# Patient Record
Sex: FEMALE | Race: OTHER | Employment: PART TIME | URBAN - METROPOLITAN AREA
[De-identification: names, ages, dates, MRNs, and addresses within clinical notes are randomized per-mention and may not be internally consistent; named-entity substitution may affect disease eponyms.]

---

## 2017-01-11 PROBLEM — Z51.89 TREATMENT: Status: ACTIVE | Noted: 2017-01-11

## 2017-01-26 ENCOUNTER — OFFICE VISIT (OUTPATIENT)
Dept: INTERNAL MEDICINE | Age: 42
End: 2017-01-26
Attending: INTERNAL MEDICINE

## 2017-01-26 VITALS — DIASTOLIC BLOOD PRESSURE: 100 MMHG | SYSTOLIC BLOOD PRESSURE: 150 MMHG | OXYGEN SATURATION: 99 %

## 2017-01-26 DIAGNOSIS — L72.9 CYST OF SUBCUTANEOUS TISSUE: Primary | ICD-10-CM

## 2017-01-26 RX ORDER — POLYETHYLENE GLYCOL 3350 17 G/17G
17 POWDER, FOR SOLUTION ORAL PRN
Qty: 119 G | Refills: 1 | Status: SHIPPED | OUTPATIENT
Start: 2017-01-26 | End: 2018-07-16 | Stop reason: SDUPTHER

## 2017-01-26 RX ORDER — MOMETASONE FUROATE 1 MG/G
CREAM TOPICAL
Qty: 1 TUBE | Refills: 2 | Status: SHIPPED | OUTPATIENT
Start: 2017-01-26 | End: 2017-11-06 | Stop reason: SDUPTHER

## 2017-01-26 ASSESSMENT — ENCOUNTER SYMPTOMS
VOMITING: 0
BLOOD IN STOOL: 1
NAUSEA: 0
COUGH: 0
ABDOMINAL PAIN: 0
DIARRHEA: 0
SHORTNESS OF BREATH: 0

## 2017-02-03 ENCOUNTER — TELEPHONE (OUTPATIENT)
Dept: INTERNAL MEDICINE | Age: 42
End: 2017-02-03

## 2017-02-06 PROBLEM — L03.311 ABDOMINAL WALL CELLULITIS: Status: ACTIVE | Noted: 2017-02-06

## 2017-02-08 PROBLEM — A49.02 MRSA INFECTION: Status: ACTIVE | Noted: 2017-02-08

## 2017-02-08 PROBLEM — L02.211 ABDOMINAL WALL ABSCESS: Status: ACTIVE | Noted: 2017-02-08

## 2017-02-13 ENCOUNTER — TELEPHONE (OUTPATIENT)
Dept: INFECTIOUS DISEASES | Age: 42
End: 2017-02-13

## 2017-02-20 ENCOUNTER — OFFICE VISIT (OUTPATIENT)
Dept: INTERNAL MEDICINE | Age: 42
End: 2017-02-20
Attending: STUDENT IN AN ORGANIZED HEALTH CARE EDUCATION/TRAINING PROGRAM

## 2017-02-20 VITALS
HEART RATE: 89 BPM | DIASTOLIC BLOOD PRESSURE: 94 MMHG | RESPIRATION RATE: 20 BRPM | TEMPERATURE: 99.3 F | BODY MASS INDEX: 39.48 KG/M2 | SYSTOLIC BLOOD PRESSURE: 142 MMHG | WEIGHT: 222.8 LBS | OXYGEN SATURATION: 99 %

## 2017-02-20 RX ORDER — LINEZOLID 600 MG/1
600 TABLET, FILM COATED ORAL 2 TIMES DAILY
Qty: 10 TABLET | Refills: 0 | Status: SHIPPED | OUTPATIENT
Start: 2017-02-20 | End: 2017-02-25

## 2017-02-20 RX ORDER — CETIRIZINE HYDROCHLORIDE, PSEUDOEPHEDRINE HYDROCHLORIDE 5; 120 MG/1; MG/1
1 TABLET, FILM COATED, EXTENDED RELEASE ORAL 2 TIMES DAILY
Qty: 60 TABLET | Refills: 0 | Status: SHIPPED | OUTPATIENT
Start: 2017-02-20 | End: 2017-03-22

## 2017-02-20 ASSESSMENT — ENCOUNTER SYMPTOMS
SHORTNESS OF BREATH: 0
SPUTUM PRODUCTION: 0
NAUSEA: 0
WHEEZING: 0
DOUBLE VISION: 0
BLOOD IN STOOL: 0
COUGH: 0
DIARRHEA: 0
BLURRED VISION: 0
VOMITING: 0
CONSTIPATION: 0

## 2017-03-01 RX ORDER — LEVOTHYROXINE SODIUM 0.12 MG/1
125 TABLET ORAL DAILY
Qty: 30 TABLET | Refills: 2
Start: 2017-03-01 | End: 2017-11-06 | Stop reason: SDUPTHER

## 2017-03-06 ENCOUNTER — TELEPHONE (OUTPATIENT)
Dept: INTERNAL MEDICINE | Age: 42
End: 2017-03-06

## 2017-04-04 DIAGNOSIS — J45.909 UNCOMPLICATED ASTHMA, UNSPECIFIED ASTHMA SEVERITY: ICD-10-CM

## 2017-04-04 RX ORDER — MONTELUKAST SODIUM 10 MG/1
10 TABLET ORAL DAILY
Qty: 30 TABLET | Refills: 1 | OUTPATIENT
Start: 2017-04-04 | End: 2017-10-16 | Stop reason: SDUPTHER

## 2017-05-10 PROBLEM — D69.3 CHRONIC ITP (IDIOPATHIC THROMBOCYTOPENIA) (HCC): Status: ACTIVE | Noted: 2017-05-10

## 2017-06-12 ENCOUNTER — OFFICE VISIT (OUTPATIENT)
Dept: INTERNAL MEDICINE | Age: 42
End: 2017-06-12
Attending: STUDENT IN AN ORGANIZED HEALTH CARE EDUCATION/TRAINING PROGRAM

## 2017-06-12 VITALS
WEIGHT: 224 LBS | SYSTOLIC BLOOD PRESSURE: 122 MMHG | RESPIRATION RATE: 18 BRPM | BODY MASS INDEX: 39.69 KG/M2 | TEMPERATURE: 99.2 F | OXYGEN SATURATION: 97 % | HEART RATE: 85 BPM | DIASTOLIC BLOOD PRESSURE: 79 MMHG

## 2017-06-12 DIAGNOSIS — E03.9 HYPOTHYROIDISM, UNSPECIFIED TYPE: Primary | ICD-10-CM

## 2017-06-12 DIAGNOSIS — E66.9 OBESITY, UNSPECIFIED OBESITY SEVERITY, UNSPECIFIED OBESITY TYPE: ICD-10-CM

## 2017-06-12 DIAGNOSIS — R20.0 NUMBNESS AND TINGLING: ICD-10-CM

## 2017-06-12 DIAGNOSIS — R20.2 NUMBNESS AND TINGLING: ICD-10-CM

## 2017-06-12 LAB
GLUCOSE BLD-MCNC: 111 MG/DL (ref 70–99)
PERFORMED ON: ABNORMAL

## 2017-06-12 RX ORDER — PREDNISONE 20 MG/1
TABLET ORAL
Qty: 10 TABLET | Refills: 0 | Status: SHIPPED | OUTPATIENT
Start: 2017-06-12 | End: 2017-06-22

## 2017-06-12 RX ORDER — ALBUTEROL SULFATE 90 UG/1
2 AEROSOL, METERED RESPIRATORY (INHALATION) EVERY 6 HOURS PRN
Qty: 1 INHALER | Refills: 5 | Status: SHIPPED | OUTPATIENT
Start: 2017-06-12 | End: 2018-03-19 | Stop reason: SDUPTHER

## 2017-06-12 RX ORDER — AMOXICILLIN AND CLAVULANATE POTASSIUM 875; 125 MG/1; MG/1
1 TABLET, FILM COATED ORAL 2 TIMES DAILY
Qty: 14 TABLET | Refills: 0 | Status: SHIPPED | OUTPATIENT
Start: 2017-06-12 | End: 2017-06-19

## 2017-06-12 RX ORDER — PANTOPRAZOLE SODIUM 40 MG/1
40 TABLET, DELAYED RELEASE ORAL DAILY
Qty: 30 TABLET | Refills: 2 | Status: SHIPPED | OUTPATIENT
Start: 2017-06-12 | End: 2018-07-16 | Stop reason: SDUPTHER

## 2017-06-12 ASSESSMENT — ENCOUNTER SYMPTOMS
SHORTNESS OF BREATH: 0
CONSTIPATION: 0
BLURRED VISION: 0
BLOOD IN STOOL: 0
VOMITING: 0
WHEEZING: 0
SORE THROAT: 1
PHOTOPHOBIA: 0
NAUSEA: 0
DOUBLE VISION: 0
SPUTUM PRODUCTION: 1
DIARRHEA: 0
COUGH: 1

## 2017-10-16 ENCOUNTER — OFFICE VISIT (OUTPATIENT)
Dept: INTERNAL MEDICINE | Age: 42
End: 2017-10-16
Attending: STUDENT IN AN ORGANIZED HEALTH CARE EDUCATION/TRAINING PROGRAM

## 2017-10-16 VITALS
HEIGHT: 63 IN | TEMPERATURE: 98.2 F | WEIGHT: 220 LBS | RESPIRATION RATE: 16 BRPM | HEART RATE: 83 BPM | OXYGEN SATURATION: 96 % | BODY MASS INDEX: 38.98 KG/M2 | SYSTOLIC BLOOD PRESSURE: 134 MMHG | DIASTOLIC BLOOD PRESSURE: 90 MMHG

## 2017-10-16 DIAGNOSIS — K75.4 AUTOIMMUNE HEPATITIS (HCC): Primary | ICD-10-CM

## 2017-10-16 DIAGNOSIS — Z12.39 BREAST SCREENING: ICD-10-CM

## 2017-10-16 DIAGNOSIS — Z76.0 MEDICATION REFILL: ICD-10-CM

## 2017-10-16 RX ORDER — MAGNESIUM 200 MG
200 TABLET ORAL DAILY
Qty: 30 TABLET | Refills: 0 | Status: SHIPPED | OUTPATIENT
Start: 2017-10-16 | End: 2018-02-15

## 2017-10-16 RX ORDER — RIZATRIPTAN BENZOATE 5 MG/1
5 TABLET ORAL
Qty: 6 TABLET | Refills: 0 | Status: SHIPPED | OUTPATIENT
Start: 2017-10-16 | End: 2018-07-16 | Stop reason: SDUPTHER

## 2017-10-16 RX ORDER — MONTELUKAST SODIUM 10 MG/1
10 TABLET ORAL NIGHTLY
Qty: 30 TABLET | Refills: 0 | Status: SHIPPED | OUTPATIENT
Start: 2017-10-16 | End: 2018-03-05 | Stop reason: SDUPTHER

## 2017-10-16 RX ORDER — SUMATRIPTAN 50 MG/1
50 TABLET, FILM COATED ORAL
Qty: 6 TABLET | Refills: 0 | Status: CANCELLED | OUTPATIENT
Start: 2017-10-16 | End: 2017-10-16

## 2017-10-16 RX ORDER — RIBOFLAVIN (VITAMIN B2) 400 MG
400 TABLET ORAL DAILY
Qty: 30 TABLET | Refills: 0 | Status: SHIPPED | OUTPATIENT
Start: 2017-10-16 | End: 2018-02-15

## 2017-10-16 ASSESSMENT — ENCOUNTER SYMPTOMS
NAUSEA: 1
SPUTUM PRODUCTION: 0
DOUBLE VISION: 0
EYE REDNESS: 1
VOMITING: 0
PHOTOPHOBIA: 1
DIARRHEA: 0
CONSTIPATION: 0
BLURRED VISION: 1
COUGH: 0
BLOOD IN STOOL: 0
SORE THROAT: 0
WHEEZING: 0
SINUS PAIN: 1
SHORTNESS OF BREATH: 0

## 2017-10-16 NOTE — PROGRESS NOTES
[facility] OUTPATIENT CLINIC       NURSING PROGRESS NOTE      2017  Riley Arora    Chief Complaint:   Chief Complaint   Patient presents with    Migraine       Constitutional: {Findings; ROS constitutional:13509::\"negative\"}  Eyes: {Findings; ROS eyes:11292::\"negative\"}  Ears, nose, mouth, throat, and face: {Findings; ROS HEENT:97991::\"negative\"}  Respiratory: {Findings; ROS respiratory:95106::\"negative\"}  Cardiovascular: {Findings; ROS cardiac:90425::\"negative\"}  Gastrointestinal: {Findings; ROS gastrointestinal:62421::\"negative\"}  Genitourinary: {Findings; ROS genitourinary:95341::\"negative\"}  Integument/breast: {Findings; ROS skin/breast:97904::\"negative\"}  Hematologic/lymphatic: {Findings; ROS hematologic/lymphatic:34946::\"negative\"}  Musculoskeletal: {Findings; ROS musculoskeletal:35650::\"negative\"}  Neurological: {Findings; ROS neuro:48842::\"negative\"}  Diabetes: {YES/NO/NA:238897285}  Yes:  Medication compliance:  {compliance:398943}  Diabetic diet compliance:  {compliance:923188}  Home glucose monitoring:  {home testin}  Last eye exam:  {NUMBER 1-10:4264015726} {days/wks/mos/yrs:947820}  Acute symptoms hyperglycemia:  {symptoms; hyperglycemia:45558}  Acute symptoms hypoglycemia:  {symptoms; hypoglycemia:50533}  POC glucose today: *** mg/dL    Pain Assessment:  Pain Present: {YES/NO:35061}  Pain Score: {NUMBERS; 0-10:5044}  Pain Quality/Description: {[facility] PAIN DESC:}    Mobility/Safety/Self-Care:  Steady Gait: {YES / NO:}  History of Falls: {EXAM; YES/NO:::\"No\"}   History of a Fall within the last 30 days {YES OR NO:}  Assistive Device: {Mobility/Ambulatory Device:1744633231}  Poor Hygiene: {YES / KS:93848}    Psychosocial:   Depression: {YES / DM:12067}  If YES,    Does Patient express current thoughts of harming self or others?: {YES / IN:32799}  Anxious: {YES / RACHAEL:23223}  Insomnia: {YES / ZF:36403}  Inappropriate Behavior: {YES / GV:36986}  Alcohol Abuse:

## 2017-10-16 NOTE — PROGRESS NOTES
cycle     Morbid obesity with BMI of 40.0-44.9, adult ([address] 5/26/2015    MRSA (methicillin resistant staph aureus) culture positive 02/03/2017    abdominal abscess    MRSA infection 08/20/2012    rt thigh abscess    Pericarditis, acute     Sinusitis        Past Surgical History:        Procedure Laterality Date    OTHER SURGICAL HISTORY  8/22/2012    INCISION AND DRAINAGE POSTERIOR THIGH ABSCESS    RECTAL SURGERY  Aug 2011    repair of rectal fissures and anal skin tag removal    SINUS SURGERY      X 3    TONSILLECTOMY  2004    TONSILLECTOMY AND ADENOIDECTOMY  2005    (partial adenoidectomy)       Family History:       Problem Relation Age of Onset    High Blood Pressure Mother     Heart Disease Father     Diabetes Maternal Aunt        Social History:   TOBACCO:   reports that she has never smoked. She has never used smokeless tobacco.  ETOH:   reports that she drinks alcohol. OCCUPATION:      Allergies:  Latex; Clindamycin/lincomycin; Sulfa antibiotics; and Diflucan [fluconazole]    Current Medications:    Prior to Admission medications    Medication Sig Start Date End Date Taking? Authorizing Provider   pantoprazole (PROTONIX) 40 MG tablet Take 1 tablet by mouth daily 6/12/17  Yes Vic Vaca MD   albuterol sulfate HFA (PROVENTIL HFA) 108 (90 BASE) MCG/ACT inhaler Inhale 2 puffs into the lungs every 6 hours as needed for Wheezing 6/12/17  Yes Vic Vaca MD   levothyroxine (SYNTHROID) 125 MCG tablet Take 1 tablet by mouth daily 3/1/17  Yes Raul Watkins MD   lactobacillus (CULTURELLE) CAPS capsule Take 4 capsules by mouth daily 2/9/17  Yes Suzie Paniagua MD   mometasone (ELOCON) 0.1 % cream Apply topically daily. 1/26/17  Yes Kerri Kwon MD   polyethylene glycol [facility]) powder Take 17 g by mouth as needed (constipation) Take as directed. 1/26/17  Yes Kerri Kwon MD   urea (CARMOL) 10 % cream Apply topically as needed.  10/24/16  Yes Miles Waller DPM   folic acid (FOLVITE) 1 MG tablet Take 1 tablet by mouth daily 7/18/16  Yes Ekaterina Santizo MD   montelukast (SINGULAIR) 10 MG tablet Take 1 tablet by mouth daily 4/4/17   Ekaterina Santizo MD   cyanocobalamin 1000 MCG/ML injection Inject 1 ml subcutaneously every 30 days 3/8/17   Nadia Bahena MD   Syringe, Disposable, 2.5 ML MISC 1 Syringe by Does not apply route every 30 days 3/8/17   Nadia Bahena MD   NEEDLE, DISP, 25 G (BD DISP NEEDLES) 25G X 5/8\" MISC 1 packet by Does not apply route every 30 days 3/8/17   Nadia Bahena MD   budesonide-formoterol [facility]) 160-4.5 MCG/ACT AERO Inhale 2 puffs into the lungs 2 times daily 7/18/16   Ekaterina Santizo MD   Biotin 5000 MCG TABS Take 5,000 mcg by mouth daily    Historical Provider, MD   Spacer/Aero-Holding Chambers (AEROCHAMBER PLUS-FLOW SIGNAL) MISC Use with inhalers as directed 8/17/15   Zak Galvez MD   budesonide (PULMICORT) 0.5 MG/2ML nebulizer suspension Use 1 raspule (0.5mg) to mix with 1 teaspoon of saline. Then instill into sinuses as instructed. 2/2/15   Nil Roger Arana MD       Review of Systems   Constitutional: Negative for chills, fever and malaise/fatigue. HENT: Positive for congestion (improving) and sinus pain (improving). Negative for ear discharge, ear pain, sore throat and tinnitus. Headache     Eyes: Positive for blurred vision (with migraine), photophobia and redness. Negative for double vision. Respiratory: Negative for cough, sputum production, shortness of breath and wheezing. Cardiovascular: Negative for chest pain, palpitations and leg swelling. Gastrointestinal: Positive for nausea (with migraine). Negative for blood in stool, constipation, diarrhea, melena and vomiting. Genitourinary: Negative for dysuria, flank pain, hematuria and urgency. Musculoskeletal: Negative for falls. Skin: Negative for rash. Neurological: Negative for dizziness, focal weakness, loss of consciousness and headaches.    Endo/Heme/Allergies: Negative hours. [unfilled]    No results for input(s): Ulyess Course, LABMICR, MICROALBUR, Michelle Zhou in the last 72 hours. Lab Results   Component Value Date    TSH 0.67 11/26/2016       Hematology:  No results for input(s): WBC, HGB, HCT, PLT, MCV, MCH, MCHC, RDW, EOSABS in the last 72 hours. Invalid input(s): NEUTP, LYMPHP, MONOSP, EOSP, BASOP, NEUTABS, LYMPHABS, MONOABS, BASOABS    Lab Results   Component Value Date    IRON 106 02/21/2017    TIBC 259 02/21/2017    FERRITIN 812.7 (H) 02/21/2017    FOLATE 7.11 11/26/2016    MZXDDODR58 278 11/26/2016       Lipid:  Lab Results   Component Value Date    CHOL 174 10/24/2013    HDL 56 10/24/2013    LDLCALC 99 10/24/2013    TRIG 93 10/24/2013       U/A:  Lab Results   Component Value Date    LABMICR SEE BELOW 09/20/2016         Assessment/Plan:     Headache. Reports history of migraines. Headache can also be due to her sinus issues and/or stress. Patient stated new lights installed in her work has resulted in an exacerbation and making it difficult to work. Requested letter to wear sunglasses while working.  - Will prescribe rizatriptan 5mg, if improves her headaches will know with more certainty it is a migraine  - Prescribed Riboflavin/magnesium   - Letter provided to wear sunglasses while working  - F/u in 2 weeks     Sinusitis. Patient with previous history of sinusitis. Reports another episodes and was seen by her ENT. Reports currently taking Augmentin and prednisone.   - Cont Per ENT    Left Eye Conjunctivitis. Some conjunctival erythema evident. No drainage currently. Likely viral. May also be due to allergies as patient has not been taking her Singulair. No evidence of bacterial infection  - Reorder Singulair  - Patient already on PO antibiotics per ENT for sinusitis   - Recommended patient obtain eye lid scrub/rince   - Monitor    Autoimmune Hepatitis: Patient was diagnosed on 11/26/2016. Smooth muscle antibody <1.2. F actin IgG greater than 57. Tissue transglutaminase 1. [place] positive. Enzymes elevated during last hospital admission.     - Patient reported she was seen by GI at [facility] and was followed for this problem. Per GI note from January (Dr. Marcos Scherer) it appears he was awaiting to complete his own evaluation prior to referring her to a hepatologist. No notes founds thereafter.   - Patient informed she needs to follow up with GI about her autoimmune hepatitis as she has not seen them since her colonoscopy. - Had a colonoscopy was in February which was normal. F/u scope in 2027. - Will defer to GI   - Will get CMP  - Monitor    Autoimmune thrombocytopenia  - Patient follows with Dr. Ventura Vines, informed she needs an appointment  - Platelet count appeared stable   - Will get CBC  - Will defer management to oncology/hematology  - Monitor    Hypothyroidism. TSH 0.67 on 11/26/16  - Cont home synthroid  - Check TSH    Cellulitis- Resolved. Patient had presented to the ED on Friday 2/3 with left lower quadrant cellulitis and I&D was performed on an abscess that was present. Reduced sensation- Resolved. Patient previously complained of somewhat reduced sensation on the 2nd digit of both feet. Today patient issue has resolved, thinks it may have been \"dead skin\". Yeast Infection - Resolved    Health Maintenance   - Patient does not wish to have any vaccinations currently  - Does not want cervical screening during current visit  - Ordered mammogram   - Will check lipids, last checked in 2013    Case will be discussed with preceptor.      River Moran MD  Internal Medicine Resident  Pager: [phone]  10/16/2017, 10:58 AM

## 2017-10-16 NOTE — PATIENT INSTRUCTIONS
Before any of you medication is completely gone, call your pharmacy AT LEAST 1 WEEK ahead for refills. Review all information regarding your medication before starting. If you become ill when the clinic is closed, please call the [facility] [employer]  at   #[phone] and ask the  to page the AOD between 6:00 AM and 6:00 PM or page the AON between 6:00 PM and 6:00 am    Labs are done Tuesday thru Friday from 8:00 to 9:00 AM. For fasting labs do not eat anything for 12 hours prior. You may drink water. The clinic is not able to process MY CHART requests for appointments or messages including requests. Please call the [address] [place] [address] at [phone]  For appointments and messages. Call your pharmacy for medication refills. Return to clinic in 2 weeks    Please go to [employer] and get an eyelid scrub for your eye matter    Continue medication as listed on discharge sheet  We are starting you on Magnesium 200 mgs daily  Riboflavin 400 mgs daily   riboflavin (vitamin B2)  Pronunciation:  RYE bow flay toma  Brand:  [id number], Vitamin B2  What is the most important information I should know about riboflavin? Follow all directions on your medicine label and package. Tell each of your healthcare providers about all your medical conditions, allergies, and all medicines you use. What is riboflavin? Riboflavin is vitamin B2. Vitamins are naturally occurring substances necessary for many processes in the body. Riboflavin is important in the maintenance of many tissues of the body. Riboflavin is used to treat or prevent deficiencies of riboflavin. Riboflavin may also be used for purposes not listed in this medication guide. What should I discuss with my healthcare provider before taking riboflavin? Ask a doctor or pharmacist if it is safe for you to use this medicine if you have other medical conditions, especially:  · gallbladder disease; or  · cirrhosis or other liver disease.   Riboflavin for medical advice about side effects. You may report side effects to FDA at 1-759-ITE-2197. What other drugs will affect riboflavin? Other drugs may interact with riboflavin, including prescription and over-the-counter medicines, vitamins, and herbal products. Tell each of your health care providers about all medicines you use now and any medicine you start or stop using. Where can I get more information? Your pharmacist can provide more information about riboflavin. Remember, keep this and all other medicines out of the reach of children, never share your medicines with others, and use this medication only for the indication prescribed. Every effort has been made to ensure that the information provided by [facility]  is accurate, up-to-date, and complete, but no guarantee is made to that effect. Drug information contained herein may be time sensitive. [employer] information has been compiled for use by healthcare practitioners and consumers in the [place] and therefore [employer] does not warrant that uses outside of the [place] are appropriate, unless specifically indicated otherwise. [facility] drug information does not endorse drugs, diagnose patients or recommend therapy. [employer] drug information is an informational resource designed to assist licensed healthcare practitioners in caring for their patients and/or to serve consumers viewing this service as a supplement to, and not a substitute for, the expertise, skill, knowledge and judgment of healthcare practitioners. The absence of a warning for a given drug or drug combination in no way should be construed to indicate that the drug or drug combination is safe, effective or appropriate for any given patient. [employer] does not assume any responsibility for any aspect of healthcare administered with the aid of information [employer] provides.  The information contained herein is not intended to cover all possible uses, directions, may also be used for purposes not listed in this medication guide. What should I discuss with my healthcare provider before using rizatriptan? You should not use rizatriptan if you are allergic to it, or if you have:  · severe or uncontrolled high blood pressure;  · past or present heart problems;  · a history of coronary artery disease, angina (chest pain), heart attack, or stroke, including \"mini-stroke\";  · a blood vessel disorder or circulation problems that cause a lack of blood supply within the body; or  · a headache that seems different from your usual migraine headaches. Do not use rizatriptan if you have used an MAO inhibitor in the past 14 days. A dangerous drug interaction could occur. MAO inhibitors include isocarboxazid, linezolid, methylene blue injection, phenelzine, rasagiline, selegiline, tranylcypromine, and others. To make sure rizatriptan is safe for you, tell your doctor if you have:  · liver or kidney disease;  · high blood pressure, a heart rhythm disorder;  · a condition for which you take propranolol (Hemangeol, Inderal, InnoPran); or  · coronary heart disease (or risk factors such as diabetes, menopause, smoking, being overweight, having high cholesterol, having a family history of coronary artery disease, being older than 36 and a man, or being a woman who has had a hysterectomy). Rizatriptan disintegrating tablets may contain phenylalanine. Talk to your doctor before using this form of rizatriptan if you have phenylketonuria (PKU). It is not known whether this medicine will harm an unborn baby. Tell your doctor if you are pregnant or plan to become pregnant. It is not known whether rizatriptan passes into breast milk or if it could harm a nursing baby. Tell your doctor if you are breast-feeding a baby. Rizatriptan is not approved for use by anyone younger than 10years old. How should I use rizatriptan?   Your doctor may want to give your first dose of this medicine in a hospital be checked using an electrocardiograph or ECG (sometimes called an EKG). Store at room temperature away from moisture and heat. What happens if I miss a dose? Since rizatriptan is used as needed, it does not have a daily dosing schedule. Call your doctor promptly if your symptoms do not improve after using rizatriptan. What happens if I overdose? Seek emergency medical attention or call the Poison Help line at 1-787.719.4238. What should I avoid while using rizatriptan? Do not take rizatriptan within 24 hours before or after using another migraine headache medicine,  including:  · medicines like rizatriptan--almotriptan, eletriptan, frovatriptan, naratriptan, sumatriptan, zolmitriptan, and others; or  · ergot medicine--dihydroergotamine, ergotamine, ergonovine, methylergonovine. Rizatriptan may impair your thinking or reactions. Be careful if you drive or do anything that requires you to be alert. What are the possible side effects of rizatriptan? Get emergency medical help if you have signs of an allergic reaction: hives; difficult breathing; swelling of your face, lips, tongue, or throat. Stop using rizatriptan and call your doctor at once if you have:  · sudden and severe stomach pain and bloody diarrhea;  · cold feeling or numbness in your feet and hands;  · heart attack symptoms --chest pain or pressure, pain spreading to your jaw or shoulder, nausea, sweating;  · high levels of serotonin in the body --agitation, hallucinations, fever, fast heart rate, overactive reflexes, nausea, vomiting, diarrhea, loss of coordination, fainting;  · signs of a stroke --sudden numbness or weakness (especially on one side of the body), sudden severe headache, slurred speech, problems with vision or balance; or  · dangerously high blood pressure --severe headache, blurred vision, buzzing in your ears, anxiety, confusion, chest pain, shortness of breath, uneven heartbeats, seizure.   Common side effects may viewing this service as a supplement to, and not a substitute for, the expertise, skill, knowledge and judgment of healthcare practitioners. The absence of a warning for a given drug or drug combination in no way should be construed to indicate that the drug or drug combination is safe, effective or appropriate for any given patient. [facility] does not assume any responsibility for any aspect of healthcare administered with the aid of information [facility] provides. The information contained herein is not intended to cover all possible uses, directions, precautions, warnings, drug interactions, allergic reactions, or adverse effects. If you have questions about the drugs you are taking, check with your doctor, nurse or pharmacist.  Copyright 1264-4651 [address]: 11.01. Revision date: 9/29/2016. Care instructions adapted under license by [facility] ([facility]). If you have questions about a medical condition or this instruction, always ask your healthcare professional. [place] [address] any warranty or liability for your use of this information. 5 mgs for migraines. Take as directed    Please schedule a mammogram    Go to the outpatient lab after fasting for 8 hours. Eat no food or beverages except water. Please have labs drawn this week. Instructions reviewed before discharge and copy given to patient by ANTOINE Parks RN, [address] [employer] [phone]

## 2017-10-16 NOTE — PROGRESS NOTES
[facility]       NURSING PROGRESS NOTE      October 16, 2017  Letty Washburn    Chief Complaint:   Chief Complaint   Patient presents with    Migraine       Constitutional: negative  Eyes: positive for redness in left eye with drainage. Photosensitivity  Ears, nose, mouth, throat, and face: positive for clearing sinus infection  Respiratory: negative  Cardiovascular: negative  Gastrointestinal: negative  Genitourinary: negative  Integument/breast: negative  Hematologic/lymphatic: negative  Musculoskeletal: negative  Neurological: positive for headaches  Diabetes: No  Pain Assessment:  Pain Present: yes  Pain Score: 9  Pain Quality/Description: Aching    Mobility/Safety/Self-Care:  Steady Gait: Yes  History of Falls: No   History of a Fall within the last 30 days No  Assistive Device: None  Poor Hygiene: No    Psychosocial:   Depression: No  If YES,    Does Patient express current thoughts of harming self or others?: No  Anxious: Yes, at work  Insomnia: Yes  Inappropriate Behavior: No  Alcohol Abuse: no  Substance Abuse: no  Signs of Physical Abuse: No  Signs of Emotional Abuse: No    Educational:  Identify the learner who is being assessed for education:  patient                    Ability to Learn:  Exhibits ability to grasp concepts and respond to questions: High  Ready to Learn: Yes  calm   Preferred Method of Learning:  verbal  Barriers to Learning: none  Special Considerations due to cultural, Buddhist, spiritual beliefs:  No  Language:  English  :  No    Note:   Here for follow up. Having migraines and needs a work note allowing her to wear sunglasses at work. Declined flu shot.     11:44 AM 10/16/2017

## 2017-10-16 NOTE — LETTER
To Whom This May Concern,     I've been asked by Ms. Marcel iMshra to write this letter. She is under this clinic's care for treatment of chronic migraine headaches. Over the last several months, she had several office visits that have focused around her self report inability to work because of ongoing headaches triggered by LED lights. Ms. Marcel Mishra was referred to a neurologist for additional evaluation and as of this date, she had declined to have further testing. This office cannot document that she can work without restrictions.        Sincerely          MD Gwen Wharton MD

## 2017-10-28 LAB — T4 FREE: 0.7 NG/DL (ref 0.9–1.8)

## 2017-10-30 ENCOUNTER — HOSPITAL ENCOUNTER (OUTPATIENT)
Dept: CT IMAGING | Age: 42
Discharge: HOME | End: 2017-10-30
Attending: STUDENT IN AN ORGANIZED HEALTH CARE EDUCATION/TRAINING PROGRAM | Admitting: STUDENT IN AN ORGANIZED HEALTH CARE EDUCATION/TRAINING PROGRAM

## 2017-10-30 ENCOUNTER — OFFICE VISIT (OUTPATIENT)
Dept: INTERNAL MEDICINE | Age: 42
End: 2017-10-30
Attending: STUDENT IN AN ORGANIZED HEALTH CARE EDUCATION/TRAINING PROGRAM

## 2017-10-30 ENCOUNTER — HOSPITAL ENCOUNTER (OUTPATIENT)
Dept: MAMMOGRAPHY | Age: 42
Discharge: HOME | End: 2017-10-30
Attending: STUDENT IN AN ORGANIZED HEALTH CARE EDUCATION/TRAINING PROGRAM | Admitting: STUDENT IN AN ORGANIZED HEALTH CARE EDUCATION/TRAINING PROGRAM

## 2017-10-30 VITALS
BODY MASS INDEX: 38.62 KG/M2 | SYSTOLIC BLOOD PRESSURE: 131 MMHG | RESPIRATION RATE: 18 BRPM | HEART RATE: 84 BPM | TEMPERATURE: 97.9 F | HEIGHT: 63 IN | WEIGHT: 218 LBS | OXYGEN SATURATION: 99 % | DIASTOLIC BLOOD PRESSURE: 86 MMHG

## 2017-10-30 DIAGNOSIS — R51.9 HEADACHE: ICD-10-CM

## 2017-10-30 DIAGNOSIS — R51.9 INTRACTABLE EPISODIC HEADACHE, UNSPECIFIED HEADACHE TYPE: Primary | ICD-10-CM

## 2017-10-30 DIAGNOSIS — E03.9 HYPOTHYROIDISM, UNSPECIFIED TYPE: ICD-10-CM

## 2017-10-30 DIAGNOSIS — Z12.39 BREAST SCREENING: ICD-10-CM

## 2017-10-30 DIAGNOSIS — K90.9 INTESTINAL MALABSORPTION, UNSPECIFIED TYPE: Chronic | ICD-10-CM

## 2017-10-30 DIAGNOSIS — R51.9 INTRACTABLE EPISODIC HEADACHE, UNSPECIFIED HEADACHE TYPE: ICD-10-CM

## 2017-10-30 DIAGNOSIS — D50.0 IRON DEFICIENCY ANEMIA DUE TO CHRONIC BLOOD LOSS: ICD-10-CM

## 2017-10-30 DIAGNOSIS — Z00.00 PREVENTATIVE HEALTH CARE: ICD-10-CM

## 2017-10-30 RX ORDER — LACTOBACILLUS RHAMNOSUS GG 10B CELL
4 CAPSULE ORAL DAILY
Qty: 120 CAPSULE | Refills: 0 | Status: SHIPPED | OUTPATIENT
Start: 2017-10-30 | End: 2017-12-04 | Stop reason: SDUPTHER

## 2017-10-30 RX ORDER — CYANOCOBALAMIN 1000 UG/ML
INJECTION INTRAMUSCULAR; SUBCUTANEOUS
Qty: 1 ML | Refills: 11 | Status: SHIPPED | OUTPATIENT
Start: 2017-10-30 | End: 2018-07-16 | Stop reason: SDUPTHER

## 2017-10-30 RX ORDER — CYANOCOBALAMIN 1000 UG/ML
1000 INJECTION INTRAMUSCULAR; SUBCUTANEOUS ONCE
Qty: 1 ML | Refills: 0 | Status: SHIPPED | OUTPATIENT
Start: 2017-10-30 | End: 2017-11-06 | Stop reason: ALTCHOICE

## 2017-10-30 RX ORDER — RIZATRIPTAN BENZOATE 5 MG/1
5 TABLET ORAL
Qty: 6 TABLET | Refills: 0 | Status: SHIPPED | OUTPATIENT
Start: 2017-10-30 | End: 2017-11-06 | Stop reason: SDUPTHER

## 2017-10-30 RX ORDER — BUSPIRONE HYDROCHLORIDE 5 MG/1
5 TABLET ORAL 2 TIMES DAILY
Qty: 60 TABLET | Refills: 0 | Status: SHIPPED | OUTPATIENT
Start: 2017-10-30 | End: 2017-11-06

## 2017-10-30 ASSESSMENT — ENCOUNTER SYMPTOMS
CONSTIPATION: 0
SORE THROAT: 0
DOUBLE VISION: 0
VOMITING: 0
NAUSEA: 1
BLURRED VISION: 1
COUGH: 0
WHEEZING: 0
SPUTUM PRODUCTION: 0
SINUS PAIN: 0
SHORTNESS OF BREATH: 0
BLOOD IN STOOL: 0
DIARRHEA: 0
PHOTOPHOBIA: 1
EYE REDNESS: 0

## 2017-10-30 NOTE — PROGRESS NOTES
Department Of Internal Medicine  General Medicine/Primary Care  Established Patient Visit    Patient:  Aditi Dempsey                                               : 1975  Age: 38 y.o. MRN: [id number]  Date : 10/30/2017    History Obtained From:  Patient, EMR    REASON FOR VISIT:  Follow-up visit    HISTORY OF PRESENT ILLNESS:     Aditi Dempsey is a 37 yo Female PMH of sinusitis w/ multiple surgeries, pericarditis, asthma, autoimmune hepatitis, menorrhagia w/ iron deficiency anemia, GERD, hypothyroidism who presents for a follow-up visit. Patient was seen 2 weeks ago for headaches, suspected to be migraine type (reported previous history, had visual aura), which were exacerbated by her work conditions (stress, new lights). She was prescribed Maxalt as well as magnesium/riboflavin. Today the patient reports that her headaches persist. The rizatriptan is helping \"taking the edge off\" and removing some of her symptoms but does not fully alleviate her headache. Her headaches usually are worse at work and significantly improve several hours after she gets home. She endorses feeling very stressed at work and feels this is also contributing. She has not taken the magnesium and riboflavin. Had some conjunctivitis which has resolved and she has no eye pain. She finished her treatment course for sinusitis (per ENT) but is concerned she may have a reoccurrence  (has noticed some nasal discharge which is more yellow, has sinus pressure which is very common for her). No fever, chills, shore throat or other symptoms (although sinus issue can also contribute to headaches) . Has had multiple procedures on her sinuses and is seen by ENT. She is not regularly taking her folate/B12. She has also missed several doses of her synthroid. She missed work on Thursday, Friday and would like a note.      Still has not followed with Dr. Fawn Beaver at [facility] for her autoimmune hepatitis and Dr. Petar Lr for ITP/thrombocytopenia . Patient denies neck stiffness, shortness of breath, chest pain, abdominal pain, dysuria. Past Medical History:        Diagnosis Date    Anemia, iron deficiency     Asthma     Autoimmune hepatitis ([address]     History of blood transfusion     Hypothyroidism     Menorrhagia with regular cycle     Morbid obesity with BMI of 40.0-44.9, [facility] ([facility]) 5/26/2015    MRSA (methicillin resistant staph aureus) culture positive 02/03/2017    abdominal abscess    MRSA infection 08/20/2012    rt thigh abscess    Pericarditis, acute     Sinusitis        Past Surgical History:        Procedure Laterality Date    OTHER SURGICAL HISTORY  8/22/2012    INCISION AND DRAINAGE POSTERIOR THIGH ABSCESS    RECTAL SURGERY  Aug 2011    repair of rectal fissures and anal skin tag removal    SINUS SURGERY      X 3    TONSILLECTOMY  2004    TONSILLECTOMY AND ADENOIDECTOMY  2005    (partial adenoidectomy)       Family History:       Problem Relation Age of Onset    High Blood Pressure Mother     Heart Disease Father     Diabetes Maternal Aunt        Social History:   TOBACCO:   reports that she has never smoked. She has never used smokeless tobacco.  ETOH:   reports that she drinks alcohol. OCCUPATION:      Allergies:  Latex; Clindamycin/lincomycin; Sulfa antibiotics; and Diflucan [fluconazole]    Current Medications:    Prior to Admission medications    Medication Sig Start Date End Date Taking?  Authorizing Provider   rizatriptan (MAXALT) 5 MG tablet Take 1 tablet by mouth once as needed for Migraine May repeat in 2 hours if needed 10/16/17 10/16/17  Argelia Mercedes MD   montelukast (SINGULAIR) 10 MG tablet Take 1 tablet by mouth nightly 10/16/17   Argelia Mercedes MD   Riboflavin 400 MG TABS Take 400 mg by mouth daily 10/16/17   Argelia Mercedes MD   magnesium 200 MG TABS tablet Take 1 tablet by mouth daily 10/16/17   Argelia Mercedes MD   pantoprazole (PROTONIX) 40 MG tablet Take 1 tablet by mouth daily 6/12/17   Deepak Martinez MD   albuterol sulfate HFA (PROVENTIL HFA) 108 (90 BASE) MCG/ACT inhaler Inhale 2 puffs into the lungs every 6 hours as needed for Wheezing 6/12/17   Deepak Martinez MD   cyanocobalamin 1000 MCG/ML injection Inject 1 ml subcutaneously every 30 days 3/8/17   Anahi Gonzalez MD   Syringe, Disposable, 2.5 ML MISC 1 Syringe by Does not apply route every 30 days 3/8/17   Anahi Gonzalez MD   NEEDLE, DISP, 25 G (BD DISP NEEDLES) 25G X 5/8\" MISC 1 packet by Does not apply route every 30 days 3/8/17   Anahi Gonzalez MD   levothyroxine (SYNTHROID) 125 MCG tablet Take 1 tablet by mouth daily 3/1/17   Cole Webster MD   lactobacillus (CULTURELLE) CAPS capsule Take 4 capsules by mouth daily 2/9/17   Kim Lo MD   mometasone (ELOCON) 0.1 % cream Apply topically daily. 1/26/17   David Javed MD   polyethylene glycol [facility]) powder Take 17 g by mouth as needed (constipation) Take as directed. 1/26/17   David Javed MD   urea (CARMOL) 10 % cream Apply topically as needed. 10/24/16   Amos Hashimoto, DPM   folic acid (FOLVITE) 1 MG tablet Take 1 tablet by mouth daily 7/18/16   Deepak Martinez MD   budesonide-formoterol [facility]) 160-4.5 MCG/ACT AERO Inhale 2 puffs into the lungs 2 times daily 7/18/16   Deepak Martinez MD   Biotin 5000 MCG TABS Take 5,000 mcg by mouth daily    Historical Provider, MD   Spacer/Aero-Holding Chambers (AEROCHAMBER PLUS-FLOW SIGNAL) MISC Use with inhalers as directed 8/17/15   Sharron Askew MD   budesonide (PULMICORT) 0.5 MG/2ML nebulizer suspension Use 1 raspule (0.5mg) to mix with 1 teaspoon of saline. Then instill into sinuses as instructed. 2/2/15   Ryan Marino MD       Review of Systems   Constitutional: Negative for chills, fever and malaise/fatigue. HENT: Positive for congestion (slight). Negative for ear discharge, ear pain, sinus pain (Has some pressure, but no pain), sore throat and tinnitus.          Headache     Eyes: Positive for blurred vision (with migraine, relieved by triptan) and photophobia (at work). Negative for double vision and redness. Respiratory: Negative for cough, sputum production, shortness of breath and wheezing. Cardiovascular: Negative for chest pain, palpitations and leg swelling. Gastrointestinal: Positive for nausea (with migraine). Negative for blood in stool, constipation, diarrhea, melena and vomiting. Genitourinary: Negative for dysuria, flank pain, hematuria and urgency. Musculoskeletal: Negative for falls. Skin: Negative for rash. Neurological: Negative for dizziness, focal weakness, loss of consciousness and headaches. Endo/Heme/Allergies: Negative for polydipsia. Psychiatric/Behavioral: Negative for substance abuse. Physical Exam:      Vitals:   /86 (Site: Left Arm, Position: Sitting, Cuff Size: Large Adult)   Pulse 84   Temp 97.9 °F (36.6 °C) (Oral)   Resp 18   Ht 5' 3\" (1.6 m)   Wt 218 lb (98.9 kg)   SpO2 99%   BMI 38.62 kg/m²     Body mass index is 38.62 kg/m². Wt Readings from Last 3 Encounters:   10/30/17 218 lb (98.9 kg)   10/16/17 220 lb (99.8 kg)   07/01/17 224 lb (101.6 kg)       Physical Exam   Constitutional: She is oriented to person, place, and time. No distress. Wearing sunglasses   HENT:   Head: Normocephalic and atraumatic. Right Ear: External ear normal.   Left Ear: External ear normal.   Mouth/Throat: Oropharynx is clear and moist. No trismus in the jaw. No oropharyngeal exudate or tonsillar abscesses. Eyes: EOM are normal. Pupils are equal, round, and reactive to light. Right eye exhibits no discharge. Left eye exhibits no discharge. No significant erythema, discharge   Neck: Normal range of motion. No JVD present. No thyromegaly present. Cardiovascular: Normal rate, regular rhythm and normal heart sounds. No murmur heard. Pulmonary/Chest: No accessory muscle usage or stridor. No respiratory distress.  She has decreased breath sounds but no full resolution. Has not taken magnesium/riboflavin prescribed. - CT Head W WO contrast. Will evaluate for bleed/ mass. May need MRI if no improvement noted in symptoms. Patient does have chronically low platelets. - Asked to f/u with ENT for sinuses   - Will again prescribe rizatriptan 5mg (6 pills)  - Asked to take Riboflavin/magnesium   - F/u in 1 weeks     Chronic Sinusitis. Patient with previous history of sinusitis with multiple procedures. Reports another recent episode and was seen by her ENT. Recently completed course of Augmentin and prednisone. Worries about potential recurrence but currently only has some nasal discharge which is \"more yellow\". - Asked to follow up with ENT, this seems like a chronic issue and more antibiotics/steroids is not seeming to help. Anxiety. Patient has had increasing stress at work and reports significant anxiety.   - Start with low dose Buspar 5mg BID. No contraindications. Will monitor blood work as has a rare side-effect of thrombocytopenia. Autoimmune Hepatitis: Patient was diagnosed on 11/26/2016. Smooth muscle antibody <1.2. F actin IgG greater than 57. Tissue transglutaminase 1. [place] positive. Has elevated enzymes ( alk phose- 316, ALT - 110, AST-83 on 10/28)    - Patient was seen by GI at [facility] and was followed for this issue. Per GI note from January (Dr. Iman Rodriguez) it appears he was awaiting to complete his own evaluation prior to referring her to a hepatologist. Patient has not followed up since her colonoscopy. - Patient informed she needs to follow up with GI about her autoimmune hepatitis   - Had a colonoscopy in February which was normal. F/u scope in 2027. - Will defer to GI, but may need steroids  - Monitor    Hypothyroidism. TSH of 8.31 and T4 of 0.7 on 10/28/17. Previously had TSH of 0.67 and t4 of 1.1 on 11/26/16 . Patient does report missing some doses. No overt symptoms. Reports history of graves/hashimoto. No palpable nodules.    - Asked to take current dose (125mcg) regularly for 1 month, will repeat TSH thereafter before going up on dose  - Referred to endocrinology   - Check TSH in 1 month    HLD  - LDL of 139, but does have high HDL of 65. - Will avoid statin with her current liver issues. Low folate. 3.66 on 10/28/17. Patient does not regularly take her folate supplement. Her B12 was 266 (low - normal). - Asked to take her folate supplement.  - B12 injection given. Will do 1000 mcg IM once weekly for 4 weeks, subsequently will transition to once monthly dosing. Autoimmune thrombocytopenia. Platelets of 87 04/91, were 98 on 5/10  - Patient follows with Dr. Jeannine Hanley, informed she needs an appointment  - Platelet count appeared relatively stable (slight decline)  - Will defer management to oncology/hematology  - Monitor    Cellulitis- Resolved. Reduced sensation- Resolved. Patient previously complained of somewhat reduced sensation on the 2nd digit of both feet. Today patient issue has resolved, thinks it may have been \"dead skin\". Yeast Infection - Resolved    Left Eye Conjunctivitis. - resolved    Health Maintenance   - Patient does not wish to have any vaccinations currently (flu, pneumonia)  - Does not want cervical screening during current visit, says she has gynecologist.     Case will be discussed with preceptor.      Codi Mcfarland MD  Internal Medicine Resident  Pager: [phone]  10/30/2017, 9:39 AM

## 2017-10-30 NOTE — PATIENT INSTRUCTIONS
Before any of you medication is completely gone, call your pharmacy AT LEAST 1 WEEK ahead for refills. Review all information regarding your medication before starting. If you become ill when the clinic is closed, please call the [facility]  at   #[phone] and ask the  to page the AOD between 6:00 AM and 6:00 PM or page the AON between 6:00 PM and 6:00 am    Labs are done Tuesday thru Friday from 8:00 to 9:00 AM. For fasting labs do not eat anything for 12 hours prior. You may drink water. The clinic is not able to process MY CHART requests for appointments or messages including requests. Please call the [address] [place] [address] at [phone]  For appointments and messages. Call your pharmacy for medication refills. Return to clinic in one week    Please schedule an appointment with Brandon Garza for your diabetes    Please schedule a CT scan with and without contrast    Continue medication as listed on discharge sheet    We are prescribing 6 more Maxalt for your headaches    We are adding Buspar 5 mgs twice a day  buspirone  Pronunciation:  linwood abrams  Brand: BuSpar  What is the most important information I should know about buspirone? Do not use buspirone if you have taken an MAO inhibitor in the past 14 days. A dangerous drug interaction could occur. MAO inhibitors include isocarboxazid, linezolid, methylene blue injection, phenelzine, rasagiline, selegiline, and tranylcypromine. What is buspirone? Buspirone is an anti-anxiety medicine that affects chemicals in the brain that may be unbalanced in people with anxiety. Buspirone is used to treat symptoms of anxiety, such as fear, tension, irritability, dizziness, pounding heartbeat, and other physical symptoms. Buspirone is not an anti-psychotic medication and should not be used in place of medication prescribed by your doctor for mental illness.   Buspirone may also be used for purposes not listed in this medication guide. What should I discuss with my healthcare provider before taking buspirone? You should not use buspirone if you are allergic to it. Do not use buspirone if you have taken an MAO inhibitor in the past 14 days. A dangerous drug interaction could occur. MAO inhibitors include isocarboxazid, linezolid, methylene blue injection, phenelzine, rasagiline, selegiline, and tranylcypromine. To make sure buspirone is safe for you, tell your doctor if you have any of these conditions:  · kidney disease; or  · liver disease. Buspirone is not expected to harm an unborn baby. Tell your doctor if you are pregnant or plan to become pregnant during treatment. It is not known whether buspirone passes into breast milk or if it could harm a nursing baby. Tell your doctor if you are breast-feeding a baby. Buspirone is not approved for use by anyone younger than 25years old. How should I take buspirone? Follow all directions on your prescription label. Your doctor may occasionally change your dose to make sure you get the best results. Do not take this medicine in larger or smaller amounts or for longer than recommended. You may take buspirone with or without food but take it the same way each time. Some buspirone tablets are scored so you can break the tablet into 2 or 3 pieces in order to take a smaller amount of the medicine at each dose. Do not use a buspirone tablet if it has not been broken correctly and the piece is too big or too small. Follow your doctor's instructions about how much of the tablet to take. If you have switched to buspirone from another anxiety medication, you may need to slowly decrease your dose of the other medication rather than stopping suddenly. Some anxiety medications can cause withdrawal symptoms when you stop taking them suddenly after long-term use. Buspirone can cause false positive results with certain medical tests.  You may need to stop using the medicine for at least 48 products. Tell each of your health care providers about all medicines you use now and any medicine you start or stop using. Where can I get more information? Your pharmacist can provide more information about buspirone. Remember, keep this and all other medicines out of the reach of children, never share your medicines with others, and use this medication only for the indication prescribed. Every effort has been made to ensure that the information provided by [facility] is accurate, up-to-date, and complete, but no guarantee is made to that effect. Drug information contained herein may be time sensitive. [facility] information has been compiled for use by healthcare practitioners and consumers in the [place] and therefore [facility] does not warrant that uses outside of the [place] are appropriate, unless specifically indicated otherwise. [facility]'s drug information does not endorse drugs, diagnose patients or recommend therapy. [facility]'s drug information is an informational resource designed to assist licensed healthcare practitioners in caring for their patients and/or to serve consumers viewing this service as a supplement to, and not a substitute for, the expertise, skill, knowledge and judgment of healthcare practitioners. The absence of a warning for a given drug or drug combination in no way should be construed to indicate that the drug or drug combination is safe, effective or appropriate for any given patient. [facility] does not assume any responsibility for any aspect of healthcare administered with the aid of information [facility] provides. The information contained herein is not intended to cover all possible uses, directions, precautions, warnings, drug interactions, allergic reactions, or adverse effects. If you have questions about the drugs you are taking, check with your doctor, nurse or pharmacist.  Copyright 9841-1500 [address] Version: 5.01.  Revision date: 12/14/2015. Care instructions adapted under license by [facility] [address] If you have questions about a medical condition or this instruction, always ask your healthcare professional. [place] [address] any warranty or liability for your use of this information.     Instructions reviewed before discharge and copy given to patient by Renita Turpin RN, [address] [facility] [phone]

## 2017-11-06 ENCOUNTER — OFFICE VISIT (OUTPATIENT)
Dept: INTERNAL MEDICINE | Age: 42
End: 2017-11-06
Attending: INTERNAL MEDICINE

## 2017-11-06 VITALS
OXYGEN SATURATION: 97 % | DIASTOLIC BLOOD PRESSURE: 86 MMHG | WEIGHT: 221 LBS | SYSTOLIC BLOOD PRESSURE: 127 MMHG | HEIGHT: 63 IN | HEART RATE: 86 BPM | TEMPERATURE: 98.5 F | RESPIRATION RATE: 16 BRPM | BODY MASS INDEX: 39.16 KG/M2

## 2017-11-06 DIAGNOSIS — E66.9 OBESITY (BMI 30-39.9): ICD-10-CM

## 2017-11-06 DIAGNOSIS — G89.29 CHRONIC BILATERAL LOW BACK PAIN, WITH SCIATICA PRESENCE UNSPECIFIED: ICD-10-CM

## 2017-11-06 DIAGNOSIS — F41.9 ANXIETY: ICD-10-CM

## 2017-11-06 DIAGNOSIS — E78.5 HYPERLIPIDEMIA, UNSPECIFIED HYPERLIPIDEMIA TYPE: ICD-10-CM

## 2017-11-06 DIAGNOSIS — G44.89 OTHER HEADACHE SYNDROME: ICD-10-CM

## 2017-11-06 DIAGNOSIS — M54.5 CHRONIC BILATERAL LOW BACK PAIN, WITH SCIATICA PRESENCE UNSPECIFIED: ICD-10-CM

## 2017-11-06 DIAGNOSIS — E03.9 HYPOTHYROIDISM, UNSPECIFIED TYPE: ICD-10-CM

## 2017-11-06 DIAGNOSIS — G43.519 MIGRAINE AURA, PERSISTENT, INTRACTABLE: Primary | ICD-10-CM

## 2017-11-06 DIAGNOSIS — K75.4 AUTOIMMUNE HEPATITIS (HCC): ICD-10-CM

## 2017-11-06 DIAGNOSIS — E53.8 LOW FOLATE: ICD-10-CM

## 2017-11-06 LAB
ALBUMIN SERPL-MCNC: 3.9 G/DL (ref 3.4–5)
ALP BLD-CCNC: 291 U/L (ref 40–129)
ALT SERPL-CCNC: 64 U/L (ref 10–40)
AST SERPL-CCNC: 59 U/L (ref 15–37)
BILIRUB SERPL-MCNC: 0.4 MG/DL (ref 0–1)
BILIRUBIN DIRECT: <0.2 MG/DL (ref 0–0.3)
BILIRUBIN, INDIRECT: ABNORMAL MG/DL (ref 0–1)
TOTAL PROTEIN: 8.6 G/DL (ref 6.4–8.2)

## 2017-11-06 RX ORDER — RIZATRIPTAN BENZOATE 5 MG/1
5 TABLET ORAL
Qty: 6 TABLET | Refills: 0 | Status: SHIPPED | OUTPATIENT
Start: 2017-11-06 | End: 2017-12-04 | Stop reason: SDUPTHER

## 2017-11-06 RX ORDER — MOMETASONE FUROATE 1 MG/G
CREAM TOPICAL
Qty: 1 TUBE | Refills: 2 | Status: SHIPPED | OUTPATIENT
Start: 2017-11-06 | End: 2018-07-16 | Stop reason: SDUPTHER

## 2017-11-06 RX ORDER — CYANOCOBALAMIN 1000 UG/ML
1000 INJECTION INTRAMUSCULAR; SUBCUTANEOUS ONCE
Qty: 1 ML | Refills: 0 | Status: SHIPPED | OUTPATIENT
Start: 2017-11-06 | End: 2017-11-06 | Stop reason: ALTCHOICE

## 2017-11-06 RX ORDER — LEVOTHYROXINE SODIUM 0.12 MG/1
125 TABLET ORAL DAILY
Qty: 30 TABLET | Refills: 2 | Status: SHIPPED | OUTPATIENT
Start: 2017-11-06 | End: 2018-07-16 | Stop reason: SDUPTHER

## 2017-11-06 ASSESSMENT — ENCOUNTER SYMPTOMS
EYE REDNESS: 0
SINUS PAIN: 0
DOUBLE VISION: 0
VOMITING: 0
NAUSEA: 1
BLOOD IN STOOL: 0
WHEEZING: 0
BLURRED VISION: 1
SHORTNESS OF BREATH: 0
SPUTUM PRODUCTION: 0
DIARRHEA: 0
PHOTOPHOBIA: 1
CONSTIPATION: 0
SORE THROAT: 0
COUGH: 0

## 2017-11-06 NOTE — PATIENT INSTRUCTIONS
Vitamin B12 deficiency and will not prevent possible damage to the spinal cord. Use all of your medications as directed. To treat pernicious anemia, you may have to use cyanocobalamin for the rest of your life. Do not stop using the medicine unless your doctor tells you to. Untreated vitamin B12 deficiency can lead to a recurrence of anemia and irreversible nerve damage. Store this medication at room temperature away from moisture, heat, and light. What happens if I miss a dose? Call your doctor for instructions if you miss a dose. What happens if I overdose? Seek emergency medical attention or call the Poison Help line at 1-121.206.1874. What should I avoid while using cyanocobalamin injection? Avoid drinking large amounts of alcohol while you are being treated with cyanocobalamin. What are the possible side effects of cyanocobalamin injection? Get emergency medical help if you have any of these signs of an allergic reaction:  hives; difficulty breathing; swelling of your face, lips, tongue, or throat. Call your doctor at once if you have:  · numbness or tingling in your hands or feet;  · signs of fluid build-up around your lungs --anxiety, sweating, pale skin, severe shortness of breath, wheezing, gasping for breath, cough with foamy mucus, chest pain; or  · signs of low potassium --confusion, uneven heart rate, extreme thirst, increased urination, leg discomfort, muscle weakness or limp feeling. Common side effects may include:  · swelling, rapid weight gain;  · diarrhea; or  · itching or mild rash. This is not a complete list of side effects and others may occur. Call your doctor for medical advice about side effects. You may report side effects to FDA at 2-821-OZU-6923. What other drugs will affect cyanocobalamin injection? Other drugs may interact with cyanocobalamin, including prescription and over-the-counter medicines, vitamins, and herbal products.  Tell each of your health care providers about all medicines you use now and any medicine you start or stop using. Where can I get more information? Your pharmacist can provide more information about cyanocobalamin injection. Remember, keep this and all other medicines out of the reach of children, never share your medicines with others, and use this medication only for the indication prescribed. Every effort has been made to ensure that the information provided by [address] [place], [place] [address]can Dr is accurate, up-to-date, and complete, but no guarantee is made to that effect. Drug information contained herein may be time sensitive. [facility] information has been compiled for use by healthcare practitioners and consumers in the [place] and therefore [facility] does not warrant that uses outside of the [place] are appropriate, unless specifically indicated otherwise. [facility]'s drug information does not endorse drugs, diagnose patients or recommend therapy. [facility] drug information is an informational resource designed to assist licensed healthcare practitioners in caring for their patients and/or to serve consumers viewing this service as a supplement to, and not a substitute for, the expertise, skill, knowledge and judgment of healthcare practitioners. The absence of a warning for a given drug or drug combination in no way should be construed to indicate that the drug or drug combination is safe, effective or appropriate for any given patient. [facility] does not assume any responsibility for any aspect of healthcare administered with the aid of information [facility] provides. The information contained herein is not intended to cover all possible uses, directions, precautions, warnings, drug interactions, allergic reactions, or adverse effects. If you have questions about the drugs you are taking, check with your doctor, nurse or pharmacist.  Copyright 0886-7033 [address] [place] [address] Avenue: 2.01. Revision date: 2/27/2014.   Care instructions adapted under

## 2017-11-06 NOTE — PROGRESS NOTES
Department Of Internal Medicine  General Medicine/Primary Care  Established Patient Visit    Patient:  Juan F Lee                                               : 1975  Age: 38 y.o. MRN: [id number]  Date : 2017    History Obtained From:  Patient, EMR    REASON FOR VISIT:  Follow-up visit    HISTORY OF PRESENT ILLNESS:     Juan F Lee is a 37 yo Female PMH of sinusitis w/ multiple surgeries, pericarditis, asthma, autoimmune hepatitis, menorrhagia w/ iron deficiency anemia, GERD, hypothyroidism who presents for a follow-up visit. She continues to c/o headaches, same presentation as last week. They are not completely improved with her prescribed triptan. She continues to have photophobia and nausea. Her CT Head results were reviewed with her. She denies fever, chills chest pain, shortness of breath, abdominal pain, constipation, diarrhea, and dysuria. Past Medical History:        Diagnosis Date    Anemia, iron deficiency     Asthma     Autoimmune hepatitis ([address]     History of blood transfusion     Hypothyroidism     Menorrhagia with regular cycle     Morbid obesity with BMI of 40.0-44.9, adult ([facility]) 2015    MRSA (methicillin resistant staph aureus) culture positive 2017    abdominal abscess    MRSA infection 2012    rt thigh abscess    Pericarditis, acute     Sinusitis        Past Surgical History:        Procedure Laterality Date    OTHER SURGICAL HISTORY  2012    INCISION AND DRAINAGE POSTERIOR THIGH ABSCESS    RECTAL SURGERY  Aug 2011    repair of rectal fissures and anal skin tag removal    SINUS SURGERY      X 3    TONSILLECTOMY  2004    TONSILLECTOMY AND ADENOIDECTOMY      (partial adenoidectomy)       Family History:       Problem Relation Age of Onset    High Blood Pressure Mother     Heart Disease Father     Diabetes Maternal Aunt        Social History:   TOBACCO:   reports that she has never smoked.  She has never used smokeless tobacco.  ETOH:   reports that she drinks alcohol. OCCUPATION:      Allergies:  Latex; Clindamycin/lincomycin; Sulfa antibiotics; and Diflucan [fluconazole]    Current Medications:    Prior to Admission medications    Medication Sig Start Date End Date Taking? Authorizing Provider   cyanocobalamin 1000 MCG/ML injection Inject 1 ml (1000mcg) weekly for 4 weeks. Followed by once a month (indefinitely). 10/30/17  Yes Norberto Fulton MD   lactobacillus (CULTURELLE) CAPS capsule Take 4 capsules by mouth daily 10/30/17  Yes Norberto Fulton MD   montelukast (SINGULAIR) 10 MG tablet Take 1 tablet by mouth nightly 10/16/17  Yes Norberto Fulton MD   Riboflavin 400 MG TABS Take 400 mg by mouth daily 10/16/17  Yes Norberto Fulton MD   magnesium 200 MG TABS tablet Take 1 tablet by mouth daily 10/16/17  Yes Norberto Fulton MD   pantoprazole (PROTONIX) 40 MG tablet Take 1 tablet by mouth daily 6/12/17  Yes Norberto Fulton MD   albuterol sulfate HFA (PROVENTIL HFA) 108 (90 BASE) MCG/ACT inhaler Inhale 2 puffs into the lungs every 6 hours as needed for Wheezing 6/12/17  Yes Norberto Fulton MD   Syringe, Disposable, 2.5 ML MISC 1 Syringe by Does not apply route every 30 days 3/8/17  Yes Gutierrez Castro MD   NEEDLE, DISP, 25 G (BD DISP NEEDLES) 25G X 5/8\" MISC 1 packet by Does not apply route every 30 days 3/8/17  Yes Gutierrez Castro MD   levothyroxine (SYNTHROID) 125 MCG tablet Take 1 tablet by mouth daily 3/1/17  Yes Cole Ricardo MD   mometasone (ELOCON) 0.1 % cream Apply topically daily. 1/26/17  Yes Dolly Brown MD   polyethylene glycol [facility]) powder Take 17 g by mouth as needed (constipation) Take as directed. 1/26/17  Yes Dolly Brown MD   urea (CARMOL) 10 % cream Apply topically as needed.  10/24/16  Yes Julito Whatley DPM   busPIRone (BUSPAR) 5 MG tablet Take 1 tablet by mouth 2 times daily 10/30/17 11/29/17  Norberto Fulton MD   rizatriptan (MAXALT) 5 MG tablet Take 1 tablet by mouth once as needed for Migraine Psychiatric/Behavioral: Negative for substance abuse. Physical Exam:      Vitals:   /86 (Site: Right Arm, Position: Sitting, Cuff Size: Large Adult)   Pulse 86   Temp 98.5 °F (36.9 °C) (Oral)   Resp 16   Ht 5' 3\" (1.6 m)   Wt 221 lb (100.2 kg)   SpO2 97%   BMI 39.15 kg/m²     Body mass index is 39.15 kg/m². Wt Readings from Last 3 Encounters:   11/06/17 221 lb (100.2 kg)   10/30/17 218 lb (98.9 kg)   10/16/17 220 lb (99.8 kg)       Physical Exam   Constitutional: She is oriented to person, place, and time. No distress. Wearing sunglasses   HENT:   Head: Normocephalic and atraumatic. Right Ear: External ear normal.   Left Ear: External ear normal.   Mouth/Throat: Oropharynx is clear and moist. No trismus in the jaw. No oropharyngeal exudate or tonsillar abscesses. Eyes: EOM are normal. Pupils are equal, round, and reactive to light. Right eye exhibits no discharge. Left eye exhibits no discharge. No significant erythema, discharge   Neck: Normal range of motion. No JVD present. No thyromegaly present. Cardiovascular: Normal rate, regular rhythm and normal heart sounds. No murmur heard. Pulmonary/Chest: No accessory muscle usage or stridor. No respiratory distress. She has no decreased breath sounds (mildly, due to body habitus). She has no wheezes. She has no rales. She exhibits no tenderness. Abdominal: Soft. There is no tenderness. There is no rebound and no guarding. Musculoskeletal: She exhibits no edema. Neurological: She is alert and oriented to person, place, and time. She has intact cranial nerves. She displays no weakness and facial symmetry. No cranial nerve deficit. Skin: Skin is warm and dry. No rash noted. She is not diaphoretic. No erythema. Psychiatric: Memory and judgment normal.       LABS:    Chemistry:  No results for input(s): BUN, CREATININE, NA, K, CO2, CL, MG, PHOS, AST, ALT, ALB, PROT in the last 72 hours.     Invalid input(s): GLU, CA, TBILI, DBILI, ALP, GLUFASTING    No results for input(s): ALKPHOS, ALT, AST, PROT, BILITOT, BILIDIR, LABALBU in the last 72 hours. [unfilled]    No results for input(s): Kristi Fresh, LABMICR, MICROALBUR, [place] in the last 72 hours. Lab Results   Component Value Date    TSH 0.67 11/26/2016       Hematology:  No results for input(s): WBC, HGB, HCT, PLT, MCV, MCH, MCHC, RDW, EOSABS in the last 72 hours. Invalid input(s): NEUTP, LYMPHP, MONOSP, EOSP, BASOP, NEUTABS, LYMPHABS, MONOABS, BASOABS    Lab Results   Component Value Date    IRON 106 02/21/2017    TIBC 259 02/21/2017    FERRITIN 812.7 (H) 02/21/2017    FOLATE 3.66 (L) 10/28/2017    XEXTKHUI41 266 10/28/2017       Lipid:  Lab Results   Component Value Date    CHOL 174 10/24/2013    HDL 65 (H) 10/28/2017    LDLCALC 139 (H) 10/28/2017    TRIG 93 10/24/2013       U/A:  Lab Results   Component Value Date    LABMICR SEE BELOW 09/20/2016         Assessment/Plan:     Headache. Not significantly improving. Reports history of migraines. Headache can also be due to her sinus issues and/or stress. Patient reported new lights installed in her work has resulted in an exacerbation of her headaches and making it difficult to work. Also reports increased stress at work. Rizatriptan was tried with some improvement over her symptoms but no full resolution. Has not taken magnesium/riboflavin prescribed. - CT Head W WO contrast reviewed with patient. We discussed the need of MRI and an LP as her symptoms have not improved. At this time, she wants to see if removing herself from her current job (and the possible trigger of her migraines) will improve her symptoms. If not, then she is open to trying the additional diagnostic testing   - Asked to f/u with ENT for sinuses   - Will again prescribe rizatriptan 5mg (6 pills)  - Asked to take Riboflavin/magnesium   - given script for PO metoprolol 25 mg BID for prophylaxis     Chronic Sinusitis.  Patient with previous history of sinusitis with multiple procedures. Reports another recent episode and was seen by her ENT. Recently completed course of Augmentin and prednisone. Worries about potential recurrence but currently only has some nasal discharge which is \"more yellow\". - Asked to follow up with ENT, this seems like a chronic issue and more antibiotics/steroids is not seeming to help. Anxiety. Patient has had increasing stress at work and reports significant anxiety. - D/C Buspar, pt not interested in taking medication    Autoimmune Hepatitis: Patient was diagnosed on 11/26/2016. Smooth muscle antibody <1.2. F actin IgG greater than 57. Tissue transglutaminase 1. [place] positive. Has elevated enzymes ( alk phose- 316, ALT - 110, AST-83 on 10/28)  -repeat LFTs today    - Patient was seen by GI at [address] [place] [address] and was followed for this issue. Per GI note from January (Dr. Daniel Palafox) it appears he was awaiting to complete his own evaluation prior to referring her to a hepatologist. Patient has not followed up since her colonoscopy. - Patient informed she needs to follow up with GI about her autoimmune hepatitis   - Had a colonoscopy in February which was normal. F/u scope in 2027. - Will defer to GI, but may need steroids  - Monitor    Hypothyroidism. TSH of 8.31 and T4 of 0.7 on 10/28/17. Previously had TSH of 0.67 and t4 of 1.1 on 11/26/16 . Patient does report missing some doses. No overt symptoms. Reports history of graves/hashimoto. No palpable nodules. - Asked to take current dose (125mcg) regularly for 1 month, will repeat TSH thereafter before going up on dose  - Referred to endocrinology   - Check TSH in 1 month    HLD  - LDL of 139, but does have high HDL of 65. - Will avoid statin with her current liver issues. Low folate. 3.66 on 10/28/17. Patient does not regularly take her folate supplement. Her B12 was 266 (low - normal). - Asked to take her folate supplement.  - B12 injection given.  Will do

## 2017-11-06 NOTE — PROGRESS NOTES
[facility]       NURSING PROGRESS NOTE      November 6, 2017  Alyssa Price    Chief Complaint:   Chief Complaint   Patient presents with    Follow-up     hypothyroid; iron deficiency anemia       Constitutional: alert and oriented; calm;denies fever,chills,illness; weight stable over 9 months  Eyes: negative  Ears, nose, mouth, throat, and face: negative  Respiratory: states she uses Albuterol inhaler when the weather has drastic changes. Has not been taking Symbicort due to insurance problem but states she doesn't need it.   Cardiovascular: negative  Gastrointestinal: states her GERD is well controlled on Protonix  Genitourinary: negative  Integument/breast: negative  Hematologic/lymphatic: negative  Musculoskeletal: negative  Neurological: states bright lights at work are cause for headaches at work  Diabetes: No    Pain Assessment:  Pain Present: no  Pain Score: 0  Pain Quality/Description: patient had no pain when admitted to exam room but during visit developed headache 8/10    Mobility/Safety/Self-Care:  Steady Gait: Yes  History of Falls: No   History of a Fall within the last 30 days No  Assistive Device: None  Poor Hygiene: No    Psychosocial:   Depression: No  does not want to take Buspar; taking Melatonin instead but does not realize it is indicated for insomnia  If YES,    Does Patient express current thoughts of harming self or others?: No  Anxious: yes  Insomnia: Yes  Inappropriate Behavior: No  Alcohol Abuse: no  Substance Abuse: no  Signs of Physical Abuse: No  Signs of Emotional Abuse: No    Educational:  Identify the learner who is being assessed for education:  patient                    Ability to Learn:  Exhibits ability to grasp concepts and respond to questions: High  Ready to Learn: Yes  calm   Preferred Method of Learning:  written  Barriers to Learning: Verbalizes interest  Special Considerations due to cultural, Adventist, spiritual beliefs:  No  Language: English  :  No    Note:   refuses flu vaccine    Vitamin B12 injection 1ml given IM in right upper arm Lot#[id number]  Exp: July 2018      9:33 AM 11/6/2017

## 2017-12-04 ENCOUNTER — OFFICE VISIT (OUTPATIENT)
Dept: INTERNAL MEDICINE | Age: 42
End: 2017-12-04
Attending: STUDENT IN AN ORGANIZED HEALTH CARE EDUCATION/TRAINING PROGRAM

## 2017-12-04 DIAGNOSIS — Z00.00 PREVENTATIVE HEALTH CARE: ICD-10-CM

## 2017-12-04 DIAGNOSIS — E03.9 HYPOTHYROIDISM, UNSPECIFIED TYPE: Primary | ICD-10-CM

## 2017-12-04 RX ORDER — FLUTICASONE PROPIONATE 50 MCG
1 SPRAY, SUSPENSION (ML) NASAL DAILY
Qty: 1 BOTTLE | Refills: 0 | Status: SHIPPED | OUTPATIENT
Start: 2017-12-04 | End: 2018-03-05 | Stop reason: SDUPTHER

## 2017-12-04 RX ORDER — RIZATRIPTAN BENZOATE 5 MG/1
5 TABLET ORAL
Qty: 6 TABLET | Refills: 0 | Status: SHIPPED | OUTPATIENT
Start: 2017-12-04 | End: 2018-03-05 | Stop reason: SDUPTHER

## 2017-12-04 RX ORDER — LACTOBACILLUS RHAMNOSUS GG 10B CELL
4 CAPSULE ORAL DAILY
Qty: 120 CAPSULE | Refills: 0
Start: 2017-12-04 | End: 2018-03-05 | Stop reason: SDUPTHER

## 2017-12-04 ASSESSMENT — ENCOUNTER SYMPTOMS
BLOOD IN STOOL: 0
SPUTUM PRODUCTION: 0
NAUSEA: 1
SHORTNESS OF BREATH: 0
EYE REDNESS: 0
WHEEZING: 0
COUGH: 0
PHOTOPHOBIA: 1
DOUBLE VISION: 0
BLURRED VISION: 1
SINUS PAIN: 0
VOMITING: 0
CONSTIPATION: 0
SORE THROAT: 0
DIARRHEA: 0

## 2017-12-08 VITALS
SYSTOLIC BLOOD PRESSURE: 129 MMHG | TEMPERATURE: 97.5 F | HEIGHT: 63 IN | OXYGEN SATURATION: 98 % | HEART RATE: 77 BPM | DIASTOLIC BLOOD PRESSURE: 81 MMHG

## 2017-12-08 RX ORDER — NAPROXEN 500 MG/1
TABLET ORAL
Refills: 0 | COMMUNITY
Start: 2017-09-02 | End: 2017-12-21 | Stop reason: ALTCHOICE

## 2017-12-26 ENCOUNTER — OFFICE VISIT (OUTPATIENT)
Dept: INTERNAL MEDICINE | Age: 42
End: 2017-12-26
Attending: INTERNAL MEDICINE

## 2017-12-26 VITALS
BODY MASS INDEX: 39.15 KG/M2 | OXYGEN SATURATION: 98 % | TEMPERATURE: 98 F | DIASTOLIC BLOOD PRESSURE: 80 MMHG | WEIGHT: 221 LBS | HEART RATE: 83 BPM | SYSTOLIC BLOOD PRESSURE: 122 MMHG | RESPIRATION RATE: 16 BRPM

## 2017-12-26 DIAGNOSIS — J01.10 ACUTE FRONTAL SINUSITIS, RECURRENCE NOT SPECIFIED: Primary | ICD-10-CM

## 2017-12-26 RX ORDER — AMOXICILLIN AND CLAVULANATE POTASSIUM 875; 125 MG/1; MG/1
1 TABLET, FILM COATED ORAL 2 TIMES DAILY
Qty: 14 TABLET | Refills: 0 | Status: SHIPPED | OUTPATIENT
Start: 2017-12-26 | End: 2018-01-02

## 2017-12-26 ASSESSMENT — ENCOUNTER SYMPTOMS
SINUS PAIN: 1
HEMOPTYSIS: 0
COUGH: 1
HEARTBURN: 0
SORE THROAT: 1
SHORTNESS OF BREATH: 0
VOMITING: 0
DOUBLE VISION: 0
BLURRED VISION: 0
ABDOMINAL PAIN: 0

## 2017-12-26 NOTE — PROGRESS NOTES
[facility] PROGRESS NOTE      December 26, 2017  Sea Joshi    Chief Complaint:   Chief Complaint   Patient presents with    Follow-up     sinus issues no better. Constitutional: positive for chills and fatigue  Eyes: negative  Ears, nose, mouth, throat, and face: negative, pressure over sinus. Sore throat. Respiratory: positive for cough and cough productive of yellow sputum. Cardiovascular: negative  Gastrointestinal: negative  Genitourinary: negative  Integument/breast: negative  Hematologic/lymphatic: negative  Musculoskeletal: negative  Neurological: negative  Diabetes: No  Yes:  Medication compliance:    Diabetic diet compliance:    Home glucose monitoring:    Last eye exam:     Acute symptoms hyperglycemia:    Acute symptoms hypoglycemia:    POC glucose today:  mg/dL    Pain Assessment:  Pain Present: no  Pain Score: 0  Pain Quality/Description:     Mobility/Safety/Self-Care:  Steady Gait: Yes  History of Falls: No   History of a Fall within the last 30 days No  Assistive Device: None  Poor Hygiene: No    Psychosocial:   Depression: No  If YES,    Does Patient express current thoughts of harming self or others?: No  Anxious: No  Insomnia: No  Inappropriate Behavior: No  Alcohol Abuse: no  Substance Abuse: no  Signs of Physical Abuse: No  Signs of Emotional Abuse: No    Educational:  Identify the learner who is being assessed for education:  patient                    Ability to Learn:  Exhibits ability to grasp concepts and respond to questions: Medium  Ready to Learn: Yes  calm   Preferred Method of Learning:  written  Barriers to Learning: Verbalizes interest  Special Considerations due to cultural, Synagogue, spiritual beliefs:  No  Language:  English  :  No    Note:   Medrol dose pack did not help at all. Feels worse today.       10:11 AM 12/26/2017

## 2017-12-26 NOTE — PROGRESS NOTES
Department Of Internal Medicine  General Medicine/Primary Care  Established Patient Visit    Patient:  Lars Leo                                               : 1975  Age: 38 y.o. MRN: [id number]  Date : 2017    History Obtained From:  patient    REASON FOR VISIT:  Fevers chills nasal drainage    HISTORY OF PRESENT ILLNESS:   The patient is a 38 y.o. female who presents with chronic sinusitis presenting to the clinic one week ago for lymphadenopathy and ear popping now has gotten worse with increased nasal drainage and sore throat, fevers, chills, muscle aches. Patient has been having yellowish greenish sputum production and nasal drainage. Fevers at home measured to the 100s. Patient denies any sick contacts, SOB, and NVD. Past Medical History:        Diagnosis Date    Anemia, iron deficiency     Asthma     Autoimmune hepatitis ([address]     History of blood transfusion     Hypothyroidism     Menorrhagia with regular cycle     Morbid obesity with BMI of 40.0-44.9, [facility] ([facility]) 2015    MRSA (methicillin resistant staph aureus) culture positive 2017    abdominal abscess    MRSA infection 2012    rt thigh abscess    Pericarditis, acute     Sinusitis        Past Surgical History:        Procedure Laterality Date    OTHER SURGICAL HISTORY  2012    INCISION AND DRAINAGE POSTERIOR THIGH ABSCESS    RECTAL SURGERY  Aug 2011    repair of rectal fissures and anal skin tag removal    SINUS SURGERY      X 3    TONSILLECTOMY  2004    TONSILLECTOMY AND ADENOIDECTOMY      (partial adenoidectomy)       Family History:       Problem Relation Age of Onset    High Blood Pressure Mother     Heart Disease Father     Diabetes Maternal Aunt        Social History:   TOBACCO:   reports that she has never smoked. She has never used smokeless tobacco.  ETOH:   reports that she drinks alcohol.   OCCUPATION:      Allergies:  Latex; Clindamycin/lincomycin; Sulfa antibiotics; and tablet by mouth once as needed for Migraine May repeat in 2 hours if needed 12/4/17 12/4/17  Ekaterina Santizo MD   rizatriptan (MAXALT) 5 MG tablet Take 1 tablet by mouth once as needed for Migraine May repeat in 2 hours if needed 10/16/17 10/16/17  Ekaterina Santizo MD   Syringe, Disposable, 2.5 ML MISC 1 Syringe by Does not apply route every 30 days 3/8/17   Nadia Bahena MD   NEEDLE, DISP, 25 G (BD DISP NEEDLES) 25G X 5/8\" MISC 1 packet by Does not apply route every 30 days 3/8/17   Nadia Bahena MD   polyethylene glycol (MIRALAX) powder Take 17 g by mouth as needed (constipation) Take as directed. 1/26/17   Umang Shafer MD       Review of Systems   Constitutional: Negative for chills and fever. HENT: Positive for congestion, sinus pain and sore throat. Negative for ear discharge, ear pain and hearing loss. Eyes: Negative for blurred vision and double vision. Respiratory: Positive for cough. Negative for hemoptysis and shortness of breath. Cardiovascular: Negative for chest pain and palpitations. Gastrointestinal: Negative for abdominal pain, heartburn and vomiting. Musculoskeletal: Positive for myalgias. Skin: Negative for rash. Neurological: Negative for dizziness, tingling and headaches. Psychiatric/Behavioral: Negative for depression. Physical Exam:      Vitals: /80 (Site: Right Arm, Position: Sitting, Cuff Size: Medium Adult)   Pulse 83   Temp 98 °F (36.7 °C) (Oral)   Resp 16   Wt 221 lb (100.2 kg)   SpO2 98%   BMI 39.15 kg/m²     Body mass index is 39.15 kg/m². Wt Readings from Last 3 Encounters:   12/26/17 221 lb (100.2 kg)   12/21/17 220 lb (99.8 kg)   11/06/17 221 lb (100.2 kg)     Physical Exam   Constitutional: She is oriented to person, place, and time. She appears well-developed and well-nourished. No distress. HENT:   Head: Normocephalic and atraumatic.    Right Ear: External ear normal.   Left Ear: External ear normal.   Nose: Nose normal. 1. Acute Sinusitis     Assessment/Plan:   1. Sinusitis  - Patient with fevers and chills with worsening symptoms since last visit  - Some improvement with Medrol Dose pack but worse generalized sypmtoms  - will give Augmentin 875/125 for 7 days  - NSAIDs for patient's sore throat  - follow up with Dr. Miranda Chris in one week if symptoms do not improve. Case will be discussed with preceptor.      Vandana Horner MD  Internal Medicine Resident  Pager: [phone]  12/26/2017, 10:25 AM

## 2017-12-26 NOTE — PATIENT INSTRUCTIONS
Augmentin one capsule twice a day. Call if no better after taking antibiotics. Sooner if you get worse. Patient Education          amoxicillin and clavulanate potassium  Pronunciation:  am OKS i eleazar in [place] velez [facility] ee um  Brand:  Augmentin, Augmentin ES-600, Augmentin XR  What is the most important information I should know about amoxicillin and clavulanate potassium? Do not use this medication if you are allergic to amoxicillin or clavulanate potassium, or if you have ever had liver problems caused by this medication. Do not use if you are allergic to any other penicillin antibiotic, such as amoxicillin (Amoxil, Augmentin, Dispermox, Moxatag), ampicillin (Principen, Unasyn), dicloxacillin (Dycill, Dynapen), oxacillin (Bactocill), or penicillin (Bicillin L-A, PC Pen VK, Pfizerpen), and others. Before taking amoxicillin and clavulanate potassium, tell your doctor if you have liver disease (or a history of hepatitis or jaundice), kidney disease, or mononucleosis, or if you are allergic to a cephalosporin antibiotic, such as cefdinir (Omnicef), cefprozil (Cefzil), cefuroxime (Ceftin), cephalexin (Keflex), and others. If you switch from one tablet form to another (regular, chewable, or extended-release tablet), take only the new tablet form and strength prescribed for you. This medicine may not be as effective or could be harmful if you do not use the exact tablet form your doctor has prescribed. Amoxicillin and clavulanate potassium can pass into breast milk and may harm a nursing baby. Do not use this medication without telling your doctor if you are breast-feeding a baby. Amoxicillin and clavulanate potassium can make birth control pills less effective. Ask your doctor about using a non-hormone method of birth control (such as a condom, diaphragm, spermicide) to prevent pregnancy while taking amoxicillin and clavulanate potassium. What is amoxicillin and clavulanate potassium?   Amoxicillin is an potassium. Amoxicillin and clavulanate potassium can pass into breast milk and may harm a nursing baby. Do not use this medication without telling your doctor if you are breast-feeding a baby. The liquid and chewable tablet forms of this medication may contain phenylalanine. Talk to your doctor before using these forms of amoxicillin and clavulanate potassium if you have phenylketonuria (PKU). How should I take amoxicillin and clavulanate potassium? Take exactly as prescribed by your doctor. Do not take in larger or smaller amounts or for longer than recommended. Follow the directions on your prescription label. If you switch from one tablet form to another (regular, chewable, or extended-release tablet), take only the new tablet form and strength prescribed for you. The strength of clavulanate potassium is not the same among the different tablet forms, even though the amount of amoxicillin may be the same as in the tablet you were using before. This medicine may not be as effective or could be harmful if you do not use the exact tablet form your doctor has prescribed. Take this medicine with a full glass of water. Take the medicine at the start of a meal to reduce stomach upset. Take the medicine at the same time each day. The Augmentin tablet should be swallowed whole. The Augmentin Chewable tablet must be chewed before swallowing. Do not swallow a chewable tablet whole. Do not crush or chew the Augmentin XR (extended-release) tablet. Swallow the pill whole, or break the pill in half and take both halves one at a time. If you have trouble swallowing a whole or half pill, talk with your doctor about using another form of amoxicillin and clavulanate potassium. Shake the liquid form of this medicine well just before you measure a dose. To be sure you get the correct dose, measure the liquid with a marked measuring spoon or medicine cup, not with a regular table spoon.  If you do not have a dose-measuring device, ask your pharmacist for one. Take this medication for the full prescribed length of time. Your symptoms may improve before the infection is completely cleared. Skipping doses may also increase your risk of further infection that is resistant to antibiotics. Amoxicillin and clavulanate potassium will not treat a viral infection such as the common cold or flu. This medication can cause false results with certain lab tests for glucose (sugar) in the urine. Tell any doctor who treats you that you are using amoxicillin and clavulanate potassium. Store the tablets at room temperature away from moisture and heat. Store the liquid  in the refrigerator. Throw away any unused liquid after 10 days. What happens if I miss a dose? Take the missed dose as soon as you remember. Skip the missed dose if it is almost time for your next scheduled dose. Do not take extra medicine to make up the missed dose. What happens if I overdose? Seek emergency medical attention or call the Poison Help line at 1-668.511.5290. Overdose can cause nausea, vomiting, stomach pain, diarrhea, skin rash, drowsiness, and hyperactivity. What should I avoid while taking amoxicillin and clavulanate potassium? Antibiotic medicines can cause diarrhea, which may be a sign of a new infection. If you have diarrhea that is watery or has blood in it, stop taking this medication and call your doctor. Do not use any medicine to stop the diarrhea unless your doctor has told you to. What are the possible side effects of amoxicillin and clavulanate potassium? Get emergency medical help if you have any of these signs of an allergic reaction: hives; difficulty breathing; swelling of your face, lips, tongue, or throat.   Stop using this medicine and call your doctor at once if you have a serious side effect such as:  · diarrhea that is watery or has blood in it;  · pale or yellowed skin, dark colored urine, fever, confusion or weakness;  · easy bruising or bleeding;  · skin rash, bruising, severe tingling, numbness, pain, muscle weakness;  · agitation, confusion, unusual thoughts or behavior, seizure (convulsions);  · nausea, upper stomach pain, itching, loss of appetite, dark urine, gee-colored stools, jaundice (yellowing of the skin or eyes); or  · severe skin reaction -- fever, sore throat, swelling in your face or tongue, burning in your eyes, skin pain, followed by a red or purple skin rash that spreads (especially in the face or upper body) and causes blistering and peeling. Less serious side effects may include:  · mild diarrhea, gas, stomach pain;  · nausea or vomiting;  · headache;  · skin rash or itching;  · white patches in your mouth or throat; or  · vaginal yeast infection (itching or discharge). This is not a complete list of side effects and others may occur. Call your doctor for medical advice about side effects. You may report side effects to FDA at 4-966-EIE-0955. What other drugs will affect amoxicillin and clavulanate potassium? Tell your doctor about all other medications you use, especially:  · allopurinol (Zyloprim);  · probenecid (Benemid);  · a blood thinner such as warfarin (Coumadin, Jantoven); or  · another antibiotic (for the same or for a different infection). This list is not complete and other drugs may interact with amoxicillin and clavulanate potassium. Tell your doctor about all medications you use. This includes prescription, over-the-counter, vitamin, and herbal products. Do not start a new medication without telling your doctor. Where can I get more information? Your pharmacist can provide more information about amoxicillin and clavulanate potassium. Remember, keep this and all other medicines out of the reach of children, never share your medicines with others, and use this medication only for the indication prescribed.   Every effort has been made to ensure that the information provided by [address] [place] [address] Dr DAMON ('[employer]') is accurate, up-to-date, and complete, but no guarantee is made to that effect. Drug information contained herein may be time sensitive. [employer] information has been compiled for use by healthcare practitioners and consumers in the [place] and therefore [employer] does not warrant that uses outside of the [place] are appropriate, unless specifically indicated otherwise. [employer]'s drug information does not endorse drugs, diagnose patients or recommend therapy. [employer]'s drug information is an informational resource designed to assist licensed healthcare practitioners in caring for their patients and/or to serve consumers viewing this service as a supplement to, and not a substitute for, the expertise, skill, knowledge and judgment of healthcare practitioners. The absence of a warning for a given drug or drug combination in no way should be construed to indicate that the drug or drug combination is safe, effective or appropriate for any given patient. [facility] does not assume any responsibility for any aspect of healthcare administered with the aid of information [employer] provides. The information contained herein is not intended to cover all possible uses, directions, precautions, warnings, drug interactions, allergic reactions, or adverse effects. If you have questions about the drugs you are taking, check with your doctor, nurse or pharmacist.  Copyright 3683-8886 [address]. Version: 9.01. Revision date: 2/1/2011. Care instructions adapted under license by [facility] ([facility]). If you have questions about a medical condition or this instruction, always ask your healthcare professional. [place] [address] any warranty or liability for your use of this information.

## 2018-01-03 ENCOUNTER — OFFICE VISIT (OUTPATIENT)
Dept: INTERNAL MEDICINE | Age: 43
End: 2018-01-03
Attending: STUDENT IN AN ORGANIZED HEALTH CARE EDUCATION/TRAINING PROGRAM

## 2018-01-03 VITALS
HEART RATE: 90 BPM | RESPIRATION RATE: 18 BRPM | SYSTOLIC BLOOD PRESSURE: 123 MMHG | TEMPERATURE: 97.3 F | DIASTOLIC BLOOD PRESSURE: 81 MMHG | OXYGEN SATURATION: 99 %

## 2018-01-03 DIAGNOSIS — L02.91 ABSCESS: Primary | ICD-10-CM

## 2018-01-03 DIAGNOSIS — Z22.322 MRSA (METHICILLIN RESISTANT STAPHYLOCOCCUS AUREUS) CARRIER: ICD-10-CM

## 2018-01-03 DIAGNOSIS — Z76.89 ENCOUNTER FOR INCISION AND DRAINAGE PROCEDURE: ICD-10-CM

## 2018-01-03 RX ORDER — DOXYCYCLINE 100 MG/1
100 TABLET ORAL 2 TIMES DAILY
Qty: 14 TABLET | Refills: 0 | Status: SHIPPED | OUTPATIENT
Start: 2018-01-03 | End: 2018-01-03 | Stop reason: CLARIF

## 2018-01-03 RX ORDER — CHLORHEXIDINE GLUCONATE 4 G/100ML
SOLUTION TOPICAL
Qty: 1 BOTTLE | Refills: 1 | Status: SHIPPED | OUTPATIENT
Start: 2018-01-03 | End: 2018-01-17

## 2018-01-03 RX ORDER — HYDROCODONE BITARTRATE AND ACETAMINOPHEN 5; 325 MG/1; MG/1
1 TABLET ORAL EVERY 6 HOURS PRN
Qty: 12 TABLET | Refills: 0 | Status: SHIPPED | OUTPATIENT
Start: 2018-01-03 | End: 2018-01-06

## 2018-01-03 RX ORDER — LINEZOLID 600 MG/1
600 TABLET, FILM COATED ORAL 2 TIMES DAILY
Qty: 14 TABLET | Refills: 0 | Status: SHIPPED | OUTPATIENT
Start: 2018-01-03 | End: 2018-01-03 | Stop reason: SDUPTHER

## 2018-01-03 RX ORDER — LINEZOLID 600 MG/1
600 TABLET, FILM COATED ORAL 2 TIMES DAILY
Qty: 14 TABLET | Refills: 0 | Status: SHIPPED | OUTPATIENT
Start: 2018-01-03 | End: 2018-01-10

## 2018-01-09 LAB
ANAEROBIC CULTURE: ABNORMAL
GRAM STAIN RESULT: ABNORMAL
ORGANISM: ABNORMAL
WOUND/ABSCESS: ABNORMAL

## 2018-01-10 ENCOUNTER — OFFICE VISIT (OUTPATIENT)
Dept: INTERNAL MEDICINE | Age: 43
End: 2018-01-10
Attending: STUDENT IN AN ORGANIZED HEALTH CARE EDUCATION/TRAINING PROGRAM

## 2018-01-10 DIAGNOSIS — Z98.890 STATUS POST INCISION AND DRAINAGE: ICD-10-CM

## 2018-01-10 NOTE — PATIENT INSTRUCTIONS
Clean wound daily with hibiclense and apply the bactoban ointment three times a day. Return regular appointment with Dr. Sowmya Lopez. All other medication the same.

## 2018-01-16 ENCOUNTER — TELEPHONE (OUTPATIENT)
Dept: INTERNAL MEDICINE | Age: 43
End: 2018-01-16

## 2018-01-18 PROBLEM — Z98.890 STATUS POST INCISION AND DRAINAGE: Status: ACTIVE | Noted: 2018-01-18

## 2018-02-19 ENCOUNTER — OFFICE VISIT (OUTPATIENT)
Dept: ORTHOPEDIC SURGERY | Age: 43
End: 2018-02-19

## 2018-02-19 ENCOUNTER — TELEPHONE (OUTPATIENT)
Dept: INTERNAL MEDICINE | Age: 43
End: 2018-02-19

## 2018-02-19 VITALS
SYSTOLIC BLOOD PRESSURE: 134 MMHG | HEART RATE: 92 BPM | HEIGHT: 63 IN | DIASTOLIC BLOOD PRESSURE: 89 MMHG | WEIGHT: 219 LBS | BODY MASS INDEX: 38.8 KG/M2 | RESPIRATION RATE: 17 BRPM

## 2018-02-19 DIAGNOSIS — M25.561 RIGHT KNEE PAIN, UNSPECIFIED CHRONICITY: Primary | ICD-10-CM

## 2018-02-19 DIAGNOSIS — S80.01XA CONTUSION OF RIGHT KNEE, INITIAL ENCOUNTER: ICD-10-CM

## 2018-02-19 PROCEDURE — G8417 CALC BMI ABV UP PARAM F/U: HCPCS | Performed by: ORTHOPAEDIC SURGERY

## 2018-02-19 PROCEDURE — 1036F TOBACCO NON-USER: CPT | Performed by: ORTHOPAEDIC SURGERY

## 2018-02-19 PROCEDURE — G8427 DOCREV CUR MEDS BY ELIG CLIN: HCPCS | Performed by: ORTHOPAEDIC SURGERY

## 2018-02-19 PROCEDURE — G8484 FLU IMMUNIZE NO ADMIN: HCPCS | Performed by: ORTHOPAEDIC SURGERY

## 2018-02-19 PROCEDURE — 99203 OFFICE O/P NEW LOW 30 MIN: CPT | Performed by: ORTHOPAEDIC SURGERY

## 2018-02-19 NOTE — TELEPHONE ENCOUNTER
Patient called stating she was in ED for knee injury and wants handicap placard. Patient referred from ED to [address]. Patient has appointment today. Will fax to 's office.

## 2018-02-19 NOTE — PROGRESS NOTES
Aunt        Social History     Social History    Marital status: Single     Spouse name: N/A    Number of children: N/A    Years of education: N/A     Occupational History    NA      Social History Main Topics    Smoking status: Never Smoker    Smokeless tobacco: Never Used    Alcohol use 0.0 oz/week      Comment: about 5 drinks a week    Drug use: No    Sexual activity: Not Asked     Other Topics Concern    None     Social History Narrative    None       Current Outpatient Prescriptions   Medication Sig Dispense Refill    Multiple Vitamins-Minerals (THERAPEUTIC MULTIVITAMIN-MINERALS) tablet Take 1 tablet by mouth daily      cetirizine (ZYRTEC) 10 MG tablet Take 10 mg by mouth daily      amoxicillin-clavulanate (AUGMENTIN) 875-125 MG per tablet Take 1 tablet by mouth 2 times daily      naproxen (NAPROSYN) 500 MG tablet Take 1 tablet by mouth 2 times daily as needed for Pain 30 tablet 0    Misc. Devices (HIBICLENS HAND PUMP 32OZ) MISC Use as needed for skin irritation or bumps. 1 each 0    fluticasone (FLONASE) 50 MCG/ACT nasal spray 1 spray by Nasal route daily 1 Bottle 0    rizatriptan (MAXALT) 5 MG tablet Take 1 tablet by mouth once as needed for Migraine May repeat in 2 hours if needed 6 tablet 0    lactobacillus (CULTURELLE) CAPS capsule Take 4 capsules by mouth daily 120 capsule 0    mometasone (ELOCON) 0.1 % cream Apply topically daily. 1 Tube 2    levothyroxine (SYNTHROID) 125 MCG tablet Take 1 tablet by mouth daily 30 tablet 2    cyanocobalamin 1000 MCG/ML injection Inject 1 ml (1000mcg) weekly for 4 weeks. Followed by once a month (indefinitely).  1 mL 11    rizatriptan (MAXALT) 5 MG tablet Take 1 tablet by mouth once as needed for Migraine May repeat in 2 hours if needed 6 tablet 0    montelukast (SINGULAIR) 10 MG tablet Take 1 tablet by mouth nightly 30 tablet 0    pantoprazole (PROTONIX) 40 MG tablet Take 1 tablet by mouth daily 30 tablet 2    albuterol sulfate HFA (PROVENTIL

## 2018-02-26 ENCOUNTER — TELEPHONE (OUTPATIENT)
Dept: ORTHOPEDIC SURGERY | Age: 43
End: 2018-02-26

## 2018-03-05 ENCOUNTER — OFFICE VISIT (OUTPATIENT)
Dept: INTERNAL MEDICINE | Age: 43
End: 2018-03-05
Attending: INTERNAL MEDICINE

## 2018-03-05 ENCOUNTER — OFFICE VISIT (OUTPATIENT)
Dept: ORTHOPEDIC SURGERY | Age: 43
End: 2018-03-05

## 2018-03-05 VITALS
TEMPERATURE: 98.1 F | HEART RATE: 88 BPM | DIASTOLIC BLOOD PRESSURE: 86 MMHG | WEIGHT: 220 LBS | SYSTOLIC BLOOD PRESSURE: 130 MMHG | RESPIRATION RATE: 20 BRPM | OXYGEN SATURATION: 98 % | BODY MASS INDEX: 38.97 KG/M2

## 2018-03-05 VITALS
BODY MASS INDEX: 38.8 KG/M2 | HEIGHT: 63 IN | WEIGHT: 219 LBS | SYSTOLIC BLOOD PRESSURE: 141 MMHG | DIASTOLIC BLOOD PRESSURE: 94 MMHG | RESPIRATION RATE: 16 BRPM

## 2018-03-05 DIAGNOSIS — S80.211D ABRASION OF RIGHT KNEE, SUBSEQUENT ENCOUNTER: ICD-10-CM

## 2018-03-05 DIAGNOSIS — M25.561 ACUTE PAIN OF RIGHT KNEE: Primary | ICD-10-CM

## 2018-03-05 DIAGNOSIS — S80.01XD CONTUSION OF RIGHT KNEE, SUBSEQUENT ENCOUNTER: Primary | ICD-10-CM

## 2018-03-05 DIAGNOSIS — M25.461 EFFUSION OF RIGHT KNEE: ICD-10-CM

## 2018-03-05 DIAGNOSIS — Z00.00 PREVENTATIVE HEALTH CARE: ICD-10-CM

## 2018-03-05 PROCEDURE — 1036F TOBACCO NON-USER: CPT | Performed by: ORTHOPAEDIC SURGERY

## 2018-03-05 PROCEDURE — G8427 DOCREV CUR MEDS BY ELIG CLIN: HCPCS | Performed by: ORTHOPAEDIC SURGERY

## 2018-03-05 PROCEDURE — G8484 FLU IMMUNIZE NO ADMIN: HCPCS | Performed by: ORTHOPAEDIC SURGERY

## 2018-03-05 PROCEDURE — 99213 OFFICE O/P EST LOW 20 MIN: CPT | Performed by: ORTHOPAEDIC SURGERY

## 2018-03-05 PROCEDURE — G8417 CALC BMI ABV UP PARAM F/U: HCPCS | Performed by: ORTHOPAEDIC SURGERY

## 2018-03-05 RX ORDER — FLUTICASONE PROPIONATE 50 MCG
1 SPRAY, SUSPENSION (ML) NASAL DAILY
Qty: 1 BOTTLE | Refills: 3 | Status: SHIPPED | OUTPATIENT
Start: 2018-03-05 | End: 2018-07-16 | Stop reason: SDUPTHER

## 2018-03-05 RX ORDER — MONTELUKAST SODIUM 10 MG/1
10 TABLET ORAL NIGHTLY
Qty: 30 TABLET | Refills: 3 | Status: SHIPPED | OUTPATIENT
Start: 2018-03-05 | End: 2018-07-16 | Stop reason: SDUPTHER

## 2018-03-05 RX ORDER — CAPSAICIN 0.025 %
CREAM (GRAM) TOPICAL
Qty: 2 TUBE | Refills: 0 | Status: SHIPPED | OUTPATIENT
Start: 2018-03-05 | End: 2018-04-04

## 2018-03-05 RX ORDER — LACTOBACILLUS RHAMNOSUS GG 10B CELL
4 CAPSULE ORAL DAILY
Qty: 120 CAPSULE | Refills: 0 | Status: SHIPPED | OUTPATIENT
Start: 2018-03-05 | End: 2018-07-16 | Stop reason: SDUPTHER

## 2018-03-05 RX ORDER — RIZATRIPTAN BENZOATE 5 MG/1
5 TABLET ORAL
Qty: 12 TABLET | Refills: 0 | Status: SHIPPED | OUTPATIENT
Start: 2018-03-05 | End: 2018-03-19 | Stop reason: SDUPTHER

## 2018-03-05 ASSESSMENT — ENCOUNTER SYMPTOMS
NAUSEA: 0
DIARRHEA: 0
SHORTNESS OF BREATH: 0
HEARTBURN: 1
COUGH: 0
CONSTIPATION: 0
VOMITING: 0
SPUTUM PRODUCTION: 0

## 2018-03-05 NOTE — PROGRESS NOTES
Family History:       Problem Relation Age of Onset    High Blood Pressure Mother     Heart Disease Father     Diabetes Maternal Aunt        Social History:   TOBACCO:   reports that she has never smoked. She has never used smokeless tobacco.  ETOH:   reports that she drinks alcohol. OCCUPATION:      Allergies:  Latex; Clindamycin/lincomycin; Sulfa antibiotics; and Diflucan [fluconazole]    Current Medications:    Prior to Admission medications    Medication Sig Start Date End Date Taking? Authorizing Provider   Handicap Placard MISC by Does not apply route Duration 2 weeks 2/19/18   Vernadine Care, MD   Multiple Vitamins-Minerals (THERAPEUTIC MULTIVITAMIN-MINERALS) tablet Take 1 tablet by mouth daily    Historical Provider, MD   cetirizine (ZYRTEC) 10 MG tablet Take 10 mg by mouth daily    Historical Provider, MD   amoxicillin-clavulanate (AUGMENTIN) 875-125 MG per tablet Take 1 tablet by mouth 2 times daily    Historical Provider, MD   naproxen (NAPROSYN) 500 MG tablet Take 1 tablet by mouth 2 times daily as needed for Pain 2/15/18   LEILANI Irizarry   Misc. Devices (HIBICLENS HAND PUMP 32OZ) MISC Use as needed for skin irritation or bumps. 1/10/18   Cezar Johns MD   fluticasone [facility]) 50 MCG/ACT nasal spray 1 spray by Nasal route daily 12/4/17   Hiro Engle MD   rizatriptan (MAXALT) 5 MG tablet Take 1 tablet by mouth once as needed for Migraine May repeat in 2 hours if needed 12/4/17 2/15/18  Hiro Engle MD   lactobacillus (CULTURELLE) CAPS capsule Take 4 capsules by mouth daily 12/4/17   Lita Nagel MD   mometasone (ELOCON) 0.1 % cream Apply topically daily. 11/6/17   Lita Nagel MD   levothyroxine (SYNTHROID) 125 MCG tablet Take 1 tablet by mouth daily 11/6/17   Lita Nagel MD   cyanocobalamin 1000 MCG/ML injection Inject 1 ml (1000mcg) weekly for 4 weeks. Followed by once a month (indefinitely).  10/30/17   Hiro Engle MD   rizatriptan (MAXALT) 5 MG tablet Take 1 tablet by mouth once as needed for Migraine May repeat in 2 hours if needed 10/16/17 10/16/17  Ratna Galvan MD   montelukast (SINGULAIR) 10 MG tablet Take 1 tablet by mouth nightly 10/16/17   Ratna Galvan MD   pantoprazole (PROTONIX) 40 MG tablet Take 1 tablet by mouth daily 6/12/17   Ratna Galvan MD   albuterol sulfate HFA (PROVENTIL HFA) 108 (90 BASE) MCG/ACT inhaler Inhale 2 puffs into the lungs every 6 hours as needed for Wheezing 6/12/17   Ratna Galvan MD   Syringe, Disposable, 2.5 ML MISC 1 Syringe by Does not apply route every 30 days 3/8/17   Silvio Jaimes MD   NEEDLE, DISP, 25 G (BD DISP NEEDLES) 25G X 5/8\" MISC 1 packet by Does not apply route every 30 days 3/8/17   Silvio Jaimes MD   polyethylene glycol (MIRALAX) powder Take 17 g by mouth as needed (constipation) Take as directed. 1/26/17   Tash Enrique MD   urea (CARMOL) 10 % cream Apply topically as needed. 10/24/16   Luisa Duggan DPM   folic acid (FOLVITE) 1 MG tablet Take 1 tablet by mouth daily 7/18/16   Ratna Galvan MD       Review of Systems   Constitutional: Negative for chills and fever. Respiratory: Negative for cough, sputum production and shortness of breath. Cardiovascular: Negative for chest pain and palpitations. Gastrointestinal: Positive for heartburn. Negative for constipation, diarrhea, nausea and vomiting. Musculoskeletal: Positive for falls, joint pain and myalgias. Physical Exam:      Vitals: LMP 02/04/2018     There is no height or weight on file to calculate BMI. Wt Readings from Last 3 Encounters:   03/05/18 219 lb (99.3 kg)   02/19/18 219 lb (99.3 kg)   02/15/18 219 lb 9.3 oz (99.6 kg)       Physical Exam  Constitutional: She is oriented to person, place, and time. She appears well-developed and well-nourished. No distress. Cardiovascular: RRR, No murmur, S1S2  Pulmonary/Chest: Effort normal and breath sounds normal. No respiratory distress. She has no wheezes.  She has no rales. She exhibits no tenderness. Abdominal: Soft. Bowel sounds are normal. She exhibits no distension. There is no tenderness. There is no guarding. Musculoskeletal: Normal range of motion. She exhibits no edema or deformity. Negative anterior or posterior drawer test. She has healing abrasion w/ eschar at the anterior tibia area  Neurological: She is alert and oriented to person, place, and time. Skin: Skin is warm and dry. LABS:    Chemistry:  No results for input(s): BUN, CREATININE, NA, K, CO2, CL, MG, PHOS, AST, ALT, ALB, PROT in the last 72 hours. Invalid input(s): GLU, CA, TBILI, DBILI, ALP, GLUFASTING    No results for input(s): ALKPHOS, ALT, AST, PROT, BILITOT, BILIDIR, LABALBU in the last 72 hours. [unfilled]    No results for input(s): Rory Wilson, LABMICR, MICROALBUR, Krishna Raymundoo in the last 72 hours. Lab Results   Component Value Date    TSH 0.67 11/26/2016       Hematology:  No results for input(s): WBC, HGB, HCT, PLT, MCV, MCH, MCHC, RDW, EOSABS in the last 72 hours. Invalid input(s): NEUTP, LYMPHP, MONOSP, EOSP, BASOP, NEUTABS, LYMPHABS, MONOABS, BASOABS    Lab Results   Component Value Date    IRON 106 02/21/2017    TIBC 259 02/21/2017    FERRITIN 812.7 (H) 02/21/2017    FOLATE 3.66 (L) 10/28/2017    AQAYQJSW09 266 10/28/2017       Lipid:  Lab Results   Component Value Date    CHOL 174 10/24/2013    HDL 65 (H) 10/28/2017    LDLCALC 139 (H) 10/28/2017    TRIG 93 10/24/2013       U/A:  Lab Results   Component Value Date    LABMICR SEE BELOW 09/20/2016       Imaging:   Xr Knee Right (1-2 Views)    Result Date: 2/19/2018  Radiology exam is complete. No Radiologist dictation. Please follow up with ordering provider.      Xr Knee Right (1-2 Views)    Result Date: 2/15/2018  EXAMINATION: 2 VIEWS OF THE RIGHT KNEE 2/15/2018 11:50 am COMPARISON: 07/01/2017 HISTORY: ORDERING PHYSICIAN PROVIDED HISTORY: injury from fall TECHNOLOGIST PROVIDED HISTORY: Technologist Provided

## 2018-03-05 NOTE — LETTER
[facility]  [address]  [address] [facility] [place] [address]  Phone: [phone]  Fax: [phone]    Adrian Willard MD        March 5, 2018     Patient: Casa Matta   YOB: 1975   Date of Visit: 3/5/2018       To Whom It May Concern: It is my medical opinion that Casa Matta may return to work on 3/6/18. No kneeling or squatting for 2 months. If you have any questions or concerns, please don't hesitate to call.     Sincerely,          Adrian Willard MD

## 2018-03-05 NOTE — PROGRESS NOTES
CHIEF COMPLAINT: Right knee pain. DATE OF INJURY: 2/13/18    History:   Nighat Kramer is a 38 y.o. Black female here for right knee follow up. Initial history:   referred by [facility] ER for evaluation and treatment of right knee pain / injury. The patient complains of right knee pain. This is evaluated as a personal injury. There was a history of injury. She tripped and fell going up steps, landing on her right knee. The pain began 6 days ago. The pain is located anterior. She describes the symptoms as aching. She rates pain at 8/10. Symptoms improve with ice, elevation, compression. The symptoms are worse with activity. The knee has not given out or felt unstable. The patient cannot bend and straighten the knee fully. There is no swelling in the knee. There was not catching / locking of the knee. The patient has not had PT. The patient has not had an injection. The patient has not taken NSAIDs because she has history of GI ulcer with naproxen. The patient's occupation is .    Interval History:  She is here because of issues related to her work. She did not go back to work since after last visit. She did not obtain the handicap placard. She states she did not go to work because they have uneven floors inside the building, and have signs warning about it and people have tripped in the past.  She also lives on the 4th floor of a building and did not wish to risk falling. She rates pain 7/10. She states the knee does fell better than before. She has swelling. She feels snapping anteriorly. Physical Examination:     Vital signs:   BP (!) 141/94   Resp 16   Ht 5' 3\" (1.6 m)   Wt 219 lb (99.3 kg)   LMP 02/04/2018   BMI 38.79 kg/m²     General:  alert, appears stated age, cooperative and no distress   Gait:  Antalgic. The patient can bear weight on the injured extremity.      Right Knee  Alignment:  neutral   ROM:  0 degrees extension to 120 degrees flexion, but stiff   Left knee: 0 degrees extension, 120 flexion   Crepitus:  no   Joint Tenderness:  anterior tibia around abrasion   Effusion:   0 cc   Patellar excursion:  2 of 4 quadrants    Patellar tilt test:  negative   Patellar facet tenderness:  negative medial   negative lateral   Trochlear tenderness:  negative   Lachman test:  negative   Left knee: negative   Anterior drawer test:  negative   Left knee: positive   Posterior drawer:   negative    Left knee: negative   Varus laxity at 30 degrees:  negative   Left knee: negative   Valgus laxity at 30 degrees:   negative   Left knee: negative   Saray's test:  not tested   Left knee: negative   Dora's test:  negative   Left knee: negative     She has a healing abrasion with eschar at the anterior tibia near the level of the tibial tubercle. No signs of infection. Motor exam of the lower extremities show quadriceps, hamstrings, foot dorsiflexion and plantarflexion grossly intact. Sensation to both feet is grossly intact to light touch. The bilateral lower extremities are warm and well-perfused with brisk capillary refill. Imaging:  Right Knee X-Ray (previous): No fracture. Moderate lateral compartment narrowing       Assessment:     Right knee contusion with abrasion  Right knee osteoarthritis  Obesity      Plan:     Natural history and expected course discussed. Questions answered. I had an extensive discussion with the patient about her injury. Her exam is still consistent with contusion and abrasion. 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 Continue rest, ice, compression, and elevation (RICE) therapy. Letter for return to work and restrictions provided. Follow up prn.

## 2018-03-05 NOTE — PATIENT INSTRUCTIONS
Before any of you medication is completely gone, call your pharmacy AT LEAST 1 WEEK ahead for refills. Review all information regarding your medication before starting. If you become ill when the clinic is closed, please call the [facility] [employer] [employer]  at   #[phone] and ask the  to page the AOD between 6:00 AM and 6:00 PM or page the AON between 6:00 PM and 6:00 am    The clinic is not able to process MY CHART requests for appointments or messages including requests. Please call the [facility] at [phone]  For appointments and messages. Call your pharmacy for medication refills. Return to clinic 1 month    Apply capsaicin to knee twice huynh    Referral to physical therapy.  Call for appointment     Continue medication as listed on discharge sheet    Instructions reviewed before discharge and copy given to patient    [address] [phone]

## 2018-03-06 ENCOUNTER — HOSPITAL ENCOUNTER (OUTPATIENT)
Dept: PHYSICAL THERAPY | Age: 43
Discharge: HOME | End: 2018-03-31
Attending: INTERNAL MEDICINE | Admitting: INTERNAL MEDICINE

## 2018-03-06 ASSESSMENT — PAIN DESCRIPTION - DESCRIPTORS: DESCRIPTORS: ACHING;DULL;CONSTANT

## 2018-03-06 ASSESSMENT — PAIN DESCRIPTION - ORIENTATION: ORIENTATION: RIGHT

## 2018-03-06 ASSESSMENT — PAIN DESCRIPTION - LOCATION: LOCATION: KNEE

## 2018-03-06 ASSESSMENT — PAIN SCALES - GENERAL: PAINLEVEL_OUTOF10: 7

## 2018-03-06 ASSESSMENT — PAIN DESCRIPTION - PAIN TYPE: TYPE: ACUTE PAIN

## 2018-03-07 ENCOUNTER — HOSPITAL ENCOUNTER (OUTPATIENT)
Dept: OTHER | Age: 43
Discharge: HOME | End: 2018-03-31
Attending: INTERNAL MEDICINE | Admitting: INTERNAL MEDICINE

## 2018-03-19 ENCOUNTER — OFFICE VISIT (OUTPATIENT)
Dept: INTERNAL MEDICINE | Age: 43
End: 2018-03-19
Attending: STUDENT IN AN ORGANIZED HEALTH CARE EDUCATION/TRAINING PROGRAM

## 2018-03-19 VITALS
TEMPERATURE: 98.3 F | DIASTOLIC BLOOD PRESSURE: 91 MMHG | RESPIRATION RATE: 20 BRPM | HEART RATE: 79 BPM | SYSTOLIC BLOOD PRESSURE: 137 MMHG | WEIGHT: 218 LBS | HEIGHT: 62 IN | OXYGEN SATURATION: 99 % | BODY MASS INDEX: 40.12 KG/M2

## 2018-03-19 DIAGNOSIS — R51.9 GENERALIZED HEADACHES: Primary | ICD-10-CM

## 2018-03-19 DIAGNOSIS — D69.3 CHRONIC ITP (IDIOPATHIC THROMBOCYTOPENIA) (HCC): ICD-10-CM

## 2018-03-19 DIAGNOSIS — E03.9 HYPOTHYROIDISM, UNSPECIFIED TYPE: ICD-10-CM

## 2018-03-19 DIAGNOSIS — D50.0 IRON DEFICIENCY ANEMIA DUE TO CHRONIC BLOOD LOSS: ICD-10-CM

## 2018-03-19 LAB — TSH REFLEX: 1.76 UIU/ML (ref 0.27–4.2)

## 2018-03-19 RX ORDER — ALBUTEROL SULFATE 90 UG/1
2 AEROSOL, METERED RESPIRATORY (INHALATION) EVERY 6 HOURS PRN
Qty: 1 INHALER | Refills: 2 | Status: SHIPPED | OUTPATIENT
Start: 2018-03-19 | End: 2018-07-16 | Stop reason: SDUPTHER

## 2018-03-19 RX ORDER — METOPROLOL SUCCINATE 25 MG/1
25 TABLET, EXTENDED RELEASE ORAL DAILY
Qty: 30 TABLET | Refills: 1 | Status: SHIPPED | OUTPATIENT
Start: 2018-03-19 | End: 2018-07-16

## 2018-03-19 RX ORDER — RIZATRIPTAN BENZOATE 5 MG/1
5 TABLET ORAL
Qty: 12 TABLET | Refills: 0 | Status: SHIPPED | OUTPATIENT
Start: 2018-03-19 | End: 2018-07-16 | Stop reason: SDUPTHER

## 2018-03-19 ASSESSMENT — ENCOUNTER SYMPTOMS
SPUTUM PRODUCTION: 0
SORE THROAT: 0
PHOTOPHOBIA: 1
VOMITING: 0
SHORTNESS OF BREATH: 0
EYE REDNESS: 0
SINUS PAIN: 0
BLURRED VISION: 1
DOUBLE VISION: 0
NAUSEA: 1
WHEEZING: 0
BLOOD IN STOOL: 0
CONSTIPATION: 0
COUGH: 0
DIARRHEA: 0

## 2018-03-19 NOTE — PROGRESS NOTES
Mauricio Plascencia MD   polyethylene glycol (MIRALAX) powder Take 17 g by mouth as needed (constipation) Take as directed. 1/26/17  Yes Abimael Brock MD   urea (CARMOL) 10 % cream Apply topically as needed. 10/24/16  Yes Robert Page DPM   folic acid (FOLVITE) 1 MG tablet Take 1 tablet by mouth daily 7/18/16  Yes Hernandez Jc MD   rizatriptan (MAXALT) 5 MG tablet Take 1 tablet by mouth once as needed for Migraine May repeat in 2 hours if needed 3/5/18 3/5/18  Tena Cintron   diclofenac sodium (VOLTAREN) 1 % GEL Apply 4 g topically 4 times daily 3/5/18 4/4/18  Tena Cintron   naproxen (NAPROSYN) 500 MG tablet Take 1 tablet by mouth 2 times daily as needed for Pain 2/15/18   LEILANI Gay   rizatriptan (MAXALT) 5 MG tablet Take 1 tablet by mouth once as needed for Migraine May repeat in 2 hours if needed 10/16/17 10/16/17  Hernandez Jc MD   Syringe, Disposable, 2.5 ML MISC 1 Syringe by Does not apply route every 30 days 3/8/17   Serge Ortiz MD   NEEDLE, DISP, 25 G (BD DISP NEEDLES) 25G X 5/8\" MISC 1 packet by Does not apply route every 30 days 3/8/17   Serge Ortiz MD       Review of Systems   Constitutional: Negative for chills, fever and malaise/fatigue. HENT: Negative for congestion, ear discharge, ear pain, sinus pain, sore throat and tinnitus. Headache     Eyes: Positive for blurred vision (with migraine) and photophobia. Negative for double vision and redness. Respiratory: Negative for cough, sputum production, shortness of breath and wheezing. Cardiovascular: Negative for chest pain, palpitations and leg swelling. Gastrointestinal: Positive for nausea (with migraine). Negative for blood in stool, constipation, diarrhea, melena and vomiting. Genitourinary: Negative for dysuria, flank pain, hematuria and urgency. Musculoskeletal: Positive for falls (Previously tripped, knee contusion). Negative for neck pain. Skin: Negative for rash.    Neurological: Negative for

## 2018-03-19 NOTE — PATIENT INSTRUCTIONS
Call your pharmacy for medication refills at least 48 hours ahead at [phone] (Monday -Friday, 08:00 AM to 4:00 PM .If you become ill when the clinic is closed, please call [facility]  at [phone] and ask the  to page the AOD between 6:00 AM and 6:00 PM or page the AON between 6:00 PM & 6:00 AM.    The clinic is not able to process [facility] requests for appointments or messages including refill requests. Please call the [facility] at [phone] for appointments and messages. The [facility]  Telephone:[phone]    NEW PRESCRIPTION:    METOPROLOL 25MG   TAKE ONE TABLET BY MOUTH DAILY    Refill on Maxalt  Refill  Albuterol Inhaler    Referral to [address] [place] [address] Cristine Garza Neurologist    REturn in 3 months. Instructions reviewed with patient and copy given to patient upon discharge. 11:44 AM3/19/2018     Patient Education          metoprolol  Pronunciation:  me TOE pro lol  Brand:  Lopressor, Metoprolol Succinate ER, Metoprolol Tartrate, Toprol-XL  What is the most important information I should know about metoprolol? You should not use this medication if you have a serious heart problem (heart block, sick sinus syndrome, slow heart rate), severe circulation problems, severe heart failure, or a history of slow heart beats that caused fainting. What is metoprolol? Metoprolol is a beta-blocker that affects the heart and circulation (blood flow through arteries and veins). Metoprolol is used to treat angina (chest pain) and hypertension (high blood pressure). It is also used to treat or prevent heart attack. Metoprolol may also be used for other purposes not listed in this medication guide. What should I discuss with my healthcare provider before taking metoprolol?   You should not use this medication if you are allergic to metoprolol, or other beta-blockers (atenolol, carvedilol, labetalol, metoprolol, nadolol, nebivolol, propranolol, ergotamine, methylergonovine;  · heart or blood pressure medications --amlodipine, clonidine, digoxin, diltiazem, dipyridamole, hydralazine, methyldopa, nifedipine, quinidine, reserpine, verapamil, and others;  · an MAO inhibitor --isocarboxazid, linezolid, phenelzine, rasagiline, selegiline, tranylcypromine; or  · medicine to treat mental illness --chlorpromazine, fluphenazine haloperidol, thioridazine. This list is not complete. Other drugs may interact with metoprolol, including prescription and over-the-counter medicines, vitamins, and herbal products. Not all possible interactions are listed in this medication guide. Where can I get more information? Your pharmacist can provide more information about metoprolol. Remember, keep this and all other medicines out of the reach of children, never share your medicines with others, and use this medication only for the indication prescribed. Every effort has been made to ensure that the information provided by [address] is accurate, up-to-date, and complete, but no guarantee is made to that effect. Drug information contained herein may be time sensitive. [facility] information has been compiled for use by healthcare practitioners and consumers in the [place] and therefore [facility] does not warrant that uses outside of the [place] are appropriate, unless specifically indicated otherwise. [facility]'s drug information does not endorse drugs, diagnose patients or recommend therapy. [facility] drug information is an informational resource designed to assist licensed healthcare practitioners in caring for their patients and/or to serve consumers viewing this service as a supplement to, and not a substitute for, the expertise, skill, knowledge and judgment of healthcare practitioners.  The absence of a warning for a given drug or drug combination in no way should be construed to indicate that the drug or drug combination is safe, effective or appropriate for any given patient. [facility] does not assume any responsibility for any aspect of healthcare administered with the aid of information [facility] provides. The information contained herein is not intended to cover all possible uses, directions, precautions, warnings, drug interactions, allergic reactions, or adverse effects. If you have questions about the drugs you are taking, check with your doctor, nurse or pharmacist.  Copyright 7519-0986 [address] [place] [address] Dr BROWN. Version: 16.04. Revision date: 3/25/2016. Care instructions adapted under license by [facility] ([facility]). If you have questions about a medical condition or this instruction, always ask your healthcare professional. [address] any warranty or liability for your use of this information.

## 2018-03-27 ENCOUNTER — TELEPHONE (OUTPATIENT)
Dept: INTERNAL MEDICINE | Age: 43
End: 2018-03-27

## 2018-04-01 ENCOUNTER — HOSPITAL ENCOUNTER (OUTPATIENT)
Dept: OTHER | Age: 43
Discharge: HOME | End: 2018-04-30
Attending: INTERNAL MEDICINE | Admitting: INTERNAL MEDICINE

## 2018-04-16 ENCOUNTER — TELEPHONE (OUTPATIENT)
Dept: INTERNAL MEDICINE | Age: 43
End: 2018-04-16

## 2018-04-25 ENCOUNTER — OFFICE VISIT (OUTPATIENT)
Dept: INTERNAL MEDICINE | Age: 43
End: 2018-04-25
Attending: STUDENT IN AN ORGANIZED HEALTH CARE EDUCATION/TRAINING PROGRAM

## 2018-04-25 VITALS
HEIGHT: 63 IN | OXYGEN SATURATION: 100 % | TEMPERATURE: 97.7 F | RESPIRATION RATE: 20 BRPM | HEART RATE: 78 BPM | BODY MASS INDEX: 38.45 KG/M2 | SYSTOLIC BLOOD PRESSURE: 125 MMHG | WEIGHT: 217 LBS | DIASTOLIC BLOOD PRESSURE: 87 MMHG

## 2018-04-25 DIAGNOSIS — D50.0 IRON DEFICIENCY ANEMIA DUE TO CHRONIC BLOOD LOSS: ICD-10-CM

## 2018-04-25 DIAGNOSIS — Z00.00 PREVENTATIVE HEALTH CARE: ICD-10-CM

## 2018-04-25 DIAGNOSIS — K90.9 INTESTINAL MALABSORPTION, UNSPECIFIED TYPE: Chronic | ICD-10-CM

## 2018-04-25 DIAGNOSIS — Z76.0 ENCOUNTER FOR MEDICATION REFILL: Primary | ICD-10-CM

## 2018-04-25 RX ORDER — GREEN TEA/HOODIA GORDONII 315-12.5MG
4 CAPSULE ORAL DAILY
Qty: 120 TABLET | Refills: 1 | Status: CANCELLED | OUTPATIENT
Start: 2018-04-25 | End: 2018-05-25

## 2018-04-25 RX ORDER — LEVOTHYROXINE SODIUM 0.12 MG/1
125 TABLET ORAL DAILY
Qty: 30 TABLET | Refills: 1 | Status: CANCELLED | OUTPATIENT
Start: 2018-04-25 | End: 2018-05-25

## 2018-04-25 RX ORDER — CETIRIZINE HYDROCHLORIDE, PSEUDOEPHEDRINE HYDROCHLORIDE 5; 120 MG/1; MG/1
1 TABLET, FILM COATED, EXTENDED RELEASE ORAL 2 TIMES DAILY
Qty: 60 TABLET | Refills: 1 | Status: SHIPPED | OUTPATIENT
Start: 2018-04-25 | End: 2018-05-25

## 2018-04-25 RX ORDER — CYANOCOBALAMIN 1000 UG/ML
1000 INJECTION INTRAMUSCULAR; SUBCUTANEOUS
Qty: 1 ML | Refills: 1 | Status: CANCELLED | OUTPATIENT
Start: 2018-04-25

## 2018-04-25 RX ORDER — CETIRIZINE HYDROCHLORIDE, PSEUDOEPHEDRINE HYDROCHLORIDE 5; 120 MG/1; MG/1
1 TABLET, FILM COATED, EXTENDED RELEASE ORAL 2 TIMES DAILY
Qty: 60 TABLET | Refills: 1 | Status: CANCELLED | OUTPATIENT
Start: 2018-04-25 | End: 2018-05-25

## 2018-07-16 ENCOUNTER — OFFICE VISIT (OUTPATIENT)
Dept: INTERNAL MEDICINE CLINIC | Age: 43
End: 2018-07-16
Payer: COMMERCIAL

## 2018-07-16 VITALS
DIASTOLIC BLOOD PRESSURE: 90 MMHG | HEART RATE: 88 BPM | SYSTOLIC BLOOD PRESSURE: 131 MMHG | TEMPERATURE: 97.9 F | HEIGHT: 63 IN | RESPIRATION RATE: 20 BRPM | BODY MASS INDEX: 40.04 KG/M2 | OXYGEN SATURATION: 97 % | WEIGHT: 226 LBS

## 2018-07-16 DIAGNOSIS — K90.9 INTESTINAL MALABSORPTION, UNSPECIFIED TYPE: Chronic | ICD-10-CM

## 2018-07-16 DIAGNOSIS — D64.9 ANEMIA, UNSPECIFIED TYPE: Primary | ICD-10-CM

## 2018-07-16 DIAGNOSIS — D50.0 IRON DEFICIENCY ANEMIA DUE TO CHRONIC BLOOD LOSS: ICD-10-CM

## 2018-07-16 DIAGNOSIS — Z00.00 PREVENTATIVE HEALTH CARE: ICD-10-CM

## 2018-07-16 PROCEDURE — 99213 OFFICE O/P EST LOW 20 MIN: CPT | Performed by: STUDENT IN AN ORGANIZED HEALTH CARE EDUCATION/TRAINING PROGRAM

## 2018-07-16 RX ORDER — PANTOPRAZOLE SODIUM 40 MG/1
40 TABLET, DELAYED RELEASE ORAL DAILY
Qty: 30 TABLET | Refills: 2 | Status: SHIPPED | OUTPATIENT
Start: 2018-07-16 | End: 2018-07-16 | Stop reason: SDUPTHER

## 2018-07-16 RX ORDER — POLYETHYLENE GLYCOL 3350 17 G/17G
17 POWDER, FOR SOLUTION ORAL PRN
Qty: 119 G | Refills: 1 | Status: SHIPPED | OUTPATIENT
Start: 2018-07-16 | End: 2018-07-16 | Stop reason: SDUPTHER

## 2018-07-16 RX ORDER — CYANOCOBALAMIN 1000 UG/ML
INJECTION INTRAMUSCULAR; SUBCUTANEOUS
Qty: 1 ML | Refills: 5 | Status: SHIPPED | OUTPATIENT
Start: 2018-07-16 | End: 2019-07-05 | Stop reason: SDUPTHER

## 2018-07-16 RX ORDER — MOMETASONE FUROATE 1 MG/G
CREAM TOPICAL
Qty: 1 TUBE | Refills: 2 | Status: SHIPPED | OUTPATIENT
Start: 2018-07-16 | End: 2019-01-07 | Stop reason: SDUPTHER

## 2018-07-16 RX ORDER — RIZATRIPTAN BENZOATE 5 MG/1
5 TABLET ORAL
Qty: 8 TABLET | Refills: 0 | Status: SHIPPED | OUTPATIENT
Start: 2018-07-16 | End: 2018-07-16 | Stop reason: SDUPTHER

## 2018-07-16 RX ORDER — POLYETHYLENE GLYCOL 3350 17 G/17G
17 POWDER, FOR SOLUTION ORAL PRN
Qty: 119 G | Refills: 1 | Status: SHIPPED | OUTPATIENT
Start: 2018-07-16 | End: 2020-06-25 | Stop reason: SDUPTHER

## 2018-07-16 RX ORDER — M-VIT,TX,IRON,MINS/CALC/FOLIC 27MG-0.4MG
1 TABLET ORAL DAILY
Qty: 30 TABLET | Refills: 1 | Status: SHIPPED | OUTPATIENT
Start: 2018-07-16 | End: 2018-07-16 | Stop reason: SDUPTHER

## 2018-07-16 RX ORDER — MONTELUKAST SODIUM 10 MG/1
10 TABLET ORAL NIGHTLY
Qty: 30 TABLET | Refills: 2 | Status: SHIPPED | OUTPATIENT
Start: 2018-07-16 | End: 2019-01-07 | Stop reason: ALTCHOICE

## 2018-07-16 RX ORDER — PANTOPRAZOLE SODIUM 40 MG/1
40 TABLET, DELAYED RELEASE ORAL DAILY
Qty: 30 TABLET | Refills: 2 | Status: SHIPPED | OUTPATIENT
Start: 2018-07-16 | End: 2018-11-26 | Stop reason: SDUPTHER

## 2018-07-16 RX ORDER — LEVOTHYROXINE SODIUM 0.12 MG/1
125 TABLET ORAL DAILY
Qty: 30 TABLET | Refills: 2 | Status: SHIPPED | OUTPATIENT
Start: 2018-07-16 | End: 2018-10-25

## 2018-07-16 RX ORDER — LACTOBACILLUS RHAMNOSUS GG 10B CELL
1 CAPSULE ORAL DAILY
Qty: 30 CAPSULE | Refills: 1 | Status: SHIPPED | OUTPATIENT
Start: 2018-07-16 | End: 2018-11-26 | Stop reason: SDUPTHER

## 2018-07-16 RX ORDER — FOLIC ACID 1 MG/1
1 TABLET ORAL DAILY
Qty: 30 TABLET | Refills: 0 | Status: SHIPPED | OUTPATIENT
Start: 2018-07-16 | End: 2018-07-16 | Stop reason: SDUPTHER

## 2018-07-16 RX ORDER — MOMETASONE FUROATE 1 MG/G
CREAM TOPICAL
Qty: 1 TUBE | Refills: 2 | Status: SHIPPED | OUTPATIENT
Start: 2018-07-16 | End: 2018-07-16 | Stop reason: SDUPTHER

## 2018-07-16 RX ORDER — LEVOTHYROXINE SODIUM 0.12 MG/1
125 TABLET ORAL DAILY
Qty: 30 TABLET | Refills: 2 | Status: SHIPPED | OUTPATIENT
Start: 2018-07-16 | End: 2018-07-16 | Stop reason: SDUPTHER

## 2018-07-16 RX ORDER — M-VIT,TX,IRON,MINS/CALC/FOLIC 27MG-0.4MG
1 TABLET ORAL DAILY
Qty: 30 TABLET | Refills: 1 | Status: SHIPPED | OUTPATIENT
Start: 2018-07-16 | End: 2020-04-03 | Stop reason: SDUPTHER

## 2018-07-16 RX ORDER — FOLIC ACID 1 MG/1
1 TABLET ORAL DAILY
Qty: 30 TABLET | Refills: 0 | Status: SHIPPED | OUTPATIENT
Start: 2018-07-16 | End: 2018-08-29 | Stop reason: SDUPTHER

## 2018-07-16 RX ORDER — FLUTICASONE PROPIONATE 50 MCG
1 SPRAY, SUSPENSION (ML) NASAL DAILY
Qty: 1 BOTTLE | Refills: 3 | Status: SHIPPED | OUTPATIENT
Start: 2018-07-16 | End: 2018-07-16 | Stop reason: SDUPTHER

## 2018-07-16 RX ORDER — CHLORHEXIDINE GLUCONATE 4 G/100ML
SOLUTION TOPICAL
Qty: 1 BOTTLE | Refills: 1 | Status: SHIPPED | OUTPATIENT
Start: 2018-07-16 | End: 2018-07-30

## 2018-07-16 RX ORDER — ALBUTEROL SULFATE 90 UG/1
2 AEROSOL, METERED RESPIRATORY (INHALATION) EVERY 6 HOURS PRN
Qty: 1 INHALER | Refills: 2 | Status: SHIPPED | OUTPATIENT
Start: 2018-07-16 | End: 2018-07-16 | Stop reason: SDUPTHER

## 2018-07-16 RX ORDER — LACTOBACILLUS RHAMNOSUS GG 10B CELL
4 CAPSULE ORAL DAILY
Qty: 120 CAPSULE | Refills: 0 | Status: SHIPPED | OUTPATIENT
Start: 2018-07-16 | End: 2018-07-16 | Stop reason: SDUPTHER

## 2018-07-16 RX ORDER — FLUTICASONE PROPIONATE 50 MCG
1 SPRAY, SUSPENSION (ML) NASAL DAILY
Qty: 1 BOTTLE | Refills: 3 | Status: SHIPPED | OUTPATIENT
Start: 2018-07-16 | End: 2019-07-02 | Stop reason: SDUPTHER

## 2018-07-16 RX ORDER — ALBUTEROL SULFATE 90 UG/1
2 AEROSOL, METERED RESPIRATORY (INHALATION) EVERY 6 HOURS PRN
Qty: 1 INHALER | Refills: 2 | Status: SHIPPED | OUTPATIENT
Start: 2018-07-16 | End: 2019-10-23 | Stop reason: SDUPTHER

## 2018-07-16 RX ORDER — LACTOBACILLUS RHAMNOSUS GG 10B CELL
4 CAPSULE ORAL DAILY
Qty: 120 CAPSULE | Refills: 1 | Status: SHIPPED | OUTPATIENT
Start: 2018-07-16 | End: 2018-07-16 | Stop reason: DRUGHIGH

## 2018-07-16 RX ORDER — MONTELUKAST SODIUM 10 MG/1
10 TABLET ORAL NIGHTLY
Qty: 30 TABLET | Refills: 3 | Status: SHIPPED | OUTPATIENT
Start: 2018-07-16 | End: 2018-07-16 | Stop reason: SDUPTHER

## 2018-07-16 RX ORDER — RIZATRIPTAN BENZOATE 5 MG/1
5 TABLET ORAL
Qty: 8 TABLET | Refills: 0 | Status: SHIPPED | OUTPATIENT
Start: 2018-07-16 | End: 2019-01-07 | Stop reason: ALTCHOICE

## 2018-07-16 RX ORDER — MUPIROCIN CALCIUM 20 MG/G
CREAM TOPICAL
Qty: 1 TUBE | Refills: 0 | Status: SHIPPED | OUTPATIENT
Start: 2018-07-16 | End: 2018-08-15

## 2018-07-16 ASSESSMENT — ENCOUNTER SYMPTOMS
ABDOMINAL PAIN: 0
SINUS PAIN: 0
COUGH: 0
SPUTUM PRODUCTION: 0
BLOOD IN STOOL: 0
PHOTOPHOBIA: 0
NAUSEA: 0
SHORTNESS OF BREATH: 0
EYE REDNESS: 0
WHEEZING: 0
SORE THROAT: 0
DIARRHEA: 0
BLURRED VISION: 0
ORTHOPNEA: 0
DOUBLE VISION: 0
CONSTIPATION: 0

## 2018-07-16 NOTE — PROGRESS NOTES
[facility]       NURSING PROGRESS NOTE      July 16, 2018  Viri Stringer    Chief Complaint:   Chief Complaint   Patient presents with    Other     new job; needs medical visit for work       Constitutional: alert and oriented; calm; denies feeling ill; weight gain 9lbs in 3 months  Eyes: negative  Ears, nose, mouth, throat, and face: negative  Respiratory: negative  Cardiovascular: negative  Gastrointestinal: negative  Genitourinary: negative  Integument/breast: negative  Hematologic/lymphatic: negative  Musculoskeletal: negative  Neurological: negative  Diabetes: No    Pain Assessment:  Pain Present: no  Pain Score: 0  Pain Quality/Description: no c/o pain    Mobility/Safety/Self-Care:  Steady Gait: Yes  History of Falls: No   History of a Fall within the last 30 days No  Assistive Device: None  Poor Hygiene: No    Psychosocial:   Depression: No  If YES,    Does Patient express current thoughts of harming self or others?: No  Anxious: No  Insomnia: No  Inappropriate Behavior: No  Alcohol Abuse: no  Substance Abuse: no  Signs of Physical Abuse: No  Signs of Emotional Abuse: No    Educational:  Identify the learner who is being assessed for education:  patient                    Ability to Learn:  Exhibits ability to grasp concepts and respond to questions: High  Ready to Learn: Yes  calm   Preferred Method of Learning:  written  Barriers to Learning: Verbalizes interest  Special Considerations due to cultural, Mu-ism, spiritual beliefs:  No  Language:  English  :  No    Note:   has paperwork for doctor to sign letter for new job that she is cleared to work based on [employer] that was abnormal      10:03 AM 7/16/2018

## 2018-07-16 NOTE — PATIENT INSTRUCTIONS
Call your pharmacy for medication refills at least 48 hours ahead at [phone] (Monday -Friday, 08:00 AM to 4:00 PM .If you become ill when the clinic is closed, please call [facility]  at [phone] and ask the  to page the AOD between 6:00 AM and 6:00 PM or page the AON between 6:00 PM & 6:00 AM.    The clinic is not able to process [facility] requests for appointments or messages including refill requests. Please call the [facility] at [phone] for appointments and messages. The [facility]  Telephone:[phone]    Referral to [address] [place] [address] for Gyn appointment  Followup with  for labs (platelets history)  Followup with [facility]  letter for new employer faxed as requested    Work excuse for today given. Bloodwork ordered  Refills given. Return in 3 months. Call [phone] for appointment    Instructions reviewed with patient and copy given to patient upon discharge.

## 2018-07-31 ENCOUNTER — TELEPHONE (OUTPATIENT)
Dept: ENT CLINIC | Age: 43
End: 2018-07-31

## 2018-07-31 DIAGNOSIS — J32.9 CHRONIC SINUSITIS, UNSPECIFIED LOCATION: Primary | Chronic | ICD-10-CM

## 2018-07-31 RX ORDER — CLINDAMYCIN HYDROCHLORIDE 300 MG/1
300 CAPSULE ORAL 3 TIMES DAILY
Qty: 30 CAPSULE | Refills: 0 | Status: CANCELLED | OUTPATIENT
Start: 2018-07-31 | End: 2018-08-10

## 2018-07-31 RX ORDER — AMOXICILLIN AND CLAVULANATE POTASSIUM 875; 125 MG/1; MG/1
1 TABLET, FILM COATED ORAL 2 TIMES DAILY
Qty: 20 TABLET | Refills: 0 | Status: SHIPPED | OUTPATIENT
Start: 2018-07-31 | End: 2018-08-10

## 2018-07-31 RX ORDER — PREDNISONE 10 MG/1
TABLET ORAL
Qty: 38 TABLET | Refills: 0 | Status: SHIPPED | OUTPATIENT
Start: 2018-07-31 | End: 2018-08-30 | Stop reason: ALTCHOICE

## 2018-08-08 ENCOUNTER — OFFICE VISIT (OUTPATIENT)
Dept: ENT CLINIC | Age: 43
End: 2018-08-08

## 2018-08-08 VITALS
DIASTOLIC BLOOD PRESSURE: 97 MMHG | HEART RATE: 98 BPM | SYSTOLIC BLOOD PRESSURE: 141 MMHG | WEIGHT: 222 LBS | BODY MASS INDEX: 37.9 KG/M2 | HEIGHT: 64 IN

## 2018-08-08 DIAGNOSIS — K90.9 INTESTINAL MALABSORPTION, UNSPECIFIED TYPE: Chronic | ICD-10-CM

## 2018-08-08 DIAGNOSIS — J32.4 CHRONIC PANSINUSITIS: Primary | ICD-10-CM

## 2018-08-08 DIAGNOSIS — D64.9 ANEMIA, UNSPECIFIED TYPE: ICD-10-CM

## 2018-08-08 LAB
FOLATE: 6.83 NG/ML (ref 4.78–24.2)
VITAMIN B-12: 325 PG/ML (ref 211–911)

## 2018-08-08 PROCEDURE — 99212 OFFICE O/P EST SF 10 MIN: CPT | Performed by: OTOLARYNGOLOGY

## 2018-08-08 ASSESSMENT — ENCOUNTER SYMPTOMS
VOMITING: 0
SINUS PAIN: 0
CONSTIPATION: 0
WHEEZING: 0
CHEST TIGHTNESS: 0
EYE DISCHARGE: 0
VOICE CHANGE: 0
APNEA: 0
BACK PAIN: 0
FACIAL SWELLING: 0
CHOKING: 0
EYE PAIN: 0
SORE THROAT: 0
RHINORRHEA: 0
DIARRHEA: 0
TROUBLE SWALLOWING: 0
SINUS PRESSURE: 0
NAUSEA: 0
STRIDOR: 0
COUGH: 0
SHORTNESS OF BREATH: 0
BLOOD IN STOOL: 0
COLOR CHANGE: 0

## 2018-08-15 NOTE — LETTER
Routing request to provider for review/approval because:  Medication is reported/historical  Called and spoke with pt.  Reports he is on (rivaroxaban) Xarelto 20mg one tablet daily with evening meal.  Would like sent to [facility] in [place].    Cass MORTON RN       [facility]  [address] [facility]      October 16, 2017    To Whom It May Concern:    Please allow Radha Myles to wear sunglasses at work. She suffers from migraine headaches which can be brought on by bright lights and can be aggravated by bright lights. Due to her photosynsitivity, she is unable to work in bright lights.     Thank you,      Dr. Adrian Luna MD

## 2018-08-29 RX ORDER — FOLIC ACID 1 MG/1
1 TABLET ORAL DAILY
Qty: 30 TABLET | Refills: 3
Start: 2018-08-29 | End: 2019-03-21 | Stop reason: SDUPTHER

## 2018-08-30 ENCOUNTER — OFFICE VISIT (OUTPATIENT)
Dept: GYNECOLOGY | Age: 43
End: 2018-08-30

## 2018-08-30 VITALS
TEMPERATURE: 97.2 F | SYSTOLIC BLOOD PRESSURE: 127 MMHG | HEART RATE: 92 BPM | RESPIRATION RATE: 17 BRPM | DIASTOLIC BLOOD PRESSURE: 85 MMHG | HEIGHT: 63 IN | WEIGHT: 217 LBS | BODY MASS INDEX: 38.45 KG/M2

## 2018-08-30 DIAGNOSIS — N92.0 MENORRHAGIA WITH REGULAR CYCLE: ICD-10-CM

## 2018-08-30 DIAGNOSIS — Z01.419 WELL WOMAN EXAM WITH ROUTINE GYNECOLOGICAL EXAM: Primary | ICD-10-CM

## 2018-08-30 DIAGNOSIS — Z11.3 ROUTINE SCREENING FOR STI (SEXUALLY TRANSMITTED INFECTION): ICD-10-CM

## 2018-08-30 PROCEDURE — 99386 PREV VISIT NEW AGE 40-64: CPT | Performed by: OBSTETRICS & GYNECOLOGY

## 2018-08-30 RX ORDER — LIDOCAINE HYDROCHLORIDE ANHYDROUS AND DEXTROSE MONOHYDRATE .4; 5 G/100ML; G/100ML
INJECTION, SOLUTION INTRAVENOUS CONTINUOUS
COMMUNITY
End: 2018-10-23 | Stop reason: ALTCHOICE

## 2018-08-31 LAB
C TRACH DNA GENITAL QL NAA+PROBE: NEGATIVE
N. GONORRHOEAE DNA: NEGATIVE

## 2018-09-03 ASSESSMENT — ENCOUNTER SYMPTOMS
GASTROINTESTINAL NEGATIVE: 1
RESPIRATORY NEGATIVE: 1
EYES NEGATIVE: 1

## 2018-09-03 NOTE — PROGRESS NOTES
file     Other Topics Concern    Not on file     Social History Narrative    No narrative on file     Allergies   Allergen Reactions    Latex Swelling and Dermatitis     Also \"burning of skin\"      Clindamycin/Lincomycin Hives and Other (See Comments)     \"Throat closing\"    Sulfa Antibiotics Anaphylaxis and Hives    Diflucan [Fluconazole] Hives and Itching    Other Hives     Outpatient Prescriptions Marked as Taking for the 8/30/18 encounter (Office Visit) with Quique Montes MD   Medication Sig Dispense Refill    lidocaine 4-5 MG/ML-% infusion Infuse intravenously continuous      folic acid (FOLVITE) 1 MG tablet Take 1 tablet by mouth daily 30 tablet 3    albuterol sulfate HFA (PROVENTIL HFA) 108 (90 Base) MCG/ACT inhaler Inhale 2 puffs into the lungs every 6 hours as needed for Wheezing 1 Inhaler 2    montelukast (SINGULAIR) 10 MG tablet Take 1 tablet by mouth nightly 30 tablet 2    fluticasone (FLONASE) 50 MCG/ACT nasal spray 1 spray by Nasal route daily 1 Bottle 3    Multiple Vitamins-Minerals (THERAPEUTIC MULTIVITAMIN-MINERALS) tablet Take 1 tablet by mouth daily 30 tablet 1    levothyroxine (SYNTHROID) 125 MCG tablet Take 1 tablet by mouth daily 30 tablet 2    mometasone (ELOCON) 0.1 % cream Apply topically daily. 1 Tube 2    pantoprazole (PROTONIX) 40 MG tablet Take 1 tablet by mouth daily 30 tablet 2    polyethylene glycol (MIRALAX) powder Take 17 g by mouth as needed (constipation) Take as directed. 119 g 1    urea (CARMOL) 10 % cream Apply topically as needed.  1 Tube 2    lactobacillus (CULTURELLE) capsule Take 1 capsule by mouth daily 30 capsule 1    cyanocobalamin 1000 MCG/ML injection Inject 1 ml (1000mcg) once monthly 1 mL 5    NEEDLE, DISP, 25 G (BD DISP NEEDLES) 25G X 5/8\" MISC 1 packet by Does not apply route every 30 days 12 each 1    Handicap Placard MISC by Does not apply route Duration 2 weeks 1 each 0    cetirizine (ZYRTEC) 10 MG tablet Take 10 mg by mouth daily      injury around the vagina. No vaginal discharge found. Genitourinary Comments: Normal urethral meatus, nl urethra, nl bladder. Musculoskeletal: Normal range of motion. Lymphadenopathy:        Right: No inguinal adenopathy present. Left: No inguinal adenopathy present. Neurological: She is alert and oriented to person, place, and time. She has normal reflexes. Skin: Skin is warm and dry. Psychiatric: She has a normal mood and affect. Her behavior is normal. Judgment and thought content normal.       Assessment:      1. Annual  2. Menorrhagia  3. STI testing      Plan:      1. Pap, calcium, exercise, mammogram  2. Pelvic US  3.  Check labs, dna        Leonor Del Castillo MD

## 2018-09-04 LAB
HPV COMMENT: NORMAL
HPV TYPE 16: NOT DETECTED
HPV TYPE 18: NOT DETECTED
HPVOH (OTHER TYPES): NOT DETECTED

## 2018-09-07 ENCOUNTER — TELEPHONE (OUTPATIENT)
Dept: INTERNAL MEDICINE CLINIC | Age: 43
End: 2018-09-07

## 2018-09-24 ENCOUNTER — TELEPHONE (OUTPATIENT)
Dept: ENT CLINIC | Age: 43
End: 2018-09-24

## 2018-09-24 DIAGNOSIS — J32.4 CHRONIC PANSINUSITIS: Primary | ICD-10-CM

## 2018-09-24 RX ORDER — PREDNISONE 10 MG/1
TABLET ORAL
Qty: 38 TABLET | Refills: 0 | Status: SHIPPED | OUTPATIENT
Start: 2018-09-24 | End: 2018-10-23 | Stop reason: ALTCHOICE

## 2018-09-24 RX ORDER — AMOXICILLIN AND CLAVULANATE POTASSIUM 875; 125 MG/1; MG/1
1 TABLET, FILM COATED ORAL 2 TIMES DAILY
Qty: 42 TABLET | Refills: 0 | Status: SHIPPED | OUTPATIENT
Start: 2018-09-24 | End: 2018-10-15

## 2018-09-24 NOTE — TELEPHONE ENCOUNTER
Pt states she has a sinus infection, green discharge, post nasal drainage and chest congestion. She would like augmentin and prednisone.   Would like called into [facility]     [phone]

## 2018-10-23 ENCOUNTER — TELEPHONE (OUTPATIENT)
Dept: ENDOCRINOLOGY | Age: 43
End: 2018-10-23

## 2018-10-23 ENCOUNTER — OFFICE VISIT (OUTPATIENT)
Dept: ENDOCRINOLOGY | Age: 43
End: 2018-10-23
Payer: COMMERCIAL

## 2018-10-23 VITALS
HEART RATE: 85 BPM | SYSTOLIC BLOOD PRESSURE: 123 MMHG | WEIGHT: 224.4 LBS | TEMPERATURE: 98.1 F | DIASTOLIC BLOOD PRESSURE: 82 MMHG | OXYGEN SATURATION: 98 % | HEIGHT: 62 IN | RESPIRATION RATE: 16 BRPM | BODY MASS INDEX: 41.3 KG/M2

## 2018-10-23 DIAGNOSIS — E89.0 POSTABLATIVE HYPOTHYROIDISM: ICD-10-CM

## 2018-10-23 DIAGNOSIS — E89.0 POSTABLATIVE HYPOTHYROIDISM: Primary | ICD-10-CM

## 2018-10-23 LAB
T4 FREE: 0.8 NG/DL (ref 0.9–1.8)
TSH SERPL DL<=0.05 MIU/L-ACNC: 3.5 UIU/ML (ref 0.27–4.2)

## 2018-10-23 PROCEDURE — 99203 OFFICE O/P NEW LOW 30 MIN: CPT | Performed by: INTERNAL MEDICINE

## 2018-10-23 PROCEDURE — G8417 CALC BMI ABV UP PARAM F/U: HCPCS | Performed by: INTERNAL MEDICINE

## 2018-10-23 PROCEDURE — G8484 FLU IMMUNIZE NO ADMIN: HCPCS | Performed by: INTERNAL MEDICINE

## 2018-10-23 PROCEDURE — 1036F TOBACCO NON-USER: CPT | Performed by: INTERNAL MEDICINE

## 2018-10-23 PROCEDURE — G8427 DOCREV CUR MEDS BY ELIG CLIN: HCPCS | Performed by: INTERNAL MEDICINE

## 2018-10-23 RX ORDER — CETIRIZINE HYDROCHLORIDE, PSEUDOEPHEDRINE HYDROCHLORIDE 5; 120 MG/1; MG/1
1 TABLET, FILM COATED, EXTENDED RELEASE ORAL 2 TIMES DAILY
COMMUNITY
End: 2019-02-12 | Stop reason: SDUPTHER

## 2018-10-23 NOTE — PROGRESS NOTES
Endocrinology       New Patient Consultation  Chata Parekh M.D. Phone: [phone]   FAX: [phone]       Nguyễn Bolivar   YOB: 1975    Date of Visit:  10/23/2018    Allergies   Allergen Reactions    Latex Swelling and Dermatitis     Also \"burning of skin\"      Clindamycin/Lincomycin Hives and Other (See Comments)     \"Throat closing\"    Sulfa Antibiotics Anaphylaxis and Hives    Diflucan [Fluconazole] Hives and Itching    Other Hives     Outpatient Prescriptions Marked as Taking for the 10/23/18 encounter (Office Visit) with Irene Ba MD   Medication Sig Dispense Refill    cetirizine-psuedoephedrine (ZYRTEC-D) 5-120 MG per extended release tablet Take 1 tablet by mouth 2 times daily      folic acid (FOLVITE) 1 MG tablet Take 1 tablet by mouth daily 30 tablet 3    albuterol sulfate HFA (PROVENTIL HFA) 108 (90 Base) MCG/ACT inhaler Inhale 2 puffs into the lungs every 6 hours as needed for Wheezing 1 Inhaler 2    fluticasone (FLONASE) 50 MCG/ACT nasal spray 1 spray by Nasal route daily 1 Bottle 3    Multiple Vitamins-Minerals (THERAPEUTIC MULTIVITAMIN-MINERALS) tablet Take 1 tablet by mouth daily 30 tablet 1    levothyroxine (SYNTHROID) 125 MCG tablet Take 1 tablet by mouth daily 30 tablet 2    rizatriptan (MAXALT) 5 MG tablet Take 1 tablet by mouth once as needed for Migraine May repeat in 2 hours if needed 8 tablet 0    lactobacillus (CULTURELLE) capsule Take 1 capsule by mouth daily 30 capsule 1    cyanocobalamin 1000 MCG/ML injection Inject 1 ml (1000mcg) once monthly 1 mL 5    NEEDLE, DISP, 25 G (BD DISP NEEDLES) 25G X 5/8\" MISC 1 packet by Does not apply route every 30 days 12 each 1    Handicap Placard MISC by Does not apply route Duration 2 weeks 1 each 0    Misc. Devices (HIBICLENS HAND PUMP 32OZ) MISC Use as needed for skin irritation or bumps.  1 each 0         Vitals:    10/23/18 1317 10/23/18 1322   BP: (!) 127/94 123/82   Site: Right Upper Arm Left

## 2018-10-25 DIAGNOSIS — E89.0 POSTABLATIVE HYPOTHYROIDISM: Primary | ICD-10-CM

## 2018-10-25 RX ORDER — LIOTHYRONINE SODIUM 5 UG/1
5 TABLET ORAL DAILY
Qty: 30 TABLET | Refills: 3 | Status: SHIPPED | OUTPATIENT
Start: 2018-10-25 | End: 2019-03-20 | Stop reason: SDUPTHER

## 2018-10-25 RX ORDER — LEVOTHYROXINE SODIUM 112 UG/1
112 TABLET ORAL DAILY
Qty: 30 TABLET | Refills: 3 | Status: SHIPPED | OUTPATIENT
Start: 2018-10-25 | End: 2019-01-23

## 2018-11-26 RX ORDER — PANTOPRAZOLE SODIUM 40 MG/1
40 TABLET, DELAYED RELEASE ORAL DAILY
Qty: 30 TABLET | Refills: 1
Start: 2018-11-26 | End: 2019-01-07

## 2018-11-26 RX ORDER — LACTOBACILLUS RHAMNOSUS GG 10B CELL
1 CAPSULE ORAL DAILY
Qty: 30 CAPSULE | Refills: 1
Start: 2018-11-26 | End: 2019-01-07 | Stop reason: SDUPTHER

## 2018-12-27 ENCOUNTER — OFFICE VISIT (OUTPATIENT)
Dept: ENT CLINIC | Age: 43
End: 2018-12-27
Payer: COMMERCIAL

## 2018-12-27 VITALS
DIASTOLIC BLOOD PRESSURE: 89 MMHG | BODY MASS INDEX: 38.21 KG/M2 | HEIGHT: 64 IN | HEART RATE: 80 BPM | SYSTOLIC BLOOD PRESSURE: 138 MMHG | WEIGHT: 223.8 LBS

## 2018-12-27 DIAGNOSIS — J06.9 VIRAL UPPER RESPIRATORY TRACT INFECTION: ICD-10-CM

## 2018-12-27 DIAGNOSIS — J32.4 CHRONIC PANSINUSITIS: Primary | Chronic | ICD-10-CM

## 2018-12-27 PROCEDURE — 99214 OFFICE O/P EST MOD 30 MIN: CPT | Performed by: OTOLARYNGOLOGY

## 2018-12-27 PROCEDURE — 1036F TOBACCO NON-USER: CPT | Performed by: OTOLARYNGOLOGY

## 2018-12-27 PROCEDURE — G8427 DOCREV CUR MEDS BY ELIG CLIN: HCPCS | Performed by: OTOLARYNGOLOGY

## 2018-12-27 PROCEDURE — 31231 NASAL ENDOSCOPY DX: CPT | Performed by: OTOLARYNGOLOGY

## 2018-12-27 PROCEDURE — G8484 FLU IMMUNIZE NO ADMIN: HCPCS | Performed by: OTOLARYNGOLOGY

## 2018-12-27 PROCEDURE — G8417 CALC BMI ABV UP PARAM F/U: HCPCS | Performed by: OTOLARYNGOLOGY

## 2018-12-27 RX ORDER — PREDNISONE 10 MG/1
40 TABLET ORAL DAILY
Qty: 20 TABLET | Refills: 0 | Status: SHIPPED | OUTPATIENT
Start: 2018-12-27 | End: 2019-01-01

## 2018-12-27 ASSESSMENT — ENCOUNTER SYMPTOMS
EYE ITCHING: 0
SINUS PAIN: 0
TROUBLE SWALLOWING: 0
DIARRHEA: 0
NAUSEA: 0
FACIAL SWELLING: 0
VOICE CHANGE: 1
CHOKING: 0
SHORTNESS OF BREATH: 0
SORE THROAT: 0
EYE REDNESS: 0
EYE PAIN: 0
COUGH: 0
RHINORRHEA: 0
SINUS PRESSURE: 1

## 2019-01-03 ENCOUNTER — TELEPHONE (OUTPATIENT)
Dept: PULMONOLOGY | Age: 44
End: 2019-01-03

## 2019-01-03 ENCOUNTER — OFFICE VISIT (OUTPATIENT)
Dept: INTERNAL MEDICINE CLINIC | Age: 44
End: 2019-01-03
Payer: COMMERCIAL

## 2019-01-03 VITALS
RESPIRATION RATE: 18 BRPM | BODY MASS INDEX: 39.3 KG/M2 | DIASTOLIC BLOOD PRESSURE: 94 MMHG | HEART RATE: 88 BPM | OXYGEN SATURATION: 98 % | SYSTOLIC BLOOD PRESSURE: 146 MMHG | TEMPERATURE: 98 F | WEIGHT: 226.1 LBS

## 2019-01-03 DIAGNOSIS — R51.9 GENERALIZED HEADACHES: ICD-10-CM

## 2019-01-03 DIAGNOSIS — Z00.00 PREVENTATIVE HEALTH CARE: Primary | ICD-10-CM

## 2019-01-03 DIAGNOSIS — J45.909 UNCOMPLICATED ASTHMA, UNSPECIFIED ASTHMA SEVERITY, UNSPECIFIED WHETHER PERSISTENT: ICD-10-CM

## 2019-01-03 DIAGNOSIS — E66.9 OBESITY, UNSPECIFIED OBESITY SEVERITY, UNSPECIFIED OBESITY TYPE: ICD-10-CM

## 2019-01-03 DIAGNOSIS — K75.4 AUTOIMMUNE HEPATITIS (HCC): ICD-10-CM

## 2019-01-03 PROCEDURE — 99213 OFFICE O/P EST LOW 20 MIN: CPT | Performed by: STUDENT IN AN ORGANIZED HEALTH CARE EDUCATION/TRAINING PROGRAM

## 2019-01-03 RX ORDER — PREDNISONE 10 MG/1
TABLET ORAL
Qty: 30 TABLET | Refills: 0 | Status: SHIPPED | OUTPATIENT
Start: 2019-01-03 | End: 2019-01-22 | Stop reason: ALTCHOICE

## 2019-01-03 RX ORDER — AMOXICILLIN AND CLAVULANATE POTASSIUM 875; 125 MG/1; MG/1
1 TABLET, FILM COATED ORAL 2 TIMES DAILY
Qty: 20 TABLET | Refills: 0 | Status: SHIPPED | OUTPATIENT
Start: 2019-01-03 | End: 2019-01-13

## 2019-01-03 ASSESSMENT — ENCOUNTER SYMPTOMS
SINUS PAIN: 0
DIARRHEA: 0
NAUSEA: 0
ABDOMINAL PAIN: 0
CONSTIPATION: 0
EYE REDNESS: 0
BLURRED VISION: 0
COUGH: 0
ORTHOPNEA: 0
PHOTOPHOBIA: 0
SPUTUM PRODUCTION: 0
WHEEZING: 1
BLOOD IN STOOL: 0
DOUBLE VISION: 0
SORE THROAT: 0
SHORTNESS OF BREATH: 1

## 2019-01-07 ENCOUNTER — OFFICE VISIT (OUTPATIENT)
Dept: INTERNAL MEDICINE CLINIC | Age: 44
End: 2019-01-07
Payer: COMMERCIAL

## 2019-01-07 VITALS
HEIGHT: 63 IN | WEIGHT: 229.4 LBS | TEMPERATURE: 98.4 F | DIASTOLIC BLOOD PRESSURE: 93 MMHG | RESPIRATION RATE: 20 BRPM | BODY MASS INDEX: 40.64 KG/M2 | OXYGEN SATURATION: 98 % | SYSTOLIC BLOOD PRESSURE: 148 MMHG | HEART RATE: 80 BPM

## 2019-01-07 DIAGNOSIS — K75.4 AUTOIMMUNE HEPATITIS (HCC): Primary | ICD-10-CM

## 2019-01-07 PROCEDURE — 99213 OFFICE O/P EST LOW 20 MIN: CPT | Performed by: STUDENT IN AN ORGANIZED HEALTH CARE EDUCATION/TRAINING PROGRAM

## 2019-01-07 RX ORDER — MOMETASONE FUROATE 1 MG/G
CREAM TOPICAL
Qty: 1 TUBE | Refills: 2 | Status: SHIPPED | OUTPATIENT
Start: 2019-01-07 | End: 2020-06-22 | Stop reason: SDUPTHER

## 2019-01-07 RX ORDER — LACTOBACILLUS RHAMNOSUS GG 10B CELL
1 CAPSULE ORAL 2 TIMES DAILY
Qty: 30 CAPSULE | Refills: 1 | Status: SHIPPED | OUTPATIENT
Start: 2019-01-07 | End: 2019-01-22 | Stop reason: CLARIF

## 2019-01-07 RX ORDER — RANITIDINE 150 MG/1
150 TABLET ORAL 2 TIMES DAILY
Qty: 180 TABLET | Refills: 1 | Status: SHIPPED | OUTPATIENT
Start: 2019-01-07 | End: 2020-03-05

## 2019-01-22 ENCOUNTER — OFFICE VISIT (OUTPATIENT)
Dept: ENDOCRINOLOGY | Age: 44
End: 2019-01-22
Payer: COMMERCIAL

## 2019-01-22 VITALS
HEIGHT: 62 IN | OXYGEN SATURATION: 93 % | SYSTOLIC BLOOD PRESSURE: 129 MMHG | WEIGHT: 229.6 LBS | RESPIRATION RATE: 16 BRPM | HEART RATE: 84 BPM | DIASTOLIC BLOOD PRESSURE: 82 MMHG | BODY MASS INDEX: 42.25 KG/M2

## 2019-01-22 DIAGNOSIS — E89.0 POSTABLATIVE HYPOTHYROIDISM: ICD-10-CM

## 2019-01-22 DIAGNOSIS — R73.03 PRE-DIABETES: ICD-10-CM

## 2019-01-22 DIAGNOSIS — E89.0 POSTABLATIVE HYPOTHYROIDISM: Primary | ICD-10-CM

## 2019-01-22 DIAGNOSIS — E66.01 MORBID OBESITY WITH BMI OF 40.0-44.9, ADULT (HCC): ICD-10-CM

## 2019-01-22 LAB
A/G RATIO: 1.1 (ref 1.1–2.2)
ALBUMIN SERPL-MCNC: 4.4 G/DL (ref 3.4–5)
ALP BLD-CCNC: 269 U/L (ref 40–129)
ALT SERPL-CCNC: 64 U/L (ref 10–40)
ANION GAP SERPL CALCULATED.3IONS-SCNC: 15 MMOL/L (ref 3–16)
AST SERPL-CCNC: 55 U/L (ref 15–37)
BILIRUB SERPL-MCNC: 0.4 MG/DL (ref 0–1)
BUN BLDV-MCNC: 13 MG/DL (ref 7–20)
CALCIUM SERPL-MCNC: 9.5 MG/DL (ref 8.3–10.6)
CHLORIDE BLD-SCNC: 103 MMOL/L (ref 99–110)
CO2: 21 MMOL/L (ref 21–32)
CREAT SERPL-MCNC: 0.9 MG/DL (ref 0.6–1.1)
GFR AFRICAN AMERICAN: >60
GFR NON-AFRICAN AMERICAN: >60
GLOBULIN: 4.1 G/DL
GLUCOSE BLD-MCNC: 81 MG/DL (ref 70–99)
POTASSIUM SERPL-SCNC: 4.3 MMOL/L (ref 3.5–5.1)
SODIUM BLD-SCNC: 139 MMOL/L (ref 136–145)
T3 FREE: 2.7 PG/ML (ref 2.3–4.2)
T4 FREE: 0.8 NG/DL (ref 0.9–1.8)
TOTAL PROTEIN: 8.5 G/DL (ref 6.4–8.2)
TSH SERPL DL<=0.05 MIU/L-ACNC: 4.04 UIU/ML (ref 0.27–4.2)

## 2019-01-22 PROCEDURE — G8417 CALC BMI ABV UP PARAM F/U: HCPCS | Performed by: INTERNAL MEDICINE

## 2019-01-22 PROCEDURE — 99214 OFFICE O/P EST MOD 30 MIN: CPT | Performed by: INTERNAL MEDICINE

## 2019-01-22 PROCEDURE — 1036F TOBACCO NON-USER: CPT | Performed by: INTERNAL MEDICINE

## 2019-01-22 PROCEDURE — G8427 DOCREV CUR MEDS BY ELIG CLIN: HCPCS | Performed by: INTERNAL MEDICINE

## 2019-01-22 PROCEDURE — G8484 FLU IMMUNIZE NO ADMIN: HCPCS | Performed by: INTERNAL MEDICINE

## 2019-01-22 RX ORDER — VITAMIN B COMPLEX
1 CAPSULE ORAL DAILY
Status: ON HOLD | COMMUNITY
End: 2019-02-06

## 2019-01-22 ASSESSMENT — ENCOUNTER SYMPTOMS
BACK PAIN: 0
COUGH: 0
PHOTOPHOBIA: 0

## 2019-01-23 ENCOUNTER — TELEPHONE (OUTPATIENT)
Dept: SURGERY | Age: 44
End: 2019-01-23

## 2019-01-23 LAB
ESTIMATED AVERAGE GLUCOSE: 116.9 MG/DL
HBA1C MFR BLD: 5.7 %

## 2019-01-23 RX ORDER — LEVOTHYROXINE SODIUM 0.12 MG/1
125 TABLET ORAL DAILY
Qty: 30 TABLET | Refills: 5 | Status: SHIPPED | OUTPATIENT
Start: 2019-01-23 | End: 2020-06-22 | Stop reason: SDUPTHER

## 2019-02-01 ENCOUNTER — APPOINTMENT (OUTPATIENT)
Dept: GENERAL RADIOLOGY | Age: 44
End: 2019-02-01
Payer: COMMERCIAL

## 2019-02-01 ENCOUNTER — HOSPITAL ENCOUNTER (EMERGENCY)
Age: 44
Discharge: HOME | End: 2019-02-01
Attending: EMERGENCY MEDICINE
Payer: COMMERCIAL

## 2019-02-01 ENCOUNTER — TELEPHONE (OUTPATIENT)
Dept: ENT CLINIC | Age: 44
End: 2019-02-01

## 2019-02-01 VITALS
TEMPERATURE: 97.4 F | HEIGHT: 63 IN | BODY MASS INDEX: 39.87 KG/M2 | DIASTOLIC BLOOD PRESSURE: 79 MMHG | OXYGEN SATURATION: 100 % | HEART RATE: 110 BPM | WEIGHT: 225 LBS | RESPIRATION RATE: 15 BRPM | SYSTOLIC BLOOD PRESSURE: 125 MMHG

## 2019-02-01 DIAGNOSIS — J45.901 MODERATE ASTHMA WITH EXACERBATION, UNSPECIFIED WHETHER PERSISTENT: Primary | ICD-10-CM

## 2019-02-01 LAB
ANION GAP SERPL CALCULATED.3IONS-SCNC: 14 MMOL/L (ref 3–16)
BASOPHILS ABSOLUTE: 0 K/UL (ref 0–0.2)
BASOPHILS RELATIVE PERCENT: 0.4 %
BUN BLDV-MCNC: 10 MG/DL (ref 7–20)
CALCIUM SERPL-MCNC: 9.3 MG/DL (ref 8.3–10.6)
CHLORIDE BLD-SCNC: 102 MMOL/L (ref 99–110)
CO2: 20 MMOL/L (ref 21–32)
CREAT SERPL-MCNC: 0.7 MG/DL (ref 0.6–1.1)
EKG ATRIAL RATE: 90 BPM
EKG DIAGNOSIS: NORMAL
EKG P AXIS: 29 DEGREES
EKG P-R INTERVAL: 162 MS
EKG Q-T INTERVAL: 376 MS
EKG QRS DURATION: 74 MS
EKG QTC CALCULATION (BAZETT): 459 MS
EKG R AXIS: -18 DEGREES
EKG T AXIS: 30 DEGREES
EKG VENTRICULAR RATE: 90 BPM
EOSINOPHILS ABSOLUTE: 0.1 K/UL (ref 0–0.6)
EOSINOPHILS RELATIVE PERCENT: 2.2 %
GFR AFRICAN AMERICAN: >60
GFR NON-AFRICAN AMERICAN: >60
GLUCOSE BLD-MCNC: 81 MG/DL (ref 70–99)
HCT VFR BLD CALC: 39.2 % (ref 36–48)
HEMOGLOBIN: 12.8 G/DL (ref 12–16)
LYMPHOCYTES ABSOLUTE: 1.3 K/UL (ref 1–5.1)
LYMPHOCYTES RELATIVE PERCENT: 19 %
MCH RBC QN AUTO: 28.8 PG (ref 26–34)
MCHC RBC AUTO-ENTMCNC: 32.6 G/DL (ref 31–36)
MCV RBC AUTO: 88.2 FL (ref 80–100)
MONOCYTES ABSOLUTE: 0.6 K/UL (ref 0–1.3)
MONOCYTES RELATIVE PERCENT: 8.6 %
NEUTROPHILS ABSOLUTE: 4.6 K/UL (ref 1.7–7.7)
NEUTROPHILS RELATIVE PERCENT: 69.8 %
PDW BLD-RTO: 13.2 % (ref 12.4–15.4)
PLATELET # BLD: 148 K/UL (ref 135–450)
PMV BLD AUTO: 10.5 FL (ref 5–10.5)
POTASSIUM REFLEX MAGNESIUM: 4 MMOL/L (ref 3.5–5.1)
RAPID INFLUENZA  B AGN: NEGATIVE
RAPID INFLUENZA A AGN: NEGATIVE
RBC # BLD: 4.44 M/UL (ref 4–5.2)
SODIUM BLD-SCNC: 136 MMOL/L (ref 136–145)
TROPONIN: <0.01 NG/ML
WBC # BLD: 6.6 K/UL (ref 4–11)

## 2019-02-01 PROCEDURE — 94761 N-INVAS EAR/PLS OXIMETRY MLT: CPT

## 2019-02-01 PROCEDURE — 85025 COMPLETE CBC W/AUTO DIFF WBC: CPT

## 2019-02-01 PROCEDURE — 71046 X-RAY EXAM CHEST 2 VIEWS: CPT

## 2019-02-01 PROCEDURE — 6360000002 HC RX W HCPCS: Performed by: STUDENT IN AN ORGANIZED HEALTH CARE EDUCATION/TRAINING PROGRAM

## 2019-02-01 PROCEDURE — 6360000002 HC RX W HCPCS: Performed by: EMERGENCY MEDICINE

## 2019-02-01 PROCEDURE — 96365 THER/PROPH/DIAG IV INF INIT: CPT

## 2019-02-01 PROCEDURE — 84484 ASSAY OF TROPONIN QUANT: CPT

## 2019-02-01 PROCEDURE — 94664 DEMO&/EVAL PT USE INHALER: CPT

## 2019-02-01 PROCEDURE — 87804 INFLUENZA ASSAY W/OPTIC: CPT

## 2019-02-01 PROCEDURE — 96375 TX/PRO/DX INJ NEW DRUG ADDON: CPT

## 2019-02-01 PROCEDURE — 80048 BASIC METABOLIC PNL TOTAL CA: CPT

## 2019-02-01 PROCEDURE — 6370000000 HC RX 637 (ALT 250 FOR IP): Performed by: EMERGENCY MEDICINE

## 2019-02-01 PROCEDURE — 99285 EMERGENCY DEPT VISIT HI MDM: CPT

## 2019-02-01 PROCEDURE — 93005 ELECTROCARDIOGRAM TRACING: CPT | Performed by: STUDENT IN AN ORGANIZED HEALTH CARE EDUCATION/TRAINING PROGRAM

## 2019-02-01 PROCEDURE — 94640 AIRWAY INHALATION TREATMENT: CPT

## 2019-02-01 RX ORDER — METHYLPREDNISOLONE SODIUM SUCCINATE 40 MG/ML
40 INJECTION, POWDER, LYOPHILIZED, FOR SOLUTION INTRAMUSCULAR; INTRAVENOUS ONCE
Status: COMPLETED | OUTPATIENT
Start: 2019-02-01 | End: 2019-02-01

## 2019-02-01 RX ORDER — ALBUTEROL SULFATE 2.5 MG/3ML
2.5 SOLUTION RESPIRATORY (INHALATION) ONCE
Status: COMPLETED | OUTPATIENT
Start: 2019-02-01 | End: 2019-02-01

## 2019-02-01 RX ORDER — IPRATROPIUM BROMIDE AND ALBUTEROL SULFATE 2.5; .5 MG/3ML; MG/3ML
1 SOLUTION RESPIRATORY (INHALATION) ONCE
Status: COMPLETED | OUTPATIENT
Start: 2019-02-01 | End: 2019-02-01

## 2019-02-01 RX ORDER — PREDNISONE 10 MG/1
10 TABLET ORAL 2 TIMES DAILY
Qty: 10 TABLET | Refills: 0 | Status: SHIPPED | OUTPATIENT
Start: 2019-02-01 | End: 2019-02-03

## 2019-02-01 RX ORDER — MAGNESIUM SULFATE IN WATER 40 MG/ML
2 INJECTION, SOLUTION INTRAVENOUS ONCE
Status: COMPLETED | OUTPATIENT
Start: 2019-02-01 | End: 2019-02-01

## 2019-02-01 RX ADMIN — ALBUTEROL SULFATE 2.5 MG: 2.5 SOLUTION RESPIRATORY (INHALATION) at 18:32

## 2019-02-01 RX ADMIN — IPRATROPIUM BROMIDE AND ALBUTEROL SULFATE 1 AMPULE: .5; 3 SOLUTION RESPIRATORY (INHALATION) at 18:23

## 2019-02-01 RX ADMIN — Medication 40 MG: at 18:14

## 2019-02-01 RX ADMIN — MAGNESIUM SULFATE HEPTAHYDRATE 2 G: 40 INJECTION, SOLUTION INTRAVENOUS at 18:22

## 2019-02-01 ASSESSMENT — ENCOUNTER SYMPTOMS
GASTROINTESTINAL NEGATIVE: 1
SHORTNESS OF BREATH: 1
COUGH: 1
EYES NEGATIVE: 1

## 2019-02-03 ENCOUNTER — HOSPITAL ENCOUNTER (EMERGENCY)
Age: 44
Discharge: HOME | End: 2019-02-03
Payer: COMMERCIAL

## 2019-02-03 VITALS
BODY MASS INDEX: 40.07 KG/M2 | SYSTOLIC BLOOD PRESSURE: 145 MMHG | TEMPERATURE: 96.9 F | DIASTOLIC BLOOD PRESSURE: 98 MMHG | OXYGEN SATURATION: 99 % | HEART RATE: 112 BPM | RESPIRATION RATE: 15 BRPM | WEIGHT: 226.19 LBS

## 2019-02-03 DIAGNOSIS — R05.9 COUGH: ICD-10-CM

## 2019-02-03 DIAGNOSIS — J32.4 CHRONIC PANSINUSITIS: Primary | ICD-10-CM

## 2019-02-03 PROCEDURE — 99283 EMERGENCY DEPT VISIT LOW MDM: CPT

## 2019-02-03 RX ORDER — GUAIFENESIN AND CODEINE PHOSPHATE 100; 10 MG/5ML; MG/5ML
5 SOLUTION ORAL 3 TIMES DAILY PRN
Qty: 120 ML | Refills: 0 | Status: ON HOLD | OUTPATIENT
Start: 2019-02-03 | End: 2019-02-07

## 2019-02-03 RX ORDER — CEFUROXIME AXETIL 500 MG/1
500 TABLET ORAL 2 TIMES DAILY
Qty: 20 TABLET | Refills: 0 | Status: ON HOLD | OUTPATIENT
Start: 2019-02-03 | End: 2019-02-07 | Stop reason: HOSPADM

## 2019-02-03 RX ORDER — PREDNISONE 20 MG/1
TABLET ORAL
Qty: 18 TABLET | Refills: 0 | Status: SHIPPED | OUTPATIENT
Start: 2019-02-03 | End: 2019-02-13

## 2019-02-03 ASSESSMENT — ENCOUNTER SYMPTOMS
RHINORRHEA: 1
GASTROINTESTINAL NEGATIVE: 1
SORE THROAT: 1
SINUS PRESSURE: 1
SINUS PAIN: 1
COUGH: 1

## 2019-02-03 ASSESSMENT — PAIN DESCRIPTION - PAIN TYPE: TYPE: ACUTE PAIN

## 2019-02-03 ASSESSMENT — PAIN DESCRIPTION - DESCRIPTORS: DESCRIPTORS: ACHING

## 2019-02-03 ASSESSMENT — PAIN SCALES - GENERAL
PAINLEVEL_OUTOF10: 3
PAINLEVEL_OUTOF10: 3

## 2019-02-03 ASSESSMENT — PAIN DESCRIPTION - ORIENTATION: ORIENTATION: RIGHT;LEFT

## 2019-02-03 ASSESSMENT — PAIN DESCRIPTION - FREQUENCY: FREQUENCY: CONTINUOUS

## 2019-02-03 ASSESSMENT — PAIN DESCRIPTION - LOCATION: LOCATION: RIB CAGE

## 2019-02-05 ENCOUNTER — OFFICE VISIT (OUTPATIENT)
Dept: ENT CLINIC | Age: 44
End: 2019-02-05
Payer: COMMERCIAL

## 2019-02-05 ENCOUNTER — HOSPITAL ENCOUNTER (EMERGENCY)
Age: 44
Discharge: HOME | End: 2019-02-05
Payer: COMMERCIAL

## 2019-02-05 VITALS
RESPIRATION RATE: 20 BRPM | HEART RATE: 94 BPM | TEMPERATURE: 98.4 F | SYSTOLIC BLOOD PRESSURE: 155 MMHG | DIASTOLIC BLOOD PRESSURE: 111 MMHG | OXYGEN SATURATION: 96 %

## 2019-02-05 DIAGNOSIS — J32.4 CHRONIC PANSINUSITIS: ICD-10-CM

## 2019-02-05 DIAGNOSIS — J32.2 CHRONIC ETHMOIDAL SINUSITIS: Primary | ICD-10-CM

## 2019-02-05 PROCEDURE — G8427 DOCREV CUR MEDS BY ELIG CLIN: HCPCS | Performed by: OTOLARYNGOLOGY

## 2019-02-05 PROCEDURE — G8417 CALC BMI ABV UP PARAM F/U: HCPCS | Performed by: OTOLARYNGOLOGY

## 2019-02-05 PROCEDURE — 1036F TOBACCO NON-USER: CPT | Performed by: OTOLARYNGOLOGY

## 2019-02-05 PROCEDURE — 4500000002 HC ER NO CHARGE

## 2019-02-05 PROCEDURE — 31231 NASAL ENDOSCOPY DX: CPT | Performed by: OTOLARYNGOLOGY

## 2019-02-05 PROCEDURE — 99214 OFFICE O/P EST MOD 30 MIN: CPT | Performed by: OTOLARYNGOLOGY

## 2019-02-05 PROCEDURE — G8484 FLU IMMUNIZE NO ADMIN: HCPCS | Performed by: OTOLARYNGOLOGY

## 2019-02-05 RX ORDER — EUCALYPTUS/PEPPERMINT OIL
1 SOLUTION, NON-ORAL NASAL EVERY 6 HOURS PRN
Qty: 1 BOTTLE | Refills: 5 | Status: SHIPPED | OUTPATIENT
Start: 2019-02-05 | End: 2020-06-25 | Stop reason: SDUPTHER

## 2019-02-05 ASSESSMENT — ENCOUNTER SYMPTOMS
SHORTNESS OF BREATH: 0
NAUSEA: 0
CHOKING: 0
SINUS PRESSURE: 0
COUGH: 1
EYE ITCHING: 0
TROUBLE SWALLOWING: 0
FACIAL SWELLING: 0
RHINORRHEA: 0
WHEEZING: 1
SORE THROAT: 0
SINUS PAIN: 0
EYE PAIN: 0
EYE REDNESS: 0
DIARRHEA: 0
VOICE CHANGE: 0

## 2019-02-06 ENCOUNTER — HOSPITAL ENCOUNTER (INPATIENT)
Age: 44
LOS: 1 days | Discharge: HOME | DRG: 141 | End: 2019-02-07
Attending: EMERGENCY MEDICINE | Admitting: FAMILY MEDICINE
Payer: COMMERCIAL

## 2019-02-06 ENCOUNTER — APPOINTMENT (OUTPATIENT)
Dept: GENERAL RADIOLOGY | Age: 44
DRG: 141 | End: 2019-02-06
Payer: COMMERCIAL

## 2019-02-06 DIAGNOSIS — J45.901 EXACERBATION OF PERSISTENT ASTHMA, UNSPECIFIED ASTHMA SEVERITY: Primary | ICD-10-CM

## 2019-02-06 DIAGNOSIS — R05.9 COUGH: ICD-10-CM

## 2019-02-06 DIAGNOSIS — J32.4 CHRONIC PANSINUSITIS: ICD-10-CM

## 2019-02-06 LAB
ANION GAP SERPL CALCULATED.3IONS-SCNC: 14 MMOL/L (ref 3–16)
BASOPHILS ABSOLUTE: 0 K/UL (ref 0–0.2)
BASOPHILS RELATIVE PERCENT: 0.2 %
BUN BLDV-MCNC: 18 MG/DL (ref 7–20)
CALCIUM SERPL-MCNC: 8.9 MG/DL (ref 8.3–10.6)
CHLORIDE BLD-SCNC: 104 MMOL/L (ref 99–110)
CO2: 19 MMOL/L (ref 21–32)
CREAT SERPL-MCNC: 0.9 MG/DL (ref 0.6–1.1)
EOSINOPHILS ABSOLUTE: 0.1 K/UL (ref 0–0.6)
EOSINOPHILS RELATIVE PERCENT: 0.6 %
GFR AFRICAN AMERICAN: >60
GFR NON-AFRICAN AMERICAN: >60
GLUCOSE BLD-MCNC: 108 MG/DL (ref 70–99)
GLUCOSE BLD-MCNC: 122 MG/DL (ref 70–99)
GLUCOSE BLD-MCNC: 162 MG/DL (ref 70–99)
GLUCOSE BLD-MCNC: 170 MG/DL (ref 70–99)
HCT VFR BLD CALC: 37.2 % (ref 36–48)
HEMOGLOBIN: 12.3 G/DL (ref 12–16)
LACTIC ACID: 0.8 MMOL/L (ref 0.4–2)
LYMPHOCYTES ABSOLUTE: 2.1 K/UL (ref 1–5.1)
LYMPHOCYTES RELATIVE PERCENT: 17.6 %
MAGNESIUM: 2 MG/DL (ref 1.8–2.4)
MCH RBC QN AUTO: 29.6 PG (ref 26–34)
MCHC RBC AUTO-ENTMCNC: 33.2 G/DL (ref 31–36)
MCV RBC AUTO: 89.2 FL (ref 80–100)
MONOCYTES ABSOLUTE: 1.2 K/UL (ref 0–1.3)
MONOCYTES RELATIVE PERCENT: 10.2 %
NEUTROPHILS ABSOLUTE: 8.4 K/UL (ref 1.7–7.7)
NEUTROPHILS RELATIVE PERCENT: 71.4 %
PDW BLD-RTO: 13.5 % (ref 12.4–15.4)
PERFORMED ON: ABNORMAL
PLATELET # BLD: 155 K/UL (ref 135–450)
PMV BLD AUTO: 9.8 FL (ref 5–10.5)
POTASSIUM REFLEX MAGNESIUM: 3.4 MMOL/L (ref 3.5–5.1)
RBC # BLD: 4.17 M/UL (ref 4–5.2)
SODIUM BLD-SCNC: 137 MMOL/L (ref 136–145)
WBC # BLD: 11.8 K/UL (ref 4–11)

## 2019-02-06 PROCEDURE — G0378 HOSPITAL OBSERVATION PER HR: HCPCS

## 2019-02-06 PROCEDURE — 96365 THER/PROPH/DIAG IV INF INIT: CPT

## 2019-02-06 PROCEDURE — 99285 EMERGENCY DEPT VISIT HI MDM: CPT

## 2019-02-06 PROCEDURE — 71046 X-RAY EXAM CHEST 2 VIEWS: CPT

## 2019-02-06 PROCEDURE — 6370000000 HC RX 637 (ALT 250 FOR IP): Performed by: FAMILY MEDICINE

## 2019-02-06 PROCEDURE — 94760 N-INVAS EAR/PLS OXIMETRY 1: CPT

## 2019-02-06 PROCEDURE — 83735 ASSAY OF MAGNESIUM: CPT

## 2019-02-06 PROCEDURE — 83605 ASSAY OF LACTIC ACID: CPT

## 2019-02-06 PROCEDURE — 6370000000 HC RX 637 (ALT 250 FOR IP): Performed by: INTERNAL MEDICINE

## 2019-02-06 PROCEDURE — 6360000002 HC RX W HCPCS: Performed by: FAMILY MEDICINE

## 2019-02-06 PROCEDURE — 80048 BASIC METABOLIC PNL TOTAL CA: CPT

## 2019-02-06 PROCEDURE — 6370000000 HC RX 637 (ALT 250 FOR IP): Performed by: EMERGENCY MEDICINE

## 2019-02-06 PROCEDURE — 94664 DEMO&/EVAL PT USE INHALER: CPT

## 2019-02-06 PROCEDURE — 94762 N-INVAS EAR/PLS OXIMTRY CONT: CPT

## 2019-02-06 PROCEDURE — 96372 THER/PROPH/DIAG INJ SC/IM: CPT

## 2019-02-06 PROCEDURE — 96375 TX/PRO/DX INJ NEW DRUG ADDON: CPT

## 2019-02-06 PROCEDURE — 2580000003 HC RX 258: Performed by: INTERNAL MEDICINE

## 2019-02-06 PROCEDURE — 85025 COMPLETE CBC W/AUTO DIFF WBC: CPT

## 2019-02-06 PROCEDURE — 96376 TX/PRO/DX INJ SAME DRUG ADON: CPT

## 2019-02-06 PROCEDURE — 94640 AIRWAY INHALATION TREATMENT: CPT

## 2019-02-06 PROCEDURE — 6360000002 HC RX W HCPCS: Performed by: EMERGENCY MEDICINE

## 2019-02-06 PROCEDURE — 6360000002 HC RX W HCPCS: Performed by: INTERNAL MEDICINE

## 2019-02-06 RX ORDER — NICOTINE POLACRILEX 4 MG
15 LOZENGE BUCCAL PRN
Status: DISCONTINUED | OUTPATIENT
Start: 2019-02-06 | End: 2019-02-07 | Stop reason: HOSPADM

## 2019-02-06 RX ORDER — ALBUTEROL SULFATE 2.5 MG/3ML
2.5 SOLUTION RESPIRATORY (INHALATION) ONCE
Status: COMPLETED | OUTPATIENT
Start: 2019-02-06 | End: 2019-02-06

## 2019-02-06 RX ORDER — DEXTROSE MONOHYDRATE 50 MG/ML
100 INJECTION, SOLUTION INTRAVENOUS PRN
Status: DISCONTINUED | OUTPATIENT
Start: 2019-02-06 | End: 2019-02-07 | Stop reason: HOSPADM

## 2019-02-06 RX ORDER — SODIUM CHLORIDE 0.9 % (FLUSH) 0.9 %
10 SYRINGE (ML) INJECTION EVERY 12 HOURS SCHEDULED
Status: DISCONTINUED | OUTPATIENT
Start: 2019-02-06 | End: 2019-02-07 | Stop reason: HOSPADM

## 2019-02-06 RX ORDER — IPRATROPIUM BROMIDE AND ALBUTEROL SULFATE 2.5; .5 MG/3ML; MG/3ML
1 SOLUTION RESPIRATORY (INHALATION)
Status: DISCONTINUED | OUTPATIENT
Start: 2019-02-06 | End: 2019-02-07 | Stop reason: HOSPADM

## 2019-02-06 RX ORDER — BENZONATATE 100 MG/1
100 CAPSULE ORAL ONCE
Status: COMPLETED | OUTPATIENT
Start: 2019-02-06 | End: 2019-02-06

## 2019-02-06 RX ORDER — PREDNISONE 20 MG/1
60 TABLET ORAL ONCE
Status: COMPLETED | OUTPATIENT
Start: 2019-02-06 | End: 2019-02-06

## 2019-02-06 RX ORDER — GUAIFENESIN AND CODEINE PHOSPHATE 100; 10 MG/5ML; MG/5ML
5 SOLUTION ORAL 3 TIMES DAILY PRN
Status: DISCONTINUED | OUTPATIENT
Start: 2019-02-06 | End: 2019-02-06 | Stop reason: CLARIF

## 2019-02-06 RX ORDER — CODEINE PHOSPHATE AND GUAIFENESIN 10; 100 MG/5ML; MG/5ML
5 SOLUTION ORAL EVERY 8 HOURS PRN
Status: DISCONTINUED | OUTPATIENT
Start: 2019-02-06 | End: 2019-02-07 | Stop reason: HOSPADM

## 2019-02-06 RX ORDER — LEVOFLOXACIN 5 MG/ML
500 INJECTION, SOLUTION INTRAVENOUS EVERY 24 HOURS
Status: DISCONTINUED | OUTPATIENT
Start: 2019-02-06 | End: 2019-02-07 | Stop reason: HOSPADM

## 2019-02-06 RX ORDER — LEVOTHYROXINE SODIUM 0.12 MG/1
125 TABLET ORAL DAILY
Status: DISCONTINUED | OUTPATIENT
Start: 2019-02-06 | End: 2019-02-07 | Stop reason: HOSPADM

## 2019-02-06 RX ORDER — ONDANSETRON 2 MG/ML
4 INJECTION INTRAMUSCULAR; INTRAVENOUS EVERY 6 HOURS PRN
Status: DISCONTINUED | OUTPATIENT
Start: 2019-02-06 | End: 2019-02-07 | Stop reason: HOSPADM

## 2019-02-06 RX ORDER — DEXTROSE MONOHYDRATE 25 G/50ML
12.5 INJECTION, SOLUTION INTRAVENOUS PRN
Status: DISCONTINUED | OUTPATIENT
Start: 2019-02-06 | End: 2019-02-07 | Stop reason: HOSPADM

## 2019-02-06 RX ORDER — LACTOBACILLUS RHAMNOSUS GG 10B CELL
1 CAPSULE ORAL
Status: DISCONTINUED | OUTPATIENT
Start: 2019-02-06 | End: 2019-02-07 | Stop reason: HOSPADM

## 2019-02-06 RX ORDER — METHYLPREDNISOLONE SODIUM SUCCINATE 40 MG/ML
40 INJECTION, POWDER, LYOPHILIZED, FOR SOLUTION INTRAMUSCULAR; INTRAVENOUS EVERY 8 HOURS
Status: DISCONTINUED | OUTPATIENT
Start: 2019-02-06 | End: 2019-02-07

## 2019-02-06 RX ORDER — SODIUM CHLORIDE 0.9 % (FLUSH) 0.9 %
10 SYRINGE (ML) INJECTION PRN
Status: DISCONTINUED | OUTPATIENT
Start: 2019-02-06 | End: 2019-02-07 | Stop reason: HOSPADM

## 2019-02-06 RX ORDER — ACETAMINOPHEN 500 MG
1000 TABLET ORAL EVERY 8 HOURS PRN
Status: DISCONTINUED | OUTPATIENT
Start: 2019-02-06 | End: 2019-02-07 | Stop reason: HOSPADM

## 2019-02-06 RX ORDER — FLUTICASONE PROPIONATE 50 MCG
2 SPRAY, SUSPENSION (ML) NASAL DAILY
Status: DISCONTINUED | OUTPATIENT
Start: 2019-02-06 | End: 2019-02-07 | Stop reason: HOSPADM

## 2019-02-06 RX ORDER — IPRATROPIUM BROMIDE AND ALBUTEROL SULFATE 2.5; .5 MG/3ML; MG/3ML
1 SOLUTION RESPIRATORY (INHALATION) ONCE
Status: COMPLETED | OUTPATIENT
Start: 2019-02-06 | End: 2019-02-06

## 2019-02-06 RX ADMIN — ALBUTEROL SULFATE 2.5 MG: 2.5 SOLUTION RESPIRATORY (INHALATION) at 05:24

## 2019-02-06 RX ADMIN — GUAIFENESIN AND CODEINE PHOSPHATE 5 ML: 10; 100 LIQUID ORAL at 20:10

## 2019-02-06 RX ADMIN — ACETAMINOPHEN 1000 MG: 500 TABLET ORAL at 22:20

## 2019-02-06 RX ADMIN — IPRATROPIUM BROMIDE AND ALBUTEROL SULFATE 1 AMPULE: .5; 3 SOLUTION RESPIRATORY (INHALATION) at 20:11

## 2019-02-06 RX ADMIN — Medication 1 CAPSULE: at 11:12

## 2019-02-06 RX ADMIN — LEVOFLOXACIN 500 MG: 5 INJECTION, SOLUTION INTRAVENOUS at 11:12

## 2019-02-06 RX ADMIN — Medication 10 ML: at 08:58

## 2019-02-06 RX ADMIN — GUAIFENESIN AND CODEINE PHOSPHATE 5 ML: 10; 100 LIQUID ORAL at 08:48

## 2019-02-06 RX ADMIN — PREDNISONE 60 MG: 20 TABLET ORAL at 07:13

## 2019-02-06 RX ADMIN — LEVOTHYROXINE SODIUM 125 MCG: 125 TABLET ORAL at 08:49

## 2019-02-06 RX ADMIN — FLUTICASONE PROPIONATE 2 SPRAY: 50 SPRAY, METERED NASAL at 12:57

## 2019-02-06 RX ADMIN — ACETAMINOPHEN 1000 MG: 500 TABLET ORAL at 14:17

## 2019-02-06 RX ADMIN — METHYLPREDNISOLONE SODIUM SUCCINATE 40 MG: 40 INJECTION, POWDER, FOR SOLUTION INTRAMUSCULAR; INTRAVENOUS at 23:08

## 2019-02-06 RX ADMIN — Medication 10 ML: at 16:39

## 2019-02-06 RX ADMIN — METHYLPREDNISOLONE SODIUM SUCCINATE 40 MG: 40 INJECTION, POWDER, FOR SOLUTION INTRAMUSCULAR; INTRAVENOUS at 16:39

## 2019-02-06 RX ADMIN — IPRATROPIUM BROMIDE AND ALBUTEROL SULFATE 1 AMPULE: .5; 3 SOLUTION RESPIRATORY (INHALATION) at 12:15

## 2019-02-06 RX ADMIN — ENOXAPARIN SODIUM 40 MG: 40 INJECTION SUBCUTANEOUS at 08:48

## 2019-02-06 RX ADMIN — IPRATROPIUM BROMIDE AND ALBUTEROL SULFATE 1 AMPULE: .5; 3 SOLUTION RESPIRATORY (INHALATION) at 05:24

## 2019-02-06 RX ADMIN — METFORMIN HYDROCHLORIDE 500 MG: 500 TABLET ORAL at 16:39

## 2019-02-06 RX ADMIN — Medication 10 ML: at 20:18

## 2019-02-06 RX ADMIN — METFORMIN HYDROCHLORIDE 500 MG: 500 TABLET ORAL at 12:56

## 2019-02-06 RX ADMIN — BENZONATATE 100 MG: 100 CAPSULE ORAL at 05:00

## 2019-02-06 RX ADMIN — IPRATROPIUM BROMIDE AND ALBUTEROL SULFATE 1 AMPULE: .5; 3 SOLUTION RESPIRATORY (INHALATION) at 16:14

## 2019-02-06 ASSESSMENT — ENCOUNTER SYMPTOMS
SHORTNESS OF BREATH: 1
NAUSEA: 0
COUGH: 1
DIARRHEA: 0
ABDOMINAL PAIN: 0
VOMITING: 0
WHEEZING: 1
CHEST TIGHTNESS: 1
SORE THROAT: 0
STRIDOR: 0

## 2019-02-06 ASSESSMENT — PAIN SCALES - GENERAL
PAINLEVEL_OUTOF10: 0
PAINLEVEL_OUTOF10: 6
PAINLEVEL_OUTOF10: 8
PAINLEVEL_OUTOF10: 5
PAINLEVEL_OUTOF10: 3
PAINLEVEL_OUTOF10: 0
PAINLEVEL_OUTOF10: 5
PAINLEVEL_OUTOF10: 3
PAINLEVEL_OUTOF10: 5

## 2019-02-06 ASSESSMENT — PAIN DESCRIPTION - PAIN TYPE: TYPE: ACUTE PAIN

## 2019-02-06 ASSESSMENT — PAIN DESCRIPTION - LOCATION: LOCATION: CHEST

## 2019-02-07 VITALS
TEMPERATURE: 98.3 F | SYSTOLIC BLOOD PRESSURE: 140 MMHG | HEART RATE: 74 BPM | HEIGHT: 63 IN | RESPIRATION RATE: 18 BRPM | BODY MASS INDEX: 39.41 KG/M2 | WEIGHT: 222.44 LBS | DIASTOLIC BLOOD PRESSURE: 77 MMHG | OXYGEN SATURATION: 95 %

## 2019-02-07 PROBLEM — J45.901 ACUTE SEVERE EXACERBATION OF ASTHMA: Status: ACTIVE | Noted: 2019-02-07

## 2019-02-07 LAB
GLUCOSE BLD-MCNC: 125 MG/DL (ref 70–99)
GLUCOSE BLD-MCNC: 127 MG/DL (ref 70–99)
HCT VFR BLD CALC: 37.1 % (ref 36–48)
HEMOGLOBIN: 12.3 G/DL (ref 12–16)
MCH RBC QN AUTO: 30.1 PG (ref 26–34)
MCHC RBC AUTO-ENTMCNC: 33.2 G/DL (ref 31–36)
MCV RBC AUTO: 90.7 FL (ref 80–100)
PDW BLD-RTO: 13.3 % (ref 12.4–15.4)
PERFORMED ON: ABNORMAL
PERFORMED ON: ABNORMAL
PLATELET # BLD: 163 K/UL (ref 135–450)
PMV BLD AUTO: 9.8 FL (ref 5–10.5)
PROCALCITONIN: 0.08 NG/ML (ref 0–0.15)
RBC # BLD: 4.09 M/UL (ref 4–5.2)
WBC # BLD: 10.9 K/UL (ref 4–11)

## 2019-02-07 PROCEDURE — 87205 SMEAR GRAM STAIN: CPT

## 2019-02-07 PROCEDURE — 84145 PROCALCITONIN (PCT): CPT

## 2019-02-07 PROCEDURE — 6360000002 HC RX W HCPCS: Performed by: FAMILY MEDICINE

## 2019-02-07 PROCEDURE — 36415 COLL VENOUS BLD VENIPUNCTURE: CPT

## 2019-02-07 PROCEDURE — 1200000000 HC SEMI PRIVATE

## 2019-02-07 PROCEDURE — 87070 CULTURE OTHR SPECIMN AEROBIC: CPT

## 2019-02-07 PROCEDURE — 6370000000 HC RX 637 (ALT 250 FOR IP): Performed by: INTERNAL MEDICINE

## 2019-02-07 PROCEDURE — 96372 THER/PROPH/DIAG INJ SC/IM: CPT

## 2019-02-07 PROCEDURE — G0378 HOSPITAL OBSERVATION PER HR: HCPCS

## 2019-02-07 PROCEDURE — 94640 AIRWAY INHALATION TREATMENT: CPT

## 2019-02-07 PROCEDURE — 2580000003 HC RX 258: Performed by: INTERNAL MEDICINE

## 2019-02-07 PROCEDURE — 6360000002 HC RX W HCPCS: Performed by: INTERNAL MEDICINE

## 2019-02-07 PROCEDURE — 85027 COMPLETE CBC AUTOMATED: CPT

## 2019-02-07 PROCEDURE — 6370000000 HC RX 637 (ALT 250 FOR IP): Performed by: FAMILY MEDICINE

## 2019-02-07 PROCEDURE — 96366 THER/PROPH/DIAG IV INF ADDON: CPT

## 2019-02-07 PROCEDURE — 94761 N-INVAS EAR/PLS OXIMETRY MLT: CPT

## 2019-02-07 PROCEDURE — 96376 TX/PRO/DX INJ SAME DRUG ADON: CPT

## 2019-02-07 RX ORDER — IPRATROPIUM BROMIDE AND ALBUTEROL SULFATE 2.5; .5 MG/3ML; MG/3ML
3 SOLUTION RESPIRATORY (INHALATION) EVERY 4 HOURS PRN
Qty: 360 ML | Refills: 3 | Status: SHIPPED | OUTPATIENT
Start: 2019-02-07 | End: 2019-02-07 | Stop reason: HOSPADM

## 2019-02-07 RX ORDER — PREDNISONE 10 MG/1
10 TABLET ORAL DAILY
Qty: 3 TABLET | Refills: 0 | Status: SHIPPED | OUTPATIENT
Start: 2019-02-13 | End: 2019-02-16

## 2019-02-07 RX ORDER — LACTOBACILLUS RHAMNOSUS GG 10B CELL
1 CAPSULE ORAL
Qty: 30 CAPSULE | Refills: 0 | Status: SHIPPED | OUTPATIENT
Start: 2019-02-08 | End: 2019-03-21 | Stop reason: SDUPTHER

## 2019-02-07 RX ORDER — GUAIFENESIN AND CODEINE PHOSPHATE 100; 10 MG/5ML; MG/5ML
5 SOLUTION ORAL 3 TIMES DAILY PRN
Qty: 120 ML | Refills: 0 | Status: SHIPPED | OUTPATIENT
Start: 2019-02-07 | End: 2019-02-10

## 2019-02-07 RX ORDER — LEVOFLOXACIN 750 MG/1
750 TABLET ORAL DAILY
Qty: 5 TABLET | Refills: 0 | Status: SHIPPED | OUTPATIENT
Start: 2019-02-07 | End: 2019-02-12

## 2019-02-07 RX ORDER — GUAIFENESIN 600 MG/1
600 TABLET, EXTENDED RELEASE ORAL 2 TIMES DAILY
Status: DISCONTINUED | OUTPATIENT
Start: 2019-02-07 | End: 2019-02-07 | Stop reason: HOSPADM

## 2019-02-07 RX ADMIN — METHYLPREDNISOLONE SODIUM SUCCINATE 40 MG: 40 INJECTION, POWDER, FOR SOLUTION INTRAMUSCULAR; INTRAVENOUS at 09:07

## 2019-02-07 RX ADMIN — GUAIFENESIN AND CODEINE PHOSPHATE 5 ML: 10; 100 LIQUID ORAL at 04:47

## 2019-02-07 RX ADMIN — FLUTICASONE PROPIONATE 2 SPRAY: 50 SPRAY, METERED NASAL at 09:18

## 2019-02-07 RX ADMIN — ENOXAPARIN SODIUM 40 MG: 40 INJECTION SUBCUTANEOUS at 09:07

## 2019-02-07 RX ADMIN — IPRATROPIUM BROMIDE AND ALBUTEROL SULFATE 1 AMPULE: .5; 3 SOLUTION RESPIRATORY (INHALATION) at 12:02

## 2019-02-07 RX ADMIN — LEVOTHYROXINE SODIUM 125 MCG: 125 TABLET ORAL at 05:25

## 2019-02-07 RX ADMIN — GUAIFENESIN 600 MG: 600 TABLET, EXTENDED RELEASE ORAL at 11:40

## 2019-02-07 RX ADMIN — Medication 1 CAPSULE: at 09:08

## 2019-02-07 RX ADMIN — METFORMIN HYDROCHLORIDE 500 MG: 500 TABLET ORAL at 09:07

## 2019-02-07 RX ADMIN — IPRATROPIUM BROMIDE AND ALBUTEROL SULFATE 1 AMPULE: .5; 3 SOLUTION RESPIRATORY (INHALATION) at 08:15

## 2019-02-07 RX ADMIN — LEVOFLOXACIN 500 MG: 5 INJECTION, SOLUTION INTRAVENOUS at 09:08

## 2019-02-07 RX ADMIN — Medication 10 ML: at 09:14

## 2019-02-07 RX ADMIN — ACETAMINOPHEN 1000 MG: 500 TABLET ORAL at 09:06

## 2019-02-07 ASSESSMENT — PAIN SCALES - GENERAL
PAINLEVEL_OUTOF10: 3
PAINLEVEL_OUTOF10: 0
PAINLEVEL_OUTOF10: 8
PAINLEVEL_OUTOF10: 0
PAINLEVEL_OUTOF10: 3
PAINLEVEL_OUTOF10: 0

## 2019-02-09 LAB
CULTURE, RESPIRATORY: NORMAL
GRAM STAIN RESULT: NORMAL
GRAM STAIN RESULT: NORMAL
WOUND/ABSCESS: NORMAL

## 2019-02-12 RX ORDER — CETIRIZINE HYDROCHLORIDE, PSEUDOEPHEDRINE HYDROCHLORIDE 5; 120 MG/1; MG/1
1 TABLET, FILM COATED, EXTENDED RELEASE ORAL 2 TIMES DAILY
Qty: 60 TABLET | Refills: 1
Start: 2019-02-12 | End: 2019-07-02 | Stop reason: SDUPTHER

## 2019-02-25 ENCOUNTER — OFFICE VISIT (OUTPATIENT)
Dept: ENT CLINIC | Age: 44
End: 2019-02-25
Payer: COMMERCIAL

## 2019-02-25 VITALS
BODY MASS INDEX: 39.27 KG/M2 | SYSTOLIC BLOOD PRESSURE: 152 MMHG | DIASTOLIC BLOOD PRESSURE: 96 MMHG | HEART RATE: 97 BPM | HEIGHT: 64 IN | WEIGHT: 230 LBS

## 2019-02-25 DIAGNOSIS — J32.9 CHRONIC SINUSITIS, UNSPECIFIED LOCATION: Primary | ICD-10-CM

## 2019-02-25 DIAGNOSIS — J45.41 MODERATE PERSISTENT ASTHMA WITH EXACERBATION: ICD-10-CM

## 2019-02-25 DIAGNOSIS — J31.0 CHRONIC RHINITIS: ICD-10-CM

## 2019-02-25 PROCEDURE — 31231 NASAL ENDOSCOPY DX: CPT | Performed by: OTOLARYNGOLOGY

## 2019-02-25 PROCEDURE — 1036F TOBACCO NON-USER: CPT | Performed by: OTOLARYNGOLOGY

## 2019-02-25 PROCEDURE — G8484 FLU IMMUNIZE NO ADMIN: HCPCS | Performed by: OTOLARYNGOLOGY

## 2019-02-25 PROCEDURE — 99214 OFFICE O/P EST MOD 30 MIN: CPT | Performed by: OTOLARYNGOLOGY

## 2019-02-25 PROCEDURE — G8427 DOCREV CUR MEDS BY ELIG CLIN: HCPCS | Performed by: OTOLARYNGOLOGY

## 2019-02-25 PROCEDURE — G8417 CALC BMI ABV UP PARAM F/U: HCPCS | Performed by: OTOLARYNGOLOGY

## 2019-02-25 ASSESSMENT — ENCOUNTER SYMPTOMS
RHINORRHEA: 0
VOICE CHANGE: 0
NAUSEA: 0
EYE PAIN: 0
SORE THROAT: 0
COUGH: 1
EYE ITCHING: 0
SINUS PAIN: 0
SINUS PRESSURE: 0
SHORTNESS OF BREATH: 0
EYE REDNESS: 0
TROUBLE SWALLOWING: 0
DIARRHEA: 0
FACIAL SWELLING: 0

## 2019-02-27 ENCOUNTER — OFFICE VISIT (OUTPATIENT)
Dept: PULMONOLOGY | Age: 44
End: 2019-02-27
Payer: COMMERCIAL

## 2019-02-27 VITALS
HEIGHT: 63 IN | SYSTOLIC BLOOD PRESSURE: 100 MMHG | BODY MASS INDEX: 40.4 KG/M2 | DIASTOLIC BLOOD PRESSURE: 80 MMHG | OXYGEN SATURATION: 98 % | WEIGHT: 228 LBS | HEART RATE: 92 BPM

## 2019-02-27 DIAGNOSIS — J45.21 MILD INTERMITTENT ACUTE ASTHMATIC BRONCHITIS WITH ACUTE EXACERBATION: Primary | ICD-10-CM

## 2019-02-27 PROCEDURE — G8427 DOCREV CUR MEDS BY ELIG CLIN: HCPCS | Performed by: INTERNAL MEDICINE

## 2019-02-27 PROCEDURE — G8484 FLU IMMUNIZE NO ADMIN: HCPCS | Performed by: INTERNAL MEDICINE

## 2019-02-27 PROCEDURE — G8417 CALC BMI ABV UP PARAM F/U: HCPCS | Performed by: INTERNAL MEDICINE

## 2019-02-27 PROCEDURE — 99214 OFFICE O/P EST MOD 30 MIN: CPT | Performed by: INTERNAL MEDICINE

## 2019-02-27 PROCEDURE — 1036F TOBACCO NON-USER: CPT | Performed by: INTERNAL MEDICINE

## 2019-02-27 RX ORDER — GUAIFENESIN/DEXTROMETHORPHAN 100-10MG/5
5 SYRUP ORAL 3 TIMES DAILY PRN
Qty: 120 ML | Refills: 0 | Status: SHIPPED | OUTPATIENT
Start: 2019-02-27 | End: 2019-03-09

## 2019-02-27 RX ORDER — BUDESONIDE AND FORMOTEROL FUMARATE DIHYDRATE 160; 4.5 UG/1; UG/1
2 AEROSOL RESPIRATORY (INHALATION) 2 TIMES DAILY
Qty: 1 INHALER | Refills: 3 | Status: SHIPPED | OUTPATIENT
Start: 2019-02-27 | End: 2019-03-01

## 2019-02-27 ASSESSMENT — ENCOUNTER SYMPTOMS
SHORTNESS OF BREATH: 1
CHEST TIGHTNESS: 0
WHEEZING: 1
COUGH: 1

## 2019-02-28 ENCOUNTER — OFFICE VISIT (OUTPATIENT)
Dept: ENT CLINIC | Age: 44
End: 2019-02-28
Payer: COMMERCIAL

## 2019-02-28 ENCOUNTER — TELEPHONE (OUTPATIENT)
Dept: PULMONOLOGY | Age: 44
End: 2019-02-28

## 2019-02-28 DIAGNOSIS — R04.0 EPISTAXIS: Primary | ICD-10-CM

## 2019-02-28 PROCEDURE — 99214 OFFICE O/P EST MOD 30 MIN: CPT | Performed by: OTOLARYNGOLOGY

## 2019-02-28 PROCEDURE — 30901 CONTROL OF NOSEBLEED: CPT | Performed by: OTOLARYNGOLOGY

## 2019-02-28 PROCEDURE — 1036F TOBACCO NON-USER: CPT | Performed by: OTOLARYNGOLOGY

## 2019-02-28 PROCEDURE — G8417 CALC BMI ABV UP PARAM F/U: HCPCS | Performed by: OTOLARYNGOLOGY

## 2019-02-28 PROCEDURE — G8484 FLU IMMUNIZE NO ADMIN: HCPCS | Performed by: OTOLARYNGOLOGY

## 2019-02-28 PROCEDURE — G8427 DOCREV CUR MEDS BY ELIG CLIN: HCPCS | Performed by: OTOLARYNGOLOGY

## 2019-02-28 ASSESSMENT — ENCOUNTER SYMPTOMS
EYE REDNESS: 0
SHORTNESS OF BREATH: 0
SORE THROAT: 0
TROUBLE SWALLOWING: 0
EYE PAIN: 0
RHINORRHEA: 0
DIARRHEA: 0
EYE ITCHING: 0
COUGH: 0
FACIAL SWELLING: 0
SINUS PAIN: 0
SINUS PRESSURE: 0
VOICE CHANGE: 0
NAUSEA: 0
CHOKING: 0

## 2019-03-01 RX ORDER — FLUTICASONE FUROATE AND VILANTEROL 200; 25 UG/1; UG/1
1 POWDER RESPIRATORY (INHALATION) DAILY
Qty: 1 EACH | Refills: 11 | Status: SHIPPED | OUTPATIENT
Start: 2019-03-01 | End: 2019-03-21 | Stop reason: SDUPTHER

## 2019-03-07 ENCOUNTER — TELEPHONE (OUTPATIENT)
Dept: ENT CLINIC | Age: 44
End: 2019-03-07

## 2019-03-10 ENCOUNTER — HOSPITAL ENCOUNTER (EMERGENCY)
Age: 44
Discharge: HOME | End: 2019-03-10
Payer: COMMERCIAL

## 2019-03-10 VITALS
TEMPERATURE: 97.9 F | RESPIRATION RATE: 14 BRPM | DIASTOLIC BLOOD PRESSURE: 93 MMHG | OXYGEN SATURATION: 99 % | WEIGHT: 227.51 LBS | SYSTOLIC BLOOD PRESSURE: 125 MMHG | BODY MASS INDEX: 40.31 KG/M2 | HEART RATE: 88 BPM | HEIGHT: 63 IN

## 2019-03-10 DIAGNOSIS — L03.012 PARONYCHIA OF LEFT MIDDLE FINGER: Primary | ICD-10-CM

## 2019-03-10 PROCEDURE — 2500000003 HC RX 250 WO HCPCS: Performed by: PHYSICIAN ASSISTANT

## 2019-03-10 PROCEDURE — 6370000000 HC RX 637 (ALT 250 FOR IP): Performed by: PHYSICIAN ASSISTANT

## 2019-03-10 PROCEDURE — 4500000023 HC ED LEVEL 3 PROCEDURE

## 2019-03-10 PROCEDURE — 99283 EMERGENCY DEPT VISIT LOW MDM: CPT

## 2019-03-10 RX ORDER — LIDOCAINE HYDROCHLORIDE 10 MG/ML
5 INJECTION, SOLUTION EPIDURAL; INFILTRATION; INTRACAUDAL; PERINEURAL ONCE
Status: DISCONTINUED | OUTPATIENT
Start: 2019-03-10 | End: 2019-03-10

## 2019-03-10 RX ORDER — AMOXICILLIN AND CLAVULANATE POTASSIUM 875; 125 MG/1; MG/1
1 TABLET, FILM COATED ORAL 2 TIMES DAILY
Qty: 14 TABLET | Refills: 0 | Status: SHIPPED | OUTPATIENT
Start: 2019-03-10 | End: 2019-03-17

## 2019-03-10 RX ORDER — IBUPROFEN 800 MG/1
800 TABLET ORAL EVERY 8 HOURS PRN
Qty: 30 TABLET | Refills: 0 | Status: SHIPPED | OUTPATIENT
Start: 2019-03-10 | End: 2020-04-03 | Stop reason: SDUPTHER

## 2019-03-10 RX ORDER — IBUPROFEN 400 MG/1
800 TABLET ORAL ONCE
Status: COMPLETED | OUTPATIENT
Start: 2019-03-10 | End: 2019-03-10

## 2019-03-10 RX ADMIN — IBUPROFEN 800 MG: 400 TABLET, FILM COATED ORAL at 07:00

## 2019-03-10 RX ADMIN — LIDOCAINE HYDROCHLORIDE 5 ML: 10 INJECTION, SOLUTION EPIDURAL; INFILTRATION; INTRACAUDAL; PERINEURAL at 06:30

## 2019-03-10 ASSESSMENT — ENCOUNTER SYMPTOMS
SHORTNESS OF BREATH: 0
NAUSEA: 0
COUGH: 0
BACK PAIN: 0
EYE PAIN: 0
DIARRHEA: 0
VOMITING: 0
ABDOMINAL PAIN: 0

## 2019-03-10 ASSESSMENT — PAIN DESCRIPTION - FREQUENCY: FREQUENCY: CONTINUOUS

## 2019-03-10 ASSESSMENT — PAIN DESCRIPTION - LOCATION: LOCATION: HAND

## 2019-03-10 ASSESSMENT — PAIN DESCRIPTION - DESCRIPTORS: DESCRIPTORS: THROBBING

## 2019-03-10 ASSESSMENT — PAIN DESCRIPTION - PROGRESSION: CLINICAL_PROGRESSION: GRADUALLY WORSENING

## 2019-03-10 ASSESSMENT — PAIN SCALES - GENERAL: PAINLEVEL_OUTOF10: 8

## 2019-03-10 ASSESSMENT — PAIN DESCRIPTION - ORIENTATION: ORIENTATION: LEFT

## 2019-03-10 ASSESSMENT — PAIN DESCRIPTION - ONSET: ONSET: PROGRESSIVE

## 2019-03-20 RX ORDER — LIOTHYRONINE SODIUM 5 UG/1
TABLET ORAL
Qty: 30 TABLET | Refills: 3 | Status: SHIPPED | OUTPATIENT
Start: 2019-03-20 | End: 2020-01-07

## 2019-03-20 RX ORDER — CA/D3/MAG OX/ZINC/COP/MANG/BOR 600 MG-800
TABLET,CHEWABLE ORAL
Qty: 30 CAPSULE | Refills: 1 | OUTPATIENT
Start: 2019-03-20

## 2019-03-21 ENCOUNTER — TELEPHONE (OUTPATIENT)
Dept: INTERNAL MEDICINE CLINIC | Age: 44
End: 2019-03-21

## 2019-03-21 RX ORDER — FLUTICASONE FUROATE AND VILANTEROL 200; 25 UG/1; UG/1
1 POWDER RESPIRATORY (INHALATION) DAILY
Qty: 1 EACH | Refills: 2
Start: 2019-03-21 | End: 2020-04-03 | Stop reason: SDUPTHER

## 2019-03-21 RX ORDER — MONTELUKAST SODIUM 10 MG/1
10 TABLET ORAL NIGHTLY
Qty: 30 TABLET | Refills: 2
Start: 2019-03-21 | End: 2020-01-07

## 2019-03-21 RX ORDER — LACTOBACILLUS RHAMNOSUS GG 10B CELL
1 CAPSULE ORAL
Qty: 30 CAPSULE | Refills: 0 | Status: SHIPPED | OUTPATIENT
Start: 2019-03-21 | End: 2019-07-02 | Stop reason: ALTCHOICE

## 2019-03-21 RX ORDER — FOLIC ACID 1 MG/1
1 TABLET ORAL DAILY
Qty: 30 TABLET | Refills: 0
Start: 2019-03-21 | End: 2020-04-03 | Stop reason: SDUPTHER

## 2019-03-25 ENCOUNTER — OFFICE VISIT (OUTPATIENT)
Dept: SURGERY | Age: 44
End: 2019-03-25
Payer: COMMERCIAL

## 2019-03-25 VITALS
HEIGHT: 63 IN | RESPIRATION RATE: 16 BRPM | SYSTOLIC BLOOD PRESSURE: 128 MMHG | BODY MASS INDEX: 40.22 KG/M2 | DIASTOLIC BLOOD PRESSURE: 84 MMHG | WEIGHT: 227 LBS

## 2019-03-25 DIAGNOSIS — K60.2 FISSURE IN ANO: Primary | ICD-10-CM

## 2019-03-25 PROCEDURE — G8417 CALC BMI ABV UP PARAM F/U: HCPCS | Performed by: SURGERY

## 2019-03-25 PROCEDURE — G8427 DOCREV CUR MEDS BY ELIG CLIN: HCPCS | Performed by: SURGERY

## 2019-03-25 PROCEDURE — G8484 FLU IMMUNIZE NO ADMIN: HCPCS | Performed by: SURGERY

## 2019-03-25 PROCEDURE — 99243 OFF/OP CNSLTJ NEW/EST LOW 30: CPT | Performed by: SURGERY

## 2019-03-28 ENCOUNTER — TELEPHONE (OUTPATIENT)
Dept: SURGERY | Age: 44
End: 2019-03-28

## 2019-03-28 NOTE — TELEPHONE ENCOUNTER
We have time held for surgery at [facility] on 4/2/19 and will need to know if patient intends to proceed with surgery this day. LMOM for patient to call office.

## 2019-05-15 ENCOUNTER — TELEPHONE (OUTPATIENT)
Dept: SURGERY | Age: 44
End: 2019-05-15

## 2019-05-29 RX ORDER — PANTOPRAZOLE SODIUM 40 MG/1
40 TABLET, DELAYED RELEASE ORAL DAILY
Qty: 30 TABLET | Refills: 0
Start: 2019-05-29 | End: 2019-07-02 | Stop reason: ALTCHOICE

## 2019-06-14 ENCOUNTER — TELEPHONE (OUTPATIENT)
Dept: INTERNAL MEDICINE CLINIC | Age: 44
End: 2019-06-14

## 2019-06-14 NOTE — TELEPHONE ENCOUNTER
Patient called. Missed last appointment and wants to reschedule appointment . Also states she is going to [place] and needs specific immunizations for that region. States she needs tetanus and hepatitis B as well . I informed her we can do the Tetanus  and Hepatitis immunizations We don't carry immunizations required for [place] to go over seas. I instructed her to call the health Department they may be able to tell her where to get them . States states she is sure she  Already had the Tetanus and hepatitis immunizations. I told  her I couldn't find humberto record here. She states she has seen [facility] , Dr. Diana Caraballo and Agustin Meléndez  And thinks they may have given them to her. Instructed her to call their office. I asked her if she still want's  appointment  To be seen . She does . Appointment scheduled.

## 2019-06-27 ENCOUNTER — TELEPHONE (OUTPATIENT)
Dept: INTERNAL MEDICINE CLINIC | Age: 44
End: 2019-06-27

## 2019-06-27 NOTE — TELEPHONE ENCOUNTER
Patient states that she will need. TDAP and Hepatitis A-B prior to a trip to [place]. Will discuss at next visit.

## 2019-07-02 ENCOUNTER — OFFICE VISIT (OUTPATIENT)
Dept: INTERNAL MEDICINE CLINIC | Age: 44
End: 2019-07-02
Payer: COMMERCIAL

## 2019-07-02 VITALS
HEART RATE: 71 BPM | DIASTOLIC BLOOD PRESSURE: 88 MMHG | OXYGEN SATURATION: 98 % | WEIGHT: 218.3 LBS | SYSTOLIC BLOOD PRESSURE: 122 MMHG | BODY MASS INDEX: 38.68 KG/M2 | TEMPERATURE: 98.4 F | RESPIRATION RATE: 20 BRPM | HEIGHT: 63 IN

## 2019-07-02 DIAGNOSIS — Z01.84 IMMUNITY TO HEPATITIS B VIRUS DEMONSTRATED BY SEROLOGIC TEST: ICD-10-CM

## 2019-07-02 DIAGNOSIS — K59.00 CONSTIPATION, UNSPECIFIED CONSTIPATION TYPE: ICD-10-CM

## 2019-07-02 DIAGNOSIS — Z23 NEED FOR PROPHYLACTIC VACCINATION AGAINST DIPHTHERIA-TETANUS-PERTUSSIS (DTP): ICD-10-CM

## 2019-07-02 DIAGNOSIS — Z23 NEED FOR PROPHYLACTIC VACCINATION AGAINST VIRAL HEPATITIS: ICD-10-CM

## 2019-07-02 DIAGNOSIS — E89.0 POSTABLATIVE HYPOTHYROIDISM: ICD-10-CM

## 2019-07-02 DIAGNOSIS — Z01.84 IMMUNITY TO HEPATITIS A VIRUS DETERMINED BY SEROLOGIC TEST: ICD-10-CM

## 2019-07-02 DIAGNOSIS — L30.9 ECZEMA, UNSPECIFIED TYPE: ICD-10-CM

## 2019-07-02 DIAGNOSIS — J32.4 CHRONIC PANSINUSITIS: Primary | ICD-10-CM

## 2019-07-02 LAB
HAV IGM SER IA-ACNC: NORMAL
HEPATITIS B SURFACE ANTIGEN INTERPRETATION: NORMAL
T4 FREE: 0.8 NG/DL (ref 0.9–1.8)
TSH REFLEX: 7.97 UIU/ML (ref 0.27–4.2)

## 2019-07-02 PROCEDURE — 6360000002 HC RX W HCPCS: Performed by: STUDENT IN AN ORGANIZED HEALTH CARE EDUCATION/TRAINING PROGRAM

## 2019-07-02 PROCEDURE — 99213 OFFICE O/P EST LOW 20 MIN: CPT | Performed by: STUDENT IN AN ORGANIZED HEALTH CARE EDUCATION/TRAINING PROGRAM

## 2019-07-02 PROCEDURE — 90715 TDAP VACCINE 7 YRS/> IM: CPT | Performed by: STUDENT IN AN ORGANIZED HEALTH CARE EDUCATION/TRAINING PROGRAM

## 2019-07-02 PROCEDURE — 90636 HEP A/HEP B VACC ADULT IM: CPT | Performed by: STUDENT IN AN ORGANIZED HEALTH CARE EDUCATION/TRAINING PROGRAM

## 2019-07-02 PROCEDURE — 90472 IMMUNIZATION ADMIN EACH ADD: CPT | Performed by: STUDENT IN AN ORGANIZED HEALTH CARE EDUCATION/TRAINING PROGRAM

## 2019-07-02 PROCEDURE — 90471 IMMUNIZATION ADMIN: CPT | Performed by: STUDENT IN AN ORGANIZED HEALTH CARE EDUCATION/TRAINING PROGRAM

## 2019-07-02 RX ORDER — CETIRIZINE HYDROCHLORIDE, PSEUDOEPHEDRINE HYDROCHLORIDE 5; 120 MG/1; MG/1
1 TABLET, FILM COATED, EXTENDED RELEASE ORAL 2 TIMES DAILY
Qty: 60 TABLET | Refills: 1 | Status: SHIPPED | OUTPATIENT
Start: 2019-07-02 | End: 2019-10-23 | Stop reason: SDUPTHER

## 2019-07-02 RX ORDER — FLUTICASONE PROPIONATE 50 MCG
1 SPRAY, SUSPENSION (ML) NASAL DAILY
Qty: 1 BOTTLE | Refills: 3 | Status: ON HOLD | OUTPATIENT
Start: 2019-07-02 | End: 2020-03-08 | Stop reason: ALTCHOICE

## 2019-07-02 RX ADMIN — TETANUS TOXOID, REDUCED DIPHTHERIA TOXOID AND ACELLULAR PERTUSSIS VACCINE, ADSORBED 0.5 ML: 5; 2.5; 8; 8; 2.5 SUSPENSION INTRAMUSCULAR at 15:27

## 2019-07-02 RX ADMIN — HEPATITIS A AND HEPATITIS B (RECOMBINANT) VACCINE 1 ML: 720; 20 INJECTION, SUSPENSION INTRAMUSCULAR at 15:24

## 2019-07-02 ASSESSMENT — ENCOUNTER SYMPTOMS
COUGH: 0
CHEST TIGHTNESS: 0
SINUS PRESSURE: 1
BACK PAIN: 1
SHORTNESS OF BREATH: 0

## 2019-07-02 NOTE — PROGRESS NOTES
Never Smoker    Smokeless tobacco: Never Used   Substance and Sexual Activity    Alcohol use: Yes     Alcohol/week: 0.0 oz     Comment: 2 drinks/week     Drug use: No    Sexual activity: Not on file   Lifestyle    Physical activity:     Days per week: Not on file     Minutes per session: Not on file    Stress: Not on file   Relationships    Social connections:     Talks on phone: Not on file     Gets together: Not on file     Attends [employer] service: Not on file     Active member of club or organization: Not on file     Attends meetings of clubs or organizations: Not on file     Relationship status: Not on file    Intimate partner violence:     Fear of current or ex partner: Not on file     Emotionally abused: Not on file     Physically abused: Not on file     Forced sexual activity: Not on file   Other Topics Concern    Not on file   Social History Narrative    Not on file        Family History   Problem Relation Age of Onset    High Blood Pressure Mother     Heart Disease Father     Diabetes Maternal Aunt        Vitals:    07/02/19 1435   BP: 122/88   Site: Left Upper Arm   Position: Sitting   Cuff Size: Large Adult   Pulse: 71   Resp: 20   Temp: 98.4 °F (36.9 °C)   TempSrc: Oral   SpO2: 98%   Weight: 218 lb 4.8 oz (99 kg)   Height: 5' 3\" (1.6 m)     Estimated body mass index is 38.67 kg/m² as calculated from the following:    Height as of this encounter: 5' 3\" (1.6 m). Weight as of this encounter: 218 lb 4.8 oz (99 kg). Physical Exam   Constitutional: She is oriented to person, place, and time. She appears well-developed and well-nourished. HENT:   Head: Normocephalic and atraumatic. Eyes: Right eye exhibits no discharge. Left eye exhibits no discharge. Neck: No JVD present. No thyromegaly present. Cardiovascular: Normal rate, regular rhythm, normal heart sounds and intact distal pulses. Exam reveals no gallop and no friction rub. No murmur heard.   Pulmonary/Chest: Effort normal and

## 2019-07-03 ENCOUNTER — TELEPHONE (OUTPATIENT)
Dept: INTERNAL MEDICINE CLINIC | Age: 44
End: 2019-07-03

## 2019-07-10 ENCOUNTER — OFFICE VISIT (OUTPATIENT)
Dept: INTERNAL MEDICINE CLINIC | Age: 44
End: 2019-07-10
Payer: COMMERCIAL

## 2019-07-10 VITALS
DIASTOLIC BLOOD PRESSURE: 74 MMHG | TEMPERATURE: 98.6 F | HEIGHT: 63 IN | RESPIRATION RATE: 20 BRPM | HEART RATE: 79 BPM | BODY MASS INDEX: 38.29 KG/M2 | SYSTOLIC BLOOD PRESSURE: 119 MMHG | WEIGHT: 216.1 LBS | OXYGEN SATURATION: 99 %

## 2019-07-10 DIAGNOSIS — H10.13 ALLERGIC CONJUNCTIVITIS OF BOTH EYES: Primary | ICD-10-CM

## 2019-07-10 PROCEDURE — 99213 OFFICE O/P EST LOW 20 MIN: CPT | Performed by: STUDENT IN AN ORGANIZED HEALTH CARE EDUCATION/TRAINING PROGRAM

## 2019-07-10 RX ORDER — OLOPATADINE HYDROCHLORIDE 1 MG/ML
1 SOLUTION/ DROPS OPHTHALMIC 2 TIMES DAILY
Qty: 1 BOTTLE | Refills: 1 | Status: SHIPPED | OUTPATIENT
Start: 2019-07-10 | End: 2019-08-09

## 2019-07-10 RX ORDER — AZELASTINE HYDROCHLORIDE 0.5 MG/ML
1 SOLUTION/ DROPS OPHTHALMIC 2 TIMES DAILY
Qty: 1 BOTTLE | Refills: 1 | Status: SHIPPED | OUTPATIENT
Start: 2019-07-10 | End: 2020-01-07

## 2019-07-26 DIAGNOSIS — L30.9 ECZEMA, UNSPECIFIED TYPE: ICD-10-CM

## 2019-07-26 DIAGNOSIS — K90.9 INTESTINAL MALABSORPTION, UNSPECIFIED TYPE: Chronic | ICD-10-CM

## 2019-07-26 DIAGNOSIS — D50.0 IRON DEFICIENCY ANEMIA DUE TO CHRONIC BLOOD LOSS: ICD-10-CM

## 2019-07-26 RX ORDER — CYANOCOBALAMIN 1000 UG/ML
INJECTION INTRAMUSCULAR; SUBCUTANEOUS
Qty: 1 ML | Refills: 0 | Status: SHIPPED | OUTPATIENT
Start: 2019-07-26 | End: 2020-01-07

## 2019-07-30 ENCOUNTER — OFFICE VISIT (OUTPATIENT)
Dept: INTERNAL MEDICINE CLINIC | Age: 44
End: 2019-07-30
Payer: COMMERCIAL

## 2019-07-30 DIAGNOSIS — Z23 NEED FOR PROPHYLACTIC VACCINATION AGAINST VIRAL HEPATITIS: Primary | ICD-10-CM

## 2019-07-30 PROCEDURE — 2500000003 HC RX 250 WO HCPCS: Performed by: STUDENT IN AN ORGANIZED HEALTH CARE EDUCATION/TRAINING PROGRAM

## 2019-07-30 PROCEDURE — 90636 HEP A/HEP B VACC ADULT IM: CPT | Performed by: STUDENT IN AN ORGANIZED HEALTH CARE EDUCATION/TRAINING PROGRAM

## 2019-07-30 PROCEDURE — 90471 IMMUNIZATION ADMIN: CPT | Performed by: STUDENT IN AN ORGANIZED HEALTH CARE EDUCATION/TRAINING PROGRAM

## 2019-07-30 RX ADMIN — HEPATITIS A AND HEPATITIS B (RECOMBINANT) VACCINE 1 ML: 720; 20 INJECTION, SUSPENSION INTRAMUSCULAR at 09:31

## 2019-08-08 PROBLEM — Z23: Status: ACTIVE | Noted: 2019-08-08

## 2019-08-13 ENCOUNTER — OFFICE VISIT (OUTPATIENT)
Dept: INTERNAL MEDICINE CLINIC | Age: 44
End: 2019-08-13
Payer: COMMERCIAL

## 2019-08-13 VITALS
SYSTOLIC BLOOD PRESSURE: 126 MMHG | RESPIRATION RATE: 16 BRPM | OXYGEN SATURATION: 99 % | HEART RATE: 80 BPM | DIASTOLIC BLOOD PRESSURE: 85 MMHG | TEMPERATURE: 98.3 F

## 2019-08-13 DIAGNOSIS — B30.9 VIRAL CONJUNCTIVITIS OF BOTH EYES: Primary | ICD-10-CM

## 2019-08-13 PROCEDURE — 99213 OFFICE O/P EST LOW 20 MIN: CPT | Performed by: STUDENT IN AN ORGANIZED HEALTH CARE EDUCATION/TRAINING PROGRAM

## 2019-08-13 NOTE — PROGRESS NOTES
Outpatient Clinic Visit Note    Patient: Geoffrey Richards  : 1975 (37 y.o.)  Date: 2019    CC: Pink eye    HPI:      Ms. YOUNG [facility] is a pleasant 37yo female with PMHx autoimmune hypothyroidism, presenting today for evaluation of eye discharge and discomfort for the last 4 days. She has returned from a mission trip to [place], and developed her initial eye discomfort and drainage in the Left eye whil she was there. It worsened at the airport and by the time she was flying back it had spread to the Right eye. She admits to awakening with 'eyes glued shut' for the past few nights, eye tenderness, and thick mucoid discharge bilaterally. Denies fevers, vision changes, cough, rhinorrhea, and congestion. Past Medical History:    Past Medical History:   Diagnosis Date    Anemia, iron deficiency     Asthma     Autoimmune hepatitis ([facility]     Fissure, anal     History of blood transfusion     Hypothyroidism     Menorrhagia with regular cycle     Morbid obesity with BMI of 40.0-44.9, adult ([facility] 2015    MRSA (methicillin resistant staph aureus) culture positive 2017    abdominal abscess    MRSA infection 2012    rt thigh abscess    Pericarditis, acute     Sinusitis        Past Surgical History:  Past Surgical History:   Procedure Laterality Date    OTHER SURGICAL HISTORY  2012    INCISION AND DRAINAGE POSTERIOR THIGH ABSCESS    RECTAL SURGERY  Aug 2011    repair of rectal fissures and anal skin tag removal    SINUS SURGERY      X 3    TONSILLECTOMY  2004    TONSILLECTOMY AND ADENOIDECTOMY      (partial adenoidectomy)       Home Medications:  Has been reviewed and as documented. Allergies:    Latex; Clindamycin/lincomycin; Sulfa antibiotics;  Diflucan [fluconazole]; and Other    Family History:       Problem Relation Age of Onset    High Blood Pressure Mother     Heart Disease Father     Diabetes Maternal Aunt        ROS: A 10-organ Review Of Systems was obtained recommended at this time  - Pt counseled on progression of viral vs bacterial conjunctivitis, and indications for antibiotic therapy.     Dispo: Pt has been staffed with Dr. Zenaida Alcala  _______________  Ronan Valdovinos DO, 8/13/2019 3:11 PM   PGY-1

## 2019-08-30 ENCOUNTER — OFFICE VISIT (OUTPATIENT)
Dept: INTERNAL MEDICINE CLINIC | Age: 44
End: 2019-08-30
Payer: COMMERCIAL

## 2019-08-30 VITALS
OXYGEN SATURATION: 98 % | WEIGHT: 216 LBS | HEIGHT: 63 IN | DIASTOLIC BLOOD PRESSURE: 80 MMHG | RESPIRATION RATE: 20 BRPM | BODY MASS INDEX: 38.27 KG/M2 | TEMPERATURE: 98.3 F | HEART RATE: 85 BPM | SYSTOLIC BLOOD PRESSURE: 118 MMHG

## 2019-08-30 DIAGNOSIS — H10.9 CONJUNCTIVITIS OF BOTH EYES, UNSPECIFIED CONJUNCTIVITIS TYPE: Primary | ICD-10-CM

## 2019-08-30 PROCEDURE — 99213 OFFICE O/P EST LOW 20 MIN: CPT | Performed by: STUDENT IN AN ORGANIZED HEALTH CARE EDUCATION/TRAINING PROGRAM

## 2019-08-30 RX ORDER — CIPROFLOXACIN HYDROCHLORIDE 3.5 MG/ML
1 SOLUTION/ DROPS TOPICAL 4 TIMES DAILY
Qty: 20 DROP | Refills: 0 | Status: SHIPPED | OUTPATIENT
Start: 2019-08-30 | End: 2019-09-04

## 2019-08-30 RX ORDER — AZELASTINE HYDROCHLORIDE 0.5 MG/ML
1 SOLUTION/ DROPS OPHTHALMIC 2 TIMES DAILY
Qty: 1 BOTTLE | Refills: 1 | Status: SHIPPED | OUTPATIENT
Start: 2019-08-30 | End: 2019-09-29

## 2019-08-30 ASSESSMENT — ENCOUNTER SYMPTOMS
SORE THROAT: 1
VOMITING: 0
WHEEZING: 0
PHOTOPHOBIA: 0
ABDOMINAL PAIN: 0
SHORTNESS OF BREATH: 0
NAUSEA: 0
BACK PAIN: 0
SINUS PAIN: 1

## 2019-08-30 NOTE — PROGRESS NOTES
2019    Chas Talbot (:  1975) is a 37 y.o. female, here for evaluation of the following medical concerns:    Since , Pt's eye symptoms have persisted. She wakes up every morning with \"thick, cloudy, yellow\" fluid from her eyes. She feels like her eyes are almost \"stuck\" every morning. Pinkness improves with visine A (4x a day). Uses saline washes every morning. Burning pain is more in the paranasal area. Eyes feel \"puffy\". Eyes \"burn\" throughout the day, and there is cloudy discharge collected in the paranasal area. Pt reports low grade fever (Highest 99.6F on ) in evenings since this episodes began, and sore throat for past 2 days. Pt has chronic sinusitis (on flonase) and so she gets nasal discharge, sore throat every now and then. Pt denies vision changes (hasnt had trouble driving). Pt denies chills night sweats, nasal discharge, cough, chest pain, SOB. Pt denies getting new pets, plants. Pt uses hypoallergenic products. Review of Systems   HENT: Positive for postnasal drip, sinus pain and sore throat. Negative for congestion and sneezing. Eyes: Negative for photophobia and visual disturbance. Respiratory: Negative for shortness of breath and wheezing. Cardiovascular: Negative for chest pain, palpitations and leg swelling. Gastrointestinal: Negative for abdominal pain, nausea and vomiting. Genitourinary: Negative for dysuria and frequency. Musculoskeletal: Negative for back pain and joint swelling. Neurological: Negative for dizziness, tremors, weakness and light-headedness. Prior to Visit Medications    Medication Sig Taking?  Authorizing Provider   cyanocobalamin 1000 MCG/ML injection INJECT 1ML INTO THE SKIN ONCE MONTHLY Yes Jim Levin DO   MONOJECT MAGELLAN SYRINGE 25G X \" 3 ML MISC USE AS DIRECTED FOR INJECTION Yes Jim Levin DO   urea (CARMOL) 10 % cream APPLY TO AFFECTED AREA(S) TOPICALLY AS NEEDED Yes Jim Levin, DO palpation in paranasal area bilaterally. Neck: Normal range of motion. Cardiovascular: Normal rate and regular rhythm. No murmur heard. Pulmonary/Chest: Effort normal and breath sounds normal. No respiratory distress. She has no rales. Abdominal: Soft. There is no tenderness. Musculoskeletal: She exhibits no edema or tenderness. Neurological: She is alert. ASSESSMENT/PLAN:  1. Conjunctivitis of both eyes, unspecified conjunctivitis type Allergic vs Bacterial  -persistent discharge every morning. Now associated with pain, subjective fevers.   -No relief with visine, no discharge on exam however Pt reports severe discharge every morning. .  -Start on Ciprofloxacin Drops QID x5 days. -If symptoms do not improve, Azelastine drops BID for 30 days. -FU in resident clinic as needed. An  electronic signature was used to authenticate this note.     --Lissette Caal MD on 8/30/2019 at 3:01 PM

## 2019-10-24 ENCOUNTER — TELEPHONE (OUTPATIENT)
Dept: ENT CLINIC | Age: 44
End: 2019-10-24

## 2019-10-24 ENCOUNTER — HOSPITAL ENCOUNTER (EMERGENCY)
Age: 44
Discharge: HOME | End: 2019-10-24
Payer: COMMERCIAL

## 2019-10-24 VITALS
OXYGEN SATURATION: 100 % | TEMPERATURE: 98.3 F | HEIGHT: 63 IN | RESPIRATION RATE: 16 BRPM | WEIGHT: 211.86 LBS | SYSTOLIC BLOOD PRESSURE: 136 MMHG | BODY MASS INDEX: 37.54 KG/M2 | HEART RATE: 74 BPM | DIASTOLIC BLOOD PRESSURE: 93 MMHG

## 2019-10-24 DIAGNOSIS — J45.901 MILD ASTHMA WITH EXACERBATION, UNSPECIFIED WHETHER PERSISTENT: ICD-10-CM

## 2019-10-24 DIAGNOSIS — J01.90 ACUTE SINUSITIS, RECURRENCE NOT SPECIFIED, UNSPECIFIED LOCATION: Primary | ICD-10-CM

## 2019-10-24 PROCEDURE — 94640 AIRWAY INHALATION TREATMENT: CPT

## 2019-10-24 PROCEDURE — 99284 EMERGENCY DEPT VISIT MOD MDM: CPT

## 2019-10-24 PROCEDURE — 6370000000 HC RX 637 (ALT 250 FOR IP): Performed by: PHYSICIAN ASSISTANT

## 2019-10-24 PROCEDURE — 6360000002 HC RX W HCPCS: Performed by: PHYSICIAN ASSISTANT

## 2019-10-24 RX ORDER — ALBUTEROL SULFATE 90 UG/1
AEROSOL, METERED RESPIRATORY (INHALATION)
Qty: 1 INHALER | Refills: 0 | Status: SHIPPED | OUTPATIENT
Start: 2019-10-24 | End: 2020-03-05

## 2019-10-24 RX ORDER — ALBUTEROL SULFATE 2.5 MG/3ML
5 SOLUTION RESPIRATORY (INHALATION) ONCE
Status: COMPLETED | OUTPATIENT
Start: 2019-10-24 | End: 2019-10-24

## 2019-10-24 RX ORDER — PREDNISONE 20 MG/1
40 TABLET ORAL DAILY
Qty: 10 TABLET | Refills: 0 | Status: SHIPPED | OUTPATIENT
Start: 2019-10-24 | End: 2019-10-29

## 2019-10-24 RX ORDER — PREDNISONE 20 MG/1
60 TABLET ORAL ONCE
Status: COMPLETED | OUTPATIENT
Start: 2019-10-24 | End: 2019-10-24

## 2019-10-24 RX ORDER — AMOXICILLIN AND CLAVULANATE POTASSIUM 875; 125 MG/1; MG/1
1 TABLET, FILM COATED ORAL 2 TIMES DAILY
Qty: 20 TABLET | Refills: 0 | Status: SHIPPED | OUTPATIENT
Start: 2019-10-24 | End: 2019-11-03

## 2019-10-24 RX ORDER — IPRATROPIUM BROMIDE AND ALBUTEROL SULFATE 2.5; .5 MG/3ML; MG/3ML
1 SOLUTION RESPIRATORY (INHALATION) ONCE
Status: COMPLETED | OUTPATIENT
Start: 2019-10-24 | End: 2019-10-24

## 2019-10-24 RX ORDER — AMOXICILLIN AND CLAVULANATE POTASSIUM 875; 125 MG/1; MG/1
1 TABLET, FILM COATED ORAL ONCE
Status: COMPLETED | OUTPATIENT
Start: 2019-10-24 | End: 2019-10-24

## 2019-10-24 RX ORDER — GUAIFENESIN AND CODEINE PHOSPHATE 100; 10 MG/5ML; MG/5ML
5 SOLUTION ORAL 3 TIMES DAILY PRN
Qty: 60 ML | Refills: 0 | Status: SHIPPED | OUTPATIENT
Start: 2019-10-24 | End: 2019-10-29

## 2019-10-24 RX ADMIN — ALBUTEROL SULFATE 5 MG: 2.5 SOLUTION RESPIRATORY (INHALATION) at 16:15

## 2019-10-24 RX ADMIN — AMOXICILLIN AND CLAVULANATE POTASSIUM 1 TABLET: 875; 125 TABLET, FILM COATED ORAL at 15:58

## 2019-10-24 RX ADMIN — IPRATROPIUM BROMIDE AND ALBUTEROL SULFATE 1 AMPULE: .5; 3 SOLUTION RESPIRATORY (INHALATION) at 16:15

## 2019-10-24 RX ADMIN — PREDNISONE 60 MG: 20 TABLET ORAL at 15:58

## 2019-10-24 ASSESSMENT — PAIN - FUNCTIONAL ASSESSMENT: PAIN_FUNCTIONAL_ASSESSMENT: 0-10

## 2019-10-24 ASSESSMENT — PAIN DESCRIPTION - DESCRIPTORS
DESCRIPTORS: ACHING
DESCRIPTORS: ACHING

## 2019-10-24 ASSESSMENT — PAIN DESCRIPTION - LOCATION
LOCATION: HEAD;THROAT
LOCATION: HEAD;THROAT

## 2019-10-24 ASSESSMENT — PAIN SCALES - GENERAL
PAINLEVEL_OUTOF10: 8
PAINLEVEL_OUTOF10: 8

## 2019-10-24 ASSESSMENT — PAIN DESCRIPTION - FREQUENCY
FREQUENCY: CONTINUOUS
FREQUENCY: CONTINUOUS

## 2019-10-24 ASSESSMENT — PAIN DESCRIPTION - PROGRESSION
CLINICAL_PROGRESSION: NOT CHANGED
CLINICAL_PROGRESSION: NOT CHANGED

## 2019-10-24 ASSESSMENT — PAIN DESCRIPTION - PAIN TYPE
TYPE: ACUTE PAIN
TYPE: ACUTE PAIN

## 2019-10-24 ASSESSMENT — PAIN DESCRIPTION - ONSET
ONSET: ON-GOING
ONSET: ON-GOING

## 2020-01-08 RX ORDER — LIOTHYRONINE SODIUM 5 UG/1
TABLET ORAL
Qty: 30 TABLET | Refills: 0 | Status: SHIPPED | OUTPATIENT
Start: 2020-01-08 | End: 2020-04-28

## 2020-01-30 ENCOUNTER — APPOINTMENT (OUTPATIENT)
Dept: GENERAL RADIOLOGY | Age: 45
End: 2020-01-30
Payer: COMMERCIAL

## 2020-01-30 ENCOUNTER — HOSPITAL ENCOUNTER (EMERGENCY)
Age: 45
Discharge: HOME | End: 2020-01-30
Attending: EMERGENCY MEDICINE
Payer: COMMERCIAL

## 2020-01-30 VITALS
HEART RATE: 93 BPM | WEIGHT: 215 LBS | HEIGHT: 63 IN | TEMPERATURE: 98.2 F | OXYGEN SATURATION: 100 % | SYSTOLIC BLOOD PRESSURE: 137 MMHG | DIASTOLIC BLOOD PRESSURE: 76 MMHG | BODY MASS INDEX: 38.09 KG/M2 | RESPIRATION RATE: 17 BRPM

## 2020-01-30 LAB
ANION GAP SERPL CALCULATED.3IONS-SCNC: 14 MMOL/L (ref 3–16)
BASE EXCESS VENOUS: -3.3 MMOL/L (ref -2–3)
BASOPHILS ABSOLUTE: 0 K/UL (ref 0–0.2)
BASOPHILS RELATIVE PERCENT: 0.4 %
BUN BLDV-MCNC: 17 MG/DL (ref 7–20)
CALCIUM SERPL-MCNC: 9.1 MG/DL (ref 8.3–10.6)
CARBOXYHEMOGLOBIN: 2 % (ref 0–1.5)
CHLORIDE BLD-SCNC: 102 MMOL/L (ref 99–110)
CO2: 18 MMOL/L (ref 21–32)
CREAT SERPL-MCNC: 0.7 MG/DL (ref 0.6–1.1)
D DIMER: <200 NG/ML DDU (ref 0–229)
EKG ATRIAL RATE: 94 BPM
EKG DIAGNOSIS: NORMAL
EKG P AXIS: 29 DEGREES
EKG P-R INTERVAL: 156 MS
EKG Q-T INTERVAL: 336 MS
EKG QRS DURATION: 76 MS
EKG QTC CALCULATION (BAZETT): 420 MS
EKG R AXIS: -18 DEGREES
EKG T AXIS: 106 DEGREES
EKG VENTRICULAR RATE: 94 BPM
EOSINOPHILS ABSOLUTE: 0.1 K/UL (ref 0–0.6)
EOSINOPHILS RELATIVE PERCENT: 1.5 %
GFR AFRICAN AMERICAN: >60
GFR NON-AFRICAN AMERICAN: >60
GLUCOSE BLD-MCNC: 102 MG/DL (ref 70–99)
HCO3 VENOUS: 20.3 MMOL/L (ref 24–28)
HCT VFR BLD CALC: 33.2 % (ref 36–48)
HEMOGLOBIN, VEN, REDUCED: 21.9 %
HEMOGLOBIN: 10.7 G/DL (ref 12–16)
LYMPHOCYTES ABSOLUTE: 1.1 K/UL (ref 1–5.1)
LYMPHOCYTES RELATIVE PERCENT: 14.1 %
MCH RBC QN AUTO: 26.9 PG (ref 26–34)
MCHC RBC AUTO-ENTMCNC: 32.2 G/DL (ref 31–36)
MCV RBC AUTO: 83.5 FL (ref 80–100)
METHEMOGLOBIN VENOUS: 0.6 % (ref 0–1.5)
MONOCYTES ABSOLUTE: 0.6 K/UL (ref 0–1.3)
MONOCYTES RELATIVE PERCENT: 7.6 %
NEUTROPHILS ABSOLUTE: 6.1 K/UL (ref 1.7–7.7)
NEUTROPHILS RELATIVE PERCENT: 76.4 %
O2 SAT, VEN: 78 %
PCO2, VEN: 33.5 MMHG (ref 41–51)
PDW BLD-RTO: 16.2 % (ref 12.4–15.4)
PH VENOUS: 7.4 (ref 7.35–7.45)
PLATELET # BLD: 116 K/UL (ref 135–450)
PMV BLD AUTO: 9.7 FL (ref 5–10.5)
PO2, VEN: 42.5 MMHG (ref 25–40)
POTASSIUM REFLEX MAGNESIUM: 4.1 MMOL/L (ref 3.5–5.1)
RAPID INFLUENZA  B AGN: NEGATIVE
RAPID INFLUENZA A AGN: NEGATIVE
RBC # BLD: 3.98 M/UL (ref 4–5.2)
SODIUM BLD-SCNC: 134 MMOL/L (ref 136–145)
TCO2 CALC VENOUS: 21 MMOL/L
TROPONIN: 0.02 NG/ML
TROPONIN: <0.01 NG/ML
WBC # BLD: 8 K/UL (ref 4–11)

## 2020-01-30 PROCEDURE — 96374 THER/PROPH/DIAG INJ IV PUSH: CPT

## 2020-01-30 PROCEDURE — 85379 FIBRIN DEGRADATION QUANT: CPT

## 2020-01-30 PROCEDURE — 87804 INFLUENZA ASSAY W/OPTIC: CPT

## 2020-01-30 PROCEDURE — 94640 AIRWAY INHALATION TREATMENT: CPT

## 2020-01-30 PROCEDURE — 84484 ASSAY OF TROPONIN QUANT: CPT

## 2020-01-30 PROCEDURE — 71046 X-RAY EXAM CHEST 2 VIEWS: CPT

## 2020-01-30 PROCEDURE — 93005 ELECTROCARDIOGRAM TRACING: CPT | Performed by: EMERGENCY MEDICINE

## 2020-01-30 PROCEDURE — 80048 BASIC METABOLIC PNL TOTAL CA: CPT

## 2020-01-30 PROCEDURE — 6370000000 HC RX 637 (ALT 250 FOR IP): Performed by: EMERGENCY MEDICINE

## 2020-01-30 PROCEDURE — 99285 EMERGENCY DEPT VISIT HI MDM: CPT

## 2020-01-30 PROCEDURE — 85025 COMPLETE CBC W/AUTO DIFF WBC: CPT

## 2020-01-30 PROCEDURE — 36415 COLL VENOUS BLD VENIPUNCTURE: CPT

## 2020-01-30 PROCEDURE — 82803 BLOOD GASES ANY COMBINATION: CPT

## 2020-01-30 PROCEDURE — 94761 N-INVAS EAR/PLS OXIMETRY MLT: CPT

## 2020-01-30 PROCEDURE — 6360000002 HC RX W HCPCS: Performed by: EMERGENCY MEDICINE

## 2020-01-30 RX ORDER — PREDNISONE 20 MG/1
40 TABLET ORAL DAILY
Qty: 10 TABLET | Refills: 0 | Status: SHIPPED | OUTPATIENT
Start: 2020-01-30 | End: 2020-02-04

## 2020-01-30 RX ORDER — IPRATROPIUM BROMIDE AND ALBUTEROL SULFATE 2.5; .5 MG/3ML; MG/3ML
1 SOLUTION RESPIRATORY (INHALATION)
Status: DISCONTINUED | OUTPATIENT
Start: 2020-01-30 | End: 2020-01-30 | Stop reason: HOSPADM

## 2020-01-30 RX ORDER — ASPIRIN 325 MG
325 TABLET ORAL ONCE
Status: COMPLETED | OUTPATIENT
Start: 2020-01-30 | End: 2020-01-30

## 2020-01-30 RX ORDER — CODEINE PHOSPHATE AND GUAIFENESIN 10; 100 MG/5ML; MG/5ML
5 SOLUTION ORAL 3 TIMES DAILY PRN
Qty: 45 ML | Refills: 0 | Status: SHIPPED | OUTPATIENT
Start: 2020-01-30 | End: 2020-02-05 | Stop reason: SDUPTHER

## 2020-01-30 RX ORDER — AMOXICILLIN AND CLAVULANATE POTASSIUM 875; 125 MG/1; MG/1
1 TABLET, FILM COATED ORAL 2 TIMES DAILY
Qty: 14 TABLET | Refills: 0 | Status: SHIPPED | OUTPATIENT
Start: 2020-01-30 | End: 2020-02-05 | Stop reason: SDUPTHER

## 2020-01-30 RX ORDER — METHYLPREDNISOLONE SODIUM SUCCINATE 125 MG/2ML
125 INJECTION, POWDER, LYOPHILIZED, FOR SOLUTION INTRAMUSCULAR; INTRAVENOUS ONCE
Status: COMPLETED | OUTPATIENT
Start: 2020-01-30 | End: 2020-01-30

## 2020-01-30 RX ADMIN — IPRATROPIUM BROMIDE AND ALBUTEROL SULFATE 1 AMPULE: .5; 3 SOLUTION RESPIRATORY (INHALATION) at 11:10

## 2020-01-30 RX ADMIN — IPRATROPIUM BROMIDE AND ALBUTEROL SULFATE 1 AMPULE: .5; 3 SOLUTION RESPIRATORY (INHALATION) at 08:44

## 2020-01-30 RX ADMIN — ASPIRIN 325 MG ORAL TABLET 325 MG: 325 PILL ORAL at 10:20

## 2020-01-30 RX ADMIN — METHYLPREDNISOLONE SODIUM SUCCINATE 125 MG: 125 INJECTION, POWDER, FOR SOLUTION INTRAMUSCULAR; INTRAVENOUS at 08:55

## 2020-01-30 ASSESSMENT — ENCOUNTER SYMPTOMS
WHEEZING: 1
COUGH: 1
SHORTNESS OF BREATH: 1

## 2020-01-30 ASSESSMENT — PAIN DESCRIPTION - LOCATION: LOCATION: THROAT

## 2020-01-30 ASSESSMENT — PAIN DESCRIPTION - PAIN TYPE: TYPE: ACUTE PAIN

## 2020-01-30 ASSESSMENT — PAIN SCALES - GENERAL: PAINLEVEL_OUTOF10: 8

## 2020-01-30 NOTE — FLOWSHEET NOTE
01/30/20 1111   Encounter Summary   Services provided to: Patient   Referral/Consult From: Álvaro   Continue Visiting   (Seen 1/30/19, NETTA. )   Complexity of Encounter Moderate   Length of Encounter 15 minutes   Routine   Type Initial   Assessment Approachable   Intervention Nurtured hope   Outcome Expressed gratitude        01/30/20 1111   Encounter Summary   Services provided to: Patient   Referral/Consult From: Álvaro   Continue Visiting   (Seen 1/30/19, NETTA. )   Complexity of Encounter Moderate   Length of Encounter 15 minutes   Routine   Type Initial   Assessment Approachable   Intervention Nurtured hope   Outcome Expressed gratitude

## 2020-01-30 NOTE — FLOWSHEET NOTE
01/30/20 1111   Encounter Summary   Services provided to: Patient   Referral/Consult From: Rounding   Continue Visiting   (Seen 1/30/19, NETTA. )   Complexity of Encounter Moderate   Length of Encounter 15 minutes   Routine   Type Initial   Assessment Approachable   Intervention Nurtured hope   Outcome Expressed gratitude   Spiritual/Scientologist   Type Spiritual support   Assessment Approachable   Intervention Active listening;Nurtured hope;Prayer   Outcome Expressed gratitude

## 2020-01-30 NOTE — ED PROVIDER NOTES
Date of evaluation: 1/30/2020    Chief Complaint   Shortness of Breath      Nursing Notes, Past Medical Hx, Past Surgical Hx, Social Hx, Allergies, and Family Hx were reviewed. History of Present Illness     Harvinder Portillo is a 53 y.o. female who presents coughing and wheezing. She states it started as a sinus infection last Friday. She was using nasal irrigation and then over the last day is when she started having wheezing and asthma attacks. She is never been intubated in the past for asthma. No history of PE DVT. She had low-grade fever as well yesterday. No myalgias no hemoptysis    Review of Systems     Review of Systems   Constitutional: Positive for fever. Negative for activity change and appetite change. Respiratory: Positive for cough, shortness of breath and wheezing. Cardiovascular: Negative for chest pain. Endocrine: Negative. Genitourinary: Negative. Neurological: Negative. Hematological: Negative. Psychiatric/Behavioral: Negative. All other systems reviewed and are negative. Past Medical, Surgical, Family, and Social History     She has a past medical history of Anemia, iron deficiency, Asthma, Autoimmune hepatitis ([address] Fissure, anal, History of blood transfusion, Hypothyroidism, Menorrhagia with regular cycle, Morbid obesity with BMI of 40.0-44.9, adult ([address] MRSA (methicillin resistant staph aureus) culture positive, MRSA infection, Pericarditis, acute, and Sinusitis. She has a past surgical history that includes Tonsillectomy and Adenoidectomy (2005); Rectal surgery (Aug 2011); Tonsillectomy (2004); other surgical history (8/22/2012); and sinus surgery. Her family history includes Diabetes in her maternal aunt; Heart Disease in her father; High Blood Pressure in her mother. She reports that she has never smoked. She has never used smokeless tobacco. She reports current alcohol use. She reports that she does not use drugs.     Medications     Previous Medications    ALBUTEROL (PROVENTIL) (5 MG/ML) 0.5% NEBULIZER SOLUTION    Take 1 mL by nebulization 4 times daily as needed for Wheezing    ALBUTEROL SULFATE HFA (PROVENTIL HFA) 108 (90 BASE) MCG/ACT INHALER    Inhale 2 puffs every 4 hours while awake for 7-10 days, then every 6 hours as needed for wheezing. Dispense with SPACER and instruct on use. May substitute Ventolin or Proventil as needed per insurance. AZELASTINE (OPTIVAR) 0.05 % OPHTHALMIC SOLUTION    INSTILL 1 DROP INTO EACH EYE TWO TIMES A DAY    BETASEPT SURGICAL SCRUB 4 % EXTERNAL LIQUID    APPLY TO AFFECTED AREA(S) TOPICALLY AS NEEDED    CALCIUM-MAGNESIUM-VITAMIN D 300-150-400 MG-MG-UNIT TABS    Take by mouth    CETIRIZINE-PSUEDOEPHEDRINE (ZYRTEC-D) 5-120 MG PER EXTENDED RELEASE TABLET    TAKE 1 TABLET BY MOUTH 2 TIMES A DAY    CYANOCOBALAMIN 1000 MCG/ML INJECTION    INJECT 1ML INTO THE SKIN ONCE MONTHLY    FLUTICASONE (FLONASE) 50 MCG/ACT NASAL SPRAY    1 spray by Nasal route daily    FLUTICASONE FUROATE-VILANTEROL 200-25 MCG/INH AEPB    Inhale 1 puff into the lungs daily    FOLIC ACID (FOLVITE) 1 MG TABLET    Take 1 tablet by mouth daily    HANDICAP PLACARD MISC    by Does not apply route Duration 2 weeks    IBUPROFEN (ADVIL;MOTRIN) 800 MG TABLET    Take 1 tablet by mouth every 8 hours as needed for Pain    LEVOTHYROXINE (SYNTHROID) 125 MCG TABLET    Take 1 tablet by mouth daily    LIOTHYRONINE (CYTOMEL) 5 MCG TABLET    TAKE 1 TABLET BY MOUTH ONE TIME A DAY    MISC NATURAL PRODUCT NASAL (PONARIS) SOLN    1 spray by Each Nare route every 6 hours as needed (as need)    MISC. DEVICES (HIBICLENS HAND PUMP 32OZ) MISC    Use as needed for skin irritation or bumps. MOMETASONE (ELOCON) 0.1 % CREAM    Apply topically daily.     MONOJECT MAGELLAN SYRINGE 25G X 5/8\" 3 ML MISC    USE AS DIRECTED FOR INJECTION    MONTELUKAST (SINGULAIR) 10 MG TABLET    TAKE ONE TABLET BY MOUTH EVERY EVENING    MULTIPLE VITAMINS-MINERALS (THERAPEUTIC MULTIVITAMIN-MINERALS) Musculoskeletal: Normal range of motion. Skin:     General: Skin is warm. Capillary Refill: Capillary refill takes less than 2 seconds. Neurological:      General: No focal deficit present. Mental Status: She is alert. Diagnostic Results     EKG   Normal sinus rhythm LVH nonspecific ST-T wave abnormalities. Compared to previous EKG there was lateral T wave inversion and V3 on her previous EKG as well. RADIOLOGY:  XR CHEST STANDARD (2 VW)   Final Result      No lobar consolidation            LABS:   Labs Reviewed   CBC WITH AUTO DIFFERENTIAL - Abnormal; Notable for the following components:       Result Value    RBC 3.98 (*)     Hemoglobin 10.7 (*)     Hematocrit 33.2 (*)     RDW 16.2 (*)     Platelets 041 (*)     All other components within normal limits    Narrative:     Performed at: [facility] [place] [address] [address]   Phone [phone]   BASIC METABOLIC PANEL W/ REFLEX TO MG FOR LOW K - Abnormal; Notable for the following components:    Sodium 134 (*)     CO2 18 (*)     Glucose 102 (*)     All other components within normal limits    Narrative:     Performed at: [facility] [place] [address] [address]   Phone [phone]   BLOOD GAS, VENOUS - Abnormal; Notable for the following components:    pCO2, Mauricio 33.5 (*)     pO2, Mauricio 42.5 (*)     HCO3, Venous 20.3 (*)     Base Excess, Mauricio -3.3 (*)     Carboxyhemoglobin 2.0 (*)     All other components within normal limits    Narrative:     Performed at: [facility] [place] [address] [address]   Phone [phone]   TROPONIN - Abnormal; Notable for the following components:    Troponin 0.02 (*)     All other components within normal limits    Narrative:     Performed at:   [facility] [place] [address] [address] Phone [phone]   RAPID INFLUENZA A/B ANTIGENS    Narrative:     Performed at: [facility]  [address]   Phone [phone]   TROPONIN    Narrative:     Performed at: [facility]  [address] [place] [address] [address]   Phone [phone]   D-DIMER, QUANTITATIVE    Narrative:     Performed at: [facility]  [address] [place] [address] [address]   Phone [phone]       BEDSIDE ULTRASOUND:      VITALS:  BP: 134/80, Temp: 98.2 °F (36.8 °C), Pulse: 96, Resp: 17     Procedures         ED Course     The patient was given the followingmedications:  Orders Placed This Encounter   Medications    methylPREDNISolone sodium (SOLU-MEDROL) injection 125 mg    ipratropium-albuterol (DUONEB) nebulizer solution 1 ampule    aspirin tablet 325 mg    predniSONE (DELTASONE) 20 MG tablet     Sig: Take 2 tablets by mouth daily for 5 days     Dispense:  10 tablet     Refill:  0    amoxicillin-clavulanate (AUGMENTIN) 875-125 MG per tablet     Sig: Take 1 tablet by mouth 2 times daily for 7 days     Dispense:  14 tablet     Refill:  0    guaiFENesin-codeine (GUAIFENESIN AC) 100-10 MG/5ML liquid     Sig: Take 5 mLs by mouth 3 times daily as needed for Cough for up to 3 days. Dispense:  45 mL     Refill:  0            CONSULTS:  None    MEDICAL DECISION MAKING     Carmina Upton is a 53 y.o. female   49-year-old female with a history of asthma who complains of sinus infection and nasal drip. Also with wheezing. She denies any chest pain nausea vomiting or diaphoresis. I do not feel this is ACS. She had an EKG which showed nonspecific ST changes V3 through V6 which are not previous was on her lateral leads. This may be lead placement troponin was 0.02 repeated less than 0.01.   She is emphatic that feels like her asthma which started as a sinus infection last Friday. Her heart score is 2. Clinically I feel this is more respiratory related with her sinusitis and asthma she is awake alert feels much improved with breathing treatments and steroids. I do feel we should cover her with antibiotics and she states antibiotics when she gets sinus infection which leads to her asthma is the only thing that helps. We will place her on Augmentin prednisone 40 mg each day for 5-day burst and 1 FS and codeine cough syrup for 3 days. She is to follow-up with the medical clinic return for any worsening symptoms of note, a d-dimer was performed which was negative as well and she is low risk for pulmonary emboli. Clinical Impression     1. Mild asthma with exacerbation, unspecified whether persistent    2. Moderate persistent asthma with exacerbation        /Plan     PATIENT REFERRED TO:  Ivan Andrade, DO  [address] [place] [id number]  [phone]    Schedule an appointment as soon as possible for a visit         DISCHARGE MEDICATIONS:  New Prescriptions    AMOXICILLIN-CLAVULANATE (AUGMENTIN) 875-125 MG PER TABLET    Take 1 tablet by mouth 2 times daily for 7 days    GUAIFENESIN-CODEINE (GUAIFENESIN AC) 100-10 MG/5ML LIQUID    Take 5 mLs by mouth 3 times daily as needed for Cough for up to 3 days.     PREDNISONE (DELTASONE) 20 MG TABLET    Take 2 tablets by mouth daily for 5 days       DISPOSITION Decision To Discharge 01/30/2020 11:22:19 AM      Dee Pugh MD  01/30/20 9477

## 2020-01-30 NOTE — ED TRIAGE NOTES
Pt here with c/o SOB, states sinus issues started last Fri and the wheezing started 2 days ago, getting  worse

## 2020-02-05 ENCOUNTER — OFFICE VISIT (OUTPATIENT)
Dept: INTERNAL MEDICINE CLINIC | Age: 45
End: 2020-02-05
Payer: COMMERCIAL

## 2020-02-05 VITALS
HEIGHT: 63 IN | BODY MASS INDEX: 39.57 KG/M2 | SYSTOLIC BLOOD PRESSURE: 126 MMHG | DIASTOLIC BLOOD PRESSURE: 93 MMHG | OXYGEN SATURATION: 99 % | RESPIRATION RATE: 20 BRPM | TEMPERATURE: 97.8 F | WEIGHT: 223.3 LBS | HEART RATE: 88 BPM

## 2020-02-05 PROCEDURE — 99213 OFFICE O/P EST LOW 20 MIN: CPT | Performed by: STUDENT IN AN ORGANIZED HEALTH CARE EDUCATION/TRAINING PROGRAM

## 2020-02-05 RX ORDER — AMOXICILLIN AND CLAVULANATE POTASSIUM 875; 125 MG/1; MG/1
1 TABLET, FILM COATED ORAL 2 TIMES DAILY
Qty: 14 TABLET | Refills: 0 | Status: SHIPPED | OUTPATIENT
Start: 2020-02-05 | End: 2020-02-12

## 2020-02-05 RX ORDER — CODEINE PHOSPHATE AND GUAIFENESIN 10; 100 MG/5ML; MG/5ML
5 SOLUTION ORAL 4 TIMES DAILY PRN
Qty: 120 ML | Refills: 0 | Status: SHIPPED | OUTPATIENT
Start: 2020-02-05 | End: 2020-02-12

## 2020-03-05 ENCOUNTER — OFFICE VISIT (OUTPATIENT)
Dept: ENT CLINIC | Age: 45
End: 2020-03-05
Payer: COMMERCIAL

## 2020-03-05 VITALS
DIASTOLIC BLOOD PRESSURE: 82 MMHG | HEIGHT: 63 IN | TEMPERATURE: 97.3 F | HEART RATE: 91 BPM | SYSTOLIC BLOOD PRESSURE: 114 MMHG | WEIGHT: 216.8 LBS | BODY MASS INDEX: 38.41 KG/M2

## 2020-03-05 PROCEDURE — G8417 CALC BMI ABV UP PARAM F/U: HCPCS | Performed by: OTOLARYNGOLOGY

## 2020-03-05 PROCEDURE — G8427 DOCREV CUR MEDS BY ELIG CLIN: HCPCS | Performed by: OTOLARYNGOLOGY

## 2020-03-05 PROCEDURE — 1036F TOBACCO NON-USER: CPT | Performed by: OTOLARYNGOLOGY

## 2020-03-05 PROCEDURE — 99214 OFFICE O/P EST MOD 30 MIN: CPT | Performed by: OTOLARYNGOLOGY

## 2020-03-05 PROCEDURE — G8484 FLU IMMUNIZE NO ADMIN: HCPCS | Performed by: OTOLARYNGOLOGY

## 2020-03-05 PROCEDURE — 31231 NASAL ENDOSCOPY DX: CPT | Performed by: OTOLARYNGOLOGY

## 2020-03-05 RX ORDER — CLARITHROMYCIN 500 MG/1
500 TABLET, COATED ORAL 2 TIMES DAILY
Qty: 28 TABLET | Refills: 0 | Status: SHIPPED | OUTPATIENT
Start: 2020-03-05 | End: 2020-03-19

## 2020-03-05 RX ORDER — PREDNISONE 10 MG/1
TABLET ORAL
Qty: 38 TABLET | Refills: 0 | Status: SHIPPED | OUTPATIENT
Start: 2020-03-05 | End: 2020-06-25 | Stop reason: ALTCHOICE

## 2020-03-05 ASSESSMENT — ENCOUNTER SYMPTOMS
EYE DISCHARGE: 0
WHEEZING: 0
VOMITING: 0
CONSTIPATION: 0
SINUS PRESSURE: 1
FACIAL SWELLING: 0
SINUS PAIN: 1
SHORTNESS OF BREATH: 0
VOICE CHANGE: 0
TROUBLE SWALLOWING: 0
APNEA: 0
RHINORRHEA: 0
STRIDOR: 0
CHOKING: 0
DIARRHEA: 0
BLOOD IN STOOL: 0
NAUSEA: 0
COUGH: 0
BACK PAIN: 0
SORE THROAT: 0
CHEST TIGHTNESS: 0
COLOR CHANGE: 0
EYE PAIN: 0

## 2020-03-05 NOTE — PROGRESS NOTES
Subjective:      Patient ID: Cindy Carpenter is a 53 y.o. female. HPI  Chief Complaint   Patient presents with    Sinus infection  History of Present Illness:Kenroy is a(n) 53 y.o. female who presents with a 2 week history of nasal congestion, pressure, frontal pain and blood when blowing nose. On antibiotics and prednsione without relief.    Nasal Discharge: purulent and bloody  Nasal Obstruction: Yes bilateral; intermittent  Post Nasal Drainage: Yes  Nasal Bleeding: Yes  Sense of Smell: decreased  Eye Symptoms: none  Previous Treatments: as above/ Prior sinus surgery     Patient Active Problem List   Diagnosis    Anemia    Hypothyroidism    Chronic sinusitis    Asthma in adult    Congestion    Chronic rhinosinusitis    Fracture of finger, middle or proximal phalanx    Iron deficiency anemia    Positive WESTON (antinuclear antibody)    Eczema    Anal fissure    Intestinal malabsorption    Morbid obesity with BMI of 40.0-44.9, adult (HCC)    Recurrent acute sinusitis    Chest pain on breathing    Chronic blood loss anemia    History of cardiovascular disorder    Right upper quadrant abdominal pain    Callus of foot    Thrombocytopenia (HCC)    Onychomycosis    Treatment    Abdominal wall cellulitis    MRSA infection    Abdominal wall abscess    Chronic ITP (idiopathic thrombocytopenia) (HCC)    Hyperlipidemia    Other headache syndrome    Low folate    Autoimmune hepatitis (HCC)    Anxiety    Status post incision and drainage    Chronic pansinusitis    Moderate asthma with exacerbation    Acute severe exacerbation of asthma    Need for prophylactic vaccination against viral hepatitis     Past Surgical History:   Procedure Laterality Date    OTHER SURGICAL HISTORY  8/22/2012    INCISION AND DRAINAGE POSTERIOR THIGH ABSCESS    RECTAL SURGERY  Aug 2011    repair of rectal fissures and anal skin tag removal    SINUS SURGERY      X 3    TONSILLECTOMY  2004    TONSILLECTOMY AND ADENOIDECTOMY  2005    (partial adenoidectomy)     Family History   Problem Relation Age of Onset    High Blood Pressure Mother     Heart Disease Father     Diabetes Maternal Aunt      Social History     Socioeconomic History    Marital status: Single     Spouse name: Not on file    Number of children: Not on file    Years of education: Not on file    Highest education level: Not on file   Occupational History    Occupation: NA   Social Needs    Financial resource strain: Not on file    Food insecurity:     Worry: Not on file     Inability: Not on file    Transportation needs:     Medical: Not on file     Non-medical: Not on file   Tobacco Use    Smoking status: Never Smoker    Smokeless tobacco: Never Used   Substance and Sexual Activity    Alcohol use: Yes     Alcohol/week: 0.0 standard drinks     Comment: 2 drinks/week     Drug use: No    Sexual activity: Not on file   Lifestyle    Physical activity:     Days per week: Not on file     Minutes per session: Not on file    Stress: Not on file   Relationships    Social connections:     Talks on phone: Not on file     Gets together: Not on file     Attends Orthodoxy service: Not on file     Active member of club or organization: Not on file     Attends meetings of clubs or organizations: Not on file     Relationship status: Not on file    Intimate partner violence:     Fear of current or ex partner: Not on file     Emotionally abused: Not on file     Physically abused: Not on file     Forced sexual activity: Not on file   Other Topics Concern    Not on file   Social History Narrative    Not on file       DRUG/FOOD ALLERGIES: Latex; Clindamycin/lincomycin; Sulfa antibiotics; Diflucan [fluconazole]; and Other    CURRENT MEDICATIONS  Prior to Admission medications    Medication Sig Start Date End Date Taking?  Authorizing Provider   clarithromycin (BIAXIN) 500 MG tablet Take 1 tablet by mouth 2 times daily for 14 days 3/5/20 3/19/20 Yes Modesto Nova MD Janice   predniSONE (DELTASONE) 10 MG tablet 4 tabs a day for 5 days, 3 tabs a day for 3 days, 2 tabs a day for 3 days,1 tab a day for 3 days 3/5/20  Yes Yvette Desouza MD   liothyronine (CYTOMEL) 5 MCG tablet TAKE 1 TABLET BY MOUTH ONE TIME A DAY 1/8/20  Yes MAYELA Chaney CNP   pantoprazole (PROTONIX) 40 MG tablet TAKE 1 TABLET BY MOUTH ONE TIME A DAY 1/7/20  Yes Alta Petersen MD   montelukast (SINGULAIR) 10 MG tablet TAKE ONE TABLET BY MOUTH EVERY EVENING 1/7/20  Yes Alta Petersen MD   urea (CARMOL) 10 % cream APPLY TO AFFECTED AREA(S) TOPICALLY AS NEEDED 1/7/20  Yes Alta Petersen MD   cyanocobalamin 1000 MCG/ML injection INJECT 1ML INTO THE SKIN ONCE MONTHLY 1/7/20  Yes Alta Petersen MD   BETASEPT SURGICAL SCRUB 4 % external liquid APPLY TO AFFECTED AREA(S) TOPICALLY AS NEEDED 1/7/20  Yes Alta Petersen MD   mupirocin (BACTROBAN) 2 % ointment APPLY TO AFFECTED AREA(S) TOPICALLY AS NEEDED 1/7/20  Yes Alta Petersen MD   MONOJECT MAGELLAN SYRINGE 25G X 5/8\" 3 ML MISC USE AS DIRECTED FOR INJECTION 10/23/19  Yes Chandana Her MD   VENTOLIN  (90 Base) MCG/ACT inhaler INHALE 2 PUFFS INTO THE LUNGS EVERY 6 HOURS AS NEEDED FOR WHEEZING 10/23/19  Yes Chandana Her MD   cetirizine-psuedoephedrine (ZYRTEC-D) 5-120 MG per extended release tablet TAKE 1 TABLET BY MOUTH 2 TIMES A DAY 10/23/19  Yes Chandana Her MD   Probiotic Product (RA PROBIOTIC MAX STRENGTH) CAPS Take 1 capsule by mouth 2 times daily 7/2/19  Yes Jory Farfan DO   folic acid (FOLVITE) 1 MG tablet Take 1 tablet by mouth daily 3/21/19  Yes Riddhi Parra MD   Fluticasone Furoate-Vilanterol 200-25 MCG/INH AEPB Inhale 1 puff into the lungs daily 3/21/19  Yes Riddhi Parra MD   Misc. Devices (HIBICLENS HAND PUMP 32OZ) MISC Use as needed for skin irritation or bumps.  3/20/19  Yes Howard Frye MD   ibuprofen (ADVIL;MOTRIN) 800 MG tablet Take 1 tablet by mouth every 8 hours as needed for Pain 3/10/19  Yes Megan BROWN LEILANI Angela   albuterol (PROVENTIL) (5 MG/ML) 0.5% nebulizer solution Take 1 mL by nebulization 4 times daily as needed for Wheezing 2/7/19  Yes Shalonda Cates DO   levothyroxine (SYNTHROID) 125 MCG tablet Take 1 tablet by mouth daily 1/23/19  Yes Murray Christianson MD   mometasone (ELOCON) 0.1 % cream Apply topically daily. 1/7/19  Yes Arpit Tapia MD   Multiple Vitamins-Minerals (THERAPEUTIC MULTIVITAMIN-MINERALS) tablet Take 1 tablet by mouth daily 7/16/18  Yes Melany Gage MD   polyethylene glycol (MIRALAX) powder Take 17 g by mouth as needed (constipation) Take as directed. 7/16/18  Yes Melany Gage MD   NEEDLE, DISP, 25 G (BD DISP NEEDLES) 25G X 5/8\" MISC 1 packet by Does not apply route every 30 days 7/16/18  Yes Melany Gage MD   Handicap Placard MISC by Does not apply route Duration 2 weeks 2/19/18  Yes Jocelyne Burton MD   azelastine (OPTIVAR) 0.05 % ophthalmic solution INSTILL 1 DROP INTO [facility] EYE TWO TIMES A DAY  Patient not taking: Reported on 3/5/2020 1/7/20   Stephanie Leon MD   fluticasone [facility]) 50 MCG/ACT nasal spray 1 spray by Nasal route daily  Patient not taking: Reported on 2/5/2020 7/2/19   Brent Gunter DO   Misc Natural Product Nasal (PONARIS) SOLN 1 spray by Each Nare route every 6 hours as needed (as need) 2/5/19 3/7/19  Ko Neff MD   Calcium-Magnesium-Vitamin D 300-150-400 MG-MG-UNIT TABS Take by mouth    Historical Provider, MD     Review of Systems   Constitutional: Negative for activity change, appetite change, chills, fatigue and fever. HENT: Positive for congestion, nosebleeds, postnasal drip, sinus pressure and sinus pain. Negative for dental problem, drooling, ear discharge, ear pain, facial swelling, hearing loss, mouth sores, rhinorrhea, sneezing, sore throat, tinnitus, trouble swallowing and voice change. Eyes: Negative for pain, discharge and visual disturbance.    Respiratory: Negative for apnea, cough, choking, chest tightness, shortness of motion. Left eye: Normal extraocular motion. Conjunctiva/sclera:      Right eye: Right conjunctiva is not injected. No chemosis or exudate. Left eye: Left conjunctiva is not injected. No chemosis or exudate. Neck:      Thyroid: No thyroid mass or thyromegaly. Cardiovascular:      Rate and Rhythm: Normal rate and regular rhythm. Pulmonary:      Effort: No tachypnea or respiratory distress. Lymphadenopathy:      Head:      Right side of head: No preauricular or posterior auricular adenopathy. Left side of head: No preauricular or posterior auricular adenopathy. Cervical:      Right cervical: No superficial, deep or posterior cervical adenopathy. Left cervical: No superficial, deep or posterior cervical adenopathy. Neurological:      Mental Status: She is alert and oriented to person, place, and time. Nasal Endoscopy    Pre OP: chronic sinusitis  Post OP: CRS with acute flare. Procedure: Nasal endoscopy    After obtaining verbal consent from the patient 1% lidocaine with afrin was sprayed into the nasal cavities. After allowing a time for anesthesia, a nasal endoscope was placed into the nostril. The septum, inferior, and middle turbinates were examined. The middle meatus, and sphenoethmoid recess was examined bilaterally. Cultures were not obtained from the sinuses. There were no complications. Pertinent positives included: There was not edema and purulence in the left middle meatus. There was not edema and purulence at the right middle meatus. Polyps were not identified in the  sinuses. Masses were not identified. Purulence in maxillary and left frontal recess     Tolerated well without complication. I attest that I was present for and did the entire procedure myself. Assessment:       Diagnosis Orders   1.  Chronic pansinusitis  clarithromycin (BIAXIN) 500 MG tablet    predniSONE (DELTASONE) 10 MG tablet           Plan:      14 days of Biaxin and prednisone  Follow up as needed. The patient was counseled for sinusitis and received a biaxin and prednisone prescription medication(s) for this diagnosis. The mechanism of action for the prescription(s) were explained/reviewed. The appropriate usage was described and technique for topical use explained if appropriate. Questions from the patient were answered and the prescription(s) were e-prescribed to the appropriate pharmacy.           Lauren Peguero MD

## 2020-03-07 ENCOUNTER — APPOINTMENT (OUTPATIENT)
Dept: GENERAL RADIOLOGY | Age: 45
DRG: 201 | End: 2020-03-07
Payer: COMMERCIAL

## 2020-03-07 ENCOUNTER — HOSPITAL ENCOUNTER (INPATIENT)
Age: 45
LOS: 1 days | Discharge: HOME | DRG: 201 | End: 2020-03-09
Attending: EMERGENCY MEDICINE | Admitting: INTERNAL MEDICINE
Payer: COMMERCIAL

## 2020-03-07 LAB
A/G RATIO: 0.8 (ref 1.1–2.2)
ALBUMIN SERPL-MCNC: 3.7 G/DL (ref 3.4–5)
ALP BLD-CCNC: 252 U/L (ref 40–129)
ALT SERPL-CCNC: 77 U/L (ref 10–40)
ANION GAP SERPL CALCULATED.3IONS-SCNC: 15 MMOL/L (ref 3–16)
AST SERPL-CCNC: 58 U/L (ref 15–37)
BASOPHILS ABSOLUTE: 0 K/UL (ref 0–0.2)
BASOPHILS RELATIVE PERCENT: 0.4 %
BILIRUB SERPL-MCNC: 0.4 MG/DL (ref 0–1)
BUN BLDV-MCNC: 23 MG/DL (ref 7–20)
CALCIUM SERPL-MCNC: 9.3 MG/DL (ref 8.3–10.6)
CHLORIDE BLD-SCNC: 107 MMOL/L (ref 99–110)
CO2: 14 MMOL/L (ref 21–32)
CREAT SERPL-MCNC: 0.9 MG/DL (ref 0.6–1.1)
EOSINOPHILS ABSOLUTE: 0 K/UL (ref 0–0.6)
EOSINOPHILS RELATIVE PERCENT: 0.4 %
GFR AFRICAN AMERICAN: >60
GFR NON-AFRICAN AMERICAN: >60
GLOBULIN: 4.8 G/DL
GLUCOSE BLD-MCNC: 101 MG/DL (ref 70–99)
HCG QUALITATIVE: NEGATIVE
HCT VFR BLD CALC: 36.3 % (ref 36–48)
HEMOGLOBIN: 12.1 G/DL (ref 12–16)
LYMPHOCYTES ABSOLUTE: 2.2 K/UL (ref 1–5.1)
LYMPHOCYTES RELATIVE PERCENT: 22.7 %
MCH RBC QN AUTO: 28.7 PG (ref 26–34)
MCHC RBC AUTO-ENTMCNC: 33.3 G/DL (ref 31–36)
MCV RBC AUTO: 86.2 FL (ref 80–100)
MONOCYTES ABSOLUTE: 0.8 K/UL (ref 0–1.3)
MONOCYTES RELATIVE PERCENT: 8.2 %
NEUTROPHILS ABSOLUTE: 6.6 K/UL (ref 1.7–7.7)
NEUTROPHILS RELATIVE PERCENT: 68.3 %
PDW BLD-RTO: 20.3 % (ref 12.4–15.4)
PLATELET # BLD: 171 K/UL (ref 135–450)
PMV BLD AUTO: 9.8 FL (ref 5–10.5)
POTASSIUM REFLEX MAGNESIUM: 3.6 MMOL/L (ref 3.5–5.1)
PRO-BNP: 274 PG/ML (ref 0–124)
RBC # BLD: 4.21 M/UL (ref 4–5.2)
SODIUM BLD-SCNC: 136 MMOL/L (ref 136–145)
T4 FREE: 0.9 NG/DL (ref 0.9–1.8)
TOTAL PROTEIN: 8.5 G/DL (ref 6.4–8.2)
TROPONIN: <0.01 NG/ML
TSH REFLEX: 10.08 UIU/ML (ref 0.27–4.2)
WBC # BLD: 9.6 K/UL (ref 4–11)

## 2020-03-07 PROCEDURE — 80053 COMPREHEN METABOLIC PANEL: CPT

## 2020-03-07 PROCEDURE — 83880 ASSAY OF NATRIURETIC PEPTIDE: CPT

## 2020-03-07 PROCEDURE — 85025 COMPLETE CBC W/AUTO DIFF WBC: CPT

## 2020-03-07 PROCEDURE — 96374 THER/PROPH/DIAG INJ IV PUSH: CPT

## 2020-03-07 PROCEDURE — 84443 ASSAY THYROID STIM HORMONE: CPT

## 2020-03-07 PROCEDURE — 2580000003 HC RX 258: Performed by: NURSE PRACTITIONER

## 2020-03-07 PROCEDURE — 84439 ASSAY OF FREE THYROXINE: CPT

## 2020-03-07 PROCEDURE — 96360 HYDRATION IV INFUSION INIT: CPT

## 2020-03-07 PROCEDURE — 2500000003 HC RX 250 WO HCPCS: Performed by: NURSE PRACTITIONER

## 2020-03-07 PROCEDURE — 99285 EMERGENCY DEPT VISIT HI MDM: CPT

## 2020-03-07 PROCEDURE — 71045 X-RAY EXAM CHEST 1 VIEW: CPT

## 2020-03-07 PROCEDURE — 84703 CHORIONIC GONADOTROPIN ASSAY: CPT

## 2020-03-07 PROCEDURE — 84484 ASSAY OF TROPONIN QUANT: CPT

## 2020-03-07 PROCEDURE — 93005 ELECTROCARDIOGRAM TRACING: CPT | Performed by: EMERGENCY MEDICINE

## 2020-03-07 RX ORDER — 0.9 % SODIUM CHLORIDE 0.9 %
1000 INTRAVENOUS SOLUTION INTRAVENOUS ONCE
Status: COMPLETED | OUTPATIENT
Start: 2020-03-07 | End: 2020-03-08

## 2020-03-07 RX ORDER — DILTIAZEM HYDROCHLORIDE 5 MG/ML
10 INJECTION INTRAVENOUS ONCE
Status: COMPLETED | OUTPATIENT
Start: 2020-03-07 | End: 2020-03-07

## 2020-03-07 RX ADMIN — DILTIAZEM HYDROCHLORIDE 5 MG/HR: 5 INJECTION INTRAVENOUS at 23:05

## 2020-03-07 RX ADMIN — DILTIAZEM HYDROCHLORIDE 10 MG: 5 INJECTION INTRAVENOUS at 23:01

## 2020-03-07 RX ADMIN — SODIUM CHLORIDE 1000 ML: 9 INJECTION, SOLUTION INTRAVENOUS at 23:01

## 2020-03-07 ASSESSMENT — ENCOUNTER SYMPTOMS
DIARRHEA: 0
SHORTNESS OF BREATH: 1
BACK PAIN: 0
ABDOMINAL PAIN: 0
NAUSEA: 0
COUGH: 1
VOMITING: 0

## 2020-03-07 ASSESSMENT — PAIN DESCRIPTION - DESCRIPTORS: DESCRIPTORS: ACHING

## 2020-03-07 ASSESSMENT — PAIN SCALES - GENERAL
PAINLEVEL_OUTOF10: 0
PAINLEVEL_OUTOF10: 8

## 2020-03-07 ASSESSMENT — PAIN DESCRIPTION - PROGRESSION: CLINICAL_PROGRESSION: RESOLVED

## 2020-03-07 ASSESSMENT — PAIN DESCRIPTION - LOCATION: LOCATION: CHEST

## 2020-03-07 ASSESSMENT — PAIN DESCRIPTION - PAIN TYPE: TYPE: ACUTE PAIN

## 2020-03-07 ASSESSMENT — PAIN DESCRIPTION - FREQUENCY: FREQUENCY: CONTINUOUS

## 2020-03-08 PROBLEM — I48.91 ATRIAL FIBRILLATION WITH RVR (HCC): Status: ACTIVE | Noted: 2020-03-08

## 2020-03-08 LAB
BILIRUBIN URINE: NEGATIVE
BLOOD, URINE: NEGATIVE
CLARITY: CLEAR
COLOR: YELLOW
GLUCOSE URINE: NEGATIVE MG/DL
KETONES, URINE: NEGATIVE MG/DL
LEUKOCYTE ESTERASE, URINE: NEGATIVE
MICROSCOPIC EXAMINATION: NORMAL
NITRITE, URINE: NEGATIVE
PH UA: 6.5 (ref 5–8)
PROTEIN UA: NEGATIVE MG/DL
SPECIFIC GRAVITY UA: 1.02 (ref 1–1.03)
TROPONIN: <0.01 NG/ML
TROPONIN: <0.01 NG/ML
URINE REFLEX TO CULTURE: NORMAL
URINE TYPE: NORMAL
UROBILINOGEN, URINE: 1 E.U./DL

## 2020-03-08 PROCEDURE — 94640 AIRWAY INHALATION TREATMENT: CPT

## 2020-03-08 PROCEDURE — 2580000003 HC RX 258: Performed by: NURSE PRACTITIONER

## 2020-03-08 PROCEDURE — 96372 THER/PROPH/DIAG INJ SC/IM: CPT

## 2020-03-08 PROCEDURE — G0378 HOSPITAL OBSERVATION PER HR: HCPCS

## 2020-03-08 PROCEDURE — 96366 THER/PROPH/DIAG IV INF ADDON: CPT

## 2020-03-08 PROCEDURE — 6370000000 HC RX 637 (ALT 250 FOR IP): Performed by: INTERNAL MEDICINE

## 2020-03-08 PROCEDURE — 84484 ASSAY OF TROPONIN QUANT: CPT

## 2020-03-08 PROCEDURE — 94761 N-INVAS EAR/PLS OXIMETRY MLT: CPT

## 2020-03-08 PROCEDURE — 2580000003 HC RX 258: Performed by: INTERNAL MEDICINE

## 2020-03-08 PROCEDURE — 6360000002 HC RX W HCPCS: Performed by: INTERNAL MEDICINE

## 2020-03-08 PROCEDURE — 36415 COLL VENOUS BLD VENIPUNCTURE: CPT

## 2020-03-08 PROCEDURE — 81003 URINALYSIS AUTO W/O SCOPE: CPT

## 2020-03-08 PROCEDURE — 93005 ELECTROCARDIOGRAM TRACING: CPT | Performed by: EMERGENCY MEDICINE

## 2020-03-08 PROCEDURE — 2060000000 HC ICU INTERMEDIATE R&B

## 2020-03-08 RX ORDER — 0.9 % SODIUM CHLORIDE 0.9 %
1000 INTRAVENOUS SOLUTION INTRAVENOUS ONCE
Status: DISCONTINUED | OUTPATIENT
Start: 2020-03-08 | End: 2020-03-08

## 2020-03-08 RX ORDER — PANTOPRAZOLE SODIUM 40 MG/1
40 TABLET, DELAYED RELEASE ORAL
Status: DISCONTINUED | OUTPATIENT
Start: 2020-03-09 | End: 2020-03-09 | Stop reason: HOSPADM

## 2020-03-08 RX ORDER — CLARITHROMYCIN 500 MG/1
500 TABLET, COATED ORAL 2 TIMES DAILY
Status: DISCONTINUED | OUTPATIENT
Start: 2020-03-08 | End: 2020-03-08

## 2020-03-08 RX ORDER — ACETAMINOPHEN 325 MG/1
650 TABLET ORAL EVERY 6 HOURS PRN
Status: DISCONTINUED | OUTPATIENT
Start: 2020-03-08 | End: 2020-03-09 | Stop reason: HOSPADM

## 2020-03-08 RX ORDER — ALBUTEROL SULFATE 90 UG/1
2 AEROSOL, METERED RESPIRATORY (INHALATION) EVERY 6 HOURS PRN
Status: DISCONTINUED | OUTPATIENT
Start: 2020-03-08 | End: 2020-03-09 | Stop reason: HOSPADM

## 2020-03-08 RX ORDER — BUDESONIDE AND FORMOTEROL FUMARATE DIHYDRATE 160; 4.5 UG/1; UG/1
2 AEROSOL RESPIRATORY (INHALATION) 2 TIMES DAILY
Status: DISCONTINUED | OUTPATIENT
Start: 2020-03-08 | End: 2020-03-09 | Stop reason: HOSPADM

## 2020-03-08 RX ORDER — ALBUTEROL SULFATE 2.5 MG/3ML
5 SOLUTION RESPIRATORY (INHALATION) 4 TIMES DAILY PRN
Status: DISCONTINUED | OUTPATIENT
Start: 2020-03-08 | End: 2020-03-09 | Stop reason: HOSPADM

## 2020-03-08 RX ORDER — PROMETHAZINE HYDROCHLORIDE 25 MG/1
12.5 TABLET ORAL EVERY 6 HOURS PRN
Status: DISCONTINUED | OUTPATIENT
Start: 2020-03-08 | End: 2020-03-09 | Stop reason: HOSPADM

## 2020-03-08 RX ORDER — POLYETHYLENE GLYCOL 3350 17 G/17G
17 POWDER, FOR SOLUTION ORAL DAILY PRN
Status: DISCONTINUED | OUTPATIENT
Start: 2020-03-08 | End: 2020-03-09 | Stop reason: HOSPADM

## 2020-03-08 RX ORDER — CETIRIZINE HYDROCHLORIDE 10 MG/1
5 TABLET ORAL 2 TIMES DAILY
Status: DISCONTINUED | OUTPATIENT
Start: 2020-03-08 | End: 2020-03-09 | Stop reason: HOSPADM

## 2020-03-08 RX ORDER — PSEUDOEPHEDRINE HCL 120 MG/1
120 TABLET, FILM COATED, EXTENDED RELEASE ORAL 2 TIMES DAILY
Status: DISCONTINUED | OUTPATIENT
Start: 2020-03-08 | End: 2020-03-08

## 2020-03-08 RX ORDER — LIOTHYRONINE SODIUM 5 UG/1
5 TABLET ORAL DAILY
Status: DISCONTINUED | OUTPATIENT
Start: 2020-03-08 | End: 2020-03-09 | Stop reason: HOSPADM

## 2020-03-08 RX ORDER — SODIUM CHLORIDE 0.9 % (FLUSH) 0.9 %
10 SYRINGE (ML) INJECTION PRN
Status: DISCONTINUED | OUTPATIENT
Start: 2020-03-08 | End: 2020-03-09 | Stop reason: HOSPADM

## 2020-03-08 RX ORDER — 0.9 % SODIUM CHLORIDE 0.9 %
500 INTRAVENOUS SOLUTION INTRAVENOUS ONCE
Status: COMPLETED | OUTPATIENT
Start: 2020-03-08 | End: 2020-03-08

## 2020-03-08 RX ORDER — 0.9 % SODIUM CHLORIDE 0.9 %
1000 INTRAVENOUS SOLUTION INTRAVENOUS ONCE
Status: COMPLETED | OUTPATIENT
Start: 2020-03-08 | End: 2020-03-08

## 2020-03-08 RX ORDER — ONDANSETRON 2 MG/ML
4 INJECTION INTRAMUSCULAR; INTRAVENOUS EVERY 6 HOURS PRN
Status: DISCONTINUED | OUTPATIENT
Start: 2020-03-08 | End: 2020-03-09 | Stop reason: HOSPADM

## 2020-03-08 RX ORDER — ACETAMINOPHEN 650 MG/1
650 SUPPOSITORY RECTAL EVERY 6 HOURS PRN
Status: DISCONTINUED | OUTPATIENT
Start: 2020-03-08 | End: 2020-03-09 | Stop reason: HOSPADM

## 2020-03-08 RX ORDER — AZITHROMYCIN 500 MG/1
500 TABLET, FILM COATED ORAL DAILY
Status: DISCONTINUED | OUTPATIENT
Start: 2020-03-08 | End: 2020-03-09

## 2020-03-08 RX ORDER — SODIUM CHLORIDE 0.9 % (FLUSH) 0.9 %
10 SYRINGE (ML) INJECTION EVERY 12 HOURS SCHEDULED
Status: DISCONTINUED | OUTPATIENT
Start: 2020-03-08 | End: 2020-03-09 | Stop reason: HOSPADM

## 2020-03-08 RX ORDER — LACTOBACILLUS RHAMNOSUS GG 10B CELL
1 CAPSULE ORAL 2 TIMES DAILY
Status: DISCONTINUED | OUTPATIENT
Start: 2020-03-08 | End: 2020-03-09 | Stop reason: HOSPADM

## 2020-03-08 RX ORDER — FOLIC ACID 1 MG/1
1 TABLET ORAL DAILY
Status: DISCONTINUED | OUTPATIENT
Start: 2020-03-08 | End: 2020-03-09 | Stop reason: HOSPADM

## 2020-03-08 RX ORDER — MONTELUKAST SODIUM 10 MG/1
10 TABLET ORAL EVERY EVENING
Status: DISCONTINUED | OUTPATIENT
Start: 2020-03-08 | End: 2020-03-09 | Stop reason: HOSPADM

## 2020-03-08 RX ORDER — CETIRIZINE HYDROCHLORIDE, PSEUDOEPHEDRINE HYDROCHLORIDE 5; 120 MG/1; MG/1
1 TABLET, FILM COATED, EXTENDED RELEASE ORAL 2 TIMES DAILY
Status: DISCONTINUED | OUTPATIENT
Start: 2020-03-08 | End: 2020-03-08

## 2020-03-08 RX ADMIN — Medication 1 CAPSULE: at 21:37

## 2020-03-08 RX ADMIN — SODIUM CHLORIDE 1000 ML: 9 INJECTION, SOLUTION INTRAVENOUS at 00:05

## 2020-03-08 RX ADMIN — CETIRIZINE HYDROCHLORIDE 5 MG: 10 TABLET, FILM COATED ORAL at 21:37

## 2020-03-08 RX ADMIN — ENOXAPARIN SODIUM 40 MG: 40 INJECTION SUBCUTANEOUS at 08:05

## 2020-03-08 RX ADMIN — BUDESONIDE AND FORMOTEROL FUMARATE DIHYDRATE 2 PUFF: 160; 4.5 AEROSOL RESPIRATORY (INHALATION) at 10:02

## 2020-03-08 RX ADMIN — BUDESONIDE AND FORMOTEROL FUMARATE DIHYDRATE 2 PUFF: 160; 4.5 AEROSOL RESPIRATORY (INHALATION) at 19:30

## 2020-03-08 RX ADMIN — CETIRIZINE HYDROCHLORIDE 5 MG: 10 TABLET, FILM COATED ORAL at 11:31

## 2020-03-08 RX ADMIN — LEVOTHYROXINE SODIUM 125 MCG: 0.1 TABLET ORAL at 08:04

## 2020-03-08 RX ADMIN — Medication 1 CAPSULE: at 11:31

## 2020-03-08 RX ADMIN — AZITHROMYCIN MONOHYDRATE 500 MG: 500 TABLET ORAL at 11:31

## 2020-03-08 RX ADMIN — FOLIC ACID 1 MG: 1 TABLET ORAL at 11:32

## 2020-03-08 RX ADMIN — MONTELUKAST SODIUM 10 MG: 10 TABLET, FILM COATED ORAL at 18:04

## 2020-03-08 RX ADMIN — LIOTHYRONINE SODIUM 5 MCG: 5 TABLET ORAL at 14:11

## 2020-03-08 RX ADMIN — SODIUM CHLORIDE 500 ML: 9 INJECTION, SOLUTION INTRAVENOUS at 06:16

## 2020-03-08 ASSESSMENT — PAIN SCALES - GENERAL
PAINLEVEL_OUTOF10: 8
PAINLEVEL_OUTOF10: 0
PAINLEVEL_OUTOF10: 4
PAINLEVEL_OUTOF10: 0

## 2020-03-08 ASSESSMENT — PAIN - FUNCTIONAL ASSESSMENT
PAIN_FUNCTIONAL_ASSESSMENT: ACTIVITIES ARE NOT PREVENTED
PAIN_FUNCTIONAL_ASSESSMENT: ACTIVITIES ARE NOT PREVENTED

## 2020-03-08 ASSESSMENT — PAIN DESCRIPTION - LOCATION
LOCATION: CHEST
LOCATION: CHEST;ARM

## 2020-03-08 ASSESSMENT — PAIN DESCRIPTION - ORIENTATION
ORIENTATION: LEFT
ORIENTATION: LEFT

## 2020-03-08 ASSESSMENT — PAIN DESCRIPTION - PROGRESSION
CLINICAL_PROGRESSION: GRADUALLY IMPROVING
CLINICAL_PROGRESSION: NOT CHANGED

## 2020-03-08 ASSESSMENT — PAIN DESCRIPTION - DESCRIPTORS
DESCRIPTORS: ACHING
DESCRIPTORS: ACHING;OTHER (COMMENT)

## 2020-03-08 ASSESSMENT — PAIN DESCRIPTION - FREQUENCY
FREQUENCY: CONTINUOUS
FREQUENCY: CONTINUOUS

## 2020-03-08 ASSESSMENT — PAIN DESCRIPTION - PAIN TYPE
TYPE: ACUTE PAIN
TYPE: ACUTE PAIN

## 2020-03-08 NOTE — PROGRESS NOTES
Patient seen and examined earlier this morning by one of my colleagues. 52 y/o F with hx Hashimoto's hypothyroidism, autoimmune hepatitis, iron deficiency anemia who p/w new onset Afib with RVR. She was having lightheadedness, dizziness, and palpitations throughout the day yesterday. Feeling better this morning. She is still in Afib but better rate controlled on dilt gtt. Her blood pressure is soft so attempting to wean down on dilt. Cardiology is consulted and Echocardiogram is pending. Her CHADSVASC score is 1 (female gender), so she can either take aspirin or anticoagulation. Will defer to cardiology. Re-ordered majority of home meds, but holding steroids. She has chronic sinusitis and was being treated with clarithromycin and a steroid taper as an outpatient.     Solo Page MD, MPH  Hospitalist   Pager: [phone]

## 2020-03-08 NOTE — H&P
ONE TIME A DAY 1/8/20   MAYELA Méndez - CNP   pantoprazole (PROTONIX) 40 MG tablet TAKE 1 TABLET BY MOUTH ONE TIME A DAY 1/7/20   Aissatou Castillo MD   montelukast (SINGULAIR) 10 MG tablet TAKE ONE TABLET BY MOUTH EVERY EVENING 1/7/20   Aissatou Castillo MD   urea (CARMOL) 10 % cream APPLY TO AFFECTED AREA(S) TOPICALLY AS NEEDED 1/7/20   Aissatou Castillo MD   cyanocobalamin 1000 MCG/ML injection INJECT 1ML INTO THE SKIN ONCE MONTHLY 1/7/20   Aissatou Castillo MD   BETASEPT SURGICAL SCRUB 4 % external liquid APPLY TO AFFECTED AREA(S) TOPICALLY AS NEEDED 1/7/20   Aissatou Castillo MD   mupirocin (BACTROBAN) 2 % ointment APPLY TO AFFECTED AREA(S) TOPICALLY AS NEEDED 1/7/20   Aissatou Castillo MD   azelastine (OPTIVAR) 0.05 % ophthalmic solution INSTILL 1 DROP INTO EACH EYE TWO TIMES A DAY  Patient not taking: Reported on 3/5/2020 1/7/20   Aissatou Castillo MD   MONOJECT MAGELLAN SYRINGE 25G X 5/8\" 3 ML MISC USE AS DIRECTED FOR INJECTION 10/23/19   Alisa Luu MD   VENTOLIN  (90 Base) MCG/ACT inhaler INHALE 2 PUFFS INTO THE LUNGS EVERY 6 HOURS AS NEEDED FOR WHEEZING 10/23/19   Alisa Luu MD   cetirizine-psuedoephedrine (ZYRTEC-D) 5-120 MG per extended release tablet TAKE 1 TABLET BY MOUTH 2 TIMES A DAY 10/23/19   Alisa Luu MD   fluticasone (FLONASE) 50 MCG/ACT nasal spray 1 spray by Nasal route daily  Patient not taking: Reported on 2/5/2020 7/2/19   Rodrigo Meza DO   Probiotic Product (RA PROBIOTIC MAX STRENGTH) CAPS Take 1 capsule by mouth 2 times daily 7/2/19   Rodrigo Meza DO   folic acid (FOLVITE) 1 MG tablet Take 1 tablet by mouth daily 3/21/19   Jairo Samuel MD   Fluticasone Furoate-Vilanterol 200-25 MCG/INH AEPB Inhale 1 puff into the lungs daily 3/21/19   Jairo Samuel MD   Misc. Devices (HIBICLENS HAND PUMP 32OZ) MISC Use as needed for skin irritation or bumps.  3/20/19   Santy Sifuentes MD   ibuprofen (ADVIL;MOTRIN) 800 MG tablet Take 1 tablet by mouth every 8 hours as needed for Pain 3/10/19   LEILANI Bingham   albuterol (PROVENTIL) (5 MG/ML) 0.5% nebulizer solution Take 1 mL by nebulization 4 times daily as needed for Wheezing 2/7/19   Shalonda Black,    Misc Natural Product Nasal (PONARIS) SOLN 1 spray by Each Nare route every 6 hours as needed (as need) 2/5/19 3/7/19  Ana Zavala MD   levothyroxine (SYNTHROID) 125 MCG tablet Take 1 tablet by mouth daily 1/23/19   Mitesh Pettit MD   Calcium-Magnesium-Vitamin D 300-150-400 MG-MG-UNIT TABS Take by mouth    Historical Provider, MD   mometasone (ELOCON) 0.1 % cream Apply topically daily. 1/7/19   Luciana Tomas MD   Multiple Vitamins-Minerals (THERAPEUTIC MULTIVITAMIN-MINERALS) tablet Take 1 tablet by mouth daily 7/16/18   Corey Eagle MD   polyethylene glycol [facility]) powder Take 17 g by mouth as needed (constipation) Take as directed. 7/16/18   Corey Eagle MD   NEEDLE, DISP, 25 G (BD DISP NEEDLES) 25G X 5/8\" MISC 1 packet by Does not apply route every 30 days 7/16/18   Corey Eagle MD   Handicap Placard MISC by Does not apply route Duration 2 weeks 2/19/18   Eduard Golden MD       Allergies:  Latex; Clindamycin/lincomycin; Sulfa antibiotics; Diflucan [fluconazole]; and Other    Social History:  The patient currently lives home    TOBACCO:   reports that she has never smoked. She has never used smokeless tobacco.  ETOH:   reports current alcohol use. Family History:  Reviewed in detail and negative for DM, Early CAD, Cancer, CVA. Positive as follows:        Problem Relation Age of Onset    High Blood Pressure Mother     Heart Disease Father     Diabetes Maternal Aunt        REVIEW OF SYSTEMS:   Positive for sob  and as noted in the HPI. All other systems reviewed and negative.     PHYSICAL EXAM:    BP 89/69   Pulse 87   Temp 98 °F (36.7 °C) (Oral)   Resp 25   Ht 5' 3\" (1.6 m)   Wt 218 lb (98.9 kg)   SpO2 100%   BMI 38.62 kg/m²     General appearance: No apparent distress appears stated age and BLOODU Negative 09/20/2016    GLUCOSEU Negative 09/20/2016    GLUCOSEU NEGATIVE 05/15/2012    AMORPHOUS 3+ 01/09/2015       ABG  No results found for: VHU2ORZ, BEART, M7ZBMFPI, PHART, THGBART, SFV3JIM, PO2ART, BHK0GOK        Active Hospital Problems    Diagnosis Date Noted    Atrial fibrillation with RVR ([facility] [address] [I48.91] 03/08/2020         PHYSICIANS CERTIFICATION:    I certify that Ananya Mercado is expected to be hospitalized for more  than 2 midnights based on the following assessment and plan:      ASSESSMENT/PLAN:    Hypothyroidism  Asthma     Tele  Serial trop  Echo  Cardizem drip  Cardiology consult  Resume home meds    DVT Prophylaxis: lovenox   Diet: DIET CARDIAC;  Code Status: Full Code      Dispo - inpt. Dafne Ray MD    Thank you Jamila Hill DO for the opportunity to be involved in this patient's care. If you have any questions or concerns please feel free to contact me at [phone].

## 2020-03-08 NOTE — ED PROVIDER NOTES
[address] [facility]      Pt Name: Pauline Reis  PUM:[id number]  [place] 1975  Date of evaluation: 3/7/2020  Provider: MAYELA Pérez - CNP   Attending provider:  Isabela Nevarez MD      Chief Complaint:    Chief Complaint   Patient presents with    Shortness of Breath     currently being tx for sinus infection by ent. on abx and prednisone. hx of asthma. reports fatigue all day, lightheadedness x 4 hours and sob x 2 hours. using inhaler at home without relief.  Chest Pain       Nursing Notes, Past Medical Hx, Past Surgical Hx, Social Hx, Allergies, and Family Hx were all reviewed and agreed with or any disagreements were addressed in the HPI.    HPI:  (Location, Duration, Timing, Severity, Quality, Assoc Sx, Context, Modifying factors)  This is a  53 y.o. female with history of asthma who presents to the emergency department today complaining of shortness of breath, chest pain, and palpitations. Onset was around 1400 hrs. today. Chest pain is not exertional.  No nausea or diaphoresis. Shortness of breath is worse with exertion. She has been sick all week with an upper respiratory infection and was put on steroids and antibiotics 2 days ago. On arrival to ER she was in A. fib with RVR.     PastMedical/Surgical History:      Diagnosis Date    Anemia, iron deficiency     Asthma     Autoimmune hepatitis ([address]     Fissure, anal     History of blood transfusion     Hypothyroidism     Menorrhagia with regular cycle     Morbid obesity with BMI of 40.0-44.9, adult ([facility]) 5/26/2015    MRSA (methicillin resistant staph aureus) culture positive 02/03/2017    abdominal abscess    MRSA infection 08/20/2012    rt thigh abscess    Pericarditis, acute     Sinusitis          Procedure Laterality Date    OTHER SURGICAL HISTORY  8/22/2012    INCISION AND DRAINAGE POSTERIOR THIGH ABSCESS    RECTAL SURGERY  Aug 2011    repair of rectal fissures and anal skin tag removal    SINUS SURGERY      X 3    TONSILLECTOMY  2004    TONSILLECTOMY AND ADENOIDECTOMY  2005    (partial adenoidectomy)       Medications:  Previous Medications    ALBUTEROL (PROVENTIL) (5 MG/ML) 0.5% NEBULIZER SOLUTION    Take 1 mL by nebulization 4 times daily as needed for Wheezing    AZELASTINE (OPTIVAR) 0.05 % OPHTHALMIC SOLUTION    INSTILL 1 DROP INTO EACH EYE TWO TIMES A DAY    BETASEPT SURGICAL SCRUB 4 % EXTERNAL LIQUID    APPLY TO AFFECTED AREA(S) TOPICALLY AS NEEDED    CALCIUM-MAGNESIUM-VITAMIN D 300-150-400 MG-MG-UNIT TABS    Take by mouth    CETIRIZINE-PSUEDOEPHEDRINE (ZYRTEC-D) 5-120 MG PER EXTENDED RELEASE TABLET    TAKE 1 TABLET BY MOUTH 2 TIMES A DAY    CLARITHROMYCIN (BIAXIN) 500 MG TABLET    Take 1 tablet by mouth 2 times daily for 14 days    CYANOCOBALAMIN 1000 MCG/ML INJECTION    INJECT 1ML INTO THE SKIN ONCE MONTHLY    FLUTICASONE (FLONASE) 50 MCG/ACT NASAL SPRAY    1 spray by Nasal route daily    FLUTICASONE FUROATE-VILANTEROL 200-25 MCG/INH AEPB    Inhale 1 puff into the lungs daily    FOLIC ACID (FOLVITE) 1 MG TABLET    Take 1 tablet by mouth daily    HANDICAP PLACARD MISC    by Does not apply route Duration 2 weeks    IBUPROFEN (ADVIL;MOTRIN) 800 MG TABLET    Take 1 tablet by mouth every 8 hours as needed for Pain    LEVOTHYROXINE (SYNTHROID) 125 MCG TABLET    Take 1 tablet by mouth daily    LIOTHYRONINE (CYTOMEL) 5 MCG TABLET    TAKE 1 TABLET BY MOUTH ONE TIME A DAY    MISC NATURAL PRODUCT NASAL (PONARIS) SOLN    1 spray by Each Nare route every 6 hours as needed (as need)    MISC. DEVICES (HIBICLENS HAND PUMP 32OZ) MISC    Use as needed for skin irritation or bumps. MOMETASONE (ELOCON) 0.1 % CREAM    Apply topically daily.     MONOJECT MAGELLAN SYRINGE 25G X 5/8\" 3 ML MISC    USE AS DIRECTED FOR INJECTION    MONTELUKAST (SINGULAIR) 10 MG TABLET    TAKE ONE TABLET BY MOUTH EVERY EVENING    MULTIPLE VITAMINS-MINERALS (THERAPEUTIC MULTIVITAMIN-MINERALS) TABLET    Take 1 tablet by mouth daily    MUPIROCIN (BACTROBAN) 2 % OINTMENT    APPLY TO AFFECTED AREA(S) TOPICALLY AS NEEDED    NEEDLE, DISP, 25 G (BD DISP NEEDLES) 25G X 5/8\" MISC    1 packet by Does not apply route every 30 days    PANTOPRAZOLE (PROTONIX) 40 MG TABLET    TAKE 1 TABLET BY MOUTH ONE TIME A DAY    POLYETHYLENE GLYCOL (MIRALAX) POWDER    Take 17 g by mouth as needed (constipation) Take as directed. PREDNISONE (DELTASONE) 10 MG TABLET    4 tabs a day for 5 days, 3 tabs a day for 3 days, 2 tabs a day for 3 days,1 tab a day for 3 days    PROBIOTIC PRODUCT (RA PROBIOTIC MAX STRENGTH) CAPS    Take 1 capsule by mouth 2 times daily    UREA (CARMOL) 10 % CREAM    APPLY TO AFFECTED AREA(S) TOPICALLY AS NEEDED    VENTOLIN  (90 BASE) MCG/ACT INHALER    INHALE 2 PUFFS INTO THE LUNGS EVERY 6 HOURS AS NEEDED FOR WHEEZING         Review of Systems:  Review of Systems   Constitutional: Negative for chills, diaphoresis and fever. HENT: Negative. Respiratory: Positive for cough and shortness of breath. Cardiovascular: Positive for chest pain and palpitations. Negative for leg swelling. Gastrointestinal: Negative for abdominal pain, diarrhea, nausea and vomiting. Musculoskeletal: Negative for back pain, neck pain and neck stiffness. Skin: Negative for pallor and rash. Allergic/Immunologic: Negative for immunocompromised state. Neurological: Negative for dizziness, syncope and headaches. Hematological: Negative for adenopathy. Psychiatric/Behavioral: Negative for confusion. All other systems reviewed and are negative. Positives and Pertinent negatives as per HPI. Except as noted above in the ROS, problem specific ROS was completed and is negative. Physical Exam:  Physical Exam  Vitals signs and nursing note reviewed. Constitutional:       General: She is not in acute distress. Appearance: Normal appearance. She is well-developed.  She is not went from 170s to 100. She remained stable. Her pressure was slightly soft but she was not symptomatic. She will be admitted for further work-up. She was given all test results and given an opportunity to ask and have any questions answered. She was agreeable to admission. The hospitalist was consulted and agreed to meet patient and write orders. The patient tolerated their visit well. They were seen and evaluated by the attending physician, Candelario Voss MD who agreed with the assessment and plan. The patient and / or the family were informed of the results of any tests, a time was given to answer questions, a plan was proposed and they agreed with plan. CLINICAL IMPRESSION:  1. Atrial fibrillation with RVR ([facility] [address]    2. New onset a-fib [facility])        DISPOSITION Decision To Admit 03/08/2020 12:33:09 AM      PATIENT REFERRED TO:  No follow-up provider specified.     DISCHARGE MEDICATIONS:  New Prescriptions    No medications on file       DISCONTINUED MEDICATIONS:  Discontinued Medications    No medications on file              (Please note the MDM and HPI sections of this note were completed with a voice recognition program.  Efforts were made to edit the dictations but occasionally words are mis-transcribed.)    Electronically signed, MAYELA Victor CNP,           MAYELA Victor CNP  03/08/20 4141

## 2020-03-08 NOTE — PROGRESS NOTES
Admitted to 5123 from ED for atrial fib, RVR. Oriented to room, unit protocol, plan of care as ordered, and safety protocol. Cardizem drip infusing at 5 mg/hr. Reports pain improving. Call light and belongings in reach. Bed locked with rails up x 2. Placed on monitor and verified rhythm. Destinee Jeter RN

## 2020-03-08 NOTE — ED NOTES
ED SBAR report provider to  RN. Patient to be transported to Room 5123 via stretcher by RN. Patient transported with bedside cardiac monitor and with IV medications infusing. IV site clean, dry, and intact. MEWS score and pain assessed and documented. Updated patient on plan of care. Pedro Garcia RN  03/08/20 0682

## 2020-03-08 NOTE — PLAN OF CARE
Problem: Pain:  Goal: Pain level will decrease  Description: Pain level will decrease  Outcome: Ongoing  Goal: Control of acute pain  Description: Control of acute pain  Outcome: Ongoing  Goal: Control of chronic pain  Description: Control of chronic pain  Outcome: Ongoing     Problem: Falls - Risk of:  Goal: Will remain free from falls  Description: Will remain free from falls  Outcome: Ongoing  Goal: Absence of physical injury  Description: Absence of physical injury  Outcome: Ongoing

## 2020-03-08 NOTE — FLOWSHEET NOTE
AD ed completed. Documents, explanatory information and contact information left with patient, who may contact us if further questions or if they wish to complete. 03/08/20 1351   Encounter Summary   Services provided to: Patient   Referral/Consult From: Nurse   Support System Parent; Family members; Zoroastrian/kim community   Place of [place] [place]   Continue Visiting   (support and prayer. AD info left. [address] 3/8)   Complexity of Encounter Moderate   Length of Encounter 15 minutes   Spiritual/Mu-ism   Type Spiritual support   Assessment Calm; Approachable   Intervention Active listening;Explored feelings, thoughts, concerns;Prayer;Sustaining presence/ Ministry of presence   Outcome Expressed gratitude   Advance Directives (For Healthcare)   Healthcare Directive No, patient does not have an advance directive for healthcare treatment   Advance Directives Documents given;Documents explained   [address]  Electronically signed by Rhonda Phillips on 3/8/2020 at 1:55 PM

## 2020-03-08 NOTE — ED NOTES
Pt transported to ER bed #43. Handoff of pts pain score and plan of care.       Melania Isidro RN  03/08/20 3904

## 2020-03-08 NOTE — ED NOTES
MD Kel contact for pt blood pressure below 926 systolic.  Instructed to turn cardizem down to 5ml/hr and reevaluate       Joy Mejia RN  03/08/20 3721

## 2020-03-09 VITALS
BODY MASS INDEX: 38.77 KG/M2 | WEIGHT: 218.8 LBS | SYSTOLIC BLOOD PRESSURE: 127 MMHG | HEIGHT: 63 IN | RESPIRATION RATE: 18 BRPM | HEART RATE: 78 BPM | OXYGEN SATURATION: 98 % | TEMPERATURE: 98.7 F | DIASTOLIC BLOOD PRESSURE: 89 MMHG

## 2020-03-09 PROBLEM — R74.8 ELEVATED LIVER ENZYMES: Status: ACTIVE | Noted: 2020-03-09

## 2020-03-09 LAB
A/G RATIO: 0.8 (ref 1.1–2.2)
ALBUMIN SERPL-MCNC: 3.2 G/DL (ref 3.4–5)
ALP BLD-CCNC: 240 U/L (ref 40–129)
ALT SERPL-CCNC: 74 U/L (ref 10–40)
ANION GAP SERPL CALCULATED.3IONS-SCNC: 12 MMOL/L (ref 3–16)
AST SERPL-CCNC: 62 U/L (ref 15–37)
BASOPHILS ABSOLUTE: 0 K/UL (ref 0–0.2)
BASOPHILS RELATIVE PERCENT: 0.2 %
BILIRUB SERPL-MCNC: 0.3 MG/DL (ref 0–1)
BUN BLDV-MCNC: 11 MG/DL (ref 7–20)
CALCIUM SERPL-MCNC: 8.7 MG/DL (ref 8.3–10.6)
CHLORIDE BLD-SCNC: 110 MMOL/L (ref 99–110)
CO2: 17 MMOL/L (ref 21–32)
CREAT SERPL-MCNC: 0.7 MG/DL (ref 0.6–1.1)
EKG ATRIAL RATE: 170 BPM
EKG DIAGNOSIS: NORMAL
EKG Q-T INTERVAL: 254 MS
EKG QRS DURATION: 76 MS
EKG QTC CALCULATION (BAZETT): 430 MS
EKG R AXIS: 22 DEGREES
EKG T AXIS: 127 DEGREES
EKG VENTRICULAR RATE: 173 BPM
EOSINOPHILS ABSOLUTE: 0.1 K/UL (ref 0–0.6)
EOSINOPHILS RELATIVE PERCENT: 1.5 %
GFR AFRICAN AMERICAN: >60
GFR NON-AFRICAN AMERICAN: >60
GLOBULIN: 4.1 G/DL
GLUCOSE BLD-MCNC: 101 MG/DL (ref 70–99)
HCT VFR BLD CALC: 33.7 % (ref 36–48)
HEMOGLOBIN: 11 G/DL (ref 12–16)
LV EF: 58 %
LVEF MODALITY: NORMAL
LYMPHOCYTES ABSOLUTE: 1.2 K/UL (ref 1–5.1)
LYMPHOCYTES RELATIVE PERCENT: 21.8 %
MCH RBC QN AUTO: 28.5 PG (ref 26–34)
MCHC RBC AUTO-ENTMCNC: 32.7 G/DL (ref 31–36)
MCV RBC AUTO: 87.1 FL (ref 80–100)
MONOCYTES ABSOLUTE: 0.6 K/UL (ref 0–1.3)
MONOCYTES RELATIVE PERCENT: 10 %
NEUTROPHILS ABSOLUTE: 3.8 K/UL (ref 1.7–7.7)
NEUTROPHILS RELATIVE PERCENT: 66.5 %
PDW BLD-RTO: 20.2 % (ref 12.4–15.4)
PLATELET # BLD: 129 K/UL (ref 135–450)
PMV BLD AUTO: 9.6 FL (ref 5–10.5)
POTASSIUM SERPL-SCNC: 3.8 MMOL/L (ref 3.5–5.1)
RBC # BLD: 3.87 M/UL (ref 4–5.2)
SODIUM BLD-SCNC: 139 MMOL/L (ref 136–145)
TOTAL PROTEIN: 7.3 G/DL (ref 6.4–8.2)
WBC # BLD: 5.7 K/UL (ref 4–11)

## 2020-03-09 PROCEDURE — 99218 PR INITIAL OBSERVATION CARE/DAY 30 MINUTES: CPT | Performed by: INTERNAL MEDICINE

## 2020-03-09 PROCEDURE — G0378 HOSPITAL OBSERVATION PER HR: HCPCS

## 2020-03-09 PROCEDURE — 6370000000 HC RX 637 (ALT 250 FOR IP): Performed by: INTERNAL MEDICINE

## 2020-03-09 PROCEDURE — 6360000002 HC RX W HCPCS: Performed by: INTERNAL MEDICINE

## 2020-03-09 PROCEDURE — 80053 COMPREHEN METABOLIC PANEL: CPT

## 2020-03-09 PROCEDURE — 93010 ELECTROCARDIOGRAM REPORT: CPT | Performed by: INTERNAL MEDICINE

## 2020-03-09 PROCEDURE — 85025 COMPLETE CBC W/AUTO DIFF WBC: CPT

## 2020-03-09 PROCEDURE — 94760 N-INVAS EAR/PLS OXIMETRY 1: CPT

## 2020-03-09 PROCEDURE — 94640 AIRWAY INHALATION TREATMENT: CPT

## 2020-03-09 PROCEDURE — 96372 THER/PROPH/DIAG INJ SC/IM: CPT

## 2020-03-09 PROCEDURE — 93306 TTE W/DOPPLER COMPLETE: CPT

## 2020-03-09 PROCEDURE — 36415 COLL VENOUS BLD VENIPUNCTURE: CPT

## 2020-03-09 PROCEDURE — 96366 THER/PROPH/DIAG IV INF ADDON: CPT

## 2020-03-09 RX ORDER — DILTIAZEM HYDROCHLORIDE 120 MG/1
120 CAPSULE, COATED, EXTENDED RELEASE ORAL DAILY
Qty: 30 CAPSULE | Refills: 3 | Status: SHIPPED | OUTPATIENT
Start: 2020-03-09 | End: 2020-11-10 | Stop reason: SDUPTHER

## 2020-03-09 RX ORDER — GUAIFENESIN/DEXTROMETHORPHAN 100-10MG/5
5 SYRUP ORAL EVERY 6 HOURS PRN
Status: DISCONTINUED | OUTPATIENT
Start: 2020-03-09 | End: 2020-03-09 | Stop reason: HOSPADM

## 2020-03-09 RX ORDER — AMOXICILLIN AND CLAVULANATE POTASSIUM 875; 125 MG/1; MG/1
1 TABLET, FILM COATED ORAL EVERY 12 HOURS SCHEDULED
Status: DISCONTINUED | OUTPATIENT
Start: 2020-03-09 | End: 2020-03-09 | Stop reason: HOSPADM

## 2020-03-09 RX ORDER — DILTIAZEM HYDROCHLORIDE 180 MG/1
180 CAPSULE, COATED, EXTENDED RELEASE ORAL DAILY
Status: DISCONTINUED | OUTPATIENT
Start: 2020-03-09 | End: 2020-03-09 | Stop reason: HOSPADM

## 2020-03-09 RX ADMIN — BUDESONIDE AND FORMOTEROL FUMARATE DIHYDRATE 2 PUFF: 160; 4.5 AEROSOL RESPIRATORY (INHALATION) at 07:46

## 2020-03-09 RX ADMIN — GUAIFENESIN AND DEXTROMETHORPHAN 5 ML: 100; 10 SYRUP ORAL at 11:51

## 2020-03-09 RX ADMIN — Medication 1 CAPSULE: at 08:36

## 2020-03-09 RX ADMIN — DILTIAZEM HYDROCHLORIDE 2.5 MG/HR: 5 INJECTION INTRAVENOUS at 04:29

## 2020-03-09 RX ADMIN — ACETAMINOPHEN 650 MG: 325 TABLET ORAL at 04:42

## 2020-03-09 RX ADMIN — AZITHROMYCIN MONOHYDRATE 500 MG: 500 TABLET ORAL at 08:36

## 2020-03-09 RX ADMIN — ENOXAPARIN SODIUM 40 MG: 40 INJECTION SUBCUTANEOUS at 08:36

## 2020-03-09 RX ADMIN — PANTOPRAZOLE SODIUM 40 MG: 40 TABLET, DELAYED RELEASE ORAL at 05:57

## 2020-03-09 RX ADMIN — CETIRIZINE HYDROCHLORIDE 5 MG: 10 TABLET, FILM COATED ORAL at 08:36

## 2020-03-09 RX ADMIN — AMOXICILLIN AND CLAVULANATE POTASSIUM 1 TABLET: 875; 125 TABLET, FILM COATED ORAL at 11:51

## 2020-03-09 RX ADMIN — LEVOTHYROXINE SODIUM 125 MCG: 0.1 TABLET ORAL at 05:57

## 2020-03-09 RX ADMIN — FOLIC ACID 1 MG: 1 TABLET ORAL at 08:37

## 2020-03-09 RX ADMIN — DILTIAZEM HYDROCHLORIDE 180 MG: 180 CAPSULE, COATED, EXTENDED RELEASE ORAL at 12:59

## 2020-03-09 RX ADMIN — LIOTHYRONINE SODIUM 5 MCG: 5 TABLET ORAL at 09:00

## 2020-03-09 ASSESSMENT — PAIN SCALES - GENERAL
PAINLEVEL_OUTOF10: 0
PAINLEVEL_OUTOF10: 5
PAINLEVEL_OUTOF10: 3
PAINLEVEL_OUTOF10: 0
PAINLEVEL_OUTOF10: 4
PAINLEVEL_OUTOF10: 0
PAINLEVEL_OUTOF10: 4

## 2020-03-09 ASSESSMENT — PAIN DESCRIPTION - LOCATION: LOCATION: CHEST

## 2020-03-09 ASSESSMENT — PAIN DESCRIPTION - DESCRIPTORS: DESCRIPTORS: ACHING

## 2020-03-09 ASSESSMENT — PAIN DESCRIPTION - PAIN TYPE: TYPE: ACUTE PAIN

## 2020-03-09 ASSESSMENT — PAIN DESCRIPTION - ORIENTATION: ORIENTATION: MID

## 2020-03-09 ASSESSMENT — PAIN DESCRIPTION - FREQUENCY: FREQUENCY: INTERMITTENT

## 2020-03-09 ASSESSMENT — PAIN - FUNCTIONAL ASSESSMENT: PAIN_FUNCTIONAL_ASSESSMENT: ACTIVITIES ARE NOT PREVENTED

## 2020-03-09 NOTE — CONSULTS
Summary   Normal left ventricle size, wall thickness and systolic function with an   estimated ejection fraction of 55-60%. There is reversal of E/A inflow velocities across the mitral valve   suggesting impaired left ventricular relaxation. Diastolic filling parameters suggests grade I diastolic dysfunction . Trivial mitral regurgitation is present. There is trivial tricuspid regurgitation with RVSP estimated at 28 mmHg. Trivial pulmonic regurgitation present. Signature      ------------------------------------------------------------------   Electronically signed by Lorrie Lerner MD (Interpreting   physician) on 10/06/2015 at 12:15 PM   ------------------------------------------------------------------    Myoview: 2015  IMPRESSION:       no ischemia or infarction       Recent Labs     03/07/20 2255 03/09/20  0637    139   K 3.6 3.8    110   CO2 14* 17*   BUN 23* 11   CREATININE 0.9 0.7     Recent Labs     03/07/20 2255 03/09/20  0637   WBC 9.6 5.7   HGB 12.1 11.0*   HCT 36.3 33.7*   MCV 86.2 87.1    129*     Lab Results   Component Value Date    CKTOTAL 32 05/15/2012    TROPONINI <0.01 03/08/2020         Assessment:     Active Problems:    Hypothyroidism    Morbid obesity with BMI of 40.0-44.9, adult (HCC)    Atrial fibrillation with RVR (HCC)    Elevated liver enzymes  Resolved Problems:    * No resolved hospital problems. *      Plan:     1. First episode of paf with rapid rate. Would treat with Cardizem 120 mg daily to see if tolerates( ? Worse constipation). If no echo abnormality such as pericardial fluid then ok to d/c from cards standpoint. Will follow as outpatient if she desires. 2. Discussed need to loose weight and exercise which might reduce paf incidence remarkably. 3. Reviewed in detail with patient.

## 2020-03-09 NOTE — PLAN OF CARE
Pt is a medium fall risk. She is compliant with fall prevention strategies. Instead of wearing the nonskid socks, the pt wears her shoes from home when ambulating. Non skid socks at bedside. Fall contract reviewed with pt. Fall risk bracelet placed. Bed in locked, low position with side rails up x 2. Bed alarm deferred because pt is oriented to her abilities and has a steady gait. Pt denied pain for majority of shift. During last set of VS, pt c/o mid chest ache that was a 4/10. Pt medicated with tylenol and noted to fall back asleep. Later when asked, pt rated her pain as a 3/10 and was able to fall back asleep afterwards.

## 2020-03-09 NOTE — DISCHARGE SUMMARY
maximum impulse non-displaced  Abdomen: Soft, non-tender or non-distended without rigidity or guarding and positive bowel sounds all four quadrants. Extremities: No clubbing, cyanosis, or edema bilaterally. Full range of motion without deformity and normal gait intact. Skin: Skin color, texture, turgor normal.  No rashes or lesions. Neurologic: Alert and oriented X 3, neurovascularly intact with sensory/motor intact upper extremities/lower extremities, bilaterally. Cranial nerves: II-XII intact, grossly non-focal.  Mental status: Alert, oriented, thought content appropriate. Capillary Refill: Acceptable  < 3 seconds  Peripheral Pulses: +3 Easily felt, not easily obliterated with pressure       Labs: For convenience and continuity at follow-up the following most recent labs are provided:      CBC:    Lab Results   Component Value Date    WBC 5.7 03/09/2020    HGB 11.0 03/09/2020    HCT 33.7 03/09/2020     03/09/2020       Renal:    Lab Results   Component Value Date     03/09/2020    K 3.8 03/09/2020    K 3.6 03/07/2020     03/09/2020    CO2 17 03/09/2020    BUN 11 03/09/2020    CREATININE 0.7 03/09/2020    CALCIUM 8.7 03/09/2020    PHOS 3.2 02/09/2017         Significant Diagnostic Studies    Radiology:   XR CHEST PORTABLE   Final Result   No evidence of acute process.                 Consults:     IP CONSULT TO SPIRITUAL SERVICES  IP CONSULT TO CARDIOLOGY    Disposition:  home     Condition at Discharge: Stable    Discharge Instructions/Follow-up:  PCP in 1 week    Code Status:  Full Code     Activity: activity as tolerated    Diet: regular diet      Discharge Medications:     Current Discharge Medication List           Details   dilTIAZem (CARDIZEM CD) 120 MG extended release capsule Take 1 capsule by mouth daily  Qty: 30 capsule, Refills: 3              Details   clarithromycin (BIAXIN) 500 MG tablet Take 1 tablet by mouth 2 times daily for 14 days  Qty: 28 tablet, Refills: 0    Associated Diagnoses: Chronic pansinusitis      predniSONE (DELTASONE) 10 MG tablet 4 tabs a day for 5 days, 3 tabs a day for 3 days, 2 tabs a day for 3 days,1 tab a day for 3 days  Qty: 38 tablet, Refills: 0    Associated Diagnoses: Chronic pansinusitis      liothyronine (CYTOMEL) 5 MCG tablet TAKE 1 TABLET BY MOUTH ONE TIME A DAY  Qty: 30 tablet, Refills: 0    Comments: . PATIENT NEEDS TO MAKE FOLLOW UP FOR REFILLS      pantoprazole (PROTONIX) 40 MG tablet TAKE 1 TABLET BY MOUTH ONE TIME A DAY  Qty: 30 tablet, Refills: 0      montelukast (SINGULAIR) 10 MG tablet TAKE ONE TABLET BY MOUTH EVERY EVENING  Qty: 30 tablet, Refills: 2      urea (CARMOL) 10 % cream APPLY TO AFFECTED AREA(S) TOPICALLY AS NEEDED  Qty: 85 g, Refills: 0    Associated Diagnoses: Eczema, unspecified type      cyanocobalamin 1000 MCG/ML injection INJECT 1ML INTO THE SKIN ONCE MONTHLY  Qty: 1 mL, Refills: 0    Associated Diagnoses: Iron deficiency anemia due to chronic blood loss;  Intestinal malabsorption, unspecified type      mupirocin (BACTROBAN) 2 % ointment APPLY TO AFFECTED AREA(S) TOPICALLY AS NEEDED  Qty: 22 g, Refills: 0      VENTOLIN  (90 Base) MCG/ACT inhaler INHALE 2 PUFFS INTO THE LUNGS EVERY 6 HOURS AS NEEDED FOR WHEEZING  Qty: 18 g, Refills: 2      cetirizine-psuedoephedrine (ZYRTEC-D) 5-120 MG per extended release tablet TAKE 1 TABLET BY MOUTH 2 TIMES A DAY  Qty: 60 tablet, Refills: 1    Associated Diagnoses: Chronic pansinusitis      Probiotic Product (RA PROBIOTIC MAX STRENGTH) CAPS Take 1 capsule by mouth 2 times daily  Qty: 60 capsule, Refills: 3    Associated Diagnoses: Constipation, unspecified constipation type      folic acid (FOLVITE) 1 MG tablet Take 1 tablet by mouth daily  Qty: 30 tablet, Refills: 0      Fluticasone Furoate-Vilanterol 200-25 MCG/INH AEPB Inhale 1 puff into the lungs daily  Qty: 1 each, Refills: 2      ibuprofen (ADVIL;MOTRIN) 800 MG tablet Take 1 tablet by mouth every 8 hours as needed for Pain  Qty: 30

## 2020-03-09 NOTE — PROGRESS NOTES
Discharge orders acknowledged by RN . Discharge teaching completed with pt. AVS reviewed and all questions answered. New prescriptions and current medication regimen reviewed and pt understands schedule. Follow up appointments also reviewed with pt and resources given for discharge. Pt was given written prescriptions to be filled and understands schedule. IV removed. Telemonitor removed and returned to [facility]. All other education completed. Required core measures completed. Pt vitals WDL. Pt discharged with all belongings. Pt refused wheelchair transport walking with steady gait. No complications.

## 2020-03-10 LAB
EKG ATRIAL RATE: 81 BPM
EKG DIAGNOSIS: NORMAL
EKG Q-T INTERVAL: 364 MS
EKG QRS DURATION: 74 MS
EKG QTC CALCULATION (BAZETT): 459 MS
EKG R AXIS: -1 DEGREES
EKG T AXIS: 64 DEGREES
EKG VENTRICULAR RATE: 96 BPM

## 2020-03-10 PROCEDURE — 93010 ELECTROCARDIOGRAM REPORT: CPT | Performed by: INTERNAL MEDICINE

## 2020-03-16 ENCOUNTER — TELEPHONE (OUTPATIENT)
Dept: ENT CLINIC | Age: 45
End: 2020-03-16

## 2020-03-19 RX ORDER — CLARITHROMYCIN 500 MG/1
500 TABLET, COATED ORAL 2 TIMES DAILY
Qty: 28 TABLET | Refills: 0 | OUTPATIENT
Start: 2020-03-19 | End: 2020-04-02

## 2020-03-20 ENCOUNTER — TELEPHONE (OUTPATIENT)
Dept: ENT CLINIC | Age: 45
End: 2020-03-20

## 2020-03-20 RX ORDER — CLARITHROMYCIN 500 MG/1
500 TABLET, COATED ORAL 2 TIMES DAILY
Qty: 28 TABLET | Refills: 0 | Status: SHIPPED | OUTPATIENT
Start: 2020-03-20 | End: 2020-04-03

## 2020-03-20 NOTE — TELEPHONE ENCOUNTER
----- Message from Onelia Mendez MD sent at 3/20/2020  1:29 PM EDT -----  Done  ----- Message -----  From: Jus Orozco LPN  Sent: 3/06/8817   1:06 PM EDT  To: Onelia Mendez MD    Called patient back with your message of antibiotic and heart medication reaction. States her last dose of Cardizem was the day she left hospital 3/9/20. Has not taken any cardizem since as per \"the doctor\" told her she did not need to take it everyday. States missed antibiotic doses while hospitalized March/7/,8/9th and is requesting refill. Also requesting thrush medication. Pharmacy is the [place] [id number].

## 2020-03-20 NOTE — TELEPHONE ENCOUNTER
Patient aware medications sent in to [facility] and to call [phone] and ask for transfer. If not able to transfer-please  medication at [address].

## 2020-04-03 ENCOUNTER — TELEPHONE (OUTPATIENT)
Dept: BARIATRICS/WEIGHT MGMT | Age: 45
End: 2020-04-03

## 2020-04-03 ENCOUNTER — TELEMEDICINE (OUTPATIENT)
Dept: BARIATRICS/WEIGHT MGMT | Age: 45
End: 2020-04-03
Payer: COMMERCIAL

## 2020-04-03 VITALS — BODY MASS INDEX: 38.27 KG/M2 | WEIGHT: 216 LBS | HEIGHT: 63 IN

## 2020-04-03 PROBLEM — E78.2 HYPERLIPIDEMIA, MIXED: Status: ACTIVE | Noted: 2017-11-06

## 2020-04-03 PROBLEM — E66.01 SEVERE OBESITY (BMI 35.0-39.9) WITH COMORBIDITY (HCC): Status: ACTIVE | Noted: 2020-04-03

## 2020-04-03 PROCEDURE — 1036F TOBACCO NON-USER: CPT | Performed by: SURGERY

## 2020-04-03 PROCEDURE — G8428 CUR MEDS NOT DOCUMENT: HCPCS | Performed by: SURGERY

## 2020-04-03 PROCEDURE — G8417 CALC BMI ABV UP PARAM F/U: HCPCS | Performed by: SURGERY

## 2020-04-03 PROCEDURE — 99204 OFFICE O/P NEW MOD 45 MIN: CPT | Performed by: SURGERY

## 2020-04-03 PROCEDURE — 1111F DSCHRG MED/CURRENT MED MERGE: CPT | Performed by: SURGERY

## 2020-04-03 RX ORDER — M-VIT,TX,IRON,MINS/CALC/FOLIC 27MG-0.4MG
1 TABLET ORAL DAILY
Qty: 30 TABLET | Refills: 1 | Status: SHIPPED | OUTPATIENT
Start: 2020-04-03 | End: 2020-06-25 | Stop reason: SDUPTHER

## 2020-04-03 RX ORDER — PANTOPRAZOLE SODIUM 40 MG/1
TABLET, DELAYED RELEASE ORAL
Qty: 30 TABLET | Refills: 0 | Status: SHIPPED | OUTPATIENT
Start: 2020-04-03 | End: 2020-05-04

## 2020-04-03 RX ORDER — CETIRIZINE HYDROCHLORIDE, PSEUDOEPHEDRINE HYDROCHLORIDE 5; 120 MG/1; MG/1
TABLET, FILM COATED, EXTENDED RELEASE ORAL
Qty: 60 TABLET | Refills: 1 | Status: SHIPPED | OUTPATIENT
Start: 2020-04-03 | End: 2020-06-25 | Stop reason: SDUPTHER

## 2020-04-03 RX ORDER — IBUPROFEN 800 MG/1
800 TABLET ORAL EVERY 8 HOURS PRN
Qty: 30 TABLET | Refills: 0 | Status: SHIPPED | OUTPATIENT
Start: 2020-04-03 | End: 2020-06-25 | Stop reason: SDUPTHER

## 2020-04-03 RX ORDER — NEEDLES, DISPOSABLE 25GX5/8"
1 NEEDLE, DISPOSABLE MISCELLANEOUS
Qty: 12 EACH | Refills: 1 | Status: SHIPPED | OUTPATIENT
Start: 2020-04-03 | End: 2020-06-25 | Stop reason: SDUPTHER

## 2020-04-03 RX ORDER — MONTELUKAST SODIUM 10 MG/1
TABLET ORAL
Qty: 30 TABLET | Refills: 2 | Status: SHIPPED | OUTPATIENT
Start: 2020-04-03 | End: 2020-08-19 | Stop reason: SDUPTHER

## 2020-04-03 RX ORDER — FOLIC ACID 1 MG/1
1 TABLET ORAL DAILY
Qty: 30 TABLET | Refills: 0 | Status: SHIPPED | OUTPATIENT
Start: 2020-04-03 | End: 2020-05-04

## 2020-04-03 RX ORDER — ALBUTEROL SULFATE 90 UG/1
AEROSOL, METERED RESPIRATORY (INHALATION)
Qty: 18 G | Refills: 2 | Status: SHIPPED | OUTPATIENT
Start: 2020-04-03 | End: 2020-06-25 | Stop reason: SDUPTHER

## 2020-04-03 RX ORDER — FLUTICASONE FUROATE AND VILANTEROL 200; 25 UG/1; UG/1
1 POWDER RESPIRATORY (INHALATION) DAILY
Qty: 1 EACH | Refills: 2 | Status: SHIPPED
Start: 2020-04-03 | End: 2020-09-11 | Stop reason: CLARIF

## 2020-04-03 NOTE — PROGRESS NOTES
[facility]) Physicians   [employer] [facility] [employer]  Samuel Matson MD, [address] [facility], [address] [place] [id number] . Phone: [phone]  Fax: [phone]       TELEHEALTH EVALUATION -- Audio/Visual (During UWXXN-31 public health emergency)         Chief Complaint   Patient presents with    Weight Management         HPI:    Due to the COVID-19 pandemic restrictions on close contact interactions as recommended by CDC and health authorities, the patient's visit was conducted via telemedicine in lieu of a face to face visit. Patient verbally consented and agreed to proceed. Brandon Anton is a very pleasant 53 y.o. obese female ,   Body mass index is 38.88 kg/m². And multiple medical problems who is presenting via telemedicine for weight loss evaluation and consultation by Dr. Mariaelena Galvan. Patient has been struggling for several years now with obesity. Patient feels the weight is an obstacle to achieve and perform things in  daily living and is posing risk on health. Tries to diet, and exercise but can't keep the weight off. Patient tried optifast and other regimens, but with no sustainable weight loss. Patient  is very determined to lose weight and be healthy, and is interested in joining our weight loss program to achieve this goal.    Otherwise patient denies any nausea, vomiting, fevers, chills, shortness of breath, chest pain, constipation or urinary symptoms.       Pain Assessment   Denies any abdominal pain     Past Medical History:   Diagnosis Date    Anemia, iron deficiency     Asthma     Autoimmune hepatitis ([address]     Fissure, anal     History of blood transfusion     Hypothyroidism     Menorrhagia with regular cycle     Morbid obesity with BMI of 40.0-44.9, adult ([facility]) 5/26/2015    MRSA (methicillin resistant staph aureus) culture positive 02/03/2017    abdominal abscess    MRSA infection 08/20/2012    rt thigh abscess    Pericarditis, acute     (CARMOL) 10 % cream, APPLY TO AFFECTED AREA(S) TOPICALLY AS NEEDED, Disp: 85 g, Rfl: 0    cyanocobalamin 1000 MCG/ML injection, INJECT 1ML INTO THE SKIN ONCE MONTHLY, Disp: 1 mL, Rfl: 0    BETASEPT SURGICAL SCRUB 4 % external liquid, APPLY TO AFFECTED AREA(S) TOPICALLY AS NEEDED, Disp: 473 mL, Rfl: 0    mupirocin (BACTROBAN) 2 % ointment, APPLY TO AFFECTED AREA(S) TOPICALLY AS NEEDED, Disp: 22 g, Rfl: 0    MONOJECT MAGELLAN SYRINGE 25G X 5/8\" 3 ML MISC, USE AS DIRECTED FOR INJECTION, Disp: 12 each, Rfl: 1    VENTOLIN  (90 Base) MCG/ACT inhaler, INHALE 2 PUFFS INTO THE LUNGS EVERY 6 HOURS AS NEEDED FOR WHEEZING, Disp: 18 g, Rfl: 2    cetirizine-psuedoephedrine (ZYRTEC-D) 5-120 MG per extended release tablet, TAKE 1 TABLET BY MOUTH 2 TIMES A DAY, Disp: 60 tablet, Rfl: 1    Probiotic Product (RA PROBIOTIC MAX STRENGTH) CAPS, Take 1 capsule by mouth 2 times daily, Disp: 60 capsule, Rfl: 3    folic acid (FOLVITE) 1 MG tablet, Take 1 tablet by mouth daily, Disp: 30 tablet, Rfl: 0    Fluticasone Furoate-Vilanterol 200-25 MCG/INH AEPB, Inhale 1 puff into the lungs daily, Disp: 1 each, Rfl: 2    Misc. Devices (HIBICLENS HAND PUMP 32OZ) MISC, Use as needed for skin irritation or bumps., Disp: 1 each, Rfl: 0    ibuprofen (ADVIL;MOTRIN) 800 MG tablet, Take 1 tablet by mouth every 8 hours as needed for Pain, Disp: 30 tablet, Rfl: 0    albuterol (PROVENTIL) (5 MG/ML) 0.5% nebulizer solution, Take 1 mL by nebulization 4 times daily as needed for Wheezing, Disp: 120 each, Rfl: 3    Misc Natural Product Nasal (PONARIS) SOLN, 1 spray by Each Nare route every 6 hours as needed (as need), Disp: 1 Bottle, Rfl: 5    levothyroxine (SYNTHROID) 125 MCG tablet, Take 1 tablet by mouth daily, Disp: 30 tablet, Rfl: 5    mometasone (ELOCON) 0.1 % cream, Apply topically daily. , Disp: 1 Tube, Rfl: 2    Multiple Vitamins-Minerals (THERAPEUTIC MULTIVITAMIN-MINERALS) tablet, Take 1 tablet by mouth daily, Disp: 30 tablet, Rfl: 1    polyethylene glycol (MIRALAX) powder, Take 17 g by mouth as needed (constipation) Take as directed., Disp: 119 g, Rfl: 1    NEEDLE, DISP, 25 G (BD DISP NEEDLES) 25G X 5/8\" MISC, 1 packet by Does not apply route every 30 days, Disp: 12 each, Rfl: 1    Handicap Placard MISC, by Does not apply route Duration 2 weeks, Disp: 1 each, Rfl: 0      Review of Systems - History obtained from the patient  General ROS: negative  Psychological ROS: negative  Ophthalmic ROS: negative  Neurological ROS: negative  ENT ROS: negative  Allergy and Immunology ROS: negative  Hematological and Lymphatic ROS: anemia   Endocrine ROS: negative  Breast ROS: negative  Respiratory ROS: negative  Cardiovascular ROS: palpitations   Gastrointestinal ROS:negative  Genito-Urinary ROS: negative  Musculoskeletal ROS: joint pain  Skin ROS: negative      Physical Exam   Deferred       Gaylin Cockayne was seen today for weight management. Diagnoses and all orders for this visit:    Elevated liver enzymes    Severe obesity (BMI 35.0-39. 9) with comorbidity ([address]    Atrial fibrillation with RVR ([address]            Patient Active Problem List   Diagnosis    Anemia    Hypothyroidism    Chronic sinusitis    Asthma in adult    Congestion    Chronic rhinosinusitis    Fracture of finger, middle or proximal phalanx    Iron deficiency anemia    Positive WESTON (antinuclear antibody)    Eczema    Anal fissure    Intestinal malabsorption    Morbid obesity with BMI of 40.0-44.9, adult (HCC)    Recurrent acute sinusitis    Chest pain on breathing    Chronic blood loss anemia    History of cardiovascular disorder    Right upper quadrant abdominal pain    Callus of foot    Thrombocytopenia (HCC)    Onychomycosis    Treatment    Abdominal wall cellulitis    MRSA infection    Abdominal wall abscess    Chronic ITP (idiopathic thrombocytopenia) (HCC)    Hyperlipidemia, mixed    Other headache syndrome    Low folate    Autoimmune hepatitis ([address]   

## 2020-04-03 NOTE — TELEPHONE ENCOUNTER
Pt had video visit with Dr Chris Thomas this am. I tried to call her to reschedule her next appt with Catarino Valiente but got n/a. LM for her to call back.

## 2020-04-13 RX ORDER — LEVOTHYROXINE SODIUM 0.12 MG/1
125 TABLET ORAL DAILY
Qty: 30 TABLET | Refills: 5 | Status: CANCELLED | OUTPATIENT
Start: 2020-04-13

## 2020-04-13 RX ORDER — MOMETASONE FUROATE 1 MG/G
CREAM TOPICAL
Qty: 1 TUBE | Refills: 2 | Status: CANCELLED | OUTPATIENT
Start: 2020-04-13

## 2020-04-13 RX ORDER — CYANOCOBALAMIN 1000 UG/ML
INJECTION INTRAMUSCULAR; SUBCUTANEOUS
Qty: 1 ML | Refills: 0 | Status: CANCELLED | OUTPATIENT
Start: 2020-04-13

## 2020-04-13 RX ORDER — EUCALYPTUS/PEPPERMINT OIL
1 SOLUTION, NON-ORAL NASAL EVERY 6 HOURS PRN
Qty: 1 BOTTLE | Refills: 5 | Status: CANCELLED | OUTPATIENT
Start: 2020-04-13 | End: 2020-05-13

## 2020-04-27 NOTE — TELEPHONE ENCOUNTER
Medication:   Requested Prescriptions     Pending Prescriptions Disp Refills    liothyronine (CYTOMEL) 5 MCG tablet [Pharmacy Med Name: LIOTHYRONINE SOD 5 MCG TAB] 30 tablet 0     Sig: TAKE ONE TABLET BY MOUTH DAILY         Last appt: 1/22/2019   Next appt: Visit date not found    Last OARRS:   RX Monitoring 2/19/2018   Attestation The Prescription Monitoring Report for this patient was reviewed today.

## 2020-04-28 RX ORDER — LIOTHYRONINE SODIUM 5 UG/1
TABLET ORAL
Qty: 30 TABLET | Refills: 0 | Status: SHIPPED | OUTPATIENT
Start: 2020-04-28 | End: 2020-05-05

## 2020-04-30 ASSESSMENT — ENCOUNTER SYMPTOMS
ALLERGIC/IMMUNOLOGIC NEGATIVE: 1
GASTROINTESTINAL NEGATIVE: 1
RESPIRATORY NEGATIVE: 1
EYES NEGATIVE: 1

## 2020-04-30 NOTE — PROGRESS NOTES
smoking and risks of ETOH. Allergies   Allergen Reactions    Latex Swelling and Dermatitis     Also \"burning of skin\"      Clindamycin/Lincomycin Hives and Other (See Comments)     \"Throat closing\"    Sulfa Antibiotics Anaphylaxis and Hives    Diflucan [Fluconazole] Hives and Itching    Other Hives     Vitals:    05/04/20 1108   Weight: 219 lb (99.3 kg)   Height: 5' 2.5\" (1.588 m)     Body mass index is 39.42 kg/m².     Current Outpatient Medications:     folic acid (FOLVITE) 1 MG tablet, TAKE ONE TABLET BY MOUTH DAILY, Disp: 30 tablet, Rfl: 0    pantoprazole (PROTONIX) 40 MG tablet, TAKE ONE TABLET BY MOUTH DAILY, Disp: 30 tablet, Rfl: 0    liothyronine (CYTOMEL) 5 MCG tablet, TAKE ONE TABLET BY MOUTH DAILY, Disp: 30 tablet, Rfl: 0    montelukast (SINGULAIR) 10 MG tablet, TAKE ONE TABLET BY MOUTH EVERY EVENING, Disp: 30 tablet, Rfl: 2    albuterol sulfate HFA (VENTOLIN HFA) 108 (90 Base) MCG/ACT inhaler, INHALE 2 PUFFS INTO THE LUNGS EVERY 6 HOURS AS NEEDED FOR WHEEZING, Disp: 18 g, Rfl: 2    cetirizine-psuedoephedrine (ZYRTEC-D) 5-120 MG per extended release tablet, TAKE 1 TABLET BY MOUTH 2 TIMES A DAY, Disp: 60 tablet, Rfl: 1    Fluticasone furoate-vilanterol (BREO ELLIPTA) 200-25 MCG/INH AEPB inhaler, Inhale 1 puff into the lungs daily, Disp: 1 each, Rfl: 2    ibuprofen (ADVIL;MOTRIN) 800 MG tablet, Take 1 tablet by mouth every 8 hours as needed for Pain, Disp: 30 tablet, Rfl: 0    albuterol (PROVENTIL) (5 MG/ML) 0.5% nebulizer solution, Take 1 mL by nebulization 4 times daily as needed for Wheezing, Disp: 120 each, Rfl: 3    Multiple Vitamins-Minerals (THERAPEUTIC MULTIVITAMIN-MINERALS) tablet, Take 1 tablet by mouth daily, Disp: 30 tablet, Rfl: 1    NEEDLE, DISP, 25 G (BD DISP NEEDLES) 25G X 5/8\" MISC, 1 packet by Does not apply route every 30 days, Disp: 12 each, Rfl: 1    dilTIAZem (CARDIZEM CD) 120 MG extended release capsule, Take 1 capsule by mouth daily, Disp: 30 capsule, Rfl: 3   EOSABS 0.1 03/09/2020     Lab Results   Component Value Date     03/09/2020    K 3.8 03/09/2020    K 3.6 03/07/2020     03/09/2020    CO2 17 03/09/2020    ANIONGAP 12 03/09/2020    GLUCOSE 101 03/09/2020    BUN 11 03/09/2020    CREATININE 0.7 03/09/2020    LABGLOM >60 03/09/2020    GFRAA >60 03/09/2020    GFRAA 100 09/10/2012    CALCIUM 8.7 03/09/2020    PROT 7.3 03/09/2020    PROT 8.4 05/17/2012    LABALBU 3.2 03/09/2020    AGRATIO 0.8 03/09/2020    BILITOT 0.3 03/09/2020    ALKPHOS 240 03/09/2020    ALT 74 03/09/2020    AST 62 03/09/2020    GLOB 4.1 03/09/2020     Lab Results   Component Value Date    CHOL 174 10/24/2013    TRIG 93 10/24/2013    HDL 65 10/28/2017    LDLCALC 139 10/28/2017    LABVLDL 20 10/28/2017     Lab Results   Component Value Date    TSHREFLEX 10.08 03/07/2020     Lab Results   Component Value Date    IRON 106 02/21/2017    TIBC 259 02/21/2017    LABIRON 9 11/26/2016     Lab Results   Component Value Date    AHHGSYPB00 325 08/08/2018    FOLATE 6.83 08/08/2018     No results found for: VITD25  Lab Results   Component Value Date    LABA1C 5.7 01/22/2019    .9 01/22/2019       Patient Active Problem List   Diagnosis    Anemia    Hypothyroidism    Chronic sinusitis    Asthma in adult    Congestion    Chronic rhinosinusitis    Fracture of finger, middle or proximal phalanx    Iron deficiency anemia    Positive WESTON (antinuclear antibody)    Eczema    Anal fissure    Intestinal malabsorption    Morbid obesity with BMI of 40.0-44.9, adult (HCC)    Recurrent acute sinusitis    Chest pain on breathing    Chronic blood loss anemia    History of cardiovascular disorder    Right upper quadrant abdominal pain    Callus of foot    Thrombocytopenia (HCC)    Onychomycosis    Treatment    Abdominal wall cellulitis    MRSA infection    Abdominal wall abscess    Chronic ITP (idiopathic thrombocytopenia) (HCC)    Hyperlipidemia, mixed    Other headache syndrome   

## 2020-05-04 ENCOUNTER — TELEMEDICINE (OUTPATIENT)
Dept: BARIATRICS/WEIGHT MGMT | Age: 45
End: 2020-05-04
Payer: COMMERCIAL

## 2020-05-04 VITALS — HEIGHT: 63 IN | BODY MASS INDEX: 38.8 KG/M2 | WEIGHT: 219 LBS

## 2020-05-04 PROCEDURE — 99213 OFFICE O/P EST LOW 20 MIN: CPT | Performed by: NURSE PRACTITIONER

## 2020-05-04 PROCEDURE — G8417 CALC BMI ABV UP PARAM F/U: HCPCS | Performed by: NURSE PRACTITIONER

## 2020-05-04 PROCEDURE — 1036F TOBACCO NON-USER: CPT | Performed by: NURSE PRACTITIONER

## 2020-05-04 PROCEDURE — G8427 DOCREV CUR MEDS BY ELIG CLIN: HCPCS | Performed by: NURSE PRACTITIONER

## 2020-05-04 ASSESSMENT — ENCOUNTER SYMPTOMS
COUGH: 0
SHORTNESS OF BREATH: 0

## 2020-05-04 NOTE — PROGRESS NOTES
Zeeshan Del Angel lost 3 lbs over past ~ month. Pt is very stressed with the quarantine, states she is waiting on unemployment. Current treatment plan:   Patient is not on medication. Negative side effects from medications? n/a  Patient is not on Optifast.   Patient is on diet and exercise only plan. Treatment plan details: 1200 calorie Low Carb    Is patient adhering to diet/me al plan?: no, states her situation is not good, still waiting on unemployment     Carbohydrate consumption: not tracking    Pt states she is generally eating 1-2x/day. States her mind is preoccupied and stressed, forgets to eat. Schedule is off track. B- bananas & blackberries  L- lunch meat sandwich on 1 slice of bread  S- organic chips & guacamole/salsa  D- grilled chicken w/ tomatoes/guac/salsa sandwich  S- bowl of cereal (smart start)    Fluids: 1/2 beer OR 1-2 shots of liquor OR wine     Consuming at least 64oz of calorie free fluids? yes - water    Participating in intentional exercise? Yes Details: walking 3x/wk, plus taking her nephew out some    Plan/Goals:   - Structure meals, eat 4x/day, focus on lean protein  - Limit/avoid alcohol  - Listen to internal cues of satisfaction / portions    Handouts: protein bar / budget friendly foods    Due to the COVID-19 restrictions on close contact interactions the patient's visit was conducted via telephone in elli of a face to face visit. The patient is here through telemedicine for their 2nd MWM visit.     Destiny Alcantar

## 2020-05-04 NOTE — PATIENT INSTRUCTIONS
Key dietary points:     You should be eating protein at every meal and snack.  Protein is typically found in animal sources, i.e. chicken, lean beef, lean pork, fish, seafood and eggs. It is also found in low-fat dairy sources such as skim or 1% milk, low-fat yogurt, low-fat cheese, and low-fat cottage cheese. Plant based sources of protein include peanut butter, beans, nuts, seeds, hummus and soy.  Meats (preferably organic or grass fed) are great sources of protein and have no carbohydrates.  It is suggested to use coconut, olive, avocado, or almond oils. Choose vegetables that grow above ground as they are generally lower in carbohydrates and higher in fiber.  Avoid starches such as bread, rice, potatoes, pasta and all sources of simple sugars (desserts, soda, breakfast cereals). Choose beverages that are low in calories and sugar.  You should be drinking 64 ounces of low calorie (5 calories or less per serving) fluids per day. Suggestions include:  o Water (you may add fresh fruit, lemon, lime, cucumber or mint)  o Crystal Light  o [place] Liquid Water Enhancer  o Propel Zero  o Powerade Zero/Gatorade Zero  o Isopure  o Emict9B  o SOBE Lifewater Zero  o Vitamin Water Zero  o Sugar Free Redd-Aid    You should be eating 4-5 times per day.  Three small meals plus 1-2 snacks per day is your goal. This balances your calories and nutrients evenly throughout the day and helps to boost your metabolism. Refer to the snack list provided at your initial visit. You should be utilizing the 9-inch plate method.  Eating on a smaller plate will help you control portion sizes, but what you put on your plate counts. Make ¼ of your plate lean protein, ¼ carbohydrate (fruit or dairy) and ½ the plate non-starchy vegetables. You should be reducing added fat and sugar in your diet.  Frying foods adds too much fat and calories, but you could use an air fryer as it requires significantly less oil.  Baking,

## 2020-05-05 RX ORDER — LIOTHYRONINE SODIUM 5 UG/1
TABLET ORAL
Qty: 30 TABLET | Refills: 0 | Status: SHIPPED | OUTPATIENT
Start: 2020-05-05 | End: 2020-06-25 | Stop reason: SDUPTHER

## 2020-05-26 ASSESSMENT — ENCOUNTER SYMPTOMS
EYES NEGATIVE: 1
ALLERGIC/IMMUNOLOGIC NEGATIVE: 1
GASTROINTESTINAL NEGATIVE: 1
RESPIRATORY NEGATIVE: 1

## 2020-05-26 NOTE — PROGRESS NOTES
smoking and risks of ETOH. Allergies   Allergen Reactions    Latex Swelling and Dermatitis     Also \"burning of skin\"      Clindamycin/Lincomycin Hives and Other (See Comments)     \"Throat closing\"    Sulfa Antibiotics Anaphylaxis and Hives    Diflucan [Fluconazole] Hives and Itching    Other Hives     Vitals:    06/01/20 1259   Weight: 218 lb (98.9 kg)   Height: 5' 2.5\" (1.588 m)     Body mass index is 39.24 kg/m².     Current Outpatient Medications:     liothyronine (CYTOMEL) 5 MCG tablet, TAKE ONE TABLET BY MOUTH DAILY, Disp: 30 tablet, Rfl: 0    folic acid (FOLVITE) 1 MG tablet, TAKE ONE TABLET BY MOUTH DAILY, Disp: 30 tablet, Rfl: 0    pantoprazole (PROTONIX) 40 MG tablet, TAKE ONE TABLET BY MOUTH DAILY, Disp: 30 tablet, Rfl: 0    montelukast (SINGULAIR) 10 MG tablet, TAKE ONE TABLET BY MOUTH EVERY EVENING, Disp: 30 tablet, Rfl: 2    albuterol sulfate HFA (VENTOLIN HFA) 108 (90 Base) MCG/ACT inhaler, INHALE 2 PUFFS INTO THE LUNGS EVERY 6 HOURS AS NEEDED FOR WHEEZING, Disp: 18 g, Rfl: 2    cetirizine-psuedoephedrine (ZYRTEC-D) 5-120 MG per extended release tablet, TAKE 1 TABLET BY MOUTH 2 TIMES A DAY, Disp: 60 tablet, Rfl: 1    Fluticasone furoate-vilanterol (BREO ELLIPTA) 200-25 MCG/INH AEPB inhaler, Inhale 1 puff into the lungs daily, Disp: 1 each, Rfl: 2    ibuprofen (ADVIL;MOTRIN) 800 MG tablet, Take 1 tablet by mouth every 8 hours as needed for Pain, Disp: 30 tablet, Rfl: 0    albuterol (PROVENTIL) (5 MG/ML) 0.5% nebulizer solution, Take 1 mL by nebulization 4 times daily as needed for Wheezing, Disp: 120 each, Rfl: 3    Multiple Vitamins-Minerals (THERAPEUTIC MULTIVITAMIN-MINERALS) tablet, Take 1 tablet by mouth daily, Disp: 30 tablet, Rfl: 1    NEEDLE, DISP, 25 G (BD DISP NEEDLES) 25G X 5/8\" MISC, 1 packet by Does not apply route every 30 days, Disp: 12 each, Rfl: 1    dilTIAZem (CARDIZEM CD) 120 MG extended release capsule, Take 1 capsule by mouth daily, Disp: 30 capsule, Rfl: 3   EOSABS 0.1 03/09/2020     Lab Results   Component Value Date     03/09/2020    K 3.8 03/09/2020    K 3.6 03/07/2020     03/09/2020    CO2 17 03/09/2020    ANIONGAP 12 03/09/2020    GLUCOSE 101 03/09/2020    BUN 11 03/09/2020    CREATININE 0.7 03/09/2020    LABGLOM >60 03/09/2020    GFRAA >60 03/09/2020    GFRAA 100 09/10/2012    CALCIUM 8.7 03/09/2020    PROT 7.3 03/09/2020    PROT 8.4 05/17/2012    LABALBU 3.2 03/09/2020    AGRATIO 0.8 03/09/2020    BILITOT 0.3 03/09/2020    ALKPHOS 240 03/09/2020    ALT 74 03/09/2020    AST 62 03/09/2020    GLOB 4.1 03/09/2020     Lab Results   Component Value Date    CHOL 174 10/24/2013    TRIG 93 10/24/2013    HDL 65 10/28/2017    LDLCALC 139 10/28/2017    LABVLDL 20 10/28/2017     Lab Results   Component Value Date    TSHREFLEX 10.08 03/07/2020     Lab Results   Component Value Date    IRON 106 02/21/2017    TIBC 259 02/21/2017    LABIRON 9 11/26/2016     Lab Results   Component Value Date    [id number] 325 08/08/2018    FOLATE 6.83 08/08/2018     No results found for: VITD25  Lab Results   Component Value Date    LABA1C 5.7 01/22/2019    .9 01/22/2019       Patient Active Problem List   Diagnosis    Anemia    Hypothyroidism    Chronic sinusitis    Asthma in adult    Congestion    Chronic rhinosinusitis    Fracture of finger, middle or proximal phalanx    Iron deficiency anemia    Positive WESTON (antinuclear antibody)    Eczema    Anal fissure    Intestinal malabsorption    Morbid obesity with BMI of 40.0-44.9, adult (HCC)    Recurrent acute sinusitis    Chest pain on breathing    Chronic blood loss anemia    History of cardiovascular disorder    Right upper quadrant abdominal pain    Callus of foot    Thrombocytopenia (HCC)    Onychomycosis    Treatment    Abdominal wall cellulitis    MRSA infection    Abdominal wall abscess    Chronic ITP (idiopathic thrombocytopenia) (HCC)    Hyperlipidemia, mixed    Other headache syndrome   

## 2020-05-26 NOTE — PATIENT INSTRUCTIONS
Key dietary points:     You should be eating protein at every meal and snack.  Protein is typically found in animal sources, i.e. chicken, lean beef, lean pork, fish, seafood and eggs. It is also found in low-fat dairy sources such as skim or 1% milk, low-fat yogurt, low-fat cheese, and low-fat cottage cheese. Plant based sources of protein include peanut butter, beans, nuts, seeds, hummus and soy.  Meats (preferably organic or grass fed) are great sources of protein and have no carbohydrates.  It is suggested to use coconut, olive, avocado, or almond oils. Choose vegetables that grow above ground as they are generally lower in carbohydrates and higher in fiber.  Avoid starches such as bread, rice, potatoes, pasta and all sources of simple sugars (desserts, soda, breakfast cereals). Choose beverages that are low in calories and sugar.  You should be drinking 64 ounces of low calorie (5 calories or less per serving) fluids per day. Suggestions include:  o Water (you may add fresh fruit, lemon, lime, cucumber or mint)  o Crystal Light  o [place] Liquid Water Enhancer  o Propel Zero  o Powerade Zero/Gatorade Zero  o Isopure  o [id number]  o SOBE Lifewater Zero  o Vitamin Water Zero  o Sugar Free Redd-Aid    You should be eating 4-5 times per day.  Three small meals plus 1-2 snacks per day is your goal. This balances your calories and nutrients evenly throughout the day and helps to boost your metabolism. Refer to the snack list provided at your initial visit. You should be utilizing the 9-inch plate method.  Eating on a smaller plate will help you control portion sizes, but what you put on your plate counts. Make ¼ of your plate lean protein, ¼ carbohydrate (fruit or dairy) and ½ the plate non-starchy vegetables. You should be reducing added fat and sugar in your diet.  Frying foods adds too much fat and calories, but you could use an air fryer as it requires significantly less oil.  Baking,

## 2020-06-01 ENCOUNTER — TELEMEDICINE (OUTPATIENT)
Dept: BARIATRICS/WEIGHT MGMT | Age: 45
End: 2020-06-01
Payer: COMMERCIAL

## 2020-06-01 VITALS — HEIGHT: 63 IN | BODY MASS INDEX: 38.62 KG/M2 | WEIGHT: 218 LBS

## 2020-06-01 PROCEDURE — G8417 CALC BMI ABV UP PARAM F/U: HCPCS | Performed by: NURSE PRACTITIONER

## 2020-06-01 PROCEDURE — 1036F TOBACCO NON-USER: CPT | Performed by: NURSE PRACTITIONER

## 2020-06-01 PROCEDURE — G8427 DOCREV CUR MEDS BY ELIG CLIN: HCPCS | Performed by: NURSE PRACTITIONER

## 2020-06-01 PROCEDURE — 99213 OFFICE O/P EST LOW 20 MIN: CPT | Performed by: NURSE PRACTITIONER

## 2020-06-01 ASSESSMENT — ENCOUNTER SYMPTOMS
SHORTNESS OF BREATH: 0
COUGH: 0

## 2020-06-01 NOTE — PROGRESS NOTES
Leonardo Payton lost 1 lbs over past ~ month. Pt has cut back on alcohol from 3 drinks per day to 1. Pt states her days are inconsistent with sleep, struggling with insomnia. Treatment plan details: 1200 oly LC meal plan  Is patient adhering to treatment plan: no - states she doesn't feel well with the protein. Trying to eat more veggies and balance. Is pt eating 4-5 times each day? eating 2-4x/day    Is pt including lean protein with all meals and snacks? within reason / limiting - some fish    Is pt avoiding added sugars and starchy foods? limiting snack foods / eating some fruits / eating some rice    Consuming at least 64oz of calorie free fluids? 48oz - water / sometimes w/ lemon / sometimes tea w/ honey     Participating in intentional exercise? yes - dancing 3x/wk (salsa) for ~15 min & walking 2x/wk for 15-20 min    Plan/Goals:   - Limit fruit to 1/2 cup per sitting  - Continue exercise  - Work on consistent eating    Handouts: none    Due to the COVID-19 restrictions on close contact interactions the patient's visit was conducted via telephone in elli of a face to face visit. The patient is here through telemedicine for their 3rd MWM.     Dorothea Li

## 2020-06-02 RX ORDER — FOLIC ACID 1 MG/1
TABLET ORAL
Qty: 30 TABLET | Refills: 0 | Status: SHIPPED | OUTPATIENT
Start: 2020-06-02 | End: 2020-06-25 | Stop reason: SDUPTHER

## 2020-06-02 RX ORDER — PANTOPRAZOLE SODIUM 40 MG/1
TABLET, DELAYED RELEASE ORAL
Qty: 30 TABLET | Refills: 0 | Status: SHIPPED | OUTPATIENT
Start: 2020-06-02 | End: 2020-07-02

## 2020-06-22 RX ORDER — LEVOTHYROXINE SODIUM 0.12 MG/1
125 TABLET ORAL DAILY
Qty: 30 TABLET | Refills: 5 | Status: SHIPPED | OUTPATIENT
Start: 2020-06-22 | End: 2020-09-18 | Stop reason: SDUPTHER

## 2020-06-22 RX ORDER — CYANOCOBALAMIN 1000 UG/ML
INJECTION INTRAMUSCULAR; SUBCUTANEOUS
Qty: 1 ML | Refills: 0 | Status: SHIPPED | OUTPATIENT
Start: 2020-06-22 | End: 2020-08-19

## 2020-06-22 RX ORDER — MOMETASONE FUROATE 1 MG/G
CREAM TOPICAL
Qty: 1 TUBE | Refills: 2 | Status: SHIPPED | OUTPATIENT
Start: 2020-06-22 | End: 2020-06-25 | Stop reason: SDUPTHER

## 2020-06-23 NOTE — TELEPHONE ENCOUNTER
Pharmacy was calling to get a max daily or a frequency for mupirocin (BACTROBAN) 2 % ointment   So insurance can cover it.     mupirocin (BACTROBAN) 2 % ointment    Last fill- 06/22/20  Last appt-  02/05/20 Dr. Alaina Kim  Next appt- 06/25/20  Dr. Alaina Kim

## 2020-06-25 ENCOUNTER — OFFICE VISIT (OUTPATIENT)
Dept: INTERNAL MEDICINE CLINIC | Age: 45
End: 2020-06-25
Payer: COMMERCIAL

## 2020-06-25 VITALS
HEART RATE: 97 BPM | HEIGHT: 63 IN | DIASTOLIC BLOOD PRESSURE: 69 MMHG | BODY MASS INDEX: 39.34 KG/M2 | OXYGEN SATURATION: 97 % | TEMPERATURE: 99.2 F | SYSTOLIC BLOOD PRESSURE: 111 MMHG | WEIGHT: 222 LBS

## 2020-06-25 PROBLEM — L03.311 ABDOMINAL WALL CELLULITIS: Status: RESOLVED | Noted: 2017-02-06 | Resolved: 2020-06-25

## 2020-06-25 PROBLEM — E66.01 SEVERE OBESITY (BMI 35.0-39.9) WITH COMORBIDITY (HCC): Status: RESOLVED | Noted: 2020-04-03 | Resolved: 2020-06-25

## 2020-06-25 PROBLEM — Z23: Status: RESOLVED | Noted: 2019-08-08 | Resolved: 2020-06-25

## 2020-06-25 PROBLEM — A49.02 MRSA INFECTION: Status: RESOLVED | Noted: 2017-02-08 | Resolved: 2020-06-25

## 2020-06-25 PROBLEM — G44.89 OTHER HEADACHE SYNDROME: Status: RESOLVED | Noted: 2017-11-06 | Resolved: 2020-06-25

## 2020-06-25 PROBLEM — E53.8 LOW FOLATE: Status: RESOLVED | Noted: 2017-11-06 | Resolved: 2020-06-25

## 2020-06-25 PROBLEM — J45.901 ACUTE SEVERE EXACERBATION OF ASTHMA: Status: RESOLVED | Noted: 2019-02-07 | Resolved: 2020-06-25

## 2020-06-25 PROBLEM — J45.901 MODERATE ASTHMA WITH EXACERBATION: Status: RESOLVED | Noted: 2019-02-06 | Resolved: 2020-06-25

## 2020-06-25 PROBLEM — Z98.890 STATUS POST INCISION AND DRAINAGE: Status: RESOLVED | Noted: 2018-01-18 | Resolved: 2020-06-25

## 2020-06-25 PROBLEM — L02.211 ABDOMINAL WALL ABSCESS: Status: RESOLVED | Noted: 2017-02-08 | Resolved: 2020-06-25

## 2020-06-25 PROBLEM — R74.8 ELEVATED LIVER ENZYMES: Status: RESOLVED | Noted: 2020-03-09 | Resolved: 2020-06-25

## 2020-06-25 LAB — HBA1C MFR BLD: 5.7 %

## 2020-06-25 PROCEDURE — 83036 HEMOGLOBIN GLYCOSYLATED A1C: CPT | Performed by: STUDENT IN AN ORGANIZED HEALTH CARE EDUCATION/TRAINING PROGRAM

## 2020-06-25 PROCEDURE — 99213 OFFICE O/P EST LOW 20 MIN: CPT | Performed by: STUDENT IN AN ORGANIZED HEALTH CARE EDUCATION/TRAINING PROGRAM

## 2020-06-25 RX ORDER — MOMETASONE FUROATE 1 MG/G
CREAM TOPICAL
Qty: 1 TUBE | Refills: 2 | Status: SHIPPED | OUTPATIENT
Start: 2020-06-25 | End: 2022-01-27 | Stop reason: SDUPTHER

## 2020-06-25 RX ORDER — IBUPROFEN 800 MG/1
800 TABLET ORAL EVERY 8 HOURS PRN
Qty: 30 TABLET | Refills: 0 | Status: SHIPPED | OUTPATIENT
Start: 2020-06-25 | End: 2020-08-04

## 2020-06-25 RX ORDER — KETOCONAZOLE 20 MG/ML
SHAMPOO TOPICAL
Qty: 1 BOTTLE | Refills: 0 | Status: SHIPPED | OUTPATIENT
Start: 2020-06-25 | End: 2020-08-04

## 2020-06-25 RX ORDER — ALBUTEROL SULFATE 90 UG/1
AEROSOL, METERED RESPIRATORY (INHALATION)
Qty: 18 G | Refills: 2 | Status: SHIPPED | OUTPATIENT
Start: 2020-06-25 | End: 2020-11-09 | Stop reason: SDUPTHER

## 2020-06-25 RX ORDER — POLYETHYLENE GLYCOL 3350 17 G/17G
17 POWDER, FOR SOLUTION ORAL PRN
Qty: 119 G | Refills: 1 | Status: SHIPPED | OUTPATIENT
Start: 2020-06-25 | End: 2020-08-19 | Stop reason: SDUPTHER

## 2020-06-25 RX ORDER — NEEDLES, DISPOSABLE 25GX5/8"
1 NEEDLE, DISPOSABLE MISCELLANEOUS
Qty: 12 EACH | Refills: 1 | Status: SHIPPED | OUTPATIENT
Start: 2020-06-25 | End: 2021-06-04

## 2020-06-25 RX ORDER — FOLIC ACID 1 MG/1
TABLET ORAL
Qty: 30 TABLET | Refills: 0 | Status: SHIPPED | OUTPATIENT
Start: 2020-06-25 | End: 2020-07-02

## 2020-06-25 RX ORDER — EUCALYPTUS/PEPPERMINT OIL
1 SOLUTION, NON-ORAL NASAL EVERY 6 HOURS PRN
Qty: 1 BOTTLE | Refills: 5 | Status: SHIPPED | OUTPATIENT
Start: 2020-06-25 | End: 2022-01-05

## 2020-06-25 RX ORDER — FLUTICASONE PROPIONATE 50 MCG
1 SPRAY, SUSPENSION (ML) NASAL DAILY
Qty: 2 BOTTLE | Refills: 1 | Status: SHIPPED | OUTPATIENT
Start: 2020-06-25 | End: 2020-12-28

## 2020-06-25 RX ORDER — CETIRIZINE HYDROCHLORIDE, PSEUDOEPHEDRINE HYDROCHLORIDE 5; 120 MG/1; MG/1
TABLET, FILM COATED, EXTENDED RELEASE ORAL
Qty: 60 TABLET | Refills: 1 | Status: SHIPPED | OUTPATIENT
Start: 2020-06-25 | End: 2020-09-09

## 2020-06-25 RX ORDER — M-VIT,TX,IRON,MINS/CALC/FOLIC 27MG-0.4MG
1 TABLET ORAL DAILY
Qty: 30 TABLET | Refills: 1 | Status: SHIPPED | OUTPATIENT
Start: 2020-06-25

## 2020-06-25 RX ORDER — LIOTHYRONINE SODIUM 5 UG/1
TABLET ORAL
Qty: 30 TABLET | Refills: 0 | Status: SHIPPED | OUTPATIENT
Start: 2020-06-25 | End: 2020-07-02

## 2020-06-25 ASSESSMENT — ENCOUNTER SYMPTOMS
ABDOMINAL DISTENTION: 0
SHORTNESS OF BREATH: 0
NAUSEA: 0
ABDOMINAL PAIN: 0
VOMITING: 0
DIARRHEA: 0
WHEEZING: 0
COUGH: 0

## 2020-06-25 ASSESSMENT — PATIENT HEALTH QUESTIONNAIRE - PHQ9
1. LITTLE INTEREST OR PLEASURE IN DOING THINGS: 1
SUM OF ALL RESPONSES TO PHQ QUESTIONS 1-9: 1
SUM OF ALL RESPONSES TO PHQ QUESTIONS 1-9: 1

## 2020-06-25 NOTE — PROGRESS NOTES
2020     Amparo Jefferson (:  1975) is a 53 y.o. female, here for evaluation of the following medical concerns:    HPI  Patient is here for general follow up. She has a pilonidal cyst on her scalp that has been increasing in size and tenderness. She has no issues with her medications. She is compliant with diet and exercise. Is here for refills. Review of Systems   Constitutional: Negative for chills, fatigue and fever. HENT: Negative for congestion. Respiratory: Negative for cough, shortness of breath and wheezing. Cardiovascular: Negative for chest pain and palpitations. Gastrointestinal: Negative for abdominal distention, abdominal pain, diarrhea, nausea and vomiting. Genitourinary: Negative for dysuria. Neurological: Negative for dizziness, syncope, weakness, light-headedness and numbness. Prior to Visit Medications    Medication Sig Taking? Authorizing Provider   mometasone (ELOCON) 0.1 % cream Apply topically daily. Yes Zurdo Richards MD   folic acid (FOLVITE) 1 MG tablet TAKE ONE TABLET BY MOUTH DAILY Yes Zurdo Richards MD   liothyronine (CYTOMEL) 5 MCG tablet TAKE ONE TABLET BY MOUTH DAILY Yes Zurdo Richards MD   albuterol sulfate HFA (VENTOLIN HFA) 108 (90 Base) MCG/ACT inhaler INHALE 2 PUFFS INTO THE LUNGS EVERY 6 HOURS AS NEEDED FOR WHEEZING Yes Zurdo Richards MD   cetirizine-psuedoephedrine (ZYRTEC-D) 5-120 MG per extended release tablet TAKE 1 TABLET BY MOUTH 2 [place] Yes Zurdo Richards MD   ibuprofen (ADVIL;MOTRIN) 800 MG tablet Take 1 tablet by mouth every 8 hours as needed for Pain Yes Zurdo Richards MD   Multiple Vitamins-Minerals (THERAPEUTIC MULTIVITAMIN-MINERALS) tablet Take 1 tablet by mouth daily Yes Zurdo Richards MD   NEEDLE, DISP, 25 G (BD DISP NEEDLES) 25G X 5/8\" MISC 1 packet by Does not apply route every 30 days Yes Zurdo Richards MD   urea (CARMOL) 10 % cream Apply topically as needed.  Yes Zurdo Richards MD   Probiotic

## 2020-06-26 ENCOUNTER — TELEPHONE (OUTPATIENT)
Dept: INTERNAL MEDICINE CLINIC | Age: 45
End: 2020-06-26

## 2020-06-26 NOTE — TELEPHONE ENCOUNTER
Patient would like for a nurse to call her back an explain her surgery  to her because she has some concerns.

## 2020-07-02 RX ORDER — FOLIC ACID 1 MG/1
TABLET ORAL
Qty: 30 TABLET | Refills: 0 | Status: SHIPPED | OUTPATIENT
Start: 2020-07-02 | End: 2020-08-19 | Stop reason: SDUPTHER

## 2020-07-02 RX ORDER — PANTOPRAZOLE SODIUM 40 MG/1
TABLET, DELAYED RELEASE ORAL
Qty: 30 TABLET | Refills: 0 | Status: SHIPPED | OUTPATIENT
Start: 2020-07-02 | End: 2020-07-29

## 2020-07-02 RX ORDER — LIOTHYRONINE SODIUM 5 UG/1
TABLET ORAL
Qty: 30 TABLET | Refills: 0 | Status: SHIPPED | OUTPATIENT
Start: 2020-07-02 | End: 2020-09-18 | Stop reason: SDUPTHER

## 2020-07-09 ENCOUNTER — TELEPHONE (OUTPATIENT)
Dept: INTERNAL MEDICINE CLINIC | Age: 45
End: 2020-07-09

## 2020-07-09 NOTE — TELEPHONE ENCOUNTER
Need referral for dermatology for spot on head.   Also needs referral for liver specialist.  Saw DR Brianne Jones 06/25/20

## 2020-07-27 ENCOUNTER — TELEPHONE (OUTPATIENT)
Dept: INTERNAL MEDICINE CLINIC | Age: 45
End: 2020-07-27

## 2020-07-27 NOTE — TELEPHONE ENCOUNTER
Patient says pharmacy needs instructions for some of her medications and would like for someone from the clinic to give them a call.

## 2020-08-04 RX ORDER — KETOCONAZOLE 20 MG/ML
SHAMPOO TOPICAL
Qty: 1 BOTTLE | Refills: 3 | Status: SHIPPED | OUTPATIENT
Start: 2020-08-04 | End: 2021-05-13

## 2020-08-04 RX ORDER — IBUPROFEN 800 MG/1
TABLET ORAL
Qty: 30 TABLET | Refills: 1 | Status: SHIPPED | OUTPATIENT
Start: 2020-08-04 | End: 2020-11-06

## 2020-08-19 RX ORDER — POLYETHYLENE GLYCOL 3350 17 G/17G
17 POWDER, FOR SOLUTION ORAL PRN
Qty: 119 G | Refills: 1 | Status: SHIPPED | OUTPATIENT
Start: 2020-08-19 | End: 2020-12-29

## 2020-08-19 RX ORDER — PANTOPRAZOLE SODIUM 40 MG/1
40 TABLET, DELAYED RELEASE ORAL DAILY
Qty: 30 TABLET | Refills: 3 | Status: SHIPPED | OUTPATIENT
Start: 2020-08-19 | End: 2020-11-06

## 2020-08-19 RX ORDER — FOLIC ACID 1 MG/1
1 TABLET ORAL DAILY
Qty: 30 TABLET | Refills: 3 | Status: SHIPPED | OUTPATIENT
Start: 2020-08-19 | End: 2021-03-26 | Stop reason: SDUPTHER

## 2020-08-19 RX ORDER — MONTELUKAST SODIUM 10 MG/1
TABLET ORAL
Qty: 30 TABLET | Refills: 2 | Status: SHIPPED | OUTPATIENT
Start: 2020-08-19 | End: 2020-11-06

## 2020-08-19 RX ORDER — CYANOCOBALAMIN 1000 UG/ML
INJECTION INTRAMUSCULAR; SUBCUTANEOUS
Qty: 1 ML | Refills: 0 | Status: SHIPPED | OUTPATIENT
Start: 2020-08-19 | End: 2020-11-06

## 2020-08-19 NOTE — TELEPHONE ENCOUNTER
LAST OV 06/25/2020 MINAL  NEXT OV NONE    folic acid (FOLVITE) 1 MG tablet    polyethylene glycol (MIRALAX) 17 GM/SCOOP powder

## 2020-09-09 RX ORDER — CETIRIZINE HYDROCHLORIDE, PSEUDOEPHEDRINE HYDROCHLORIDE 5; 120 MG/1; MG/1
TABLET, FILM COATED, EXTENDED RELEASE ORAL
Qty: 60 TABLET | Refills: 1 | Status: SHIPPED | OUTPATIENT
Start: 2020-09-09 | End: 2020-11-06

## 2020-09-11 ENCOUNTER — TELEPHONE (OUTPATIENT)
Dept: INTERNAL MEDICINE CLINIC | Age: 45
End: 2020-09-11

## 2020-09-11 RX ORDER — BUDESONIDE AND FORMOTEROL FUMARATE DIHYDRATE 160; 4.5 UG/1; UG/1
2 AEROSOL RESPIRATORY (INHALATION) 2 TIMES DAILY
Qty: 1 INHALER | Refills: 2 | Status: SHIPPED | OUTPATIENT
Start: 2020-09-11 | End: 2021-02-22 | Stop reason: SDUPTHER

## 2020-09-11 NOTE — TELEPHONE ENCOUNTER
Monserrat Lua is not covered, but symbicort is . Dr. Howie Ziegler ok'd change to Symbicort 160 2 puffs twice a day. Lupillo Manning at [employer] . She will let patient know about the change.

## 2020-09-15 ENCOUNTER — VIRTUAL VISIT (OUTPATIENT)
Dept: ENDOCRINOLOGY | Age: 45
End: 2020-09-15
Payer: COMMERCIAL

## 2020-09-15 PROCEDURE — 99213 OFFICE O/P EST LOW 20 MIN: CPT | Performed by: INTERNAL MEDICINE

## 2020-09-15 PROCEDURE — G8427 DOCREV CUR MEDS BY ELIG CLIN: HCPCS | Performed by: INTERNAL MEDICINE

## 2020-09-15 ASSESSMENT — ENCOUNTER SYMPTOMS
PHOTOPHOBIA: 0
COUGH: 0
BACK PAIN: 0

## 2020-09-15 NOTE — PROGRESS NOTES
Endocrinology  Abel Mejia M.D. Phone: [phone]   FAX: [phone]       Heidy Buckley   YOB: 1975    Date of Visit:  9/15/2020    Allergies   Allergen Reactions    Latex Swelling and Dermatitis     Also \"burning of skin\"      Clindamycin/Lincomycin Hives and Other (See Comments)     \"Throat closing\"    Sulfa Antibiotics Anaphylaxis and Hives    Diflucan [Fluconazole] Hives and Itching    Other Hives     Outpatient Medications Marked as Taking for the 9/15/20 encounter (Virtual Visit) with Raciel Krishnan MD   Medication Sig Dispense Refill    budesonide-formoterol (SYMBICORT) 160-4.5 MCG/ACT AERO Inhale 2 puffs into the lungs 2 times daily 1 Inhaler 2    cetirizine-psuedoephedrine (ZYRTEC-D) 5-120 MG per extended release tablet TAKE 1 TABLET BY MOUTH 2 TIMES A DAY 60 tablet 1    cyanocobalamin 1000 MCG/ML injection INJECT 1ML INTO THE SKIN ONCE MONTHLY 1 mL 0    montelukast (SINGULAIR) 10 MG tablet TAKE ONE TABLET BY MOUTH EVERY EVENING 30 tablet 2    pantoprazole (PROTONIX) 40 MG tablet Take 1 tablet by mouth daily 30 tablet 3    folic acid (FOLVITE) 1 MG tablet Take 1 tablet by mouth daily 30 tablet 3    polyethylene glycol (MIRALAX) 17 GM/SCOOP powder Take 17 g by mouth as needed (constipation) Take as directed. 119 g 1    ketoconazole (NIZORAL) 2 % shampoo APPLY TO AFFECTED AREA(S) DAILY AS NEEDED 1 Bottle 3    ibuprofen (ADVIL;MOTRIN) 800 MG tablet TAKE ONE TABLET BY MOUTH EVERY 8 HOUR AS NEEDED FOR PAIN 30 tablet 1    liothyronine (CYTOMEL) 5 MCG tablet TAKE ONE TABLET BY MOUTH DAILY 30 tablet 0    mometasone (ELOCON) 0.1 % cream Apply topically daily.  1 Tube 2    albuterol sulfate HFA (VENTOLIN HFA) 108 (90 Base) MCG/ACT inhaler INHALE 2 PUFFS INTO THE LUNGS EVERY 6 HOURS AS NEEDED FOR WHEEZING 18 g 2    Multiple Vitamins-Minerals (THERAPEUTIC MULTIVITAMIN-MINERALS) tablet Take 1 tablet by mouth daily 30 tablet 1    NEEDLE, DISP, 25 G (BD DISP NEEDLES) 25G X 5/8\" MISC 1 packet by Does not apply route every 30 days 12 each 1    urea (CARMOL) 10 % cream Apply topically as needed. 85 g 0    Probiotic Product (RA PROBIOTIC MAX STRENGTH) CAPS Take 1 capsule by mouth 2 times daily 60 capsule 3    fluticasone (FLONASE) 50 MCG/ACT nasal spray 1 spray by Each Nostril route daily 2 Bottle 1    mupirocin (BACTROBAN) 2 % ointment APPLY TO AFFECTED AREA(S) TOPICALLY AS NEEDED 30 g 0    levothyroxine (SYNTHROID) 125 MCG tablet Take 1 tablet by mouth daily 30 tablet 5    Misc. Devices (HIBICLENS HAND PUMP 32OZ) MISC Use as needed for skin irritation or bumps. 1 each 0    albuterol (PROVENTIL) (5 MG/ML) 0.5% nebulizer solution Take 1 mL by nebulization 4 times daily as needed for Wheezing 120 each 3    dilTIAZem (CARDIZEM CD) 120 MG extended release capsule Take 1 capsule by mouth daily 30 capsule 3    BETASEPT SURGICAL SCRUB 4 % external liquid APPLY TO AFFECTED AREA(S) TOPICALLY AS NEEDED 473 mL 0    MONOJECT MAGELLAN SYRINGE 25G X 5/8\" 3 ML MISC USE AS DIRECTED FOR INJECTION 12 each 1    Handicap Placard MISC by Does not apply route Duration 2 weeks 1 each 0         There were no vitals filed for this visit. There is no height or weight on file to calculate BMI.      Wt Readings from Last 3 Encounters:   06/25/20 222 lb (100.7 kg)   06/01/20 218 lb (98.9 kg)   05/04/20 219 lb (99.3 kg)     BP Readings from Last 3 Encounters:   06/25/20 111/69   03/09/20 127/89   03/05/20 114/82        Past Medical History:   Diagnosis Date    Abdominal wall abscess 2/8/2017    Abdominal wall cellulitis 2/6/2017    Anal fissure 4/30/2015    Anemia, iron deficiency     Asthma     Autoimmune hepatitis ([facility]     History of blood transfusion     Hypothyroidism     Menorrhagia with regular cycle     Morbid obesity with BMI of 40.0-44.9, adult ([facility] 5/26/2015    MRSA (methicillin resistant staph aureus) culture positive 02/03/2017    abdominal abscess    MRSA infection 08/20/2012    rt thigh abscess    Pericarditis, acute     Sinusitis      Past Surgical History:   Procedure Laterality Date    OTHER SURGICAL HISTORY  8/22/2012    INCISION AND DRAINAGE POSTERIOR THIGH ABSCESS    RECTAL SURGERY  Aug 2011    repair of rectal fissures and anal skin tag removal    SINUS SURGERY      X 3    TONSILLECTOMY  2004    TONSILLECTOMY AND ADENOIDECTOMY  2005    (partial adenoidectomy)     Family History   Problem Relation Age of Onset    High Blood Pressure Mother     Heart Disease Father     Diabetes Maternal Aunt      Social History     Tobacco Use   Smoking Status Never Smoker   Smokeless Tobacco Never Used      Social History     Substance and Sexual Activity   Alcohol Use Yes    Alcohol/week: 0.0 standard drinks    Comment: 2 drinks/week        HPI      Jennifer Urias is a 53 y.o. female who is here for a follow-up for management of thyroid disease  Self referred. PCP  Emma Vyas DO        Due to the COVID-19 restrictions on close contact interactions the patient's visit was conducted via video link  ( doxy. me) in lieu of a face to face visit. Location for patient : home  Physician : home    Pursuant to the emergency declaration under the [address] [place] [address] authority and the [employer] and [employer] Act, this Virtual  Visit was conducted, with patient's consent, to reduce the patient's risk of exposure to COVID-19 and provide necessary care. Because this was a Virtual Visit, evaluation of the following organ systems was limited: Vitals, Constitutional, EENT, Resp, CV, GI, , MS, Neuro, Skin. Heme. Lymph, Imm. Patient is aware that that he/she may receive a bill for this service, depending on her insurance coverage, and has provided verbal consent to proceed.     Physical exam in today's note represents findings from the last actual visit    Last seen in 01/19     Patient has a PMH of thyroid disease, anemia, obesity, pericarditis, GERD, autoimmune hepatitis. She was diagnosed with Graves disease the at the age of 9 yrs  Had CARBALLO. Has been diagnosed with hypothyroidism since age of 3 yrs     Current thyroid medication: Levothyroxine 125mcg daily and cytomel 5 mcg daily. Dose of levothyroxine  has ranged from 112-150 in the past.    Takes it first thing in the morning on empty stomach- yes  Interfering medications including calcium, vitamin D , iron tablets, Proton pump inhibitors: no    FH of hypothyroidism: Aunts and maternal grandmother. FH of thyroid cancer: no    She is feeling tired fatigued. Experiencing hair loss, dry skin. Pre-diabetes. A1c 5.7 %     Was stated on metformin 500 mg BID in 2019    Weight is stable. She is having GI problems including IBS, Anal fissure. Review of Systems   Constitutional: Negative for chills, diaphoresis and fever. Eyes: Negative for photophobia. Respiratory: Negative for cough. Cardiovascular: Negative for chest pain and palpitations. Endocrine: Negative for polydipsia. Genitourinary: Negative for dysuria, flank pain, frequency, hematuria and urgency. Musculoskeletal: Negative for back pain, myalgias and neck pain. Skin: Negative for rash. Allergic/Immunologic: Negative for environmental allergies. Neurological: Positive for headaches. Negative for dizziness, tremors and seizures. Hematological: Does not bruise/bleed easily. Psychiatric/Behavioral: Negative for hallucinations and suicidal ideas. The patient is not nervous/anxious. Lab Results   Component Value Date    TSH 4.04 01/22/2019    TSH 3.50 10/23/2018    TSH 0.67 11/26/2016    TSH 2.35 04/28/2014    TSH 2.18 10/24/2013       No visits with results within 1 Day(s) from this visit.    Latest known visit with results is:   Office Visit on 06/25/2020   Component Date Value Ref Range Status    Hemoglobin A1C 06/25/2020 5.7 % Final         Assessment/Plan    1. Hypothyroidism    This is a 53 yrs old female who has PMH of postablative hypothyroidism . She is on levothyroxine 125 mcg daily and cytomel 5 mcg daily. C/o fatigue. Will repeat labs      2. Pre-diabetes   Reviewed life style changes. A1c 5.7 %---> 5.7 %   Was  on metformin 500 mg BID for 2 months and then stopped it as it was not working    Will repeat A1c, fasting sugar       3. Anemia. Iron deficiency  As per hematology    4. Asthma. As per pulmonology    5. Chronic Sinusitis. As per ENT   Dr. Rambo Taveras     7. Obesity.  She requests referral to weight management team.

## 2020-09-17 DIAGNOSIS — E06.3 HYPOTHYROIDISM DUE TO HASHIMOTO'S THYROIDITIS: ICD-10-CM

## 2020-09-17 DIAGNOSIS — E03.8 HYPOTHYROIDISM DUE TO HASHIMOTO'S THYROIDITIS: ICD-10-CM

## 2020-09-17 DIAGNOSIS — R73.03 PREDIABETES: ICD-10-CM

## 2020-09-17 LAB
A/G RATIO: 0.9 (ref 1.1–2.2)
ALBUMIN SERPL-MCNC: 4 G/DL (ref 3.4–5)
ALP BLD-CCNC: 176 U/L (ref 40–129)
ALT SERPL-CCNC: 47 U/L (ref 10–40)
ANION GAP SERPL CALCULATED.3IONS-SCNC: 9 MMOL/L (ref 3–16)
AST SERPL-CCNC: 50 U/L (ref 15–37)
BILIRUB SERPL-MCNC: 0.3 MG/DL (ref 0–1)
BUN BLDV-MCNC: 19 MG/DL (ref 7–20)
CALCIUM SERPL-MCNC: 9.4 MG/DL (ref 8.3–10.6)
CHLORIDE BLD-SCNC: 104 MMOL/L (ref 99–110)
CO2: 22 MMOL/L (ref 21–32)
CREAT SERPL-MCNC: 0.8 MG/DL (ref 0.6–1.1)
GFR AFRICAN AMERICAN: >60
GFR NON-AFRICAN AMERICAN: >60
GLOBULIN: 4.7 G/DL
GLUCOSE BLD-MCNC: 104 MG/DL (ref 70–99)
POTASSIUM SERPL-SCNC: 4.2 MMOL/L (ref 3.5–5.1)
SODIUM BLD-SCNC: 135 MMOL/L (ref 136–145)
T3 FREE: 2.2 PG/ML (ref 2.3–4.2)
T4 FREE: 0.7 NG/DL (ref 0.9–1.8)
TOTAL PROTEIN: 8.7 G/DL (ref 6.4–8.2)
TSH SERPL DL<=0.05 MIU/L-ACNC: 8.28 UIU/ML (ref 0.27–4.2)

## 2020-09-18 ENCOUNTER — TELEPHONE (OUTPATIENT)
Dept: ENDOCRINOLOGY | Age: 45
End: 2020-09-18

## 2020-09-18 LAB
ESTIMATED AVERAGE GLUCOSE: 116.9 MG/DL
HBA1C MFR BLD: 5.7 %

## 2020-09-18 RX ORDER — LEVOTHYROXINE SODIUM 0.12 MG/1
125 TABLET ORAL DAILY
Qty: 30 TABLET | Refills: 5 | Status: SHIPPED | OUTPATIENT
Start: 2020-09-18 | End: 2021-07-26 | Stop reason: SDUPTHER

## 2020-09-18 RX ORDER — LIOTHYRONINE SODIUM 5 UG/1
TABLET ORAL
Qty: 60 TABLET | Refills: 5 | Status: SHIPPED | OUTPATIENT
Start: 2020-09-18 | End: 2021-07-26 | Stop reason: SDUPTHER

## 2020-09-18 NOTE — TELEPHONE ENCOUNTER
Spoke to her. Reviewed labs  Will continue levothyroxine 125 mcg daily, increase cytomel to 10 mcg daily. Resume metformin at 500 mg BID  LFTs elevated.  Is scheduled to see hepatology      Follow-up in 3-4 months to see Kareem Gayle

## 2020-09-18 NOTE — TELEPHONE ENCOUNTER
Patient called and said she would like her lab results and med changes today so she can get her medications before 430 today

## 2020-10-01 ENCOUNTER — TELEPHONE (OUTPATIENT)
Dept: ENT CLINIC | Age: 45
End: 2020-10-01

## 2020-10-01 RX ORDER — PREDNISONE 10 MG/1
TABLET ORAL
Qty: 38 TABLET | Refills: 0 | Status: SHIPPED | OUTPATIENT
Start: 2020-10-01 | End: 2021-06-04

## 2020-10-01 RX ORDER — CLARITHROMYCIN 500 MG/1
500 TABLET, COATED ORAL 2 TIMES DAILY
Qty: 20 TABLET | Refills: 0 | Status: SHIPPED | OUTPATIENT
Start: 2020-10-01 | End: 2020-10-11

## 2020-10-01 NOTE — TELEPHONE ENCOUNTER
Can you please call [facility] and find out their address so I can call in Biaxin 21 days and prednsione 14 day taper for Veronda. I will not call in a cough syrup.    MAGDALENO

## 2020-10-01 NOTE — TELEPHONE ENCOUNTER
Called patient and made aware that Dr Ginger Bagley will call in Steroid taper and antibiotic but NO cough syrup or medication. Verbalized understanding, states she is in [place] for work. Pharmacy was/is in patient chart.

## 2020-10-09 ENCOUNTER — TELEPHONE (OUTPATIENT)
Dept: ENT CLINIC | Age: 45
End: 2020-10-09

## 2020-10-09 NOTE — TELEPHONE ENCOUNTER
Patient called to say the RX given last week is not working she is home now please call in something else to [facility]

## 2020-10-12 ENCOUNTER — OFFICE VISIT (OUTPATIENT)
Dept: ENT CLINIC | Age: 45
End: 2020-10-12
Payer: COMMERCIAL

## 2020-10-12 VITALS
BODY MASS INDEX: 39.55 KG/M2 | HEART RATE: 95 BPM | SYSTOLIC BLOOD PRESSURE: 133 MMHG | TEMPERATURE: 97.2 F | DIASTOLIC BLOOD PRESSURE: 86 MMHG | HEIGHT: 63 IN | WEIGHT: 223.2 LBS

## 2020-10-12 PROCEDURE — 31231 NASAL ENDOSCOPY DX: CPT | Performed by: OTOLARYNGOLOGY

## 2020-10-12 PROCEDURE — G8484 FLU IMMUNIZE NO ADMIN: HCPCS | Performed by: OTOLARYNGOLOGY

## 2020-10-12 PROCEDURE — G8427 DOCREV CUR MEDS BY ELIG CLIN: HCPCS | Performed by: OTOLARYNGOLOGY

## 2020-10-12 PROCEDURE — 1036F TOBACCO NON-USER: CPT | Performed by: OTOLARYNGOLOGY

## 2020-10-12 PROCEDURE — G8417 CALC BMI ABV UP PARAM F/U: HCPCS | Performed by: OTOLARYNGOLOGY

## 2020-10-12 PROCEDURE — 99214 OFFICE O/P EST MOD 30 MIN: CPT | Performed by: OTOLARYNGOLOGY

## 2020-10-12 RX ORDER — PREDNISONE 10 MG/1
TABLET ORAL
Qty: 38 TABLET | Refills: 0 | Status: SHIPPED
Start: 2020-10-12 | End: 2021-03-25 | Stop reason: SDUPTHER

## 2020-10-12 RX ORDER — AMOXICILLIN AND CLAVULANATE POTASSIUM 875; 125 MG/1; MG/1
1 TABLET, FILM COATED ORAL 2 TIMES DAILY
Qty: 42 TABLET | Refills: 0 | Status: SHIPPED | OUTPATIENT
Start: 2020-10-12 | End: 2020-11-02

## 2020-10-12 ASSESSMENT — ENCOUNTER SYMPTOMS
SINUS PRESSURE: 0
VOICE CHANGE: 0
RHINORRHEA: 0
EYE PAIN: 0
NAUSEA: 0
COUGH: 0
TROUBLE SWALLOWING: 0
FACIAL SWELLING: 0
DIARRHEA: 0
SINUS PAIN: 0
EYE REDNESS: 0
SORE THROAT: 0
SHORTNESS OF BREATH: 0
EYE ITCHING: 0
CHOKING: 0

## 2020-10-12 NOTE — PROGRESS NOTES
Subjective:      Patient ID: Stevie Coombs is a 53 y.o. female. HPI  Chief Complaint   Patient presents with    Sinus infection  History of Present Illness:Kenroy is a(n) 53 y.o. female who presents with 4 weeks of facial pressure, post nasal drip. Biaxin did not help.    Nasal Discharge: none  Nasal Obstruction: No   Post Nasal Drainage: Yes  Nasal Bleeding: No  Sense of Smell: normal  Eye Symptoms: none  Previous Treatments: biaxin     Patient Active Problem List   Diagnosis    Hypothyroidism    Chronic sinusitis    Asthma in adult    Chronic rhinosinusitis    Iron deficiency anemia    Eczema    Intestinal malabsorption    Morbid obesity with BMI of 40.0-44.9, adult (HCC)    Thrombocytopenia (HCC)    Chronic ITP (idiopathic thrombocytopenia) (HCC)    Hyperlipidemia, mixed    Autoimmune hepatitis (HCC)    Anxiety    Chronic pansinusitis    Atrial fibrillation with RVR (HCC)     Past Surgical History:   Procedure Laterality Date    OTHER SURGICAL HISTORY  8/22/2012    INCISION AND DRAINAGE POSTERIOR THIGH ABSCESS    RECTAL SURGERY  Aug 2011    repair of rectal fissures and anal skin tag removal    SINUS SURGERY      X 3    TONSILLECTOMY  2004    TONSILLECTOMY AND ADENOIDECTOMY  2005    (partial adenoidectomy)     Family History   Problem Relation Age of Onset    High Blood Pressure Mother     Heart Disease Father     Diabetes Maternal Aunt      Social History     Socioeconomic History    Marital status: Single     Spouse name: Not on file    Number of children: Not on file    Years of education: Not on file    Highest education level: Not on file   Occupational History    Occupation: NA   Social Needs    Financial resource strain: Not on file    Food insecurity     Worry: Not on file     Inability: Not on file    Transportation needs     Medical: Not on file     Non-medical: Not on file   Tobacco Use    Smoking status: Never Smoker    Smokeless tobacco: Never Used   Substance and Sexual Activity    Alcohol use: Yes     Alcohol/week: 0.0 standard drinks     Comment: 2 drinks/week     Drug use: No    Sexual activity: Yes   Lifestyle    Physical activity     Days per week: Not on file     Minutes per session: Not on file    Stress: Not on file   Relationships    Social connections     Talks on phone: Not on file     Gets together: Not on file     Attends Yarsanism service: Not on file     Active member of club or organization: Not on file     Attends meetings of clubs or organizations: Not on file     Relationship status: Not on file    Intimate partner violence     Fear of current or ex partner: Not on file     Emotionally abused: Not on file     Physically abused: Not on file     Forced sexual activity: Not on file   Other Topics Concern    Not on file   Social History Narrative    Not on file       DRUG/FOOD ALLERGIES: Latex; Clindamycin/lincomycin; Sulfa antibiotics; Diflucan [fluconazole]; and Other    CURRENT MEDICATIONS  Prior to Admission medications    Medication Sig Start Date End Date Taking?  Authorizing Provider   predniSONE (DELTASONE) 10 MG tablet 4 tabs a day for 5 days, 3 tabs a day for 3 days, 2 tabs a day for 3 days,1 tab a day for 3 days 10/1/20   Cameron Garza MD   liothyronine (CYTOMEL) 5 MCG tablet TAKE TWO TABLETS BY MOUTH DAILY 9/18/20   Onur Garcia MD   levothyroxine (SYNTHROID) 125 MCG tablet Take 1 tablet by mouth daily 9/18/20   Onur Garcia MD   metFORMIN (GLUCOPHAGE) 500 MG tablet Take 1 tablet by mouth 2 times daily (with meals) 9/18/20   Onur Garcia MD   mupirocin (BACTROBAN) 2 % ointment APPLY TO AFFECTED AREA(S) TOPICALLY AS NEEDED 9/18/20   Vamsi Odell MD   budesonide-formoterol (SYMBICORT) 160-4.5 MCG/ACT AERO Inhale 2 puffs into the lungs 2 times daily 9/11/20   Vamsi Odell MD   cetirizine-psuedoephedrine (ZYRTEC-D) 5-120 MG per extended release tablet TAKE 1 TABLET BY MOUTH 2 TIMES A DAY 9/9/20   Danay Marroquin MD 1 mL by nebulization 4 times daily as needed for Wheezing 4/3/20   Jackson Jose Rafael,    dilTIAZem (CARDIZEM CD) 120 MG extended release capsule Take 1 capsule by mouth daily 3/9/20   Dannie Munguia MD   BETASEPT SURGICAL SCRUB 4 % external liquid APPLY TO AFFECTED AREA(S) TOPICALLY AS NEEDED 1/7/20   Chanda Martini MD   MONOJECT MAGELLAN SYRINGE 25G X 5/8\" 3 ML MISC USE AS DIRECTED FOR INJECTION 10/23/19   Ankur Rincon MD   Handicap Placard MISC by Does not apply route Duration 2 weeks 2/19/18   Maria Luisa Fung MD     Review of Systems   Constitutional: Negative for activity change, appetite change, chills, fatigue and fever. HENT: Negative for congestion, ear discharge, ear pain, facial swelling, hearing loss, nosebleeds, postnasal drip, rhinorrhea, sinus pressure, sinus pain, sneezing, sore throat, tinnitus, trouble swallowing and voice change. Eyes: Negative for pain, redness, itching and visual disturbance. Respiratory: Negative for cough, choking and shortness of breath. Gastrointestinal: Negative for diarrhea and nausea. Endocrine: Negative for cold intolerance and heat intolerance. Objective:   Physical Exam  Constitutional:       Appearance: She is well-developed. HENT:      Head: Not macrocephalic and not microcephalic. No Mejia's sign, abrasion, right periorbital erythema, left periorbital erythema or laceration. Nose: No nasal deformity, septal deviation, laceration, mucosal edema or rhinorrhea. Right Nostril: No epistaxis or occlusion. Left Nostril: No epistaxis or occlusion. Right Turbinates: Not enlarged, swollen or pale. Left Turbinates: Not enlarged, swollen or pale. Right Sinus: No maxillary sinus tenderness or frontal sinus tenderness. Left Sinus: No maxillary sinus tenderness or frontal sinus tenderness. Mouth/Throat:      Lips: No lesions. Mouth: Mucous membranes are moist.      Tongue: No lesions.       Palate: No entire procedure myself. Assessment:       Diagnosis Orders   1. Acute non-recurrent ethmoidal sinusitis  amoxicillin-clavulanate (AUGMENTIN) 875-125 MG per tablet    predniSONE (DELTASONE) 10 MG tablet    nystatin (MYCOSTATIN) 216281 UNIT/ML suspension   2. Pharyngitis, unspecified etiology  amoxicillin-clavulanate (AUGMENTIN) 875-125 MG per tablet    predniSONE (DELTASONE) 10 MG tablet    nystatin (MYCOSTATIN) 710906 UNIT/ML suspension           Plan:      Add augmentin and prednisone. Nystatin for possible yeast.   Follow up if persists. The patient was counseled for sinusitis and received a augmentin and prednisone prescription medication(s) for this diagnosis. The mechanism of action for the prescription(s) were explained/reviewed. The appropriate usage was described and technique for topical use explained if appropriate. Questions from the patient were answered and the prescription(s) were e-prescribed to the appropriate pharmacy.           Patria Willams MD

## 2020-11-06 RX ORDER — CETIRIZINE HYDROCHLORIDE, PSEUDOEPHEDRINE HYDROCHLORIDE 5; 120 MG/1; MG/1
TABLET, FILM COATED, EXTENDED RELEASE ORAL
Qty: 60 TABLET | Refills: 2 | Status: SHIPPED | OUTPATIENT
Start: 2020-11-06 | End: 2021-05-13

## 2020-11-06 RX ORDER — CYANOCOBALAMIN 1000 UG/ML
INJECTION INTRAMUSCULAR; SUBCUTANEOUS
Qty: 1 ML | Refills: 2 | Status: SHIPPED | OUTPATIENT
Start: 2020-11-06 | End: 2021-03-26 | Stop reason: SDUPTHER

## 2020-11-06 RX ORDER — IBUPROFEN 800 MG/1
TABLET ORAL
Qty: 30 TABLET | Refills: 0 | Status: SHIPPED | OUTPATIENT
Start: 2020-11-06 | End: 2020-12-29

## 2020-11-06 RX ORDER — PANTOPRAZOLE SODIUM 40 MG/1
TABLET, DELAYED RELEASE ORAL
Qty: 30 TABLET | Refills: 3 | Status: ON HOLD | OUTPATIENT
Start: 2020-11-06 | End: 2020-12-29 | Stop reason: SDUPTHER

## 2020-11-06 RX ORDER — BUDESONIDE AND FORMOTEROL FUMARATE DIHYDRATE 160; 4.5 UG/1; UG/1
2 AEROSOL RESPIRATORY (INHALATION) 2 TIMES DAILY
Qty: 1 INHALER | Refills: 5 | Status: SHIPPED | OUTPATIENT
Start: 2020-11-06 | End: 2021-10-04 | Stop reason: SDUPTHER

## 2020-11-06 RX ORDER — MONTELUKAST SODIUM 10 MG/1
TABLET ORAL
Qty: 30 TABLET | Refills: 3 | Status: SHIPPED | OUTPATIENT
Start: 2020-11-06 | End: 2021-05-13

## 2020-11-06 NOTE — TELEPHONE ENCOUNTER
insurance won't pay for Breo Ellipta inhaler  Symbicort is preferred on formulary  Last OV 6-25-20 [facility]

## 2020-11-09 RX ORDER — ALBUTEROL SULFATE 90 UG/1
AEROSOL, METERED RESPIRATORY (INHALATION)
Qty: 18 G | Refills: 2 | Status: SHIPPED | OUTPATIENT
Start: 2020-11-09 | End: 2021-03-26 | Stop reason: SDUPTHER

## 2020-11-10 RX ORDER — DILTIAZEM HYDROCHLORIDE 120 MG/1
120 CAPSULE, COATED, EXTENDED RELEASE ORAL DAILY
Qty: 30 CAPSULE | Refills: 3 | Status: SHIPPED | OUTPATIENT
Start: 2020-11-10 | End: 2021-05-13

## 2020-11-11 ENCOUNTER — TELEPHONE (OUTPATIENT)
Dept: ENT CLINIC | Age: 45
End: 2020-11-11

## 2020-11-11 NOTE — TELEPHONE ENCOUNTER
Patient called to let you know that her infection is returning, she would like to know if you want to give her another dose of the prednisone or does she need to come in to see you, you have availability for tomorrow, please advise.

## 2020-11-12 RX ORDER — PREDNISONE 10 MG/1
TABLET ORAL
Qty: 38 TABLET | Refills: 0 | Status: SHIPPED
Start: 2020-11-12 | End: 2021-03-26 | Stop reason: SDUPTHER

## 2020-11-12 RX ORDER — AMOXICILLIN AND CLAVULANATE POTASSIUM 875; 125 MG/1; MG/1
1 TABLET, FILM COATED ORAL 2 TIMES DAILY
Qty: 20 TABLET | Refills: 0 | Status: SHIPPED | OUTPATIENT
Start: 2020-11-12 | End: 2020-11-22

## 2020-12-07 ENCOUNTER — APPOINTMENT (OUTPATIENT)
Dept: CT IMAGING | Age: 45
End: 2020-12-07
Payer: COMMERCIAL

## 2020-12-07 ENCOUNTER — APPOINTMENT (OUTPATIENT)
Dept: ULTRASOUND IMAGING | Age: 45
End: 2020-12-07
Payer: COMMERCIAL

## 2020-12-07 ENCOUNTER — HOSPITAL ENCOUNTER (EMERGENCY)
Age: 45
Discharge: HOME | End: 2020-12-07
Payer: COMMERCIAL

## 2020-12-07 ENCOUNTER — APPOINTMENT (OUTPATIENT)
Dept: GENERAL RADIOLOGY | Age: 45
End: 2020-12-07
Payer: COMMERCIAL

## 2020-12-07 VITALS
SYSTOLIC BLOOD PRESSURE: 127 MMHG | HEART RATE: 84 BPM | RESPIRATION RATE: 16 BRPM | TEMPERATURE: 98.6 F | OXYGEN SATURATION: 98 % | DIASTOLIC BLOOD PRESSURE: 91 MMHG

## 2020-12-07 LAB
ALBUMIN SERPL-MCNC: 4.1 G/DL (ref 3.4–5)
ALP BLD-CCNC: 246 U/L (ref 40–129)
ALT SERPL-CCNC: 75 U/L (ref 10–40)
ANION GAP SERPL CALCULATED.3IONS-SCNC: 12 MMOL/L (ref 3–16)
AST SERPL-CCNC: 76 U/L (ref 15–37)
BASOPHILS ABSOLUTE: 0 K/UL (ref 0–0.2)
BASOPHILS RELATIVE PERCENT: 0.3 %
BILIRUB SERPL-MCNC: 0.6 MG/DL (ref 0–1)
BILIRUBIN DIRECT: 0.3 MG/DL (ref 0–0.3)
BILIRUBIN URINE: NEGATIVE
BILIRUBIN, INDIRECT: 0.3 MG/DL (ref 0–1)
BLOOD, URINE: NEGATIVE
BUN BLDV-MCNC: 12 MG/DL (ref 7–20)
CALCIUM SERPL-MCNC: 9.3 MG/DL (ref 8.3–10.6)
CHLORIDE BLD-SCNC: 103 MMOL/L (ref 99–110)
CLARITY: CLEAR
CO2: 22 MMOL/L (ref 21–32)
COLOR: NORMAL
CREAT SERPL-MCNC: 0.5 MG/DL (ref 0.6–1.1)
EOSINOPHILS ABSOLUTE: 0.1 K/UL (ref 0–0.6)
EOSINOPHILS RELATIVE PERCENT: 2.1 %
GFR AFRICAN AMERICAN: >60
GFR NON-AFRICAN AMERICAN: >60
GLUCOSE BLD-MCNC: 107 MG/DL (ref 70–99)
GLUCOSE URINE: NEGATIVE MG/DL
HCG(URINE) PREGNANCY TEST: NEGATIVE
HCT VFR BLD CALC: 38 % (ref 36–48)
HEMOGLOBIN: 12.7 G/DL (ref 12–16)
KETONES, URINE: NEGATIVE MG/DL
LEUKOCYTE ESTERASE, URINE: NEGATIVE
LIPASE: 36 U/L (ref 13–60)
LYMPHOCYTES ABSOLUTE: 0.8 K/UL (ref 1–5.1)
LYMPHOCYTES RELATIVE PERCENT: 12.8 %
MCH RBC QN AUTO: 30.3 PG (ref 26–34)
MCHC RBC AUTO-ENTMCNC: 33.4 G/DL (ref 31–36)
MCV RBC AUTO: 90.8 FL (ref 80–100)
MICROSCOPIC EXAMINATION: NORMAL
MONOCYTES ABSOLUTE: 0.5 K/UL (ref 0–1.3)
MONOCYTES RELATIVE PERCENT: 7.8 %
NEUTROPHILS ABSOLUTE: 4.5 K/UL (ref 1.7–7.7)
NEUTROPHILS RELATIVE PERCENT: 77 %
NITRITE, URINE: NEGATIVE
PDW BLD-RTO: 14.5 % (ref 12.4–15.4)
PH UA: 6.5 (ref 5–8)
PLATELET # BLD: 127 K/UL (ref 135–450)
PMV BLD AUTO: 9.5 FL (ref 5–10.5)
POTASSIUM REFLEX MAGNESIUM: 4 MMOL/L (ref 3.5–5.1)
PROTEIN UA: NEGATIVE MG/DL
RBC # BLD: 4.18 M/UL (ref 4–5.2)
SODIUM BLD-SCNC: 137 MMOL/L (ref 136–145)
SPECIFIC GRAVITY UA: 1.02 (ref 1–1.03)
TOTAL PROTEIN: 8.3 G/DL (ref 6.4–8.2)
URINE REFLEX TO CULTURE: NORMAL
URINE TYPE: NORMAL
UROBILINOGEN, URINE: 1 E.U./DL
WBC # BLD: 5.9 K/UL (ref 4–11)

## 2020-12-07 PROCEDURE — 83690 ASSAY OF LIPASE: CPT

## 2020-12-07 PROCEDURE — 85025 COMPLETE CBC W/AUTO DIFF WBC: CPT

## 2020-12-07 PROCEDURE — 84703 CHORIONIC GONADOTROPIN ASSAY: CPT

## 2020-12-07 PROCEDURE — 71046 X-RAY EXAM CHEST 2 VIEWS: CPT

## 2020-12-07 PROCEDURE — 99283 EMERGENCY DEPT VISIT LOW MDM: CPT

## 2020-12-07 PROCEDURE — 76705 ECHO EXAM OF ABDOMEN: CPT

## 2020-12-07 PROCEDURE — 74176 CT ABD & PELVIS W/O CONTRAST: CPT

## 2020-12-07 PROCEDURE — 80048 BASIC METABOLIC PNL TOTAL CA: CPT

## 2020-12-07 PROCEDURE — 81003 URINALYSIS AUTO W/O SCOPE: CPT

## 2020-12-07 PROCEDURE — 80076 HEPATIC FUNCTION PANEL: CPT

## 2020-12-07 ASSESSMENT — PAIN DESCRIPTION - PAIN TYPE: TYPE: ACUTE PAIN

## 2020-12-07 ASSESSMENT — PAIN DESCRIPTION - LOCATION: LOCATION: ABDOMEN

## 2020-12-07 ASSESSMENT — PAIN DESCRIPTION - ORIENTATION: ORIENTATION: RIGHT;UPPER

## 2020-12-07 ASSESSMENT — PAIN DESCRIPTION - DESCRIPTORS: DESCRIPTORS: SHARP

## 2020-12-07 ASSESSMENT — PAIN SCALES - GENERAL: PAINLEVEL_OUTOF10: 9

## 2020-12-07 NOTE — ED PROVIDER NOTES
[address]  [address] [address]  Dept: [phone]  Loc: [address] ENCOUNTER     Evaluated by the JALEN. My supervising physician was available for consultation. CHIEF COMPLAINT    Chief Complaint   Patient presents with    Abdominal Pain     RUQ pain x2 days, dark urine x1 day; hx of thyroid disease and hepatitis       HPI    Van Dowell is a 50 y.o. female who presents with abdominal pain localized in the upper right side of the abdomen. Onset was 2 days ago. The duration has been constant since the onset. The pain is 9/10 in severity. The quality of the pain is sharp and cramping. The context is that the symptoms started spontaneously. There are no alleviating factors. She states she felt like her urine has been dark for the last day, but she denies any pain with urination, urgency, or foul smell to her urine. She denies any fevers, chills, chest pain, shortness of breath. She does have a history of autoimmune hepatitis, and has been told to follow-up with a liver specialist, but has not because of coronavirus. She also reports that she has been on Augmentin for prolonged period of time due to the history of sinusitis, but recently stopped the medication about a week ago. She denies previous history of abdominal surgeries. She has no further complaints at this time. REVIEW OF SYSTEMS    GI: see HPI, no vomiting, no diarrhea, no hematochezia, no gee colored stools  Cardiac: No chest pain, shortness of breath, palpitations or syncope  Pulmonary: No difficulty breathing or new cough  General: No fevers  : No dysuria, No hematuria  See HPI for further details. All other systems reviewed and are negative.     PAST MEDICAL & SURGICAL HISTORY    Past Medical History:   Diagnosis Date    Abdominal wall abscess 2/8/2017    Abdominal wall cellulitis 2/6/2017    Anal fissure (DELTASONE) 10 MG tablet 4 tabs a day for 5 days, 3 tabs a day for 3 days, 2 tabs a day for 3 days,1 tab a day for 3 days 38 tablet 0    predniSONE (DELTASONE) 10 MG tablet 4 tabs a day for 5 days, 3 tabs a day for 3 days, 2 tabs a day for 3 days,1 tab a day for 3 days 38 tablet 0    liothyronine (CYTOMEL) 5 MCG tablet TAKE TWO TABLETS BY MOUTH DAILY 60 tablet 5    levothyroxine (SYNTHROID) 125 MCG tablet Take 1 tablet by mouth daily 30 tablet 5    metFORMIN (GLUCOPHAGE) 500 MG tablet Take 1 tablet by mouth 2 times daily (with meals) 180 tablet 1    mupirocin (BACTROBAN) 2 % ointment APPLY TO AFFECTED AREA(S) TOPICALLY AS NEEDED 30 g 0    budesonide-formoterol (SYMBICORT) 160-4.5 MCG/ACT AERO Inhale 2 puffs into the lungs 2 times daily 1 Inhaler 2    folic acid (FOLVITE) 1 MG tablet Take 1 tablet by mouth daily 30 tablet 3    polyethylene glycol (MIRALAX) 17 GM/SCOOP powder Take 17 g by mouth as needed (constipation) Take as directed. 119 g 1    ketoconazole (NIZORAL) 2 % shampoo APPLY TO AFFECTED AREA(S) DAILY AS NEEDED 1 Bottle 3    mometasone (ELOCON) 0.1 % cream Apply topically daily. 1 Tube 2    Multiple Vitamins-Minerals (THERAPEUTIC MULTIVITAMIN-MINERALS) tablet Take 1 tablet by mouth daily 30 tablet 1    NEEDLE, DISP, 25 G (BD DISP NEEDLES) 25G X 5/8\" MISC 1 packet by Does not apply route every 30 days 12 each 1    urea (CARMOL) 10 % cream Apply topically as needed. 85 g 0    Probiotic Product (RA PROBIOTIC MAX STRENGTH) CAPS Take 1 capsule by mouth 2 times daily 60 capsule 3    Misc Natural Product Nasal (PONARIS) SOLN 1 spray by Each Nostril route every 6 hours as needed (as need) 1 Bottle 5    fluticasone (FLONASE) 50 MCG/ACT nasal spray 1 spray by Each Nostril route daily 2 Bottle 1    Misc. Devices (HIBICLENS HAND PUMP 32OZ) MISC Use as needed for skin irritation or bumps.  (Patient not taking: Reported on 10/12/2020) 1 each 0    albuterol (PROVENTIL) (5 MG/ML) 0.5% nebulizer solution Take 1 mL by nebulization 4 times daily as needed for Wheezing 120 each 3    BETASEPT SURGICAL SCRUB 4 % external liquid APPLY TO AFFECTED AREA(S) TOPICALLY AS NEEDED 473 mL 0    MONOJECT MAGELLAN SYRINGE 25G X 5/8\" 3 ML MISC USE AS DIRECTED FOR INJECTION 12 each 1    Handicap Placard MISC by Does not apply route Duration 2 weeks 1 each 0       ALLERGIES    Allergies   Allergen Reactions    Latex Swelling and Dermatitis     Also \"burning of skin\"      Clindamycin/Lincomycin Hives and Other (See Comments)     \"Throat closing\"    Sulfa Antibiotics Anaphylaxis and Hives    Diflucan [Fluconazole] Hives and Itching    Other Hives       SOCIAL AND FAMILY HISTORY    Social History     Socioeconomic History    Marital status: Single     Spouse name: None    Number of children: None    Years of education: None    Highest education level: None   Occupational History    Occupation: NA   Social Needs    Financial resource strain: None    Food insecurity     Worry: None     Inability: None    Transportation needs     Medical: None     Non-medical: None   Tobacco Use    Smoking status: Never Smoker    Smokeless tobacco: Never Used   Substance and Sexual Activity    Alcohol use:  Yes     Alcohol/week: 0.0 standard drinks     Comment: 2 drinks/week     Drug use: No    Sexual activity: Yes   Lifestyle    Physical activity     Days per week: None     Minutes per session: None    Stress: None   Relationships    Social connections     Talks on phone: None     Gets together: None     Attends Shinto service: None     Active member of club or organization: None     Attends meetings of clubs or organizations: None     Relationship status: None    Intimate partner violence     Fear of current or ex partner: None     Emotionally abused: None     Physically abused: None     Forced sexual activity: None   Other Topics Concern    None   Social History Narrative    None     Family History   Problem Relation Age of Obstruction), PUD, GERD, Acute Cholecystitis, Pancreatitis, Hepatitis, Colitis, SMA Syndrome, Mesenteric Steal Syndrome, Splanchnic Vein Thrombosis, other    Patient is afebrile and nontoxic in appearance. Labs reveal no leukocytosis or anemia. Metabolic panel unremarkable. LFTs with alk phos of 246, ALT of 75, AST of 76, her bilirubin is 0.6. Lipase within normal limits. Urinalysis unremarkable. She is not pregnant. CT findings as above with no acute abnormalities. CXR with no acute process. US with no acute findings. On re-evaluation, patient is resting comfortably. I consulted GI, and spoke with Dr. Gil Yee regarding her labs and imaging and history. He advised that she should definitely follow-up with him as an outpatient, but that he did feel that she was safe to be discharged home. He advised that the prolonged course of Augmentin could have contributed to this elevation in her liver enzymes. There are no further complaints at this time. The patient was instructed to follow up as an outpatient in 2 days. The patient was instructed to return to the ED immediately for any new or worsening symptoms. The patient verbalized understanding. FINAL IMPRESSION    1. Abdominal pain, right upper quadrant    2.  LFT elevation        PLAN  Discharge with outpatient follow-up    (Please note that this note was completed with a voice recognition program.  Every attempt was made to edit the dictations, but inevitably there remain words that are mis-transcribed.)         Devendra Sierra  12/07/20 1308

## 2020-12-07 NOTE — ED TRIAGE NOTES
Patient admitted to ED via private car with complaints of RUQ abdominal pain x2 days and dark urine that she noticed yesterday. Patient states that the RUQ pain is worse with deep breathing and that the last time she felt like this, \"it was on the left side and I had pericarditis. \" Patient has a history of hypothyroidism and autoimmune hepatitis. Patient endorses that during the pandemic, she was drinking more alcohol and she also has been on frequent antibiotics. BP is elevated. Other VS are WNL. Patient is alert and oriented x4.

## 2020-12-07 NOTE — ED NOTES
D/C: Order noted for d/c. Pt confirmed d/c paperwork has correct name. Discharge and education instructions reviewed with patient. Teach-back successful. Pt verbalized understanding and signed d/c papers. Pt denied questions at this time. No acute distress noted. Patient instructed to follow-up as noted - return to emergency department if symptoms worsen. Patient verbalized understanding. Discharged per EDMD with discharge instructions. Pt discharged to private vehicle. Patient stable upon departure. Thanked patient for choosing [facility]) for care. Provider aware of patient pain at time of discharge.        Ifrah Gifford RN  12/07/20 2123

## 2020-12-08 DIAGNOSIS — K75.4 AUTOIMMUNE HEPATITIS (HCC): ICD-10-CM

## 2020-12-08 LAB
ALBUMIN SERPL-MCNC: 4.4 G/DL (ref 3.4–5)
ALP BLD-CCNC: 278 U/L (ref 40–129)
ALT SERPL-CCNC: 78 U/L (ref 10–40)
AST SERPL-CCNC: 55 U/L (ref 15–37)
BILIRUB SERPL-MCNC: 0.4 MG/DL (ref 0–1)
BILIRUBIN DIRECT: <0.2 MG/DL (ref 0–0.3)
BILIRUBIN, INDIRECT: ABNORMAL MG/DL (ref 0–1)
FERRITIN: 316.7 NG/ML (ref 15–150)
HBV SURFACE AB TITR SER: <3.5 MIU/ML
HEPATITIS B SURFACE ANTIGEN INTERPRETATION: NORMAL
HEPATITIS C ANTIBODY INTERPRETATION: NORMAL
INR BLD: 0.96 (ref 0.86–1.14)
IRON SATURATION: 28 % (ref 15–50)
IRON: 79 UG/DL (ref 37–145)
PROTHROMBIN TIME: 11.1 SEC (ref 10–13.2)
TOTAL IRON BINDING CAPACITY: 285 UG/DL (ref 260–445)
TOTAL PROTEIN: 8.4 G/DL (ref 6.4–8.2)

## 2020-12-10 LAB
HAV AB SERPL IA-ACNC: POSITIVE
HEPATITIS B CORE TOTAL ANTIBODY: NEGATIVE

## 2020-12-11 LAB
F-ACTIN AB IGG: 34 UNITS (ref 0–19)
MITOCHONDRIAL M2 AB, IGG: 2.4 UNITS (ref 0–24.9)
SMOOTH MUSCLE AB IGG TITER: ABNORMAL

## 2020-12-28 RX ORDER — FLUTICASONE PROPIONATE 50 MCG
SPRAY, SUSPENSION (ML) NASAL
Qty: 1 BOTTLE | Refills: 3 | Status: SHIPPED | OUTPATIENT
Start: 2020-12-28 | End: 2021-06-23

## 2020-12-29 ENCOUNTER — ANESTHESIA (OUTPATIENT)
Dept: ENDOSCOPY | Age: 45
End: 2020-12-29
Payer: COMMERCIAL

## 2020-12-29 ENCOUNTER — ANESTHESIA EVENT (OUTPATIENT)
Dept: ENDOSCOPY | Age: 45
End: 2020-12-29
Payer: COMMERCIAL

## 2020-12-29 ENCOUNTER — HOSPITAL ENCOUNTER (OUTPATIENT)
Age: 45
Setting detail: OUTPATIENT SURGERY
Discharge: HOME | End: 2020-12-29
Attending: INTERNAL MEDICINE | Admitting: INTERNAL MEDICINE
Payer: COMMERCIAL

## 2020-12-29 VITALS
OXYGEN SATURATION: 98 % | TEMPERATURE: 97.3 F | HEART RATE: 75 BPM | WEIGHT: 223 LBS | BODY MASS INDEX: 39.51 KG/M2 | SYSTOLIC BLOOD PRESSURE: 127 MMHG | DIASTOLIC BLOOD PRESSURE: 85 MMHG | RESPIRATION RATE: 21 BRPM | HEIGHT: 63 IN

## 2020-12-29 VITALS — DIASTOLIC BLOOD PRESSURE: 77 MMHG | OXYGEN SATURATION: 100 % | SYSTOLIC BLOOD PRESSURE: 114 MMHG

## 2020-12-29 LAB — HCG(URINE) PREGNANCY TEST: NEGATIVE

## 2020-12-29 PROCEDURE — 2500000003 HC RX 250 WO HCPCS: Performed by: NURSE ANESTHETIST, CERTIFIED REGISTERED

## 2020-12-29 PROCEDURE — 7100000010 HC PHASE II RECOVERY - FIRST 15 MIN: Performed by: INTERNAL MEDICINE

## 2020-12-29 PROCEDURE — 2709999900 HC NON-CHARGEABLE SUPPLY: Performed by: INTERNAL MEDICINE

## 2020-12-29 PROCEDURE — 3700000001 HC ADD 15 MINUTES (ANESTHESIA): Performed by: INTERNAL MEDICINE

## 2020-12-29 PROCEDURE — 6360000002 HC RX W HCPCS: Performed by: NURSE ANESTHETIST, CERTIFIED REGISTERED

## 2020-12-29 PROCEDURE — 7100000011 HC PHASE II RECOVERY - ADDTL 15 MIN: Performed by: INTERNAL MEDICINE

## 2020-12-29 PROCEDURE — 84703 CHORIONIC GONADOTROPIN ASSAY: CPT

## 2020-12-29 PROCEDURE — 2580000003 HC RX 258: Performed by: NURSE ANESTHETIST, CERTIFIED REGISTERED

## 2020-12-29 PROCEDURE — 88305 TISSUE EXAM BY PATHOLOGIST: CPT

## 2020-12-29 PROCEDURE — 3700000000 HC ANESTHESIA ATTENDED CARE: Performed by: INTERNAL MEDICINE

## 2020-12-29 PROCEDURE — 3609012400 HC EGD TRANSORAL BIOPSY SINGLE/MULTIPLE: Performed by: INTERNAL MEDICINE

## 2020-12-29 PROCEDURE — 7100000001 HC PACU RECOVERY - ADDTL 15 MIN: Performed by: INTERNAL MEDICINE

## 2020-12-29 PROCEDURE — 3609027000 HC COLONOSCOPY: Performed by: INTERNAL MEDICINE

## 2020-12-29 PROCEDURE — 7100000000 HC PACU RECOVERY - FIRST 15 MIN: Performed by: INTERNAL MEDICINE

## 2020-12-29 RX ORDER — PANTOPRAZOLE SODIUM 40 MG/1
40 TABLET, DELAYED RELEASE ORAL
Qty: 60 TABLET | Refills: 5 | Status: SHIPPED | OUTPATIENT
Start: 2020-12-29 | End: 2022-01-28 | Stop reason: SDUPTHER

## 2020-12-29 RX ORDER — IBUPROFEN 800 MG/1
TABLET ORAL
Qty: 30 TABLET | Refills: 0 | Status: SHIPPED | OUTPATIENT
Start: 2020-12-29 | End: 2021-02-22 | Stop reason: SDUPTHER

## 2020-12-29 RX ORDER — POLYETHYLENE GLYCOL 3350 17 G/17G
17 POWDER ORAL PRN
Qty: 1 BOTTLE | Refills: 1 | Status: SHIPPED | OUTPATIENT
Start: 2020-12-29 | End: 2021-03-29 | Stop reason: SDUPTHER

## 2020-12-29 RX ORDER — PROPOFOL 10 MG/ML
INJECTION, EMULSION INTRAVENOUS CONTINUOUS PRN
Status: DISCONTINUED | OUTPATIENT
Start: 2020-12-29 | End: 2020-12-29 | Stop reason: SDUPTHER

## 2020-12-29 RX ORDER — PROPOFOL 10 MG/ML
INJECTION, EMULSION INTRAVENOUS PRN
Status: DISCONTINUED | OUTPATIENT
Start: 2020-12-29 | End: 2020-12-29 | Stop reason: SDUPTHER

## 2020-12-29 RX ORDER — SYRINGE AND NEEDLE,INSULIN,1ML 31 GX5/16"
SYRINGE, EMPTY DISPOSABLE MISCELLANEOUS
Qty: 100 EACH | Refills: 1 | Status: SHIPPED | OUTPATIENT
Start: 2020-12-29 | End: 2021-03-26 | Stop reason: SDUPTHER

## 2020-12-29 RX ORDER — GLYCOPYRROLATE 0.2 MG/ML
INJECTION INTRAMUSCULAR; INTRAVENOUS PRN
Status: DISCONTINUED | OUTPATIENT
Start: 2020-12-29 | End: 2020-12-29 | Stop reason: SDUPTHER

## 2020-12-29 RX ORDER — GINSENG 100 MG
CAPSULE ORAL
Qty: 120 CAPSULE | Refills: 0 | Status: SHIPPED | OUTPATIENT
Start: 2020-12-29 | End: 2021-03-26 | Stop reason: SDUPTHER

## 2020-12-29 RX ORDER — SODIUM CHLORIDE 9 MG/ML
INJECTION, SOLUTION INTRAVENOUS CONTINUOUS PRN
Status: DISCONTINUED | OUTPATIENT
Start: 2020-12-29 | End: 2020-12-29 | Stop reason: SDUPTHER

## 2020-12-29 RX ORDER — LIDOCAINE HYDROCHLORIDE 20 MG/ML
INJECTION, SOLUTION EPIDURAL; INFILTRATION; INTRACAUDAL; PERINEURAL PRN
Status: DISCONTINUED | OUTPATIENT
Start: 2020-12-29 | End: 2020-12-29 | Stop reason: SDUPTHER

## 2020-12-29 RX ADMIN — PROPOFOL 20 MG: 10 INJECTION, EMULSION INTRAVENOUS at 09:31

## 2020-12-29 RX ADMIN — LIDOCAINE HYDROCHLORIDE 100 MG: 20 INJECTION, SOLUTION EPIDURAL; INFILTRATION; INTRACAUDAL; PERINEURAL at 09:27

## 2020-12-29 RX ADMIN — GLYCOPYRROLATE 0.1 MG: 0.2 INJECTION, SOLUTION INTRAMUSCULAR; INTRAVENOUS at 09:27

## 2020-12-29 RX ADMIN — PROPOFOL 100 MCG/KG/MIN: 10 INJECTION, EMULSION INTRAVENOUS at 09:28

## 2020-12-29 RX ADMIN — PROPOFOL 30 MG: 10 INJECTION, EMULSION INTRAVENOUS at 09:30

## 2020-12-29 RX ADMIN — PROPOFOL 20 MG: 10 INJECTION, EMULSION INTRAVENOUS at 09:35

## 2020-12-29 RX ADMIN — PROPOFOL 50 MG: 10 INJECTION, EMULSION INTRAVENOUS at 09:28

## 2020-12-29 RX ADMIN — SODIUM CHLORIDE: 9 INJECTION, SOLUTION INTRAVENOUS at 09:27

## 2020-12-29 RX ADMIN — PROPOFOL 30 MG: 10 INJECTION, EMULSION INTRAVENOUS at 09:40

## 2020-12-29 NOTE — TELEPHONE ENCOUNTER
Last seen 6-5-20  Last filled Ibuprofen 11-2-20  Last filled Mirlax 8-19-20  Probiotic last filled 6-25-20  She uses the syringes for the b-12 injections.

## 2020-12-29 NOTE — H&P
[address] [place] [address]    Pre-operative History and Physical    Patient: Evelyn Bland  : 1975  CSN:     History Obtained From:  patient and/or guardian. HISTORY OF PRESENT ILLNESS:    The patient is a 50 y.o. female with AICAH, iron deficiency anemia and upper abd pain here for EGD and colonoscopy. Past Medical History:    Past Medical History:   Diagnosis Date    Abdominal wall abscess 2017    Abdominal wall cellulitis 2017    Anal fissure 2015    Anemia, iron deficiency     Asthma     [facility]     History of blood transfusion     Hypothyroidism     Menorrhagia with regular cycle     Morbid obesity with BMI of 40.0-44.9, adult ([facility] 2015    MRSA (methicillin resistant staph aureus) culture positive 2017    abdominal abscess    MRSA infection 2012    rt thigh abscess    Pericarditis, acute     Sinusitis      Past Surgical History:    Past Surgical History:   Procedure Laterality Date    OTHER SURGICAL HISTORY  2012    INCISION AND DRAINAGE POSTERIOR THIGH ABSCESS    RECTAL SURGERY  Aug 2011    repair of rectal fissures and anal skin tag removal    SINUS SURGERY      X 3    TONSILLECTOMY  2004    TONSILLECTOMY AND ADENOIDECTOMY      (partial adenoidectomy)     Medications Prior to Admission:   No current facility-administered medications on file prior to encounter.       Current Outpatient Medications on File Prior to Encounter   Medication Sig Dispense Refill    albuterol sulfate HFA (VENTOLIN HFA) 108 (90 Base) MCG/ACT inhaler INHALE 2 PUFFS INTO THE LUNGS EVERY 6 HOURS AS NEEDED FOR WHEEZING 18 g 2    ibuprofen (ADVIL;MOTRIN) 800 MG tablet TAKE ONE TABLET BY MOUTH EVERY 8 HOURS AS NEEDED FOR PAIN 30 tablet 0    montelukast (SINGULAIR) 10 MG tablet TAKE ONE TABLET BY MOUTH EVERY EVENING 30 tablet 3    pantoprazole (PROTONIX) 40 MG tablet TAKE 1 TABLET BY MOUTH ONE TIME A DAY 30 tablet 3    cyanocobalamin 1000 MCG/ML injection INJECT 1ML INTO THE SKIN ONCE MONTLY 1 mL 2    cetirizine-psuedoephedrine (ZYRTEC-D) 5-120 MG per extended release tablet TAKE 1 TABLET BY MOUTH 2 TIMES A DAY 60 tablet 2    budesonide-formoterol (SYMBICORT) 160-4.5 MCG/ACT AERO Inhale 2 puffs into the lungs 2 times daily 1 Inhaler 5    liothyronine (CYTOMEL) 5 MCG tablet TAKE TWO TABLETS BY MOUTH DAILY 60 tablet 5    levothyroxine (SYNTHROID) 125 MCG tablet Take 1 tablet by mouth daily 30 tablet 5    budesonide-formoterol (SYMBICORT) 160-4.5 MCG/ACT AERO Inhale 2 puffs into the lungs 2 times daily 1 Inhaler 2    folic acid (FOLVITE) 1 MG tablet Take 1 tablet by mouth daily 30 tablet 3    Multiple Vitamins-Minerals (THERAPEUTIC MULTIVITAMIN-MINERALS) tablet Take 1 tablet by mouth daily 30 tablet 1    Probiotic Product (RA PROBIOTIC MAX STRENGTH) CAPS Take 1 capsule by mouth 2 times daily 60 capsule 3    albuterol (PROVENTIL) (5 MG/ML) 0.5% nebulizer solution Take 1 mL by nebulization 4 times daily as needed for Wheezing 120 each 3    predniSONE (DELTASONE) 10 MG tablet 4 tabs a day for 5 days, 3 tabs a day for 3 days, 2 tabs a day for 3 days,1 tab a day for 3 days 38 tablet 0    dilTIAZem (CARDIZEM CD) 120 MG extended release capsule Take 1 capsule by mouth daily 30 capsule 3    predniSONE (DELTASONE) 10 MG tablet 4 tabs a day for 5 days, 3 tabs a day for 3 days, 2 tabs a day for 3 days,1 tab a day for 3 days 38 tablet 0    predniSONE (DELTASONE) 10 MG tablet 4 tabs a day for 5 days, 3 tabs a day for 3 days, 2 tabs a day for 3 days,1 tab a day for 3 days 38 tablet 0    metFORMIN (GLUCOPHAGE) 500 MG tablet Take 1 tablet by mouth 2 times daily (with meals) 180 tablet 1    polyethylene glycol (MIRALAX) 17 GM/SCOOP powder Take 17 g by mouth as needed (constipation) Take as directed. 119 g 1    ketoconazole (NIZORAL) 2 % shampoo APPLY TO AFFECTED AREA(S) DAILY AS NEEDED 1 Bottle 3    mometasone (ELOCON) 0.1 % cream Apply topically daily. 1 Tube 2    NEEDLE, DISP, 25 G (BD DISP NEEDLES) 25G X 5/8\" MISC 1 packet by Does not apply route every 30 days 12 each 1    urea (CARMOL) 10 % cream Apply topically as needed. 85 g 0    Misc Natural Product Nasal (PONARIS) SOLN 1 spray by Each Nostril route every 6 hours as needed (as need) 1 Bottle 5    Misc. Devices (HIBICLENS HAND PUMP 32OZ) MISC Use as needed for skin irritation or bumps. (Patient not taking: Reported on 10/12/2020) 1 each 0    BETASEPT SURGICAL SCRUB 4 % external liquid APPLY TO AFFECTED AREA(S) TOPICALLY AS NEEDED 473 mL 0    MONOJECT MAGELLAN SYRINGE 25G X 5/8\" 3 ML MISC USE AS DIRECTED FOR INJECTION 12 each 1    Handicap Placard MISC by Does not apply route Duration 2 weeks 1 each 0        Allergies:  Latex, Clindamycin/lincomycin, Sulfa antibiotics, Diflucan [fluconazole], and Other      Social History:   Social History     Tobacco Use    Smoking status: Never Smoker    Smokeless tobacco: Never Used   Substance Use Topics    Alcohol use: Yes     Alcohol/week: 0.0 standard drinks     Comment: 2 drinks/week      Family History:   Family History   Problem Relation Age of Onset    High Blood Pressure Mother     Heart Disease Father     Diabetes Maternal Aunt        PHYSICAL EXAM:      /82   Pulse 84   Temp 96.9 °F (36.1 °C) (Temporal)   Resp 16   Ht 5' 3\" (1.6 m)   Wt 223 lb (101.2 kg)   LMP 11/09/2020 (Exact Date)   SpO2 99%   BMI 39.50 kg/m²  I        Heart:   RRR, normal s1s2    Lungs:  CTA bilat,  Normal effort    Abdomen:   NT, ND      ASA Grade:  ASA 3 - Patient with moderate systemic disease with functional limitations    Mallampati Class:  Class I: Soft palate, uvula, fauces, pillars visible  __________  Class II: Soft palate, uvula, fauces visible  ____X_____   Class III: Soft palate, base of uvula visible  __________  Class IV: Hard palate only visible   __________        ASSESSMENT AND PLAN:    1.   Patient is a 50 y.o. female here for Esophagogastroduodenoscopy and colonoscopy with MAC.   2.  Procedure options, risks and benefits reviewed with patient. Patient expresses understanding.      Kevan Gamboa MD  [address]  12/29/2020

## 2020-12-29 NOTE — ANESTHESIA POSTPROCEDURE EVALUATION
Department of Anesthesiology  Postprocedure Note    Patient: Pro Casper  MRN: [id number]  YOB: 1975  Date of evaluation: 12/29/2020  Time:  11:17 AM     Procedure Summary     Date: 12/29/20 Room / Location: [address] [place] [address]    Anesthesia Start: 2696 Anesthesia Stop: 8110    Procedures:       COLONOSCOPY DIAGNOSTIC (N/A )      EGD BIOPSY (N/A Abdomen) Diagnosis: (AUTOIMMUNE HEPATITIS K75.4)    Surgeons: Joannie Osler, MD Responsible Provider: Liliya Lopes MD    Anesthesia Type: MAC ASA Status: 3          Anesthesia Type: MAC    Katalina Phase I: Katalina Score: 10    Katalina Phase II: Katalina Score: 10    Last vitals: Reviewed and per EMR flowsheets.        Anesthesia Post Evaluation    Patient location during evaluation: PACU  Complications: no  Cardiovascular status: hemodynamically stable  Respiratory status: acceptable

## 2020-12-29 NOTE — ANESTHESIA PRE PROCEDURE
Department of Anesthesiology  Preprocedure Note       Name:  Caden Alexander   Age:  50 y.o.  :  1975                                          MRN:  [id number]         Date:  2020      Surgeon: Alfa Vicente):  Lizzeth Doolye MD    Procedure: Procedure(s):  COLONOSCOPY DIAGNOSTIC  EGD DIAGNOSTIC ONLY    Medications prior to admission:   Prior to Admission medications    Medication Sig Start Date End Date Taking?  Authorizing Provider   fluticasone (FLONASE) 50 MCG/ACT nasal spray USE1 SPRAY IN EACH NOSTRIL DAILY 20  Yes Sury Ya DO   albuterol sulfate HFA (VENTOLIN HFA) 108 (90 Base) MCG/ACT inhaler INHALE 2 PUFFS INTO THE LUNGS EVERY 6 HOURS AS NEEDED FOR WHEEZING 20  Yes Lauren Ward MD   ibuprofen (ADVIL;MOTRIN) 800 MG tablet TAKE ONE TABLET BY MOUTH EVERY 8 HOURS AS NEEDED FOR PAIN 20  Yes Aram Craig MD   montelukast (SINGULAIR) 10 MG tablet TAKE ONE TABLET BY MOUTH EVERY EVENING 20  Yes Aram Craig MD   pantoprazole (PROTONIX) 40 MG tablet TAKE 1 TABLET BY MOUTH ONE TIME A DAY 20  Yes Aram Craig MD   cyanocobalamin 1000 MCG/ML injection INJECT 1ML INTO THE SKIN ONCE MONTLY 20  Yes Aram Craig MD   cetirizine-psuedoephedrine (ZYRTEC-D) 5-120 MG per extended release tablet TAKE 1 TABLET BY MOUTH 2 TIMES A DAY 20  Yes Aram Craig MD   budesonide-formoterol [facility]) 160-4.5 MCG/ACT AERO Inhale 2 puffs into the lungs 2 times daily 20  Yes Aram Craig MD   liothyronine (CYTOMEL) 5 MCG tablet TAKE TWO TABLETS BY MOUTH DAILY 20  Yes Bharathi Heredia MD   levothyroxine (SYNTHROID) 125 MCG tablet Take 1 tablet by mouth daily 20  Yes Bharathi Heredia MD   budesonide-formoterol (SYMBICORT) 160-4.5 MCG/ACT AERO Inhale 2 puffs into the lungs 2 times daily 20  Yes Malia Tracey MD   folic acid (FOLVITE) 1 MG tablet Take 1 tablet by mouth daily 20  Yes Aram Craig MD Multiple Vitamins-Minerals (THERAPEUTIC MULTIVITAMIN-MINERALS) tablet Take 1 tablet by mouth daily 6/25/20  Yes Rusty Sharma MD   Probiotic Product (RA PROBIOTIC MAX STRENGTH) CAPS Take 1 capsule by mouth 2 times daily 6/25/20  Yes Rusty Sharma MD   albuterol (PROVENTIL) (5 MG/ML) 0.5% nebulizer solution Take 1 mL by nebulization 4 times daily as needed for Wheezing 4/3/20  Yes José [employer] Brands, DO   mupirocin (BACTROBAN) 2 % ointment APPLY TO AFFECTED AREA(S) TOPICALLY AS NEEDED 12/28/20   Sury Ya DO   predniSONE (DELTASONE) 10 MG tablet 4 tabs a day for 5 days, 3 tabs a day for 3 days, 2 tabs a day for 3 days,1 tab a day for 3 days 11/12/20   Maida Ho MD   dilTIAZem (CARDIZEM CD) 120 MG extended release capsule Take 1 capsule by mouth daily 14/72/86   Octavia Ribeiro MD   predniSONE (DELTASONE) 10 MG tablet 4 tabs a day for 5 days, 3 tabs a day for 3 days, 2 tabs a day for 3 days,1 tab a day for 3 days 10/12/20   Maida Ho MD   predniSONE (DELTASONE) 10 MG tablet 4 tabs a day for 5 days, 3 tabs a day for 3 days, 2 tabs a day for 3 days,1 tab a day for 3 days 10/1/20   Maida Ho MD   metFORMIN (GLUCOPHAGE) 500 MG tablet Take 1 tablet by mouth 2 times daily (with meals) 9/18/20   Alli Pantoja MD   polyethylene glycol (MIRALAX) 17 GM/SCOOP powder Take 17 g by mouth as needed (constipation) Take as directed. 8/19/20   Rusty Sharma MD   ketoconazole (NIZORAL) 2 % shampoo APPLY TO AFFECTED AREA(S) DAILY AS NEEDED 8/4/20   Yajaira Yin MD   mometasone (ELOCON) 0.1 % cream Apply topically daily. 6/25/20   Rusty Sharma MD   NEEDLE, DISP, 25 G (BD DISP NEEDLES) 25G X 5/8\" MISC 1 packet by Does not apply route every 30 days 6/25/20   Rusty Sharma MD   urea (CARMOL) 10 % cream Apply topically as needed.  6/25/20   Rusty Sharma MD Misc Natural Product Nasal (PONARIS) SOLN 1 spray by Each Nostril route every 6 hours as needed (as need) 6/25/20 7/25/20  Lon Jaime MD   Misc. Devices (HIBICLENS HAND PUMP 32OZ) MISC Use as needed for skin irritation or bumps. Patient not taking: Reported on 10/12/2020 6/22/20   Olena Sena MD   BETASEPT SURGICAL SCRUB 4 % external liquid APPLY TO AFFECTED AREA(S) TOPICALLY AS NEEDED 1/7/20   Shereen Dior MD   MONOJECT MAGELLAN SYRINGE 25G X 5/8\" 3 ML MISC USE AS DIRECTED FOR INJECTION 10/23/19   Olena Sena MD   Handicap Placard MISC by Does not apply route Duration 2 weeks 2/19/18   Irlanda Snyder MD       Current medications:    No current facility-administered medications for this encounter. Allergies:     Allergies   Allergen Reactions    Latex Swelling and Dermatitis     Also \"burning of skin\"      Clindamycin/Lincomycin Hives and Other (See Comments)     \"Throat closing\"    Sulfa Antibiotics Anaphylaxis and Hives    Diflucan [Fluconazole] Hives and Itching    Other Hives       Problem List:    Patient Active Problem List   Diagnosis Code    Hypothyroidism E03.9    Chronic sinusitis J32.9    Asthma in adult J45.909    Chronic rhinosinusitis J32.9    Iron deficiency anemia D50.9    Eczema L30.9    Intestinal malabsorption K90.9    Morbid obesity with BMI of 40.0-44.9, adult ([facility]) E66.01, Z68.41    Thrombocytopenia ([facility]) D69.6    Chronic ITP (idiopathic thrombocytopenia) ([facility]) D69.3    Hyperlipidemia, mixed E78.2    Autoimmune hepatitis ([facility]) K75.4    Anxiety F41.9    Chronic pansinusitis J32.4    Atrial fibrillation with RVR ([facility]) I48.91       Past Medical History:        Diagnosis Date    Abdominal wall abscess 2/8/2017    Abdominal wall cellulitis 2/6/2017    Anal fissure 4/30/2015    Anemia, iron deficiency     Asthma     Autoimmune hepatitis ([facility] [address]     History of blood transfusion     Hypothyroidism     Menorrhagia with regular cycle  Morbid obesity with BMI of 40.0-44.9, adult ([address] 5/26/2015    MRSA (methicillin resistant staph aureus) culture positive 02/03/2017    abdominal abscess    MRSA infection 08/20/2012    rt thigh abscess    Pericarditis, acute     Sinusitis        Past Surgical History:        Procedure Laterality Date    OTHER SURGICAL HISTORY  8/22/2012    INCISION AND DRAINAGE POSTERIOR THIGH ABSCESS    RECTAL SURGERY  Aug 2011    repair of rectal fissures and anal skin tag removal    SINUS SURGERY      X 3    TONSILLECTOMY  2004    TONSILLECTOMY AND ADENOIDECTOMY  2005    (partial adenoidectomy)       Social History:    Social History     Tobacco Use    Smoking status: Never Smoker    Smokeless tobacco: Never Used   Substance Use Topics    Alcohol use: Yes     Alcohol/week: 0.0 standard drinks     Comment: 2 drinks/week                                 Counseling given: Not Answered      Vital Signs (Current):   Vitals:    12/29/20 0832 12/29/20 0841   BP:  129/82   Pulse:  84   Resp:  16   Temp:  96.9 °F (36.1 °C)   TempSrc:  Temporal   SpO2:  99%   Weight: 223 lb (101.2 kg)    Height: 5' 3\" (1.6 m)                                               BP Readings from Last 3 Encounters:   12/29/20 129/82   12/07/20 (!) 127/91   10/12/20 133/86       NPO Status: Time of last liquid consumption: 0430                        Time of last solid consumption: 2245                        Date of last liquid consumption: 12/29/20                        Date of last solid food consumption: 12/27/20    BMI:   Wt Readings from Last 3 Encounters:   12/29/20 223 lb (101.2 kg)   10/12/20 223 lb 3.2 oz (101.2 kg)   06/25/20 222 lb (100.7 kg)     Body mass index is 39.5 kg/m².     CBC:   Lab Results   Component Value Date    WBC 5.9 12/07/2020    RBC 4.18 12/07/2020    RBC 4.18 05/10/2017    HGB 12.7 12/07/2020    HCT 38.0 12/07/2020    MCV 90.8 12/07/2020    RDW 14.5 12/07/2020     12/07/2020       CMP:   Lab Results Component Value Date     12/07/2020    K 4.0 12/07/2020     12/07/2020    CO2 22 12/07/2020    BUN 12 12/07/2020    CREATININE 0.5 12/07/2020    GFRAA >60 12/07/2020    GFRAA 100 09/10/2012    AGRATIO 0.9 09/17/2020    LABGLOM >60 12/07/2020    GLUCOSE 107 12/07/2020    PROT 8.4 12/08/2020    PROT 8.4 05/17/2012    CALCIUM 9.3 12/07/2020    BILITOT 0.4 12/08/2020    ALKPHOS 278 12/08/2020    AST 55 12/08/2020    ALT 78 12/08/2020       POC Tests: No results for input(s): POCGLU, POCNA, POCK, POCCL, POCBUN, POCHEMO, POCHCT in the last 72 hours.     Coags:   Lab Results   Component Value Date    PROTIME 11.1 12/08/2020    INR 0.96 12/08/2020    APTT 29.0 06/02/2014       HCG (If Applicable):   Lab Results   Component Value Date    PREGTESTUR Negative 12/29/2020        ABGs: No results found for: PHART, PO2ART, ZUS3KIO, XUF1TSS, BEART, S8OSVOXR     Type & Screen (If Applicable):  Lab Results   Component Value Date    LABABO AB 05/15/2012    LABRH Positive 05/15/2012       Drug/Infectious Status (If Applicable):  No results found for: HIV, HEPCAB    COVID-19 Screening (If Applicable): No results found for: COVID19      Anesthesia Evaluation  Patient summary reviewed and Nursing notes reviewed no history of anesthetic complications:   Airway: Mallampati: III        Dental:    (+) caps      Pulmonary:normal exam    (+) sleep apnea:  asthma:                            Cardiovascular:  Exercise tolerance: good (>4 METS),   (+) hypertension:, dysrhythmias: atrial fibrillation, hyperlipidemia      ECG reviewed  Rhythm: regular    Echocardiogram reviewed  Stress test reviewed                Neuro/Psych:   (+) depression/anxiety             GI/Hepatic/Renal:   (+) GERD:, hepatitis (autoimmune): A, liver disease:, morbid obesity          Endo/Other:    (+) hypothyroidism, blood dyscrasia (ITP): anemia:., .                 Abdominal:   (+) obese,         Vascular:                                        Anesthesia Plan MAC     ASA 3       Induction: intravenous. Anesthetic plan and risks discussed with patient. Plan discussed with CRNA. MEDICATIONS:  No current facility-administered medications for this encounter.          LABS:  Lab Results   Component Value Date    WBC 5.9 12/07/2020    HGB 12.7 12/07/2020    HCT 38.0 12/07/2020    MCV 90.8 12/07/2020     (L) 12/07/2020    GLUCOSE 107 (H) 12/07/2020    PROTIME 11.1 12/08/2020    INR 0.96 12/08/2020    APTT 29.0 06/02/2014       Lab Results   Component Value Date     12/07/2020    K 4.0 12/07/2020     12/07/2020    CO2 22 12/07/2020    PHOS 3.2 02/09/2017    BUN 12 12/07/2020    CREATININE 0.5 (L) 12/07/2020       Problem List:  Patient Active Problem List   Diagnosis    Hypothyroidism    Chronic sinusitis    Asthma in adult    Chronic rhinosinusitis    Iron deficiency anemia    Eczema    Intestinal malabsorption    Morbid obesity with BMI of 40.0-44.9, adult (HCC)    Thrombocytopenia (HCC)    Chronic ITP (idiopathic thrombocytopenia) (HCC)    Hyperlipidemia, mixed    Autoimmune hepatitis ([address]    Anxiety    Chronic pansinusitis    Atrial fibrillation with RVR ([facility] [address]           Vibha Hall MD   12/29/2020

## 2020-12-29 NOTE — PROGRESS NOTES
Pt arrived from endo to PACU bay 4. Report received from endo staff. Pt arouses to voice. Pt on RA, NSR, VSS. Will continue to monitor.
Pt awake and alert. Pt on RA, VSS. Mother in the waiting room. Pt denies pain and nausea, tolerating PO. Pt meets criteria to be discharged from phase 1.
Reviewed problem list, assessment, H&P, and checklist preoperatively. Scope verified using 2 person system. Family in waiting room.
Teaching / education initiated regarding perioperative experience, expectations, and pain management during stay. Patient verbalized understanding.
patient inform the staff on arrival what their rides plans are while the patient is in the facility. At the MAIN there is one visitor allowed. Please note that the visitor policy is subject to change. Covid done 12/21/2020 at outside facility-negative results on chart.

## 2021-01-07 RX ORDER — CHLORHEXIDINE GLUCONATE 4 G/100ML
SOLUTION TOPICAL
Qty: 473 ML | Refills: 0 | Status: SHIPPED | OUTPATIENT
Start: 2021-01-07 | End: 2022-01-24 | Stop reason: SDUPTHER

## 2021-01-07 NOTE — TELEPHONE ENCOUNTER
LAST OV 06/25/2020 MINAL  NEXT OV NONE    BETASEPT SURGICAL SCRUB 4% LIQD     AS NEEDED FOR SKIN IRRITATIONS OR BUMPS  (TOO OLD TO FILL)

## 2021-02-22 DIAGNOSIS — E03.8 HYPOTHYROIDISM DUE TO HASHIMOTO'S THYROIDITIS: ICD-10-CM

## 2021-02-22 DIAGNOSIS — E06.3 HYPOTHYROIDISM DUE TO HASHIMOTO'S THYROIDITIS: ICD-10-CM

## 2021-02-22 RX ORDER — BUDESONIDE AND FORMOTEROL FUMARATE DIHYDRATE 160; 4.5 UG/1; UG/1
2 AEROSOL RESPIRATORY (INHALATION) 2 TIMES DAILY
Qty: 1 INHALER | Refills: 5 | Status: SHIPPED
Start: 2021-02-22 | End: 2021-03-26 | Stop reason: SDUPTHER

## 2021-02-22 RX ORDER — IBUPROFEN 800 MG/1
TABLET ORAL
Qty: 30 TABLET | Refills: 0 | Status: SHIPPED | OUTPATIENT
Start: 2021-02-22 | End: 2021-03-26 | Stop reason: ALTCHOICE

## 2021-03-23 ENCOUNTER — TELEPHONE (OUTPATIENT)
Dept: ENT CLINIC | Age: 46
End: 2021-03-23

## 2021-03-23 DIAGNOSIS — J32.9 SINUSITIS, UNSPECIFIED CHRONICITY, UNSPECIFIED LOCATION: Primary | ICD-10-CM

## 2021-03-23 RX ORDER — PREDNISONE 10 MG/1
TABLET ORAL
Qty: 38 TABLET | Refills: 0 | Status: SHIPPED
Start: 2021-03-23 | End: 2021-03-25 | Stop reason: SDUPTHER

## 2021-03-23 RX ORDER — AMOXICILLIN AND CLAVULANATE POTASSIUM 875; 125 MG/1; MG/1
1 TABLET, FILM COATED ORAL 2 TIMES DAILY
Qty: 20 TABLET | Refills: 0 | Status: SHIPPED | OUTPATIENT
Start: 2021-03-23 | End: 2021-04-02

## 2021-03-23 NOTE — TELEPHONE ENCOUNTER
Patient called office and made aware medications sent to [facility]

## 2021-03-23 NOTE — TELEPHONE ENCOUNTER
Calling stating has sinus infection. Wants to know if she can have antibiotic and steroid.   Patient has appointment scheduled for Thursday-but \"is afraid I will end up in the ER before that\"

## 2021-03-25 ENCOUNTER — TELEPHONE (OUTPATIENT)
Dept: INTERNAL MEDICINE CLINIC | Age: 46
End: 2021-03-25

## 2021-03-25 ENCOUNTER — OFFICE VISIT (OUTPATIENT)
Dept: ENT CLINIC | Age: 46
End: 2021-03-25
Payer: COMMERCIAL

## 2021-03-25 VITALS
BODY MASS INDEX: 38.8 KG/M2 | SYSTOLIC BLOOD PRESSURE: 122 MMHG | HEIGHT: 63 IN | WEIGHT: 219 LBS | DIASTOLIC BLOOD PRESSURE: 80 MMHG | TEMPERATURE: 97.4 F | HEART RATE: 87 BPM

## 2021-03-25 DIAGNOSIS — J01.01 ACUTE RECURRENT MAXILLARY SINUSITIS: ICD-10-CM

## 2021-03-25 DIAGNOSIS — J32.4 CHRONIC PANSINUSITIS: Primary | ICD-10-CM

## 2021-03-25 PROCEDURE — G8427 DOCREV CUR MEDS BY ELIG CLIN: HCPCS | Performed by: OTOLARYNGOLOGY

## 2021-03-25 PROCEDURE — 99212 OFFICE O/P EST SF 10 MIN: CPT | Performed by: OTOLARYNGOLOGY

## 2021-03-25 PROCEDURE — 31231 NASAL ENDOSCOPY DX: CPT | Performed by: OTOLARYNGOLOGY

## 2021-03-25 PROCEDURE — 1036F TOBACCO NON-USER: CPT | Performed by: OTOLARYNGOLOGY

## 2021-03-25 PROCEDURE — G8484 FLU IMMUNIZE NO ADMIN: HCPCS | Performed by: OTOLARYNGOLOGY

## 2021-03-25 PROCEDURE — G8417 CALC BMI ABV UP PARAM F/U: HCPCS | Performed by: OTOLARYNGOLOGY

## 2021-03-25 ASSESSMENT — ENCOUNTER SYMPTOMS
FACIAL SWELLING: 0
VOICE CHANGE: 0
TROUBLE SWALLOWING: 0
EYE ITCHING: 0
SINUS PRESSURE: 1
DIARRHEA: 0
SORE THROAT: 0
EYE PAIN: 0
COUGH: 0
RHINORRHEA: 0
NAUSEA: 0
CHOKING: 0
EYE REDNESS: 0
SINUS PAIN: 0
SHORTNESS OF BREATH: 0

## 2021-03-25 NOTE — PROGRESS NOTES
Not on file   Occupational History    Occupation: NA   Social Needs    Financial resource strain: Not on file    Food insecurity     Worry: Not on file     Inability: Not on file   [employer] needs     Medical: Not on file     Non-medical: Not on file   Tobacco Use    Smoking status: Never Smoker    Smokeless tobacco: Never Used   Substance and Sexual Activity    Alcohol use: Yes     Alcohol/week: 0.0 standard drinks     Comment: 2 drinks/week     Drug use: No    Sexual activity: Yes   Lifestyle    Physical activity     Days per week: Not on file     Minutes per session: Not on file    Stress: Not on file   Relationships    Social connections     Talks on phone: Not on file     Gets together: Not on file     Attends Confucianism service: Not on file     Active member of club or organization: Not on file     Attends meetings of clubs or organizations: Not on file     Relationship status: Not on file    Intimate partner violence     Fear of current or ex partner: Not on file     Emotionally abused: Not on file     Physically abused: Not on file     Forced sexual activity: Not on file   Other Topics Concern    Not on file   Social History Narrative    Not on file       DRUG/FOOD ALLERGIES: Latex, Clindamycin/lincomycin, Sulfa antibiotics, Diflucan [fluconazole], and Other    CURRENT MEDICATIONS  Prior to Admission medications    Medication Sig Start Date End Date Taking?  Authorizing Provider   amoxicillin-clavulanate (AUGMENTIN) 875-125 MG per tablet Take 1 tablet by mouth 2 times daily for 10 days 3/23/21 4/2/21  Sarwat Monge MD   predniSONE (DELTASONE) 10 MG tablet 4 tabs a day for 5 days, 3 tabs a day for 3 days, 2 tabs a day for 3 days,1 tab a day for 3 days 3/23/21   Sarwat Monge MD   budesonide-formoterol [facility]) 160-4.5 MCG/ACT AERO Inhale 2 puffs into the lungs 2 times daily 2/22/21   Sury Ya DO   ibuprofen (ADVIL;MOTRIN) 800 MG tablet TAKE ONE TABLET BY MOUTH EVERY 8 HOURS AS NEEDED FOR PAIN 2/22/21   Sury Ya DO   mupirocin (BACTROBAN) 2 % ointment APPLY TO AFFECTED AREA(S) TOPICALLY AS NEEDED 2/22/21   Sury Ya DO   chlorhexidine (BETASEPT SURGICAL SCRUB) 4 % external liquid APPLY TO AFFECTED AREA(S) TOPICALLY AS NEEDED 1/7/21   Anderson Joseph MD   Probiotic Product (ACIDOPHILUS HIGH-POTENCY) CAPS TAKE 1 CAPSULE BY MOUTH 2 TIMES A DAY 58/92/08   Kaylan Jackson MD   ULTICARE INSULIN SYRINGE 31G X 5/16\" 1 ML MISC AS DIRECTED 69/36/23   Oakley Osgood, MD   pantoprazole (PROTONIX) 40 MG tablet Take 1 tablet by mouth 2 times daily (before meals) 12/29/20   Marek Denson MD   fluticasone [facility]) 50 MCG/ACT nasal spray USE1 SPRAY IN EACH NOSTRIL DAILY 12/28/20   Sury Ya DO   predniSONE (DELTASONE) 10 MG tablet 4 tabs a day for 5 days, 3 tabs a day for 3 days, 2 tabs a day for 3 days,1 tab a day for 3 days 11/12/20   Phillip Abbott MD   dilTIAZem (CARDIZEM CD) 120 MG extended release capsule Take 1 capsule by mouth daily 54/92/53   Oakley Osgood, MD   albuterol sulfate HFA (VENTOLIN HFA) 108 (90 Base) MCG/ACT inhaler INHALE 2 PUFFS INTO THE LUNGS EVERY 6 HOURS AS NEEDED FOR WHEEZING 11/9/20   Myah Luke MD   montelukast (SINGULAIR) 10 MG tablet TAKE ONE TABLET BY MOUTH EVERY EVENING 11/6/20   Anderson Joseph MD   cyanocobalamin 1000 MCG/ML injection INJECT 1ML INTO THE SKIN ONCE MONTLY 11/6/20   Anderson Joseph MD   cetirizine-psuedoephedrine (ZYRTEC-D) 5-120 MG per extended release tablet TAKE 1 TABLET BY MOUTH 2 TIMES A DAY 11/6/20   Anderson Joseph MD   budesonide-formoterol [facility]) 160-4.5 MCG/ACT AERO Inhale 2 puffs into the lungs 2 times daily 11/6/20   Anderson Joseph MD   predniSONE (DELTASONE) 10 MG tablet 4 tabs a day for 5 days, 3 tabs a day for 3 days, 2 tabs a day for 3 days,1 tab a day for 3 days 10/12/20   Phillip Abbott MD   predniSONE (DELTASONE) 10 MG tablet 4 tabs a day for 5 days, 3 tabs a day for 3 days, 2 tabs a day for 3 days,1 tab a day for 3 days 10/1/20   Manjinder Srivastava MD   liothyronine (CYTOMEL) 5 MCG tablet TAKE TWO TABLETS BY MOUTH DAILY 9/18/20   Burnie Kehr, MD   levothyroxine (SYNTHROID) 125 MCG tablet Take 1 tablet by mouth daily 9/18/20   Burnie Kehr, MD   folic acid (FOLVITE) 1 MG tablet Take 1 tablet by mouth daily 8/19/20   Hien Otero MD   ketoconazole (NIZORAL) 2 % shampoo APPLY TO AFFECTED AREA(S) DAILY AS NEEDED 8/4/20   Jame Huff MD   mometasone (ELOCON) 0.1 % cream Apply topically daily. 6/25/20   Hien Otero MD   Multiple Vitamins-Minerals (THERAPEUTIC MULTIVITAMIN-MINERALS) tablet Take 1 tablet by mouth daily 6/25/20   Hien Otero MD   NEEDLE, DISP, 25 G (BD DISP NEEDLES) 25G X 5/8\" MISC 1 packet by Does not apply route every 30 days 6/25/20   Hien Otero MD   urea (CARMOL) 10 % cream Apply topically as needed. 6/25/20   MD Bhanu Mccurdy Natural Product Nasal (PONARIS) SOLN 1 spray by Each Nostril route every 6 hours as needed (as need) 6/25/20 7/25/20  Hien Otero MD   Misc. Devices (HIBICLENS HAND PUMP 32OZ) MISC Use as needed for skin irritation or bumps. Patient not taking: Reported on 10/12/2020 6/22/20   Muna Sun MD   albuterol (PROVENTIL) (5 MG/ML) 0.5% nebulizer solution Take 1 mL by nebulization 4 times daily as needed for Wheezing 4/3/20   Mame Hayden DO   MONOJECT MAGELLAN SYRINGE 25G X 5/8\" 3 ML MISC USE AS DIRECTED FOR INJECTION 10/23/19   Muna Sun MD   Handkwan Pineda MISC by Does not apply route Duration 2 weeks 2/19/18   Erich Cardona MD         Review of Systems   Constitutional: Negative for activity change, appetite change, chills, fatigue and fever. HENT: Positive for congestion, postnasal drip and sinus pressure. Negative for ear discharge, ear pain, facial swelling, hearing loss, nosebleeds, rhinorrhea, sinus pain, sneezing, sore throat, tinnitus, trouble swallowing and voice change. Eyes: Negative for pain, redness, itching and visual disturbance. Respiratory: Negative for cough, choking and shortness of breath. Gastrointestinal: Negative for diarrhea and nausea. Endocrine: Negative for cold intolerance and heat intolerance. Objective:   Physical Exam  Constitutional:       Appearance: She is well-developed. HENT:      Head: Not macrocephalic and not microcephalic. No Mejia's sign, abrasion, right periorbital erythema, left periorbital erythema or laceration. Nose: No nasal deformity, septal deviation, laceration, mucosal edema or rhinorrhea. Right Nostril: No epistaxis or occlusion. Left Nostril: No epistaxis or occlusion. Right Turbinates: Not enlarged, swollen or pale. Left Turbinates: Not enlarged, swollen or pale. Right Sinus: No maxillary sinus tenderness or frontal sinus tenderness. Left Sinus: No maxillary sinus tenderness or frontal sinus tenderness. Mouth/Throat:      Lips: No lesions. Mouth: Mucous membranes are moist.      Tongue: No lesions. Palate: No mass. Pharynx: Uvula midline. No oropharyngeal exudate or posterior oropharyngeal erythema. Tonsils: No tonsillar abscesses. Eyes:      General:         Right eye: No discharge. Left eye: No discharge. Extraocular Movements:      Right eye: Normal extraocular motion. Left eye: Normal extraocular motion. Conjunctiva/sclera:      Right eye: Right conjunctiva is not injected. No chemosis or exudate. Left eye: Left conjunctiva is not injected. No chemosis or exudate. Neck:      Thyroid: No thyroid mass or thyromegaly. Cardiovascular:      Rate and Rhythm: Normal rate and regular rhythm. Pulmonary:      Effort: No tachypnea or respiratory distress. Lymphadenopathy:      Head:      Right side of head: No preauricular or posterior auricular adenopathy. Left side of head: No preauricular or posterior auricular adenopathy. Cervical:      Right cervical: No superficial, deep or posterior cervical adenopathy. Left cervical: No superficial, deep or posterior cervical adenopathy. Neurological:      Mental Status: She is alert and oriented to person, place, and time. Due to the patients chronic sinus disease and/or history of sinonasal neoplasm for surveillance a nasal endoscopy with or without debridement will be performed to complete a thorough physical examination of the patient which cannot be performed by anterior rhinoscopy alone. See below for endoscopy report. Nasal Endoscopy    Pre OP: chronic sinusitis with acute flare  Post OP: acute maxillary sinusitis  Procedure: Nasal endoscopy    After obtaining verbal consent from the patient 1% lidocaine with afrin was sprayed into the nasal cavities. After allowing a time for anesthesia, a nasal endoscope was placed into the nostril. The septum, inferior, and middle turbinates were examined. The middle meatus, and sphenoethmoid recess was examined bilaterally. Cultures were not obtained from the sinuses. There were no complications. Pertinent positives included: There was not edema and purulence in the left middle meatus. There was not edema and purulence at the right middle meatus. Polyps were not identified in the sinuses. Masses were not identified. Tolerated well without complication. I attest that I was present for and did the entire procedure myself. Assessment:       Diagnosis Orders   1. Chronic pansinusitis     2.  Acute recurrent maxillary sinusitis             Plan:      Complete antibiotics and prednisone  Call if worsens        Palomo Parks MD

## 2021-03-26 ENCOUNTER — OFFICE VISIT (OUTPATIENT)
Dept: INTERNAL MEDICINE CLINIC | Age: 46
End: 2021-03-26
Payer: COMMERCIAL

## 2021-03-26 VITALS
WEIGHT: 221.4 LBS | DIASTOLIC BLOOD PRESSURE: 78 MMHG | RESPIRATION RATE: 16 BRPM | HEART RATE: 88 BPM | OXYGEN SATURATION: 98 % | HEIGHT: 63 IN | BODY MASS INDEX: 39.23 KG/M2 | TEMPERATURE: 97.1 F | SYSTOLIC BLOOD PRESSURE: 120 MMHG

## 2021-03-26 DIAGNOSIS — K59.00 CONSTIPATION, UNSPECIFIED CONSTIPATION TYPE: ICD-10-CM

## 2021-03-26 DIAGNOSIS — J45.41 MODERATE PERSISTENT ASTHMA WITH EXACERBATION: ICD-10-CM

## 2021-03-26 DIAGNOSIS — K90.9 INTESTINAL MALABSORPTION, UNSPECIFIED TYPE: Chronic | ICD-10-CM

## 2021-03-26 DIAGNOSIS — D50.0 IRON DEFICIENCY ANEMIA DUE TO CHRONIC BLOOD LOSS: ICD-10-CM

## 2021-03-26 DIAGNOSIS — E06.3 HYPOTHYROIDISM DUE TO HASHIMOTO'S THYROIDITIS: Primary | ICD-10-CM

## 2021-03-26 DIAGNOSIS — E03.8 HYPOTHYROIDISM DUE TO HASHIMOTO'S THYROIDITIS: Primary | ICD-10-CM

## 2021-03-26 PROCEDURE — 99213 OFFICE O/P EST LOW 20 MIN: CPT

## 2021-03-26 RX ORDER — GINSENG 100 MG
CAPSULE ORAL
Qty: 120 CAPSULE | Refills: 0 | Status: SHIPPED | OUTPATIENT
Start: 2021-03-26 | End: 2021-05-13

## 2021-03-26 RX ORDER — ALBUTEROL SULFATE 90 UG/1
AEROSOL, METERED RESPIRATORY (INHALATION)
Qty: 18 G | Refills: 2 | Status: SHIPPED | OUTPATIENT
Start: 2021-03-26 | End: 2021-07-23

## 2021-03-26 RX ORDER — FOLIC ACID 1 MG/1
1 TABLET ORAL DAILY
Qty: 30 TABLET | Refills: 3 | Status: SHIPPED | OUTPATIENT
Start: 2021-03-26 | End: 2021-12-06

## 2021-03-26 RX ORDER — CYANOCOBALAMIN 1000 UG/ML
INJECTION INTRAMUSCULAR; SUBCUTANEOUS
Qty: 1 ML | Refills: 2 | Status: SHIPPED | OUTPATIENT
Start: 2021-03-26 | End: 2021-07-29

## 2021-03-26 RX ORDER — BLOOD SUGAR DIAGNOSTIC
STRIP MISCELLANEOUS
Qty: 100 EACH | Refills: 1 | Status: SHIPPED | OUTPATIENT
Start: 2021-03-26

## 2021-03-26 ASSESSMENT — ENCOUNTER SYMPTOMS
ABDOMINAL PAIN: 0
VOMITING: 0
CONSTIPATION: 0
RHINORRHEA: 1
DIARRHEA: 0
COUGH: 0
SINUS PAIN: 1
CHEST TIGHTNESS: 0
NAUSEA: 0
SORE THROAT: 0
SINUS PRESSURE: 1
SHORTNESS OF BREATH: 0
APNEA: 0

## 2021-03-26 NOTE — PROGRESS NOTES
3/26/2021    Paradise Granda  YOB: 1975    Chief Complaint: Daytime tiredness and regular scheduled checkup    History of Present Illness:  Ms. David Cui is a 25-year-old female with a past medical history of asthma, hypothyroidism, anemia, pansinusitis, and GERD who presents today for a scheduled appointment. She also endorses increased daytime tiredness, increased snoring, and is currently being treated for and acute sinus infection. Patient states that she does snore as well as been observed to have an apnea episode by her mother. The patient states that she has not had her thyroid function checked since her last change in dosage. This is something that we will check today as this could play into her due to tightness. Patient also states that she has new job that [employer] and [employer] travel which could also be playing a role. Patient is otherwise in her normal state of health, denies chest pain, shortness of breath, and abdominal pain at this time. Review of Systems:  Review of Systems   Constitutional: Negative for activity change, appetite change, chills, diaphoresis, fever and unexpected weight change. HENT: Positive for postnasal drip, rhinorrhea, sinus pressure and sinus pain. Negative for congestion and sore throat. Eyes: Negative for visual disturbance. Respiratory: Negative for apnea, cough, chest tightness and shortness of breath. Cardiovascular: Negative for chest pain, palpitations and leg swelling. Gastrointestinal: Negative for abdominal pain, constipation, diarrhea, nausea and vomiting. Endocrine: Negative for cold intolerance, heat intolerance, polydipsia, polyphagia and polyuria. Genitourinary: Negative for difficulty urinating. Musculoskeletal: Negative for arthralgias and myalgias. Neurological: Negative for weakness and headaches. Psychiatric/Behavioral: Negative for confusion and sleep disturbance.    All other systems reviewed and are MD Anirudh   mupirocin (BACTROBAN) 2 % ointment APPLY TO AFFECTED AREA(S) TOPICALLY AS NEEDED Yes Amrit Hansen MD   Probiotic Product (ACIDOPHILUS HIGH-POTENCY) CAPS TAKE 1 CAPSULE BY MOUTH 2 TIMES A DAY Yes Amrit Hansen MD   Insulin Syringe-Needle U-100 (ULTICARE INSULIN SYRINGE) 31G X 5/16\" 1 ML MISC AS DIRECTED Yes Amrit Hansen MD   amoxicillin-clavulanate (AUGMENTIN) 875-125 MG per tablet Take 1 tablet by mouth 2 times daily for 10 days Yes Sheri Mitchell MD   chlorhexidine (BETASEPT SURGICAL SCRUB) 4 % external liquid APPLY TO AFFECTED AREA(S) TOPICALLY AS NEEDED Yes Skylar Mejia MD   pantoprazole (PROTONIX) 40 MG tablet Take 1 tablet by mouth 2 times daily (before meals) Yes Gayla Casas MD   fluticasone (FLONASE) 50 MCG/ACT nasal spray USE1 SPRAY IN EACH NOSTRIL DAILY Yes Sury Ya DO   dilTIAZem (CARDIZEM CD) 120 MG extended release capsule Take 1 capsule by mouth daily  Patient taking differently: Take 120 mg by mouth daily Indications: Uses prn with anxiety  Yes Darrisu Gonzalez MD   montelukast (SINGULAIR) 10 MG tablet TAKE ONE TABLET BY MOUTH EVERY EVENING Yes Skylar Mejia MD   cetirizine-psuedoephedrine (ZYRTEC-D) 5-120 MG per extended release tablet TAKE 1 TABLET BY MOUTH 2 [employer]bi [employer] Yes Skylar Mejia MD   budesonide-formoterol (SYMBICORT) 160-4.5 MCG/ACT AERO Inhale 2 puffs into the lungs 2 times daily Yes Skylar Mejia MD   predniSONE (DELTASONE) 10 MG tablet 4 tabs a day for 5 days, 3 tabs a day for 3 days, 2 tabs a day for 3 days,1 tab a day for 3 days Yes Sheri Mitchell MD   liothyronine (CYTOMEL) 5 MCG tablet TAKE TWO TABLETS BY MOUTH DAILY Yes Rona Navarrete MD   levothyroxine (SYNTHROID) 125 MCG tablet Take 1 tablet by mouth daily Yes Rona Navarrete MD   ketoconazole (NIZORAL) 2 % shampoo APPLY TO AFFECTED AREA(S) DAILY AS NEEDED Yes Altaf Mcfadden MD   mometasone (ELOCON) 0.1 % cream Apply topically daily.  Yes Charly Kiana Junito Gtz MD   Multiple Vitamins-Minerals (THERAPEUTIC MULTIVITAMIN-MINERALS) tablet Take 1 tablet by mouth daily Yes Gracy Mclaughlin MD   urea (CARMOL) 10 % cream Apply topically as needed. Yes Gracy Mclaughlin MD   Misc. Devices (HIBICLENS HAND PUMP 32OZ) MISC Use as needed for skin irritation or bumps. Yes Kendall Nash MD   albuterol (PROVENTIL) (5 MG/ML) 0.5% nebulizer solution Take 1 mL by nebulization 4 times daily as needed for Wheezing  Patient taking differently: Take 5 mg by nebulization 4 times daily as needed for Wheezing Indications: USES ABOUT 1X/MONTH  Yes Dean Mcneill,    Handicap Placard MISC by Does not apply route Duration 2 weeks Yes Rory Sargent MD   NEEDLE, DISP, 25 G (BD DISP NEEDLES) 25G X 5/8\" MISC 1 packet by Does not apply route every 30 days  Gracy Mclaughlin MD   Misc Natural Product Nasal (PONARIS) SOLN 1 spray by Each Nostril route every 6 hours as needed (as need)  Gracy Mclaughlin MD   MONOJECT MAGELLAN SYRINGE 25G X 5/8\" 3 ML MISC USE AS DIRECTED FOR INJECTION  Kendall Nash MD     Allergies   Allergen Reactions    Latex Swelling and Dermatitis     Also \"burning of skin\"      Clindamycin/Lincomycin Hives and Other (See Comments)     \"Throat closing\"    Sulfa Antibiotics Anaphylaxis and Hives    Diflucan [Fluconazole] Hives and Itching    Other Hives       Physical Exam:  Vitals:    03/26/21 1105   BP: 120/78   Site: Left Upper Arm   Position: Sitting   Cuff Size: Large Adult   Pulse: 88   Resp: 16   Temp: 97.1 °F (36.2 °C)   TempSrc: Temporal   SpO2: 98%   Weight: 221 lb 6.4 oz (100.4 kg)   Height: 5' 3\" (1.6 m)     Estimated body mass index is 39.22 kg/m² as calculated from the following:    Height as of this encounter: 5' 3\" (1.6 m). Weight as of this encounter: 221 lb 6.4 oz (100.4 kg). Physical Exam  Vitals signs reviewed. Constitutional:       General: She is not in acute distress. Appearance: She is well-developed and well-groomed.    HENT: Head: Normocephalic and atraumatic. Mouth/Throat:      Mouth: Mucous membranes are moist.      Pharynx: Oropharynx is clear. Eyes:      General: No scleral icterus. Extraocular Movements: Extraocular movements intact. Conjunctiva/sclera: Conjunctivae normal.      Pupils: Pupils are equal, round, and reactive to light. Neck:      Musculoskeletal: Full passive range of motion without pain, normal range of motion and neck supple. Cardiovascular:      Rate and Rhythm: Normal rate and regular rhythm. Pulses: Normal pulses. Heart sounds: Normal heart sounds, S1 normal and S2 normal. No murmur. Pulmonary:      Effort: Pulmonary effort is normal. No respiratory distress. Breath sounds: Normal breath sounds and air entry. Abdominal:      General: Abdomen is flat. Bowel sounds are normal.      Palpations: Abdomen is soft. Tenderness: There is no abdominal tenderness. Musculoskeletal: Normal range of motion. Skin:     General: Skin is warm and dry. Neurological:      General: No focal deficit present. Mental Status: She is alert and oriented to person, place, and time. Cranial Nerves: Cranial nerves are intact. Sensory: Sensation is intact. Motor: Motor function is intact. Coordination: Coordination is intact. Gait: Gait is intact. Deep Tendon Reflexes: Reflexes are normal and symmetric. Psychiatric:         Attention and Perception: Attention and perception normal.         Mood and Affect: Mood normal.         Speech: Speech normal.         Behavior: Behavior normal. Behavior is cooperative. Thought Content: Thought content normal.         Cognition and Memory: Cognition and memory normal.         Judgment: Judgment normal.         Assessment and Plan:    Hypothyroidism due to Hashimoto's thyroiditis  Patient endorses increased daytime tiredness. She has not had her thyroid function checked since her last change in her medications. We will check a repeat today. - CBC Auto Differential; Future  - T4, FREE; Future  - T3, FREE; Future  - TSH without Reflex; Future  - COMPREHENSIVE METABOLIC PANEL; Future  - CBC Auto Differential; Future  · If patient's thyroid function is normal.  We will proceed with a sleep study as the patient does endorse snoring and apnea  · Encourage patient to make an appointment to see her new endocrinologist    Iron deficiency anemia due to chronic blood loss  - T4, FREE; Future  - T3, FREE; Future  - TSH without Reflex; Future  - COMPREHENSIVE METABOLIC PANEL; Future  - CBC Auto Differential; Future    Asthma   · Continue Symbicort  · Continue montelukast  · Continue albuterol as needed for shortness of breath or wheezing    Pansinusitis  · Patient currently being treated with a course of Augmentin and prednisone  · Follows with ENT    GERD  · Continue Protonix twice daily    Possible obstructive sleep apnea  Patient endorses increased snoring as well as has been observed to be apneic while she is asleep. Endorsing increased daytime sleepiness.   · Check thyroid function first  · Once thyroid function normalized recommend doing an outpatient sleep study      Return in about 4 weeks (around 4/23/2021).    -----------------------------  Tenisha Posey MD, PGY-2  3/26/2021  1:20 PM

## 2021-03-29 DIAGNOSIS — E03.8 HYPOTHYROIDISM DUE TO HASHIMOTO'S THYROIDITIS: ICD-10-CM

## 2021-03-29 DIAGNOSIS — E06.3 HYPOTHYROIDISM DUE TO HASHIMOTO'S THYROIDITIS: ICD-10-CM

## 2021-03-29 DIAGNOSIS — K59.00 CONSTIPATION, UNSPECIFIED CONSTIPATION TYPE: ICD-10-CM

## 2021-03-29 NOTE — TELEPHONE ENCOUNTER
refill PEG 3350 for constipation, refill Ibuprofen 800  Last OV 3-26-21 [facility]  next appt. 4-26-21 Rosette Masters

## 2021-04-01 ENCOUNTER — TELEPHONE (OUTPATIENT)
Dept: ENT CLINIC | Age: 46
End: 2021-04-01

## 2021-04-01 RX ORDER — POLYETHYLENE GLYCOL 3350 17 G/17G
17 POWDER ORAL PRN
Qty: 1 BOTTLE | Refills: 5 | Status: SHIPPED | OUTPATIENT
Start: 2021-04-01 | End: 2021-05-01

## 2021-04-01 RX ORDER — IBUPROFEN 800 MG/1
TABLET ORAL
Qty: 30 TABLET | Refills: 0 | OUTPATIENT
Start: 2021-04-01

## 2021-04-01 NOTE — TELEPHONE ENCOUNTER
Patient about finished with antibiotic and steroid-needs refill of both meds as she states, it is not resolved. Patient made aware Dr Flora Mattson not in office and I can send to Dr Flora Mattson colleague for advise. Patient uses [facility] outpatient pharmacy.

## 2021-04-02 NOTE — TELEPHONE ENCOUNTER
Pt states medication is not at pharmacy, please send to [address] [place] [address] and let pt know when script has been sent.

## 2021-05-13 DIAGNOSIS — K59.00 CONSTIPATION, UNSPECIFIED CONSTIPATION TYPE: ICD-10-CM

## 2021-05-13 DIAGNOSIS — J32.4 CHRONIC PANSINUSITIS: ICD-10-CM

## 2021-05-13 DIAGNOSIS — L30.9 ECZEMA, UNSPECIFIED TYPE: ICD-10-CM

## 2021-05-13 RX ORDER — MONTELUKAST SODIUM 10 MG/1
TABLET ORAL
Qty: 30 TABLET | Refills: 3 | Status: SHIPPED | OUTPATIENT
Start: 2021-05-13 | End: 2021-10-04 | Stop reason: SDUPTHER

## 2021-05-13 RX ORDER — L. ACIDOPHILUS/LACTOBAC SPOR 35MM-25MM
TABLET ORAL
Qty: 120 TABLET | Refills: 1 | Status: SHIPPED | OUTPATIENT
Start: 2021-05-13 | End: 2021-10-28

## 2021-05-13 RX ORDER — KETOCONAZOLE 20 MG/ML
SHAMPOO TOPICAL
Qty: 1 BOTTLE | Refills: 3 | Status: SHIPPED | OUTPATIENT
Start: 2021-05-13 | End: 2021-10-04

## 2021-05-13 RX ORDER — CETIRIZINE HYDROCHLORIDE, PSEUDOEPHEDRINE HYDROCHLORIDE 5; 120 MG/1; MG/1
TABLET, FILM COATED, EXTENDED RELEASE ORAL
Qty: 60 TABLET | Refills: 2 | Status: SHIPPED | OUTPATIENT
Start: 2021-05-13 | End: 2021-08-26 | Stop reason: SDUPTHER

## 2021-05-13 NOTE — TELEPHONE ENCOUNTER
Last OV - 03/26/2021 - [place]  Next OV -  None      Mupirocin last filled -   03/26/2021 - 0 rf    Acidophilus last filled  03/26/2021 - 0rf

## 2021-06-02 ENCOUNTER — CLINICAL DOCUMENTATION (OUTPATIENT)
Dept: OTHER | Age: 46
End: 2021-06-02

## 2021-06-04 ENCOUNTER — VIRTUAL VISIT (OUTPATIENT)
Dept: ENDOCRINOLOGY | Age: 46
End: 2021-06-04
Payer: COMMERCIAL

## 2021-06-04 DIAGNOSIS — E03.8 HYPOTHYROIDISM DUE TO HASHIMOTO'S THYROIDITIS: ICD-10-CM

## 2021-06-04 DIAGNOSIS — R73.03 PREDIABETES: ICD-10-CM

## 2021-06-04 DIAGNOSIS — E06.3 HYPOTHYROIDISM DUE TO HASHIMOTO'S THYROIDITIS: ICD-10-CM

## 2021-06-04 DIAGNOSIS — E03.9 ACQUIRED HYPOTHYROIDISM: Primary | ICD-10-CM

## 2021-06-04 PROCEDURE — 99214 OFFICE O/P EST MOD 30 MIN: CPT | Performed by: INTERNAL MEDICINE

## 2021-06-04 PROCEDURE — G8427 DOCREV CUR MEDS BY ELIG CLIN: HCPCS | Performed by: INTERNAL MEDICINE

## 2021-06-04 ASSESSMENT — ENCOUNTER SYMPTOMS
PHOTOPHOBIA: 0
BACK PAIN: 0
COUGH: 0

## 2021-06-04 NOTE — PROGRESS NOTES
Pursuant to the emergency declaration under the [facility], [facility] waiver authority and the [employer] and [employer] Act, this Virtual  Visit was conducted, with patient's consent, to reduce the patient's risk of exposure to COVID-19 and provide continuity of care for an established patient. Patient was at home. Provider was at home. No one else was involved  Services were provided through a video synchronous discussion virtually to substitute for in-person clinic visit. Patient has a PMH of thyroid disease, anemia, obesity, pericarditis, GERD, autoimmune hepatitis. Seen as new patient    Felt better on cytomel initially  Fatigue since on levothyroxine 125mcg    Has seen Dr. Leopold Pane    She was diagnosed with Graves disease the at the age of 9 yrs  Had CARBALLO. Has been diagnosed with hypothyroidism since age of 3 yrs     Current thyroid medication: Levothyroxine 125mcg daily and cytomel 10 mcg daily. Dose of levothyroxine  has ranged from 112-150 in the past.    Takes it first thing in the morning on empty stomach- yes  Interfering medications including calcium, vitamin D , iron tablets, Proton pump inhibitors: no    9/20 TSH 8.3 FT4 0.7 FT3 2.2    FH of hypothyroidism: Aunts and maternal grandmother. FH of thyroid cancer: no    She is feeling tired fatigued. Mild controlled   no worsening factors    Experiencing hair loss, dry skin. Pre-diabetes. A1c 5.7 %     Was stated on metformin 500 mg BID in 2019    Weight is stable. She is having GI problems including IBS, Anal fissure.      Past Medical History:   Diagnosis Date    Abdominal wall abscess 2/8/2017    Abdominal wall cellulitis 2/6/2017    Anal fissure 4/30/2015    Anemia, iron deficiency     Asthma     Autoimmune hepatitis ([facility]     History of blood transfusion     Hypothyroidism     Menorrhagia with regular cycle     Morbid obesity with BMI of 40.0-44.9, adult ([facility] [address] 5/26/2015    MRSA (methicillin resistant staph aureus) culture positive 02/03/2017    abdominal abscess    MRSA infection 08/20/2012    rt thigh abscess    Pericarditis, acute     Sinusitis      Past Surgical History:   Procedure Laterality Date    COLONOSCOPY N/A 12/29/2020    COLONOSCOPY DIAGNOSTIC performed by Romayne Hutching, MD at [facility]  8/22/2012    INCISION AND DRAINAGE POSTERIOR THIGH ABSCESS    RECTAL SURGERY  Aug 2011    repair of rectal fissures and anal skin tag removal    SINUS SURGERY      X 3    TONSILLECTOMY  2004    TONSILLECTOMY AND ADENOIDECTOMY  2005    (partial adenoidectomy)    UPPER GASTROINTESTINAL ENDOSCOPY N/A 12/29/2020    EGD BIOPSY performed by Romayne Hutching, MD at [address]     Current Outpatient Medications   Medication Sig Dispense Refill    cetirizine-psuedoephedrine (ALLERGY RELIEF D) 5-120 MG per extended release tablet TAKE 1 TABLET BY MOUTH 2 TIMES A DAY 60 tablet 2    dilTIAZem (CARDIZEM CD) 120 MG extended release capsule TAKE 1 CAPSULE BY MOUTH ONE TIME A DAY 30 capsule 1    mupirocin (BACTROBAN) 2 % ointment APPLY TO AFFECTED AREA(S) TOPICALLY AS NEEDED 1 Tube 1    montelukast (SINGULAIR) 10 MG tablet TAKE ONE TABLET BY MOUTH EVERY EVENING 30 tablet 3    Lactobacillus (ACIDOPHILUS/L-SPOROGENES) TABS TAKE 1 TABLET BY MOUTH 2 TIMES A  tablet 1    ketoconazole (NIZORAL) 2 % shampoo APPLY TO AFFECTED AREA(S) ONE TIME DAILY AS NEEDED 1 Bottle 3    albuterol sulfate HFA (VENTOLIN HFA) 108 (90 Base) MCG/ACT inhaler INHALE 2 PUFFS INTO THE LUNGS EVERY 6 HOURS AS NEEDED FOR WHEEZING 18 g 2    folic acid (FOLVITE) 1 MG tablet Take 1 tablet by mouth daily 30 tablet 3    cyanocobalamin 1000 MCG/ML injection INJECT 1ML INTO THE SKIN ONCE MONTLY 1 mL 2    Insulin Syringe-Needle U-100 (ULTICARE INSULIN SYRINGE) 31G X 5/16\" 1 ML MISC AS DIRECTED 100 each 1    chlorhexidine (BETASEPT 12/29/2020   Component Date Value Ref Range Status    HCG(Urine) Pregnancy Test 12/29/2020 Negative  Detects HCG level >20 MIU/mL Final    Comment: Note:  Always repeat results in question with a serum  quantitative pregnancy test. A serum hCG is positive  2-5 days before the urine hCG test.           Assessment/Plan    1. Hypothyroidism    This is a 53 yrs old female who has PMH of postablative hypothyroidism . She is on levothyroxine 125 mcg daily and cytomel 10 mcg daily. C/o fatigue. Will repeat labs    May increase levothyroxine as felt better on higher dose    2. Pre-diabetes   Reviewed life style changes. A1c 5.7 %---> 5.7 %   Was  on metformin 500 mg BID for 2 months and then stopped it as it was not working    Will repeat A1c       3. Anemia. Iron deficiency  As per hematology    4. Asthma. As per pulmonology    5. Chronic Sinusitis. As per ENT   Dr. Esthela Ding     7. Obesity.  She requests referral to weight management team.

## 2021-06-22 ENCOUNTER — OFFICE VISIT (OUTPATIENT)
Dept: ENT CLINIC | Age: 46
End: 2021-06-22
Payer: COMMERCIAL

## 2021-06-22 VITALS
HEART RATE: 84 BPM | SYSTOLIC BLOOD PRESSURE: 142 MMHG | HEIGHT: 63 IN | BODY MASS INDEX: 38.98 KG/M2 | WEIGHT: 220 LBS | DIASTOLIC BLOOD PRESSURE: 93 MMHG | TEMPERATURE: 97.4 F

## 2021-06-22 DIAGNOSIS — J32.4 CHRONIC PANSINUSITIS: ICD-10-CM

## 2021-06-22 DIAGNOSIS — J06.9 VIRAL UPPER RESPIRATORY TRACT INFECTION: Primary | ICD-10-CM

## 2021-06-22 PROCEDURE — 1036F TOBACCO NON-USER: CPT | Performed by: OTOLARYNGOLOGY

## 2021-06-22 PROCEDURE — G8417 CALC BMI ABV UP PARAM F/U: HCPCS | Performed by: OTOLARYNGOLOGY

## 2021-06-22 PROCEDURE — 99214 OFFICE O/P EST MOD 30 MIN: CPT | Performed by: OTOLARYNGOLOGY

## 2021-06-22 PROCEDURE — 31231 NASAL ENDOSCOPY DX: CPT | Performed by: OTOLARYNGOLOGY

## 2021-06-22 PROCEDURE — G8427 DOCREV CUR MEDS BY ELIG CLIN: HCPCS | Performed by: OTOLARYNGOLOGY

## 2021-06-22 RX ORDER — PREDNISONE 10 MG/1
40 TABLET ORAL DAILY
Qty: 20 TABLET | Refills: 0 | Status: SHIPPED | OUTPATIENT
Start: 2021-06-22 | End: 2021-06-27

## 2021-06-22 ASSESSMENT — ENCOUNTER SYMPTOMS
RHINORRHEA: 0
EYE PAIN: 0
EYE ITCHING: 0
COUGH: 0
SINUS PRESSURE: 0
NAUSEA: 0
DIARRHEA: 0
CHOKING: 0
SORE THROAT: 1
TROUBLE SWALLOWING: 0
VOICE CHANGE: 0
EYE REDNESS: 0
SHORTNESS OF BREATH: 0
FACIAL SWELLING: 0
SINUS PAIN: 0

## 2021-06-23 DIAGNOSIS — J45.41 MODERATE PERSISTENT ASTHMA WITH EXACERBATION: ICD-10-CM

## 2021-06-23 RX ORDER — FLUTICASONE PROPIONATE 50 MCG
SPRAY, SUSPENSION (ML) NASAL
Qty: 1 BOTTLE | Refills: 3 | Status: SHIPPED | OUTPATIENT
Start: 2021-06-23 | End: 2021-12-06 | Stop reason: SDUPTHER

## 2021-07-22 ENCOUNTER — TELEPHONE (OUTPATIENT)
Dept: ENT CLINIC | Age: 46
End: 2021-07-22

## 2021-07-22 DIAGNOSIS — R09.81 SINUS CONGESTION: Primary | ICD-10-CM

## 2021-07-22 RX ORDER — PREDNISONE 10 MG/1
TABLET ORAL
Qty: 38 TABLET | Refills: 0 | Status: SHIPPED | OUTPATIENT
Start: 2021-07-22 | End: 2021-08-12 | Stop reason: ALTCHOICE

## 2021-07-22 NOTE — TELEPHONE ENCOUNTER
Please let Ms. Ellsworth Mo know I sent her in a new prednisone script. If this does not clear her up Ill need to see her in the office.    MAGDALENO

## 2021-07-22 NOTE — TELEPHONE ENCOUNTER
Patient informed that new script sent to the pharmacy and if it doesn't clear up then she will need to be seen in the office.

## 2021-07-22 NOTE — TELEPHONE ENCOUNTER
Incoming call from patient stating that she was treated by Dr. Ginger Bagley for sinus infection on 06/22/2021. He prescribed her Prednisone. She finished her Prednisone 2 and a half weeks ago. She states that she is feeling the same way again and was wondering if she can get another round of Prednisone. Patient's phone number is [phone]. Please advise.

## 2021-07-23 DIAGNOSIS — E03.9 ACQUIRED HYPOTHYROIDISM: ICD-10-CM

## 2021-07-23 DIAGNOSIS — K90.9 INTESTINAL MALABSORPTION, UNSPECIFIED TYPE: Chronic | ICD-10-CM

## 2021-07-23 DIAGNOSIS — R73.03 PREDIABETES: ICD-10-CM

## 2021-07-23 DIAGNOSIS — L30.9 ECZEMA, UNSPECIFIED TYPE: ICD-10-CM

## 2021-07-23 DIAGNOSIS — D50.0 IRON DEFICIENCY ANEMIA DUE TO CHRONIC BLOOD LOSS: ICD-10-CM

## 2021-07-23 DIAGNOSIS — J45.41 MODERATE PERSISTENT ASTHMA WITH EXACERBATION: ICD-10-CM

## 2021-07-23 LAB
T3 FREE: 2.7 PG/ML (ref 2.3–4.2)
T4 FREE: 0.9 NG/DL (ref 0.9–1.8)
TSH REFLEX: 4.99 UIU/ML (ref 0.27–4.2)

## 2021-07-23 NOTE — TELEPHONE ENCOUNTER
Refill Albuterol inhaler,mupirocin, Cyanocobalamin  delete the Cyanocobalamin if not appropriate.     Last appointment with Tara Hubbard 3-26-21

## 2021-07-24 LAB
ESTIMATED AVERAGE GLUCOSE: 119.8 MG/DL
HBA1C MFR BLD: 5.8 %

## 2021-07-26 RX ORDER — LEVOTHYROXINE SODIUM 137 UG/1
137 TABLET ORAL DAILY
Qty: 30 TABLET | Refills: 2 | Status: SHIPPED | OUTPATIENT
Start: 2021-07-26 | End: 2022-02-28

## 2021-07-26 RX ORDER — LIOTHYRONINE SODIUM 5 UG/1
TABLET ORAL
Qty: 60 TABLET | Refills: 5 | Status: SHIPPED | OUTPATIENT
Start: 2021-07-26 | End: 2022-04-01

## 2021-07-29 RX ORDER — ALBUTEROL SULFATE 90 UG/1
AEROSOL, METERED RESPIRATORY (INHALATION)
Qty: 1 INHALER | Refills: 5 | Status: SHIPPED | OUTPATIENT
Start: 2021-07-29 | End: 2021-08-01 | Stop reason: SDUPTHER

## 2021-07-29 RX ORDER — DILTIAZEM HYDROCHLORIDE 120 MG/1
CAPSULE, COATED, EXTENDED RELEASE ORAL
Qty: 30 CAPSULE | Refills: 3 | Status: SHIPPED | OUTPATIENT
Start: 2021-07-29 | End: 2021-07-30 | Stop reason: SDUPTHER

## 2021-07-29 RX ORDER — CYANOCOBALAMIN 1000 UG/ML
INJECTION INTRAMUSCULAR; SUBCUTANEOUS
Qty: 1 VIAL | Refills: 2 | Status: SHIPPED | OUTPATIENT
Start: 2021-07-29 | End: 2021-08-01 | Stop reason: SDUPTHER

## 2021-07-30 DIAGNOSIS — K90.9 INTESTINAL MALABSORPTION, UNSPECIFIED TYPE: Chronic | ICD-10-CM

## 2021-07-30 DIAGNOSIS — D50.0 IRON DEFICIENCY ANEMIA DUE TO CHRONIC BLOOD LOSS: ICD-10-CM

## 2021-07-30 DIAGNOSIS — L30.9 ECZEMA, UNSPECIFIED TYPE: ICD-10-CM

## 2021-07-30 DIAGNOSIS — J45.41 MODERATE PERSISTENT ASTHMA WITH EXACERBATION: ICD-10-CM

## 2021-07-30 NOTE — TELEPHONE ENCOUNTER
Refill Mupirocin ointment, diltiazem,Albuterol inhaler,urea cream, cyanocobalamin    Last OV 3-26-21   Next appt. not scheduled.

## 2021-08-01 RX ORDER — CYANOCOBALAMIN 1000 UG/ML
INJECTION INTRAMUSCULAR; SUBCUTANEOUS
Qty: 1 VIAL | Refills: 2 | Status: SHIPPED | OUTPATIENT
Start: 2021-08-01 | End: 2021-12-06

## 2021-08-01 RX ORDER — ALBUTEROL SULFATE 90 UG/1
AEROSOL, METERED RESPIRATORY (INHALATION)
Qty: 1 INHALER | Refills: 5 | Status: SHIPPED | OUTPATIENT
Start: 2021-08-01 | End: 2022-04-01

## 2021-08-01 RX ORDER — DILTIAZEM HYDROCHLORIDE 120 MG/1
CAPSULE, COATED, EXTENDED RELEASE ORAL
Qty: 30 CAPSULE | Refills: 3 | Status: SHIPPED | OUTPATIENT
Start: 2021-08-01 | End: 2022-01-24

## 2021-08-12 ENCOUNTER — OFFICE VISIT (OUTPATIENT)
Dept: ENT CLINIC | Age: 46
End: 2021-08-12
Payer: COMMERCIAL

## 2021-08-12 VITALS
HEIGHT: 63 IN | TEMPERATURE: 97.9 F | DIASTOLIC BLOOD PRESSURE: 85 MMHG | WEIGHT: 224 LBS | HEART RATE: 91 BPM | BODY MASS INDEX: 39.69 KG/M2 | SYSTOLIC BLOOD PRESSURE: 131 MMHG

## 2021-08-12 DIAGNOSIS — Z77.120 MOLD SUSPECTED EXPOSURE: ICD-10-CM

## 2021-08-12 DIAGNOSIS — J45.909 MODERATE ASTHMA WITHOUT COMPLICATION, UNSPECIFIED WHETHER PERSISTENT: ICD-10-CM

## 2021-08-12 DIAGNOSIS — J32.4 CHRONIC PANSINUSITIS: ICD-10-CM

## 2021-08-12 DIAGNOSIS — J02.9 SORE THROAT: Primary | ICD-10-CM

## 2021-08-12 PROCEDURE — G8427 DOCREV CUR MEDS BY ELIG CLIN: HCPCS | Performed by: OTOLARYNGOLOGY

## 2021-08-12 PROCEDURE — G8417 CALC BMI ABV UP PARAM F/U: HCPCS | Performed by: OTOLARYNGOLOGY

## 2021-08-12 PROCEDURE — 99214 OFFICE O/P EST MOD 30 MIN: CPT | Performed by: OTOLARYNGOLOGY

## 2021-08-12 PROCEDURE — 1036F TOBACCO NON-USER: CPT | Performed by: OTOLARYNGOLOGY

## 2021-08-12 RX ORDER — EUCALYPTUS/PEPPERMINT OIL
1 SOLUTION, NON-ORAL NASAL EVERY 4 HOURS PRN
Qty: 1 BOTTLE | Refills: 5 | Status: SHIPPED | OUTPATIENT
Start: 2021-08-12 | End: 2022-01-05

## 2021-08-12 RX ORDER — PREDNISONE 10 MG/1
TABLET ORAL
Qty: 38 TABLET | Refills: 0 | Status: SHIPPED | OUTPATIENT
Start: 2021-08-12 | End: 2021-10-28

## 2021-08-12 RX ORDER — BUDESONIDE 0.25 MG/2ML
250 INHALANT ORAL 2 TIMES DAILY
Qty: 60 AMPULE | Refills: 5 | Status: SHIPPED | OUTPATIENT
Start: 2021-08-12 | End: 2022-03-16

## 2021-08-12 ASSESSMENT — ENCOUNTER SYMPTOMS
SINUS PAIN: 0
DIARRHEA: 0
RHINORRHEA: 0
EYE REDNESS: 0
EYE PAIN: 0
NAUSEA: 0
CHOKING: 0
TROUBLE SWALLOWING: 0
COUGH: 0
SINUS PRESSURE: 0
SORE THROAT: 1
FACIAL SWELLING: 0
EYE ITCHING: 0
VOICE CHANGE: 0
SHORTNESS OF BREATH: 0

## 2021-08-12 NOTE — PROGRESS NOTES
Subjective:      Patient ID: Nithya Huertas is a 50 y.o. female. HPI  Chief Complaint   Patient presents with    Congestion and sore throat  History of Present Illness:Kenroy is a(n) 50 y.o. female who presents with a 1 week history of moderate to severe congestion and sore throat with ear pressure. Exposed to mold at work. Several employees sick.    Nasal Discharge: clear  Nasal Obstruction: Yes bilateral; intermittent  Post Nasal Drainage: Yes  Nasal Bleeding: No  Sense of Smell: decreased  Eye Symptoms: none  Previous Treatments: none     Patient Active Problem List   Diagnosis    Hypothyroidism    Chronic sinusitis    Asthma in adult    Chronic rhinosinusitis    Iron deficiency anemia    Eczema    Intestinal malabsorption    Morbid obesity with BMI of 40.0-44.9, adult (HCC)    Thrombocytopenia (HCC)    Chronic ITP (idiopathic thrombocytopenia) (HCC)    Hyperlipidemia, mixed    [facility]    Anxiety    Chronic pansinusitis    Atrial fibrillation with RVR ([facility]     Past Surgical History:   Procedure Laterality Date    COLONOSCOPY N/A 12/29/2020    COLONOSCOPY DIAGNOSTIC performed by Sha Casas MD at [address] HISTORY  8/22/2012    INCISION AND DRAINAGE POSTERIOR THIGH ABSCESS    RECTAL SURGERY  Aug 2011    repair of rectal fissures and anal skin tag removal    SINUS SURGERY      X 3    TONSILLECTOMY  2004    TONSILLECTOMY AND ADENOIDECTOMY  2005    (partial adenoidectomy)    UPPER GASTROINTESTINAL ENDOSCOPY N/A 12/29/2020    EGD BIOPSY performed by [facility] at [facility] ENDOSCOPY     Family History   Problem Relation Age of Onset    High Blood Pressure Mother     Heart Disease Father     Diabetes Maternal Aunt      Social History     Socioeconomic History    Marital status: Single     Spouse name: Not on file    Number of children: Not on file    Years of education: Not on file    Highest education level: Not on file Occupational History    Occupation: NA   Tobacco Use    Smoking status: Never Smoker    Smokeless tobacco: Never Used   Vaping Use    Vaping Use: Never used   Substance and Sexual Activity    Alcohol use: Yes     Alcohol/week: 0.0 standard drinks     Comment: 2 drinks/week     Drug use: No    Sexual activity: Yes   Other Topics Concern    Not on file   Social History Narrative    Not on file     Social Determinants of Health     Financial Resource Strain:     Difficulty of Paying Living Expenses:    Food Insecurity:     Worried About Running Out of Food in the Last Year:     [address] in the Last Year:    Transportation Needs:     Lack of Transportation (Medical):  Lack of Transportation (Non-Medical):    Physical Activity:     Days of Exercise per Week:     Minutes of Exercise per Session:    Stress:     Feeling of Stress :    Social Connections:     Frequency of Communication with Friends and Family:     Frequency of Social Gatherings with Friends and Family:     Attends Yazidism Services:     Active Member of Clubs or Organizations:     Attends Club or Organization Meetings:     Marital Status:    Intimate Partner Violence:     Fear of Current or Ex-Partner:     Emotionally Abused:     Physically Abused:     Sexually Abused:        DRUG/FOOD ALLERGIES: Latex, Clindamycin/lincomycin, Sulfa antibiotics, Diflucan [fluconazole], and Other    CURRENT MEDICATIONS  Prior to Admission medications    Medication Sig Start Date End Date Taking? Authorizing Provider   mupirocin (BACTROBAN) 2 % ointment Apply topically 3 times daily.  8/1/21  Yes Dyana Ayala MD   cyanocobalamin 1000 MCG/ML injection INJECT 1ML INTO THE SKIN ONCE MONTHLY 8/1/21  Yes Dyana Ayala MD   dilTIAZem (CARDIZEM CD) 120 MG extended release capsule TAKE 1 CAPSULE BY MOUTH ONE TIME A DAY 8/1/21  Yes Dyana Ayala MD   albuterol sulfate  (90 Base) MCG/ACT inhaler INHALE 2 PUFF BY MOUTH EVERY 6 HOURS AS NEEDED FOR WHEEZING 8/1/21  Yes Chris Mac MD   urea (CARMOL) 10 % cream Apply topically as needed. 8/1/21  Yes Chris Mac MD   levothyroxine (SYNTHROID) 137 MCG tablet Take 1 tablet by mouth daily 7/26/21  Yes John Blank MD   liothyronine (CYTOMEL) 5 MCG tablet TAKE TWO TABLETS BY MOUTH DAILY 7/26/21  Yes John Blank MD   fluticasone (FLONASE) 50 MCG/ACT nasal spray APPLY 1 SPRAY IN THE AFFECTED NOSTRIL ONE TIME A DAY 6/23/21  Yes Pita Olson MD   cetirizine-psuedoephedrine (ALLERGY RELIEF D) 5-120 MG per extended release tablet TAKE 1 TABLET BY MOUTH 2 TIMES A DAY 5/13/21  Yes Ekaterina Julien MD   montelukast (SINGULAIR) 10 MG tablet TAKE ONE TABLET BY MOUTH EVERY EVENING 5/13/21  Yes Ekaterina Julien MD   Lactobacillus (ACIDOPHILUS/L-SPOROGENES) TABS TAKE 1 TABLET BY MOUTH 2 TIMES A DAY 5/13/21  Yes Chris Mac MD   ketoconazole (NIZORAL) 2 % shampoo APPLY TO AFFECTED AREA(S) ONE TIME DAILY AS NEEDED 5/13/21  Yes Chris Mac MD   folic acid (FOLVITE) 1 MG tablet Take 1 tablet by mouth daily 3/26/21  Yes Chris Mac MD   Insulin Syringe-Needle U-100 Nada Shmuel INSULIN SYRINGE) 31G X 5/16\" 1 ML MISC AS DIRECTED 3/26/21  Yes Chris Mac MD   chlorhexidine (BETASEPT SURGICAL SCRUB) 4 % external liquid APPLY TO AFFECTED AREA(S) TOPICALLY AS NEEDED 1/7/21  Yes Rita Connors MD   pantoprazole (PROTONIX) 40 MG tablet Take 1 tablet by mouth 2 times daily (before meals) 12/29/20  Yes Virgilio Rubinstein, MD   budesonide-formoterol [facility]) 160-4.5 MCG/ACT AERO Inhale 2 puffs into the lungs 2 times daily 11/6/20  Yes Rita Connors MD   mometasone (ELOCON) 0.1 % cream Apply topically daily. 6/25/20  Yes Rita Connors MD   Multiple Vitamins-Minerals (THERAPEUTIC MULTIVITAMIN-MINERALS) tablet Take 1 tablet by mouth daily 6/25/20  Yes Rita Connors MD   Misc.  Devices (HIBICLENS HAND PUMP 32OZ) MISC Use as needed for skin irritation or bumps. 6/22/20  Yes Blanca Riggs MD   Misc Natural Product Nasal (PONARIS) SOLN 1 spray by Each Nostril route every 6 hours as needed (as need) 6/25/20 7/25/20  Rusty Sharma MD         Lab Studies:  Lab Results   Component Value Date    WBC 5.9 12/07/2020    HGB 12.7 12/07/2020    HCT 38.0 12/07/2020    MCV 90.8 12/07/2020     (L) 12/07/2020     Lab Results   Component Value Date    GLUCOSE 107 (H) 12/07/2020    BUN 12 12/07/2020    CREATININE 0.5 (L) 12/07/2020    K 4.0 12/07/2020     12/07/2020     12/07/2020    CALCIUM 9.3 12/07/2020     Lab Results   Component Value Date    MG 2.00 02/06/2019     Lab Results   Component Value Date    PHOS 3.2 02/09/2017     Lab Results   Component Value Date    ALKPHOS 278 (H) 12/08/2020    ALT 78 (H) 12/08/2020    AST 55 (H) 12/08/2020    BILITOT 0.4 12/08/2020    PROT 8.4 (H) 12/08/2020     Review of Systems   Constitutional: Negative for activity change, appetite change, chills, fatigue and fever. HENT: Positive for congestion and sore throat. Negative for ear discharge, ear pain, facial swelling, hearing loss, nosebleeds, postnasal drip, rhinorrhea, sinus pressure, sinus pain, sneezing, tinnitus, trouble swallowing and voice change. Eyes: Negative for pain, redness, itching and visual disturbance. Respiratory: Negative for cough, choking and shortness of breath. Gastrointestinal: Negative for diarrhea and nausea. Endocrine: Negative for cold intolerance and heat intolerance. Objective:   Physical Exam  Constitutional:       Appearance: She is well-developed. HENT:      Head: Not macrocephalic and not microcephalic. No Mejia's sign, abrasion, right periorbital erythema, left periorbital erythema or laceration. Nose: No nasal deformity, septal deviation, laceration, mucosal edema or rhinorrhea. Right Nostril: No epistaxis or occlusion. Left Nostril: No epistaxis or occlusion.       Right Turbinates: Not enlarged, swollen or pale. Left Turbinates: Not enlarged, swollen or pale. Right Sinus: No maxillary sinus tenderness or frontal sinus tenderness. Left Sinus: No maxillary sinus tenderness or frontal sinus tenderness. Mouth/Throat:      Lips: No lesions. Mouth: Mucous membranes are moist.      Tongue: No lesions. Palate: No mass. Pharynx: Uvula midline. No oropharyngeal exudate or posterior oropharyngeal erythema. Tonsils: No tonsillar abscesses. Eyes:      General:         Right eye: No discharge. Left eye: No discharge. Extraocular Movements:      Right eye: Normal extraocular motion. Left eye: Normal extraocular motion. Conjunctiva/sclera:      Right eye: Right conjunctiva is not injected. No chemosis or exudate. Left eye: Left conjunctiva is not injected. No chemosis or exudate. Neck:      Thyroid: No thyroid mass or thyromegaly. Cardiovascular:      Rate and Rhythm: Normal rate and regular rhythm. Pulmonary:      Effort: No tachypnea or respiratory distress. Lymphadenopathy:      Head:      Right side of head: No preauricular or posterior auricular adenopathy. Left side of head: No preauricular or posterior auricular adenopathy. Cervical:      Right cervical: No superficial, deep or posterior cervical adenopathy. Left cervical: No superficial, deep or posterior cervical adenopathy. Neurological:      Mental Status: She is alert and oriented to person, place, and time. Assessment:       Diagnosis Orders   1. Sore throat     2. Chronic pansinusitis  Misc Natural Product Nasal (PONARIS) SOLN    predniSONE (DELTASONE) 10 MG tablet   3. Mold suspected exposure  Misc Natural Product Nasal (PONARIS) SOLN    predniSONE (DELTASONE) 10 MG tablet    budesonide (PULMICORT) 0.25 MG/2ML nebulizer suspension   4.  Moderate asthma without complication, unspecified whether persistent  budesonide (PULMICORT) 0.25 MG/2ML nebulizer suspension Plan:      The patient was counseled for CRS excacerbation and received a prednisone prescription medication(s) for this diagnosis. The mechanism of action for the prescription(s) were explained/reviewed. The appropriate usage was described and technique for topical use explained if appropriate. Questions from the patient were answered and the prescription(s) were e-prescribed to the appropriate pharmacy. Follow up as needed.            Susan Dickey MD

## 2021-08-17 ENCOUNTER — OFFICE VISIT (OUTPATIENT)
Dept: CARDIOLOGY CLINIC | Age: 46
End: 2021-08-17
Payer: COMMERCIAL

## 2021-08-17 ENCOUNTER — TELEPHONE (OUTPATIENT)
Dept: CARDIOLOGY CLINIC | Age: 46
End: 2021-08-17

## 2021-08-17 VITALS
SYSTOLIC BLOOD PRESSURE: 118 MMHG | DIASTOLIC BLOOD PRESSURE: 76 MMHG | BODY MASS INDEX: 40.04 KG/M2 | WEIGHT: 226 LBS | HEART RATE: 90 BPM | HEIGHT: 63 IN

## 2021-08-17 DIAGNOSIS — I48.91 ATRIAL FIBRILLATION WITH RVR (HCC): Primary | ICD-10-CM

## 2021-08-17 DIAGNOSIS — R00.0 TACHYCARDIA: ICD-10-CM

## 2021-08-17 DIAGNOSIS — R06.02 SOB (SHORTNESS OF BREATH): ICD-10-CM

## 2021-08-17 DIAGNOSIS — R00.2 PALPITATIONS: ICD-10-CM

## 2021-08-17 PROCEDURE — 1036F TOBACCO NON-USER: CPT | Performed by: NURSE PRACTITIONER

## 2021-08-17 PROCEDURE — G8427 DOCREV CUR MEDS BY ELIG CLIN: HCPCS | Performed by: NURSE PRACTITIONER

## 2021-08-17 PROCEDURE — 99215 OFFICE O/P EST HI 40 MIN: CPT | Performed by: NURSE PRACTITIONER

## 2021-08-17 PROCEDURE — 93000 ELECTROCARDIOGRAM COMPLETE: CPT | Performed by: NURSE PRACTITIONER

## 2021-08-17 PROCEDURE — G8417 CALC BMI ABV UP PARAM F/U: HCPCS | Performed by: NURSE PRACTITIONER

## 2021-08-17 RX ORDER — POLYETHYLENE GLYCOL 3350 17 G/17G
POWDER ORAL
COMMUNITY
Start: 2021-07-23 | End: 2021-12-06

## 2021-08-17 RX ORDER — NICOTINE 14MG/24HR
PATCH, TRANSDERMAL 24 HOURS TRANSDERMAL
COMMUNITY
Start: 2021-07-23 | End: 2021-10-04

## 2021-08-17 RX ORDER — CETIRIZINE HYDROCHLORIDE 10 MG/1
10 TABLET ORAL DAILY
COMMUNITY
End: 2021-10-28

## 2021-08-17 NOTE — PROGRESS NOTES
[address]   Electrophysiology  Office Visit  Date: 8/17/2021    Chief Complaint   Patient presents with    Atrial Fibrillation    Tachycardia    Palpitations       Cardiac HX: Lissette Klein is a 50 y.o. woman with a history of asthma, hypothyroidism, Hashimoto's disease iron deficient anemia, HLD, morbid obesity, chronic ITP, autoimmune hepatitis, pAF dx'd in 3/2020 when she p/w palpitations, SOB and dizziness and found to be in AF/RVR. Interval History/HPI: Patient here for f/u for paroxysmal AF. Patient states that last evening around 6:00 she felt heart racing and palpitations and knew she was out of rhythm. Discontinued and she felt her heart rate increase around 9:00 in the evening. She took a 120 diltiazem at 10:00. She felt like she continued to be out of rhythm and at 4:00 this morning she took another dose. She states she was up frequently overnight urinating. She will get 830 this morning and states that she felt a little better however still off and then around 10:00 this morning felt back to her normal.  She has not had the symptoms since the episode that she had in March 2020 when she presented to [address] [place] [address] given diltiazem as needed at that time to take and was not placed on [place] due to her low [id number] score. She has not felt any heart racing, palpitations or irregular heartbeats until last evening. She does admit to being under a lot of stress. Her stepdad is in the hospital as well as her biological dad. She works out of town and has been traveling for the last 7 weeks. She also is on a prednisone taper for her asthma and sinusitis. She denies chest pain, PND, orthopnea or lower extremity edema. She does admit to snoring.     Home medications:   Current Outpatient Medications on File Prior to Visit   Medication Sig Dispense Refill    cetirizine (ZYRTEC) 10 MG tablet Take 10 mg by mouth daily      Misc Natural Product Nasal (PONARIS) SOLN 1 spray by Each Nostril route every 4 hours as needed (Dry nose) 1 Bottle 5    predniSONE (DELTASONE) 10 MG tablet 4 tabs a day for 5 days, 3 tabs a day for 3 days, 2 tabs a day for 3 days,1 tab a day for 3 days 38 tablet 0    budesonide (PULMICORT) 0.25 MG/2ML nebulizer suspension Take 2 mLs by nebulization 2 times daily 60 ampule 5    mupirocin (BACTROBAN) 2 % ointment Apply topically 3 times daily. 1 Tube 1    cyanocobalamin 1000 MCG/ML injection INJECT 1ML INTO THE SKIN ONCE MONTHLY 1 vial 2    dilTIAZem (CARDIZEM CD) 120 MG extended release capsule TAKE 1 CAPSULE BY MOUTH ONE TIME A DAY (Patient taking differently: 2 times daily as needed TAKE 1 CAPSULE BY MOUTH ONE TIME A DAY) 30 capsule 3    albuterol sulfate  (90 Base) MCG/ACT inhaler INHALE 2 PUFF BY MOUTH EVERY 6 HOURS AS NEEDED FOR WHEEZING 1 Inhaler 5    urea (CARMOL) 10 % cream Apply topically as needed.  1 Tube 2    levothyroxine (SYNTHROID) 137 MCG tablet Take 1 tablet by mouth daily 30 tablet 2    liothyronine (CYTOMEL) 5 MCG tablet TAKE TWO TABLETS BY MOUTH DAILY 60 tablet 5    fluticasone (FLONASE) 50 MCG/ACT nasal spray APPLY 1 SPRAY IN THE AFFECTED NOSTRIL ONE TIME A DAY 1 Bottle 3    cetirizine-psuedoephedrine (ALLERGY RELIEF D) 5-120 MG per extended release tablet TAKE 1 TABLET BY MOUTH 2 TIMES A DAY 60 tablet 2    montelukast (SINGULAIR) 10 MG tablet TAKE ONE TABLET BY MOUTH EVERY EVENING 30 tablet 3    Lactobacillus (ACIDOPHILUS/L-SPOROGENES) TABS TAKE 1 TABLET BY MOUTH 2 TIMES A  tablet 1    ketoconazole (NIZORAL) 2 % shampoo APPLY TO AFFECTED AREA(S) ONE TIME DAILY AS NEEDED 1 Bottle 3    folic acid (FOLVITE) 1 MG tablet Take 1 tablet by mouth daily 30 tablet 3    Insulin Syringe-Needle U-100 ([employer] INSULIN SYRINGE) 31G X 5/16\" 1 ML MISC AS DIRECTED 100 each 1    chlorhexidine (BETASEPT SURGICAL SCRUB) 4 % external liquid APPLY TO AFFECTED AREA(S) TOPICALLY AS NEEDED 473 mL 0    pantoprazole (PROTONIX) 40 MG tablet Take 1 tablet by mouth 2 times daily (before meals) 60 tablet 5    budesonide-formoterol (SYMBICORT) 160-4.5 MCG/ACT AERO Inhale 2 puffs into the lungs 2 times daily 1 Inhaler 5    mometasone (ELOCON) 0.1 % cream Apply topically daily. 1 Tube 2    Multiple Vitamins-Minerals (THERAPEUTIC MULTIVITAMIN-MINERALS) tablet Take 1 tablet by mouth daily 30 tablet 1    Misc. Devices (HIBICLENS HAND PUMP 32OZ) MISC Use as needed for skin irritation or bumps. 1 each 0    Saccharomyces boulardii (PROBIOTIC) 250 MG CAPS       polyethylene glycol (MIRALAX) 17 GM/SCOOP POWD powder       Misc Natural Product Nasal (PONARIS) SOLN 1 spray by Each Nostril route every 6 hours as needed (as need) 1 Bottle 5     No current facility-administered medications on file prior to visit.        Past Medical History:   Diagnosis Date    Abdominal wall abscess 2/8/2017    Abdominal wall cellulitis 2/6/2017    Anal fissure 4/30/2015    Anemia, iron deficiency     Asthma     [facility]     History of blood transfusion     Hypothyroidism     Menorrhagia with regular cycle     Morbid obesity with BMI of 40.0-44.9, adult ([facility] 5/26/2015    MRSA (methicillin resistant staph aureus) culture positive 02/03/2017    abdominal abscess    MRSA infection 08/20/2012    rt thigh abscess    Pericarditis, acute     Sinusitis         Past Surgical History:   Procedure Laterality Date    COLONOSCOPY N/A 12/29/2020    COLONOSCOPY DI[facility]STIC [facility]  8/22/2012    INCISION AND DRAINAGE POSTERIOR THIGH ABSCESS    RECTAL SURGERY  Aug 2011    repair of rectal fissures and anal skin tag removal    SINUS SURGERY      X 3    TONSILLECTOMY  2004    TONSILLECTOMY AND ADENOIDECTOMY  2005    (partial adenoidectomy)    UPPER GASTROINTESTINAL ENDOSCOPY N/A 12/29/2020    EGD BIOPSY performed by Carol [facility]rell MD at [facility] Reactions    Latex Swelling and Dermatitis     Also \"burning of skin\"      Clindamycin/Lincomycin Hives and Other (See Comments)     \"Throat closing\"    Sulfa Antibiotics Anaphylaxis and Hives    Diflucan [Fluconazole] Hives and Itching    Other Hives       Social History:  Reviewed. reports that she has never smoked. She has never used smokeless tobacco. She reports current alcohol use. She reports that she does not use drugs. Family History:  Reviewed. family history includes Diabetes in her maternal aunt; Heart Disease in her father; High Blood Pressure in her mother. Review of System:    · Constitutional: No fevers, chills. · Eyes: No visual changes or diplopia. No scleral icterus. · ENT: No Headaches. No mouth sores or sore throat. · Cardiovascular: No for chest pain, Yes for dyspnea on exertion, Yes for palpitations or No for loss of consciousness. No cough, hemoptysis, No for pleuritic pain, or phlebitis. · Respiratory: No for cough or wheezing. No hematemesis. · Gastrointestinal: No abdominal pain, blood in stools. · Genitourinary: No dysuria, or hematuria. · Musculoskeletal: No gait disturbance,    · Integumentary: No rash or pruritis. · Neurological: No headache, change in muscle strength, numbness or tingling. · Psychiatric: No anxiety, or depression. · Endocrine: No temperature intolerance. No excessive thirst, fluid intake, or urination. · Hem/Lymph: No abnormal bruising or bleeding, blood clots or swollen lymph nodes. · Allergic/Immunologic: No nasal congestion or hives. Physical Examination:  Vitals:    08/17/21 1614   BP: 118/76   Pulse: 90         Wt Readings from Last 3 Encounters:   08/17/21 226 lb (102.5 kg)   08/12/21 224 lb (101.6 kg)   06/22/21 220 lb (99.8 kg)       · Constitutional: Oriented. No distress. · Head: Normocephalic and atraumatic. · Mouth/Throat: Oropharynx is clear and moist.   · Eyes: Conjunctivae clear without jaunduice. PERRL.   · Neck: Neck supple. No rigidity. No JVD present. · Cardiovascular: Normal rate, regular rhythm, S1&S2. · Pulmonary/Chest: Bilateral respiratory sounds. No wheezes, No rhonchi. · Abdominal: Soft. Bowel sounds present. No distension, No tenderness. · Musculoskeletal: No tenderness. No edema    · Lymphadenopathy: Has no cervical adenopathy. · Neurological: Alert and oriented. Cranial nerve appears intact, No Gross deficit   · Skin: Skin is warm and dry. No rash noted. · Psychiatric: Has a normal mood, affect and behavior     Labs:  Reviewed. No results for input(s): NA, K, CL, CO2, PHOS, BUN, CREATININE in the last 72 hours. Invalid input(s): CA,  TSH  No results for input(s): WBC, HGB, HCT, MCV, PLT in the last 72 hours. Lab Results   Component Value Date    CKTOTAL 32 05/15/2012    TROPONINI <0.01 03/08/2020     Lab Results   Component Value Date    BNP 41.1 05/15/2012     Lab Results   Component Value Date    PROTIME 11.1 12/08/2020    PROTIME 11.7 11/28/2016    PROTIME 12.8 06/02/2014    INR 0.96 12/08/2020    INR 1.03 11/28/2016    INR 1.19 06/02/2014     Lab Results   Component Value Date    CHOL 174 10/24/2013    HDL 65 10/28/2017    TRIG 93 10/24/2013       ECG: Personally reviewed: NSR, HR 90, , QRS 84, QTc 392    ECHO: 3/9/2020   Summary   Normal left ventricle size, wall thickness, and systolic function with an   estimated ejection fraction of 55-60%. No regional wall motion abnormalities   are seen. Normal diastolic function. Normal right ventricular size and function.    Mild mitral and tricuspid regurgitation    Stress Test: 2015  IMPRESSION:       no ischemia or infarction       Cardiac Angiography: N/a    Problem List:   Patient Active Problem List    Diagnosis Date Noted    [facility] 03/08/2020    Chronic pansinusitis 02/05/2019    Hyperlipidemia, mixed 11/06/2017    [facility] 11/06/2017    Anxiety 11/06/2017    Chronic ITP (idiopathic thrombocytopenia) ([facility] 05/10/2017    Thrombocytopenia ([facility] 11/25/2016    Morbid obesity with BMI of 40.0-44.9, adult ([facility] 05/26/2015    Eczema 04/30/2015    Intestinal malabsorption 04/30/2015    Iron deficiency anemia 10/31/2014    Chronic rhinosinusitis 09/27/2013    Asthma in adult 08/26/2013    Chronic sinusitis 01/07/2013    Hypothyroidism 06/04/2012        Assessment:   1. Atrial fibrillation with RVR ([facility]    2. SOB (shortness of breath)    3. Tachycardia    4. Palpitations         Cardiac HX: Manas Bhandari is a 50 y.o. woman with a history of asthma, hypothyroidism, Hashimoto's disease iron deficient anemia, HLD, morbid obesity, chronic ITP, autoimmune hepatitis, pAF dx'd in 3/2020 when she p/w palpitations, SOB and dizziness and found to be in AF/RVR. [id number] 1. TSH 4.99 (7/23/2021). AF  - In NSR  - 2 episodes that she has felt -was last evening  - No OAC d/t low risk score  - 2 week CAM  - Will order Echo  - Sleep study   - Dilt 120 mg prn - call if not returned to NSR within 24 hours  - Will have patient see Dr. Keerthi Carroll in 3 months  - ECG ordered and results personally reviewed       EF of 77-97%  No systolic HF  No known CAD  No [address] for AF   No Tobacco use. All questions and concerns were addressed to the patient/family. Alternatives to my treatment were discussed. The note was completed using EMR. Every effort was made to ensure accuracy; however, inadvertent computerized transcription errors may be present. Patient received education regarding their diagnosis, treatment and medications while in the office today. Mark Anthony Zee CNP  [address]       I  have spent 45 minutes in care of the patient including direct face to face time, chart preparation, reviewing diagnostic testing, other provider notes and coordinating patient care.

## 2021-08-17 NOTE — TELEPHONE ENCOUNTER
14 day Cam monitor placed on patient today at 5:24 pm.  Serial number: [id number]  Dx: AFib. Called [employer] to confirm no latex as patient has allergy. Skin prepped x 3 with provided alcohol swab, air dried, applied monitor. Reviewed symptom recording and log sheet. Placed on master spreadsheet. Provided [facility] number to schedule Echo. Dr. Kandy Sharma number provided. Printed AVS given to patient      Please register in [facility] website. Also, patient will need a 3 month f/u w/Dr. Keerthi Carroll per FW.

## 2021-08-25 ENCOUNTER — HOSPITAL ENCOUNTER (OUTPATIENT)
Dept: NON INVASIVE DIAGNOSTICS | Age: 46
Discharge: HOME | End: 2021-08-25
Payer: COMMERCIAL

## 2021-08-25 DIAGNOSIS — R06.02 SOB (SHORTNESS OF BREATH): ICD-10-CM

## 2021-08-25 DIAGNOSIS — J32.4 CHRONIC PANSINUSITIS: ICD-10-CM

## 2021-08-25 DIAGNOSIS — I48.91 ATRIAL FIBRILLATION WITH RVR (HCC): ICD-10-CM

## 2021-08-25 LAB
LV EF: 58 %
LVEF MODALITY: NORMAL

## 2021-08-25 PROCEDURE — 93306 TTE W/DOPPLER COMPLETE: CPT

## 2021-08-26 RX ORDER — CETIRIZINE HYDROCHLORIDE, PSEUDOEPHEDRINE HYDROCHLORIDE 5; 120 MG/1; MG/1
TABLET, FILM COATED, EXTENDED RELEASE ORAL
Qty: 60 TABLET | Refills: 2 | Status: SHIPPED | OUTPATIENT
Start: 2021-08-26 | End: 2022-01-24

## 2021-08-30 ENCOUNTER — TELEPHONE (OUTPATIENT)
Dept: CARDIOLOGY CLINIC | Age: 46
End: 2021-08-30

## 2021-08-30 NOTE — TELEPHONE ENCOUNTER
Called and spoke to pt. Gave results and she had no questions at this time. Pt also stated that her heart monitor fell off after 4 days. I recommended that she come back in to get it replaced but she states she's currently in [place] and will be there for a few weeks.  Will advice NP.

## 2021-09-17 ENCOUNTER — TELEPHONE (OUTPATIENT)
Dept: CARDIOLOGY CLINIC | Age: 46
End: 2021-09-17

## 2021-10-04 DIAGNOSIS — L30.9 ECZEMA, UNSPECIFIED TYPE: ICD-10-CM

## 2021-10-04 RX ORDER — MONTELUKAST SODIUM 10 MG/1
TABLET ORAL
Qty: 30 TABLET | Refills: 5 | Status: SHIPPED | OUTPATIENT
Start: 2021-10-04 | End: 2022-08-30 | Stop reason: SDUPTHER

## 2021-10-04 RX ORDER — NICOTINE 14MG/24HR
PATCH, TRANSDERMAL 24 HOURS TRANSDERMAL
Qty: 120 CAPSULE | Refills: 1 | Status: SHIPPED | OUTPATIENT
Start: 2021-10-04 | End: 2022-07-25

## 2021-10-04 RX ORDER — KETOCONAZOLE 20 MG/ML
SHAMPOO TOPICAL
Qty: 1 EACH | Refills: 1 | Status: SHIPPED | OUTPATIENT
Start: 2021-10-04 | End: 2022-01-24

## 2021-10-04 RX ORDER — BUDESONIDE AND FORMOTEROL FUMARATE DIHYDRATE 160; 4.5 UG/1; UG/1
2 AEROSOL RESPIRATORY (INHALATION) 2 TIMES DAILY
Qty: 1 EACH | Refills: 5 | Status: SHIPPED | OUTPATIENT
Start: 2021-10-04 | End: 2022-08-30 | Stop reason: SDUPTHER

## 2021-10-28 ENCOUNTER — VIRTUAL VISIT (OUTPATIENT)
Dept: PULMONOLOGY | Age: 46
End: 2021-10-28
Payer: COMMERCIAL

## 2021-10-28 DIAGNOSIS — G47.10 HYPERSOMNIA: Primary | ICD-10-CM

## 2021-10-28 DIAGNOSIS — E03.8 HYPOTHYROIDISM DUE TO HASHIMOTO'S THYROIDITIS: Chronic | ICD-10-CM

## 2021-10-28 DIAGNOSIS — E06.3 HYPOTHYROIDISM DUE TO HASHIMOTO'S THYROIDITIS: Chronic | ICD-10-CM

## 2021-10-28 DIAGNOSIS — J45.40 MODERATE PERSISTENT ASTHMA IN ADULT WITHOUT COMPLICATION: Chronic | ICD-10-CM

## 2021-10-28 DIAGNOSIS — I48.0 PAROXYSMAL ATRIAL FIBRILLATION (HCC): Chronic | ICD-10-CM

## 2021-10-28 DIAGNOSIS — K21.9 GERD WITHOUT ESOPHAGITIS: Chronic | ICD-10-CM

## 2021-10-28 DIAGNOSIS — E66.01 MORBID OBESITY WITH BMI OF 40.0-44.9, ADULT (HCC): Chronic | ICD-10-CM

## 2021-10-28 DIAGNOSIS — R06.83 SNORING: ICD-10-CM

## 2021-10-28 PROBLEM — E78.2 HYPERLIPIDEMIA, MIXED: Chronic | Status: ACTIVE | Noted: 2017-11-06

## 2021-10-28 PROCEDURE — G8427 DOCREV CUR MEDS BY ELIG CLIN: HCPCS | Performed by: INTERNAL MEDICINE

## 2021-10-28 PROCEDURE — 99244 OFF/OP CNSLTJ NEW/EST MOD 40: CPT | Performed by: INTERNAL MEDICINE

## 2021-10-28 ASSESSMENT — ENCOUNTER SYMPTOMS
PHOTOPHOBIA: 0
APNEA: 1
RHINORRHEA: 0
CHOKING: 0
ABDOMINAL DISTENTION: 0
ALLERGIC/IMMUNOLOGIC NEGATIVE: 1
VOMITING: 0
EYE PAIN: 0
CHEST TIGHTNESS: 0
ABDOMINAL PAIN: 0
SHORTNESS OF BREATH: 0
NAUSEA: 0

## 2021-10-28 ASSESSMENT — SLEEP AND FATIGUE QUESTIONNAIRES
HOW LIKELY ARE YOU TO NOD OFF OR FALL ASLEEP IN A CAR, WHILE STOPPED FOR A FEW MINUTES IN TRAFFIC: 0
HOW LIKELY ARE YOU TO NOD OFF OR FALL ASLEEP WHILE SITTING INACTIVE IN A PUBLIC PLACE: 1
ESS TOTAL SCORE: 12
HOW LIKELY ARE YOU TO NOD OFF OR FALL ASLEEP WHEN YOU ARE A PASSENGER IN A CAR FOR AN HOUR WITHOUT A BREAK: 3
HOW LIKELY ARE YOU TO NOD OFF OR FALL ASLEEP WHILE SITTING AND TALKING TO SOMEONE: 0
HOW LIKELY ARE YOU TO NOD OFF OR FALL ASLEEP WHILE WATCHING TV: 2
HOW LIKELY ARE YOU TO NOD OFF OR FALL ASLEEP WHILE LYING DOWN TO REST IN THE AFTERNOON WHEN CIRCUMSTANCES PERMIT: 3
HOW LIKELY ARE YOU TO NOD OFF OR FALL ASLEEP WHILE SITTING AND READING: 0
HOW LIKELY ARE YOU TO NOD OFF OR FALL ASLEEP WHILE SITTING QUIETLY AFTER LUNCH WITHOUT ALCOHOL: 3

## 2021-10-28 NOTE — LETTER
[facility] Sleep Medicine  [place] [address]  Phone: [phone]  Fax: [phone]           Allan Hassan MD      October 28, 2021     Patient: Annalise Oshea   MR Number: [id number]   YOB: 1975   Date of Visit: 10/28/2021       Dear Dr. Charles Balderas:    Thank you for referring Scott Mccallum to me for evaluation/treatment. Below are the relevant portions of my assessment and plan of care. Visit Diagnoses and Associated Orders     Hypersomnia   (New Problem)  -  Primary    needs work-up    Home Sleep Study (HST) [44385 Custom]   - Future Order         Snoring   (New Problem)      needs work-up    Home Sleep Study (HST) [58511 Custom]   - Future Order         Paroxysmal atrial fibrillation (HCC)   (Stable)           Moderate persistent asthma in adult without complication   (Stable)           Hypothyroidism due to Hashimoto's thyroiditis   (Stable)           GERD without esophagitis   (Stable)           Morbid obesity with BMI of 40.0-44.9, adult (HCC)   (Not Stable)                 One or more undiagnosed new problem with uncertain prognosis till final diagnosis is made. Differential diagnosis includes but not limited to: RODGER, PLMD's, narcolepsy, parasomnias. Reviewed RODGER (highest likelihood Dx): pathophysiology, diagnosis, complications and treatment. Instructed her not to drive if drowsy. Continue medications per her PCP and other physicians. Limit caffeine use after 3pm. Standard of care is to do in-lab PSG but insurance is mandating an inferior HST. 1 wk follow up after study to review her results. The chronic medical conditions listed are directly related to the primary diagnosis listed above. The management of the primary diagnosis affects the secondary diagnosis and vice versa. This information was analyzed to assess complexity and medical decision making in regards to further testing and management.     Continue meds for: a fib, asthma, hypothyroid, and GERD. Pt would medically benefit from wt loss for RODGER (diet, exercise, surgical). Orders Placed This Encounter   Procedures    Home Sleep Study (HST)       If you have questions, please do not hesitate to call me. I look forward to following Ken[place] along with you.     Sincerely,        Maty Rodriguez MD    CC providers:  Vaughn Granados, APRN - CNP  [address] [address]  [place] [place], [address] [address]  [address] [place] [employer]

## 2021-11-26 ENCOUNTER — TELEPHONE (OUTPATIENT)
Dept: ENT CLINIC | Age: 46
End: 2021-11-26

## 2021-11-26 DIAGNOSIS — R05.9 COUGH IN ADULT: ICD-10-CM

## 2021-11-26 DIAGNOSIS — J45.41 MODERATE PERSISTENT ASTHMA WITH EXACERBATION: ICD-10-CM

## 2021-11-26 DIAGNOSIS — J01.91 ACUTE RECURRENT SINUSITIS, UNSPECIFIED LOCATION: Primary | ICD-10-CM

## 2021-11-26 RX ORDER — AMOXICILLIN AND CLAVULANATE POTASSIUM 875; 125 MG/1; MG/1
1 TABLET, FILM COATED ORAL 2 TIMES DAILY
Qty: 20 TABLET | Refills: 0 | Status: SHIPPED | OUTPATIENT
Start: 2021-11-26 | End: 2021-12-06

## 2021-11-26 RX ORDER — PREDNISONE 10 MG/1
TABLET ORAL
Qty: 38 TABLET | Refills: 0 | Status: SHIPPED | OUTPATIENT
Start: 2021-11-26 | End: 2021-12-10 | Stop reason: SDUPTHER

## 2021-11-26 RX ORDER — CODEINE PHOSPHATE AND GUAIFENESIN 10; 100 MG/5ML; MG/5ML
5 SOLUTION ORAL 3 TIMES DAILY PRN
Qty: 45 ML | Refills: 0 | Status: SHIPPED | OUTPATIENT
Start: 2021-11-26 | End: 2021-12-30 | Stop reason: SDUPTHER

## 2021-11-26 NOTE — TELEPHONE ENCOUNTER
Patient calling regarding loss of voice, green sinus drainage (and related cough), the same issues she has been treated for in the past x three days. Fever of 99.3 currently. Patient is asking for Augmentin, steroid and codeine cough syrup. Please call patient after speaking with Provider.  [facility] is closed today and patient gave me [facility]

## 2021-11-26 NOTE — TELEPHONE ENCOUNTER
Patient calling to say her pharmacy is closed today and if a RX is sent, please send to:  [address] [place] [address] [phone].  I changed the pharmacy within the patient's chart

## 2021-11-26 NOTE — TELEPHONE ENCOUNTER
Last OV 8/12/21, patient requesting antibiotic and steroid for \"sinus infection\". Should I have her schedule an appointment?

## 2021-11-29 NOTE — TELEPHONE ENCOUNTER
Patient is asking for more cough syrup, states she was only given a 3 day RX. States she continues to cough. Should she reach out to her PCP or will you refill?  Pharmacy updated

## 2021-12-06 ENCOUNTER — TELEPHONE (OUTPATIENT)
Dept: ENT CLINIC | Age: 46
End: 2021-12-06

## 2021-12-06 ENCOUNTER — HOSPITAL ENCOUNTER (OUTPATIENT)
Dept: SLEEP CENTER | Age: 46
Discharge: HOME | End: 2021-12-06
Payer: COMMERCIAL

## 2021-12-06 ENCOUNTER — OFFICE VISIT (OUTPATIENT)
Dept: ENT CLINIC | Age: 46
End: 2021-12-06
Payer: COMMERCIAL

## 2021-12-06 VITALS
SYSTOLIC BLOOD PRESSURE: 150 MMHG | WEIGHT: 226 LBS | DIASTOLIC BLOOD PRESSURE: 97 MMHG | BODY MASS INDEX: 40.04 KG/M2 | HEART RATE: 86 BPM | HEIGHT: 63 IN

## 2021-12-06 DIAGNOSIS — E06.3 HYPOTHYROIDISM DUE TO HASHIMOTO'S THYROIDITIS: ICD-10-CM

## 2021-12-06 DIAGNOSIS — R06.83 SNORING: ICD-10-CM

## 2021-12-06 DIAGNOSIS — L30.9 ECZEMA, UNSPECIFIED TYPE: ICD-10-CM

## 2021-12-06 DIAGNOSIS — J45.41 MODERATE PERSISTENT ASTHMA WITH EXACERBATION: ICD-10-CM

## 2021-12-06 DIAGNOSIS — J01.91 ACUTE RECURRENT SINUSITIS, UNSPECIFIED LOCATION: ICD-10-CM

## 2021-12-06 DIAGNOSIS — E66.01 MORBID OBESITY WITH BMI OF 40.0-44.9, ADULT (HCC): Chronic | ICD-10-CM

## 2021-12-06 DIAGNOSIS — J32.4 CHRONIC PANSINUSITIS: Primary | ICD-10-CM

## 2021-12-06 DIAGNOSIS — E03.8 HYPOTHYROIDISM DUE TO HASHIMOTO'S THYROIDITIS: ICD-10-CM

## 2021-12-06 DIAGNOSIS — K90.9 INTESTINAL MALABSORPTION, UNSPECIFIED TYPE: Chronic | ICD-10-CM

## 2021-12-06 DIAGNOSIS — D50.0 IRON DEFICIENCY ANEMIA DUE TO CHRONIC BLOOD LOSS: ICD-10-CM

## 2021-12-06 DIAGNOSIS — G47.10 HYPERSOMNIA: ICD-10-CM

## 2021-12-06 PROCEDURE — G8417 CALC BMI ABV UP PARAM F/U: HCPCS | Performed by: OTOLARYNGOLOGY

## 2021-12-06 PROCEDURE — G8427 DOCREV CUR MEDS BY ELIG CLIN: HCPCS | Performed by: OTOLARYNGOLOGY

## 2021-12-06 PROCEDURE — 31231 NASAL ENDOSCOPY DX: CPT | Performed by: OTOLARYNGOLOGY

## 2021-12-06 PROCEDURE — 1036F TOBACCO NON-USER: CPT | Performed by: OTOLARYNGOLOGY

## 2021-12-06 PROCEDURE — 99214 OFFICE O/P EST MOD 30 MIN: CPT | Performed by: OTOLARYNGOLOGY

## 2021-12-06 PROCEDURE — G8484 FLU IMMUNIZE NO ADMIN: HCPCS | Performed by: OTOLARYNGOLOGY

## 2021-12-06 PROCEDURE — 95810 POLYSOM 6/> YRS 4/> PARAM: CPT

## 2021-12-06 RX ORDER — FLUTICASONE PROPIONATE 50 MCG
SPRAY, SUSPENSION (ML) NASAL
Qty: 1 EACH | Refills: 0 | Status: SHIPPED | OUTPATIENT
Start: 2021-12-06 | End: 2022-01-24

## 2021-12-06 RX ORDER — PREDNISONE 10 MG/1
40 TABLET ORAL DAILY
Qty: 20 TABLET | Refills: 0 | Status: SHIPPED | OUTPATIENT
Start: 2021-12-06 | End: 2021-12-11

## 2021-12-06 RX ORDER — CYANOCOBALAMIN 1000 UG/ML
INJECTION INTRAMUSCULAR; SUBCUTANEOUS
Qty: 1 ML | Refills: 2 | Status: SHIPPED | OUTPATIENT
Start: 2021-12-06 | End: 2022-04-01

## 2021-12-06 RX ORDER — AMOXICILLIN AND CLAVULANATE POTASSIUM 875; 125 MG/1; MG/1
1 TABLET, FILM COATED ORAL 2 TIMES DAILY
Qty: 20 TABLET | Refills: 0 | Status: SHIPPED | OUTPATIENT
Start: 2021-12-06 | End: 2021-12-16

## 2021-12-06 RX ORDER — POLYETHYLENE GLYCOL 3350 17 G/17G
POWDER ORAL
Qty: 238 G | Refills: 5 | Status: SHIPPED | OUTPATIENT
Start: 2021-12-06

## 2021-12-06 RX ORDER — LEVOTHYROXINE SODIUM 137 UG/1
TABLET ORAL
Qty: 30 TABLET | Refills: 2 | OUTPATIENT
Start: 2021-12-06

## 2021-12-06 RX ORDER — FOLIC ACID 1 MG/1
TABLET ORAL
Qty: 30 TABLET | Refills: 3 | Status: SHIPPED | OUTPATIENT
Start: 2021-12-06 | End: 2022-07-25

## 2021-12-06 ASSESSMENT — ENCOUNTER SYMPTOMS
DIARRHEA: 0
CHOKING: 0
NAUSEA: 0
VOICE CHANGE: 0
COUGH: 0
SINUS PAIN: 0
EYE REDNESS: 0
RHINORRHEA: 0
FACIAL SWELLING: 0
SORE THROAT: 0
SINUS PRESSURE: 0
EYE ITCHING: 0
SHORTNESS OF BREATH: 0
TROUBLE SWALLOWING: 0
EYE PAIN: 0

## 2021-12-06 NOTE — TELEPHONE ENCOUNTER
Refill Miralax, urea cream, B12, fluricasone, and folic acid  Last OV  0-98-63   Next appt. not scheduled.

## 2021-12-06 NOTE — TELEPHONE ENCOUNTER
Medication:   Requested Prescriptions     Pending Prescriptions Disp Refills    levothyroxine (SYNTHROID) 137 MCG tablet [Pharmacy Med Name: LEVOTHYROXINE SODIUM 137MCG TABS] 30 tablet 2     Sig: TAKE 1 TABLET BY MOUTH ONE TIME A DAY       Last Filled:      Patient Phone Number: [phone] (home)     Last appt: 6/4/2021   Next appt: Visit date not found    Last Thyroid:   Lab Results   Component Value Date    TSH 8.28 09/17/2020    FT3 2.7 07/23/2021    T4FREE 0.9 07/23/2021

## 2021-12-06 NOTE — TELEPHONE ENCOUNTER
Patient called she is no better after doing a 10 days of an antibiotic, the infection is still there    [facility]

## 2021-12-08 ENCOUNTER — TELEPHONE (OUTPATIENT)
Dept: PULMONOLOGY | Age: 46
End: 2021-12-08

## 2021-12-08 PROCEDURE — 95810 POLYSOM 6/> YRS 4/> PARAM: CPT | Performed by: INTERNAL MEDICINE

## 2021-12-10 ENCOUNTER — OFFICE VISIT (OUTPATIENT)
Dept: BARIATRICS/WEIGHT MGMT | Age: 46
End: 2021-12-10

## 2021-12-10 VITALS
HEART RATE: 89 BPM | BODY MASS INDEX: 39.34 KG/M2 | SYSTOLIC BLOOD PRESSURE: 145 MMHG | WEIGHT: 222 LBS | DIASTOLIC BLOOD PRESSURE: 93 MMHG | HEIGHT: 63 IN

## 2021-12-10 DIAGNOSIS — E66.9 OBESITY (BMI 30-39.9): Primary | ICD-10-CM

## 2021-12-10 DIAGNOSIS — J01.91 ACUTE RECURRENT SINUSITIS, UNSPECIFIED LOCATION: ICD-10-CM

## 2021-12-10 PROCEDURE — 99999 PR OFFICE/OUTPT VISIT,PROCEDURE ONLY: CPT

## 2021-12-10 RX ORDER — PREDNISONE 10 MG/1
TABLET ORAL
Qty: 38 TABLET | Refills: 0 | Status: SHIPPED | OUTPATIENT
Start: 2021-12-10 | End: 2022-01-05

## 2021-12-10 NOTE — PROGRESS NOTES
loss/week)= 1257 kcal.    Plan  Plan/Recommendations: Start 1200 oly LCMP  Optifast:  interested  Diet Medications:  open to discussing, concerned with dx of autoimmune disease  Meal plan: very interested & possibly using some OTC protein shakes  *Pt states she was unable to comply with program in the past d/t limited finaces    PES Statement:  Overweight/Obesity related to unbalanced intake vs energy expenditure as evidenced by BMI. Body mass index is 39.33 kg/m². Will follow up as necessary.     Lita Garcia, RD, LD

## 2021-12-18 ENCOUNTER — TELEMEDICINE (OUTPATIENT)
Dept: BARIATRICS/WEIGHT MGMT | Age: 46
End: 2021-12-18
Payer: COMMERCIAL

## 2021-12-18 DIAGNOSIS — E66.01 MORBID OBESITY WITH BMI OF 40.0-44.9, ADULT (HCC): Primary | ICD-10-CM

## 2021-12-18 DIAGNOSIS — Z71.3 DIETARY COUNSELING AND SURVEILLANCE: ICD-10-CM

## 2021-12-18 PROCEDURE — 99203 OFFICE O/P NEW LOW 30 MIN: CPT | Performed by: FAMILY MEDICINE

## 2021-12-18 PROCEDURE — G8427 DOCREV CUR MEDS BY ELIG CLIN: HCPCS | Performed by: FAMILY MEDICINE

## 2021-12-18 ASSESSMENT — ENCOUNTER SYMPTOMS
VOMITING: 0
CHEST TIGHTNESS: 0
ABDOMINAL PAIN: 0
WHEEZING: 0
EYE PAIN: 0
ABDOMINAL DISTENTION: 0
SHORTNESS OF BREATH: 0
COUGH: 0
DIARRHEA: 0
CONSTIPATION: 0
PHOTOPHOBIA: 0
NAUSEA: 0
APNEA: 0
CHOKING: 0
BLOOD IN STOOL: 0

## 2021-12-18 NOTE — PROGRESS NOTES
Patient: Linda Barber     Encounter Date: 12/18/2021    YOB: 1975               Age: 48 y.o. Patient identification was verified at the start of the visit. Patient-Reported Vitals 10/28/2021   Patient-Reported Weight 215#   Patient-Reported Height 5'3\"   Patient-Reported Temperature -         BP Readings from Last 1 Encounters:   12/30/21 (!) 153/93       BMI Readings from Last 1 Encounters:   12/30/21 40.03 kg/m²       Pulse Readings from Last 1 Encounters:   12/30/21 96                                               Chief Complaint   Patient presents with    Bariatric, Initial Visit     SULAIMAN- NP       HPI:    48 y.o. female presents to establish care via video visit. The patient's medical history is significant for class III obesity. The patient has a long-standing history of obesity which started gradually. The problem is severe. The patient has been gaining weight. Risk factors include annual weight gain of >2 lbs (1 kg)/ year and sedentary lifestyle. Aggravating factors include poor diet and lack of physical activity. The patient has tried various diet/exercise plans which have been ineffective in the long-run. She is motivated to start losing weight to help improve her overall health. When did you become overweight? [] Childhood   [] Teens   [x] Adulthood   [] Pregnancy   [] Menopause    Highest adult weight: 231 pounds at age 50    Triggers for weight gain? [] Stress   [] Illness   [] Medications   [] Travel  []Injury     [] Nightshift work   [] Insomnia  [x] No specific triggers   [] Other    Food triggers:   [x] Stress   [x] Boredom   [x] Fast food   [x] Eating out   [] Seeking reward   [] Social     Have you ever taken medications to help you lose weight?    [] Yes  [x] No    Have you ever been on a meal replacement program?  [] Yes  [x] No    How many hours of sleep do you get each night?  [] <6 hours  [x] 6-8 hours  [] >8 hours      Dietitian's assessment reviewed and addressed with patient. Reviewed:  [x] Nutrition and the importance of regular protein intake  [x] Hidden CHO/carbohydrate sources  [x] Alcohol use  [x] Tobacco use  [x] Drug use- Denies   [x] Importance of exercise and reducing sedentary time        Controlled Substance Monitoring:     RX Monitoring 2/19/2018   Attestation The Prescription Monitoring Report for this patient was reviewed today. Allergies   Allergen Reactions    Latex Swelling and Dermatitis     Also \"burning of skin\"      Clindamycin/Lincomycin Hives and Other (See Comments)     \"Throat closing\"    Sulfa Antibiotics Anaphylaxis and Hives    Diflucan [Fluconazole] Hives and Itching    Other Hives         Current Outpatient Medications:     ciprofloxacin (CIPRO) 500 MG tablet, Take 1 tablet by mouth 2 times daily for 21 days, Disp: 42 tablet, Rfl: 0    predniSONE (DELTASONE) 10 MG tablet, 4 tabs a day for 5 days, 3 tabs a day for 3 days, 2 tabs a day for 3 days,1 tab a day for 3 days, Disp: 38 tablet, Rfl: 0    guaiFENesin-codeine (GUAIFENESIN AC) 100-10 MG/5ML liquid, Take 5 mLs by mouth 3 times daily as needed for Cough for up to 5 days. , Disp: 75 mL, Rfl: 0    predniSONE (DELTASONE) 10 MG tablet, 4 tabs a day for 5 days, 3 tabs a day for 3 days, 2 tabs a day for 3 days,1 tab a day for 3 days, Disp: 38 tablet, Rfl: 0    folic acid (FOLVITE) 1 MG tablet, TAKE 1 TABLET BY MOUTH ONE TIME A DAY, Disp: 30 tablet, Rfl: 3    cyanocobalamin 1000 MCG/ML injection, INJECT 1ML INTO THE SKIN ONCE MONTHLY, Disp: 1 mL, Rfl: 2    urea (CARMOL) 10 % cream, APPLY TO AFFECTED AREA(S) TOPICALLY AS NEEDED, Disp: 85 g, Rfl: 2    polyethylene glycol (MIRALAX) 17 GM/SCOOP POWD powder, POUR 17 GRAMS OF POWDER INTO THE CAP OF THE BOTTLE. STIR THE POWDER IN A GLASS (4 TO 8 OZ) OF WATER, JUICE, SODA, COFFEE OR TEA UNTIL COMPLETELY DISSOLVED. DRINK THE SOLUTION.  ONCE DAILY AS NEEDED FOR CONSTIPATION, Disp: 238 g, Rfl: 5    fluticasone (FLONASE) 50 MCG/ACT nasal spray, APPLY 1 SPRAY IN THE AFFECTED NOSTRIL ONE TIME A DAY, Disp: 1 each, Rfl: 0    Saccharomyces boulardii (PROBIOTIC) 250 MG CAPS, TAKE 1 CAPSULE BY MOUTH 2 TIMES A DAY, Disp: 120 capsule, Rfl: 1    ketoconazole (NIZORAL) 2 % shampoo, APPLY TO AFFECTED AREA(S) ONCE DAILY AS NEEDED, Disp: 1 each, Rfl: 1    budesonide-formoterol (SYMBICORT) 160-4.5 MCG/ACT AERO, Inhale 2 puffs into the lungs 2 times daily, Disp: 1 each, Rfl: 5    montelukast (SINGULAIR) 10 MG tablet, Take one tablet by mouth every evening, Disp: 30 tablet, Rfl: 5    cetirizine-psuedoephedrine (ALLERGY RELIEF D) 5-120 MG per extended release tablet, TAKE 1 TABLET BY MOUTH 2 TIMES A DAY, Disp: 60 tablet, Rfl: 2    Misc Natural Product Nasal (PONARIS) SOLN, 1 spray by Each Nostril route every 4 hours as needed (Dry nose), Disp: 1 Bottle, Rfl: 5    budesonide (PULMICORT) 0.25 MG/2ML nebulizer suspension, Take 2 mLs by nebulization 2 times daily, Disp: 60 ampule, Rfl: 5    mupirocin (BACTROBAN) 2 % ointment, Apply topically 3 times daily. , Disp: 1 Tube, Rfl: 1    dilTIAZem (CARDIZEM CD) 120 MG extended release capsule, TAKE 1 CAPSULE BY MOUTH ONE TIME A DAY (Patient taking differently: 2 times daily as needed TAKE 1 CAPSULE BY MOUTH ONE TIME A DAY), Disp: 30 capsule, Rfl: 3    albuterol sulfate  (90 Base) MCG/ACT inhaler, INHALE 2 PUFF BY MOUTH EVERY 6 HOURS AS NEEDED FOR WHEEZING, Disp: 1 Inhaler, Rfl: 5    levothyroxine (SYNTHROID) 137 MCG tablet, Take 1 tablet by mouth daily, Disp: 30 tablet, Rfl: 2    liothyronine (CYTOMEL) 5 MCG tablet, TAKE TWO TABLETS BY MOUTH DAILY, Disp: 60 tablet, Rfl: 5    Insulin Syringe-Needle U-100 (ULTICARE INSULIN SYRINGE) 31G X 5/16\" 1 ML MISC, AS DIRECTED, Disp: 100 each, Rfl: 1    chlorhexidine (BETASEPT SURGICAL SCRUB) 4 % external liquid, APPLY TO AFFECTED AREA(S) TOPICALLY AS NEEDED, Disp: 473 mL, Rfl: 0    pantoprazole (PROTONIX) 40 MG tablet, Take 1 tablet by mouth 2 times daily (before meals), Disp: 60 tablet, Rfl: 5    mometasone (ELOCON) 0.1 % cream, Apply topically daily. , Disp: 1 Tube, Rfl: 2    Multiple Vitamins-Minerals (THERAPEUTIC MULTIVITAMIN-MINERALS) tablet, Take 1 tablet by mouth daily, Disp: 30 tablet, Rfl: 1    Misc Natural Product Nasal (PONARIS) SOLN, 1 spray by Each Nostril route every 6 hours as needed (as need), Disp: 1 Bottle, Rfl: 5    Misc.  Devices (HIBICLENS HAND PUMP 32OZ) MISC, Use as needed for skin irritation or bumps., Disp: 1 each, Rfl: 0    Patient Active Problem List   Diagnosis    Hypothyroidism    Chronic sinusitis    Asthma in adult    Chronic rhinosinusitis    Iron deficiency anemia    Eczema    Intestinal malabsorption    Morbid obesity with BMI of 40.0-44.9, adult (HCC)    Thrombocytopenia (HCC)    Chronic ITP (idiopathic thrombocytopenia) (HCC)    Hyperlipidemia, mixed    Autoimmune hepatitis (HCC)    Anxiety    Chronic pansinusitis    Atrial fibrillation with RVR (HCC)    GERD without esophagitis       Past Medical History:   Diagnosis Date    Abdominal wall abscess 2/8/2017    Abdominal wall cellulitis 2/6/2017    Anal fissure 4/30/2015    Anemia, iron deficiency     Asthma     Autoimmune hepatitis ([facility] [address]     Chronic sinusitis     History of blood transfusion     Hypothyroidism     Menorrhagia with regular cycle     Morbid obesity with BMI of 40.0-44.9, adult ([facility] [address] 5/26/2015    MRSA (methicillin resistant staph aureus) culture positive 02/03/2017    abdominal abscess    MRSA infection 08/20/2012    rt thigh abscess    Pericarditis, acute     Sinusitis        Past Surgical History:   Procedure Laterality Date    COLONOSCOPY N/A 12/29/2020    COLONOSCOPY DIAGNOSTIC performed by Jessi Avila MD at [address] [place] [address] OTHER SURGICAL HISTORY  8/22/2012    INCISION AND DRAINAGE POSTERIOR THIGH ABSCESS    RECTAL SURGERY  Aug 2011    repair of rectal fissures and anal skin tag removal    SINUS SURGERY      X [address]  2004    TONSILLECTOMY AND ADENOIDECTOMY  2005    (partial adenoidectomy)    UPPER GASTROINTESTINAL ENDOSCOPY N/A 12/29/2020    EGD BIOPSY performed by Jaxon Eller MD at [address]       Family History   Problem Relation Age of Onset    High Blood Pressure Mother     Hypertension Mother     Elevated Lipids Mother     Other Mother         fatty liver    Heart Disease Father     Hypertension Father     Elevated Lipids Father     Coronary Art Dis Father     Liver Disease Father     Diabetes Maternal Aunt        Review of Systems   Constitutional: Negative for fatigue. Eyes: Negative for photophobia, pain and visual disturbance. Respiratory: Negative for apnea, cough, choking, chest tightness, shortness of breath and wheezing. Cardiovascular: Negative for chest pain, palpitations and leg swelling. Gastrointestinal: Negative for abdominal distention, abdominal pain, blood in stool, constipation, diarrhea, nausea and vomiting. Endocrine: Negative for cold intolerance and heat intolerance. Musculoskeletal: Negative for arthralgias and myalgias. Skin: Negative for rash. Neurological: Negative for dizziness, tremors, syncope, weakness, numbness and headaches. Psychiatric/Behavioral: Negative for agitation, confusion, decreased concentration, dysphoric mood, hallucinations, sleep disturbance and suicidal ideas. The patient is not nervous/anxious and is not hyperactive. Physical Exam  Constitutional:       Appearance: She is well-developed. HENT:      Head: Normocephalic. Eyes:      Conjunctiva/sclera: Conjunctivae normal.   Abdominal:      General: Abdomen is protuberant. Musculoskeletal:         General: No swelling. Neurological:      Mental Status: She is alert and oriented to person, place, and time. Psychiatric:         Mood and Affect: Mood normal.         Behavior: Behavior normal.         Thought Content:  Thought content normal. Judgment: Judgment normal.         Hospital Outpatient Visit on 08/25/2021   Component Date Value Ref Range Status    Left Ventricular Ejection Fraction 08/25/2021 58   Final-Edited    LVEF MODALITY 08/25/2021 ECHO   Final-Edited         Assessment and Plan:    1. Morbid obesity with BMI of 40.0-44.9, adult ([facility] [address]  Heavily counseled on the importance of therapeutic lifestyle changes through diet and exercise. The patient understands that the goal of treatment is to reach and stay at a healthy weight. The initial treatment goal is to lose at least 5-10% of her body weight in 12 weeks. This will require changes in eating habits, increased physical activity, and behavior changes. Counseled on low carb/ulices diet. Patient handouts and education material provided and reviewed in detail with the patient. All questions answered. Encouraged patient to keep a food journal and to bring it to her next visit. Discussed available treatment options in addition to lifestyle changes including medications or OPTIFAST. Explained that medications and meal replacements are most effective as part of a comprehensive treatment plan that includes nutrition, physical activity, and behavior modification. The patient also understands that she will need close follow-ups every 2-4 weeks if she starts treatment. At this time, she opts to work on lifestyle management only. She would like to avoid any tx that is not covered by her insurance. Depending on the patient's success with these changes, she may also be a good candidate for bariatric surgery down the line. 2. Dietary counseling and surveillance  [address]/low carb meal plan.           Nutrition:  [] LCHF/Ketogenic [x] Low carb/low-calorie diet [] Low-calorie diet     []Maintenance        FITTE:   [x] Cardio [] Resistance/stength exercises   [x] ACSM recommendations (150 minutes/week)           Behavior:   [x] Motivational interviewing performed    [] Referral for counseling  [x] Discussed strategies to overcome habits/challenges for focus      [x] Stress management   [x] Stimulus control  [] Sleep hygiene      No orders of the defined types were placed in this encounter. No follow-ups on file. Ezequiel Huitron is a 48 y.o. female being evaluated by a Virtual Visit (video visit) encounter to address concerns as mentioned above. A caregiver was present when appropriate. Due to this being a TeleHealth encounter (During XTQWG-79 public [facility] emergency), evaluation of the following organ systems was limited: Vitals/Constitutional/EENT/Resp/CV/GI//MS/Neuro/Skin/Heme-Lymph-Imm. Pursuant to the emergency declaration under the [address] [place] [address] and the [employer] and [employer] Act, this Virtual Visit was conducted with patient's (and/or legal guardian's) consent, to reduce the patient's risk of exposure to COVID-19 and provide necessary medical care. The patient (and/or legal guardian) has also been advised to contact this office for worsening conditions or problems, and seek emergency medical treatment and/or call 911 if deemed necessary. Services were provided through a video synchronous discussion virtually to substitute for in-person clinic visit. Patient and provider were located at their individual homes. --Rosy Phillips MD on 12/31/2021 at 7:20 PM    An electronic signature was used to authenticate this note.

## 2021-12-20 ENCOUNTER — OFFICE VISIT (OUTPATIENT)
Dept: ENT CLINIC | Age: 46
End: 2021-12-20
Payer: COMMERCIAL

## 2021-12-20 VITALS — HEIGHT: 63 IN | WEIGHT: 226 LBS | BODY MASS INDEX: 40.04 KG/M2

## 2021-12-20 DIAGNOSIS — J32.4 CHRONIC PANSINUSITIS: Primary | ICD-10-CM

## 2021-12-20 DIAGNOSIS — J31.0 CHRONIC RHINITIS: ICD-10-CM

## 2021-12-20 DIAGNOSIS — R49.0 HOARSE VOICE QUALITY: ICD-10-CM

## 2021-12-20 PROCEDURE — G8484 FLU IMMUNIZE NO ADMIN: HCPCS | Performed by: OTOLARYNGOLOGY

## 2021-12-20 PROCEDURE — 1036F TOBACCO NON-USER: CPT | Performed by: OTOLARYNGOLOGY

## 2021-12-20 PROCEDURE — G8417 CALC BMI ABV UP PARAM F/U: HCPCS | Performed by: OTOLARYNGOLOGY

## 2021-12-20 PROCEDURE — G8427 DOCREV CUR MEDS BY ELIG CLIN: HCPCS | Performed by: OTOLARYNGOLOGY

## 2021-12-20 PROCEDURE — 31231 NASAL ENDOSCOPY DX: CPT | Performed by: OTOLARYNGOLOGY

## 2021-12-20 ASSESSMENT — ENCOUNTER SYMPTOMS
NAUSEA: 0
RHINORRHEA: 0
EYE ITCHING: 0
VOICE CHANGE: 0
EYE REDNESS: 0
DIARRHEA: 0
EYE PAIN: 0
TROUBLE SWALLOWING: 0
CHOKING: 0
SHORTNESS OF BREATH: 0
SINUS PRESSURE: 0
SORE THROAT: 0
FACIAL SWELLING: 0
COUGH: 1
SINUS PAIN: 0

## 2021-12-20 NOTE — PROGRESS NOTES
Subjective:      Patient ID: Merna Joe is a 48 y.o. female. HPI  Chief Complaint   Patient presents with    infection  History of Present Illness:Kenroy is a(n) 48 y.o. female who presents with a 2 month history of sinus infection. Treated with 2 rounds of antibiotics and prednisone. Still with post nasal drip and hoarse voice. Cough. Persistent hoarseness.        Patient Active Problem List   Diagnosis    Hypothyroidism    Chronic sinusitis    Asthma in adult    Chronic rhinosinusitis    Iron deficiency anemia    Eczema    Intestinal malabsorption    Morbid obesity with BMI of 40.0-44.9, adult (HCC)    Thrombocytopenia (HCC)    Chronic ITP (idiopathic thrombocytopenia) (HCC)    Hyperlipidemia, mixed    Autoimmune hepatitis ([address]    Anxiety    Chronic pansinusitis    Atrial fibrillation with RVR ([address]    GERD without esophagitis     Past Surgical History:   Procedure Laterality Date    COLONOSCOPY N/A 12/29/2020    COLONOSCOPY DIAGNOSTIC performed by Butch Mendiola MD at [address]  8/22/2012    INCISION AND DRAINAGE POSTERIOR THIGH ABSCESS    RECTAL SURGERY  Aug 2011    repair of rectal fissures and anal skin tag removal    SINUS SURGERY      X 3    TONSILLECTOMY  2004    TONSILLECTOMY AND ADENOIDECTOMY  2005    (partial adenoidectomy)    UPPER GASTROINTESTINAL ENDOSCOPY N/A 12/29/2020    EGD BIOPSY performed by Butch Mendiola MD at [address]     Family History   Problem Relation Age of Onset    High Blood Pressure Mother     Hypertension Mother    Robertha Rumps Elevated Lipids Mother     Other Mother         fatty liver    Heart Disease Father     Hypertension Father     Elevated Lipids Father     Coronary Art Dis Father     Liver Disease Father     Diabetes Maternal Aunt      Social History     Socioeconomic History    Marital status: Single     Spouse name: Not on file    Number of children: Not on file    Years of education: Not on file    Highest education level: Not on file   Occupational History    Occupation: NA   Tobacco Use    Smoking status: Never Smoker    Smokeless tobacco: Never Used   Vaping Use    Vaping Use: Never used   Substance and Sexual Activity    Alcohol use: Yes     Alcohol/week: 0.0 standard drinks     Comment: 2 drinks/week     Drug use: No    Sexual activity: Yes   Other Topics Concern    Not on file   Social History Narrative    Not on file     Social Determinants of Health     Financial Resource Strain:     Difficulty of Paying Living Expenses: Not on file   Food Insecurity:     Worried About Running Out of Food in the Last Year: Not on file    [place] of Food in the Last Year: Not on file   Transportation Needs:     Lack of Transportation (Medical): Not on file    Lack of Transportation (Non-Medical): Not on file   Physical Activity:     Days of Exercise per Week: Not on file    Minutes of Exercise per Session: Not on file   Stress:     Feeling of Stress : Not on file   Social Connections:     Frequency of Communication with Friends and Family: Not on file    Frequency of Social Gatherings with Friends and Family: Not on file    Attends Yazidism Services: Not on file    Active Member of [address] [place] [address] or Organizations: Not on file    Attends Club or Organization Meetings: Not on file    Marital Status: Not on file   Intimate Partner Violence:     Fear of Current or Ex-Partner: Not on file    Emotionally Abused: Not on file    Physically Abused: Not on file    Sexually Abused: Not on file   Housing Stability:     Unable to Pay for Housing in the Last Year: Not on file    Number of [place] in the Last Year: Not on file    Unstable Housing in the Last Year: Not on file       DRUG/FOOD ALLERGIES: Latex, Clindamycin/lincomycin, Sulfa antibiotics, Diflucan [fluconazole], and Other    CURRENT MEDICATIONS  Prior to Admission medications    Medication Sig Start Date End Date Taking?  Authorizing Provider   predniSONE (DELTASONE) 10 MG tablet 4 tabs a day for 5 days, 3 tabs a day for 3 days, 2 tabs a day for 3 days,1 tab a day for 3 days 12/10/21  Yes Jimmie Cruz MD   folic acid (FOLVITE) 1 MG tablet TAKE 1 TABLET BY MOUTH ONE TIME A DAY 12/6/21  Yes Maria Del Rosario Antonio MD   cyanocobalamin 1000 MCG/ML injection INJECT 1ML INTO THE SKIN ONCE MONTHLY 12/6/21  Yes Maria Del Rosario Antonio MD   urea (CARMOL) 10 % cream APPLY TO AFFECTED AREA(S) TOPICALLY AS NEEDED 12/6/21  Yes Maria Del Rosario Antonio MD   polyethylene glycol (MIRALAX) 17 GM/SCOOP POWD powder POUR 17 GRAMS OF POWDER INTO THE CAP OF THE BOTTLE. STIR THE POWDER IN A GLASS (4 TO 8 OZ) OF WATER, JUICE, SODA, COFFEE OR TEA UNTIL COMPLETELY DISSOLVED. DRINK THE SOLUTION. ONCE DAILY AS NEEDED FOR CONSTIPATION 12/6/21  Yes Marai Del Rosario Antonio MD   fluticasone (FLONASE) 50 MCG/ACT nasal spray APPLY 1 SPRAY IN THE AFFECTED NOSTRIL ONE TIME A DAY 12/6/21  Yes Maria Del Rosario Antonio MD   Saccharomyces boulardii (PROBIOTIC) 250 MG CAPS TAKE 1 CAPSULE BY MOUTH 2 TIMES A DAY 10/4/21  Yes Diaz Alcala DO   ketoconazole (NIZORAL) 2 % shampoo APPLY TO AFFECTED AREA(S) ONCE DAILY AS NEEDED 10/4/21  Yes Diaz Alcala DO   budesonide-formoterol [facility]) 160-4.5 MCG/ACT AERO Inhale 2 puffs into the lungs 2 times daily 10/4/21  Yes Jaguar Martinez MD   montelukast (SINGULAIR) 10 MG tablet Take one tablet by mouth every evening 10/4/21  Yes Jaguar Martinez MD   cetirizine-psuedoephedrine (ALLERGY RELIEF D) 5-120 MG per extended release tablet TAKE 1 TABLET BY MOUTH 2 TIMES A DAY 8/26/21  Yes Jaguar Martinez MD   Misc Natural Product Nasal (PONARIS) SOLN 1 spray by Each Nostril route every 4 hours as needed (Dry nose) 8/12/21  Yes Jimmie Cruz MD   budesonide (PULMICORT) 0.25 MG/2ML nebulizer suspension Take 2 mLs by nebulization 2 times daily 8/12/21  Yes Jimmie Cruz MD   mupirocin (BACTROBAN) 2 % ointment Apply topically 3 times daily.  8/1/21 Yes Tanvir Johnson MD   dilTIAZem (CARDIZEM CD) 120 MG extended release capsule TAKE 1 CAPSULE BY MOUTH ONE TIME A DAY  Patient taking differently: 2 times daily as needed TAKE 1 CAPSULE BY MOUTH ONE TIME A DAY 8/1/21  Yes Tanvir Johnson MD   albuterol sulfate  (90 Base) MCG/ACT inhaler INHALE 2 PUFF BY MOUTH EVERY 6 HOURS AS NEEDED FOR WHEEZING 8/1/21  Yes Tanvir Johnson MD   levothyroxine (SYNTHROID) 137 MCG tablet Take 1 tablet by mouth daily 7/26/21  Yes Edith Dinero MD   liothyronine (CYTOMEL) 5 MCG tablet TAKE TWO TABLETS BY MOUTH DAILY 7/26/21  Yes Edith Dinero MD   Insulin Syringe-Needle U-100 Ade Chock INSULIN SYRINGE) 31G X 5/16\" 1 ML MISC AS DIRECTED 3/26/21  Yes Tanvir Johnson MD   chlorhexidine (BETASEPT SURGICAL SCRUB) 4 % external liquid APPLY TO AFFECTED AREA(S) TOPICALLY AS NEEDED 1/7/21  Yes Steve Balderas MD   pantoprazole (PROTONIX) 40 MG tablet Take 1 tablet by mouth 2 times daily (before meals) 12/29/20  Yes Geovanny Altman MD   mometasone (ELOCON) 0.1 % cream Apply topically daily. 6/25/20  Yes Steve Balderas MD   Multiple Vitamins-Minerals (THERAPEUTIC MULTIVITAMIN-MINERALS) tablet Take 1 tablet by mouth daily 6/25/20  Yes Steve Balderas MD   Mis. Devices (HIBICLENS HAND PUMP 32OZ) MISC Use as needed for skin irritation or bumps.  6/22/20  Yes Goran Reynolds MD   Misc Natural Product Nasal (PONARIS) SOLN 1 spray by Each Nostril route every 6 hours as needed (as need) 6/25/20 7/25/20  Steve Balderas MD         Lab Studies:  Lab Results   Component Value Date    WBC 5.9 12/07/2020    HGB 12.7 12/07/2020    HCT 38.0 12/07/2020    MCV 90.8 12/07/2020     (L) 12/07/2020     Lab Results   Component Value Date    GLUCOSE 107 (H) 12/07/2020    BUN 12 12/07/2020    CREATININE 0.5 (L) 12/07/2020    K 4.0 12/07/2020     12/07/2020     12/07/2020    CALCIUM 9.3 12/07/2020     Lab Results   Component Value Date    MG 2.00 02/06/2019 Lab Results   Component Value Date    PHOS 3.2 02/09/2017     Lab Results   Component Value Date    ALKPHOS 278 (H) 12/08/2020    ALT 78 (H) 12/08/2020    AST 55 (H) 12/08/2020    BILITOT 0.4 12/08/2020    PROT 8.4 (H) 12/08/2020     Review of Systems   Constitutional: Negative for activity change, appetite change, chills, fatigue and fever. HENT: Positive for postnasal drip. Negative for congestion, ear discharge, ear pain, facial swelling, hearing loss, nosebleeds, rhinorrhea, sinus pressure, sinus pain, sneezing, sore throat, tinnitus, trouble swallowing and voice change. Eyes: Negative for pain, redness, itching and visual disturbance. Respiratory: Positive for cough. Negative for choking and shortness of breath. Gastrointestinal: Negative for diarrhea and nausea. Endocrine: Negative for cold intolerance and heat intolerance. Objective:   Physical Exam  Constitutional:       Appearance: She is well-developed. HENT:      Head: Not macrocephalic and not microcephalic. No Mejia's sign, abrasion, right periorbital erythema, left periorbital erythema or laceration. Nose: No nasal deformity, septal deviation, laceration, mucosal edema or rhinorrhea. Right Nostril: No epistaxis or occlusion. Left Nostril: No epistaxis or occlusion. Right Turbinates: Not enlarged, swollen or pale. Left Turbinates: Not enlarged, swollen or pale. Right Sinus: No maxillary sinus tenderness or frontal sinus tenderness. Left Sinus: No maxillary sinus tenderness or frontal sinus tenderness. Mouth/Throat:      Lips: No lesions. Mouth: Mucous membranes are moist.      Tongue: No lesions. Palate: No mass. Pharynx: Uvula midline. No oropharyngeal exudate or posterior oropharyngeal erythema. Tonsils: No tonsillar abscesses. Eyes:      General:         Right eye: No discharge. Left eye: No discharge.       Extraocular Movements:      Right eye: Normal extraocular motion. Left eye: Normal extraocular motion. Conjunctiva/sclera:      Right eye: Right conjunctiva is not injected. No chemosis or exudate. Left eye: Left conjunctiva is not injected. No chemosis or exudate. Neck:      Thyroid: No thyroid mass or thyromegaly. Cardiovascular:      Rate and Rhythm: Normal rate and regular rhythm. Pulmonary:      Effort: No tachypnea or respiratory distress. Lymphadenopathy:      Head:      Right side of head: No preauricular or posterior auricular adenopathy. Left side of head: No preauricular or posterior auricular adenopathy. Cervical:      Right cervical: No superficial, deep or posterior cervical adenopathy. Left cervical: No superficial, deep or posterior cervical adenopathy. Neurological:      Mental Status: She is alert and oriented to person, place, and time. Due to the patients chronic sinus disease and/or history of sinonasal neoplasm for surveillance a nasal endoscopy with or without debridement will be performed to complete a significant physical examination of the patient which cannot be performed by anterior rhinoscopy alone (failure of complete examination of the paranasal sinuses). Failure to provide this procedure may lead to late detection of significant chronic benign disease, acute exacerbation, resolution or failure of early diagnosis of recurrent cancer. The procedure report is present in the body of the chart. Nasal Endoscopy    Pre OP: chronic sinusitis  Post OP: Rhinitis  Reason: Persistent sinus symtoms despite therapy. Procedure: Nasal endoscopy  Surgeon: Valeriano Cortez  Anesthesia: Afrin with 2% lidocaine  Estimated Blood Loss: None      After obtaining verbal consent from the patient 1% lidocaine with afrin was sprayed into the nasal cavities. After allowing a time for anesthesia, a nasal endoscope was placed into the nostril. The septum, inferior, and middle turbinates were examined.   The middle meatus, and sphenoethmoid recess was examined bilaterally. Cultures were not obtained from the sinuses. There were no complications. Pertinent positives included: There was not edema and purulence in the left middle meatus. There was not edema and purulence at the right middle meatus. Polyps were not identified in the sinuses. Masses were not identified. Sinuses widely patent without purulence. Mild nasal cavity erythema. Tolerated well without complication. I attest that I was present for and did the entire procedure myself. Assessment:       Diagnosis Orders   1. Chronic pansinusitis     2. Chronic rhinitis     3. Hoarse voice quality  [address] - Speech Therapy - [address]            Plan:      Mucinex DM. Change to hypertonic saline rinses. Follow up as needed. Referral to SLP for voice. Total time spent with the patient reviewing charts, lab tests, images, outside medical visits, history, examination, answering  questions and formulating treatment plan was 22 minutes.   New 2 - 15 Return 2 - 10  New 3 - 30 Return 3 - 20  New 4 - 45 Return 4 - 30  New 5 - 60 Return 5 - 40+          Jethro Montaño MD

## 2021-12-28 ENCOUNTER — TELEPHONE (OUTPATIENT)
Dept: ENT CLINIC | Age: 46
End: 2021-12-28

## 2021-12-30 ENCOUNTER — HOSPITAL ENCOUNTER (OUTPATIENT)
Age: 46
Discharge: HOME | End: 2021-12-30
Payer: COMMERCIAL

## 2021-12-30 ENCOUNTER — HOSPITAL ENCOUNTER (OUTPATIENT)
Dept: GENERAL RADIOLOGY | Age: 46
Discharge: HOME | End: 2021-12-30
Payer: COMMERCIAL

## 2021-12-30 ENCOUNTER — OFFICE VISIT (OUTPATIENT)
Dept: ENT CLINIC | Age: 46
End: 2021-12-30
Payer: COMMERCIAL

## 2021-12-30 VITALS
DIASTOLIC BLOOD PRESSURE: 93 MMHG | HEART RATE: 96 BPM | BODY MASS INDEX: 40.04 KG/M2 | HEIGHT: 63 IN | WEIGHT: 226 LBS | SYSTOLIC BLOOD PRESSURE: 153 MMHG

## 2021-12-30 DIAGNOSIS — R05.3 CHRONIC COUGH: ICD-10-CM

## 2021-12-30 DIAGNOSIS — J32.4 CHRONIC PANSINUSITIS: Primary | ICD-10-CM

## 2021-12-30 DIAGNOSIS — R49.0 HOARSE VOICE QUALITY: ICD-10-CM

## 2021-12-30 DIAGNOSIS — J01.91 ACUTE RECURRENT SINUSITIS, UNSPECIFIED LOCATION: ICD-10-CM

## 2021-12-30 DIAGNOSIS — R05.9 COUGH IN ADULT: ICD-10-CM

## 2021-12-30 PROCEDURE — G8417 CALC BMI ABV UP PARAM F/U: HCPCS | Performed by: OTOLARYNGOLOGY

## 2021-12-30 PROCEDURE — G8484 FLU IMMUNIZE NO ADMIN: HCPCS | Performed by: OTOLARYNGOLOGY

## 2021-12-30 PROCEDURE — 71048 X-RAY EXAM CHEST 4+ VIEWS: CPT

## 2021-12-30 PROCEDURE — 1036F TOBACCO NON-USER: CPT | Performed by: OTOLARYNGOLOGY

## 2021-12-30 PROCEDURE — G8427 DOCREV CUR MEDS BY ELIG CLIN: HCPCS | Performed by: OTOLARYNGOLOGY

## 2021-12-30 PROCEDURE — 31231 NASAL ENDOSCOPY DX: CPT | Performed by: OTOLARYNGOLOGY

## 2021-12-30 PROCEDURE — 99214 OFFICE O/P EST MOD 30 MIN: CPT | Performed by: OTOLARYNGOLOGY

## 2021-12-30 RX ORDER — CODEINE PHOSPHATE AND GUAIFENESIN 10; 100 MG/5ML; MG/5ML
5 SOLUTION ORAL 3 TIMES DAILY PRN
Qty: 75 ML | Refills: 0 | Status: SHIPPED | OUTPATIENT
Start: 2021-12-30 | End: 2022-01-04

## 2021-12-30 RX ORDER — CIPROFLOXACIN 500 MG/1
500 TABLET, FILM COATED ORAL 2 TIMES DAILY
Qty: 42 TABLET | Refills: 0 | Status: ON HOLD | OUTPATIENT
Start: 2021-12-30 | End: 2022-01-12 | Stop reason: HOSPADM

## 2021-12-30 RX ORDER — PREDNISONE 10 MG/1
TABLET ORAL
Qty: 38 TABLET | Refills: 0 | Status: ON HOLD | OUTPATIENT
Start: 2021-12-30 | End: 2022-01-12 | Stop reason: HOSPADM

## 2021-12-30 ASSESSMENT — ENCOUNTER SYMPTOMS
EYE ITCHING: 0
SINUS PAIN: 0
SINUS PRESSURE: 0
EYE PAIN: 0
SORE THROAT: 0
COUGH: 1
VOICE CHANGE: 0
DIARRHEA: 0
FACIAL SWELLING: 0
NAUSEA: 0
TROUBLE SWALLOWING: 0
SHORTNESS OF BREATH: 0
CHOKING: 0
RHINORRHEA: 0
EYE REDNESS: 0

## 2021-12-30 NOTE — PROGRESS NOTES
Subjective:      Patient ID: Oscar Gage is a 48 y.o. female. HPI  Chief Complaint   Patient presents with    Sinus problem  History of Present Illness:Kenroy is a(n) 48 y.o. female who presents with a 7  day history of croupy cough and thick nasal discharge. Seen on 12-20. Sinuses patent.  No fever, chills, nausea or vomiting  Nasal Discharge: As above  Nasal Obstruction: No  Post Nasal Drainage: Yes  Nasal Bleeding: No  Sense of Smell: normal       Patient Active Problem List   Diagnosis    Hypothyroidism    Chronic sinusitis    Asthma in adult    Chronic rhinosinusitis    Iron deficiency anemia    Eczema    Intestinal malabsorption    Morbid obesity with BMI of 40.0-44.9, adult (HCC)    Thrombocytopenia (HCC)    Chronic ITP (idiopathic thrombocytopenia) (HCC)    Hyperlipidemia, mixed    Autoimmune hepatitis ([address]    Anxiety    Chronic pansinusitis    Atrial fibrillation with RVR ([address]    GERD without esophagitis     Past Surgical History:   Procedure Laterality Date    COLONOSCOPY N/A 12/29/2020    COLONOSCOPY DIAGNOSTIC performed by Geovanny Altman MD at [address] [place] [address]  8/22/2012    INCISION AND DRAINAGE POSTERIOR THIGH ABSCESS    RECTAL SURGERY  Aug 2011    repair of rectal fissures and anal skin tag removal    SINUS SURGERY      X 3    TONSILLECTOMY  2004    TONSILLECTOMY AND ADENOIDECTOMY  2005    (partial adenoidectomy)    UPPER GASTROINTESTINAL ENDOSCOPY N/A 12/29/2020    EGD BIOPSY performed by Geovanny Altman MD at [address] [place] [address]     Family History   Problem Relation Age of Onset    High Blood Pressure Mother     Hypertension Mother    Marina Gary Elevated Lipids Mother     Other Mother         fatty liver    Heart Disease Father     Hypertension Father     Elevated Lipids Father     Coronary Art Dis Father     Liver Disease Father     Diabetes Maternal Aunt      Social History     Socioeconomic History    Marital status: Single Spouse name: Not on file    Number of children: Not on file    Years of education: Not on file    Highest education level: Not on file   Occupational History    Occupation: NA   Tobacco Use    Smoking status: Never Smoker    Smokeless tobacco: Never Used   Vaping Use    Vaping Use: Never used   Substance and Sexual Activity    Alcohol use: Yes     Alcohol/week: 0.0 standard drinks     Comment: 2 drinks/week     Drug use: No    Sexual activity: Yes   Other Topics Concern    Not on file   Social History Narrative    Not on file     Social Determinants of Health     Financial Resource Strain:     Difficulty of Paying Living Expenses: Not on file   Food Insecurity:     Worried About Running Out of Food in the Last Year: Not on file    [place] of Food in the Last Year: Not on file   Transportation Needs:     Lack of Transportation (Medical): Not on file    Lack of Transportation (Non-Medical):  Not on file   Physical Activity:     Days of Exercise per Week: Not on file    Minutes of Exercise per Session: Not on file   Stress:     Feeling of Stress : Not on file   Social Connections:     Frequency of Communication with Friends and Family: Not on file    Frequency of Social Gatherings with Friends and Family: Not on file    Attends Druze Services: Not on file    Active Member of [address] [place] [address] adicate timeads or Organizations: Not on file    Attends Club or Organization Meetings: Not on file    Marital Status: Not on file   Intimate Partner Violence:     Fear of Current or Ex-Partner: Not on file    Emotionally Abused: Not on file    Physically Abused: Not on file    Sexually Abused: Not on file   Housing Stability:     Unable to Pay for Housing in the Last Year: Not on file    Number of [place] in the Last Year: Not on file    Unstable Housing in the Last Year: Not on file       DRUG/FOOD ALLERGIES: Latex, Clindamycin/lincomycin, Sulfa antibiotics, Diflucan [fluconazole], and Other    CURRENT MEDICATIONS  Prior to Admission medications    Medication Sig Start Date End Date Taking? Authorizing Provider   predniSONE (DELTASONE) 10 MG tablet 4 tabs a day for 5 days, 3 tabs a day for 3 days, 2 tabs a day for 3 days,1 tab a day for 3 days 12/10/21  Yes Monserrat Davis MD   folic acid (FOLVITE) 1 MG tablet TAKE 1 TABLET BY MOUTH ONE TIME A DAY 12/6/21  Yes Magalis Alexandre MD   cyanocobalamin 1000 MCG/ML injection INJECT 1ML INTO THE SKIN ONCE MONTHLY 12/6/21  Yes Magalis Alexandre MD   urea (CARMOL) 10 % cream APPLY TO AFFECTED AREA(S) TOPICALLY AS NEEDED 12/6/21  Yes Magalis Alexandre MD   polyethylene glycol (MIRALAX) 17 GM/SCOOP POWD powder POUR 17 GRAMS OF POWDER INTO THE CAP OF THE BOTTLE. STIR THE POWDER IN A GLASS (4 TO 8 OZ) OF WATER, JUICE, SODA, COFFEE OR TEA UNTIL COMPLETELY DISSOLVED. DRINK THE SOLUTION.  ONCE DAILY AS NEEDED FOR CONSTIPATION 12/6/21  Yes Magalis Alexandre MD   fluticasone (FLONASE) 50 MCG/ACT nasal spray APPLY 1 SPRAY IN THE AFFECTED NOSTRIL ONE TIME A DAY 12/6/21  Yes Magalis Alexandre MD   Saccharomyces boulardii (PROBIOTIC) 250 MG CAPS TAKE 1 CAPSULE BY MOUTH 2 TIMES A DAY 10/4/21  Yes Arnold Covarrubias DO   ketoconazole (NIZORAL) 2 % shampoo APPLY TO AFFECTED AREA(S) ONCE DAILY AS NEEDED 10/4/21  Yes Arnold Covarrubias DO   budesonide-formoterol [facility]) 160-4.5 MCG/ACT AERO Inhale 2 puffs into the lungs 2 times daily 10/4/21  Yes Love Montelongo MD   montelukast (SINGULAIR) 10 MG tablet Take one tablet by mouth every evening 10/4/21  Yes Love Montelongo MD   cetirizine-psuedoephedrine (ALLERGY RELIEF D) 5-120 MG per extended release tablet TAKE 1 TABLET BY MOUTH 2 TIMES A DAY 8/26/21  Yes Love Montelongo MD   Misc Natural Product Nasal (PONARIS) SOLN 1 spray by Each Nostril route every 4 hours as needed (Dry nose) 8/12/21  Yes Monserrat Davis MD   budesonide (PULMICORT) 0.25 MG/2ML nebulizer suspension Take 2 mLs by nebulization 2 times daily 8/12/21 Yes Monserrat Davis MD   mupirocin [facility]) 2 % ointment Apply topically 3 times daily. 8/1/21  Yes Love Montelongo MD   dilTIAZem (CARDIZEM CD) 120 MG extended release capsule TAKE 1 CAPSULE BY MOUTH ONE TIME A DAY  Patient taking differently: 2 times daily as needed TAKE 1 CAPSULE BY MOUTH ONE TIME A DAY 8/1/21  Yes Love Montelongo MD   albuterol sulfate  (90 Base) MCG/ACT inhaler INHALE 2 PUFF BY MOUTH EVERY 6 HOURS AS NEEDED FOR WHEEZING 8/1/21  Yes Love Montelongo MD   levothyroxine (SYNTHROID) 137 MCG tablet Take 1 tablet by mouth daily 7/26/21  Yes Cierra Dawn MD   liothyronine (CYTOMEL) 5 MCG tablet TAKE TWO TABLETS BY MOUTH DAILY 7/26/21  Yes Cierra Dawn MD   Insulin Syringe-Needle U-100 Jose Mullet INSULIN SYRINGE) 31G X 5/16\" 1 ML MISC AS DIRECTED 3/26/21  Yes Love Montelongo MD   chlorhexidine (BETASEPT SURGICAL SCRUB) 4 % external liquid APPLY TO AFFECTED AREA(S) TOPICALLY AS NEEDED 1/7/21  Yes Cadence Fried MD   pantoprazole (PROTONIX) 40 MG tablet Take 1 tablet by mouth 2 times daily (before meals) 12/29/20  Yes Joby Ayon MD   mometasone (ELOCON) 0.1 % cream Apply topically daily. 6/25/20  Yes Cadence Fried MD   Multiple Vitamins-Minerals (THERAPEUTIC MULTIVITAMIN-MINERALS) tablet Take 1 tablet by mouth daily 6/25/20  Yes Cadence Fried MD   Misc. Devices (HIBICLENS HAND PUMP 32OZ) MISC Use as needed for skin irritation or bumps.  6/22/20  Yes MD Bhanu Clinton Natural Product Nasal (PONARIS) SOLN 1 spray by Each Nostril route every 6 hours as needed (as need) 6/25/20 7/25/20  Cadence Fried MD         Lab Studies:  Lab Results   Component Value Date    WBC 5.9 12/07/2020    HGB 12.7 12/07/2020    HCT 38.0 12/07/2020    MCV 90.8 12/07/2020     (L) 12/07/2020     Lab Results   Component Value Date    GLUCOSE 107 (H) 12/07/2020    BUN 12 12/07/2020    CREATININE 0.5 (L) 12/07/2020    K 4.0 12/07/2020     12/07/2020 Right eye: No discharge. Left eye: No discharge. Extraocular Movements:      Right eye: Normal extraocular motion. Left eye: Normal extraocular motion. Conjunctiva/sclera:      Right eye: Right conjunctiva is not injected. No chemosis or exudate. Left eye: Left conjunctiva is not injected. No chemosis or exudate. Neck:      Thyroid: No thyroid mass or thyromegaly. Cardiovascular:      Rate and Rhythm: Normal rate and regular rhythm. Pulmonary:      Effort: No tachypnea or respiratory distress. Lymphadenopathy:      Head:      Right side of head: No preauricular or posterior auricular adenopathy. Left side of head: No preauricular or posterior auricular adenopathy. Cervical:      Right cervical: No superficial, deep or posterior cervical adenopathy. Left cervical: No superficial, deep or posterior cervical adenopathy. Neurological:      Mental Status: She is alert and oriented to person, place, and time. Due to the patients chronic sinus disease and/or history of sinonasal neoplasm for surveillance a nasal endoscopy with or without debridement will be performed to complete a significant physical examination of the patient which cannot be performed by anterior rhinoscopy alone (failure of complete examination of the paranasal sinuses). Failure to provide this procedure may lead to late detection of significant chronic benign disease, acute exacerbation, resolution or failure of early diagnosis of recurrent cancer. The procedure report is present in the body of the chart. Nasal Endoscopy    Pre OP: chronic pansinusitis  Post OP: sphenoid sinusitis  Reason: Worsening symtoms  Procedure: Nasal endoscopy  Surgeon: Berenice Banks  Anesthesia: Afrin with 2% lidocaine  Estimated Blood Loss: None      After obtaining verbal consent from the patient 1% lidocaine with afrin was sprayed into the nasal cavities.   After allowing a time for anesthesia, a nasal endoscope was placed into the nostril. The septum, inferior, and middle turbinates were examined. The middle meatus, and sphenoethmoid recess was examined bilaterally. Cultures were not obtained from the sphenoid sinus. There were no complications. Pertinent positives included: There was not edema and purulence in the left middle meatus. There was not edema and purulence at the right middle meatus. Polyps were not identified in the sinuses. Masses were not identified. Tolerated well without complication. I attest that I was present for and did the entire procedure myself. Assessment:       Diagnosis Orders   1. Chronic pansinusitis  CT SINUS WO CONTRAST   2. Hoarse voice quality  XR CHEST PA LATERAL W BOTH OBLIQUE PROJECTIONS   3. Chronic cough  XR CHEST PA LATERAL W BOTH OBLIQUE PROJECTIONS    predniSONE (DELTASONE) 10 MG tablet    guaiFENesin-codeine (GUAIFENESIN AC) 100-10 MG/5ML liquid   4. Acute recurrent sinusitis, unspecified location  ciprofloxacin (CIPRO) 500 MG tablet    predniSONE (DELTASONE) 10 MG tablet    guaiFENesin-codeine (GUAIFENESIN AC) 100-10 MG/5ML liquid   5. Cough in adult  guaiFENesin-codeine (GUAIFENESIN AC) 100-10 MG/5ML liquid           Plan:      Chest xray. CT sinuses. Ordered cipro and pred. The patient was counseled for sinusitis and received a cipro and pred. prescription medication(s) for this diagnosis. The mechanism of action for the prescription(s) were explained/reviewed. The appropriate usage was described and technique for topical use explained if appropriate. Questions from the patient were answered and the prescription(s) were e-prescribed to the appropriate pharmacy.           Kelly Turpin MD

## 2022-01-01 LAB
GRAM STAIN RESULT: ABNORMAL
ORGANISM: ABNORMAL
WOUND/ABSCESS: ABNORMAL

## 2022-01-03 ENCOUNTER — PROCEDURE VISIT (OUTPATIENT)
Dept: SPEECH THERAPY | Age: 47
End: 2022-01-03
Payer: COMMERCIAL

## 2022-01-03 ENCOUNTER — TELEPHONE (OUTPATIENT)
Dept: ENT CLINIC | Age: 47
End: 2022-01-03

## 2022-01-03 DIAGNOSIS — J38.4 VOCAL CORD EDEMA: ICD-10-CM

## 2022-01-03 DIAGNOSIS — R49.0 DYSPHONIA: ICD-10-CM

## 2022-01-03 DIAGNOSIS — R49.0 MUSCLE TENSION DYSPHONIA: ICD-10-CM

## 2022-01-03 PROCEDURE — 92520 LARYNGEAL FUNCTION STUDIES: CPT | Performed by: SPEECH-LANGUAGE PATHOLOGIST

## 2022-01-03 PROCEDURE — 31579 LARYNGOSCOPY TELESCOPIC: CPT | Performed by: SPEECH-LANGUAGE PATHOLOGIST

## 2022-01-03 PROCEDURE — 92524 BEHAVRAL QUALIT ANALYS VOICE: CPT | Performed by: SPEECH-LANGUAGE PATHOLOGIST

## 2022-01-03 PROCEDURE — 92507 TX SP LANG VOICE COMM INDIV: CPT | Performed by: SPEECH-LANGUAGE PATHOLOGIST

## 2022-01-03 NOTE — TELEPHONE ENCOUNTER
----- Message from Augustin Hall MD sent at 1/3/2022  7:58 AM EST -----  Please let Ms. Carrera Safe know that her culture grew staph and that the antibiotic I called her in should cover this bacteria.

## 2022-01-03 NOTE — TELEPHONE ENCOUNTER
Spoke with patient informed her that culture grew staph and that the antibiotic she is currently on should cover this bacteria. Patient denied any further questions.

## 2022-01-03 NOTE — PROGRESS NOTES
[facility] ([facility]) ENT  Videostroboscopic Examination of the Larynx    BACKGROUND HISTORY:  Pt w/ long-standing hx of dysphonia secondary to chronic sinus infections; has had sinonasal surgery x3 and an upcoming surgery d/t staph infection. Pt endorsed voice changes starting in 2013; seen at [facility] and completed short round of VOT. Dx'd w/ pre-nodular edema. 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 Currently, only singing w/ [employer] band for local [employer]. Recovering from recent sinus infection and feels voice is still rough w/ loss of range, specifically in higher notes. Requested repeat VLS to ensure pre-nodular edema has not changed/worsened since 2013, and to implement healthy vocal techniques to maintain voice moving forward. Denied dyspnea or dysphagia. Hx of acid reflux; does take reflux medication BID. Surgical/Medical History: See the above. Hydration: >64oz of water  Smoking History: None  Caffeine Intake: Coffee/tea    PER ENT NOTE, Dr. Eddy Sample, 12/20/21:  Mucinex DM. Change to hypertonic saline rinses. Follow up as needed. Referral to SLP for voice. Perceptual Quality: Pt presented with mild-moderate dysphonia characterized by roughness, breathiness, and intermittent pressed phonation at end of sentences. Acoustic Analysis:  Maximum Sustained Phonation- 16 seconds  Avg Hz- 197   Max Hz- 841   Min Hz- 137    Rigid Stroboscopy Laryngoscopy  Procedure : Rigid Stroboscopy Laryngoscopy  Performed by: EDWAR Velazquez  Anesthesia: NA  Description:  The scope was passed along the superior lingual surface to the level of the larynx. There was no evidence of concerning masses or lesions of the base of tongue, vallecula, epiglottis, aryepiglottic folds, arytenoids, false vocal folds, true vocal folds, or pyriform sinuses. The scope was removed. The patient tolerated the procedure without difficulty. There were no complications.   Pertinent findings: See Assessment and Plan of Care       Abduction; erythema and edema       Abduction; LTVF medial edge edema     Adduction          Adduction; high pitch (medial edge erythema)    ASSESSMENT AND PLAN OF CARE:   48 y.o. female w/ hx of long-term dysphonia; previous dx of pre-nodular edema in 2013. VLS revealed generalized edema of the TVFs w/ erythema on bilateral medial edges at striking zone; suspect d/t prolonged coughing over the last 2 weeks. Increased vascularities on medial-superior surface of RTVF. Area of increased edema on anterior-medial 2/3rd of LTVF; pliable and vibratory in nature. TVFs stiff w/ moderately reduced superior-inferior and mediolateral wave; functional periodicity despite decreased movement. Glottic closure w/ posterior gap. Mild supraglottic compression. Moderate interarytenoid pachydermia and posterior edema, erythema and thick mucus were observed, indicative of laryngopharyngeal reflux. Subglottis was patent without evidence of narrowing. No mass lesions, paresis or paralysis was noted. EDUCATION AND THERAPY:  Reviewed VLS w/ pt and discussed ongoing erythema/edema, including increased area of swelling on LTVF. However, encouraged that despite previous dx in 2013, no lesion has formed at this time indicating ongoing healthy voice use and excellent awareness. Discussed impact of recurrent sinus infections on vocal quality and pt expressed understanding. Reflux counseling provided, as pt independently recognized severe reflux; discussed dietary changes and use of Gaviscon in addition to medication. Introduced to [employer] to begin decreasing irritation and increasing amplitude of vibration for stiffness; encouraged to begin completing daily and specifically as warm-up/cool-down prior to performances. Pt w/ excellent understanding.      PATIENT GOALS:  Pt will adhere to vocal hygiene protocol during daily life activities w/ 80% acc given mod cues  Pt will adhere to reflux management protocol during daily life activities w/ 80% acc given mod cues  Pt will perform SOVT techniques during various voicing tasks w/ 80% acc given mod cues  Pt will perform RVT techniques during various voicing tasks w/ 80% acc given mod cues  Pt will perform resistive/abdominal breathing exercises at rest and during phonation w/ 80% acc given mod cues  Pt will complete laryngeal/general relaxation stretches/exercises w/ 80% acc given mod cues    SLP recommended: Yes  Barriers to treatment: None  Potential benefits from rehab include: Improved vocal quality; maintenance of healthy voicing  Frequency: 4 sessions over 6-8/wk  Prognosis is: Good    RECOMMENDATIONS:   1. Dr. Deedee Estrella was present and reviewed recorded evaluation to assist in diagnosis and provide assessment and plan for treatment. 2. Follow good vocal hygiene behaviors and precautions, including increasing oral hydration. 3. Follow dietary precautions and behavioral lifestyle changes regarding laryngopharyngeal reflux, including taking PPI as prescribed by physician. 4. Voice therapy to focus on re-strengthening and balancing the laryngeal musculature, to promote to open oral front focus, to promote using adequate diaphragmatic breath support to sustain conversational speech. 5. Pt is a good candidate for further medical evaluation/intervention at the discretion of the treating physician. 6. Pt to follow up with ENT/Dr. Camacho.       CPT Code Units Billed Time Billed Today Date of POC Start Re-Certification Date Referring Provider   53744, 61088, 20364, 71839 4 Unit Time in: 1310  Time out: 1350  Total time: 40 min 1/3/2022 60 days Dr. Deedee Estrella       Thank you,    Zuleyma Last [place], [place], Sallie Steward; [phone]  Voice Specialized Speech-Language Pathologist

## 2022-01-04 ENCOUNTER — HOSPITAL ENCOUNTER (OUTPATIENT)
Dept: CT IMAGING | Age: 47
Discharge: HOME | DRG: 137 | End: 2022-01-04
Payer: COMMERCIAL

## 2022-01-04 DIAGNOSIS — J32.4 CHRONIC PANSINUSITIS: ICD-10-CM

## 2022-01-04 PROCEDURE — 70486 CT MAXILLOFACIAL W/O DYE: CPT

## 2022-01-05 ENCOUNTER — APPOINTMENT (OUTPATIENT)
Dept: GENERAL RADIOLOGY | Age: 47
DRG: 137 | End: 2022-01-05
Payer: COMMERCIAL

## 2022-01-05 ENCOUNTER — APPOINTMENT (OUTPATIENT)
Dept: CT IMAGING | Age: 47
DRG: 137 | End: 2022-01-05
Payer: COMMERCIAL

## 2022-01-05 ENCOUNTER — HOSPITAL ENCOUNTER (INPATIENT)
Age: 47
LOS: 7 days | Discharge: HOME | DRG: 137 | End: 2022-01-12
Attending: STUDENT IN AN ORGANIZED HEALTH CARE EDUCATION/TRAINING PROGRAM | Admitting: INTERNAL MEDICINE
Payer: COMMERCIAL

## 2022-01-05 DIAGNOSIS — B37.81 THRUSH OF MOUTH AND ESOPHAGUS (HCC): ICD-10-CM

## 2022-01-05 DIAGNOSIS — R74.01 TRANSAMINITIS: ICD-10-CM

## 2022-01-05 DIAGNOSIS — U07.1 PNEUMONIA DUE TO COVID-19 VIRUS: ICD-10-CM

## 2022-01-05 DIAGNOSIS — J12.82 PNEUMONIA DUE TO COVID-19 VIRUS: ICD-10-CM

## 2022-01-05 DIAGNOSIS — U07.1 ACUTE HYPOXEMIC RESPIRATORY FAILURE DUE TO COVID-19 (HCC): Primary | ICD-10-CM

## 2022-01-05 DIAGNOSIS — J96.01 ACUTE HYPOXEMIC RESPIRATORY FAILURE DUE TO COVID-19 (HCC): Primary | ICD-10-CM

## 2022-01-05 DIAGNOSIS — E87.1 HYPONATREMIA: ICD-10-CM

## 2022-01-05 DIAGNOSIS — B37.0 THRUSH OF MOUTH AND ESOPHAGUS (HCC): ICD-10-CM

## 2022-01-05 PROBLEM — J96.00 ACUTE RESPIRATORY FAILURE DUE TO COVID-19 (HCC): Status: ACTIVE | Noted: 2022-01-05

## 2022-01-05 LAB
A/G RATIO: 0.8 (ref 1.1–2.2)
ALBUMIN SERPL-MCNC: 4 G/DL (ref 3.4–5)
ALP BLD-CCNC: 315 U/L (ref 40–129)
ALT SERPL-CCNC: 71 U/L (ref 10–40)
ANION GAP SERPL CALCULATED.3IONS-SCNC: 10 MMOL/L (ref 3–16)
AST SERPL-CCNC: 84 U/L (ref 15–37)
BASE EXCESS VENOUS: -2.3 MMOL/L
BASOPHILS ABSOLUTE: 0 K/UL (ref 0–0.2)
BASOPHILS RELATIVE PERCENT: 0.4 %
BILIRUB SERPL-MCNC: 1.4 MG/DL (ref 0–1)
BUN BLDV-MCNC: 19 MG/DL (ref 7–20)
C-REACTIVE PROTEIN: 157.9 MG/L (ref 0–5.1)
CALCIUM SERPL-MCNC: 9.1 MG/DL (ref 8.3–10.6)
CARBOXYHEMOGLOBIN: 1 %
CHLORIDE BLD-SCNC: 96 MMOL/L (ref 99–110)
CO2: 22 MMOL/L (ref 21–32)
CREAT SERPL-MCNC: 0.9 MG/DL (ref 0.6–1.1)
EOSINOPHILS ABSOLUTE: 0 K/UL (ref 0–0.6)
EOSINOPHILS RELATIVE PERCENT: 0.1 %
GFR AFRICAN AMERICAN: >60
GFR NON-AFRICAN AMERICAN: >60
GLUCOSE BLD-MCNC: 87 MG/DL (ref 70–99)
HCO3 VENOUS: 25 MMOL/L (ref 23–29)
HCT VFR BLD CALC: 40.1 % (ref 36–48)
HEMOGLOBIN: 13.2 G/DL (ref 12–16)
LACTIC ACID: 1 MMOL/L (ref 0.4–2)
LACTIC ACID: 1.1 MMOL/L (ref 0.4–2)
LYMPHOCYTES ABSOLUTE: 1 K/UL (ref 1–5.1)
LYMPHOCYTES RELATIVE PERCENT: 8.7 %
MCH RBC QN AUTO: 29.6 PG (ref 26–34)
MCHC RBC AUTO-ENTMCNC: 32.8 G/DL (ref 31–36)
MCV RBC AUTO: 90.2 FL (ref 80–100)
METHEMOGLOBIN VENOUS: 0.6 %
MONOCYTES ABSOLUTE: 0.3 K/UL (ref 0–1.3)
MONOCYTES RELATIVE PERCENT: 2.8 %
NEUTROPHILS ABSOLUTE: 10 K/UL (ref 1.7–7.7)
NEUTROPHILS RELATIVE PERCENT: 88 %
O2 SAT, VEN: 18 %
O2 THERAPY: ABNORMAL
PCO2, VEN: 52.9 MMHG (ref 40–50)
PDW BLD-RTO: 13.7 % (ref 12.4–15.4)
PH VENOUS: 7.29 (ref 7.35–7.45)
PLATELET # BLD: 146 K/UL (ref 135–450)
PMV BLD AUTO: 10 FL (ref 5–10.5)
PO2, VEN: <30 MMHG
POTASSIUM SERPL-SCNC: 4 MMOL/L (ref 3.5–5.1)
PRO-BNP: <5 PG/ML (ref 0–124)
PROCALCITONIN: 0.3 NG/ML (ref 0–0.15)
RAPID INFLUENZA  B AGN: NEGATIVE
RAPID INFLUENZA A AGN: NEGATIVE
RBC # BLD: 4.45 M/UL (ref 4–5.2)
SARS-COV-2, NAAT: DETECTED
SODIUM BLD-SCNC: 128 MMOL/L (ref 136–145)
TCO2 CALC VENOUS: 27 MMOL/L
TOTAL PROTEIN: 9.3 G/DL (ref 6.4–8.2)
TROPONIN: <0.01 NG/ML
WBC # BLD: 11.4 K/UL (ref 4–11)

## 2022-01-05 PROCEDURE — 82803 BLOOD GASES ANY COMBINATION: CPT

## 2022-01-05 PROCEDURE — 96374 THER/PROPH/DIAG INJ IV PUSH: CPT

## 2022-01-05 PROCEDURE — 83605 ASSAY OF LACTIC ACID: CPT

## 2022-01-05 PROCEDURE — 87804 INFLUENZA ASSAY W/OPTIC: CPT

## 2022-01-05 PROCEDURE — 94640 AIRWAY INHALATION TREATMENT: CPT

## 2022-01-05 PROCEDURE — 80053 COMPREHEN METABOLIC PANEL: CPT

## 2022-01-05 PROCEDURE — 84484 ASSAY OF TROPONIN QUANT: CPT

## 2022-01-05 PROCEDURE — 94761 N-INVAS EAR/PLS OXIMETRY MLT: CPT

## 2022-01-05 PROCEDURE — 6370000000 HC RX 637 (ALT 250 FOR IP): Performed by: NURSE PRACTITIONER

## 2022-01-05 PROCEDURE — 93005 ELECTROCARDIOGRAM TRACING: CPT | Performed by: NURSE PRACTITIONER

## 2022-01-05 PROCEDURE — 6370000000 HC RX 637 (ALT 250 FOR IP): Performed by: INTERNAL MEDICINE

## 2022-01-05 PROCEDURE — 36415 COLL VENOUS BLD VENIPUNCTURE: CPT

## 2022-01-05 PROCEDURE — 99282 EMERGENCY DEPT VISIT SF MDM: CPT

## 2022-01-05 PROCEDURE — 2580000003 HC RX 258: Performed by: INTERNAL MEDICINE

## 2022-01-05 PROCEDURE — 87635 SARS-COV-2 COVID-19 AMP PRB: CPT

## 2022-01-05 PROCEDURE — 6360000002 HC RX W HCPCS: Performed by: INTERNAL MEDICINE

## 2022-01-05 PROCEDURE — 86140 C-REACTIVE PROTEIN: CPT

## 2022-01-05 PROCEDURE — 71045 X-RAY EXAM CHEST 1 VIEW: CPT

## 2022-01-05 PROCEDURE — 6360000002 HC RX W HCPCS: Performed by: NURSE PRACTITIONER

## 2022-01-05 PROCEDURE — 85025 COMPLETE CBC W/AUTO DIFF WBC: CPT

## 2022-01-05 PROCEDURE — 2700000000 HC OXYGEN THERAPY PER DAY

## 2022-01-05 PROCEDURE — 1200000000 HC SEMI PRIVATE

## 2022-01-05 PROCEDURE — 87040 BLOOD CULTURE FOR BACTERIA: CPT

## 2022-01-05 PROCEDURE — 84145 PROCALCITONIN (PCT): CPT

## 2022-01-05 PROCEDURE — 83880 ASSAY OF NATRIURETIC PEPTIDE: CPT

## 2022-01-05 RX ORDER — LIOTHYRONINE SODIUM 5 UG/1
10 TABLET ORAL DAILY
Status: DISCONTINUED | OUTPATIENT
Start: 2022-01-05 | End: 2022-01-12 | Stop reason: HOSPADM

## 2022-01-05 RX ORDER — IPRATROPIUM BROMIDE AND ALBUTEROL SULFATE 2.5; .5 MG/3ML; MG/3ML
1 SOLUTION RESPIRATORY (INHALATION) ONCE
Status: COMPLETED | OUTPATIENT
Start: 2022-01-05 | End: 2022-01-05

## 2022-01-05 RX ORDER — FOLIC ACID 1 MG/1
1 TABLET ORAL DAILY
Status: DISCONTINUED | OUTPATIENT
Start: 2022-01-05 | End: 2022-01-12 | Stop reason: HOSPADM

## 2022-01-05 RX ORDER — CETIRIZINE HYDROCHLORIDE, PSEUDOEPHEDRINE HYDROCHLORIDE 5; 120 MG/1; MG/1
1 TABLET, FILM COATED, EXTENDED RELEASE ORAL 2 TIMES DAILY
Status: DISCONTINUED | OUTPATIENT
Start: 2022-01-05 | End: 2022-01-05

## 2022-01-05 RX ORDER — PANTOPRAZOLE SODIUM 40 MG/1
40 TABLET, DELAYED RELEASE ORAL
Status: DISCONTINUED | OUTPATIENT
Start: 2022-01-05 | End: 2022-01-12 | Stop reason: HOSPADM

## 2022-01-05 RX ORDER — M-VIT,TX,IRON,MINS/CALC/FOLIC 27MG-0.4MG
1 TABLET ORAL DAILY
Status: DISCONTINUED | OUTPATIENT
Start: 2022-01-05 | End: 2022-01-12 | Stop reason: HOSPADM

## 2022-01-05 RX ORDER — CODEINE PHOSPHATE AND GUAIFENESIN 10; 100 MG/5ML; MG/5ML
5 SOLUTION ORAL EVERY 4 HOURS PRN
Status: DISCONTINUED | OUTPATIENT
Start: 2022-01-05 | End: 2022-01-12 | Stop reason: HOSPADM

## 2022-01-05 RX ORDER — ALBUTEROL SULFATE 90 UG/1
2 AEROSOL, METERED RESPIRATORY (INHALATION) 4 TIMES DAILY
Status: DISCONTINUED | OUTPATIENT
Start: 2022-01-05 | End: 2022-01-12 | Stop reason: HOSPADM

## 2022-01-05 RX ORDER — ACETAMINOPHEN 325 MG/1
650 TABLET ORAL EVERY 6 HOURS PRN
Status: DISCONTINUED | OUTPATIENT
Start: 2022-01-05 | End: 2022-01-07

## 2022-01-05 RX ORDER — DEXAMETHASONE SODIUM PHOSPHATE 4 MG/ML
10 INJECTION, SOLUTION INTRA-ARTICULAR; INTRALESIONAL; INTRAMUSCULAR; INTRAVENOUS; SOFT TISSUE ONCE
Status: COMPLETED | OUTPATIENT
Start: 2022-01-05 | End: 2022-01-05

## 2022-01-05 RX ORDER — BUDESONIDE AND FORMOTEROL FUMARATE DIHYDRATE 160; 4.5 UG/1; UG/1
2 AEROSOL RESPIRATORY (INHALATION) 2 TIMES DAILY
Status: DISCONTINUED | OUTPATIENT
Start: 2022-01-05 | End: 2022-01-12 | Stop reason: HOSPADM

## 2022-01-05 RX ORDER — FLUTICASONE PROPIONATE 50 MCG
1 SPRAY, SUSPENSION (ML) NASAL DAILY
Status: DISCONTINUED | OUTPATIENT
Start: 2022-01-06 | End: 2022-01-12 | Stop reason: HOSPADM

## 2022-01-05 RX ORDER — MONTELUKAST SODIUM 10 MG/1
10 TABLET ORAL NIGHTLY
Status: DISCONTINUED | OUTPATIENT
Start: 2022-01-05 | End: 2022-01-12 | Stop reason: HOSPADM

## 2022-01-05 RX ORDER — POLYETHYLENE GLYCOL 3350 17 G/17G
17 POWDER, FOR SOLUTION ORAL DAILY
Status: DISCONTINUED | OUTPATIENT
Start: 2022-01-05 | End: 2022-01-12 | Stop reason: HOSPADM

## 2022-01-05 RX ORDER — PSEUDOEPHEDRINE HCL 120 MG/1
120 TABLET, FILM COATED, EXTENDED RELEASE ORAL 2 TIMES DAILY
Status: DISCONTINUED | OUTPATIENT
Start: 2022-01-05 | End: 2022-01-12 | Stop reason: HOSPADM

## 2022-01-05 RX ORDER — DEXAMETHASONE SODIUM PHOSPHATE 4 MG/ML
6 INJECTION, SOLUTION INTRA-ARTICULAR; INTRALESIONAL; INTRAMUSCULAR; INTRAVENOUS; SOFT TISSUE EVERY 24 HOURS
Status: DISCONTINUED | OUTPATIENT
Start: 2022-01-05 | End: 2022-01-06

## 2022-01-05 RX ORDER — DILTIAZEM HYDROCHLORIDE 120 MG/1
120 CAPSULE, COATED, EXTENDED RELEASE ORAL DAILY
Status: DISCONTINUED | OUTPATIENT
Start: 2022-01-05 | End: 2022-01-12 | Stop reason: HOSPADM

## 2022-01-05 RX ORDER — CETIRIZINE HYDROCHLORIDE 10 MG/1
5 TABLET ORAL 2 TIMES DAILY
Status: DISCONTINUED | OUTPATIENT
Start: 2022-01-05 | End: 2022-01-12 | Stop reason: HOSPADM

## 2022-01-05 RX ADMIN — ALBUTEROL SULFATE 2 PUFF: 90 AEROSOL, METERED RESPIRATORY (INHALATION) at 20:12

## 2022-01-05 RX ADMIN — CEFTRIAXONE 1000 MG: 1 INJECTION, POWDER, FOR SOLUTION INTRAMUSCULAR; INTRAVENOUS at 16:31

## 2022-01-05 RX ADMIN — PANTOPRAZOLE SODIUM 40 MG: 40 TABLET, DELAYED RELEASE ORAL at 17:50

## 2022-01-05 RX ADMIN — DEXAMETHASONE SODIUM PHOSPHATE 6 MG: 4 INJECTION, SOLUTION INTRAMUSCULAR; INTRAVENOUS at 16:25

## 2022-01-05 RX ADMIN — PSEUDOEPHEDRINE HYDROCHLORIDE 120 MG: 120 TABLET, FILM COATED, EXTENDED RELEASE ORAL at 23:07

## 2022-01-05 RX ADMIN — IPRATROPIUM BROMIDE AND ALBUTEROL SULFATE 1 AMPULE: .5; 3 SOLUTION RESPIRATORY (INHALATION) at 12:06

## 2022-01-05 RX ADMIN — AZITHROMYCIN MONOHYDRATE 500 MG: 500 INJECTION, POWDER, LYOPHILIZED, FOR SOLUTION INTRAVENOUS at 17:06

## 2022-01-05 RX ADMIN — GUAIFENESIN AND CODEINE PHOSPHATE 5 ML: 10; 100 LIQUID ORAL at 14:23

## 2022-01-05 RX ADMIN — CETIRIZINE HYDROCHLORIDE 5 MG: 10 TABLET, FILM COATED ORAL at 23:06

## 2022-01-05 RX ADMIN — NYSTATIN 500000 UNITS: 100000 SUSPENSION ORAL at 12:13

## 2022-01-05 RX ADMIN — DEXAMETHASONE SODIUM PHOSPHATE 10 MG: 4 INJECTION INTRA-ARTICULAR; INTRALESIONAL; INTRAMUSCULAR; INTRAVENOUS; SOFT TISSUE at 10:57

## 2022-01-05 RX ADMIN — MONTELUKAST SODIUM 10 MG: 10 TABLET, COATED ORAL at 23:07

## 2022-01-05 RX ADMIN — BUDESONIDE AND FORMOTEROL FUMARATE DIHYDRATE 2 PUFF: 160; 4.5 AEROSOL RESPIRATORY (INHALATION) at 20:12

## 2022-01-05 RX ADMIN — IPRATROPIUM BROMIDE AND ALBUTEROL SULFATE 1 AMPULE: .5; 3 SOLUTION RESPIRATORY (INHALATION) at 11:04

## 2022-01-05 ASSESSMENT — PAIN SCALES - GENERAL: PAINLEVEL_OUTOF10: 8

## 2022-01-05 ASSESSMENT — PAIN DESCRIPTION - DESCRIPTORS: DESCRIPTORS: DISCOMFORT

## 2022-01-05 ASSESSMENT — PAIN DESCRIPTION - LOCATION: LOCATION: GENERALIZED

## 2022-01-05 ASSESSMENT — PAIN DESCRIPTION - PAIN TYPE: TYPE: ACUTE PAIN

## 2022-01-05 NOTE — ED PROVIDER NOTES
Attending Supervisory Note/Shared Visit   I have personally performed a face to face diagnostic evaluation on this patient. I have reviewed the mid-levels findings and agree. History and Exam by me shows a 49 yo F p/w SOB and pleuritic chest pain of gradual onset, rhonchi thorughout bilaterally on exam and hypoxic to the 80s on RA requiring supplementation with O2, up-titrated to 5L by NC with stabilization, given IV dex, found to be COVID +ve with XR c/w COVID PNA. Admitted to hospitalist.  Stable on reassessment, boarding in the ED at 1500 at end of my shift. The Ekg interpreted by me shows  sinus tachycardia, pqpm=205 bpm   Axis is   Normal  QTc is  within an acceptable range  Intervals and Durations are unremarkable. ST Segments: nonspecific ST changes  Compared to prior EKG, pt no longer in Afib. CRITICAL CARE TIME   Total Critical Care time was 25 minutes, excluding separately reportable procedures. There was a high probability of clinically significant/life threatening deterioration in the patient's condition which required my urgent intervention. Frequent reassessments of patient's hemodynamic status, respiratory status, administration of supplemental oxygen IV dexamethasone for treatment of Covid pneumonia and hypoxemic respiratory failure secondary to Covid.       Berny Batista MD  Attending Emergency Physician        Berny Batista MD  01/05/22 [id number]

## 2022-01-05 NOTE — ED PROVIDER NOTES
[address]  [address] [address]  Dept: [phone]  Loc: [address] [place] [address] COMPLAINT    Chief Complaint   Patient presents with    Cough    Shortness of Breath    Fatigue       HPI    Genna Sacks is a 48 y.o. female who presents to the emergency department with ongoing symptoms for at least the last 20 days. She states she started off with a sinus infection and was placed on Augmentin. Her symptoms worsened. She had a scope by her ENT he discovered a sphenoid abscess which was drained. She was switched to Cipro there after for staph growth. She has had a cough with her sinus infection and about 2 days ago she developed shortness of breath. She denies chest pain. She states that she cannot get her breath in. She has gotten progressively worse to the point where she cannot stand the fatigue that she is experiencing. She has been routinely getting Covid testing which have all been negative. She is vaccinated against Covid with 2 vaccines but has not had a booster yet. She has not had an influenza vaccine. She has a history of asthma. She states she has been on 2 rounds of steroids and has been using her inhalers at home that have not helped. She is unable to lay down flat at night for sleeping because the shortness of breath becomes much more severe. She denies history of heart failure. REVIEW OF SYSTEMS    Cardiac: no chest pain, no palpitations, no syncope  Respiratory: see HPI, + cough  Neurologic: no syncope or LOC  GI: no vomiting, no diarrhea  General: denies fever  All other systems reviewed and are negative.     PAST MEDICAL & SURGICAL HISTORY    Past Medical History:   Diagnosis Date    Abdominal wall abscess 2/8/2017    Abdominal wall cellulitis 2/6/2017    Anal fissure 4/30/2015    Anemia, iron deficiency     Asthma     Autoimmune hepatitis ([facility] [place] [facility]     Chronic sinusitis     History of blood transfusion     Hypothyroidism     Menorrhagia with regular cycle     Morbid obesity with BMI of 40.0-44.9, adult ([facility] 5/26/2015    MRSA (methicillin resistant staph aureus) culture positive 02/03/2017    abdominal abscess    MRSA infection 08/20/2012    rt thigh abscess    Pericarditis, acute     Sinusitis      Past Surgical History:   Procedure Laterality Date    COLONOSCOPY N/A 12/29/2020    COLONOSCOPY DIAGNOSTIC performed by Moose Cha MD at [address] HISTORY  8/22/2012    INCISION AND DRAINAGE POSTERIOR THIGH ABSCESS    RECTAL SURGERY  Aug 2011    repair of rectal fissures and anal skin tag removal    SINUS SURGERY      X 3    TONSILLECTOMY  2004    TONSILLECTOMY AND ADENOIDECTOMY  2005    (partial adenoidectomy)    UPPER GASTROINTESTINAL ENDOSCOPY N/A 12/29/2020    EGD BIOPSY performed by Moose Cha MD at [address] [place] [address]  (may include discharge medications prescribed in the ED)      ALLERGIES    Allergies   Allergen Reactions    Latex Swelling and Dermatitis     Also \"burning of skin\"      Clindamycin/Lincomycin Hives and Other (See Comments)     \"Throat closing\"    Sulfa Antibiotics Anaphylaxis and Hives    Diflucan [Fluconazole] Hives and Itching    Other Hives       SOCIAL & FAMILY HISTORY    Social History     Socioeconomic History    Marital status: Single     Spouse name: None    Number of children: None    Years of education: None    Highest education level: None   Occupational History    Occupation: NA   Tobacco Use    Smoking status: Never Smoker    Smokeless tobacco: Never Used   Vaping Use    Vaping Use: Never used   Substance and Sexual Activity    Alcohol use:  Yes     Alcohol/week: 0.0 standard drinks     Comment: 2 drinks/week     Drug use: No    Sexual activity: Yes   Other Topics Concern    None   Social History Narrative    None     Social Determinants of Health     Financial Resource Strain:     Difficulty of Paying Living Expenses: Not on file   Food Insecurity:     Worried About Running Out of Food in the Last Year: Not on file    Tangela of Food in the Last Year: Not on file   Transportation Needs:     Lack of Transportation (Medical): Not on file    Lack of Transportation (Non-Medical): Not on file   Physical Activity:     Days of Exercise per Week: Not on file    Minutes of Exercise per Session: Not on file   Stress:     Feeling of Stress : Not on file   Social Connections:     Frequency of Communication with Friends and Family: Not on file    Frequency of Social Gatherings with Friends and Family: Not on file    Attends Latter-day Services: Not on file    Active Member of [address] [place] [address] [employer] or Organizations: Not on file    Attends Club or Organization Meetings: Not on file    Marital Status: Not on file   Intimate Partner Violence:     Fear of Current or Ex-Partner: Not on file    Emotionally Abused: Not on file    Physically Abused: Not on file    Sexually Abused: Not on file   Housing Stability:     Unable to Pay for Housing in the Last Year: Not on file    Number of [place] in the Last Year: Not on file    Unstable Housing in the Last Year: Not on file     Family History   Problem Relation Age of Onset    High Blood Pressure Mother     Hypertension Mother     Elevated Lipids Mother     Other Mother         fatty liver    Heart Disease Father     Hypertension Father     Elevated Lipids Father     Coronary Art Dis Father     Liver Disease Father     Diabetes Maternal Aunt        PHYSICAL EXAM    VITAL SIGNS: /75   Pulse 90   Temp 100.5 °F (38.1 °C) (Axillary)   Resp 18   SpO2 (!) 88%    Constitutional:  Well developed, well nourished, respiratory distress  HENT:  Atraumatic, dry mucus membranes. Tongue is red, dry and cracked.    Neck: supple, no JVD   Respiratory: Tachypneic during my exam breathing 24 breaths/min. She is speaking in uninterrupted sentences. She is hypoxic at 82% on room air. O2 applied. Crackles throughout. Cardiovascular: Tachycardic rate with a regular rhythm S1-S2 no murmurs vascular: Radial and DP pulses 2+ and equal bilaterally  GI:  Soft, nontender, normal bowel sounds  Musculoskeletal:  no lower extremity edema, no lower extremity asymmetry, no calf tenderness, no thigh tenderness, no acute deformities  Integument:  Skin is warm and dry, no petechiae   Neurologic:  Alert & oriented, no slurred speech  Psych: Pleasant affect, no hallucinations    EKG      EKG reviewed by myself and interpreted by my attending physician Dr. Jeanie Dykes  Ventricular rate 109 bpm, parable 136 ms, QRS duration 70 ms,  ms  No ST elevation or depression. Interpretation is sinus tachycardia. Today's tracing was compared to the one on August 17, 2021 computer readout was in atrial fibrillation but my interpretation is a sinus rhythm. I see P waves each QRS complex. Therefore I see there are no acute changes. RADIOLOGY/PROCEDURES    XR CHEST PORTABLE   Final Result   Multifocal airspace consolidation most suggestive of an atypical multi lobar   pneumonia. COVID-19 pneumonia should be excluded.            Labs Reviewed   COVID-19, RAPID - Abnormal; Notable for the following components:       Result Value    SARS-CoV-2, NAAT DETECTED (*)     All other components within normal limits    Narrative:     Performed at:  [facility]  [address] [place], [place] [address]   Phone [phone]   CBC WITH AUTO DIFFERENTIAL - Abnormal; Notable for the following components:    WBC 11.4 (*)     Neutrophils Absolute 10.0 (*)     All other components within normal limits    Narrative:     Performed at:  [facility]  [address] [place], [place] [address]   Phone [phone]   COMPREHENSIVE METABOLIC PANEL - Abnormal; Notable for the following components:    Sodium 128 (*)     Chloride 96 (*)     Total Protein 9.3 (*)     Albumin/Globulin Ratio 0.8 (*)     Total Bilirubin 1.4 (*)     Alkaline Phosphatase 315 (*)     ALT 71 (*)     AST 84 (*)     All other components within normal limits    Narrative:     Performed at:  [facility]  [address] [place] [address]   Phone [phone]   BLOOD GAS, VENOUS - Abnormal; Notable for the following components:    pH, Mauricio 7.286 (*)     pCO2, Mauricio 52.9 (*)     All other components within normal limits    Narrative:     Performed at:  [place] [address], [place] [facility] [place] [address]   Phone [phone]   RAPID INFLUENZA A/B ANTIGENS    Narrative:     Performed at:  [place] [address], [facility] [place] [address]   Phone [phone]   CULTURE, BLOOD 1   CULTURE, BLOOD 2   BRAIN NATRIURETIC PEPTIDE    Narrative:     Performed at:  [place] [address], [facility] [employer] [address]   Phone [phone]   TROPONIN    Narrative:     Performed at:  [facility]  [facility] [address], [facility] [employer] [address]   Phone [phone]   LACTIC ACID, PLASMA    Narrative:     Performed at:  [facility]  [address], [facility] [employer] [address]   Phone [phone]   LACTIC ACID, PLASMA    Narrative:     Performed at:  [place] [address], [place] [facility] [employer] [address]   Phone [phone]   PROCALCITONIN   C-REACTIVE PROTEIN       ED [facility] 630 studies reviewed and interpreted. (See chart for details)    See chart for details of medications given during the ED stay.     Vitals:    01/05/22 2021 01/05/22 2034 01/05/22 2035 01/05/22 2217   BP:  (!) 146/65  121/75   Pulse:   94 90   Resp: 24   18   Temp:    100.5 °F (38.1 °C)   TempSrc:    Axillary   SpO2: (!) 86% 92%  (!) 88%     Medications   albuterol sulfate  (90 Base) MCG/ACT inhaler 2 puff (2 puffs Inhalation Given 1/5/22 2012)   budesonide-formoterol (SYMBICORT) 160-4.5 MCG/ACT inhaler 2 puff (2 puffs Inhalation Given 1/5/22 2012)   dilTIAZem (CARDIZEM CD) extended release capsule 120 mg (has no administration in time range)   fluticasone (FLONASE) 50 MCG/ACT nasal spray 1 spray (has no administration in time range)   folic acid (FOLVITE) tablet 1 mg (has no administration in time range)   levothyroxine (SYNTHROID) tablet 137 mcg (has no administration in time range)   liothyronine (CYTOMEL) tablet 10 mcg (has no administration in time range)   montelukast (SINGULAIR) tablet 10 mg (10 mg Oral Given 1/5/22 2307)   therapeutic multivitamin-minerals 1 tablet (has no administration in time range)   pantoprazole (PROTONIX) tablet 40 mg (40 mg Oral Given 1/5/22 1750)   polyethylene glycol (GLYCOLAX) packet 17 g (has no administration in time range)   dexamethasone (DECADRON) injection 6 mg (6 mg IntraVENous Given 1/5/22 1625)   cefTRIAXone (ROCEPHIN) 1000 mg IVPB in 50 mL D5W minibag (0 mg IntraVENous Stopped 1/5/22 1704)   azithromycin (ZITHROMAX) 500 mg in D5W 250ml addavial (0 mg IntraVENous Stopped 1/5/22 1811)   guaiFENesin-codeine (GUAIFENESIN AC) 100-10 MG/5ML liquid 5 mL (5 mLs Oral Given 1/5/22 1423)   cetirizine (ZYRTEC) tablet 5 mg (5 mg Oral Given 1/5/22 2306)     And   pseudoephedrine (SUDAFED 12 HR) extended release tablet 120 mg (120 mg Oral Given 1/5/22 2307)   ipratropium-albuterol (DUONEB) nebulizer solution 1 ampule (1 ampule Inhalation Given 1/5/22 1104)   Dexamethasone Sodium Phosphate injection 10 mg (10 mg IntraVENous Given 1/5/22 1057)   nystatin (MYCOSTATIN) 206255 UNIT/ML suspension 500,000 Units (500,000 Units Oral Given 1/5/22 1213)   ipratropium-albuterol (DUONEB) nebulizer solution 1 ampule (1 ampule Inhalation Given 1/5/22 1206)     This patient was seen and evaluated by myself and my attending physician Dr. Camryn Couch. Differential diagnosis includes was not limited to Covid, influenza, pneumonia, heart failure, exacerbation of asthma, PE, ACS, dehydration, other. This is an acutely ill-appearing female in respiratory distress on arrival who was hypoxic, tachypneic and tachycardic. She is afebrile, normotensive. 20 days ago was started on Augmentin for sinusitis. Switched to Cipro thereafter for a staph sphenoid abscess. She has been on 2 rounds of steroids. She has been using inhalers for the last 2 days after developing shortness of breath. She is white count 11.4. Left shift. Sodium 128, potassium 4, chloride 96, creatinine 0.9. proBNP less than 5. Troponin less than 0.01. Alk phos is 315 with an ALT of 71, AST of 84 and a bilirubin of 1.4. No acute changes on EKG    Blood cultures were drawn. Negative influenza. Positive for Covid. She was given nebulizer treatments and Decadron. Chest x-ray viewed by myself and interpreted by radiology showed  Multifocal airspace consolidation most suggestive of an atypical multi lobar   pneumonia.  COVID-19 pneumonia should be excluded. Echocardiogram from August 25, 2021  Findings      Left Ventricle   Normal left ventricle size, wall thickness, and systolic function with an   estimated ejection fraction of 55-60%. No regional wall motion abnormalities   are seen. Normal diastolic function. AVG. E/e' = 7.0. Patient was reevaluated multiple times. She needs admission. She agrees with the plan of care. She has Covid pneumonia. She is requiring new oxygen needs. She has transaminitis which is a new onset most likely in the setting of Covid. She also has oral thrush. She has anaphylaxis to flucanazole. She was given swish and swallow. Perfect serve to the hospitalist service.       CRITICAL CARE NOTE:  There was a high probability of clinically significant life-threatening deterioration of the patient's condition requiring my urgent intervention. Total critical care time is 45 minutes. This includes multiple reevaluations, vital sign monitoring, pulse oximetry monitoring, telemetry monitoring, clinical response to the IV medications, reviewing the nursing notes, consultation time, dictation/documentation time, and interpretation of the labwork. (This time excludes time spent performing procedures). FINAL IMPRESSION    1. Acute hypoxemic respiratory failure due to COVID-19 ([address]    2. Thrush of mouth and esophagus ([address]    3. Pneumonia due to COVID-19 virus    4. Transaminitis    5.  Hyponatremia        PLAN  Admission to the hospital    (Please note that this note was completed with a voice recognition program.  Every attempt was made to edit the dictations, but inevitably there remain words that are mis-transcribed.)       MAYELA Negron - SAIRA  01/05/22 5702

## 2022-01-05 NOTE — ED NOTES
Patient repositioned in bed and provided clean linens. Atb infusing without difficulty. Pt remains on 5L via NC and continues to complain of a cough. Will continue to monitor.      Adrienne Jolley RN  01/05/22 4463

## 2022-01-05 NOTE — ED NOTES
Bed: B-08  Expected date:   Expected time:   Means of arrival:   Comments:  Lobby, cough, sob, fatigue,     Francisco VALENTINO Roger  01/05/22 1006

## 2022-01-05 NOTE — H&P
Hospital Medicine History & Physical      PCP: Cris Ho DO    Date of Admission: 1/5/2022    Date of Service: Pt seen/examined on 1/5/2022    Chief Complaint:      Chief Complaint   Patient presents with    Cough    Shortness of Breath    Fatigue       History Of Present Illness: The patient is a 48 y.o. female medical history of A. fib, recurrent sinusitis, autoimmune hepatitis, ITP, obesity who presents to [facility] with worsening shortness of breath cough and fatigue. Patient states it has been about a month since she has been having sinusitis and has taken 2 courses of Augmentin and now is on Cipro and has taken 2 course of steroids. Since the last 2 days had chills with shortness of breath and fatigue and a lot of cough resulting in chest pain    Patient has been in many episodes of recurrent sinusitis and has been seeing ENT. Was initially on a course of Augmentin and steroids. This was switched to ciprofloxacin a few days ago patient did see speech therapy for vocal dystonia.     Past Medical History:        Diagnosis Date    Abdominal wall abscess 2/8/2017    Abdominal wall cellulitis 2/6/2017    Anal fissure 4/30/2015    Anemia, iron deficiency     Asthma     Autoimmune hepatitis ([facility] [address]     Chronic sinusitis     History of blood transfusion     Hypothyroidism     Menorrhagia with regular cycle     Morbid obesity with BMI of 40.0-44.9, adult ([facility] [address] 5/26/2015    MRSA (methicillin resistant staph aureus) culture positive 02/03/2017    abdominal abscess    MRSA infection 08/20/2012    rt thigh abscess    Pericarditis, acute     Sinusitis        Past Surgical History:        Procedure Laterality Date    COLONOSCOPY N/A 12/29/2020    COLONOSCOPY DIAGNOSTIC performed by Yoni Damian MD at [address] HISTORY  8/22/2012    INCISION AND DRAINAGE POSTERIOR THIGH ABSCESS    RECTAL SURGERY  Aug 2011    repair of rectal fissures and anal skin tag removal    SINUS SURGERY      X 3    TONSILLECTOMY  2004    TONSILLECTOMY AND ADENOIDECTOMY  2005    (partial adenoidectomy)    UPPER GASTROINTESTINAL ENDOSCOPY N/A 12/29/2020    EGD BIOPSY performed by Destinee Gentile MD at [address] [place] [address]       Medications Prior to Admission:    Prior to Admission medications    Medication Sig Start Date End Date Taking? Authorizing Provider   ciprofloxacin (CIPRO) 500 MG tablet Take 1 tablet by mouth 2 times daily for 21 days 12/30/21 1/20/22  Manjinder Schmidt MD   predniSONE (DELTASONE) 10 MG tablet 4 tabs a day for 5 days, 3 tabs a day for 3 days, 2 tabs a day for 3 days,1 tab a day for 3 days 12/30/21   Manjinder Schmidt MD   folic acid (FOLVITE) 1 MG tablet TAKE 1 TABLET BY MOUTH ONE TIME A DAY 12/6/21   Delano Dao MD   cyanocobalamin 1000 MCG/ML injection INJECT 1ML INTO THE SKIN ONCE MONTHLY 12/6/21   Delano Dao MD   urea (CARMOL) 10 % cream APPLY TO AFFECTED AREA(S) TOPICALLY AS NEEDED 12/6/21   Delano Dao MD   polyethylene glycol (MIRALAX) 17 GM/SCOOP POWD powder POUR 17 GRAMS OF POWDER INTO THE CAP OF THE BOTTLE. STIR THE POWDER IN A GLASS (4 TO 8 OZ) OF WATER, JUICE, SODA, COFFEE OR TEA UNTIL COMPLETELY DISSOLVED. DRINK THE SOLUTION.  ONCE DAILY AS NEEDED FOR CONSTIPATION 12/6/21   Delano Dao MD   fluticasone [facility]) 50 MCG/ACT nasal spray APPLY 1 SPRAY IN THE AFFECTED NOSTRIL ONE TIME A DAY 12/6/21   Delano Dao MD   Saccharomyces boulardii (PROBIOTIC) 250 MG CAPS TAKE 1 CAPSULE BY MOUTH 2 TIMES A DAY 10/4/21   Noelle Taylor DO   ketoconazole (NIZORAL) 2 % shampoo APPLY TO AFFECTED AREA(S) ONCE DAILY AS NEEDED 10/4/21   Noelle Taylor DO   budesonide-formoterol [facility]) 160-4.5 MCG/ACT AERO Inhale 2 puffs into the lungs 2 times daily 10/4/21   Brea Walton MD   montelukast (SINGULAIR) 10 MG tablet Take one tablet by mouth every evening 10/4/21   Brea Walton MD   cetirizine-psuedoephedrine (ALLERGY RELIEF D) 5-120 MG per extended release tablet TAKE 1 TABLET BY MOUTH 2 TIMES A DAY 8/26/21   Sierra Bell MD   budesonide (PULMICORT) 0.25 MG/2ML nebulizer suspension Take 2 mLs by nebulization 2 times daily 8/12/21   Rebecca Gaviria MD   mupirocin [facility]) 2 % ointment Apply topically 3 times daily. 8/1/21   Sierra Bell MD   dilTIAZem (CARDIZEM CD) 120 MG extended release capsule TAKE 1 CAPSULE BY MOUTH ONE TIME A DAY  Patient taking differently: 2 times daily as needed TAKE 1 CAPSULE BY MOUTH ONE TIME A DAY 8/1/21   Sierra Bell MD   albuterol sulfate  (90 Base) MCG/ACT inhaler INHALE 2 PUFF BY MOUTH EVERY 6 HOURS AS NEEDED FOR WHEEZING 8/1/21   Sierra Bell MD   levothyroxine (SYNTHROID) 137 MCG tablet Take 1 tablet by mouth daily 7/26/21   Edwina Dunne MD   liothyronine (CYTOMEL) 5 MCG tablet TAKE TWO TABLETS BY MOUTH DAILY 7/26/21   Edwina Dunne MD   Insulin Syringe-Needle U-100 Yuli Philip INSULIN SYRINGE) 31G X 5/16\" 1 ML MISC AS DIRECTED 3/26/21   Sierra Bell MD   chlorhexidine (BETASEPT SURGICAL SCRUB) 4 % external liquid APPLY TO AFFECTED AREA(S) TOPICALLY AS NEEDED 1/7/21   Rolly Hwang MD   pantoprazole (PROTONIX) 40 MG tablet Take 1 tablet by mouth 2 times daily (before meals) 12/29/20   Desire Salas MD   mometasone (ELOCON) 0.1 % cream Apply topically daily. 6/25/20   oRlly Hwang MD   Multiple Vitamins-Minerals (THERAPEUTIC MULTIVITAMIN-MINERALS) tablet Take 1 tablet by mouth daily 6/25/20   Rolly Hwang MD   Misc. Devices (HIBICLENS HAND PUMP 32OZ) MISC Use as needed for skin irritation or bumps. 6/22/20   Jacinda Estrada MD       Allergies:  Latex, Clindamycin/lincomycin, Sulfa antibiotics, Diflucan [fluconazole], and Other    Social History:       reports that she has never smoked. She has never used smokeless tobacco. She reports current alcohol use. She reports that she does not use drugs.     Family History: Reviewed in detail and negative for DM, Early CAD, Cancer, CVA. Positive as follows:        Problem Relation Age of Onset    High Blood Pressure Mother     Hypertension Mother     Elevated Lipids Mother     Other Mother         fatty liver    Heart Disease Father     Hypertension Father     Elevated Lipids Father     Coronary Art Dis Father     Liver Disease Father     Diabetes Maternal Aunt        REVIEW OF SYSTEMS:   Positive review  noted in the HPI. All other systems reviewed and negative. PHYSICAL EXAM:    /71   Pulse 102   Temp 97.5 °F (36.4 °C) (Temporal)   Resp 22   SpO2 93%   General Appearance: alert and oriented to person, place and time, well developed and well- nourished, in no acute distress  Skin: warm and dry, no rash or erythema  Head: normocephalic and atraumatic  Eyes: pupils equal, round, and reactive to light, extraocular eye movements intact, conjunctivae normal  ENT: tympanic membrane, external ear and ear canal normal bilaterally, nose without deformity, nasal mucosa and turbinates normal without polyps  Neck: supple and non-tender without mass, no thyromegaly or thyroid nodules, no cervical lymphadenopathy  Pulmonary/Chest: Air entry decreased.   Few scattered rhonchi  Cardiovascular: normal rate, regular rhythm, normal S1 and S2, no murmurs, rubs, clicks, or gallops, Peripheral pulses good, Cap refill <3 sec, no carotid bruits  Abdomen: soft, non-tender, non-distended, normal bowel sounds, no masses or organomegaly  Extremities: no cyanosis, clubbing or edema  Musculoskeletal: normal range of motion, no joint swelling, deformity or tenderness  Neurologic: reflexes normal and symmetric, no cranial nerve deficit, gait, coordination and speech normal      LABS:    CXR:  I have reviewed the CXR with the following interpretation: Multifocal pneumonia        CBC   Recent Labs     01/05/22  1037   WBC 11.4*   HGB 13.2   HCT 40.1         RENAL  No results for input(s): NA, K, CL, CO2, PHOS, BUN, CREATININE, CA in the last 72 hours. LFT'S  No results for input(s): AST, ALT, ALB, BILIDIR, BILITOT, ALKPHOS in the last 72 hours. COAG  No results for input(s): INR in the last 72 hours. CARDIAC ENZYMES  No results for input(s): CKTOTAL, CKMB, CKMBINDEX, TROPONINI in the last 72 hours. U/A:    Lab Results   Component Value Date    COLORU DK YELLOW 12/07/2020    WBCUA 0-2 01/09/2015    RBCUA 0-2 01/09/2015    BACTERIA Rare 01/09/2015    CLARITYU Clear 12/07/2020    SPECGRAV 1.023 12/07/2020    LEUKOCYTESUR Negative 12/07/2020    BLOODU Negative 12/07/2020    GLUCOSEU Negative 12/07/2020    GLUCOSEU NEGATIVE 05/15/2012    AMORPHOUS 3+ 01/09/2015       ABG  No results found for: PIG9RSV, BEART, L1EDQDMY, PHART, THGBART, JZT0YZJ, PO2ART, LWC5ETE    UA:No results for input(s): NITRITE, COLORU, PHUR, LABCAST, WBCUA, RBCUA, MUCUS, TRICHOMONAS, YEAST, BACTERIA, CLARITYU, SPECGRAV, LEUKOCYTESUR, UROBILINOGEN, BILIRUBINUR, BLOODU, GLUCOSEU, KETUA, AMORPHOUS in the last 72 hours. Microbiology:  No results for input(s): LABGRAM, LABANAE, ORG, CXSURG in the last 72 hours. Nasal Culture: No results for input(s): ORG, MRSAPCR in the last 72 hours. Blood Culture: No results for input(s): BC, BLOODCULT2, ORG in the last 72 hours. Fungal Culture:   No results for input(s): FUNGSM in the last 72 hours. No results for input(s): FUNCXBLD in the last 72 hours. CSF Culture:  No results for input(s): COLORCSF, APPEARCSF, CFTUBE, CLOTCSF, WBCCSF, RBCCSF, NEUTCSF, NUMCELLSCSF, LYMPHSCSF, MONOCSF, GLUCCSF, VOLCSF in the last 72 hours. Respiratory Culture:  No results for input(s): Scottie Caves in the last 72 hours. AFB:No results for input(s): AFBSMEAR in the last 72 hours. Urine Culture  No results for input(s): LABURIN in the last 72 hours. RADIOLOGY:    XR CHEST PORTABLE   Final Result   Multifocal airspace consolidation most suggestive of an atypical multi lobar   pneumonia.   COVID-19 pneumonia should be excluded. Previous medical records personally reviewed and analyzed         PHYSICIAN CERTIFICATION    I certify that Augusto Hudson is expected to be hospitalized for >2  midnights based on the following assessment and plan:    ASSESSMENT/PLAN:  Active Hospital Problems    Diagnosis Date Noted    Acute respiratory failure due to COVID-19 ([facility] [address] [U07.1, J96.00] 01/05/2022     Acute hypoxic respiratory failure  -Patient was hypoxic in the 80s in the ER and needed 5 L of oxygen to maintain sats greater than 90%    COVID-19 pneumonia  -Patient is vaccinated, has not received a booster  -Started on Decadron  -Inflammatory markers ordered  -Pharmacy to dose baricitinib  -CXR on the 30th was clear. Chest x-ray done today shows multifocal pneumonia  -procal ordered    Recurrent sinusitis  -Started on antibiotics  -Procalcitonin ordered  -CT scan done few days ago shows mild sinusitis    Hypothyroidism  -Continue home meds  -History of Hashimoto's    Hyponatremia  -Possibly due to Covid. Monitor    History of A. Fib  -On Cardizem  -Not on anticoagulation due to low [id number] score    Morbid obesity  -Complicating management and treatment    DVT Prophylaxis: Lovenox  Diet: No diet orders on file  Code Status: Prior      Susanne Perry MD  The note was completed using EMR. Every effort was made to ensure accuracy; however, inadvertent computerized transcription errors may be present. Thank you Christiano Tamez DO for the opportunity to be involved in this patient's care. If you have any questions or concerns please feel free to contact me at [phone].

## 2022-01-05 NOTE — ED NOTES
Pt repositioned for comfort. Pt continues to cough, remains on 5L via NC with sats in the 90s. ST on the monitor, no ectopy noted. Will continue to monitor.      Rashmi Winters RN  01/05/22 8614

## 2022-01-05 NOTE — ED TRIAGE NOTES
Pt arrives to the ER via private vehicle with complaints of cough, fever, and SOB. Pt states it started a few days ago. Pt states pain is 8/10. Pt placed on oxygen due to low O2. NAD noted, respirations even and easy, skin warm and dry.

## 2022-01-06 LAB
ALBUMIN SERPL-MCNC: 3.3 G/DL (ref 3.4–5)
ALP BLD-CCNC: 303 U/L (ref 40–129)
ALT SERPL-CCNC: 67 U/L (ref 10–40)
AST SERPL-CCNC: 59 U/L (ref 15–37)
BILIRUB SERPL-MCNC: 0.6 MG/DL (ref 0–1)
BILIRUBIN DIRECT: <0.2 MG/DL (ref 0–0.3)
BILIRUBIN, INDIRECT: ABNORMAL MG/DL (ref 0–1)
EKG ATRIAL RATE: 109 BPM
EKG DIAGNOSIS: NORMAL
EKG P AXIS: 32 DEGREES
EKG P-R INTERVAL: 136 MS
EKG Q-T INTERVAL: 322 MS
EKG QRS DURATION: 70 MS
EKG QTC CALCULATION (BAZETT): 433 MS
EKG R AXIS: -25 DEGREES
EKG T AXIS: 94 DEGREES
EKG VENTRICULAR RATE: 109 BPM
TOTAL PROTEIN: 8.9 G/DL (ref 6.4–8.2)

## 2022-01-06 PROCEDURE — 2580000003 HC RX 258: Performed by: INTERNAL MEDICINE

## 2022-01-06 PROCEDURE — 1200000000 HC SEMI PRIVATE

## 2022-01-06 PROCEDURE — 80076 HEPATIC FUNCTION PANEL: CPT

## 2022-01-06 PROCEDURE — 36415 COLL VENOUS BLD VENIPUNCTURE: CPT

## 2022-01-06 PROCEDURE — 93010 ELECTROCARDIOGRAM REPORT: CPT | Performed by: INTERNAL MEDICINE

## 2022-01-06 PROCEDURE — 94640 AIRWAY INHALATION TREATMENT: CPT

## 2022-01-06 PROCEDURE — 6370000000 HC RX 637 (ALT 250 FOR IP): Performed by: NURSE PRACTITIONER

## 2022-01-06 PROCEDURE — 6370000000 HC RX 637 (ALT 250 FOR IP): Performed by: INTERNAL MEDICINE

## 2022-01-06 PROCEDURE — 6360000002 HC RX W HCPCS: Performed by: INTERNAL MEDICINE

## 2022-01-06 PROCEDURE — 94761 N-INVAS EAR/PLS OXIMETRY MLT: CPT

## 2022-01-06 PROCEDURE — 2700000000 HC OXYGEN THERAPY PER DAY

## 2022-01-06 PROCEDURE — XW0DXM6 INTRODUCTION OF BARICITINIB INTO MOUTH AND PHARYNX, EXTERNAL APPROACH, NEW TECHNOLOGY GROUP 6: ICD-10-PCS | Performed by: INTERNAL MEDICINE

## 2022-01-06 RX ORDER — DEXAMETHASONE SODIUM PHOSPHATE 10 MG/ML
10 INJECTION, SOLUTION INTRAMUSCULAR; INTRAVENOUS 2 TIMES DAILY
Status: COMPLETED | OUTPATIENT
Start: 2022-01-06 | End: 2022-01-10

## 2022-01-06 RX ORDER — DEXAMETHASONE SODIUM PHOSPHATE 10 MG/ML
10 INJECTION INTRAMUSCULAR; INTRAVENOUS DAILY
Status: DISCONTINUED | OUTPATIENT
Start: 2022-01-11 | End: 2022-01-12 | Stop reason: HOSPADM

## 2022-01-06 RX ORDER — DEXAMETHASONE SODIUM PHOSPHATE 10 MG/ML
10 INJECTION, SOLUTION INTRAMUSCULAR; INTRAVENOUS EVERY 24 HOURS
Status: DISCONTINUED | OUTPATIENT
Start: 2022-01-11 | End: 2022-01-06

## 2022-01-06 RX ADMIN — LIOTHYRONINE SODIUM 10 MCG: 5 TABLET ORAL at 09:26

## 2022-01-06 RX ADMIN — BUDESONIDE AND FORMOTEROL FUMARATE DIHYDRATE 2 PUFF: 160; 4.5 AEROSOL RESPIRATORY (INHALATION) at 08:39

## 2022-01-06 RX ADMIN — ACETAMINOPHEN 650 MG: 325 TABLET ORAL at 12:10

## 2022-01-06 RX ADMIN — GUAIFENESIN AND CODEINE PHOSPHATE 5 ML: 10; 100 LIQUID ORAL at 21:06

## 2022-01-06 RX ADMIN — ALBUTEROL SULFATE 2 PUFF: 90 AEROSOL, METERED RESPIRATORY (INHALATION) at 08:39

## 2022-01-06 RX ADMIN — GUAIFENESIN AND CODEINE PHOSPHATE 5 ML: 10; 100 LIQUID ORAL at 12:11

## 2022-01-06 RX ADMIN — MULTIPLE VITAMINS W/ MINERALS TAB 1 TABLET: TAB at 09:23

## 2022-01-06 RX ADMIN — NYSTATIN 500000 UNITS: 100000 SUSPENSION ORAL at 21:06

## 2022-01-06 RX ADMIN — AZITHROMYCIN MONOHYDRATE 500 MG: 500 INJECTION, POWDER, LYOPHILIZED, FOR SOLUTION INTRAVENOUS at 17:57

## 2022-01-06 RX ADMIN — ACETAMINOPHEN 650 MG: 325 TABLET ORAL at 21:06

## 2022-01-06 RX ADMIN — FOLIC ACID 1 MG: 1 TABLET ORAL at 09:25

## 2022-01-06 RX ADMIN — LEVOTHYROXINE SODIUM 137 MCG: 0.03 TABLET ORAL at 09:23

## 2022-01-06 RX ADMIN — DILTIAZEM HYDROCHLORIDE 120 MG: 120 CAPSULE, COATED, EXTENDED RELEASE ORAL at 09:24

## 2022-01-06 RX ADMIN — ALBUTEROL SULFATE 2 PUFF: 90 AEROSOL, METERED RESPIRATORY (INHALATION) at 17:15

## 2022-01-06 RX ADMIN — CETIRIZINE HYDROCHLORIDE 5 MG: 10 TABLET, FILM COATED ORAL at 21:06

## 2022-01-06 RX ADMIN — DEXAMETHASONE SODIUM PHOSPHATE 10 MG: 10 INJECTION, SOLUTION INTRAMUSCULAR; INTRAVENOUS at 12:11

## 2022-01-06 RX ADMIN — ENOXAPARIN SODIUM 30 MG: 100 INJECTION SUBCUTANEOUS at 12:12

## 2022-01-06 RX ADMIN — MONTELUKAST SODIUM 10 MG: 10 TABLET, COATED ORAL at 21:07

## 2022-01-06 RX ADMIN — GUAIFENESIN AND CODEINE PHOSPHATE 5 ML: 10; 100 LIQUID ORAL at 06:03

## 2022-01-06 RX ADMIN — PANTOPRAZOLE SODIUM 40 MG: 40 TABLET, DELAYED RELEASE ORAL at 16:12

## 2022-01-06 RX ADMIN — FLUTICASONE PROPIONATE 1 SPRAY: 50 SPRAY, METERED NASAL at 09:30

## 2022-01-06 RX ADMIN — CEFTRIAXONE 1000 MG: 1 INJECTION, POWDER, FOR SOLUTION INTRAMUSCULAR; INTRAVENOUS at 16:15

## 2022-01-06 RX ADMIN — DEXAMETHASONE SODIUM PHOSPHATE 10 MG: 10 INJECTION, SOLUTION INTRAMUSCULAR; INTRAVENOUS at 21:11

## 2022-01-06 RX ADMIN — ENOXAPARIN SODIUM 30 MG: 100 INJECTION SUBCUTANEOUS at 21:11

## 2022-01-06 RX ADMIN — PSEUDOEPHEDRINE HYDROCHLORIDE 120 MG: 120 TABLET, FILM COATED, EXTENDED RELEASE ORAL at 21:06

## 2022-01-06 RX ADMIN — NYSTATIN 500000 UNITS: 100000 SUSPENSION ORAL at 16:12

## 2022-01-06 RX ADMIN — PANTOPRAZOLE SODIUM 40 MG: 40 TABLET, DELAYED RELEASE ORAL at 06:03

## 2022-01-06 RX ADMIN — ALBUTEROL SULFATE 2 PUFF: 90 AEROSOL, METERED RESPIRATORY (INHALATION) at 12:25

## 2022-01-06 RX ADMIN — POLYETHYLENE GLYCOL 3350 17 G: 17 POWDER, FOR SOLUTION ORAL at 09:26

## 2022-01-06 ASSESSMENT — PAIN SCALES - GENERAL
PAINLEVEL_OUTOF10: 0
PAINLEVEL_OUTOF10: 0
PAINLEVEL_OUTOF10: 8
PAINLEVEL_OUTOF10: 5
PAINLEVEL_OUTOF10: 0
PAINLEVEL_OUTOF10: 3
PAINLEVEL_OUTOF10: 7
PAINLEVEL_OUTOF10: 4
PAINLEVEL_OUTOF10: 0
PAINLEVEL_OUTOF10: 2

## 2022-01-06 ASSESSMENT — PAIN DESCRIPTION - FREQUENCY
FREQUENCY: INTERMITTENT

## 2022-01-06 ASSESSMENT — PAIN DESCRIPTION - PAIN TYPE
TYPE: ACUTE PAIN

## 2022-01-06 ASSESSMENT — PAIN DESCRIPTION - DESCRIPTORS
DESCRIPTORS: SHARP
DESCRIPTORS: HEADACHE
DESCRIPTORS: SHARP
DESCRIPTORS: HEADACHE

## 2022-01-06 ASSESSMENT — PAIN DESCRIPTION - PROGRESSION
CLINICAL_PROGRESSION: NOT CHANGED
CLINICAL_PROGRESSION: GRADUALLY IMPROVING
CLINICAL_PROGRESSION: NOT CHANGED

## 2022-01-06 ASSESSMENT — PAIN DESCRIPTION - ORIENTATION
ORIENTATION: MID
ORIENTATION: MID

## 2022-01-06 ASSESSMENT — PAIN DESCRIPTION - LOCATION
LOCATION: HEAD
LOCATION: RIB CAGE
LOCATION: RIB CAGE
LOCATION: HEAD

## 2022-01-06 ASSESSMENT — PAIN DESCRIPTION - ONSET
ONSET: ON-GOING

## 2022-01-06 ASSESSMENT — PAIN - FUNCTIONAL ASSESSMENT
PAIN_FUNCTIONAL_ASSESSMENT: PREVENTS OR INTERFERES SOME ACTIVE ACTIVITIES AND ADLS

## 2022-01-06 NOTE — PROGRESS NOTES
Progress Note  Admit Date: 1/5/2022      PCP: Gloria Waller DO     CC: F/U for Covid    Days in hospital:  1    SUBJECTIVE / Interval History:   Pt feels ok  02 requirement worse   Sob with ambulation          Allergies  Latex, Clindamycin/lincomycin, Sulfa antibiotics, Diflucan [fluconazole], and Other    Medications    Scheduled Meds:   albuterol sulfate HFA  2 puff Inhalation 4x daily    budesonide-formoterol  2 puff Inhalation BID    dilTIAZem  120 mg Oral Daily    fluticasone  1 spray Each Nostril Daily    folic acid  1 mg Oral Daily    levothyroxine  137 mcg Oral Daily    liothyronine  10 mcg Oral Daily    montelukast  10 mg Oral Nightly    therapeutic multivitamin-minerals  1 tablet Oral Daily    pantoprazole  40 mg Oral BID AC    polyethylene glycol  17 g Oral Daily    dexamethasone  6 mg IntraVENous Q24H    cefTRIAXone (ROCEPHIN) IV  1,000 mg IntraVENous Q24H    azithromycin  500 mg IntraVENous Q24H    cetirizine  5 mg Oral BID    And    pseudoephedrine  120 mg Oral BID     Continuous Infusions:    PRN Meds:  guaiFENesin-codeine, acetaminophen    Vitals    /78   Pulse 86   Temp 98.2 °F (36.8 °C) (Oral)   Resp 18   Ht 5' 3\" (1.6 m)   Wt 217 lb 6 oz (98.6 kg)   SpO2 (!) 86%   BMI 38.51 kg/m²     Exam:    Gen: No distress. Eyes: PERRL. No sclera icterus. No conjunctival injection. ENT: No discharge. Pharynx clear. External appearance of ears and nose normal.  Neck: Trachea midline. No obvious mass. Resp: No accessory muscle use. No crackles. No wheezes. No rhonchi. No dullness on percussion. CV: Regular rate. Regular rhythm. No murmur or rub. No edema. GI: Non-tender. Non-distended. No hernia. Skin: Warm, dry, normal texture and turgor. No nodule on exposed extremities. Lymph: No cervical LAD. No supraclavicular LAD. M/S: No cyanosis. No clubbing. No joint deformity. Neuro: Moves all four extremities. CN 2-12 tested, no defect noted. Psych: Oriented x 3. No anxiety. Awake. Alert. Intact judgement and insight. Data    LABS  CBC:   Recent Labs     01/05/22  1037   WBC 11.4*   HGB 13.2   HCT 40.1   MCV 90.2        BMP:   Recent Labs     01/05/22  1037   *   K 4.0   CL 96*   CO2 22   BUN 19   CREATININE 0.9   GLUCOSE 87     POC GLUCOSE:  No results for input(s): POCGLU in the last 72 hours. LIVER PROFILE:   Recent Labs     01/05/22  1037   AST 84*   ALT 71*   LABALBU 4.0   BILITOT 1.4*   ALKPHOS 315*     PT/INR: No results for input(s): PROTIME, INR in the last 72 hours. APTT: No results for input(s): APTT in the last 72 hours. UA:No results for input(s): NITRITE, COLORU, PHUR, LABCAST, WBCUA, RBCUA, MUCUS, TRICHOMONAS, YEAST, BACTERIA, CLARITYU, SPECGRAV, LEUKOCYTESUR, UROBILINOGEN, BILIRUBINUR, BLOODU, GLUCOSEU, KETUA, AMORPHOUS in the last 72 hours. Microbiology:  Wound Culture: No results for input(s): WNDABS, ORG in the last 72 hours. Invalid input(s):  LABGRAM  Nasal Culture: No results for input(s): ORG, MRSAPCR in the last 72 hours. Blood Culture: No results for input(s): BC, BLOODCULT2 in the last 72 hours. Fungal Culture:   No results for input(s): FUNGSM in the last 72 hours. No results for input(s): FUNCXBLD in the last 72 hours. CSF Culture:  No results for input(s): COLORCSF, APPEARCSF, CFTUBE, CLOTCSF, WBCCSF, RBCCSF, NEUTCSF, NUMCELLSCSF, LYMPHSCSF, MONOCSF, GLUCCSF, VOLCSF in the last 72 hours. Respiratory Culture:  No results for input(s): Kavya Gavel in the last 72 hours. AFB:No results for input(s): AFBSMEAR in the last 72 hours. Urine Culture  No results for input(s): LABURIN in the last 72 hours. RADIOLOGY:    XR CHEST PORTABLE   Final Result   Multifocal airspace consolidation most suggestive of an atypical multi lobar   pneumonia. COVID-19 pneumonia should be excluded.              CONSULTS:    IP CONSULT TO PHARMACY    ASSESSMENT AND PLAN:      Active Problems:    Acute respiratory failure due to COVID-19 [facility])  Resolved Problems:    * No resolved hospital problems. *    The patient is a 48 y.o. female medical history of A. fib, recurrent sinusitis, autoimmune hepatitis, ITP, obesity who presents to [facility] with worsening shortness of breath cough and fatigue. Patient states it has been about a month since she has been having sinusitis and has taken 2 courses of Augmentin and now is on Cipro and has taken 2 course of steroids. Since the last 2 days had chills with shortness of breath and fatigue and a lot of cough resulting in chest pain    Acute hypoxic respiratory failure-patient hypoxic on 6 L  -Patient was hypoxic in the 80s in the ER and needed 5 L of oxygen to maintain sats greater than 90%     COVID-19 pneumonia  -Patient is vaccinated, has not received a booster  -Started on Decadron, dose increased  -Inflammatory markers acutely elevated  -Pharmacy to dose baricitinib  -CXR on the 30th was clear. Chest x-ray done today shows multifocal pneumonia  -procal level elevated     Recurrent sinusitis  -Started on antibiotics  -CT scan done few days ago shows mild sinusitis     Hypothyroidism  -Continue home meds  -History of Hashimoto's     Hyponatremia  -Possibly due to Covid. Monitor     History of A. Fib  -On Cardizem  -Not on anticoagulation due to low [id number] score     Morbid obesity  -Complicating management and treatment    DVT Prophylaxis: Lovenox  Diet: ADULT DIET; Regular  Code Status: Prior        Discharge plan -continue care    The patient and / or the family were informed of the results of any tests, a time was given to answer questions, a plan was proposed and they agreed with plan. Discussed with consulting physicians, nursing and social work     The note was completed using EMR. Every effort was made to ensure accuracy; however, inadvertent computerized transcription errors may be present.        Berkley Espinosa MD

## 2022-01-06 NOTE — PLAN OF CARE
by Rosario Felix RN  Outcome: Ongoing  1/6/2022 0306 by Reyna Hinton RN  Outcome: Ongoing  Goal: Maintain normal serum potassium, sodium, calcium, phosphorus, and pH  1/6/2022 1047 by Rosario Felix RN  Outcome: Ongoing  1/6/2022 0306 by Reyna Hinton RN  Outcome: Ongoing     Problem: Loneliness or Risk for Loneliness  Goal: Demonstrate positive use of time alone when socialization is not possible  1/6/2022 1047 by Rosario Felix RN  Outcome: Ongoing  1/6/2022 0306 by Reyna Hinton RN  Outcome: Ongoing     Problem: Fatigue  Goal: Verbalize increase energy and improved vitality  1/6/2022 1047 by Rosario Felix RN  Outcome: Ongoing  1/6/2022 0306 by Reyna Hinton RN  Outcome: Ongoing     Problem: Patient Education: Go to Patient Education Activity  Goal: Patient/Family Education  1/6/2022 1047 by Rosario Felix RN  Outcome: Ongoing  1/6/2022 0306 by Reyna Hinton RN  Outcome: Ongoing     Problem: Pain:  Goal: Pain level will decrease  Description: Pain level will decrease  Outcome: Ongoing  Goal: Control of acute pain  Description: Control of acute pain  Outcome: Ongoing  Goal: Control of chronic pain  Description: Control of chronic pain  Outcome: Ongoing

## 2022-01-06 NOTE — PROGRESS NOTES
Pt up to room 4275 from the ED. Pt is currently in the bed, VVS. All questions answered at this time.  Electronically signed by Rolly Baker RN on 1/5/2022 at 10:45 PM

## 2022-01-06 NOTE — CARE COORDINATION
INITIAL CASE MANAGEMENT ASSESSMENT    Unable to meet with patient due to isolation status. Call to patient's room, spoke with patient over the phone to assess possible discharge needs. Explained Case Management role/services. Living Situation: verified address, lives alone in a 3rd floor apartment, no problems with the steps    Covid + date: 1/5/22    ADLs: independent     Transportation: active , drover her self here & plans to drive her self home     Medications: no barriers, usually uses [address] wants to use [address] this hospital stay    PCP: Marquise Acosta, DO [facility])    PLAN/COMMENTS: denies any needs except needs new nebulizer med cup (will give number to bedside nurse to pass on to pt), drove herself here & plans to drive her self home)    CM provided contact information for patient or family to call with any questions. CM will follow and assist as needed.     Vaishnavi Zavala RN, BSN, Case Management  [phone]  Electronically signed by Vaishnavi Zavala RN on 1/6/2022 at 1:36 PM

## 2022-01-06 NOTE — ACP (ADVANCE CARE PLANNING)
Advance Care Planning     Advance Care Planning Activator (Inpatient)  Conversation Note      Date of ACP Conversation: 1/6/2022     Conversation Conducted with: Patient with Decision Making Capacity    ACP Activator: Hakan Zazueta RN    Health Care Decision Maker:     Current Designated Health Care Decision Maker: Today we documented Decision Maker(s) consistent with Legal Next of Kin hierarchy. Care Preferences    Ventilation: \"If you were in your present state of health and suddenly became very ill and were unable to breathe on your own, what would your preference be about the use of a ventilator (breathing machine) if it were available to you? \"      Would the patient desire the use of ventilator (breathing machine)?: yes \"BUT only if it is a \"thaddeus\" tube as she is a luciano\"    \"If your health worsens and it becomes clear that your chance of recovery is unlikely, what would your preference be about the use of a ventilator (breathing machine) if it were available to you? \"     Would the patient desire the use of ventilator (breathing machine)?: unsure      Resuscitation  \"CPR works best to restart the heart when there is a sudden event, like a heart attack, in someone who is otherwise healthy. Unfortunately, CPR does not typically restart the heart for people who have serious health conditions or who are very sick. \"    \"In the event your heart stopped as a result of an underlying serious health condition, would you want attempts to be made to restart your heart (answer \"yes\" for attempt to resuscitate) or would you prefer a natural death (answer \"no\" for do not attempt to resuscitate)? \" yes       [] Yes   [] No   Educated Patient / Haylee Stockton regarding differences between Advance Directives and portable DNR orders.     Length of ACP Conversation in minutes:  5 min  Conversation Outcomes:  [x] ACP discussion completed  [] Existing advance directive reviewed with patient; no changes to patient's previously recorded wishes  [] New Advance Directive completed  [] Portable Do Not Rescitate prepared for Provider review and signature  [] POLST/POST/MOLST/MOST prepared for Provider review and signature      Follow-up plan:    [] Schedule follow-up conversation to continue planning  [] Referred individual to Provider for additional questions/concerns   [] Advised patient/agent/surrogate to review completed ACP document and update if needed with changes in condition, patient preferences or care setting    [x] This note routed to one or more involved healthcare providers        Sudha Harkins RN, BSN,   [phone]  Electronically signed by Sudha Harkins RN on 1/6/2022 at 1:43 PM

## 2022-01-06 NOTE — PROGRESS NOTES
Pt provided packet regarding barcitinib and starting the drug. Pt requested that we wait until the morning to further discuss the drug due to her being tired.  Electronically signed by Dex Sandy RN on 1/6/2022 at 1:11 AM

## 2022-01-06 NOTE — ADT AUTH CERT
Utilization Reviews         Pneumonia - Care Day 1 (1/5/2022) by Rambo Pike RN       Review Status Review Entered   Completed 1/6/2022 10:44      Criteria Review      Care Day: 1 Care Date: 1/5/2022 Level of Care: Inpatient Floor    Guideline Day 1    Level Of Care    (X) ICU or floor    1/6/2022 10:44 AM EST by Haroon Timmons      medsurg    Clinical Status    (X) * Clinical Indications met    (X) Possible Fever, dyspnea, purulent sputum, pleuritic pain, Altered mental status    1/6/2022 10:44 AM EST by Haroon Timmons      +SOB  T: 38.1    Interventions    (X) WBC    1/6/2022 10:44 AM EST by Nelevy Grams      11.4    (X) Probable oxygen    1/6/2022 10:44 AM EST by Haroon Timmons      5-6L via nc    Medications    (X) IV antibiotics    1/6/2022 10:44 AM EST by Haroon Timmons      zithromax/rocephin    * Milestone   Additional Notes   1/5  Day 1      Orders:   Decadron 10mg iv bid   Lovenox 30mg sq daily   telemetry      MD Consults/Assessments & Plans:   Per IM PN   ASSESSMENT/PLAN:   Active Hospital Problems     Diagnosis Date Noted   · Acute respiratory failure due to [facility] [U07.1, J96.00] 01/05/2022       Acute hypoxic respiratory failure   -Patient was hypoxic in the 80s in the ER and needed 5 L of oxygen to maintain sats greater than 90%       COVID-19 pneumonia   -Patient is vaccinated, has not received a booster   -Started on Decadron   -Inflammatory markers ordered   -Pharmacy to dose baricitinib   -CXR on the 30th was clear.  Chest x-ray done today shows multifocal pneumonia   -procal ordered       Recurrent sinusitis   -Started on antibiotics   -Procalcitonin ordered   -CT scan done few days ago shows mild sinusitis       Hypothyroidism   -Continue home meds   -History of Hashimoto's       Hyponatremia   -Possibly due to Covid.  Monitor       History of A.  Fib   -On Cardizem   -Not on anticoagulation due to low [id number] score                    Pneumonia - Clinical Indications for Admission to Inpatient Care by Shane Milton RN       Review Status Review Entered   Completed 1/6/2022 10:42      Criteria Review      Clinical Indications for Admission to Inpatient Care    Most Recent : Funmilayo Yan Most Recent Date: 1/6/2022 10:42 AM EST    (X) Admission is indicated for  1 or more  of the following  [A] [B] (1) (2) (9) (10) (11):       (X) Hypoxemia       1/6/2022 10:42 AM EST by Funmilayo Yan         82% ra       (X) Hemodynamic instability       1/6/2022 10:42 AM EST by Funmilayo Yan         hr 102-116   Additional Notes   DATE: 1/5         Chief Complaint:  cough, SOB, Fatigue      HPI:   Gabby Cormier is a 48 y.o. female who presents to the emergency department with ongoing symptoms for at least the last 20 days. Maxime Reddy states she started off with a sinus infection and was placed on Augmentin.  Her symptoms worsened. Maxime Reddy had a scope by her ENT he discovered a sphenoid abscess which was drained.  She was switched to Cipro there after for staph growth.  She has had a cough with her sinus infection and about 2 days ago she developed shortness of breath.  She denies chest pain.  She states that she cannot get her breath in. Maxime Reddy has gotten progressively worse to the point where she cannot stand the fatigue that she is experiencing. Maxime Reddy has been routinely getting Covid testing which have all been negative.  She is vaccinated against Covid with 2 vaccines but has not had a booster yet.  She has not had an influenza vaccine. Maxime Reddy has a history of asthma.  She states she has been on 2 rounds of steroids and has been using her inhalers at home that have not helped.  She is unable to lay down flat at night for sleeping because the shortness of breath becomes much more severe.  She denies history of heart failure.          Vitals:         Pertinent Medical History:   · Abdominal wall abscess 2/8/2017   · Abdominal wall cellulitis 2/6/2017   · Anal fissure 4/30/2015   · Anemia, iron deficiency     · Asthma     · Autoimmune hepatitis ([facility]     · Chronic sinusitis     · History of blood transfusion     · Hypothyroidism     · Menorrhagia with regular cycle     · Morbid obesity with BMI of 40.0-44.9, adult ([facility] 5/26/2015   · MRSA (methicillin resistant staph aureus) culture positive 02/03/2017     abdominal abscess   · MRSA infection 08/20/2012     rt thigh abscess   · Pericarditis, acute     · Sinusitis          Physical Exam:   Constitutional:  Well developed, well nourished, respiratory distress   HENT:  Atraumatic, dry mucus membranes. Tongue is red, dry and cracked. Neck: supple, no JVD    Respiratory: Tachypneic during my exam breathing 24 breaths/min.  She is speaking in uninterrupted sentences.  She is hypoxic at 82% on room air.  O2 applied.  Crackles throughout. Cardiovascular: Tachycardic rate with a regular rhythm S1-S2 no murmurs vascular: Radial and DP pulses 2+ and equal bilaterally   GI:  Soft, nontender, normal bowel sounds   Musculoskeletal:  no lower extremity edema, no lower extremity asymmetry, no calf tenderness, no thigh tenderness, no acute deformities   Integument:  Skin is warm and dry, no petechiae    Neurologic:  Alert & oriented, no slurred speech   Psych: Pleasant affect, no hallucinations      Abnl/Pertinent Labs:  wbc: 11.4, na: 128, alk phos: 315, alt: 71, ast: 84, vbg's:  pH: 7.286, pco2: 52.9      COVID 19: detected      Radiology/Diagnostic Studies:   XR CHEST PORTABLE   Final Result   Multifocal airspace consolidation most suggestive of an atypical multi lobar   pneumonia.  COVID-19 pneumonia should be excluded.          ED Medications: dexamethasone 10mg iv x 1, duoneb x 2      Plan:  acute hypoxemic respiratory failure due to covid 19, pneumonia

## 2022-01-06 NOTE — PROGRESS NOTES
4 Eyes Skin Assessment     NAME:  Anirudh Thao OF BIRTH:  1975  MEDICAL RECORD NUMBER:  [id number]    The patient is being assess for  Admission    I agree that 2 RN's have performed a thorough Head to Toe Skin Assessment on the patient. ALL assessment sites listed below have been assessed. Areas assessed by both nurses:    Head, Face, Ears, Shoulders, Back, Chest, Arms, Elbows, Hands, Sacrum. Buttock, Coccyx, Ischium and Legs. Feet and Heels        Does the Patient have a Wound?  No noted wound(s)       Benson Prevention initiated:  No   Wound Care Orders initiated:  No    Pressure Injury (Stage 3,4, Unstageable, DTI, NWPT, and Complex wounds) if present place consult order under [de-identified] No    New and Established Ostomies if present place consult order under : No      Nurse 1 eSignature: Electronically signed by Elisabeth Graff RN on 1/5/22 at 10:45 PM EST    **SHARE this note so that the co-signing nurse is able to place an eSignature**    Nurse 2 eSignature: Electronically signed by Ayush Urban RN on 1/6/2022 at 1:32 AM

## 2022-01-06 NOTE — RT PROTOCOL NOTE
RT Nebulizer Bronchodilator Protocol Note    There is a bronchodilator order in the chart from a provider indicating to follow the RT Bronchodilator Protocol and there is an Initiate RT Bronchodilator Protocol order as well (see protocol at bottom of note). CXR Findings:  XR CHEST PORTABLE    Result Date: 1/5/2022  Multifocal airspace consolidation most suggestive of an atypical multi lobar pneumonia. COVID-19 pneumonia should be excluded. The findings from the last RT Protocol Assessment were as follows:  Smoking: None or smoker <15 pack years  Respiratory Pattern: Mild dyspnea at rest, irregular pattern, or RR 21-25 bpm  Breath Sounds: Slightly diminished and/or crackles  Cough: Strong, spontaneous, non-productive  Indication for Bronchodilator Therapy: On home bronchodilators  Bronchodilator Assessment Score: 6. Pt did not want to change frequency of Albuterol. Pt states only use albuterol as needed    Aerosolized bronchodilator medication orders have been revised according to the RT Nebulizer Bronchodilator Protocol below. Respiratory Therapist to perform RT Therapy Protocol Assessment initially then follow the protocol. Repeat RT Therapy Protocol Assessment PRN for score 0-3 or on second treatment, BID, and PRN for scores above 3. No Indications - adjust the frequency to every 6 hours PRN wheezing or bronchospasm, if no treatments needed after 48 hours then discontinue using Per Protocol order mode. If indication present, adjust the RT bronchodilator orders based on the Bronchodilator Assessment Score as indicated below. If a patient is on this medication at home then do not decrease Frequency below that used at home. 0-3 - enter or revise RT bronchodilator order(s) to equivalent RT Bronchodilator order with Frequency of every 4 hours PRN for wheezing or increased work of breathing using Per Protocol order mode.        4-6 - enter or revise RT Bronchodilator order(s) to two equivalent RT bronchodilator orders with one order with BID Frequency and one order with Frequency of every 4 hours PRN wheezing or increased work of breathing using Per Protocol order mode. 7-10 - enter or revise RT Bronchodilator order(s) to two equivalent RT bronchodilator orders with one order with TID Frequency and one order with Frequency of every 4 hours PRN wheezing or increased work of breathing using Per Protocol order mode. 11-13 - enter or revise RT Bronchodilator order(s) to one equivalent RT bronchodilator order with QID Frequency and an Albuterol order with Frequency of every 4 hours PRN wheezing or increased work of breathing using Per Protocol order mode. Greater than 13 - enter or revise RT Bronchodilator order(s) to one equivalent RT bronchodilator order with every 4 hours Frequency and an Albuterol order with Frequency of every 2 hours PRN wheezing or increased work of breathing using Per Protocol order mode. RT to enter RT Home Evaluation for COPD & MDI Assessment order using Per Protocol order mode.     Electronically signed by Tahira Covington RCP on 1/5/2022 at 8:22 PM

## 2022-01-07 ENCOUNTER — APPOINTMENT (OUTPATIENT)
Dept: CT IMAGING | Age: 47
DRG: 137 | End: 2022-01-07
Payer: COMMERCIAL

## 2022-01-07 LAB
A/G RATIO: 0.7 (ref 1.1–2.2)
ALBUMIN SERPL-MCNC: 3.5 G/DL (ref 3.4–5)
ALP BLD-CCNC: 291 U/L (ref 40–129)
ALT SERPL-CCNC: 67 U/L (ref 10–40)
ANION GAP SERPL CALCULATED.3IONS-SCNC: 12 MMOL/L (ref 3–16)
AST SERPL-CCNC: 52 U/L (ref 15–37)
BASOPHILS ABSOLUTE: 0 K/UL (ref 0–0.2)
BASOPHILS RELATIVE PERCENT: 0.1 %
BILIRUB SERPL-MCNC: 0.4 MG/DL (ref 0–1)
BUN BLDV-MCNC: 25 MG/DL (ref 7–20)
CALCIUM SERPL-MCNC: 9.2 MG/DL (ref 8.3–10.6)
CHLORIDE BLD-SCNC: 102 MMOL/L (ref 99–110)
CO2: 20 MMOL/L (ref 21–32)
CREAT SERPL-MCNC: 0.7 MG/DL (ref 0.6–1.1)
EOSINOPHILS ABSOLUTE: 0 K/UL (ref 0–0.6)
EOSINOPHILS RELATIVE PERCENT: 0 %
GFR AFRICAN AMERICAN: >60
GFR NON-AFRICAN AMERICAN: >60
GLUCOSE BLD-MCNC: 118 MG/DL (ref 70–99)
HCT VFR BLD CALC: 41.4 % (ref 36–48)
HEMOGLOBIN: 13.6 G/DL (ref 12–16)
LYMPHOCYTES ABSOLUTE: 0.8 K/UL (ref 1–5.1)
LYMPHOCYTES RELATIVE PERCENT: 7.2 %
MCH RBC QN AUTO: 28.8 PG (ref 26–34)
MCHC RBC AUTO-ENTMCNC: 32.9 G/DL (ref 31–36)
MCV RBC AUTO: 87.6 FL (ref 80–100)
MONOCYTES ABSOLUTE: 0.6 K/UL (ref 0–1.3)
MONOCYTES RELATIVE PERCENT: 5.1 %
NEUTROPHILS ABSOLUTE: 9.8 K/UL (ref 1.7–7.7)
NEUTROPHILS RELATIVE PERCENT: 87.6 %
PDW BLD-RTO: 13.8 % (ref 12.4–15.4)
PLATELET # BLD: 179 K/UL (ref 135–450)
PMV BLD AUTO: 8.8 FL (ref 5–10.5)
POTASSIUM SERPL-SCNC: 4.4 MMOL/L (ref 3.5–5.1)
PROCALCITONIN: 0.19 NG/ML (ref 0–0.15)
RBC # BLD: 4.72 M/UL (ref 4–5.2)
SODIUM BLD-SCNC: 134 MMOL/L (ref 136–145)
TOTAL PROTEIN: 8.6 G/DL (ref 6.4–8.2)
WBC # BLD: 11.2 K/UL (ref 4–11)

## 2022-01-07 PROCEDURE — 85025 COMPLETE CBC W/AUTO DIFF WBC: CPT

## 2022-01-07 PROCEDURE — 36415 COLL VENOUS BLD VENIPUNCTURE: CPT

## 2022-01-07 PROCEDURE — 6370000000 HC RX 637 (ALT 250 FOR IP): Performed by: NURSE PRACTITIONER

## 2022-01-07 PROCEDURE — 6360000002 HC RX W HCPCS: Performed by: INTERNAL MEDICINE

## 2022-01-07 PROCEDURE — 6370000000 HC RX 637 (ALT 250 FOR IP): Performed by: INTERNAL MEDICINE

## 2022-01-07 PROCEDURE — 80053 COMPREHEN METABOLIC PANEL: CPT

## 2022-01-07 PROCEDURE — 2700000000 HC OXYGEN THERAPY PER DAY

## 2022-01-07 PROCEDURE — 6360000004 HC RX CONTRAST MEDICATION: Performed by: INTERNAL MEDICINE

## 2022-01-07 PROCEDURE — 1200000000 HC SEMI PRIVATE

## 2022-01-07 PROCEDURE — 99255 IP/OBS CONSLTJ NEW/EST HI 80: CPT | Performed by: INTERNAL MEDICINE

## 2022-01-07 PROCEDURE — 71260 CT THORAX DX C+: CPT

## 2022-01-07 PROCEDURE — 84145 PROCALCITONIN (PCT): CPT

## 2022-01-07 PROCEDURE — 2580000003 HC RX 258: Performed by: INTERNAL MEDICINE

## 2022-01-07 PROCEDURE — 94640 AIRWAY INHALATION TREATMENT: CPT

## 2022-01-07 PROCEDURE — 94761 N-INVAS EAR/PLS OXIMETRY MLT: CPT

## 2022-01-07 PROCEDURE — 71275 CT ANGIOGRAPHY CHEST: CPT

## 2022-01-07 RX ORDER — ACETAMINOPHEN 500 MG
500 TABLET ORAL EVERY 4 HOURS PRN
Status: DISCONTINUED | OUTPATIENT
Start: 2022-01-07 | End: 2022-01-12 | Stop reason: HOSPADM

## 2022-01-07 RX ADMIN — CETIRIZINE HYDROCHLORIDE 5 MG: 10 TABLET, FILM COATED ORAL at 22:10

## 2022-01-07 RX ADMIN — AZITHROMYCIN MONOHYDRATE 500 MG: 500 INJECTION, POWDER, LYOPHILIZED, FOR SOLUTION INTRAVENOUS at 18:34

## 2022-01-07 RX ADMIN — ENOXAPARIN SODIUM 30 MG: 100 INJECTION SUBCUTANEOUS at 22:09

## 2022-01-07 RX ADMIN — NYSTATIN 500000 UNITS: 100000 SUSPENSION ORAL at 17:57

## 2022-01-07 RX ADMIN — BUDESONIDE AND FORMOTEROL FUMARATE DIHYDRATE 2 PUFF: 160; 4.5 AEROSOL RESPIRATORY (INHALATION) at 07:38

## 2022-01-07 RX ADMIN — ACETAMINOPHEN 500 MG: 500 TABLET ORAL at 17:57

## 2022-01-07 RX ADMIN — CETIRIZINE HYDROCHLORIDE 5 MG: 10 TABLET, FILM COATED ORAL at 08:13

## 2022-01-07 RX ADMIN — ACETAMINOPHEN 500 MG: 500 TABLET ORAL at 14:07

## 2022-01-07 RX ADMIN — BARICITINIB 4 MG: 2 TABLET, FILM COATED ORAL at 14:07

## 2022-01-07 RX ADMIN — PANTOPRAZOLE SODIUM 40 MG: 40 TABLET, DELAYED RELEASE ORAL at 06:03

## 2022-01-07 RX ADMIN — ACETAMINOPHEN 500 MG: 500 TABLET ORAL at 22:09

## 2022-01-07 RX ADMIN — FLUTICASONE PROPIONATE 1 SPRAY: 50 SPRAY, METERED NASAL at 08:13

## 2022-01-07 RX ADMIN — IOPAMIDOL 75 ML: 755 INJECTION, SOLUTION INTRAVENOUS at 14:30

## 2022-01-07 RX ADMIN — FOLIC ACID 1 MG: 1 TABLET ORAL at 08:13

## 2022-01-07 RX ADMIN — LIOTHYRONINE SODIUM 10 MCG: 5 TABLET ORAL at 08:13

## 2022-01-07 RX ADMIN — NYSTATIN 500000 UNITS: 100000 SUSPENSION ORAL at 08:12

## 2022-01-07 RX ADMIN — LEVOTHYROXINE SODIUM 137 MCG: 0.03 TABLET ORAL at 08:13

## 2022-01-07 RX ADMIN — SALINE NASAL SPRAY 2 SPRAY: 1.5 SOLUTION NASAL at 14:08

## 2022-01-07 RX ADMIN — DEXAMETHASONE SODIUM PHOSPHATE 10 MG: 10 INJECTION, SOLUTION INTRAMUSCULAR; INTRAVENOUS at 08:12

## 2022-01-07 RX ADMIN — PSEUDOEPHEDRINE HYDROCHLORIDE 120 MG: 120 TABLET, FILM COATED, EXTENDED RELEASE ORAL at 22:10

## 2022-01-07 RX ADMIN — NYSTATIN 500000 UNITS: 100000 SUSPENSION ORAL at 22:09

## 2022-01-07 RX ADMIN — CEFTRIAXONE 2000 MG: 2 INJECTION, POWDER, FOR SOLUTION INTRAMUSCULAR; INTRAVENOUS at 17:58

## 2022-01-07 RX ADMIN — POLYETHYLENE GLYCOL 3350 17 G: 17 POWDER, FOR SOLUTION ORAL at 08:12

## 2022-01-07 RX ADMIN — NYSTATIN 500000 UNITS: 100000 SUSPENSION ORAL at 14:07

## 2022-01-07 RX ADMIN — DILTIAZEM HYDROCHLORIDE 120 MG: 120 CAPSULE, COATED, EXTENDED RELEASE ORAL at 08:13

## 2022-01-07 RX ADMIN — ENOXAPARIN SODIUM 30 MG: 100 INJECTION SUBCUTANEOUS at 08:12

## 2022-01-07 RX ADMIN — PANTOPRAZOLE SODIUM 40 MG: 40 TABLET, DELAYED RELEASE ORAL at 17:57

## 2022-01-07 RX ADMIN — ALBUTEROL SULFATE 2 PUFF: 90 AEROSOL, METERED RESPIRATORY (INHALATION) at 16:27

## 2022-01-07 RX ADMIN — DEXAMETHASONE SODIUM PHOSPHATE 10 MG: 10 INJECTION, SOLUTION INTRAMUSCULAR; INTRAVENOUS at 22:09

## 2022-01-07 RX ADMIN — GUAIFENESIN AND CODEINE PHOSPHATE 5 ML: 10; 100 LIQUID ORAL at 08:12

## 2022-01-07 RX ADMIN — PSEUDOEPHEDRINE HYDROCHLORIDE 120 MG: 120 TABLET, FILM COATED, EXTENDED RELEASE ORAL at 08:13

## 2022-01-07 RX ADMIN — MONTELUKAST SODIUM 10 MG: 10 TABLET, COATED ORAL at 22:10

## 2022-01-07 RX ADMIN — ACETAMINOPHEN 650 MG: 325 TABLET ORAL at 08:14

## 2022-01-07 RX ADMIN — ALBUTEROL SULFATE 2 PUFF: 90 AEROSOL, METERED RESPIRATORY (INHALATION) at 07:38

## 2022-01-07 RX ADMIN — MULTIPLE VITAMINS W/ MINERALS TAB 1 TABLET: TAB at 08:13

## 2022-01-07 ASSESSMENT — PAIN DESCRIPTION - ORIENTATION
ORIENTATION: RIGHT;LEFT
ORIENTATION: RIGHT;LEFT;MID
ORIENTATION: LEFT;RIGHT;MID
ORIENTATION: RIGHT;LEFT;MID
ORIENTATION: LEFT;RIGHT;MID
ORIENTATION: RIGHT;LEFT;MID

## 2022-01-07 ASSESSMENT — PAIN SCALES - GENERAL
PAINLEVEL_OUTOF10: 6
PAINLEVEL_OUTOF10: 5
PAINLEVEL_OUTOF10: 6
PAINLEVEL_OUTOF10: 8
PAINLEVEL_OUTOF10: 6
PAINLEVEL_OUTOF10: 8
PAINLEVEL_OUTOF10: 2

## 2022-01-07 ASSESSMENT — PAIN - FUNCTIONAL ASSESSMENT

## 2022-01-07 ASSESSMENT — PAIN DESCRIPTION - ONSET
ONSET: ON-GOING

## 2022-01-07 ASSESSMENT — PAIN DESCRIPTION - DESCRIPTORS
DESCRIPTORS: HEAVINESS;TIGHTNESS
DESCRIPTORS: HEADACHE
DESCRIPTORS: HEADACHE;TIGHTNESS

## 2022-01-07 ASSESSMENT — PAIN DESCRIPTION - FREQUENCY
FREQUENCY: INTERMITTENT
FREQUENCY: INTERMITTENT
FREQUENCY: CONTINUOUS
FREQUENCY: INTERMITTENT

## 2022-01-07 ASSESSMENT — PAIN DESCRIPTION - PAIN TYPE
TYPE: ACUTE PAIN

## 2022-01-07 ASSESSMENT — PAIN DESCRIPTION - LOCATION
LOCATION: HEAD
LOCATION: HEAD;RIB CAGE
LOCATION: HEAD
LOCATION: HEAD
LOCATION: HEAD;RIB CAGE
LOCATION: HEAD

## 2022-01-07 NOTE — PROGRESS NOTES
Pt alert and oriented x4. Pt reports ongoing headache and chest tightness r/t cough. Pt medicated with prn tylenol. Pt currently needing 15l high flow plus 15 NRB. Pt in bed. Denies other needs at present .  Call light and item need in reach Electronically signed by Felecia Pandey RN on 1/7/2022 at 11:16 AM

## 2022-01-07 NOTE — PROGRESS NOTES
Pt requested medication for pain in back earlier in the evening. NP notified, no new orders.  Electronically signed by Stacy Cali RN on 1/7/2022 at 1:46 AM

## 2022-01-07 NOTE — CONSULTS
Pulmonary Consult Note     Patient's name:  Jeremy Perez  Medical Record Number: [id number]  Patient's account/billing number: [de-identified]  Patient's YOB: 1975  Age: 48 y.o. Date of Admission: 1/5/2022 10:01 AM  Date of Consult: 1/7/2022      Primary Care Physician: Bridger Mejia DO      Code Status: Prior    Reason for consult: acute respiratory failure with hypoxia    Assessment and Plan     1. Acute respiratory failure with hypoxia  2. covid 19 PNA with superimposed bacterial PNA  3. PAfib  4. H/o autoimmune hepatitis and ITP  5. Moderate persistent asthma      Plan:  Titrate O2 to keep sat > 90%  Systemic steroid, Baricitinib  CAP coverage. PCT trending down  bronchodilators  dvt prophylaxis  Encourage self proning      HISTORY OF PRESENT ILLNESS:   /Ms. Jeremy Perez is a 48 y.o. Lady with PMH stated below significant for severe allergies, asthma, presented with worsening sob, symptoms stated couple of weeks ago with unresolving URI and sinusitis despite antibiotics and prednisone, found hypoxia and covid positive. She is vaccinated x 2  CXR with multifocal bilateral airspace disease  CTA no pe  CRP @ 157  PCT 0.3  We were consulted for worsening hypoxic failure.           Past Medical History:        Diagnosis Date    Abdominal wall abscess 2/8/2017    Abdominal wall cellulitis 2/6/2017    Anal fissure 4/30/2015    Anemia, iron deficiency     Asthma     [facility]     Chronic sinusitis     History of blood transfusion     Hypothyroidism     Menorrhagia with regular cycle     Morbid obesity with BMI of 40.0-44.9, adult ([facility] 5/26/2015    MRSA (methicillin resistant staph aureus) culture positive 02/03/2017    abdominal abscess    MRSA infection 08/20/2012    rt thigh abscess    Pericarditis, acute     Sinusitis        Past Surgical History:        Procedure Laterality Date    COLONOSCOPY N/A 12/29/2020 COLONOSCOPY DIAGNOSTIC performed by Priscilla Mcgowan MD at [address] [facility]  8/22/2012    INCISION AND DRAINAGE POSTERIOR THIGH ABSCESS    RECTAL SURGERY  Aug 2011    repair of rectal fissures and anal skin tag removal    SINUS SURGERY      X 3    TONSILLECTOMY  2004    TONSILLECTOMY AND ADENOIDECTOMY  2005    (partial adenoidectomy)    UPPER GASTROINTESTINAL ENDOSCOPY N/A 12/29/2020    EGD BIOPSY performed by Priscilla Mcgowan MD at [address]       Allergies: Allergies   Allergen Reactions    Latex Swelling and Dermatitis     Also \"burning of skin\"      Clindamycin/Lincomycin Hives and Other (See Comments)     \"Throat closing\"    Sulfa Antibiotics Anaphylaxis and Hives    Diflucan [Fluconazole] Hives and Itching    Other Hives         Home Meds:   Prior to Admission medications    Medication Sig Start Date End Date Taking? Authorizing Provider   ciprofloxacin (CIPRO) 500 MG tablet Take 1 tablet by mouth 2 times daily for 21 days 12/30/21 1/20/22  Marvel Najjar, MD   predniSONE (DELTASONE) 10 MG tablet 4 tabs a day for 5 days, 3 tabs a day for 3 days, 2 tabs a day for 3 days,1 tab a day for 3 days 12/30/21   Marvel Najjar, MD   folic acid (FOLVITE) 1 MG tablet TAKE 1 TABLET BY MOUTH ONE TIME A DAY 12/6/21   Jeanette Schultz MD   cyanocobalamin 1000 MCG/ML injection INJECT 1ML INTO THE SKIN ONCE MONTHLY 12/6/21   Jeanette Schultz MD   urea (CARMOL) 10 % cream APPLY TO AFFECTED AREA(S) TOPICALLY AS NEEDED 12/6/21   Jeanette Schultz MD   polyethylene glycol (MIRALAX) 17 GM/SCOOP POWD powder POUR 17 GRAMS OF POWDER INTO THE CAP OF THE BOTTLE. STIR THE POWDER IN A GLASS (4 TO 8 OZ) OF WATER, JUICE, SODA, COFFEE OR TEA UNTIL COMPLETELY DISSOLVED. DRINK THE SOLUTION.  ONCE DAILY AS NEEDED FOR CONSTIPATION 12/6/21   Jeanette Schultz MD   fluticasone [facility]) 50 MCG/ACT nasal spray APPLY 1 SPRAY IN THE AFFECTED NOSTRIL ONE TIME A DAY 12/6/21   Jeanette Schultz MD   Saccharomyces boulardii (PROBIOTIC) 250 MG CAPS TAKE 1 CAPSULE BY MOUTH 2 TIMES A DAY 10/4/21   Erin Urias,    ketoconazole (NIZORAL) 2 % shampoo APPLY TO AFFECTED AREA(S) ONCE DAILY AS NEEDED 10/4/21   Erin Urias DO   budesonide-formoterol [facility]) 160-4.5 MCG/ACT AERO Inhale 2 puffs into the lungs 2 times daily 10/4/21   Silvia Quezada MD   montelukast (SINGULAIR) 10 MG tablet Take one tablet by mouth every evening 10/4/21   Silvia Quezada MD   cetirizine-psuedoephedrine (ALLERGY RELIEF D) 5-120 MG per extended release tablet TAKE 1 TABLET BY MOUTH 2 TIMES A DAY 8/26/21   Silvia Quezada MD   budesonide (PULMICORT) 0.25 MG/2ML nebulizer suspension Take 2 mLs by nebulization 2 times daily 8/12/21   Justen Metcalf MD   mupirocin (BACTROBAN) 2 % ointment Apply topically 3 times daily. 8/1/21   Silvia Quezada MD   dilTIAZem (CARDIZEM CD) 120 MG extended release capsule TAKE 1 CAPSULE BY MOUTH ONE TIME A DAY  Patient taking differently: 2 times daily as needed TAKE 1 CAPSULE BY MOUTH ONE TIME A DAY 8/1/21   Silvia Quezada MD   albuterol sulfate  (90 Base) MCG/ACT inhaler INHALE 2 PUFF BY MOUTH EVERY 6 HOURS AS NEEDED FOR WHEEZING 8/1/21   Silvia Quezada MD   levothyroxine (SYNTHROID) 137 MCG tablet Take 1 tablet by mouth daily 7/26/21   Ken Saldaña MD   liothyronine (CYTOMEL) 5 MCG tablet TAKE TWO TABLETS BY MOUTH DAILY 7/26/21   Ken Saldaña MD   Insulin Syringe-Needle U-100 Melissa Muscat INSULIN SYRINGE) 31G X 5/16\" 1 ML MISC AS DIRECTED 3/26/21   Silvia Quezada MD   chlorhexidine (BETASEPT SURGICAL SCRUB) 4 % external liquid APPLY TO AFFECTED AREA(S) TOPICALLY AS NEEDED 1/7/21   Monster Morales MD   pantoprazole (PROTONIX) 40 MG tablet Take 1 tablet by mouth 2 times daily (before meals) 12/29/20   Rita Monroy MD   mometasone (ELOCON) 0.1 % cream Apply topically daily.  6/25/20   Monster Morales MD   Multiple Vitamins-Minerals (THERAPEUTIC MULTIVITAMIN-MINERALS) tablet Take 1 tablet by mouth daily 6/25/20   Estela Anaya MD   Misc. Devices (HIBICLENS HAND PUMP 32OZ) MISC Use as needed for skin irritation or bumps. 6/22/20   Mary Hurtado MD       Family History:       Problem Relation Age of Onset    High Blood Pressure Mother     Hypertension Mother     Elevated Lipids Mother     Other Mother         fatty liver    Heart Disease Father     Hypertension Father     Elevated Lipids Father     Coronary Art Dis Father     Liver Disease Father     Diabetes Maternal Aunt          Social History:   TOBACCO:   reports that she has never smoked. She has never used smokeless tobacco.  ETOH:   reports current alcohol use. DRUGS:  reports no history of drug use. REVIEW OF SYSTEMS:  Review of Systems -   General ROS: negative  Psychological ROS: negative  Ophthalmic ROS: negative  ENT ROS: sinus congestion and pnd  Allergy and Immunology ROS: negative  Hematological and Lymphatic ROS: negative  Endocrine ROS: negative  Breast ROS: negative  Respiratory ROS: cough, sob  Cardiovascular ROS: no chest pain or dyspnea on exertion  Gastrointestinal ROS:negative  Genito-Urinary ROS: negative  Musculoskeletal ROS: myalgias  Neurological ROS: negative  Dermatological ROS: negative        Physical Exam:    Vitals: /76   Pulse 84   Temp 98.3 °F (36.8 °C) (Oral)   Resp 30   Ht 5' 3\" (1.6 m)   Wt 215 lb 9.8 oz (97.8 kg)   SpO2 95%   BMI 38.19 kg/m²     Last Body weight:   Wt Readings from Last 3 Encounters:   01/07/22 215 lb 9.8 oz (97.8 kg)   12/30/21 226 lb (102.5 kg)   12/20/21 226 lb (102.5 kg)       Body Mass Index : Body mass index is 38.19 kg/m².       Intake and Output summary:     Intake/Output Summary (Last 24 hours) at 1/7/2022 1724  Last data filed at 1/7/2022 1425  Gross per 24 hour   Intake 240 ml   Output --   Net 240 ml       Physical Examination:     PHYSICAL EXAM:    Gen:  Moderate acute distress   Eyes: PERRL. Anicteric sclera. No conjunctival injection. ENT: No discharge. Posterior oropharynx clear. External appearance of ears and nose normal.  Neck: Trachea midline. No mass, no lymphadenopathy    Resp:  Diminished no wheezing, fine rales postrior  CV: Regular rate. Regular rhythm. No murmur or rub. No edema. GI: Soft, Non-tender. Non-distended. +BS  Skin: Warm, dry, w/o erythema. Lymph: No cervical or supraclavicular LAD. M/S: No cyanosis. No clubbing. Neuro:  CN 2-12 tested, no focal neurologic deficit. Moves all extremities  Psych: Awake and alert, Oriented x 3. Judgement and insight appropriate. Mood stable. Laboratory findings:-    CBC:   Recent Labs     01/07/22  0748   WBC 11.2*   HGB 13.6        BMP:    Recent Labs     01/05/22  1037 01/05/22  1037 01/07/22  0749   *   < > 134*   K 4.0   < > 4.4   CL 96*   < > 102   CO2 22   < > 20*   BUN 19   < > 25*   CREATININE 0.9  --  0.7   GLUCOSE 87   < > 118*    < > = values in this interval not displayed. S. Calcium:  Recent Labs     01/07/22  0749   CALCIUM 9.2     S. Ionized Calcium:No results for input(s): IONCA in the last 72 hours. S. Magnesium:No results for input(s): MG in the last 72 hours. S. Phosphorus:No results for input(s): PHOS in the last 72 hours. S. Glucose:No results for input(s): POCGLU in the last 72 hours. Glycosylated hemoglobin A1C: No results for input(s): LABA1C in the last 72 hours. INR: No results for input(s): INR in the last 72 hours. Hepatic functions:   Recent Labs     01/06/22  1330 01/06/22  1330 01/07/22  0749   ALKPHOS 303*   < > 291*   ALT 67*   < > 67*   AST 59*   < > 52*   PROT 8.9*   < > 8.6*   BILITOT 0.6   < > 0.4   BILIDIR <0.2  --   --    LABALBU 3.3*   < > 3.5    < > = values in this interval not displayed. Pancreatic functions:  Recent Labs     01/05/22  2223   LACTA 1.1     S.  Lactic Acid:   Recent Labs     01/05/22  2223   LACTA 1.1     Cardiac enzymes:  Recent Labs     01/05/22  1037   TROPONINI <0.01     BNP:No results for input(s): BNP in the last 72 hours. Lipid profile: No results for input(s): CHOL, TRIG, HDL, LDL, LDLCALC in the last 72 hours. Blood Gases: No results found for: PH, PCO2, PO2, HCO3, O2SAT  Thyroid functions:   Lab Results   Component Value Date    TSH 8.28 09/17/2020          Radiology Review:  Pertinent images / reports were reviewed as a part of this visit. CT Chest w/ contrast: No results found for this or any previous visit. CT Chest w/o contrast: No results found for this or any previous visit. CTPA: No results found for this or any previous visit. CXR PA/LAT: Results for orders placed during the hospital encounter of 12/07/20    XR CHEST (2 VW)    Narrative  EXAMINATION:  TWO XRAY VIEWS OF THE CHEST    12/7/2020 9:47 am    COMPARISON:  03/07/2020    HISTORY:  ORDERING SYSTEM PROVIDED HISTORY: right sided discomfort  TECHNOLOGIST PROVIDED HISTORY:  Reason for exam:->right sided discomfort  Reason for Exam: right sided discomfort under rt breast  Acuity: Chronic    FINDINGS:  There is linear scarring/atelectasis within the right middle lobe and  lingula. The upper lobes are clear. There is no pneumothorax or effusion. Heart size and vascularity are stable. Impression  Bibasilar atelectasis versus scar. CXR portable: Results for orders placed during the hospital encounter of 01/05/22    XR CHEST PORTABLE    Narrative  EXAMINATION:  ONE XRAY VIEW OF THE CHEST    1/5/2022 10:16 am    COMPARISON:  12/30/2021    HISTORY:  ORDERING SYSTEM PROVIDED HISTORY: SOB  TECHNOLOGIST PROVIDED HISTORY:  Reason for exam:->sob  Reason for Exam: Cough; Shortness of Breath; Fatigue    FINDINGS:  The mediastinal and cardiac contours are stable. There has been interval  development of bilateral perihilar opacities extending into the upper and  lower lobes.   There is no pleural effusion or pneumothorax. Impression  Multifocal airspace consolidation most suggestive of an atypical multi lobar  pneumonia. COVID-19 pneumonia should be excluded.                      Harley Delatorre MD, M.D.            1/7/2022, 5:24 PM

## 2022-01-07 NOTE — PLAN OF CARE
Problem: Airway Clearance - Ineffective  Goal: Achieve or maintain patent airway  1/7/2022 1117 by Kaleb Campuzano RN  Outcome: Ongoing  1/7/2022 0117 by Cammie Aase, RN  Outcome: Ongoing     Problem: Gas Exchange - Impaired  Goal: Absence of hypoxia  1/7/2022 1117 by Kaleb Campuzano RN  Outcome: Ongoing  1/7/2022 0117 by Cammie Aase, RN  Outcome: Ongoing     Problem: Breathing Pattern - Ineffective  Goal: Ability to achieve and maintain a regular respiratory rate  1/7/2022 1117 by Kaleb Campuzano RN  Outcome: Ongoing  1/7/2022 0117 by Cammie Aase, RN  Outcome: Ongoing     Problem: Body Temperature -  Risk of, Imbalanced  Goal: Ability to maintain a body temperature within defined limits  1/7/2022 1117 by Kaleb Campuzano RN  Outcome: Ongoing  1/7/2022 0117 by Cammie Aase, RN  Outcome: Ongoing     Problem:  Body Temperature -  Risk of, Imbalanced  Goal: Complications related to the disease process, condition or treatment will be avoided or minimized  1/7/2022 1117 by Kaleb Campuzano RN  Outcome: Ongoing  1/7/2022 0117 by Cammie Aase, RN  Outcome: Ongoing     Problem: Nutrition Deficits  Goal: Optimize nutritional status  1/7/2022 1117 by Kaleb Campuzano RN  Outcome: Ongoing  1/7/2022 0117 by Cammie Aase, RN  Outcome: Ongoing     Problem: Risk for Fluid Volume Deficit  Goal: Maintain normal heart rhythm  1/7/2022 1117 by Kaleb Campuzano RN  Outcome: Ongoing  1/7/2022 0117 by Cammie Aase, RN  Outcome: Ongoing     Problem: Risk for Fluid Volume Deficit  Goal: Maintain normal serum potassium, sodium, calcium, phosphorus, and pH  1/7/2022 1117 by Kaleb Campuzano RN  Outcome: Ongoing     Problem: Fatigue  Goal: Verbalize increase energy and improved vitality  1/7/2022 1117 by Kaleb Campuzano RN  Outcome: Ongoing  1/7/2022 0117 by Cammie Aase, RN  Outcome: Ongoing     Problem: Pain:  Goal: Pain level will decrease  Description: Pain level will decrease  1/7/2022 1117 by Carri Thompson RN  Outcome: Ongoing  Note: Pt able to express presence and absence of pain using numerical pain scale. Pt pain is managed by PRN analgesics as ordered by MD. Pain reassess after each interventions. Will continue to monitor as needed. 1/7/2022 0117 by Reyna Hinton RN  Outcome: Ongoing     Problem: Falls - Risk of:  Goal: Will remain free from falls  Description: Will remain free from falls  Outcome: Ongoing  Note: Pt free from falls this shift. Non skid socks provided . Pt educated on use of call light as needed for assistance. Call light always within reach. Pt able and agreeable to contact for safety appropriately.        Problem: Falls - Risk of:  Goal: Absence of physical injury  Description: Absence of physical injury  Outcome: Ongoing

## 2022-01-07 NOTE — PROGRESS NOTES
Pt ambulated to bathroom and SPO2 saturations dropped to 78% on 15L HFNC. This RN put pt on NRB mask to help assist the pt in recovery. Once pt's SpO2 saturations return to above 90%, RN will attempt to remove the mask.  Electronically signed by Karmen Bocanegra RN on 1/7/2022 at 2:00 AM

## 2022-01-07 NOTE — PROGRESS NOTES
Progress Note  Admit Date: 1/5/2022      PCP: Caryle Pretzel, DO     CC: F/U for Covid    Days in hospital:  2    SUBJECTIVE / Interval History:   Pt feels ok  02 requirement worse on 15 L of oxygen   Sob with ambulation  Very hypoxic with ambulation          Allergies  Latex, Clindamycin/lincomycin, Sulfa antibiotics, Diflucan [fluconazole], and Other    Medications    Scheduled Meds:   enoxaparin  30 mg SubCUTAneous BID    dexamethasone (PF)  10 mg IntraVENous BID    Followed by   Kathrin Pimentel ON 1/11/2022] dexamethasone  10 mg IntraVENous Daily    nystatin  5 mL Oral 4x Daily    baricitinib  4 mg Oral Daily    albuterol sulfate HFA  2 puff Inhalation 4x daily    budesonide-formoterol  2 puff Inhalation BID    dilTIAZem  120 mg Oral Daily    fluticasone  1 spray Each Nostril Daily    folic acid  1 mg Oral Daily    levothyroxine  137 mcg Oral Daily    liothyronine  10 mcg Oral Daily    montelukast  10 mg Oral Nightly    therapeutic multivitamin-minerals  1 tablet Oral Daily    pantoprazole  40 mg Oral BID AC    polyethylene glycol  17 g Oral Daily    cefTRIAXone (ROCEPHIN) IV  1,000 mg IntraVENous Q24H    azithromycin  500 mg IntraVENous Q24H    cetirizine  5 mg Oral BID    And    pseudoephedrine  120 mg Oral BID     Continuous Infusions:    PRN Meds:  guaiFENesin-codeine, acetaminophen    Vitals    /80   Pulse 75   Temp 94.3 °F (34.6 °C) (Axillary)   Resp 30   Ht 5' 3\" (1.6 m)   Wt 215 lb 9.8 oz (97.8 kg)   SpO2 90%   BMI 38.19 kg/m²     Exam:    Gen: No distress. Eyes: PERRL. No sclera icterus. No conjunctival injection. ENT: No discharge. Pharynx clear. External appearance of ears and nose normal.  Neck: Trachea midline. No obvious mass. Resp: No accessory muscle use. No crackles. No wheezes. Basal rhonchi. No dullness on percussion. CV: Regular rate. Regular rhythm. No murmur or rub. No edema. GI: Non-tender. Non-distended. No hernia.    Skin: Warm, dry, normal texture and turgor. No nodule on exposed extremities. Lymph: No cervical LAD. No supraclavicular LAD. M/S: No cyanosis. No clubbing. No joint deformity. Neuro: Moves all four extremities. CN 2-12 tested, no defect noted. Psych: Oriented x 3. No anxiety. Awake. Alert. Intact judgement and insight. Data    LABS  CBC:   Recent Labs     01/05/22  1037 01/07/22  0748   WBC 11.4* 11.2*   HGB 13.2 13.6   HCT 40.1 41.4   MCV 90.2 87.6    179     BMP:   Recent Labs     01/05/22  1037 01/07/22  0749   * 134*   K 4.0 4.4   CL 96* 102   CO2 22 20*   BUN 19 25*   CREATININE 0.9 0.7   GLUCOSE 87 118*     POC GLUCOSE:  No results for input(s): POCGLU in the last 72 hours. LIVER PROFILE:   Recent Labs     01/05/22  1037 01/06/22  1330 01/07/22  0749   AST 84* 59* 52*   ALT 71* 67* 67*   LABALBU 4.0 3.3* 3.5   BILIDIR  --  <0.2  --    BILITOT 1.4* 0.6 0.4   ALKPHOS 315* 303* 291*     PT/INR: No results for input(s): PROTIME, INR in the last 72 hours. APTT: No results for input(s): APTT in the last 72 hours. UA:No results for input(s): NITRITE, COLORU, PHUR, LABCAST, WBCUA, RBCUA, MUCUS, TRICHOMONAS, YEAST, BACTERIA, CLARITYU, SPECGRAV, LEUKOCYTESUR, UROBILINOGEN, BILIRUBINUR, BLOODU, GLUCOSEU, KETUA, AMORPHOUS in the last 72 hours. Microbiology:  Wound Culture: No results for input(s): WNDABS, ORG in the last 72 hours. Invalid input(s):  LABGRAM  Nasal Culture: No results for input(s): ORG, MRSAPCR in the last 72 hours. Blood Culture:   Recent Labs     01/05/22  1037   BC No Growth to date. Any change in status will be called. BLOODCULT2 No Growth to date. Any change in status will be called. Fungal Culture:   No results for input(s): FUNGSM in the last 72 hours. No results for input(s): FUNCXBLD in the last 72 hours.   CSF Culture:  No results for input(s): COLORCSF, APPEARCSF, CFTUBE, CLOTCSF, WBCCSF, RBCCSF, NEUTCSF, NUMCELLSCSF, LYMPHSCSF, MONOCSF, GLUCCSF, VOLCSF in the last 72 hours.  Respiratory Culture:  No results for input(s): Kavya Gavel in the last 72 hours. AFB:No results for input(s): AFBSMEAR in the last 72 hours. Urine Culture  No results for input(s): LABURIN in the last 72 hours. RADIOLOGY:    XR CHEST PORTABLE   Final Result   Multifocal airspace consolidation most suggestive of an atypical multi lobar   pneumonia. COVID-19 pneumonia should be excluded. CONSULTS:    IP CONSULT TO PHARMACY  IP CONSULT TO PULMONOLOGY    ASSESSMENT AND PLAN:      Active Problems:    Acute respiratory failure due to COVID-19 [facility])  Resolved Problems:    * No resolved hospital problems. *    The patient is a 48 y.o. female medical history of A. fib, recurrent sinusitis, autoimmune hepatitis, ITP, obesity who presents to [facility] with worsening shortness of breath cough and fatigue. Patient states it has been about a month since she has been having sinusitis and has taken 2 courses of Augmentin and now is on Cipro and has taken 2 course of steroids. Since the last 2 days had chills with shortness of breath and fatigue and a lot of cough resulting in chest pain    Acute hypoxic respiratory failure-now oxygen requirement up to 15 L  -Patient was hypoxic in the 80s in the ER and needed 5 L of oxygen to maintain sats greater than 90%     COVID-19 pneumonia  -Patient is vaccinated, has not received a booster  -Started on Decadron, dose increased (D2)  -Inflammatory markers acutely elevated  -Pharmacy to dose baricitinib D1  -CXR on the 30th was clear. Chest x-ray done today shows multifocal pneumonia  -procal level elevated, will repeat today     Recurrent sinusitis  -Started on antibiotics day 3  -CT scan done few days ago shows mild sinusitis     Hypothyroidism  -Continue home meds  -History of Hashimoto's     Hyponatremia  -Possibly due to Covid. Monitor     History of A.  Fib  -On Cardizem  -Not on anticoagulation due to low [id number] score     Morbid obesity  -Complicating management and treatment    DVT Prophylaxis: Lovenox  Diet: ADULT DIET; Regular  Code Status: Prior        Discharge plan -continue care    The patient and / or the family were informed of the results of any tests, a time was given to answer questions, a plan was proposed and they agreed with plan. Discussed with consulting physicians, nursing and social work     The note was completed using EMR. Every effort was made to ensure accuracy; however, inadvertent computerized transcription errors may be present.        Emmanuel Olievira MD

## 2022-01-08 LAB
A/G RATIO: 0.5 (ref 1.1–2.2)
ALBUMIN SERPL-MCNC: 2.9 G/DL (ref 3.4–5)
ALP BLD-CCNC: 287 U/L (ref 40–129)
ALT SERPL-CCNC: 71 U/L (ref 10–40)
ANION GAP SERPL CALCULATED.3IONS-SCNC: 15 MMOL/L (ref 3–16)
AST SERPL-CCNC: 57 U/L (ref 15–37)
BASOPHILS ABSOLUTE: 0 K/UL (ref 0–0.2)
BASOPHILS RELATIVE PERCENT: 0.1 %
BILIRUB SERPL-MCNC: 0.5 MG/DL (ref 0–1)
BUN BLDV-MCNC: 30 MG/DL (ref 7–20)
CALCIUM SERPL-MCNC: 9.2 MG/DL (ref 8.3–10.6)
CHLORIDE BLD-SCNC: 97 MMOL/L (ref 99–110)
CO2: 14 MMOL/L (ref 21–32)
CREAT SERPL-MCNC: 0.8 MG/DL (ref 0.6–1.1)
EOSINOPHILS ABSOLUTE: 0 K/UL (ref 0–0.6)
EOSINOPHILS RELATIVE PERCENT: 0 %
GFR AFRICAN AMERICAN: >60
GFR NON-AFRICAN AMERICAN: >60
GLUCOSE BLD-MCNC: 162 MG/DL (ref 70–99)
HCT VFR BLD CALC: 41 % (ref 36–48)
HEMOGLOBIN: 13 G/DL (ref 12–16)
LYMPHOCYTES ABSOLUTE: 0.7 K/UL (ref 1–5.1)
LYMPHOCYTES RELATIVE PERCENT: 6.1 %
MCH RBC QN AUTO: 28.3 PG (ref 26–34)
MCHC RBC AUTO-ENTMCNC: 31.8 G/DL (ref 31–36)
MCV RBC AUTO: 89.1 FL (ref 80–100)
MONOCYTES ABSOLUTE: 0.8 K/UL (ref 0–1.3)
MONOCYTES RELATIVE PERCENT: 6.7 %
NEUTROPHILS ABSOLUTE: 10.4 K/UL (ref 1.7–7.7)
NEUTROPHILS RELATIVE PERCENT: 87.1 %
PDW BLD-RTO: 13.7 % (ref 12.4–15.4)
PLATELET # BLD: 230 K/UL (ref 135–450)
PMV BLD AUTO: 9 FL (ref 5–10.5)
POTASSIUM SERPL-SCNC: 4.3 MMOL/L (ref 3.5–5.1)
RBC # BLD: 4.6 M/UL (ref 4–5.2)
SODIUM BLD-SCNC: 126 MMOL/L (ref 136–145)
TOTAL PROTEIN: 9 G/DL (ref 6.4–8.2)
WBC # BLD: 12 K/UL (ref 4–11)

## 2022-01-08 PROCEDURE — 2700000000 HC OXYGEN THERAPY PER DAY

## 2022-01-08 PROCEDURE — 6360000002 HC RX W HCPCS: Performed by: INTERNAL MEDICINE

## 2022-01-08 PROCEDURE — 85025 COMPLETE CBC W/AUTO DIFF WBC: CPT

## 2022-01-08 PROCEDURE — 6370000000 HC RX 637 (ALT 250 FOR IP): Performed by: INTERNAL MEDICINE

## 2022-01-08 PROCEDURE — 80053 COMPREHEN METABOLIC PANEL: CPT

## 2022-01-08 PROCEDURE — 2580000003 HC RX 258: Performed by: INTERNAL MEDICINE

## 2022-01-08 PROCEDURE — 94761 N-INVAS EAR/PLS OXIMETRY MLT: CPT

## 2022-01-08 PROCEDURE — 1200000000 HC SEMI PRIVATE

## 2022-01-08 PROCEDURE — 36415 COLL VENOUS BLD VENIPUNCTURE: CPT

## 2022-01-08 PROCEDURE — 94640 AIRWAY INHALATION TREATMENT: CPT

## 2022-01-08 RX ORDER — LACTOBACILLUS RHAMNOSUS GG 10B CELL
1 CAPSULE ORAL
Status: DISCONTINUED | OUTPATIENT
Start: 2022-01-09 | End: 2022-01-12 | Stop reason: HOSPADM

## 2022-01-08 RX ADMIN — ALBUTEROL SULFATE 2 PUFF: 90 AEROSOL, METERED RESPIRATORY (INHALATION) at 09:29

## 2022-01-08 RX ADMIN — MONTELUKAST SODIUM 10 MG: 10 TABLET, COATED ORAL at 20:55

## 2022-01-08 RX ADMIN — FOLIC ACID 1 MG: 1 TABLET ORAL at 09:45

## 2022-01-08 RX ADMIN — SALINE NASAL SPRAY 2 SPRAY: 1.5 SOLUTION NASAL at 05:15

## 2022-01-08 RX ADMIN — CETIRIZINE HYDROCHLORIDE 5 MG: 10 TABLET, FILM COATED ORAL at 20:55

## 2022-01-08 RX ADMIN — LEVOTHYROXINE SODIUM 137 MCG: 0.03 TABLET ORAL at 09:42

## 2022-01-08 RX ADMIN — ACETAMINOPHEN 500 MG: 500 TABLET ORAL at 16:52

## 2022-01-08 RX ADMIN — GUAIFENESIN AND CODEINE PHOSPHATE 5 ML: 10; 100 LIQUID ORAL at 21:05

## 2022-01-08 RX ADMIN — PANTOPRAZOLE SODIUM 40 MG: 40 TABLET, DELAYED RELEASE ORAL at 16:52

## 2022-01-08 RX ADMIN — ENOXAPARIN SODIUM 30 MG: 100 INJECTION SUBCUTANEOUS at 09:42

## 2022-01-08 RX ADMIN — ACETAMINOPHEN 500 MG: 500 TABLET ORAL at 05:10

## 2022-01-08 RX ADMIN — MULTIPLE VITAMINS W/ MINERALS TAB 1 TABLET: TAB at 09:43

## 2022-01-08 RX ADMIN — POLYETHYLENE GLYCOL 3350 17 G: 17 POWDER, FOR SOLUTION ORAL at 09:43

## 2022-01-08 RX ADMIN — NYSTATIN 500000 UNITS: 100000 SUSPENSION ORAL at 13:22

## 2022-01-08 RX ADMIN — ENOXAPARIN SODIUM 30 MG: 100 INJECTION SUBCUTANEOUS at 20:55

## 2022-01-08 RX ADMIN — NYSTATIN 500000 UNITS: 100000 SUSPENSION ORAL at 16:52

## 2022-01-08 RX ADMIN — PSEUDOEPHEDRINE HYDROCHLORIDE 120 MG: 120 TABLET, FILM COATED, EXTENDED RELEASE ORAL at 20:56

## 2022-01-08 RX ADMIN — LIOTHYRONINE SODIUM 10 MCG: 5 TABLET ORAL at 09:43

## 2022-01-08 RX ADMIN — CEFTRIAXONE 2000 MG: 2 INJECTION, POWDER, FOR SOLUTION INTRAMUSCULAR; INTRAVENOUS at 16:52

## 2022-01-08 RX ADMIN — DEXAMETHASONE SODIUM PHOSPHATE 10 MG: 10 INJECTION, SOLUTION INTRAMUSCULAR; INTRAVENOUS at 20:55

## 2022-01-08 RX ADMIN — ALBUTEROL SULFATE 2 PUFF: 90 AEROSOL, METERED RESPIRATORY (INHALATION) at 17:09

## 2022-01-08 RX ADMIN — PANTOPRAZOLE SODIUM 40 MG: 40 TABLET, DELAYED RELEASE ORAL at 05:47

## 2022-01-08 RX ADMIN — BARICITINIB 4 MG: 2 TABLET, FILM COATED ORAL at 13:21

## 2022-01-08 RX ADMIN — AZITHROMYCIN MONOHYDRATE 500 MG: 500 INJECTION, POWDER, LYOPHILIZED, FOR SOLUTION INTRAVENOUS at 17:49

## 2022-01-08 RX ADMIN — NYSTATIN 500000 UNITS: 100000 SUSPENSION ORAL at 20:55

## 2022-01-08 RX ADMIN — DEXAMETHASONE SODIUM PHOSPHATE 10 MG: 10 INJECTION, SOLUTION INTRAMUSCULAR; INTRAVENOUS at 09:43

## 2022-01-08 RX ADMIN — NYSTATIN 500000 UNITS: 100000 SUSPENSION ORAL at 09:43

## 2022-01-08 RX ADMIN — CETIRIZINE HYDROCHLORIDE 5 MG: 10 TABLET, FILM COATED ORAL at 09:43

## 2022-01-08 RX ADMIN — BUDESONIDE AND FORMOTEROL FUMARATE DIHYDRATE 2 PUFF: 160; 4.5 AEROSOL RESPIRATORY (INHALATION) at 09:29

## 2022-01-08 RX ADMIN — ACETAMINOPHEN 500 MG: 500 TABLET ORAL at 21:05

## 2022-01-08 RX ADMIN — SALINE NASAL SPRAY 2 SPRAY: 1.5 SOLUTION NASAL at 09:44

## 2022-01-08 RX ADMIN — DILTIAZEM HYDROCHLORIDE 120 MG: 120 CAPSULE, COATED, EXTENDED RELEASE ORAL at 09:42

## 2022-01-08 RX ADMIN — PSEUDOEPHEDRINE HYDROCHLORIDE 120 MG: 120 TABLET, FILM COATED, EXTENDED RELEASE ORAL at 09:43

## 2022-01-08 RX ADMIN — ACETAMINOPHEN 500 MG: 500 TABLET ORAL at 09:43

## 2022-01-08 RX ADMIN — MICONAZOLE NITRATE 9 G: KIT VAGINAL at 17:37

## 2022-01-08 ASSESSMENT — PAIN SCALES - GENERAL
PAINLEVEL_OUTOF10: 2
PAINLEVEL_OUTOF10: 6
PAINLEVEL_OUTOF10: 4
PAINLEVEL_OUTOF10: 6
PAINLEVEL_OUTOF10: 8
PAINLEVEL_OUTOF10: 8

## 2022-01-08 ASSESSMENT — PAIN DESCRIPTION - ONSET
ONSET: ON-GOING

## 2022-01-08 ASSESSMENT — PAIN DESCRIPTION - FREQUENCY
FREQUENCY: CONTINUOUS

## 2022-01-08 ASSESSMENT — PAIN DESCRIPTION - PAIN TYPE
TYPE: ACUTE PAIN

## 2022-01-08 ASSESSMENT — PAIN DESCRIPTION - LOCATION
LOCATION: HEAD

## 2022-01-08 ASSESSMENT — PAIN DESCRIPTION - DESCRIPTORS
DESCRIPTORS: ACHING
DESCRIPTORS: HEADACHE
DESCRIPTORS: HEADACHE

## 2022-01-08 ASSESSMENT — PAIN DESCRIPTION - ORIENTATION
ORIENTATION: RIGHT;LEFT;MID
ORIENTATION: RIGHT;ANTERIOR;MID
ORIENTATION: RIGHT;LEFT

## 2022-01-08 ASSESSMENT — PAIN DESCRIPTION - PROGRESSION
CLINICAL_PROGRESSION: GRADUALLY IMPROVING
CLINICAL_PROGRESSION: GRADUALLY IMPROVING
CLINICAL_PROGRESSION: NOT CHANGED

## 2022-01-08 ASSESSMENT — PAIN - FUNCTIONAL ASSESSMENT
PAIN_FUNCTIONAL_ASSESSMENT: PREVENTS OR INTERFERES SOME ACTIVE ACTIVITIES AND ADLS
PAIN_FUNCTIONAL_ASSESSMENT: ACTIVITIES ARE NOT PREVENTED
PAIN_FUNCTIONAL_ASSESSMENT: ACTIVITIES ARE NOT PREVENTED

## 2022-01-08 NOTE — PLAN OF CARE
Problem: Airway Clearance - Ineffective  Goal: Achieve or maintain patent airway  1/7/2022 2323 by Kael Anand RN  Outcome: Ongoing     Problem: Gas Exchange - Impaired  Goal: Absence of hypoxia  1/7/2022 2323 by Kael Anand RN  Outcome: Ongoing     Problem: Gas Exchange - Impaired  Goal: Promote optimal lung function  1/7/2022 2323 by Kael Anand RN  Outcome: Ongoing     Problem: Breathing Pattern - Ineffective  Goal: Ability to achieve and maintain a regular respiratory rate  1/7/2022 2323 by Kael Anand RN  Outcome: Ongoing     Problem: Body Temperature -  Risk of, Imbalanced  Goal: Ability to maintain a body temperature within defined limits  1/7/2022 2323 by Kael Anand RN  Outcome: Ongoing     Problem: Body Temperature -  Risk of, Imbalanced  Goal: Will regain or maintain usual level of consciousness  1/7/2022 2323 by Kael Anand RN  Outcome: Ongoing     Problem:  Body Temperature -  Risk of, Imbalanced  Goal: Complications related to the disease process, condition or treatment will be avoided or minimized  1/7/2022 2323 by Kael Anand RN  Outcome: Ongoing     Problem: Isolation Precautions - Risk of Spread of Infection  Goal: Prevent transmission of infection  1/7/2022 2323 by Kael Anand RN  Outcome: Ongoing     Problem: Nutrition Deficits  Goal: Optimize nutritional status  1/7/2022 2323 by Kael Anand RN  Outcome: Ongoing     Problem: Risk for Fluid Volume Deficit  Goal: Maintain normal heart rhythm  1/7/2022 2323 by Kael Anand RN  Outcome: Ongoing     Problem: Risk for Fluid Volume Deficit  Goal: Maintain absence of muscle cramping  1/7/2022 2323 by Kael Anand RN  Outcome: Ongoing     Problem: Risk for Fluid Volume Deficit  Goal: Maintain normal serum potassium, sodium, calcium, phosphorus, and pH  1/7/2022 2323 by Kael Anand RN  Outcome: Ongoing     Problem: Loneliness or Risk for Loneliness  Goal: Demonstrate positive use of time alone when socialization is not possible  1/7/2022 2323 by Kyle Bell RN  Outcome: Ongoing     Problem: Fatigue  Goal: Verbalize increase energy and improved vitality  1/7/2022 2323 by Kyle Bell RN  Outcome: Ongoing     Problem: Patient Education: Go to Patient Education Activity  Goal: Patient/Family Education  1/7/2022 2323 by Kyle Bell RN  Outcome: Ongoing     Problem: Pain:  Goal: Pain level will decrease  Description: Pain level will decrease  1/7/2022 2323 by Kyle Bell RN  Outcome: Ongoing     Problem: Pain:  Goal: Control of acute pain  Description: Control of acute pain  1/7/2022 2323 by Kyle Bell RN  Outcome: Ongoing     Problem: Pain:  Goal: Control of chronic pain  Description: Control of chronic pain  1/7/2022 2323 by Kyle Bell RN  Outcome: Ongoing     Problem: Falls - Risk of:  Goal: Will remain free from falls  Description: Will remain free from falls  1/7/2022 2323 by Kyle Bell RN  Outcome: Ongoing     Problem: Falls - Risk of:  Goal: Absence of physical injury  Description: Absence of physical injury  1/7/2022 2323 by Kyle Bell RN  Outcome: Ongoing

## 2022-01-08 NOTE — PROGRESS NOTES
Progress Note  Admit Date: 1/5/2022      PCP: Olaf Whatley DO     CC: F/U for Covid    Days in hospital:  3    SUBJECTIVE / Interval History:   Pt feels ok  02 requirement worse on 15 L of oxygen   Sob with ambulation  Very hypoxic with ambulation  Not much change from yesterday        Allergies  Latex, Clindamycin/lincomycin, Sulfa antibiotics, Diflucan [fluconazole], and Other    Medications    Scheduled Meds:   cefTRIAXone (ROCEPHIN) IV  2,000 mg IntraVENous Q24H    enoxaparin  30 mg SubCUTAneous BID    dexamethasone (PF)  10 mg IntraVENous BID    Followed by   Israel Huffman ON 1/11/2022] dexamethasone  10 mg IntraVENous Daily    nystatin  5 mL Oral 4x Daily    baricitinib  4 mg Oral Daily    albuterol sulfate HFA  2 puff Inhalation 4x daily    budesonide-formoterol  2 puff Inhalation BID    dilTIAZem  120 mg Oral Daily    fluticasone  1 spray Each Nostril Daily    folic acid  1 mg Oral Daily    levothyroxine  137 mcg Oral Daily    liothyronine  10 mcg Oral Daily    montelukast  10 mg Oral Nightly    therapeutic multivitamin-minerals  1 tablet Oral Daily    pantoprazole  40 mg Oral BID AC    polyethylene glycol  17 g Oral Daily    azithromycin  500 mg IntraVENous Q24H    cetirizine  5 mg Oral BID    And    pseudoephedrine  120 mg Oral BID     Continuous Infusions:    PRN Meds:  sodium chloride, acetaminophen, guaiFENesin-codeine    Vitals    /85   Pulse 81   Temp 97.7 °F (36.5 °C) (Axillary)   Resp 16   Ht 5' 3\" (1.6 m)   Wt 221 lb 1.9 oz (100.3 kg)   SpO2 90%   BMI 39.17 kg/m²     Exam:    Gen: No distress. Eyes: PERRL. No sclera icterus. No conjunctival injection. ENT: No discharge. Pharynx clear. External appearance of ears and nose normal.  Neck: Trachea midline. No obvious mass. Resp: No accessory muscle use. No crackles. No wheezes. Basal rhonchi. No dullness on percussion. CV: Regular rate. Regular rhythm. No murmur or rub. No edema. GI: Non-tender. Non-distended. No hernia. Skin: Warm, dry, normal texture and turgor. No nodule on exposed extremities. Lymph: No cervical LAD. No supraclavicular LAD. M/S: No cyanosis. No clubbing. No joint deformity. Neuro: Moves all four extremities. CN 2-12 tested, no defect noted. Psych: Oriented x 3. No anxiety. Awake. Alert. Intact judgement and insight. Data    LABS  CBC:   Recent Labs     01/05/22  1037 01/07/22  0748   WBC 11.4* 11.2*   HGB 13.2 13.6   HCT 40.1 41.4   MCV 90.2 87.6    179     BMP:   Recent Labs     01/05/22  1037 01/07/22  0749   * 134*   K 4.0 4.4   CL 96* 102   CO2 22 20*   BUN 19 25*   CREATININE 0.9 0.7   GLUCOSE 87 118*     POC GLUCOSE:  No results for input(s): POCGLU in the last 72 hours. LIVER PROFILE:   Recent Labs     01/05/22  1037 01/06/22  1330 01/07/22  0749   AST 84* 59* 52*   ALT 71* 67* 67*   LABALBU 4.0 3.3* 3.5   BILIDIR  --  <0.2  --    BILITOT 1.4* 0.6 0.4   ALKPHOS 315* 303* 291*     PT/INR: No results for input(s): PROTIME, INR in the last 72 hours. APTT: No results for input(s): APTT in the last 72 hours. UA:No results for input(s): NITRITE, COLORU, PHUR, LABCAST, WBCUA, RBCUA, MUCUS, TRICHOMONAS, YEAST, BACTERIA, CLARITYU, SPECGRAV, LEUKOCYTESUR, UROBILINOGEN, BILIRUBINUR, BLOODU, GLUCOSEU, KETUA, AMORPHOUS in the last 72 hours. Microbiology:  Wound Culture: No results for input(s): WNDABS, ORG in the last 72 hours. Invalid input(s):  LABGRAM  Nasal Culture: No results for input(s): ORG, MRSAPCR in the last 72 hours. Blood Culture:   Recent Labs     01/05/22  1037   BC No Growth to date. Any change in status will be called. BLOODCULT2 No Growth to date. Any change in status will be called. Fungal Culture:   No results for input(s): FUNGSM in the last 72 hours. No results for input(s): FUNCXBLD in the last 72 hours.   CSF Culture:  No results for input(s): COLORCSF, APPEARCSF, CFTUBE, CLOTCSF, WBCCSF, RBCCSF, NEUTCSF, NUMCELLSCSF, LYMPHSCSF, MONOCSF, GLUCCSF, VOLCSF in the last 72 hours. Respiratory Culture:  No results for input(s): Eran Arch in the last 72 hours. AFB:No results for input(s): AFBSMEAR in the last 72 hours. Urine Culture  No results for input(s): LABURIN in the last 72 hours. RADIOLOGY:    CTA PULMONARY W CONTRAST   Final Result   No evidence of pulmonary embolism or acute aortic disease. Moderate to   severe COVID-19 pneumonia. RECOMMENDATIONS:   Unavailable         XR CHEST PORTABLE   Final Result   Multifocal airspace consolidation most suggestive of an atypical multi lobar   pneumonia. COVID-19 pneumonia should be excluded. CONSULTS:    IP CONSULT TO PHARMACY  IP CONSULT TO PULMONOLOGY    ASSESSMENT AND PLAN:      Active Problems:    Acute respiratory failure due to COVID-19 [facility])  Resolved Problems:    * No resolved hospital problems. *    The patient is a 48 y.o. female medical history of A. fib, recurrent sinusitis, autoimmune hepatitis, ITP, obesity who presents to [facility] with worsening shortness of breath cough and fatigue. Patient states it has been about a month since she has been having sinusitis and has taken 2 courses of Augmentin and now is on Cipro and has taken 2 course of steroids. Since the last 2 days had chills with shortness of breath and fatigue and a lot of cough resulting in chest pain    Acute hypoxic respiratory failure-now oxygen requirement up to 15 L  -Attempt to wean oxygen as tolerated     COVID-19 pneumonia  -Patient is vaccinated, has not received a booster  -Started on Decadron, dose increased (D3)  -Inflammatory markers acutely elevated  -Pharmacy to dose baricitinib D2  -CXR on the 30th was clear.   Chest x-ray done today shows multifocal pneumonia  -procal level mildly elevated  -CT shows moderate to severe pneumonia due to Covid     Recurrent sinusitis  -Started on antibiotics day 4  -CT scan done few days ago shows mild sinusitis     Hypothyroidism  -Continue home meds  -History of Hashimoto's     Hyponatremia  -Possibly due to Covid. Monitor     History of A. Fib  -On Cardizem  -Not on anticoagulation due to low [id number] score     Morbid obesity  -Complicating management and treatment    DVT Prophylaxis: Lovenox  Diet: ADULT DIET; Regular  Code Status: Prior        Discharge plan -continue care    The patient and / or the family were informed of the results of any tests, a time was given to answer questions, a plan was proposed and they agreed with plan. Discussed with consulting physicians, nursing and social work     The note was completed using EMR. Every effort was made to ensure accuracy; however, inadvertent computerized transcription errors may be present.        Evie Chavez MD

## 2022-01-08 NOTE — PROGRESS NOTES
Pt reports ongoing headache -pt medicated with prn tylenol. Pt currently on 15 l high flow and intermittent 15 l NRB mask. Pt states she is coughing up more and brining up more sputum. Pt sitting un in the chair. Denies other needs at present.  Call light and item need in reach Electronically signed by Jas Collazo RN on 1/8/2022 at 10:56 AM

## 2022-01-08 NOTE — PLAN OF CARE
Problem: Airway Clearance - Ineffective  Goal: Achieve or maintain patent airway  1/8/2022 1037 by Sylvia Parra RN  Outcome: Ongoing     Problem: Gas Exchange - Impaired  Goal: Absence of hypoxia  1/8/2022 1037 by Sylvia Parra RN  Outcome: Ongoing  1/7/2022 2323 by Haily Rios RN  Outcome: Ongoing     Problem: Gas Exchange - Impaired  Goal: Promote optimal lung function  1/8/2022 1037 by Sylvia Parra RN  Outcome: Ongoing     Problem: Breathing Pattern - Ineffective  Goal: Ability to achieve and maintain a regular respiratory rate  1/8/2022 1037 by Sylvia Parra RN  Outcome: Ongoing  1/7/2022 2323 by Haily Rios RN  Outcome: Ongoing     Problem: Body Temperature -  Risk of, Imbalanced  Goal: Ability to maintain a body temperature within defined limits  1/8/2022 1037 by Sylvia Parra RN  Outcome: Ongoing  1/7/2022 2323 by Haily Rios RN  Outcome: Ongoing     Problem: Body Temperature -  Risk of, Imbalanced  Goal: Will regain or maintain usual level of consciousness  1/8/2022 1037 by Sylvia Parra RN  Outcome: Ongoing     Problem:  Body Temperature -  Risk of, Imbalanced  Goal: Complications related to the disease process, condition or treatment will be avoided or minimized  1/8/2022 1037 by Sylvia Parra RN  Outcome: Ongoing  1/7/2022 2323 by Haily Rios RN  Outcome: Ongoing     Problem: Nutrition Deficits  Goal: Optimize nutritional status  1/8/2022 1037 by Sylvia Parra RN  Outcome: Ongoing  1/7/2022 2323 by Haily Rios RN  Outcome: Ongoing     Problem: Risk for Fluid Volume Deficit  Goal: Maintain absence of muscle cramping  1/8/2022 1037 by Sylvia Parra RN  Outcome: Ongoing  1/7/2022 2323 by Haily Rios RN  Outcome: Ongoing     Problem: Risk for Fluid Volume Deficit  Goal: Maintain normal serum potassium, sodium, calcium, phosphorus, and pH  1/8/2022 1037 by Efren Thompson RN  Outcome: Ongoing     Problem: Fatigue  Goal: Verbalize increase energy and

## 2022-01-09 LAB
A/G RATIO: 0.7 (ref 1.1–2.2)
ALBUMIN SERPL-MCNC: 3.7 G/DL (ref 3.4–5)
ALP BLD-CCNC: 305 U/L (ref 40–129)
ALT SERPL-CCNC: 89 U/L (ref 10–40)
ANION GAP SERPL CALCULATED.3IONS-SCNC: 15 MMOL/L (ref 3–16)
AST SERPL-CCNC: 51 U/L (ref 15–37)
BASOPHILS ABSOLUTE: 0 K/UL (ref 0–0.2)
BASOPHILS RELATIVE PERCENT: 0.1 %
BILIRUB SERPL-MCNC: 0.4 MG/DL (ref 0–1)
BLOOD CULTURE, ROUTINE: NORMAL
BUN BLDV-MCNC: 29 MG/DL (ref 7–20)
CALCIUM SERPL-MCNC: 9 MG/DL (ref 8.3–10.6)
CHLORIDE BLD-SCNC: 100 MMOL/L (ref 99–110)
CO2: 19 MMOL/L (ref 21–32)
CREAT SERPL-MCNC: 0.7 MG/DL (ref 0.6–1.1)
CULTURE, BLOOD 2: NORMAL
EOSINOPHILS ABSOLUTE: 0 K/UL (ref 0–0.6)
EOSINOPHILS RELATIVE PERCENT: 0 %
GFR AFRICAN AMERICAN: >60
GFR NON-AFRICAN AMERICAN: >60
GLUCOSE BLD-MCNC: 130 MG/DL (ref 70–99)
HCT VFR BLD CALC: 42.7 % (ref 36–48)
HEMOGLOBIN: 14.2 G/DL (ref 12–16)
LYMPHOCYTES ABSOLUTE: 0.8 K/UL (ref 1–5.1)
LYMPHOCYTES RELATIVE PERCENT: 8 %
MCH RBC QN AUTO: 29.2 PG (ref 26–34)
MCHC RBC AUTO-ENTMCNC: 33.4 G/DL (ref 31–36)
MCV RBC AUTO: 87.5 FL (ref 80–100)
MONOCYTES ABSOLUTE: 0.4 K/UL (ref 0–1.3)
MONOCYTES RELATIVE PERCENT: 4.2 %
NEUTROPHILS ABSOLUTE: 8.9 K/UL (ref 1.7–7.7)
NEUTROPHILS RELATIVE PERCENT: 87.7 %
PDW BLD-RTO: 13.5 % (ref 12.4–15.4)
PLATELET # BLD: 253 K/UL (ref 135–450)
PMV BLD AUTO: 8.8 FL (ref 5–10.5)
POTASSIUM SERPL-SCNC: 3.8 MMOL/L (ref 3.5–5.1)
RBC # BLD: 4.88 M/UL (ref 4–5.2)
REASON FOR REJECTION: NORMAL
REJECTED TEST: NORMAL
SODIUM BLD-SCNC: 134 MMOL/L (ref 136–145)
TOTAL PROTEIN: 9 G/DL (ref 6.4–8.2)
WBC # BLD: 10.1 K/UL (ref 4–11)

## 2022-01-09 PROCEDURE — 6370000000 HC RX 637 (ALT 250 FOR IP): Performed by: INTERNAL MEDICINE

## 2022-01-09 PROCEDURE — 80053 COMPREHEN METABOLIC PANEL: CPT

## 2022-01-09 PROCEDURE — 2700000000 HC OXYGEN THERAPY PER DAY

## 2022-01-09 PROCEDURE — 94761 N-INVAS EAR/PLS OXIMETRY MLT: CPT

## 2022-01-09 PROCEDURE — 87205 SMEAR GRAM STAIN: CPT

## 2022-01-09 PROCEDURE — 85025 COMPLETE CBC W/AUTO DIFF WBC: CPT

## 2022-01-09 PROCEDURE — 36415 COLL VENOUS BLD VENIPUNCTURE: CPT

## 2022-01-09 PROCEDURE — 6360000002 HC RX W HCPCS: Performed by: INTERNAL MEDICINE

## 2022-01-09 PROCEDURE — 2580000003 HC RX 258: Performed by: INTERNAL MEDICINE

## 2022-01-09 PROCEDURE — 94640 AIRWAY INHALATION TREATMENT: CPT

## 2022-01-09 PROCEDURE — 1200000000 HC SEMI PRIVATE

## 2022-01-09 RX ADMIN — ACETAMINOPHEN 500 MG: 500 TABLET ORAL at 17:38

## 2022-01-09 RX ADMIN — BUDESONIDE AND FORMOTEROL FUMARATE DIHYDRATE 2 PUFF: 160; 4.5 AEROSOL RESPIRATORY (INHALATION) at 21:21

## 2022-01-09 RX ADMIN — PSEUDOEPHEDRINE HYDROCHLORIDE 120 MG: 120 TABLET, FILM COATED, EXTENDED RELEASE ORAL at 20:54

## 2022-01-09 RX ADMIN — ALBUTEROL SULFATE 2 PUFF: 90 AEROSOL, METERED RESPIRATORY (INHALATION) at 09:04

## 2022-01-09 RX ADMIN — LEVOTHYROXINE SODIUM 137 MCG: 0.03 TABLET ORAL at 09:54

## 2022-01-09 RX ADMIN — ALBUTEROL SULFATE 2 PUFF: 90 AEROSOL, METERED RESPIRATORY (INHALATION) at 12:17

## 2022-01-09 RX ADMIN — PSEUDOEPHEDRINE HYDROCHLORIDE 120 MG: 120 TABLET, FILM COATED, EXTENDED RELEASE ORAL at 09:54

## 2022-01-09 RX ADMIN — NYSTATIN 500000 UNITS: 100000 SUSPENSION ORAL at 20:53

## 2022-01-09 RX ADMIN — CETIRIZINE HYDROCHLORIDE 5 MG: 10 TABLET, FILM COATED ORAL at 09:54

## 2022-01-09 RX ADMIN — NYSTATIN 500000 UNITS: 100000 SUSPENSION ORAL at 09:54

## 2022-01-09 RX ADMIN — Medication 1 CAPSULE: at 09:54

## 2022-01-09 RX ADMIN — FOLIC ACID 1 MG: 1 TABLET ORAL at 09:54

## 2022-01-09 RX ADMIN — NYSTATIN 500000 UNITS: 100000 SUSPENSION ORAL at 17:38

## 2022-01-09 RX ADMIN — GUAIFENESIN AND CODEINE PHOSPHATE 5 ML: 10; 100 LIQUID ORAL at 10:07

## 2022-01-09 RX ADMIN — ENOXAPARIN SODIUM 30 MG: 100 INJECTION SUBCUTANEOUS at 20:53

## 2022-01-09 RX ADMIN — DILTIAZEM HYDROCHLORIDE 120 MG: 120 CAPSULE, COATED, EXTENDED RELEASE ORAL at 09:55

## 2022-01-09 RX ADMIN — POLYETHYLENE GLYCOL 3350 17 G: 17 POWDER, FOR SOLUTION ORAL at 09:54

## 2022-01-09 RX ADMIN — BARICITINIB 4 MG: 2 TABLET, FILM COATED ORAL at 14:37

## 2022-01-09 RX ADMIN — MONTELUKAST SODIUM 10 MG: 10 TABLET, COATED ORAL at 20:54

## 2022-01-09 RX ADMIN — GUAIFENESIN AND CODEINE PHOSPHATE 5 ML: 10; 100 LIQUID ORAL at 20:53

## 2022-01-09 RX ADMIN — ENOXAPARIN SODIUM 30 MG: 100 INJECTION SUBCUTANEOUS at 09:53

## 2022-01-09 RX ADMIN — ALBUTEROL SULFATE 2 PUFF: 90 AEROSOL, METERED RESPIRATORY (INHALATION) at 21:21

## 2022-01-09 RX ADMIN — ALBUTEROL SULFATE 2 PUFF: 90 AEROSOL, METERED RESPIRATORY (INHALATION) at 16:26

## 2022-01-09 RX ADMIN — ACETAMINOPHEN 500 MG: 500 TABLET ORAL at 22:11

## 2022-01-09 RX ADMIN — LIOTHYRONINE SODIUM 10 MCG: 5 TABLET ORAL at 09:54

## 2022-01-09 RX ADMIN — AZITHROMYCIN MONOHYDRATE 500 MG: 500 INJECTION, POWDER, LYOPHILIZED, FOR SOLUTION INTRAVENOUS at 18:25

## 2022-01-09 RX ADMIN — PANTOPRAZOLE SODIUM 40 MG: 40 TABLET, DELAYED RELEASE ORAL at 17:37

## 2022-01-09 RX ADMIN — CETIRIZINE HYDROCHLORIDE 5 MG: 10 TABLET, FILM COATED ORAL at 20:54

## 2022-01-09 RX ADMIN — MULTIPLE VITAMINS W/ MINERALS TAB 1 TABLET: TAB at 09:54

## 2022-01-09 RX ADMIN — DEXAMETHASONE SODIUM PHOSPHATE 10 MG: 10 INJECTION, SOLUTION INTRAMUSCULAR; INTRAVENOUS at 20:53

## 2022-01-09 RX ADMIN — DEXAMETHASONE SODIUM PHOSPHATE 10 MG: 10 INJECTION, SOLUTION INTRAMUSCULAR; INTRAVENOUS at 09:53

## 2022-01-09 RX ADMIN — MICONAZOLE NITRATE 9 G: KIT VAGINAL at 21:01

## 2022-01-09 RX ADMIN — ACETAMINOPHEN 500 MG: 500 TABLET ORAL at 05:30

## 2022-01-09 RX ADMIN — BUDESONIDE AND FORMOTEROL FUMARATE DIHYDRATE 2 PUFF: 160; 4.5 AEROSOL RESPIRATORY (INHALATION) at 09:04

## 2022-01-09 RX ADMIN — NYSTATIN 500000 UNITS: 100000 SUSPENSION ORAL at 14:37

## 2022-01-09 RX ADMIN — ACETAMINOPHEN 500 MG: 500 TABLET ORAL at 09:54

## 2022-01-09 RX ADMIN — CEFTRIAXONE 2000 MG: 2 INJECTION, POWDER, FOR SOLUTION INTRAMUSCULAR; INTRAVENOUS at 17:37

## 2022-01-09 RX ADMIN — PANTOPRAZOLE SODIUM 40 MG: 40 TABLET, DELAYED RELEASE ORAL at 06:26

## 2022-01-09 ASSESSMENT — PAIN DESCRIPTION - ORIENTATION
ORIENTATION: RIGHT;LEFT

## 2022-01-09 ASSESSMENT — PAIN DESCRIPTION - FREQUENCY
FREQUENCY: CONTINUOUS

## 2022-01-09 ASSESSMENT — PAIN DESCRIPTION - PAIN TYPE
TYPE: ACUTE PAIN

## 2022-01-09 ASSESSMENT — PAIN SCALES - GENERAL
PAINLEVEL_OUTOF10: 5
PAINLEVEL_OUTOF10: 6
PAINLEVEL_OUTOF10: 9
PAINLEVEL_OUTOF10: 6
PAINLEVEL_OUTOF10: 8

## 2022-01-09 ASSESSMENT — PAIN DESCRIPTION - PROGRESSION
CLINICAL_PROGRESSION: GRADUALLY IMPROVING

## 2022-01-09 ASSESSMENT — PAIN DESCRIPTION - DESCRIPTORS
DESCRIPTORS: ACHING

## 2022-01-09 ASSESSMENT — PAIN DESCRIPTION - LOCATION
LOCATION: HEAD

## 2022-01-09 ASSESSMENT — PAIN DESCRIPTION - ONSET
ONSET: ON-GOING

## 2022-01-09 ASSESSMENT — PAIN - FUNCTIONAL ASSESSMENT
PAIN_FUNCTIONAL_ASSESSMENT: ACTIVITIES ARE NOT PREVENTED

## 2022-01-09 NOTE — PLAN OF CARE
Problem: Airway Clearance - Ineffective  Goal: Achieve or maintain patent airway  1/9/2022 1012 by Christina Mir RN  Outcome: Ongoing  1/9/2022 0639 by Brenton Anderson RN  Outcome: Ongoing     Problem: Gas Exchange - Impaired  Goal: Absence of hypoxia  1/9/2022 1012 by Christina Mir RN  Outcome: Ongoing  1/9/2022 0639 by Brenton Anderson RN  Outcome: Ongoing     Problem: Gas Exchange - Impaired  Goal: Promote optimal lung function  1/9/2022 1012 by Christina Mir RN  Outcome: Ongoing  1/9/2022 0639 by Brenton Anderson RN  Outcome: Ongoing     Problem: Breathing Pattern - Ineffective  Goal: Ability to achieve and maintain a regular respiratory rate  Outcome: Ongoing     Problem: Body Temperature -  Risk of, Imbalanced  Goal: Ability to maintain a body temperature within defined limits  Outcome: Ongoing     Problem: Body Temperature -  Risk of, Imbalanced  Goal: Complications related to the disease process, condition or treatment will be avoided or minimized  Outcome: Ongoing     Problem: Nutrition Deficits  Goal: Optimize nutritional status  Outcome: Ongoing     Problem: Risk for Fluid Volume Deficit  Goal: Maintain normal serum potassium, sodium, calcium, phosphorus, and pH  Outcome: Ongoing     Problem: Fatigue  Goal: Verbalize increase energy and improved vitality  Outcome: Ongoing     Problem: Patient Education: Go to Patient Education Activity  Goal: Patient/Family Education  Outcome: Ongoing     Problem: Pain:  Goal: Pain level will decrease  Description: Pain level will decrease  1/9/2022 1012 by Lesli Thompson RN  Outcome: Ongoing     Problem: Pain:  Goal: Control of chronic pain  Description: Control of chronic pain  Outcome: Ongoing  Note: Pt able to express presence and absence of pain using numerical pain scale. Pt pain is managed by PRN analgesics as ordered by MD. Pain reassess after each interventions. Will continue to monitor as needed.         Problem: Falls - Risk of:  Goal: Will remain free from falls  Description: Will remain free from falls  1/9/2022 1012 by Nereida Ramirez RN  Outcome: Ongoing  1/9/2022 0639 by Marvina Brunner, RN  Outcome: Ongoing     Problem: Falls - Risk of:  Goal: Absence of physical injury  Description: Absence of physical injury  1/9/2022 1012 by Nereida Ramirez RN  Outcome: Ongoing  Note: Pt free from falls this shift. non skid socks provided. Pt educated on use of call light as needed for assistance. Call light always within reach. Pt able and agreeable to contact for safety appropriately.     1/9/2022 0639 by Marvina Brunner, RN  Outcome: Ongoing

## 2022-01-09 NOTE — PLAN OF CARE
Problem: Airway Clearance - Ineffective  Goal: Achieve or maintain patent airway  Outcome: Ongoing     Problem: Gas Exchange - Impaired  Goal: Absence of hypoxia  Outcome: Ongoing  Goal: Promote optimal lung function  Outcome: Ongoing     Problem: Pain:  Goal: Pain level will decrease  Description: Pain level will decrease  Outcome: Ongoing  Goal: Control of acute pain  Description: Control of acute pain  Outcome: Ongoing     Problem: Falls - Risk of:  Goal: Will remain free from falls  Description: Will remain free from falls  Outcome: Ongoing  Goal: Absence of physical injury  Description: Absence of physical injury  Outcome: Ongoing

## 2022-01-09 NOTE — PROGRESS NOTES
Regular rate. Regular rhythm. No murmur or rub. No edema. GI: Non-tender. Non-distended. No hernia. Skin: Warm, dry, normal texture and turgor. No nodule on exposed extremities. Lymph: No cervical LAD. No supraclavicular LAD. M/S: No cyanosis. No clubbing. No joint deformity. Neuro: Moves all four extremities. CN 2-12 tested, no defect noted. Psych: Oriented x 3. No anxiety. Awake. Alert. Intact judgement and insight. Data    LABS  CBC:   Recent Labs     01/07/22  0748 01/08/22  1111   WBC 11.2* 12.0*   HGB 13.6 13.0   HCT 41.4 41.0   MCV 87.6 89.1    230     BMP:   Recent Labs     01/07/22  0749 01/08/22  1111   * 126*   K 4.4 4.3    97*   CO2 20* 14*   BUN 25* 30*   CREATININE 0.7 0.8   GLUCOSE 118* 162*     POC GLUCOSE:  No results for input(s): POCGLU in the last 72 hours. LIVER PROFILE:   Recent Labs     01/06/22  1330 01/07/22  0749 01/08/22  1111   AST 59* 52* 57*   ALT 67* 67* 71*   LABALBU 3.3* 3.5 2.9*   BILIDIR <0.2  --   --    BILITOT 0.6 0.4 0.5   ALKPHOS 303* 291* 287*     PT/INR: No results for input(s): PROTIME, INR in the last 72 hours. APTT: No results for input(s): APTT in the last 72 hours. UA:No results for input(s): NITRITE, COLORU, PHUR, LABCAST, WBCUA, RBCUA, MUCUS, TRICHOMONAS, YEAST, BACTERIA, CLARITYU, SPECGRAV, LEUKOCYTESUR, UROBILINOGEN, BILIRUBINUR, BLOODU, GLUCOSEU, KETUA, AMORPHOUS in the last 72 hours. Microbiology:  Wound Culture: No results for input(s): WNDABS, ORG in the last 72 hours. Invalid input(s):  LABGRAM  Nasal Culture: No results for input(s): ORG, MRSAPCR in the last 72 hours. Blood Culture:   No results for input(s): BC, BLOODCULT2 in the last 72 hours. Fungal Culture:   No results for input(s): FUNGSM in the last 72 hours. No results for input(s): FUNCXBLD in the last 72 hours.   CSF Culture:  No results for input(s): COLORCSF, APPEARCSF, CFTUBE, CLOTCSF, WBCCSF, RBCCSF, NEUTCSF, NUMCELLSCSF, LYMPHSCSF, MONOCSF, GLUCCSF, VOLCSF in the last 72 hours. Respiratory Culture:  No results for input(s): Nikolai Juan in the last 72 hours. AFB:No results for input(s): AFBSMEAR in the last 72 hours. Urine Culture  No results for input(s): LABURIN in the last 72 hours. RADIOLOGY:    CTA PULMONARY W CONTRAST   Final Result   No evidence of pulmonary embolism or acute aortic disease. Moderate to   severe COVID-19 pneumonia. RECOMMENDATIONS:   Unavailable         XR CHEST PORTABLE   Final Result   Multifocal airspace consolidation most suggestive of an atypical multi lobar   pneumonia. COVID-19 pneumonia should be excluded. CONSULTS:    IP CONSULT TO PHARMACY  IP CONSULT TO PULMONOLOGY    ASSESSMENT AND PLAN:      Active Problems:    Acute respiratory failure due to COVID-19 [facility])  Resolved Problems:    * No resolved hospital problems. *    The patient is a 48 y.o. female medical history of A. fib, recurrent sinusitis, autoimmune hepatitis, ITP, obesity who presents to [facility] with worsening shortness of breath cough and fatigue. Patient states it has been about a month since she has been having sinusitis and has taken 2 courses of Augmentin and now is on Cipro and has taken 2 course of steroids. Since the last 2 days had chills with shortness of breath and fatigue and a lot of cough resulting in chest pain    Acute hypoxic respiratory failure-now oxygen requirement up to 15 L  -Attempt to wean oxygen as tolerated     COVID-19 pneumonia  -Patient is vaccinated, has not received a booster  -Started on Decadron, dose increased (D3)  -Inflammatory markers acutely elevated  -Pharmacy to dose baricitinib D2  -CXR on the 30th was clear.   Chest x-ray done today shows multifocal pneumonia  -procal level mildly elevated  -CT shows moderate to severe pneumonia due to Covid     Recurrent sinusitis  -Started on antibiotics day 4  -CT scan done few days ago shows mild sinusitis     Hypothyroidism  -Continue home meds  -History of Hashimoto's     Hyponatremia  -Possibly due to Covid. Monitor     History of A. Fib  -On Cardizem  -Not on anticoagulation due to low [id number] score     Morbid obesity  -Complicating management and treatment    DVT Prophylaxis: Lovenox  Diet: ADULT DIET; Regular  Code Status: Prior        Discharge plan -continue care    The patient and / or the family were informed of the results of any tests, a time was given to answer questions, a plan was proposed and they agreed with plan. Discussed with consulting physicians, nursing and social work     The note was completed using EMR. Every effort was made to ensure accuracy; however, inadvertent computerized transcription errors may be present.        Tayler Mendez MD

## 2022-01-10 LAB
A/G RATIO: 0.7 (ref 1.1–2.2)
ALBUMIN SERPL-MCNC: 3.2 G/DL (ref 3.4–5)
ALP BLD-CCNC: 249 U/L (ref 40–129)
ALT SERPL-CCNC: 76 U/L (ref 10–40)
ANION GAP SERPL CALCULATED.3IONS-SCNC: 11 MMOL/L (ref 3–16)
AST SERPL-CCNC: 35 U/L (ref 15–37)
BASOPHILS ABSOLUTE: 0 K/UL (ref 0–0.2)
BASOPHILS RELATIVE PERCENT: 0.1 %
BILIRUB SERPL-MCNC: 0.3 MG/DL (ref 0–1)
BUN BLDV-MCNC: 23 MG/DL (ref 7–20)
CALCIUM SERPL-MCNC: 8.5 MG/DL (ref 8.3–10.6)
CHLORIDE BLD-SCNC: 102 MMOL/L (ref 99–110)
CO2: 20 MMOL/L (ref 21–32)
CREAT SERPL-MCNC: 0.7 MG/DL (ref 0.6–1.1)
EOSINOPHILS ABSOLUTE: 0 K/UL (ref 0–0.6)
EOSINOPHILS RELATIVE PERCENT: 0 %
GFR AFRICAN AMERICAN: >60
GFR NON-AFRICAN AMERICAN: >60
GLUCOSE BLD-MCNC: 162 MG/DL (ref 70–99)
HCT VFR BLD CALC: 37.2 % (ref 36–48)
HEMOGLOBIN: 12.3 G/DL (ref 12–16)
LYMPHOCYTES ABSOLUTE: 0.6 K/UL (ref 1–5.1)
LYMPHOCYTES RELATIVE PERCENT: 5.9 %
MCH RBC QN AUTO: 29.2 PG (ref 26–34)
MCHC RBC AUTO-ENTMCNC: 33.1 G/DL (ref 31–36)
MCV RBC AUTO: 88.2 FL (ref 80–100)
MONOCYTES ABSOLUTE: 0.5 K/UL (ref 0–1.3)
MONOCYTES RELATIVE PERCENT: 5.1 %
NEUTROPHILS ABSOLUTE: 8.6 K/UL (ref 1.7–7.7)
NEUTROPHILS RELATIVE PERCENT: 88.9 %
PDW BLD-RTO: 13.3 % (ref 12.4–15.4)
PLATELET # BLD: 222 K/UL (ref 135–450)
PMV BLD AUTO: 8.4 FL (ref 5–10.5)
POTASSIUM SERPL-SCNC: 4.3 MMOL/L (ref 3.5–5.1)
PROCALCITONIN: 0.05 NG/ML (ref 0–0.15)
RBC # BLD: 4.21 M/UL (ref 4–5.2)
SODIUM BLD-SCNC: 133 MMOL/L (ref 136–145)
TOTAL PROTEIN: 7.7 G/DL (ref 6.4–8.2)
WBC # BLD: 9.7 K/UL (ref 4–11)

## 2022-01-10 PROCEDURE — 1200000000 HC SEMI PRIVATE

## 2022-01-10 PROCEDURE — 6370000000 HC RX 637 (ALT 250 FOR IP): Performed by: INTERNAL MEDICINE

## 2022-01-10 PROCEDURE — 94640 AIRWAY INHALATION TREATMENT: CPT

## 2022-01-10 PROCEDURE — 84145 PROCALCITONIN (PCT): CPT

## 2022-01-10 PROCEDURE — 6360000002 HC RX W HCPCS: Performed by: INTERNAL MEDICINE

## 2022-01-10 PROCEDURE — 94761 N-INVAS EAR/PLS OXIMETRY MLT: CPT

## 2022-01-10 PROCEDURE — 36415 COLL VENOUS BLD VENIPUNCTURE: CPT

## 2022-01-10 PROCEDURE — 2580000003 HC RX 258: Performed by: INTERNAL MEDICINE

## 2022-01-10 PROCEDURE — 80053 COMPREHEN METABOLIC PANEL: CPT

## 2022-01-10 PROCEDURE — 85025 COMPLETE CBC W/AUTO DIFF WBC: CPT

## 2022-01-10 PROCEDURE — 2700000000 HC OXYGEN THERAPY PER DAY

## 2022-01-10 RX ADMIN — PSEUDOEPHEDRINE HYDROCHLORIDE 120 MG: 120 TABLET, FILM COATED, EXTENDED RELEASE ORAL at 10:00

## 2022-01-10 RX ADMIN — CETIRIZINE HYDROCHLORIDE 5 MG: 10 TABLET, FILM COATED ORAL at 20:42

## 2022-01-10 RX ADMIN — FLUTICASONE PROPIONATE 1 SPRAY: 50 SPRAY, METERED NASAL at 10:00

## 2022-01-10 RX ADMIN — ENOXAPARIN SODIUM 30 MG: 100 INJECTION SUBCUTANEOUS at 20:43

## 2022-01-10 RX ADMIN — ACETAMINOPHEN 500 MG: 500 TABLET ORAL at 20:42

## 2022-01-10 RX ADMIN — NYSTATIN 500000 UNITS: 100000 SUSPENSION ORAL at 10:00

## 2022-01-10 RX ADMIN — NYSTATIN 500000 UNITS: 100000 SUSPENSION ORAL at 17:12

## 2022-01-10 RX ADMIN — ALBUTEROL SULFATE 2 PUFF: 90 AEROSOL, METERED RESPIRATORY (INHALATION) at 16:53

## 2022-01-10 RX ADMIN — BARICITINIB 4 MG: 2 TABLET, FILM COATED ORAL at 15:00

## 2022-01-10 RX ADMIN — LIOTHYRONINE SODIUM 10 MCG: 5 TABLET ORAL at 10:00

## 2022-01-10 RX ADMIN — CEFTRIAXONE 2000 MG: 2 INJECTION, POWDER, FOR SOLUTION INTRAMUSCULAR; INTRAVENOUS at 17:20

## 2022-01-10 RX ADMIN — PANTOPRAZOLE SODIUM 40 MG: 40 TABLET, DELAYED RELEASE ORAL at 06:00

## 2022-01-10 RX ADMIN — BUDESONIDE AND FORMOTEROL FUMARATE DIHYDRATE 2 PUFF: 160; 4.5 AEROSOL RESPIRATORY (INHALATION) at 08:43

## 2022-01-10 RX ADMIN — MONTELUKAST SODIUM 10 MG: 10 TABLET, COATED ORAL at 20:42

## 2022-01-10 RX ADMIN — FOLIC ACID 1 MG: 1 TABLET ORAL at 10:00

## 2022-01-10 RX ADMIN — GUAIFENESIN AND CODEINE PHOSPHATE 5 ML: 10; 100 LIQUID ORAL at 20:42

## 2022-01-10 RX ADMIN — PSEUDOEPHEDRINE HYDROCHLORIDE 120 MG: 120 TABLET, FILM COATED, EXTENDED RELEASE ORAL at 20:43

## 2022-01-10 RX ADMIN — ENOXAPARIN SODIUM 30 MG: 100 INJECTION SUBCUTANEOUS at 10:00

## 2022-01-10 RX ADMIN — Medication 1 CAPSULE: at 10:00

## 2022-01-10 RX ADMIN — NYSTATIN 500000 UNITS: 100000 SUSPENSION ORAL at 20:42

## 2022-01-10 RX ADMIN — ALBUTEROL SULFATE 2 PUFF: 90 AEROSOL, METERED RESPIRATORY (INHALATION) at 08:44

## 2022-01-10 RX ADMIN — NYSTATIN 500000 UNITS: 100000 SUSPENSION ORAL at 13:00

## 2022-01-10 RX ADMIN — ACETAMINOPHEN 500 MG: 500 TABLET ORAL at 12:03

## 2022-01-10 RX ADMIN — MICONAZOLE NITRATE 9 G: KIT VAGINAL at 20:45

## 2022-01-10 RX ADMIN — BUDESONIDE AND FORMOTEROL FUMARATE DIHYDRATE 2 PUFF: 160; 4.5 AEROSOL RESPIRATORY (INHALATION) at 20:17

## 2022-01-10 RX ADMIN — DEXAMETHASONE SODIUM PHOSPHATE 10 MG: 10 INJECTION, SOLUTION INTRAMUSCULAR; INTRAVENOUS at 10:00

## 2022-01-10 RX ADMIN — ACETAMINOPHEN 500 MG: 500 TABLET ORAL at 06:02

## 2022-01-10 RX ADMIN — PANTOPRAZOLE SODIUM 40 MG: 40 TABLET, DELAYED RELEASE ORAL at 17:00

## 2022-01-10 RX ADMIN — CETIRIZINE HYDROCHLORIDE 5 MG: 10 TABLET, FILM COATED ORAL at 10:00

## 2022-01-10 RX ADMIN — LEVOTHYROXINE SODIUM 137 MCG: 0.03 TABLET ORAL at 10:00

## 2022-01-10 RX ADMIN — DILTIAZEM HYDROCHLORIDE 120 MG: 120 CAPSULE, COATED, EXTENDED RELEASE ORAL at 10:00

## 2022-01-10 RX ADMIN — ALBUTEROL SULFATE 2 PUFF: 90 AEROSOL, METERED RESPIRATORY (INHALATION) at 20:17

## 2022-01-10 RX ADMIN — DEXAMETHASONE SODIUM PHOSPHATE 10 MG: 10 INJECTION, SOLUTION INTRAMUSCULAR; INTRAVENOUS at 20:42

## 2022-01-10 RX ADMIN — MULTIPLE VITAMINS W/ MINERALS TAB 1 TABLET: TAB at 11:00

## 2022-01-10 ASSESSMENT — PAIN SCALES - GENERAL
PAINLEVEL_OUTOF10: 0
PAINLEVEL_OUTOF10: 6
PAINLEVEL_OUTOF10: 0
PAINLEVEL_OUTOF10: 0
PAINLEVEL_OUTOF10: 2
PAINLEVEL_OUTOF10: 8
PAINLEVEL_OUTOF10: 8
PAINLEVEL_OUTOF10: 0

## 2022-01-10 ASSESSMENT — PAIN DESCRIPTION - PAIN TYPE
TYPE: CHRONIC PAIN
TYPE: ACUTE PAIN
TYPE: ACUTE PAIN

## 2022-01-10 ASSESSMENT — PAIN DESCRIPTION - ONSET: ONSET: ON-GOING

## 2022-01-10 ASSESSMENT — PAIN DESCRIPTION - FREQUENCY: FREQUENCY: INTERMITTENT

## 2022-01-10 ASSESSMENT — PAIN DESCRIPTION - DESCRIPTORS
DESCRIPTORS: ACHING;DISCOMFORT
DESCRIPTORS: ACHING

## 2022-01-10 ASSESSMENT — PAIN - FUNCTIONAL ASSESSMENT: PAIN_FUNCTIONAL_ASSESSMENT: ACTIVITIES ARE NOT PREVENTED

## 2022-01-10 ASSESSMENT — PAIN DESCRIPTION - ORIENTATION: ORIENTATION: LOWER

## 2022-01-10 ASSESSMENT — PAIN DESCRIPTION - LOCATION: LOCATION: BACK

## 2022-01-10 ASSESSMENT — PAIN DESCRIPTION - PROGRESSION: CLINICAL_PROGRESSION: GRADUALLY IMPROVING

## 2022-01-10 NOTE — PROGRESS NOTES
Progress Note  Admit Date: 1/5/2022      PCP: Gabino Neves DO     CC: F/U for Covid    Days in hospital:  5    SUBJECTIVE / Interval History:   Pt feels ok  Oxygen requirement coming down. Still has a cough        Allergies  Latex, Clindamycin/lincomycin, Sulfa antibiotics, Diflucan [fluconazole], and Other    Medications    Scheduled Meds:   lactobacillus  1 capsule Oral Daily with breakfast    miconazole  1 kit Vaginal Nightly    cefTRIAXone (ROCEPHIN) IV  2,000 mg IntraVENous Q24H    enoxaparin  30 mg SubCUTAneous BID    dexamethasone (PF)  10 mg IntraVENous BID    Followed by   Raymundo Stewart ON 1/11/2022] dexamethasone  10 mg IntraVENous Daily    nystatin  5 mL Oral 4x Daily    baricitinib  4 mg Oral Daily    albuterol sulfate HFA  2 puff Inhalation 4x daily    budesonide-formoterol  2 puff Inhalation BID    dilTIAZem  120 mg Oral Daily    fluticasone  1 spray Each Nostril Daily    folic acid  1 mg Oral Daily    levothyroxine  137 mcg Oral Daily    liothyronine  10 mcg Oral Daily    montelukast  10 mg Oral Nightly    therapeutic multivitamin-minerals  1 tablet Oral Daily    pantoprazole  40 mg Oral BID AC    polyethylene glycol  17 g Oral Daily    azithromycin  500 mg IntraVENous Q24H    cetirizine  5 mg Oral BID    And    pseudoephedrine  120 mg Oral BID     Continuous Infusions:    PRN Meds:  sodium chloride, acetaminophen, guaiFENesin-codeine    Vitals    BP (!) 150/89   Pulse 69   Temp 97.9 °F (36.6 °C) (Oral)   Resp 16   Ht 5' 3\" (1.6 m)   Wt 224 lb 3.3 oz (101.7 kg)   SpO2 92%   BMI 39.72 kg/m²     Exam:    Gen: No distress. Eyes: PERRL. No sclera icterus. No conjunctival injection. ENT: No discharge. Pharynx clear. External appearance of ears and nose normal.  Neck: Trachea midline. No obvious mass. Resp: No accessory muscle use. No crackles. No wheezes. Basal rhonchi. No dullness on percussion. CV: Regular rate. Regular rhythm. No murmur or rub. No edema.    GI: Non-tender. Non-distended. No hernia. Skin: Warm, dry, normal texture and turgor. No nodule on exposed extremities. Lymph: No cervical LAD. No supraclavicular LAD. M/S: No cyanosis. No clubbing. No joint deformity. Neuro: Moves all four extremities. CN 2-12 tested, no defect noted. Psych: Oriented x 3. No anxiety. Awake. Alert. Intact judgement and insight. Data    LABS  CBC:   Recent Labs     01/08/22  1111 01/09/22  0958 01/10/22  0732   WBC 12.0* 10.1 9.7   HGB 13.0 14.2 12.3   HCT 41.0 42.7 37.2   MCV 89.1 87.5 88.2    253 222     BMP:   Recent Labs     01/08/22 1111 01/09/22  0958 01/10/22  0732   * 134* 133*   K 4.3 3.8 4.3   CL 97* 100 102   CO2 14* 19* 20*   BUN 30* 29* 23*   CREATININE 0.8 0.7 0.7   GLUCOSE 162* 130* 162*     POC GLUCOSE:  No results for input(s): POCGLU in the last 72 hours. LIVER PROFILE:   Recent Labs     01/08/22 1111 01/09/22  0958 01/10/22  0732   AST 57* 51* 35   ALT 71* 89* 76*   LABALBU 2.9* 3.7 3.2*   BILITOT 0.5 0.4 0.3   ALKPHOS 287* 305* 249*     PT/INR: No results for input(s): PROTIME, INR in the last 72 hours. APTT: No results for input(s): APTT in the last 72 hours. UA:No results for input(s): NITRITE, COLORU, PHUR, LABCAST, WBCUA, RBCUA, MUCUS, TRICHOMONAS, YEAST, BACTERIA, CLARITYU, SPECGRAV, LEUKOCYTESUR, UROBILINOGEN, BILIRUBINUR, BLOODU, GLUCOSEU, KETUA, AMORPHOUS in the last 72 hours. Microbiology:  Wound Culture: No results for input(s): WNDABS, ORG in the last 72 hours. Invalid input(s):  LABGRAM  Nasal Culture: No results for input(s): ORG, MRSAPCR in the last 72 hours. Blood Culture:   No results for input(s): BC, BLOODCULT2 in the last 72 hours. Fungal Culture:   No results for input(s): FUNGSM in the last 72 hours. No results for input(s): FUNCXBLD in the last 72 hours.   CSF Culture:  No results for input(s): COLORCSF, APPEARCSF, CFTUBE, CLOTCSF, WBCCSF, RBCCSF, NEUTCSF, NUMCELLSCSF, LYMPHSCSF, MONOCSF, GLUCCSF, VOLCSF in the last 72 hours. Respiratory Culture:  No results for input(s): Nae Papa in the last 72 hours. AFB:No results for input(s): AFBSMEAR in the last 72 hours. Urine Culture  No results for input(s): LABURIN in the last 72 hours. RADIOLOGY:    CT CHEST PULMONARY EMBOLISM W CONTRAST   Final Result   No evidence of pulmonary embolism or acute aortic disease. Moderate to   severe COVID-19 pneumonia. RECOMMENDATIONS:   Unavailable         XR CHEST PORTABLE   Final Result   Multifocal airspace consolidation most suggestive of an atypical multi lobar   pneumonia. COVID-19 pneumonia should be excluded. CONSULTS:    IP CONSULT TO PHARMACY  IP CONSULT TO PULMONOLOGY    ASSESSMENT AND PLAN:      Active Problems:    Acute respiratory failure due to COVID-19 [facility])  Resolved Problems:    * No resolved hospital problems. *    The patient is a 48 y.o. female medical history of A. fib, recurrent sinusitis, autoimmune hepatitis, ITP, obesity who presents to [facility] with worsening shortness of breath cough and fatigue. Patient states it has been about a month since she has been having sinusitis and has taken 2 courses of Augmentin and now is on Cipro and has taken 2 course of steroids. Since the last 2 days had chills with shortness of breath and fatigue and a lot of cough resulting in chest pain. Patient was admitted with acute hypoxic respiratory failure secondary to COVID-19 pneumonia with recurrent sinusitis    Acute hypoxic respiratory failure-now on 9 L of oxygen  -Attempt to wean oxygen as tolerated     COVID-19 pneumonia  -Patient is vaccinated, has not received a booster  -Started on Decadron, dose increased (D4)  -Inflammatory markers acutely elevated  -Pharmacy to dose baricitinib D3  -CXR on the 30th was clear.   Chest x-ray done today shows multifocal pneumonia  -procal level mildly elevated  -CT shows moderate to severe pneumonia due to Covid     Recurrent sinusitis  -Started on antibiotics day 5  -CT scan done few days ago shows mild sinusitis  -If procalcitonin is okay we will stop antibiotics tomorrow     Hypothyroidism  -Continue home meds  -History of Hashimoto's     Hyponatremia  -Possibly due to Covid. Monitor     History of A. Fib  -On Cardizem  -Not on anticoagulation due to low [id number] score     Morbid obesity  -Complicating management and treatment    DVT Prophylaxis: Lovenox  Diet: ADULT DIET; Regular  Code Status: Prior        Discharge plan -continue care    The patient and / or the family were informed of the results of any tests, a time was given to answer questions, a plan was proposed and they agreed with plan. Discussed with consulting physicians, nursing and social work     The note was completed using EMR. Every effort was made to ensure accuracy; however, inadvertent computerized transcription errors may be present.        Lona Diggs MD

## 2022-01-10 NOTE — CARE COORDINATION
[id number] [facility] - [phone]    Sandoval Cardoso.  Administrative Assist, Case Management  [phone]  Electronically signed by Saurabh Iglesias on 1/10/2022 at 12:17 PM

## 2022-01-10 NOTE — ADT AUTH CERT
I RECEIVED A FAX FROM [employer] LETTING ME KNOW THAT THE PATIENT HAS [facility] AS PRIMARY COVERAGE. I RAN THE PATIENT FOR ACTIVE COVERAGE FOR THE MONTH OF January WITH THE ID NUMBER THAT THEY PROVED BUT IT ALSO BACK THAT THE PATIENT IS NOT ACTIVE. PATIENT'S [facility] TERMED AS OF 10/31/21 WITH THE NUMBER PROVIDE BY [facility] [facility]. PLEASE REVIEW CLINICALS AND PROVIDE AUTH FOR PATIENT.                                                      Utilization Reviews         Pneumonia - Care Day 2 (1/6/2022) by Jeffery Odom RN       Review Status Review Entered   Completed 1/7/2022 11:04      Criteria Review      Care Day: 2 Care Date: 1/6/2022 Level of Care: Inpatient Floor    Guideline Day 2    Level Of Care    (X) Floor    1/7/2022 11:04 AM EST by Shania clemens    Clinical Status    ( ) * No CO2 retention or acidosis    ( ) * No requirement for mechanical ventilation    (X) * Hypotension absent    1/7/2022 11:04 AM EST by Shania Higuera      115/78    (X) * Afebrile or fever improved    1/7/2022 11:04 AM EST by Shania Higuera      37.4    ( ) * No hypoxia on room air or oxygenation improved    (X) * Mental status improved or at baseline    1/7/2022 11:04 AM EST by Shania Higuera      baseline    Activity    ( ) * Increased activity    Interventions    (X) Possible oxygen    1/7/2022 11:04 AM EST by Shania Higuera      6-12L hi flow    Medications    (X) IV or oral antibiotics    1/7/2022 11:04 AM EST by Shania Higuera      zithromax/rocephin    * Milestone   Additional Notes   DATE: 1/6  Day 2      Pt remains on medsurg unit      Pertinent Updates:   02 requirement worse    Sob with ambulation      Vitals: t: 37.4 p: 78 rr: 18 bp: 102/62 spo2: 85% 12L hi flow nasal cannula         Physical Exam: increased oxygen need         Abnl/Pertinent Labs:  alk phos: 303, alt: 67, ast: 59         Medications:   Decadron 10mg iv bid   Lovenox 30mg sq daily         Orders:   telemetry      MD Consults/Assessments & Plans:   Per IM PN:   ASSESSMENT AND PLAN:         Active Problems:     Acute respiratory failure due to COVID-19 [facility])   Resolved Problems:     * No resolved hospital problems. *       The patient is a 51 y. o. female medical history of A. fib, recurrent sinusitis, autoimmune hepatitis, ITP, obesity who presents to [facility] with worsening shortness of breath cough and fatigue.  Patient states it has been about a month since she has been having sinusitis and has taken 2 courses of Augmentin and now is on Cipro and has taken 2 course of steroids.  Since the last 2 days had chills with shortness of breath and fatigue and a lot of cough resulting in chest pain       Acute hypoxic respiratory failure-patient hypoxic on 6 L   -Patient was hypoxic in the 80s in the ER and needed 5 L of oxygen to maintain sats greater than 90%       COVID-19 pneumonia   -Patient is vaccinated, has not received a booster   -Started on Decadron, dose increased   -Inflammatory markers acutely elevated   -Pharmacy to dose baricitinib   -CXR on the 30th was clear.  Chest x-ray done today shows multifocal pneumonia   -procal level elevated       Recurrent sinusitis   -Started on antibiotics   -CT scan done few days ago shows mild sinusitis                       Pneumonia - Care Day 1 (1/5/2022) by Gianluca Martinez RN       Review Status Review Entered   Completed 1/6/2022 10:44      Criteria Review      Care Day: 1 Care Date: 1/5/2022 Level of Care: Inpatient Floor    Guideline Day 1    Level Of Care    (X) ICU or floor    1/6/2022 10:44 AM EST by Swapna Bragg      medsurdexter    Clinical Status    (X) * Clinical Indications met    (X) Possible Fever, dyspnea, purulent sputum, pleuritic pain, Altered mental status    1/6/2022 10:44 AM EST by Swapna Bragg      +SOB  T: 38.1    Interventions    (X) WBC    1/6/2022 10:44 AM EST by Swapna Bragg      11.4    (X) Probable oxygen    1/6/2022 10:44 AM EST by Jennfier Stone presents to the emergency department with ongoing symptoms for at least the last 20 days. Werner Cedillo states she started off with a sinus infection and was placed on Augmentin.  Her symptoms worsened. Werner Cedillo had a scope by her ENT he discovered a sphenoid abscess which was drained.  She was switched to Cipro there after for staph growth.  She has had a cough with her sinus infection and about 2 days ago she developed shortness of breath.  She denies chest pain.  She states that she cannot get her breath in. Werner Cedillo has gotten progressively worse to the point where she cannot stand the fatigue that she is experiencing. Werner Cedillo has been routinely getting Covid testing which have all been negative.  She is vaccinated against Covid with 2 vaccines but has not had a booster yet.  She has not had an influenza vaccine. Werner Cedillo has a history of asthma.  She states she has been on 2 rounds of steroids and has been using her inhalers at home that have not helped. Werner Cedillo is unable to lay down flat at night for sleeping because the shortness of breath becomes much more severe.  She denies history of heart failure. Vitals:         Pertinent Medical History:   · Abdominal wall abscess 2/8/2017   · Abdominal wall cellulitis 2/6/2017   · Anal fissure 4/30/2015   · Anemia, iron deficiency     · Asthma     · Autoimmune hepatitis ([facility] [address]     · Chronic sinusitis     · History of blood transfusion     · Hypothyroidism     · Menorrhagia with regular cycle     · Morbid obesity with BMI of 40.0-44.9, adult ([facility] [address] 5/26/2015   · MRSA (methicillin resistant staph aureus) culture positive 02/03/2017     abdominal abscess   · MRSA infection 08/20/2012     rt thigh abscess   · Pericarditis, acute     · Sinusitis          Physical Exam:   Constitutional:  Well developed, well nourished, respiratory distress   HENT:  Atraumatic, dry mucus membranes. Tongue is red, dry and cracked.     Neck: supple, no JVD    Respiratory: Tachypneic during my exam breathing 24 breaths/min.  She is speaking in uninterrupted sentences.  She is hypoxic at 82% on room air.  O2 applied.  Crackles throughout. Cardiovascular: Tachycardic rate with a regular rhythm S1-S2 no murmurs vascular: Radial and DP pulses 2+ and equal bilaterally   GI:  Soft, nontender, normal bowel sounds   Musculoskeletal:  no lower extremity edema, no lower extremity asymmetry, no calf tenderness, no thigh tenderness, no acute deformities   Integument:  Skin is warm and dry, no petechiae    Neurologic:  Alert & oriented, no slurred speech   Psych: Pleasant affect, no hallucinations      Abnl/Pertinent Labs:  wbc: 11.4, na: 128, alk phos: 315, alt: 71, ast: 84, vbg's:  pH: 7.286, pco2: 52.9      COVID 19: detected      Radiology/Diagnostic Studies:   XR CHEST PORTABLE   Final Result   Multifocal airspace consolidation most suggestive of an atypical multi lobar   pneumonia.  COVID-19 pneumonia should be excluded.          ED Medications: dexamethasone 10mg iv x 1, duoneb x 2      Plan:  acute hypoxemic respiratory failure due to covid 19, pneumonia

## 2022-01-10 NOTE — PLAN OF CARE
Problem: Airway Clearance - Ineffective  Goal: Achieve or maintain patent airway  Outcome: Ongoing     Problem: Gas Exchange - Impaired  Goal: Absence of hypoxia  Outcome: Ongoing  Goal: Promote optimal lung function  Outcome: Ongoing     Problem: Loneliness or Risk for Loneliness  Goal: Demonstrate positive use of time alone when socialization is not possible  Outcome: Ongoing     Problem: Fatigue  Goal: Verbalize increase energy and improved vitality  Outcome: Ongoing     Problem: Pain:  Goal: Pain level will decrease  Description: Pain level will decrease  Outcome: Ongoing  Goal: Control of acute pain  Description: Control of acute pain  Outcome: Ongoing     Problem: Falls - Risk of:  Goal: Will remain free from falls  Description: Will remain free from falls  Outcome: Ongoing  Goal: Absence of physical injury  Description: Absence of physical injury  Outcome: Ongoing

## 2022-01-10 NOTE — CARE COORDINATION
Dania contacted by RN-CM regarding need for [employer] for home. Patient's previous orders for [employer] has . Will need new Orders. M for RN-CM. Thank you for the referral.  Electronically signed by Teri Miller on 1/10/2022 at 11:37 AM Cell ph# [phone]      Nebulizer with compressor  Disposable Med Nebs 2 per month  Reusable Med Nebs 1 per 6 months  Aerosol Mask 1 per month  Replacement Filters 2 per month  All other related supplies as needed per month   Dx: J45.909  Medications being used: Albuterol . 083 unit dose vial   Frequency: Four times daily   Length of Need: 12 months

## 2022-01-11 ENCOUNTER — TELEPHONE (OUTPATIENT)
Dept: ENT CLINIC | Age: 47
End: 2022-01-11

## 2022-01-11 ENCOUNTER — TELEPHONE (OUTPATIENT)
Dept: SLEEP CENTER | Age: 47
End: 2022-01-11

## 2022-01-11 LAB
A/G RATIO: 0.8 (ref 1.1–2.2)
ALBUMIN SERPL-MCNC: 3.6 G/DL (ref 3.4–5)
ALP BLD-CCNC: 241 U/L (ref 40–129)
ALT SERPL-CCNC: 81 U/L (ref 10–40)
ANION GAP SERPL CALCULATED.3IONS-SCNC: 13 MMOL/L (ref 3–16)
AST SERPL-CCNC: 34 U/L (ref 15–37)
BASOPHILS ABSOLUTE: 0 K/UL (ref 0–0.2)
BASOPHILS RELATIVE PERCENT: 0.1 %
BILIRUB SERPL-MCNC: 0.3 MG/DL (ref 0–1)
BUN BLDV-MCNC: 25 MG/DL (ref 7–20)
CALCIUM SERPL-MCNC: 8.9 MG/DL (ref 8.3–10.6)
CHLORIDE BLD-SCNC: 99 MMOL/L (ref 99–110)
CO2: 20 MMOL/L (ref 21–32)
CREAT SERPL-MCNC: 0.7 MG/DL (ref 0.6–1.1)
EOSINOPHILS ABSOLUTE: 0 K/UL (ref 0–0.6)
EOSINOPHILS RELATIVE PERCENT: 0 %
GFR AFRICAN AMERICAN: >60
GFR NON-AFRICAN AMERICAN: >60
GLUCOSE BLD-MCNC: 85 MG/DL (ref 70–99)
HCT VFR BLD CALC: 39.3 % (ref 36–48)
HEMOGLOBIN: 12.8 G/DL (ref 12–16)
LYMPHOCYTES ABSOLUTE: 0.5 K/UL (ref 1–5.1)
LYMPHOCYTES RELATIVE PERCENT: 4.7 %
MCH RBC QN AUTO: 29 PG (ref 26–34)
MCHC RBC AUTO-ENTMCNC: 32.6 G/DL (ref 31–36)
MCV RBC AUTO: 89 FL (ref 80–100)
MONOCYTES ABSOLUTE: 0.5 K/UL (ref 0–1.3)
MONOCYTES RELATIVE PERCENT: 4.9 %
NEUTROPHILS ABSOLUTE: 10 K/UL (ref 1.7–7.7)
NEUTROPHILS RELATIVE PERCENT: 90.3 %
PDW BLD-RTO: 13.5 % (ref 12.4–15.4)
PLATELET # BLD: 253 K/UL (ref 135–450)
PMV BLD AUTO: 8.5 FL (ref 5–10.5)
POTASSIUM SERPL-SCNC: 4.2 MMOL/L (ref 3.5–5.1)
RBC # BLD: 4.41 M/UL (ref 4–5.2)
SODIUM BLD-SCNC: 132 MMOL/L (ref 136–145)
TOTAL PROTEIN: 8 G/DL (ref 6.4–8.2)
WBC # BLD: 11.1 K/UL (ref 4–11)

## 2022-01-11 PROCEDURE — 94761 N-INVAS EAR/PLS OXIMETRY MLT: CPT

## 2022-01-11 PROCEDURE — 1200000000 HC SEMI PRIVATE

## 2022-01-11 PROCEDURE — 36415 COLL VENOUS BLD VENIPUNCTURE: CPT

## 2022-01-11 PROCEDURE — 85025 COMPLETE CBC W/AUTO DIFF WBC: CPT

## 2022-01-11 PROCEDURE — 6370000000 HC RX 637 (ALT 250 FOR IP): Performed by: INTERNAL MEDICINE

## 2022-01-11 PROCEDURE — 80053 COMPREHEN METABOLIC PANEL: CPT

## 2022-01-11 PROCEDURE — 94640 AIRWAY INHALATION TREATMENT: CPT

## 2022-01-11 PROCEDURE — 6360000002 HC RX W HCPCS: Performed by: INTERNAL MEDICINE

## 2022-01-11 PROCEDURE — 2580000003 HC RX 258: Performed by: INTERNAL MEDICINE

## 2022-01-11 PROCEDURE — 2700000000 HC OXYGEN THERAPY PER DAY

## 2022-01-11 RX ADMIN — ACETAMINOPHEN 500 MG: 500 TABLET ORAL at 21:05

## 2022-01-11 RX ADMIN — LIOTHYRONINE SODIUM 10 MCG: 5 TABLET ORAL at 09:02

## 2022-01-11 RX ADMIN — CETIRIZINE HYDROCHLORIDE 5 MG: 10 TABLET, FILM COATED ORAL at 09:02

## 2022-01-11 RX ADMIN — NYSTATIN 500000 UNITS: 100000 SUSPENSION ORAL at 16:23

## 2022-01-11 RX ADMIN — NYSTATIN 500000 UNITS: 100000 SUSPENSION ORAL at 21:05

## 2022-01-11 RX ADMIN — GUAIFENESIN AND CODEINE PHOSPHATE 5 ML: 10; 100 LIQUID ORAL at 21:05

## 2022-01-11 RX ADMIN — BUDESONIDE AND FORMOTEROL FUMARATE DIHYDRATE 2 PUFF: 160; 4.5 AEROSOL RESPIRATORY (INHALATION) at 20:19

## 2022-01-11 RX ADMIN — CEFTRIAXONE 2000 MG: 2 INJECTION, POWDER, FOR SOLUTION INTRAMUSCULAR; INTRAVENOUS at 16:27

## 2022-01-11 RX ADMIN — LEVOTHYROXINE SODIUM 137 MCG: 0.03 TABLET ORAL at 09:02

## 2022-01-11 RX ADMIN — ALBUTEROL SULFATE 2 PUFF: 90 AEROSOL, METERED RESPIRATORY (INHALATION) at 13:20

## 2022-01-11 RX ADMIN — MULTIPLE VITAMINS W/ MINERALS TAB 1 TABLET: TAB at 09:03

## 2022-01-11 RX ADMIN — ACETAMINOPHEN 500 MG: 500 TABLET ORAL at 06:12

## 2022-01-11 RX ADMIN — ACETAMINOPHEN 500 MG: 500 TABLET ORAL at 16:26

## 2022-01-11 RX ADMIN — DEXAMETHASONE SODIUM PHOSPHATE 10 MG: 10 INJECTION INTRAMUSCULAR; INTRAVENOUS at 09:03

## 2022-01-11 RX ADMIN — PSEUDOEPHEDRINE HYDROCHLORIDE 120 MG: 120 TABLET, FILM COATED, EXTENDED RELEASE ORAL at 09:02

## 2022-01-11 RX ADMIN — FLUTICASONE PROPIONATE 1 SPRAY: 50 SPRAY, METERED NASAL at 21:05

## 2022-01-11 RX ADMIN — BARICITINIB 4 MG: 2 TABLET, FILM COATED ORAL at 14:06

## 2022-01-11 RX ADMIN — ALBUTEROL SULFATE 2 PUFF: 90 AEROSOL, METERED RESPIRATORY (INHALATION) at 09:07

## 2022-01-11 RX ADMIN — MONTELUKAST SODIUM 10 MG: 10 TABLET, COATED ORAL at 21:05

## 2022-01-11 RX ADMIN — FOLIC ACID 1 MG: 1 TABLET ORAL at 09:03

## 2022-01-11 RX ADMIN — BUDESONIDE AND FORMOTEROL FUMARATE DIHYDRATE 2 PUFF: 160; 4.5 AEROSOL RESPIRATORY (INHALATION) at 09:08

## 2022-01-11 RX ADMIN — CETIRIZINE HYDROCHLORIDE 5 MG: 10 TABLET, FILM COATED ORAL at 21:05

## 2022-01-11 RX ADMIN — PANTOPRAZOLE SODIUM 40 MG: 40 TABLET, DELAYED RELEASE ORAL at 06:12

## 2022-01-11 RX ADMIN — Medication 1 CAPSULE: at 09:02

## 2022-01-11 RX ADMIN — ALBUTEROL SULFATE 2 PUFF: 90 AEROSOL, METERED RESPIRATORY (INHALATION) at 16:35

## 2022-01-11 RX ADMIN — ENOXAPARIN SODIUM 30 MG: 100 INJECTION SUBCUTANEOUS at 21:05

## 2022-01-11 RX ADMIN — ALBUTEROL SULFATE 2 PUFF: 90 AEROSOL, METERED RESPIRATORY (INHALATION) at 20:19

## 2022-01-11 RX ADMIN — NYSTATIN 500000 UNITS: 100000 SUSPENSION ORAL at 09:03

## 2022-01-11 RX ADMIN — NYSTATIN 500000 UNITS: 100000 SUSPENSION ORAL at 14:08

## 2022-01-11 RX ADMIN — PSEUDOEPHEDRINE HYDROCHLORIDE 120 MG: 120 TABLET, FILM COATED, EXTENDED RELEASE ORAL at 21:05

## 2022-01-11 RX ADMIN — DILTIAZEM HYDROCHLORIDE 120 MG: 120 CAPSULE, COATED, EXTENDED RELEASE ORAL at 09:02

## 2022-01-11 RX ADMIN — ENOXAPARIN SODIUM 30 MG: 100 INJECTION SUBCUTANEOUS at 09:03

## 2022-01-11 RX ADMIN — PANTOPRAZOLE SODIUM 40 MG: 40 TABLET, DELAYED RELEASE ORAL at 16:23

## 2022-01-11 RX ADMIN — ACETAMINOPHEN 500 MG: 500 TABLET ORAL at 11:06

## 2022-01-11 ASSESSMENT — PAIN DESCRIPTION - ONSET: ONSET: ON-GOING

## 2022-01-11 ASSESSMENT — PAIN DESCRIPTION - FREQUENCY: FREQUENCY: INTERMITTENT

## 2022-01-11 ASSESSMENT — PAIN SCALES - GENERAL
PAINLEVEL_OUTOF10: 4
PAINLEVEL_OUTOF10: 8
PAINLEVEL_OUTOF10: 0
PAINLEVEL_OUTOF10: 3
PAINLEVEL_OUTOF10: 3
PAINLEVEL_OUTOF10: 0
PAINLEVEL_OUTOF10: 0

## 2022-01-11 ASSESSMENT — PAIN - FUNCTIONAL ASSESSMENT: PAIN_FUNCTIONAL_ASSESSMENT: ACTIVITIES ARE NOT PREVENTED

## 2022-01-11 ASSESSMENT — PAIN DESCRIPTION - ORIENTATION: ORIENTATION: RIGHT;LEFT;UPPER;LOWER;MID

## 2022-01-11 ASSESSMENT — PAIN DESCRIPTION - DIRECTION: RADIATING_TOWARDS: MID

## 2022-01-11 ASSESSMENT — PAIN DESCRIPTION - PROGRESSION: CLINICAL_PROGRESSION: RAPIDLY IMPROVING

## 2022-01-11 ASSESSMENT — PAIN DESCRIPTION - LOCATION: LOCATION: GENERALIZED

## 2022-01-11 ASSESSMENT — PAIN DESCRIPTION - PAIN TYPE: TYPE: ACUTE PAIN

## 2022-01-11 ASSESSMENT — PAIN DESCRIPTION - DESCRIPTORS: DESCRIPTORS: ACHING;DISCOMFORT;DULL

## 2022-01-11 NOTE — PROGRESS NOTES
Progress Note  Admit Date: 1/5/2022      PCP: Matthew Kamara DO     CC: F/U for Covid    Days in hospital:  6    SUBJECTIVE / Interval History:   Pt feels ok  Oxygen requirement coming down. Now on 6 L Still has a cough  Feels much better        Allergies  Latex, Clindamycin/lincomycin, Sulfa antibiotics, Diflucan [fluconazole], and Other    Medications    Scheduled Meds:   lactobacillus  1 capsule Oral Daily with breakfast    cefTRIAXone (ROCEPHIN) IV  2,000 mg IntraVENous Q24H    enoxaparin  30 mg SubCUTAneous BID    dexamethasone  10 mg IntraVENous Daily    nystatin  5 mL Oral 4x Daily    baricitinib  4 mg Oral Daily    albuterol sulfate HFA  2 puff Inhalation 4x daily    budesonide-formoterol  2 puff Inhalation BID    dilTIAZem  120 mg Oral Daily    fluticasone  1 spray Each Nostril Daily    folic acid  1 mg Oral Daily    levothyroxine  137 mcg Oral Daily    liothyronine  10 mcg Oral Daily    montelukast  10 mg Oral Nightly    therapeutic multivitamin-minerals  1 tablet Oral Daily    pantoprazole  40 mg Oral BID AC    polyethylene glycol  17 g Oral Daily    cetirizine  5 mg Oral BID    And    pseudoephedrine  120 mg Oral BID     Continuous Infusions:    PRN Meds:  sodium chloride, acetaminophen, guaiFENesin-codeine    Vitals    /84   Pulse 72   Temp 98.3 °F (36.8 °C) (Oral)   Resp 18   Ht 5' 3\" (1.6 m)   Wt 219 lb 5.7 oz (99.5 kg)   SpO2 95%   BMI 38.86 kg/m²     Exam:    Gen: No distress. Eyes: PERRL. No sclera icterus. No conjunctival injection. ENT: No discharge. Pharynx clear. External appearance of ears and nose normal.  Neck: Trachea midline. No obvious mass. Resp: No accessory muscle use. No crackles. No wheezes. Basal rhonchi. No dullness on percussion. CV: Regular rate. Regular rhythm. No murmur or rub. No edema. GI: Non-tender. Non-distended. No hernia. Skin: Warm, dry, normal texture and turgor. No nodule on exposed extremities.    Lymph: No cervical LAD. No supraclavicular LAD. M/S: No cyanosis. No clubbing. No joint deformity. Neuro: Moves all four extremities. CN 2-12 tested, no defect noted. Psych: Oriented x 3. No anxiety. Awake. Alert. Intact judgement and insight. Data    LABS  CBC:   Recent Labs     01/09/22  0958 01/10/22  0732 01/11/22  0839   WBC 10.1 9.7 11.1*   HGB 14.2 12.3 12.8   HCT 42.7 37.2 39.3   MCV 87.5 88.2 89.0    222 253     BMP:   Recent Labs     01/08/22  1111 01/09/22  0958 01/10/22  0732   * 134* 133*   K 4.3 3.8 4.3   CL 97* 100 102   CO2 14* 19* 20*   BUN 30* 29* 23*   CREATININE 0.8 0.7 0.7   GLUCOSE 162* 130* 162*     POC GLUCOSE:  No results for input(s): POCGLU in the last 72 hours. LIVER PROFILE:   Recent Labs     01/08/22 1111 01/09/22  0958 01/10/22  0732   AST 57* 51* 35   ALT 71* 89* 76*   LABALBU 2.9* 3.7 3.2*   BILITOT 0.5 0.4 0.3   ALKPHOS 287* 305* 249*     PT/INR: No results for input(s): PROTIME, INR in the last 72 hours. APTT: No results for input(s): APTT in the last 72 hours. UA:No results for input(s): NITRITE, COLORU, PHUR, LABCAST, WBCUA, RBCUA, MUCUS, TRICHOMONAS, YEAST, BACTERIA, CLARITYU, SPECGRAV, LEUKOCYTESUR, UROBILINOGEN, BILIRUBINUR, BLOODU, GLUCOSEU, KETUA, AMORPHOUS in the last 72 hours. Microbiology:  Wound Culture: No results for input(s): WNDABS, ORG in the last 72 hours. Invalid input(s):  LABGRAM  Nasal Culture: No results for input(s): ORG, MRSAPCR in the last 72 hours. Blood Culture:   No results for input(s): BC, BLOODCULT2 in the last 72 hours. Fungal Culture:   No results for input(s): FUNGSM in the last 72 hours. No results for input(s): FUNCXBLD in the last 72 hours. CSF Culture:  No results for input(s): COLORCSF, APPEARCSF, CFTUBE, CLOTCSF, WBCCSF, RBCCSF, NEUTCSF, NUMCELLSCSF, LYMPHSCSF, MONOCSF, GLUCCSF, VOLCSF in the last 72 hours. Respiratory Culture:  No results for input(s): Nae Papa in the last 72 hours.   AFB:No results for input(s): AFBSMEAR in the last 72 hours. Urine Culture  No results for input(s): LABURIN in the last 72 hours. RADIOLOGY:    CT CHEST PULMONARY EMBOLISM W CONTRAST   Final Result   No evidence of pulmonary embolism or acute aortic disease. Moderate to   severe COVID-19 pneumonia. RECOMMENDATIONS:   Unavailable         XR CHEST PORTABLE   Final Result   Multifocal airspace consolidation most suggestive of an atypical multi lobar   pneumonia. COVID-19 pneumonia should be excluded. CONSULTS:    IP CONSULT TO PHARMACY  IP CONSULT TO PULMONOLOGY    ASSESSMENT AND PLAN:      Active Problems:    Acute respiratory failure due to COVID-19 [facility])  Resolved Problems:    * No resolved hospital problems. *    The patient is a 48 y.o. female medical history of A. fib, recurrent sinusitis, autoimmune hepatitis, ITP, obesity who presents to [facility] with worsening shortness of breath cough and fatigue. Patient states it has been about a month since she has been having sinusitis and has taken 2 courses of Augmentin and now is on Cipro and has taken 2 course of steroids. Since the last 2 days had chills with shortness of breath and fatigue and a lot of cough resulting in chest pain. Patient was admitted with acute hypoxic respiratory failure secondary to COVID-19 pneumonia with recurrent sinusitis    Acute hypoxic respiratory failure-now on 6 L of oxygen  -Attempt to wean oxygen as tolerated     COVID-19 pneumonia  -Patient is vaccinated, has not received a booster  -Started on Decadron, dose increased (D5), cut back dose tomorrow  -Inflammatory markers acutely elevated  -Pharmacy to dose baricitinib D4  -CXR on the 30th was clear.   Chest x-ray done today shows multifocal pneumonia  -procal level mildly elevated  -CT shows moderate to severe pneumonia due to Covid     Recurrent sinusitis  -Started on antibiotics day 7 ( stop today)  -CT scan done few days ago shows mild sinusitis  -If procalcitonin is okay we will stop antibiotics tomorrow     Hypothyroidism  -Continue home meds  -History of Hashimoto's     Hyponatremia  -Possibly due to Covid. Monitor     History of A. Fib  -On Cardizem  -Not on anticoagulation due to low [id number] score     Morbid obesity  -Complicating management and treatment    DVT Prophylaxis: Lovenox  Diet: ADULT DIET; Regular  Code Status: Prior        Discharge plan -continue care    The patient and / or the family were informed of the results of any tests, a time was given to answer questions, a plan was proposed and they agreed with plan. Discussed with consulting physicians, nursing and social work     The note was completed using EMR. Every effort was made to ensure accuracy; however, inadvertent computerized transcription errors may be present.        Alisha Balderas MD

## 2022-01-11 NOTE — TELEPHONE ENCOUNTER
----- Message from Reema Danielle MD sent at 1/4/2022  3:15 PM EST -----  Please let patient know that her CT sinuses showed no infection.  All previously operated sinus are widely open and functional. If she is not improved Ill have to refer her to a allergist/immunologist.  Phillip Erazo

## 2022-01-11 NOTE — TELEPHONE ENCOUNTER
Spoke to pt about scheduling 2nd sleep study (cpap) per Juan Diego Serum     Pt is not ready at this time to schedule sleep study but will call the lab back when she's got the time.      Had shots   Had covid -currently at [facility] for this   [employer]

## 2022-01-11 NOTE — TELEPHONE ENCOUNTER
Informed patient of CT results. She states she was currently in the hospital with COVID pneumonia and would call with questions later.

## 2022-01-12 ENCOUNTER — TELEPHONE (OUTPATIENT)
Dept: ENT CLINIC | Age: 47
End: 2022-01-12

## 2022-01-12 VITALS
WEIGHT: 219.36 LBS | OXYGEN SATURATION: 98 % | SYSTOLIC BLOOD PRESSURE: 112 MMHG | BODY MASS INDEX: 38.87 KG/M2 | HEART RATE: 93 BPM | DIASTOLIC BLOOD PRESSURE: 77 MMHG | RESPIRATION RATE: 18 BRPM | TEMPERATURE: 97.9 F | HEIGHT: 63 IN

## 2022-01-12 LAB
A/G RATIO: 0.8 (ref 1.1–2.2)
ALBUMIN SERPL-MCNC: 3.4 G/DL (ref 3.4–5)
ALP BLD-CCNC: 225 U/L (ref 40–129)
ALT SERPL-CCNC: 76 U/L (ref 10–40)
ANION GAP SERPL CALCULATED.3IONS-SCNC: 14 MMOL/L (ref 3–16)
AST SERPL-CCNC: 30 U/L (ref 15–37)
BANDED NEUTROPHILS RELATIVE PERCENT: 2 % (ref 0–7)
BASOPHILS ABSOLUTE: 0 K/UL (ref 0–0.2)
BASOPHILS RELATIVE PERCENT: 0 %
BILIRUB SERPL-MCNC: 0.3 MG/DL (ref 0–1)
BUN BLDV-MCNC: 27 MG/DL (ref 7–20)
CALCIUM SERPL-MCNC: 8.6 MG/DL (ref 8.3–10.6)
CHLORIDE BLD-SCNC: 102 MMOL/L (ref 99–110)
CO2: 18 MMOL/L (ref 21–32)
CREAT SERPL-MCNC: 0.8 MG/DL (ref 0.6–1.1)
EOSINOPHILS ABSOLUTE: 0 K/UL (ref 0–0.6)
EOSINOPHILS RELATIVE PERCENT: 0 %
GFR AFRICAN AMERICAN: >60
GFR NON-AFRICAN AMERICAN: >60
GLUCOSE BLD-MCNC: 113 MG/DL (ref 70–99)
HCT VFR BLD CALC: 38.8 % (ref 36–48)
HEMOGLOBIN: 13.3 G/DL (ref 12–16)
LYMPHOCYTES ABSOLUTE: 1 K/UL (ref 1–5.1)
LYMPHOCYTES RELATIVE PERCENT: 8 %
MCH RBC QN AUTO: 30.6 PG (ref 26–34)
MCHC RBC AUTO-ENTMCNC: 34.2 G/DL (ref 31–36)
MCV RBC AUTO: 89.5 FL (ref 80–100)
MONOCYTES ABSOLUTE: 0.5 K/UL (ref 0–1.3)
MONOCYTES RELATIVE PERCENT: 4 %
NEUTROPHILS ABSOLUTE: 11.2 K/UL (ref 1.7–7.7)
NEUTROPHILS RELATIVE PERCENT: 86 %
PDW BLD-RTO: 13.7 % (ref 12.4–15.4)
PLATELET # BLD: 268 K/UL (ref 135–450)
PMV BLD AUTO: 8.5 FL (ref 5–10.5)
POTASSIUM SERPL-SCNC: 4 MMOL/L (ref 3.5–5.1)
RBC # BLD: 4.33 M/UL (ref 4–5.2)
RBC # BLD: NORMAL 10*6/UL
SODIUM BLD-SCNC: 134 MMOL/L (ref 136–145)
TOTAL PROTEIN: 7.8 G/DL (ref 6.4–8.2)
WBC # BLD: 12.7 K/UL (ref 4–11)

## 2022-01-12 PROCEDURE — 6370000000 HC RX 637 (ALT 250 FOR IP): Performed by: INTERNAL MEDICINE

## 2022-01-12 PROCEDURE — 85025 COMPLETE CBC W/AUTO DIFF WBC: CPT

## 2022-01-12 PROCEDURE — 6360000002 HC RX W HCPCS: Performed by: INTERNAL MEDICINE

## 2022-01-12 PROCEDURE — 2700000000 HC OXYGEN THERAPY PER DAY

## 2022-01-12 PROCEDURE — 36415 COLL VENOUS BLD VENIPUNCTURE: CPT

## 2022-01-12 PROCEDURE — 94640 AIRWAY INHALATION TREATMENT: CPT

## 2022-01-12 PROCEDURE — 94761 N-INVAS EAR/PLS OXIMETRY MLT: CPT

## 2022-01-12 PROCEDURE — 80053 COMPREHEN METABOLIC PANEL: CPT

## 2022-01-12 RX ORDER — DEXAMETHASONE 6 MG/1
6 TABLET ORAL
Qty: 3 TABLET | Refills: 0 | Status: SHIPPED | OUTPATIENT
Start: 2022-01-12 | End: 2022-01-15

## 2022-01-12 RX ADMIN — ENOXAPARIN SODIUM 30 MG: 100 INJECTION SUBCUTANEOUS at 09:09

## 2022-01-12 RX ADMIN — MULTIPLE VITAMINS W/ MINERALS TAB 1 TABLET: TAB at 09:09

## 2022-01-12 RX ADMIN — LEVOTHYROXINE SODIUM 137 MCG: 0.03 TABLET ORAL at 09:08

## 2022-01-12 RX ADMIN — DEXAMETHASONE SODIUM PHOSPHATE 10 MG: 10 INJECTION INTRAMUSCULAR; INTRAVENOUS at 09:22

## 2022-01-12 RX ADMIN — NYSTATIN 500000 UNITS: 100000 SUSPENSION ORAL at 09:10

## 2022-01-12 RX ADMIN — CETIRIZINE HYDROCHLORIDE 5 MG: 10 TABLET, FILM COATED ORAL at 09:09

## 2022-01-12 RX ADMIN — LIOTHYRONINE SODIUM 10 MCG: 5 TABLET ORAL at 09:22

## 2022-01-12 RX ADMIN — ALBUTEROL SULFATE 2 PUFF: 90 AEROSOL, METERED RESPIRATORY (INHALATION) at 12:10

## 2022-01-12 RX ADMIN — ALBUTEROL SULFATE 2 PUFF: 90 AEROSOL, METERED RESPIRATORY (INHALATION) at 09:47

## 2022-01-12 RX ADMIN — DILTIAZEM HYDROCHLORIDE 120 MG: 120 CAPSULE, COATED, EXTENDED RELEASE ORAL at 09:09

## 2022-01-12 RX ADMIN — PSEUDOEPHEDRINE HYDROCHLORIDE 120 MG: 120 TABLET, FILM COATED, EXTENDED RELEASE ORAL at 09:22

## 2022-01-12 RX ADMIN — BUDESONIDE AND FORMOTEROL FUMARATE DIHYDRATE 2 PUFF: 160; 4.5 AEROSOL RESPIRATORY (INHALATION) at 09:47

## 2022-01-12 RX ADMIN — BARICITINIB 4 MG: 2 TABLET, FILM COATED ORAL at 12:23

## 2022-01-12 RX ADMIN — PANTOPRAZOLE SODIUM 40 MG: 40 TABLET, DELAYED RELEASE ORAL at 06:38

## 2022-01-12 RX ADMIN — ACETAMINOPHEN 500 MG: 500 TABLET ORAL at 12:23

## 2022-01-12 RX ADMIN — Medication 1 CAPSULE: at 09:08

## 2022-01-12 RX ADMIN — NYSTATIN 500000 UNITS: 100000 SUSPENSION ORAL at 12:23

## 2022-01-12 RX ADMIN — FOLIC ACID 1 MG: 1 TABLET ORAL at 09:09

## 2022-01-12 RX ADMIN — ACETAMINOPHEN 500 MG: 500 TABLET ORAL at 06:40

## 2022-01-12 ASSESSMENT — PAIN SCALES - GENERAL
PAINLEVEL_OUTOF10: 8
PAINLEVEL_OUTOF10: 0
PAINLEVEL_OUTOF10: 0
PAINLEVEL_OUTOF10: 3

## 2022-01-12 NOTE — PLAN OF CARE
Problem: Airway Clearance - Ineffective  Goal: Achieve or maintain patent airway  1/12/2022 1407 by Ishan Spears RN  Outcome: Completed  1/12/2022 0957 by Ishan Spears RN  Outcome: Ongoing     Problem: Gas Exchange - Impaired  Goal: Absence of hypoxia  1/12/2022 1407 by Ishan Spears RN  Outcome: Completed  1/12/2022 0957 by Ishan Spears RN  Outcome: Ongoing  Goal: Promote optimal lung function  1/12/2022 1407 by Ishan Spears RN  Outcome: Completed  1/12/2022 0957 by Ishan Spears RN  Outcome: Ongoing     Problem: Breathing Pattern - Ineffective  Goal: Ability to achieve and maintain a regular respiratory rate  1/12/2022 1407 by Ishan Spears RN  Outcome: Completed  1/12/2022 0957 by Ishan Spears RN  Outcome: Ongoing     Problem:  Body Temperature -  Risk of, Imbalanced  Goal: Ability to maintain a body temperature within defined limits  1/12/2022 1407 by Ishan Spears RN  Outcome: Completed  1/12/2022 0957 by Ishan Spears RN  Outcome: Ongoing  Goal: Will regain or maintain usual level of consciousness  1/12/2022 1407 by Ishan Spears RN  Outcome: Completed  1/12/2022 0957 by Ishan Spears RN  Outcome: Ongoing  Goal: Complications related to the disease process, condition or treatment will be avoided or minimized  1/12/2022 1407 by Ishan Spears RN  Outcome: Completed  1/12/2022 0957 by Ishan Spears RN  Outcome: Ongoing     Problem: Isolation Precautions - Risk of Spread of Infection  Goal: Prevent transmission of infection  1/12/2022 1407 by Ishan Spears RN  Outcome: Completed  1/12/2022 0957 by Ishan Spears RN  Outcome: Ongoing     Problem: Nutrition Deficits  Goal: Optimize nutritional status  1/12/2022 1407 by Ishan Spears RN  Outcome: Completed  1/12/2022 0957 by Ishan Spears RN  Outcome: Ongoing     Problem: Risk for Fluid Volume Deficit  Goal: Maintain normal heart rhythm  1/12/2022 1407 by Ishan Spears RN  Outcome: Completed  1/12/2022 0957 by Pat Media Barbara Brower RN  Outcome: Ongoing  Goal: Maintain absence of muscle cramping  1/12/2022 1407 by Matilda Rico RN  Outcome: Completed  1/12/2022 0957 by Matilda Rico RN  Outcome: Ongoing  Goal: Maintain normal serum potassium, sodium, calcium, phosphorus, and pH  1/12/2022 1407 by Matilda Rico RN  Outcome: Completed  1/12/2022 0957 by Matilda Rico RN  Outcome: Ongoing     Problem: Loneliness or Risk for Loneliness  Goal: Demonstrate positive use of time alone when socialization is not possible  1/12/2022 1407 by Matilda Rico RN  Outcome: Completed  1/12/2022 0957 by Matilda Rico RN  Outcome: Ongoing     Problem: Fatigue  Goal: Verbalize increase energy and improved vitality  1/12/2022 1407 by Matilda Rico RN  Outcome: Completed  1/12/2022 0957 by Matilda Rico RN  Outcome: Ongoing     Problem: Patient Education: Go to Patient Education Activity  Goal: Patient/Family Education  1/12/2022 1407 by Matilda Rico RN  Outcome: Completed  1/12/2022 0957 by Matilda Rico RN  Outcome: Ongoing     Problem: Pain:  Goal: Pain level will decrease  Description: Pain level will decrease  1/12/2022 1407 by Matilda Rico RN  Outcome: Completed  1/12/2022 0957 by Matilda Rico RN  Outcome: Ongoing  Goal: Control of acute pain  Description: Control of acute pain  1/12/2022 1407 by Matilda Rico RN  Outcome: Completed  1/12/2022 0957 by Matilda Rico RN  Outcome: Ongoing  Goal: Control of chronic pain  Description: Control of chronic pain  1/12/2022 1407 by Matilda Rico RN  Outcome: Completed  1/12/2022 0957 by Matilda Rico RN  Outcome: Ongoing     Problem: Falls - Risk of:  Goal: Will remain free from falls  Description: Will remain free from falls  1/12/2022 1407 by Matilda Rico RN  Outcome: Completed  1/12/2022 0957 by Matilda Rico RN  Outcome: Ongoing  Goal: Absence of physical injury  Description: Absence of physical injury  1/12/2022/2022 1407 by Matilda Rico, RN  Outcome: Completed  1/12/2022 0957 by Dolores Mix, RN  Outcome: Ongoing

## 2022-01-12 NOTE — FLOWSHEET NOTE
Pt rested well overnight. On 1 liter O2 with O2 sats >90%. Ambulating without difficulty. Ready to go home if possible. Very pleasant and cooperative. Uses call light as needed.

## 2022-01-12 NOTE — PROGRESS NOTES
Progress Note  Admit Date: 1/5/2022      PCP: Vance Ray DO     CC: F/U for Covid    Days in hospital:  7    SUBJECTIVE / Interval History:   Pt feels ok  Patient feels much better. Walking around. Not hypoxic        Allergies  Latex, Clindamycin/lincomycin, Sulfa antibiotics, Diflucan [fluconazole], and Other    Medications    Scheduled Meds:   lactobacillus  1 capsule Oral Daily with breakfast    enoxaparin  30 mg SubCUTAneous BID    dexamethasone  10 mg IntraVENous Daily    nystatin  5 mL Oral 4x Daily    baricitinib  4 mg Oral Daily    albuterol sulfate HFA  2 puff Inhalation 4x daily    budesonide-formoterol  2 puff Inhalation BID    dilTIAZem  120 mg Oral Daily    fluticasone  1 spray Each Nostril Daily    folic acid  1 mg Oral Daily    levothyroxine  137 mcg Oral Daily    liothyronine  10 mcg Oral Daily    montelukast  10 mg Oral Nightly    therapeutic multivitamin-minerals  1 tablet Oral Daily    pantoprazole  40 mg Oral BID AC    polyethylene glycol  17 g Oral Daily    cetirizine  5 mg Oral BID    And    pseudoephedrine  120 mg Oral BID     Continuous Infusions:    PRN Meds:  sodium chloride, acetaminophen, guaiFENesin-codeine    Vitals    BP (!) 143/79   Pulse 80   Temp 98 °F (36.7 °C) (Oral)   Resp 18   Ht 5' 3\" (1.6 m)   Wt 219 lb 5.7 oz (99.5 kg)   SpO2 92%   BMI 38.86 kg/m²     Exam:    Gen: No distress. Eyes: PERRL. No sclera icterus. No conjunctival injection. ENT: No discharge. Pharynx clear. External appearance of ears and nose normal.  Neck: Trachea midline. No obvious mass. Resp: No accessory muscle use. No crackles. No wheezes. Basal rhonchi. No dullness on percussion. CV: Regular rate. Regular rhythm. No murmur or rub. No edema. GI: Non-tender. Non-distended. No hernia. Skin: Warm, dry, normal texture and turgor. No nodule on exposed extremities. Lymph: No cervical LAD. No supraclavicular LAD. M/S: No cyanosis. No clubbing.  No joint deformity. Neuro: Moves all four extremities. CN 2-12 tested, no defect noted. Psych: Oriented x 3. No anxiety. Awake. Alert. Intact judgement and insight. Data    LABS  CBC:   Recent Labs     01/09/22  0958 01/10/22  0732 01/11/22  0839   WBC 10.1 9.7 11.1*   HGB 14.2 12.3 12.8   HCT 42.7 37.2 39.3   MCV 87.5 88.2 89.0    222 253     BMP:   Recent Labs     01/10/22  0732 01/11/22  0839 01/12/22  0812   * 132* 134*   K 4.3 4.2 4.0    99 102   CO2 20* 20* 18*   BUN 23* 25* 27*   CREATININE 0.7 0.7 0.8   GLUCOSE 162* 85 113*     POC GLUCOSE:  No results for input(s): POCGLU in the last 72 hours. LIVER PROFILE:   Recent Labs     01/10/22  0732 01/11/22  0839 01/12/22  0812   AST 35 34 30   ALT 76* 81* 76*   LABALBU 3.2* 3.6 3.4   BILITOT 0.3 0.3 0.3   ALKPHOS 249* 241* 225*     PT/INR: No results for input(s): PROTIME, INR in the last 72 hours. APTT: No results for input(s): APTT in the last 72 hours. UA:No results for input(s): NITRITE, COLORU, PHUR, LABCAST, WBCUA, RBCUA, MUCUS, TRICHOMONAS, YEAST, BACTERIA, CLARITYU, SPECGRAV, LEUKOCYTESUR, UROBILINOGEN, BILIRUBINUR, BLOODU, GLUCOSEU, KETUA, AMORPHOUS in the last 72 hours. Microbiology:  Wound Culture: No results for input(s): WNDABS, ORG in the last 72 hours. Invalid input(s):  LABGRAM  Nasal Culture: No results for input(s): ORG, MRSAPCR in the last 72 hours. Blood Culture:   No results for input(s): BC, BLOODCULT2 in the last 72 hours. Fungal Culture:   No results for input(s): FUNGSM in the last 72 hours. No results for input(s): FUNCXBLD in the last 72 hours. CSF Culture:  No results for input(s): COLORCSF, APPEARCSF, CFTUBE, CLOTCSF, WBCCSF, RBCCSF, NEUTCSF, NUMCELLSCSF, LYMPHSCSF, MONOCSF, GLUCCSF, VOLCSF in the last 72 hours. Respiratory Culture:  No results for input(s): [place] Estimable in the last 72 hours. AFB:No results for input(s): AFBSMEAR in the last 72 hours.   Urine Culture  No results for input(s): LABURIN in the last 72 hours. RADIOLOGY:    CT CHEST PULMONARY EMBOLISM W CONTRAST   Final Result   No evidence of pulmonary embolism or acute aortic disease. Moderate to   severe COVID-19 pneumonia. RECOMMENDATIONS:   Unavailable         XR CHEST PORTABLE   Final Result   Multifocal airspace consolidation most suggestive of an atypical multi lobar   pneumonia. COVID-19 pneumonia should be excluded. CONSULTS:    IP CONSULT TO PHARMACY  IP CONSULT TO PULMONOLOGY    ASSESSMENT AND PLAN:      Active Problems:    Acute respiratory failure due to COVID-19 [facility])  Resolved Problems:    * No resolved hospital problems. *    The patient is a 48 y.o. female medical history of A. fib, recurrent sinusitis, autoimmune hepatitis, ITP, obesity who presents to [facility] with worsening shortness of breath cough and fatigue. Patient states it has been about a month since she has been having sinusitis and has taken 2 courses of Augmentin and now is on Cipro and has taken 2 course of steroids. Since the last 2 days had chills with shortness of breath and fatigue and a lot of cough resulting in chest pain. Patient was admitted with acute hypoxic respiratory failure secondary to COVID-19 pneumonia with recurrent sinusitis. Patient decompensated in the hospital and needed high amounts of oxygen which has been weaned down. She was started on Decadron and baricitinib. Patient will be discharged to finish steroid course. Needs outpatient follow-up with ENT for her sinusitis issues    Acute hypoxic respiratory failure-now off oxygen. Patient was ambulated and her oxygen sats are greater than 90%-Attempt to wean oxygen as tolerated     COVID-19 pneumonia  -Patient is vaccinated, has not received a booster  -Started on Decadron, dose increased in the hospital-, now will lowered (D6)  -Inflammatory markers acutely elevated on admission  -Pharmacy to dose baricitinib D5  -CXR on the 30th was clear.   Chest x-ray done today shows multifocal pneumonia  -procal level mildly elevated  -CT shows moderate to severe pneumonia due to Covid     Recurrent sinusitis  -Started on antibiotics day 7. Repeat procalcitonin okay. Finished antibiotic course  -CT scan done few days ago shows mild sinusitis       Hypothyroidism  -Continue home meds  -History of Hashimoto's     Hyponatremia-improved  -Possibly due to Covid. Monitor     History of A. Fib  -On Cardizem  -Not on anticoagulation due to low [id number] score     Morbid obesity  -Complicating management and treatment    DVT Prophylaxis: Lovenox  Diet: ADULT DIET; Regular  Code Status: Prior        Discharge plan -today  The patient and / or the family were informed of the results of any tests, a time was given to answer questions, a plan was proposed and they agreed with plan. Discussed with consulting physicians, nursing and social work     The note was completed using EMR. Every effort was made to ensure accuracy; however, inadvertent computerized transcription errors may be present.        Paige Dumont MD

## 2022-01-12 NOTE — CARE COORDINATION
[facility] rep noted d/c orders and provided a [employer] Kit to the patient. It is hanging on the clip on the patient's door. Patient & RN were notified. These items [facility] also [facility] the [facility]'s home:    -1 [employer] neb kit  -2 Neb filters for [employer]. .Ref# [id number]  -1 [employer] neb kit    Notified RN.     Thank you for the referral.  Electronically signed by Lizz Way on 1/12/2022 at 2:25 PM Cell ph# [phone]

## 2022-01-12 NOTE — CARE COORDINATION
CASE MANAGEMENT DISCHARGE SUMMARY:    DISCHARGE DATE: 01/12/22    DISCHARGED TO HOME     TRANSPORTATION: self             Pt drove herself here, verified she feels like she can  herself home.     Electronically signed by Ricci Hernández RN on 1/12/2022 at 2:31 PM

## 2022-01-12 NOTE — DISCHARGE SUMMARY
Hospital Medicine Discharge Summary      Patient ID: Orly Gonzalez      Patient's PCP: Janes Brooks DO    Admit Date: 1/5/2022     Discharge Date: 1/12/2022  The patient was seen and examined on day of discharge and this discharge summary is in conjunction with any daily progress note from day of discharge. Admitting Physician: Kaitlynn Jauregui MD    Discharge Physician: Kaitlynn Jauregui MD     Admitted for   Chief Complaint   Patient presents with    Cough    Shortness of Breath    Fatigue       Admitting Diagnosis Thrush of mouth and esophagus ([facility] [address] [B37.81, B37.0]  Acute hypoxemic respiratory failure due to COVID-19 ([facility] [address] [U07.1, J96.01]  Pneumonia due to COVID-19 virus [U07.1, J12.82]  Acute respiratory failure due to COVID-19 ([facility] [address] [U07.1, J96.00]    Discharge Diagnoses: Active Hospital Problems    Diagnosis Date Noted    Acute respiratory failure due to COVID-19 [facility]) [U07.1, J96.00] 01/05/2022       Follow Up: Primary Care Physician in one week    PCP to Follow up on   Post discharge follow-up with PCP    Outpatient follow-up with ENT    Hospital Course: The patient is a 51 y. o. female medical history of A. fib, recurrent sinusitis, autoimmune hepatitis, ITP, obesity who presents to [facility] with worsening shortness of breath cough and fatigue.  Patient states it has been about a month since she has been having sinusitis and has taken 2 courses of Augmentin and now is on Cipro and has taken 2 course of steroids.  Since the last 2 days had chills with shortness of breath and fatigue and a lot of cough resulting in chest pain. Patient was admitted with acute hypoxic respiratory failure secondary to COVID-19 pneumonia with recurrent sinusitis. Patient decompensated in the hospital and needed high amounts of oxygen which has been weaned down. She was started on Decadron and baricitinib. Patient will be discharged to finish steroid course.   Needs outpatient follow-up with ENT for her sinusitis issues     Acute hypoxic respiratory failure-now off oxygen. Patient was ambulated and her oxygen sats are greater than 90%-Attempt to wean oxygen as tolerated     COVID-19 pneumonia  -Patient is vaccinated, has not received a booster  -Started on Decadron, dose increased in the hospital-, now will lowered (D6)  -Inflammatory markers acutely elevated on admission  -Pharmacy to dose baricitinib D5  -CXR on the 30th was clear.  Chest x-ray done today shows multifocal pneumonia  -procal level mildly elevated  -CT shows moderate to severe pneumonia due to Covid     Recurrent sinusitis  -Started on antibiotics day 7. Repeat procalcitonin okay. Finished antibiotic course  -CT scan done few days ago shows mild sinusitis        Hypothyroidism  -Continue home meds  -History of Hashimoto's     Hyponatremia-improved  -Possibly due to Covid.  Monitor     History of A. Fib  -On Cardizem  -Not on anticoagulation due to low [id number] score     Morbid obesity  -Complicating management and treatment    Consults:     IP CONSULT TO PHARMACY  IP CONSULT TO PULMONOLOGY        Disposition: home    Discharged Condition: Stable    Code Status: Prior    Activity: activity as tolerated    Diet: regular diet          Labs:  For convenience and continuity at follow-up the following most recent labs are provided:    CBC:   Lab Results   Component Value Date    WBC 12.7 01/12/2022    HGB 13.3 01/12/2022    HCT 38.8 01/12/2022     01/12/2022       RENAL:   Lab Results   Component Value Date     01/12/2022    K 4.0 01/12/2022    K 4.0 12/07/2020     01/12/2022    CO2 18 01/12/2022    BUN 27 01/12/2022    CREATININE 0.8 01/12/2022           Discharge Medications:   Current Discharge Medication List           Details   dexamethasone (DECADRON) 6 MG tablet Take 1 tablet by mouth daily (with breakfast) for 3 days  Qty: 3 tablet, Refills: 0              Details   folic acid (FOLVITE) 1 MG tablet TAKE 1 TABLET BY MOUTH ONE TIME A DAY  Qty: 30 tablet, Refills: 3    Associated Diagnoses: Hypothyroidism due to Hashimoto's thyroiditis      cyanocobalamin 1000 MCG/ML injection INJECT 1ML INTO THE SKIN ONCE MONTHLY  Qty: 1 mL, Refills: 2    Associated Diagnoses: Iron deficiency anemia due to chronic blood loss; Intestinal malabsorption, unspecified type      urea (CARMOL) 10 % cream APPLY TO AFFECTED AREA(S) TOPICALLY AS NEEDED  Qty: 85 g, Refills: 2    Associated Diagnoses: Eczema, unspecified type      polyethylene glycol (MIRALAX) 17 GM/SCOOP POWD powder POUR 17 GRAMS OF POWDER INTO THE CAP OF THE BOTTLE. STIR THE POWDER IN A GLASS (4 TO 8 OZ) OF WATER, JUICE, SODA, COFFEE OR TEA UNTIL COMPLETELY DISSOLVED. DRINK THE SOLUTION. ONCE DAILY AS NEEDED FOR CONSTIPATION  Qty: 238 g, Refills: 5      fluticasone (FLONASE) 50 MCG/ACT nasal spray APPLY 1 SPRAY IN THE AFFECTED NOSTRIL ONE TIME A DAY  Qty: 1 each, Refills: 0    Associated Diagnoses: Moderate persistent asthma with exacerbation      Saccharomyces boulardii (PROBIOTIC) 250 MG CAPS TAKE 1 CAPSULE BY MOUTH 2 TIMES A DAY  Qty: 120 capsule, Refills: 1      ketoconazole (NIZORAL) 2 % shampoo APPLY TO AFFECTED AREA(S) ONCE DAILY AS NEEDED  Qty: 1 each, Refills: 1    Associated Diagnoses: Eczema, unspecified type      budesonide-formoterol (SYMBICORT) 160-4.5 MCG/ACT AERO Inhale 2 puffs into the lungs 2 times daily  Qty: 1 each, Refills: 5      montelukast (SINGULAIR) 10 MG tablet Take one tablet by mouth every evening  Qty: 30 tablet, Refills: 5      cetirizine-psuedoephedrine (ALLERGY RELIEF D) 5-120 MG per extended release tablet TAKE 1 TABLET BY MOUTH 2 TIMES A DAY  Qty: 60 tablet, Refills: 2    Associated Diagnoses: Chronic pansinusitis      budesonide (PULMICORT) 0.25 MG/2ML nebulizer suspension Take 2 mLs by nebulization 2 times daily  Qty: 60 ampule, Refills: 5    Associated Diagnoses: Mold suspected exposure;  Moderate asthma without complication, unspecified whether and discharge plan. Signed:  Gali Rocha MD   1/12/2022    The note was completed using EMR. Every effort was made to ensure accuracy; however, inadvertent computerized transcription errors may be present. Thank you Matthew Kamara DO for the opportunity to be involved in this patient's care. If you have any questions or concerns please feel free to contact me at [phone].

## 2022-01-12 NOTE — PLAN OF CARE
Problem: Airway Clearance - Ineffective  Goal: Achieve or maintain patent airway  1/12/2022 0957 by Coleman Hussein RN  Outcome: Ongoing  1/11/2022 2140 by Dwayne Villalta RN  Outcome: Ongoing     Problem: Gas Exchange - Impaired  Goal: Absence of hypoxia  1/12/2022 0957 by Coleman Hussein RN  Outcome: Ongoing  1/11/2022 2140 by Dwayne Villalta RN  Outcome: Ongoing  Goal: Promote optimal lung function  1/12/2022 0957 by Coleman Hussein RN  Outcome: Ongoing  1/11/2022 2140 by Dwayne Villalta RN  Outcome: Ongoing     Problem: Breathing Pattern - Ineffective  Goal: Ability to achieve and maintain a regular respiratory rate  1/12/2022 0957 by Coleman Hussein RN  Outcome: Ongoing  1/11/2022 2140 by Dwayne Villalta RN  Outcome: Ongoing     Problem:  Body Temperature -  Risk of, Imbalanced  Goal: Ability to maintain a body temperature within defined limits  1/12/2022 0957 by Coleman Hussein RN  Outcome: Ongoing  1/11/2022 2140 by Dwayne Villalta RN  Outcome: Ongoing  Goal: Will regain or maintain usual level of consciousness  1/12/2022 0957 by Coleman Hussein RN  Outcome: Ongoing  1/11/2022 2140 by Dwayne Villalta RN  Outcome: Ongoing  Goal: Complications related to the disease process, condition or treatment will be avoided or minimized  1/12/2022 0957 by Coleman Hussein RN  Outcome: Ongoing  1/11/2022 2140 by Dwayne Villalta RN  Outcome: Ongoing     Problem: Isolation Precautions - Risk of Spread of Infection  Goal: Prevent transmission of infection  1/12/2022 0957 by Coleman Hussein RN  Outcome: Ongoing  1/11/2022 2140 by Dwayne Villalta RN  Outcome: Ongoing     Problem: Risk for Fluid Volume Deficit  Goal: Maintain normal heart rhythm  1/12/2022 0957 by Coleman Hussein RN  Outcome: Ongoing  1/11/2022 2140 by Dwayne Villalta RN  Outcome: Ongoing  Goal: Maintain absence of muscle cramping  1/12/2022 0957 by Coleman Hussein RN  Outcome: Ongoing  1/11/2022 2140 by Dwayne Villalta RN  Outcome: Ongoing  Goal: Maintain normal serum potassium, sodium, calcium, phosphorus, and pH  1/12/2022 0957 by Estella Mendoza RN  Outcome: Ongoing  1/11/2022 2140 by Zulma Rangel RN  Outcome: Ongoing     Problem: Loneliness or Risk for Loneliness  Goal: Demonstrate positive use of time alone when socialization is not possible  1/12/2022 0957 by Estella Mendoza RN  Outcome: Ongoing  1/11/2022 2140 by Zulma Rangel RN  Outcome: Ongoing     Problem: Fatigue  Goal: Verbalize increase energy and improved vitality  1/12/2022 0957 by Estella Mendoza RN  Outcome: Ongoing  1/11/2022 2140 by Zulma Rangel RN  Outcome: Ongoing     Problem: Patient Education: Go to Patient Education Activity  Goal: Patient/Family Education  1/12/2022 0957 by Estella Mendoza RN  Outcome: Ongoing  1/11/2022 2140 by Zulma Rangel RN  Outcome: Ongoing     Problem: Pain:  Goal: Pain level will decrease  Description: Pain level will decrease  1/12/2022 0957 by Estella Mendoza RN  Outcome: Ongoing  1/11/2022 2140 by Zulma Rangel RN  Outcome: Ongoing  Goal: Control of acute pain  Description: Control of acute pain  1/12/2022 0957 by Estella Mendoza RN  Outcome: Ongoing  1/11/2022 2140 by Zulma Rangel RN  Outcome: Ongoing  Goal: Control of chronic pain  Description: Control of chronic pain  1/12/2022 0957 by Estella Mendoza RN  Outcome: Ongoing  1/11/2022 2140 by Zulma Rangel RN  Outcome: Ongoing     Problem: Falls - Risk of:  Goal: Will remain free from falls  Description: Will remain free from falls  1/12/2022 0957 by Estella Mendoza RN  Outcome: Ongoing  1/11/2022 2140 by Zulma Rangel RN  Outcome: Ongoing     Problem: Falls - Risk of:  Goal: Absence of physical injury  Description: Absence of physical injury  1/12/2022 0957 by Estella Mendoza RN  Outcome: Ongoing  1/11/2022 2140 by Zulma Rangel RN  Outcome: Ongoing

## 2022-01-12 NOTE — TELEPHONE ENCOUNTER
Informed patient that she should discontinue cipro as antibiotics given while in the hospital should provide plenty of coverage.

## 2022-01-12 NOTE — PROGRESS NOTES
Nutrition Assessment     Type and Reason for Visit: Initial (LOS)    Nutrition Recommendations/Plan:   Continue regular diet. Nutrition Assessment:  LOS. Pt presented with cough, SOB, and fatigue. Pt with COVID+ and in isolation. Noted pt's O2 requirements have been improving, now only on 1L O2. PO intakes have varied since admission. Suspect intakes started to improve as respiratory status improved, ~50% intakes noted. No nutrition interventions needed at this time. Malnutrition Assessment:  Malnutrition Status: At risk for malnutrition (Comment)    Nutrition Related Findings: BM 1/9; Na 132      Current Nutrition Therapies:    ADULT DIET;  Regular    Anthropometric Measures:  · Height: 5' 3\" (160 cm)  · Current Body Wt: 219 lb (99.3 kg)   · BMI: 38.8    Nutrition Diagnosis:   No nutrition diagnosis at this time     Nutrition Interventions:   Food and/or Nutrient Delivery:  Continue Current Diet  Nutrition Education/Counseling:  No recommendation at this time   Coordination of Nutrition Care:  Continue to monitor while inpatient    Discharge Planning:    No discharge needs at this time     Electronically signed by Carmen Camacho RD, FRANCI on 1/12/22 at 8:11 AM EST    Contact: [phone]

## 2022-01-13 ENCOUNTER — CARE COORDINATION (OUTPATIENT)
Dept: CASE MANAGEMENT | Age: 47
End: 2022-01-13

## 2022-01-13 NOTE — CARE COORDINATION
[facility] Transitions Initial Follow Up Call    Call within 2 business days of discharge: Yes    Patient: Taylor Miranda Patient : 1975   MRN: [id number]  Reason for Admission: COVID+  Discharge Date: 22 RARS: Readmission Risk Score: 10.8 ( )      Last Discharge [id number] [address]       Complaint Diagnosis Description Type Department Provider    22 Cough; Shortness of Breath; Fatigue Acute hypoxemic respiratory failure due to COVID-19 ([facility] [address] . .. ED to Hosp-Admission (Discharged) (ADMITTED) Hamzah Walton MD; Phylicia Mathews. .. Spoke with: EL    Facility: [facility]    Attempted to reach patient via phone for initial post hospital transition call. VM left stating purpose of call along with my contact information requesting a return call.    Renata Thompson LPN [address] [place] [address]  Care Transitions  [phone]    Care Transitions 24 Hour Call    Care Transitions Interventions         Follow Up  Future Appointments   Date Time Provider Soumya Montemayor   2022 12:00 PM Faviola Jorge MD [facility]   2022  8:00 PM [facility] SLEEP LAB ROOM [facility] Sixt[place]MARIZA [place]

## 2022-01-14 ENCOUNTER — CARE COORDINATION (OUTPATIENT)
Dept: CASE MANAGEMENT | Age: 47
End: 2022-01-14

## 2022-01-14 NOTE — CARE COORDINATION
Transitions of Care Call  Call within 2 business days of discharge: No    Patient: Wing Pelaez Patient : 1975   MRN: [id number]  Reason for Admission: COVID+  Discharge Date: 22 RARS: Readmission Risk Score: 10.8 ( )      Last Discharge [facility]       Complaint Diagnosis Description Type Department Provider    22 Cough; Shortness of Breath; Fatigue Acute hypoxemic respiratory failure due to COVID-19 ([address] . .. ED to Hosp-Admission (Discharged) (ADMITTED) Sophie Urias MD; Joe Davis... Was this an external facility discharge? No Discharge Facility: NA    Challenges to be reviewed by the provider   Additional needs identified to be addressed with provider: No  none                 Encounter was not routed to provider for escalation. Method of communication with provider: phone. Discussed COVID-19 related testing which was: available at this time. Test results were: positive. Patient informed of results, if available? Yes. Current Symptoms: cough, nausea and bumps on tongue r/t thrush    Reviewed New or Changed Meds: yes    Do you have what you need at home?  Durable Medical Equipment ordered at discharge: Nebulizer  and [address] Health/Outpatient orders at discharge: none   Was patient discharged with a pulse oximeter? No Discussed and confirmed pulse oximeter discharge instructions and when to notify provider or seek emergency care. Patient education provided: Reviewed appropriate site of care based on symptoms and resources available to patient including: PCP.      Follow up appointment scheduled within 7 days of discharge: no. If no appointment scheduled, scheduling offered: yes  Future Appointments   Date Time Provider Soumya Montemayor   2022 12:00 PM Kate Aleman MD [facility]   2022  8:00 PM [facility] SLEEP LAB ROOM [facility]       Interventions: Scheduled appointment with PCP-encouraged patient to call ASAP

## 2022-01-16 ENCOUNTER — HOSPITAL ENCOUNTER (EMERGENCY)
Age: 47
Discharge: HOME | End: 2022-01-16
Payer: COMMERCIAL

## 2022-01-16 ENCOUNTER — TELEPHONE (OUTPATIENT)
Dept: OTHER | Facility: CLINIC | Age: 47
End: 2022-01-16

## 2022-01-16 VITALS
HEIGHT: 63 IN | OXYGEN SATURATION: 99 % | HEART RATE: 83 BPM | WEIGHT: 213.85 LBS | SYSTOLIC BLOOD PRESSURE: 134 MMHG | BODY MASS INDEX: 37.89 KG/M2 | TEMPERATURE: 97.7 F | RESPIRATION RATE: 18 BRPM | DIASTOLIC BLOOD PRESSURE: 85 MMHG

## 2022-01-16 DIAGNOSIS — R06.00 DYSPNEA DUE TO COVID-19: Primary | ICD-10-CM

## 2022-01-16 DIAGNOSIS — U07.1 DYSPNEA DUE TO COVID-19: Primary | ICD-10-CM

## 2022-01-16 PROCEDURE — 99283 EMERGENCY DEPT VISIT LOW MDM: CPT

## 2022-01-16 ASSESSMENT — ENCOUNTER SYMPTOMS
ABDOMINAL PAIN: 0
SORE THROAT: 0
VOMITING: 0
SHORTNESS OF BREATH: 1
NAUSEA: 0
BACK PAIN: 0
EYE PAIN: 0
COUGH: 0

## 2022-01-16 ASSESSMENT — PAIN SCALES - GENERAL: PAINLEVEL_OUTOF10: 0

## 2022-01-16 NOTE — ED NOTES
Patient ambulated. SpO2 maintained at 94% during ambulation. Provider notified.       Erick Lopez  01/16/22 1027

## 2022-01-16 NOTE — ED PROVIDER NOTES
**ADVANCED PRACTICE PROVIDER, I HAVE EVALUATED THIS PATIENT**        [address]      Pt Name: Kevin Maciel  YRF:[id number]  Pa[place] 1975  Date of evaluation: 1/16/2022  Provider: Hilda Brown PA-C      Chief Complaint:    Chief Complaint   Patient presents with    Positive For Covid-19     tested + 1/5/22, states she was discharged on Friday after being here for a week, states she feels like she needs oxygen because she felt more labored with exertion          Nursing Notes, Past Medical Hx, Past Surgical Hx, Social Hx, Allergies, and Family Hx were all reviewed and agreed with or any disagreements were addressed in the HPI.    HPI: (Location, Duration, Timing, Severity, Quality, Assoc Sx, Context, Modifying factors)    Chief Complaint of felt like her oxygen level was low. She was recently diagnosed with COVID-19 and hospitalized for 1 week. Recently sent home this past Wednesday. This is a  48 y.o. female who presents to emergency room with complaint of felt like her oxygen level was low. She was recently diagnosed with COVID-19 on 1/5/2022. She was hospitalized for a week and was recently sent home this past Wednesday. Says she has not done much moving around until today when she was moving from room to room she felt very shortness of breath. And got very fatigued. She denies any other complaint no chest pain, no abdominal pain. Just wants to get checked out. Also states she got older developed thrush because she is on Augmentin 2 rounds of it for some sinus problems and she is also on inhaler for her asthma. She was sent home on nystatin 1 an ultrasound that she can take denies any fever. She still complain of loss of taste and smell. Says she just been basically forced herself to eat. Denies dizziness or lightheadedness, no generalized weakness. No other complaints.       PastMedical/Surgical History:      Diagnosis Date    Abdominal wall abscess 2/8/2017    Abdominal wall cellulitis 2/6/2017    Anal fissure 4/30/2015    Anemia, iron deficiency     Asthma     Autoimmune hepatitis ([address]     Chronic sinusitis     COVID-19     History of blood transfusion     Hypothyroidism     Menorrhagia with regular cycle     Morbid obesity with BMI of 40.0-44.9, adult ([facility]) 5/26/2015    MRSA (methicillin resistant staph aureus) culture positive 02/03/2017    abdominal abscess    MRSA infection 08/20/2012    rt thigh abscess    Pericarditis, acute     Sinusitis          Procedure Laterality Date    COLONOSCOPY N/A 12/29/2020    COLONOSCOPY DIAGNOSTIC performed by Magda Bridges MD at [address]  8/22/2012    INCISION AND DRAINAGE POSTERIOR THIGH ABSCESS    RECTAL SURGERY  Aug 2011    repair of rectal fissures and anal skin tag removal    SINUS SURGERY      X 3    TONSILLECTOMY  2004    TONSILLECTOMY AND ADENOIDECTOMY  2005    (partial adenoidectomy)    UPPER GASTROINTESTINAL ENDOSCOPY N/A 12/29/2020    EGD BIOPSY performed by Magda Bridges MD at [address] [facility] ENDOSCOPY       Medications:  Previous Medications    ALBUTEROL SULFATE  (90 BASE) MCG/ACT INHALER    INHALE 2 PUFF BY MOUTH EVERY 6 HOURS AS NEEDED FOR WHEEZING    BUDESONIDE (PULMICORT) 0.25 MG/2ML NEBULIZER SUSPENSION    Take 2 mLs by nebulization 2 times daily    BUDESONIDE-FORMOTEROL (SYMBICORT) 160-4.5 MCG/ACT AERO    Inhale 2 puffs into the lungs 2 times daily    CETIRIZINE-PSUEDOEPHEDRINE (ALLERGY RELIEF D) 5-120 MG PER EXTENDED RELEASE TABLET    TAKE 1 TABLET BY MOUTH 2 TIMES A DAY    CHLORHEXIDINE (BETASEPT SURGICAL SCRUB) 4 % EXTERNAL LIQUID    APPLY TO AFFECTED AREA(S) TOPICALLY AS NEEDED    CYANOCOBALAMIN 1000 MCG/ML INJECTION    INJECT 1ML INTO THE SKIN ONCE MONTHLY    DILTIAZEM (CARDIZEM CD) 120 MG EXTENDED RELEASE CAPSULE    TAKE 1 CAPSULE BY MOUTH ONE TIME A DAY    FLUTICASONE (FLONASE) 50 MCG/ACT NASAL SPRAY APPLY 1 SPRAY IN THE AFFECTED NOSTRIL ONE TIME A DAY    FOLIC ACID (FOLVITE) 1 MG TABLET    TAKE 1 TABLET BY MOUTH ONE TIME A DAY    INSULIN SYRINGE-NEEDLE U-100 (ULTICARE INSULIN SYRINGE) 31G X 5/16\" 1 ML MISC    AS DIRECTED    KETOCONAZOLE (NIZORAL) 2 % SHAMPOO    APPLY TO AFFECTED AREA(S) ONCE DAILY AS NEEDED    LEVOTHYROXINE (SYNTHROID) 137 MCG TABLET    Take 1 tablet by mouth daily    LIOTHYRONINE (CYTOMEL) 5 MCG TABLET    TAKE TWO TABLETS BY MOUTH DAILY    MISC. DEVICES (HIBICLENS HAND PUMP 32OZ) MISC    Use as needed for skin irritation or bumps. MOMETASONE (ELOCON) 0.1 % CREAM    Apply topically daily. MONTELUKAST (SINGULAIR) 10 MG TABLET    Take one tablet by mouth every evening    MULTIPLE VITAMINS-MINERALS (THERAPEUTIC MULTIVITAMIN-MINERALS) TABLET    Take 1 tablet by mouth daily    MUPIROCIN (BACTROBAN) 2 % OINTMENT    Apply topically 3 times daily. NYSTATIN (MYCOSTATIN) 633591 UNIT/ML SUSPENSION    Take 5ml 4 times daily. Retain in mouth as long as possible    PANTOPRAZOLE (PROTONIX) 40 MG TABLET    Take 1 tablet by mouth 2 times daily (before meals)    POLYETHYLENE GLYCOL (MIRALAX) 17 GM/SCOOP POWD POWDER    POUR 17 GRAMS OF POWDER INTO THE CAP OF THE BOTTLE. STIR THE POWDER IN A GLASS (4 TO 8 OZ) OF WATER, JUICE, SODA, COFFEE OR TEA UNTIL COMPLETELY DISSOLVED. DRINK THE SOLUTION. ONCE DAILY AS NEEDED FOR CONSTIPATION    SACCHAROMYCES BOULARDII (PROBIOTIC) 250 MG CAPS    TAKE 1 CAPSULE BY MOUTH 2 TIMES A DAY    UREA (CARMOL) 10 % CREAM    APPLY TO AFFECTED AREA(S) TOPICALLY AS NEEDED         Review of Systems:  (2-9 systems needed)  Review of Systems   Constitutional: Positive for fatigue. Negative for chills and fever. HENT: Negative for congestion and sore throat. Eyes: Negative for pain and visual disturbance. Respiratory: Positive for shortness of breath. Negative for cough. Cardiovascular: Negative for chest pain and leg swelling.    Gastrointestinal: Negative for abdominal pain, nausea and vomiting. Genitourinary: Negative for dysuria and frequency. Musculoskeletal: Negative for back pain and neck pain. Skin: Negative for rash and wound. Neurological: Negative for dizziness and light-headedness. \"Positives and Pertinent negatives as per HPI\"    Physical Exam:  Physical Exam  HENT:      Mouth/Throat:      Mouth: Mucous membranes are moist.      Pharynx: Oropharynx is clear. No oropharyngeal exudate or posterior oropharyngeal erythema. Cardiovascular:      Rate and Rhythm: Normal rate and regular rhythm. Heart sounds: Normal heart sounds. No murmur heard. No friction rub. No gallop. Pulmonary:      Effort: Pulmonary effort is normal. No respiratory distress. Breath sounds: Normal breath sounds. No wheezing or rales. Chest:      Chest wall: No tenderness. Neurological:      General: No focal deficit present. MEDICAL DECISION MAKING    Vitals:    Vitals:    01/16/22 1709   BP: 134/85   Pulse: 83   Resp: 18   Temp: 97.7 °F (36.5 °C)   TempSrc: Oral   SpO2: 99%   Weight: 213 lb 13.5 oz (97 kg)   Height: 5' 3\" (1.6 m)       LABS:Labs Reviewed - No data to display     Remainder of labs reviewed and were negative at this time or not returned at the time of this note. RADIOLOGY:   Non-plain film images such as CT, Ultrasound and MRI are read by the radiologist. Stephanie Higuera PA-C have directly visualized the radiologic plain film image(s) with the below findings:      Interpretation per the Radiologist below, if available at the time of this note:    No orders to display        XR CHEST [address]    Result Date: 12/30/2021  Chest 4 views HISTORY: Hoarse voice quality. Chronic cough. Frontal, lateral, and bilateral oblique views demonstrate linear atelectasis/scarring in the lung bases bilaterally. The lungs are otherwise clear. Normal cardiac mediastinal silhouette. Minimal elevation of the left hemidiaphragm.     XR CHEST PORTABLE    Result Date: 1/5/2022  EXAMINATION: ONE XRAY VIEW OF THE CHEST 1/5/2022 10:16 am COMPARISON: 12/30/2021 HISTORY: ORDERING SYSTEM PROVIDED HISTORY: SOB TECHNOLOGIST PROVIDED HISTORY: Reason for exam:->sob Reason for Exam: Cough; Shortness of Breath; Fatigue FINDINGS: The mediastinal and cardiac contours are stable. There has been interval development of bilateral perihilar opacities extending into the upper and lower lobes. There is no pleural effusion or pneumothorax. Multifocal airspace consolidation most suggestive of an atypical multi lobar pneumonia. COVID-19 pneumonia should be excluded. CT CHEST PULMONARY EMBOLISM W CONTRAST    Result Date: 1/7/2022  EXAMINATION: CTA OF THE CHEST 1/7/2022 2:29 pm TECHNIQUE: CTA of the chest was performed after the administration of intravenous contrast.  Multiplanar reformatted images are provided for review. MIP images are provided for review. Dose modulation, iterative reconstruction, and/or weight based adjustment of the mA/kV was utilized to reduce the radiation dose to as low as reasonably achievable. COMPARISON: Chest x-ray from January 6, 2022 HISTORY: ORDERING SYSTEM PROVIDED HISTORY: covid, r/o PE TECHNOLOGIST PROVIDED HISTORY: Reason for exam:->covid, r/o PE FINDINGS: Pulmonary Arteries: Pulmonary arteries are adequately opacified for evaluation. No evidence of intraluminal filling defect to suggest pulmonary embolism. Main pulmonary artery is normal in caliber. Mediastinum: No evidence of mediastinal lymphadenopathy. The heart and pericardium demonstrate no acute abnormality. There is no acute abnormality of the thoracic aorta. Lungs/pleura: There is evidence of diffuse ground-glass infiltrates in the upper and lower lobes typical of COVID-19 pneumonia. Disease burden is moderate to severe. No active pleural disease. Upper Abdomen: Limited images of the upper abdomen are unremarkable.  Soft Tissues/Bones: No acute bone or soft tissue abnormality. No evidence of pulmonary embolism or acute aortic disease. Moderate to severe COVID-19 pneumonia. RECOMMENDATIONS: Unavailable     CT SINUS FOR IMAGE GUIDANCE    Result Date: 1/4/2022  EXAMINATION: CT OF THE SINUS WITHOUT CONTRAST  1/4/2022 12:52 pm TECHNIQUE: CT of the sinuses was performed without the administration of intravenous contrast. Multiplanar reformatted images are provided for review. Dose modulation, iterative reconstruction, and/or weight based adjustment of the mA/kV was utilized to reduce the radiation dose to as low as reasonably achievable. COMPARISON: None HISTORY: ORDERING SYSTEM PROVIDED HISTORY: Chronic pansinusitis TECHNOLOGIST PROVIDED HISTORY: Reason for exam:->Persistent sinus symtoms despite antibitoics and steroids. Prior sinus surgery Is the patient pregnant?->No Reason for Exam: Persistent sinus symtoms despite antibitoics and steroids. Prior sinus surgery, Chronic pansinusitis FINDINGS: SINUSES/MASTOIDS:  The patient is status post bilateral maxillary antrectomy. There is mild mucosal thickening in the maxillary sinuses bilaterally. There is minimal mucosal thickening in the ethmoid sinuses. The frontal and sphenoid sinuses are normal in appearance. The mastoid air cells are well aerated. SOFT TISSUES:  Visualized soft tissues demonstrate no acute abnormality. The visualized portion of the intracranial contents demonstrate no gross acute abnormality. Minimal sinus disease involving the maxillary and ethmoid sinuses characterized by mild mucosal thickening. RECOMMENDATIONS: Unavailable          MEDICAL DECISION MAKING / ED COURSE:      PROCEDURES:   Procedures    None    Patient was given:  Medications - No data to display    Emergency room course: Patient on exam throat is clear. Not erythematous no exudate noted. The buccal mucosa slight white patch. Consistent with her known thrush. Tongue does not show heavy white coating.  Cardiovascular regular rate rhythm, lungs are clear no wheeze rales or rhonchi noted. Alert oriented x4. Does not appear to be in acute distress. O2 saturation is at 99% when she is sitting. I did have the nurse ambulate this patient and her O2 sat went as low as 94% and not lower. Discussed patient discharge plan with her. Informed her this can take some time before she build back up her strength. She should not try to do anything strenuous or do a lot of movement. Just gradually build up her strength. Over time she will get her taste and smell back as well as her strength. I will give her prescription for O2 monitor pulse oximeter. She was okay with this plan. She will be discharged stable condition. Continue your home medication as prescribed. I did have the pharmacist to check and see if anything besides nystatin that she can take for thrush. She is using the nystatin rinse. The clotrimazole lozenges is not recommended she tells me because of her allergy to Diflucan which causes hives and itching. So at this time I recommend that she stay on a nystatin. The patient tolerated their visit well. I evaluated the patient. The physician was available for consultation as needed. The patient and / or the family were informed of the results of any tests, a time was given to answer questions, a plan was proposed and they agreed with plan. CLINICAL IMPRESSION:  1.  Dyspnea due to COVID-19        DISPOSITION Decision To Discharge 01/16/2022 06:06:37 PM      PATIENT REFERRED TO:  Humera Edwards DO  [facility]  [phone]    Call   As needed    [facility] Emergency Department  [address] [facility]  [phone]  Call   If symptoms worsen      DISCHARGE MEDICATIONS:  New Prescriptions    No medications on file       DISCONTINUED MEDICATIONS:  Discontinued Medications    No medications on file              (Please note the MDM and HPI sections of this note were completed with a voice recognition program.  Efforts were made to edit the dictations but occasionally words are mis-transcribed.)    Electronically signed, Lucila Christianson PA-C,          Lucila Christianson PA-C  01/16/22 0106

## 2022-01-17 ENCOUNTER — TELEPHONE (OUTPATIENT)
Dept: ENT CLINIC | Age: 47
End: 2022-01-17

## 2022-01-17 ENCOUNTER — TELEPHONE (OUTPATIENT)
Dept: INTERNAL MEDICINE CLINIC | Age: 47
End: 2022-01-17

## 2022-01-17 ENCOUNTER — CARE COORDINATION (OUTPATIENT)
Dept: CARE COORDINATION | Age: 47
End: 2022-01-17

## 2022-01-17 ENCOUNTER — TELEPHONE (OUTPATIENT)
Dept: OTHER | Facility: CLINIC | Age: 47
End: 2022-01-17

## 2022-01-17 DIAGNOSIS — B37.0 ORAL THRUSH: Primary | ICD-10-CM

## 2022-01-17 RX ORDER — LIDOCAINE HYDROCHLORIDE 20 MG/ML
15 SOLUTION OROPHARYNGEAL PRN
Qty: 15 ML | Refills: 3 | Status: SHIPPED | OUTPATIENT
Start: 2022-01-17 | End: 2022-02-21 | Stop reason: ALTCHOICE

## 2022-01-17 RX ORDER — CLOTRIMAZOLE 10 MG/1
10 LOZENGE ORAL; TOPICAL
Qty: 70 TABLET | Refills: 0 | Status: SHIPPED | OUTPATIENT
Start: 2022-01-17 | End: 2022-01-18 | Stop reason: ALTCHOICE

## 2022-01-17 NOTE — CARE COORDINATION
Ambulatory Care Coordination ED COVID Follow up Call    Challenges to be reviewed by the provider   Additional needs identified to be addressed with provider: No  none                 Encounter was not routed to provider for escalation. Method of communication with provider: none    Discussed COVID-19 related testing which was: available at this time. Test results were: positive. Patient informed of results, if available? Yes. Current Symptoms: no new symptoms, no worsening symptoms and Residual fatigue, decrease stamina, mathias. Reviewed New or Changed Meds: patient was not discharged to home with new medications. Do you have what you need at home?  Durable Medical Equipment ordered at discharge: None   Home Health/Outpatient orders at discharge: none   Was patient discharged with a pulse oximeter? No Discussed and confirmed pulse oximeter discharge instructions and when to notify provider or seek emergency care. Patient education provided: Reviewed appropriate site of care based on symptoms and resources available to patient including: PCP and When to call 911. Follow up appointment recommended: Patient encouraged [facility] with [facility] regarding hospital/ED f/u. Patient states she will call tomorrow to schedule. . If no appointment scheduled, scheduling offered: Patient will call tomorrow to schedue. Future Appointments   Date Time Provider Soumya Montemayor   1/29/2022 12:00 PM Kate Aleman MD [facility]   2/1/2022  8:00 PM [facility] SLEEP LAB ROOM 3 [facility] SLEEP [facility]       Interventions: Obtained and reviewed discharge summary and/or continuity of care documents  Reviewed discharge instructions, medical action plan and red flags with patient who verbalized understanding. No further follow-up call indicated based on severity of symptoms and risk factors. Plan for next call: 3-5 days  Provided contact information for future needs.     Shalonda Corral RN     ACM spoke with patient regarding ED visit on 1/16. Patient d/c from hospital on 1/14 after being hospitalized for COVID-19 for one week. She returned to the ER after feeling like her breathing was more labored with exertion and fatigued. Her O2 saturations was between 94-99% while in the ER. Workup on 1/16 negative for acute findings. She denies worsening sob, chest pain, fever. She is currently in the process of being evaluated for a cpap. She states she is scheduled for the last part of her sleep study during the first week of February. Patient is using Nystatin swish and swallow for thrush. She questions if there is a pill she can take instead. A pharmacist was consulted during her ER visit and it was recommended that she continue nystatin rinse due to her allergy to Diflucan. ACM advised her to f/u with her pcp regarding this, pt agreeable. ACM reinforced that it can take some time before she builds her stamina back up. Discussed avoiding strenuous activities. She has a script for a pulse oximeter but has not filled as of yet. She states she will do so. ACM reviewed appropriate parameters for O2 saturation. ACM discussed red flag sx and when to return to the ER. ACM will follow up with patient at later date/time.

## 2022-01-18 ENCOUNTER — TELEPHONE (OUTPATIENT)
Dept: INTERNAL MEDICINE CLINIC | Age: 47
End: 2022-01-18

## 2022-01-18 RX ORDER — ITRACONAZOLE 10 MG/ML
200 SOLUTION ORAL DAILY
Qty: 200 ML | Refills: 0 | Status: SHIPPED | OUTPATIENT
Start: 2022-01-18 | End: 2022-01-18

## 2022-01-18 RX ORDER — ITRACONAZOLE 10 MG/ML
200 SOLUTION ORAL DAILY
Qty: 200 ML | Refills: 0 | Status: SHIPPED | OUTPATIENT
Start: 2022-01-18 | End: 2022-01-28

## 2022-01-18 NOTE — TELEPHONE ENCOUNTER
Amy Hernandez spoke with patient again and informed her that per Dr. Jory Ray she should try contacting her PCP's office again or her infectious disease doctor.

## 2022-01-19 ENCOUNTER — TELEPHONE (OUTPATIENT)
Dept: INTERNAL MEDICINE CLINIC | Age: 47
End: 2022-01-19

## 2022-01-19 DIAGNOSIS — L72.9 SCALP CYST: Primary | ICD-10-CM

## 2022-01-21 ENCOUNTER — CARE COORDINATION (OUTPATIENT)
Dept: CARE COORDINATION | Age: 47
End: 2022-01-21

## 2022-01-21 NOTE — CARE COORDINATION
ACM attempted COVID-19 f/u. No answer. ACM left message on voicemail with contact information requesting a call return. ACM will follow up at later date/time.

## 2022-01-24 ENCOUNTER — OFFICE VISIT (OUTPATIENT)
Dept: INTERNAL MEDICINE CLINIC | Age: 47
End: 2022-01-24
Payer: COMMERCIAL

## 2022-01-24 ENCOUNTER — CARE COORDINATION (OUTPATIENT)
Dept: CARE COORDINATION | Age: 47
End: 2022-01-24

## 2022-01-24 VITALS
SYSTOLIC BLOOD PRESSURE: 143 MMHG | HEART RATE: 95 BPM | OXYGEN SATURATION: 95 % | WEIGHT: 217.2 LBS | DIASTOLIC BLOOD PRESSURE: 92 MMHG | TEMPERATURE: 97.1 F | HEIGHT: 63 IN | BODY MASS INDEX: 38.48 KG/M2

## 2022-01-24 VITALS
TEMPERATURE: 97.5 F | WEIGHT: 217.2 LBS | SYSTOLIC BLOOD PRESSURE: 143 MMHG | BODY MASS INDEX: 38.48 KG/M2 | HEART RATE: 77 BPM | HEIGHT: 63 IN | OXYGEN SATURATION: 95 % | DIASTOLIC BLOOD PRESSURE: 92 MMHG

## 2022-01-24 DIAGNOSIS — L30.9 ECZEMA, UNSPECIFIED TYPE: ICD-10-CM

## 2022-01-24 DIAGNOSIS — J12.82 PNEUMONIA DUE TO COVID-19 VIRUS: ICD-10-CM

## 2022-01-24 DIAGNOSIS — E55.9 VITAMIN D DEFICIENCY: ICD-10-CM

## 2022-01-24 DIAGNOSIS — J12.82 PNEUMONIA DUE TO COVID-19 VIRUS: Primary | ICD-10-CM

## 2022-01-24 DIAGNOSIS — R79.89 ELEVATED LFTS: Primary | ICD-10-CM

## 2022-01-24 DIAGNOSIS — J32.4 CHRONIC PANSINUSITIS: ICD-10-CM

## 2022-01-24 DIAGNOSIS — J45.41 MODERATE PERSISTENT ASTHMA WITH EXACERBATION: ICD-10-CM

## 2022-01-24 DIAGNOSIS — F41.9 ANXIETY: ICD-10-CM

## 2022-01-24 DIAGNOSIS — I48.91 ATRIAL FIBRILLATION WITH RVR (HCC): ICD-10-CM

## 2022-01-24 DIAGNOSIS — U07.1 PNEUMONIA DUE TO COVID-19 VIRUS: ICD-10-CM

## 2022-01-24 DIAGNOSIS — U07.1 PNEUMONIA DUE TO COVID-19 VIRUS: Primary | ICD-10-CM

## 2022-01-24 DIAGNOSIS — L72.9 SCALP CYST: Primary | ICD-10-CM

## 2022-01-24 LAB
A/G RATIO: 1.2 (ref 1.1–2.2)
ALBUMIN SERPL-MCNC: 3.9 G/DL (ref 3.4–5)
ALP BLD-CCNC: 288 U/L (ref 40–129)
ALT SERPL-CCNC: 122 U/L (ref 10–40)
ANION GAP SERPL CALCULATED.3IONS-SCNC: 11 MMOL/L (ref 3–16)
AST SERPL-CCNC: 60 U/L (ref 15–37)
BASOPHILS ABSOLUTE: 0 K/UL (ref 0–0.2)
BASOPHILS RELATIVE PERCENT: 0.6 %
BILIRUB SERPL-MCNC: 0.6 MG/DL (ref 0–1)
BUN BLDV-MCNC: 9 MG/DL (ref 7–20)
CALCIUM SERPL-MCNC: 8.5 MG/DL (ref 8.3–10.6)
CHLORIDE BLD-SCNC: 103 MMOL/L (ref 99–110)
CO2: 22 MMOL/L (ref 21–32)
CREAT SERPL-MCNC: 0.7 MG/DL (ref 0.6–1.1)
EOSINOPHILS ABSOLUTE: 0.1 K/UL (ref 0–0.6)
EOSINOPHILS RELATIVE PERCENT: 1.3 %
GFR AFRICAN AMERICAN: >60
GFR NON-AFRICAN AMERICAN: >60
GLUCOSE BLD-MCNC: 85 MG/DL (ref 70–99)
HCT VFR BLD CALC: 38.9 % (ref 36–48)
HEMOGLOBIN: 12.7 G/DL (ref 12–16)
LYMPHOCYTES ABSOLUTE: 0.9 K/UL (ref 1–5.1)
LYMPHOCYTES RELATIVE PERCENT: 14.6 %
MCH RBC QN AUTO: 29.6 PG (ref 26–34)
MCHC RBC AUTO-ENTMCNC: 32.7 G/DL (ref 31–36)
MCV RBC AUTO: 90.5 FL (ref 80–100)
MONOCYTES ABSOLUTE: 0.4 K/UL (ref 0–1.3)
MONOCYTES RELATIVE PERCENT: 6.3 %
NEUTROPHILS ABSOLUTE: 5 K/UL (ref 1.7–7.7)
NEUTROPHILS RELATIVE PERCENT: 77.2 %
PDW BLD-RTO: 14.5 % (ref 12.4–15.4)
PLATELET # BLD: 110 K/UL (ref 135–450)
PMV BLD AUTO: 10.2 FL (ref 5–10.5)
POTASSIUM SERPL-SCNC: 3.8 MMOL/L (ref 3.5–5.1)
RBC # BLD: 4.29 M/UL (ref 4–5.2)
SODIUM BLD-SCNC: 136 MMOL/L (ref 136–145)
TOTAL PROTEIN: 7.2 G/DL (ref 6.4–8.2)
TSH REFLEX: 1.7 UIU/ML (ref 0.27–4.2)
VITAMIN D 25-HYDROXY: 13.9 NG/ML
WBC # BLD: 6.5 K/UL (ref 4–11)

## 2022-01-24 PROCEDURE — 99213 OFFICE O/P EST LOW 20 MIN: CPT | Performed by: STUDENT IN AN ORGANIZED HEALTH CARE EDUCATION/TRAINING PROGRAM

## 2022-01-24 PROCEDURE — 99213 OFFICE O/P EST LOW 20 MIN: CPT

## 2022-01-24 RX ORDER — ANTISEPTIC SURGICAL SCRUB 0.04 MG/ML
SOLUTION TOPICAL
Qty: 473 ML | Refills: 0 | Status: SHIPPED | OUTPATIENT
Start: 2022-01-24 | End: 2022-02-26

## 2022-01-24 RX ORDER — CETIRIZINE HYDROCHLORIDE, PSEUDOEPHEDRINE HYDROCHLORIDE 5; 120 MG/1; MG/1
TABLET, FILM COATED, EXTENDED RELEASE ORAL
Qty: 60 TABLET | Refills: 3 | Status: SHIPPED | OUTPATIENT
Start: 2022-01-24 | End: 2022-03-16 | Stop reason: ALTCHOICE

## 2022-01-24 RX ORDER — KETOCONAZOLE 20 MG/ML
SHAMPOO TOPICAL
Qty: 120 ML | Refills: 1 | Status: SHIPPED | OUTPATIENT
Start: 2022-01-24 | End: 2022-04-01

## 2022-01-24 RX ORDER — DILTIAZEM HYDROCHLORIDE 120 MG/1
CAPSULE, COATED, EXTENDED RELEASE ORAL
Qty: 30 CAPSULE | Refills: 3 | Status: SHIPPED | OUTPATIENT
Start: 2022-01-24 | End: 2022-09-08 | Stop reason: SDUPTHER

## 2022-01-24 RX ORDER — SERTRALINE HYDROCHLORIDE 25 MG/1
25 TABLET, FILM COATED ORAL DAILY
Qty: 90 TABLET | Refills: 1 | Status: SHIPPED | OUTPATIENT
Start: 2022-01-24 | End: 2022-03-16

## 2022-01-24 RX ORDER — FLUTICASONE PROPIONATE 50 MCG
SPRAY, SUSPENSION (ML) NASAL
Qty: 16 G | Refills: 3 | Status: SHIPPED | OUTPATIENT
Start: 2022-01-24

## 2022-01-24 ASSESSMENT — VISUAL ACUITY: OU: 1

## 2022-01-24 NOTE — PROGRESS NOTES
2022    Patient's Name: Debra Pettit        : 1975)    CC: Post Hospitalization follow up     HPI: 46yo F here for a post hospitalization follow up. She was hospitalized from - for [id number] pneumonia complicated by oral and esophageal thrush. She was discharged home on no O2 and presented to the ED on 22 for SOB and feeling like she needs O2 sats were 99% at rest and 94% with ambulation during that visit on RA. Since then, she states her SOB slowly resolved but reports extreme fatiuge and muscle aches. Fatigue is worse with exertion which improves with rest. She also reports worsening anxiety associated with palpations. She is worried about what getting COVID means for her health and her current state of unemployment. She also reports problems sleeping and concentrating due to the anxiety. She reports she cries often because \"this is all so scary. \" She denies lack of interest, guilt, psychomotor issues, problems with appetite, and suicidal ideation. She alsa denies F/C, HA, dizziness, CP, abd pain, N/V/D/C, melena, and urinary sx. Review of Systems   As noted in HPI above. Prior to Visit Medications    Medication Sig Taking?  Authorizing Provider   sertraline (ZOLOFT) 25 MG tablet Take 1 tablet by mouth daily Yes Vidal Olguin MD   Handicap Placard MISC by Does not apply route Valid from today to 22 Yes Vidal Olguin MD   chlorhexidine (BETASEPT SURGICAL SCRUB) 4 % external liquid APPLY TO AFFECTED AREA(S) TOPICALLY AS NEEDED Yes Vidal Olguin MD   itraconazole (SPORANOX) 10 MG/ML solution Take 20 mLs by mouth daily for 10 days  Vidal Olguin MD   lidocaine viscous hcl (XYLOCAINE) 2 % SOLN solution Take 15 mLs by mouth as needed for Irritation  Vidal Olguin MD   folic acid (FOLVITE) 1 MG tablet TAKE 1 TABLET BY MOUTH ONE TIME A DAY  Vidal Olguin MD   cyanocobalamin 1000 MCG/ML injection INJECT 1ML INTO THE SKIN ONCE MONTHLY  Vidal Olguin MD   urea (CARMOL) 10 % cream APPLY TO AFFECTED AREA(S) TOPICALLY AS NEEDED  Ravindra Melton MD   polyethylene glycol (MIRALAX) 17 GM/SCOOP POWD powder POUR 17 GRAMS OF POWDER INTO THE CAP OF THE BOTTLE. STIR THE POWDER IN A GLASS (4 TO 8 OZ) OF WATER, JUICE, SODA, COFFEE OR TEA UNTIL COMPLETELY DISSOLVED. DRINK THE SOLUTION. ONCE DAILY AS NEEDED FOR CONSTIPATION  Ravindra Melton MD   fluticasone (FLONASE) 50 MCG/ACT nasal spray APPLY 1 SPRAY IN THE AFFECTED NOSTRIL ONE TIME A DAY  Ravindra Melton MD   Saccharomyces boulardii (PROBIOTIC) 250 MG CAPS TAKE 1 CAPSULE BY MOUTH 2 TIMES A DAY  China Orellana DO   ketoconazole (NIZORAL) 2 % shampoo APPLY TO AFFECTED AREA(S) ONCE DAILY AS NEEDED  China Orellana DO   budesonide-formoterol (SYMBICORT) 160-4.5 MCG/ACT AERO Inhale 2 puffs into the lungs 2 times daily  Raul Cullen MD   montelukast (SINGULAIR) 10 MG tablet Take one tablet by mouth every evening  Raul Cullen MD   cetirizine-psuedoephedrine (ALLERGY RELIEF D) 5-120 MG per extended release tablet TAKE 1 TABLET BY MOUTH 2 TIMES A DAY  Raul Cullen MD   budesonide (PULMICORT) 0.25 MG/2ML nebulizer suspension Take 2 mLs by nebulization 2 times daily  Duong Bosch MD   mupirocin (BACTROBAN) 2 % ointment Apply topically 3 times daily.   Raul Cullen MD   dilTIAZem (CARDIZEM CD) 120 MG extended release capsule TAKE 1 CAPSULE BY MOUTH ONE TIME A DAY  Patient taking differently: 2 times daily as needed TAKE 1 CAPSULE BY MOUTH ONE TIME A DAY  Raul Cullen MD   albuterol sulfate  (90 Base) MCG/ACT inhaler INHALE 2 PUFF BY MOUTH EVERY 6 HOURS AS NEEDED FOR WHEEZING  Raul Cullen MD   levothyroxine (SYNTHROID) 137 MCG tablet Take 1 tablet by mouth daily  Melissa Farrell MD   liothyronine (CYTOMEL) 5 MCG tablet TAKE TWO TABLETS BY MOUTH DAILY  Melissa Farrell MD   Insulin Syringe-Needle U-100 (ULTICARE INSULIN SYRINGE) 31G X 5/16\" 1 ML MISC AS DIRECTED Sonido Long MD   pantoprazole (PROTONIX) 40 MG tablet Take 1 tablet by mouth 2 times daily (before meals)  Faviola Hernadez MD   mometasone (ELOCON) 0.1 % cream Apply topically daily. Stephanie Guillermo MD   Multiple Vitamins-Minerals (THERAPEUTIC MULTIVITAMIN-MINERALS) tablet Take 1 tablet by mouth daily  Stephanie Guillermo MD   Misc. Devices (HIBICLENS HAND PUMP 32OZ) MISC Use as needed for skin irritation or bumps. Emma Tucker MD        PHYSICAL EXAM    Vitals:    01/24/22 0922   BP: (!) 143/92   Site: Right Lower Arm   Position: Sitting   Cuff Size: Medium Adult   Pulse: 95   Temp: 97.1 °F (36.2 °C)   TempSrc: Temporal   SpO2: 95%   Weight: 217 lb 3.2 oz (98.5 kg)   Height: 5' 3\" (1.6 m)      Estimated body mass index is 38.48 kg/m² as calculated from the following:    Height as of this encounter: 5' 3\" (1.6 m). Weight as of this encounter: 217 lb 3.2 oz (98.5 kg). Physical Exam  Constitutional:       General: She is not in acute distress. Appearance: Normal appearance. She is overweight. She is not ill-appearing. HENT:      Head: Normocephalic and atraumatic. Eyes:      General: Vision grossly intact. Conjunctiva/sclera: Conjunctivae normal.   Cardiovascular:      Rate and Rhythm: Normal rate and regular rhythm. Pulses: Normal pulses. Heart sounds: Normal heart sounds, S1 normal and S2 normal. No murmur heard. No friction rub. No gallop. Pulmonary:      Effort: Pulmonary effort is normal. No respiratory distress. Breath sounds: Normal breath sounds and air entry. No wheezing or rales. Abdominal:      General: Bowel sounds are normal. There is no distension. Palpations: Abdomen is soft. Tenderness: There is no abdominal tenderness. Musculoskeletal:         General: Normal range of motion. Cervical back: Full passive range of motion without pain, normal range of motion and neck supple. Right lower leg: No edema.       Left lower leg: No edema.   Lymphadenopathy:      Cervical: No cervical adenopathy. Skin:     Capillary Refill: Capillary refill takes less than 2 seconds. Coloration: Skin is not pale. Neurological:      Mental Status: She is alert and oriented to person, place, and time. Mental status is at baseline. Psychiatric:         Mood and Affect: Mood is anxious. Speech: Speech normal.         Judgment: Judgment normal.          ASSESSMENT AND PLAN    1. Pneumonia due to COVID-19 virus  Improving  - CBC Auto Differential; Future  - Comprehensive Metabolic Panel; Future  - Hemoglobin A1C; Future  - TSH with Reflex; Future  - Handicap Placard MISC; by Does not apply route Valid from today to 04/30/22  Dispense: 1 each; Refill: 0    2. Anxiety  As noted above  - TSH with Reflex; Future  - START sertraline (ZOLOFT) 25 MG tablet; Take 1 tablet by mouth daily  Dispense: 90 tablet; Refill: 1    3. Atrial fibrillation with RVR (HCC)  Currently SR w/ normal pulse from ausculation  - CBC Auto Differential; Future  - Comprehensive Metabolic Panel; Future  - Hemoglobin A1C; Future  - TSH with Reflex; Future    4. Vitamin D deficiency  - VITAMIN D 25 HYDROXY; Future       Return in about 3 months (around 4/24/2022). Electronically signed by Alfonso Marinelli MD on 1/24/2022 at 12:03 PM     Pt discussed and staffed with Dr. iCndi Rosa.

## 2022-01-24 NOTE — PROGRESS NOTES
Department of General Surgery - Adult  General Surgery Service  Resident Office Note      CHIEF COMPLAINT:  Scalp cyst    HISTORY OF PRESENT ILLNESS:  This is a 48 y.o. female with Hx of asthma, Hashimoto's thyroiditis, iron deficiency anemia, previous MRSA positive abdominal and R thigh abscess, and recent hospital admission for COVID-19 that presents to outpatient surgery clinic with concern of a chronic scalp cyst. Patient reports she has had the cyst since she was 13years old and began to notice an increase in size one year ago but was unable to schedule an appointment. Patient states the lesion is has been becoming more tender with the increased size. Lesion does not have drainage, surrounding erythema, or pruritus. Patient reports she still feels residual weakness from COVID-19 but was told it would take time for her to return to baseline. Patient denies any fevers or chills.     Past Medical History:        Diagnosis Date    Abdominal wall abscess 2/8/2017    Abdominal wall cellulitis 2/6/2017    Anal fissure 4/30/2015    Anemia, iron deficiency     Asthma     Autoimmune hepatitis ([facility] [address]     Chronic sinusitis     COVID-19     History of blood transfusion     Hypothyroidism     Menorrhagia with regular cycle     Morbid obesity with BMI of 40.0-44.9, adult ([facility] [address] 5/26/2015    MRSA (methicillin resistant staph aureus) culture positive 02/03/2017    abdominal abscess    MRSA infection 08/20/2012    rt thigh abscess    Pericarditis, acute     Sinusitis        Past Surgical History:           Procedure Laterality Date    COLONOSCOPY N/A 12/29/2020    COLONOSCOPY DIAGNOSTIC performed by Desire Salas MD at [address] [place] [address]  8/22/2012    INCISION AND DRAINAGE POSTERIOR THIGH ABSCESS    RECTAL SURGERY  Aug 2011    repair of rectal fissures and anal skin tag removal    SINUS SURGERY      X 3    TONSILLECTOMY  2004    TONSILLECTOMY AND ADENOIDECTOMY  2005    (partial adenoidectomy)    UPPER GASTROINTESTINAL ENDOSCOPY N/A 12/29/2020    EGD BIOPSY performed by Art Jacobs MD at [facility]       Allergies:  Latex, Clindamycin/lincomycin, Sulfa antibiotics, Diflucan [fluconazole], and Other    Medications:   Home Meds  Current Outpatient Medications on File Prior to Visit   Medication Sig Dispense Refill    itraconazole (SPORANOX) 10 MG/ML solution Take 20 mLs by mouth daily for 10 days 200 mL 0    lidocaine viscous hcl (XYLOCAINE) 2 % SOLN solution Take 15 mLs by mouth as needed for Irritation 15 mL 3    folic acid (FOLVITE) 1 MG tablet TAKE 1 TABLET BY MOUTH ONE TIME A DAY 30 tablet 3    cyanocobalamin 1000 MCG/ML injection INJECT 1ML INTO THE SKIN ONCE MONTHLY 1 mL 2    urea (CARMOL) 10 % cream APPLY TO AFFECTED AREA(S) TOPICALLY AS NEEDED 85 g 2    polyethylene glycol (MIRALAX) 17 GM/SCOOP POWD powder POUR 17 GRAMS OF POWDER INTO THE CAP OF THE BOTTLE. STIR THE POWDER IN A GLASS (4 TO 8 OZ) OF WATER, JUICE, SODA, COFFEE OR TEA UNTIL COMPLETELY DISSOLVED. DRINK THE SOLUTION. ONCE DAILY AS NEEDED FOR CONSTIPATION 238 g 5    fluticasone (FLONASE) 50 MCG/ACT nasal spray APPLY 1 SPRAY IN THE AFFECTED NOSTRIL ONE TIME A DAY 1 each 0    Saccharomyces boulardii (PROBIOTIC) 250 MG CAPS TAKE 1 CAPSULE BY MOUTH 2 TIMES A  capsule 1    ketoconazole (NIZORAL) 2 % shampoo APPLY TO AFFECTED AREA(S) ONCE DAILY AS NEEDED 1 each 1    budesonide-formoterol (SYMBICORT) 160-4.5 MCG/ACT AERO Inhale 2 puffs into the lungs 2 times daily 1 each 5    montelukast (SINGULAIR) 10 MG tablet Take one tablet by mouth every evening 30 tablet 5    cetirizine-psuedoephedrine (ALLERGY RELIEF D) 5-120 MG per extended release tablet TAKE 1 TABLET BY MOUTH 2 TIMES A DAY 60 tablet 2    budesonide (PULMICORT) 0.25 MG/2ML nebulizer suspension Take 2 mLs by nebulization 2 times daily 60 ampule 5    mupirocin (BACTROBAN) 2 % ointment Apply topically 3 times daily.  1 Tube 1    dilTIAZem (CARDIZEM CD) 120 MG extended release capsule TAKE 1 CAPSULE BY MOUTH ONE TIME A DAY (Patient taking differently: 2 times daily as needed TAKE 1 CAPSULE BY MOUTH ONE TIME A DAY) 30 capsule 3    albuterol sulfate  (90 Base) MCG/ACT inhaler INHALE 2 PUFF BY MOUTH EVERY 6 HOURS AS NEEDED FOR WHEEZING 1 Inhaler 5    levothyroxine (SYNTHROID) 137 MCG tablet Take 1 tablet by mouth daily 30 tablet 2    liothyronine (CYTOMEL) 5 MCG tablet TAKE TWO TABLETS BY MOUTH DAILY 60 tablet 5    Insulin Syringe-Needle U-100 (ULTICARE INSULIN SYRINGE) 31G X 5/16\" 1 ML MISC AS DIRECTED 100 each 1    chlorhexidine (BETASEPT SURGICAL SCRUB) 4 % external liquid APPLY TO AFFECTED AREA(S) TOPICALLY AS NEEDED 473 mL 0    pantoprazole (PROTONIX) 40 MG tablet Take 1 tablet by mouth 2 times daily (before meals) 60 tablet 5    mometasone (ELOCON) 0.1 % cream Apply topically daily. 1 Tube 2    Multiple Vitamins-Minerals (THERAPEUTIC MULTIVITAMIN-MINERALS) tablet Take 1 tablet by mouth daily 30 tablet 1    Misc. Devices (HIBICLENS HAND PUMP 32OZ) MISC Use as needed for skin irritation or bumps. 1 each 0     No current facility-administered medications on file prior to visit. Current Meds  No current facility-administered medications for this visit. Family History:   Family History   Problem Relation Age of Onset    High Blood Pressure Mother     Hypertension Mother     Elevated Lipids Mother     Other Mother         fatty liver    Heart Disease Father     Hypertension Father     Elevated Lipids Father     Coronary Art Dis Father     Liver Disease Father     Diabetes Maternal Aunt        Social History:   TOBACCO:   reports that she has never smoked. She has never used smokeless tobacco.  ETOH:   reports current alcohol use. DRUGS:   reports no history of drug use. REVIEW OF SYSTEMS:    A 10 point review of systems was conducted, significant findings as noted in HPI. All other systems negative.     PHYSICAL EXAM:    Vitals:    01/24/22 0924   BP: (!) 143/92   Pulse: 77   Temp: 97.5 °F (36.4 °C)   SpO2: 95%       General appearance: alert, in no acute distress  Neuro: A&Ox3, CN II-XII grossly intact  HENT: trachea midline, no JVD, no LAD; 1.5 x 1cm cyst-like lesion on mid scalp, no surrounding erythema, drainage, or visible sinus  Eyes: PERRLA, no scleral icterus  Chest/Lungs: CTAB, no crackles/rales, wheezes/rhonchi   Cardiovascular: RRR, no murmurs/gallops/rubs  Skin: warm and dry  Extremities: no edema, no cyanosis    ASSESSMENT AND PLAN:  This is a 48 y.o. female with Hx of asthma, Hashimoto's thyroiditis, iron deficiency anemia, previous MRSA positive abdominal and R thigh abscess, and recent hospital admission for COVID-19 that presents to outpatient surgery clinic with concern of a chronic scalp cyst. Patient has had cyst since age 13 but reports it has been getting larger and becoming tender over the last year. Patient has a 1.5 x 1cm cyst-like lesion on scalp that is mildly tender to palpation, but without drainage, erythema, or visible sinus. Patient would like to have cyst removed. - discussed options for excision of scalp cyst in office with local vs OR under MAC; patient would like to have procedure done in OR; risks, benefits, and alternatives discussed with patient. - This procedure has been fully reviewed with the patient and written informed consent has been obtained.     Hernandez Abernathy DO  01/24/22  11:29 AM  [phone]

## 2022-01-24 NOTE — CARE COORDINATION
ACM attempted third and final outreach for f/u regarding COVID-19. No answer. Message left through voicemail requesting call return. No further outreach indicated.

## 2022-01-24 NOTE — PATIENT INSTRUCTIONS
- START taking Sertraline once daily.   - Go and get your labs done. Will call with the results. - Follow up in 3 months or earlier if you have any medical problems.

## 2022-01-25 LAB
ESTIMATED AVERAGE GLUCOSE: 139.9 MG/DL
HBA1C MFR BLD: 6.5 %

## 2022-01-26 ENCOUNTER — TELEPHONE (OUTPATIENT)
Dept: INTERNAL MEDICINE CLINIC | Age: 47
End: 2022-01-26

## 2022-01-26 DIAGNOSIS — R73.03 PREDIABETES: Primary | ICD-10-CM

## 2022-01-26 NOTE — TELEPHONE ENCOUNTER
PT LVM STATING SHE SEEN HER LABS FOR THE VIT D, POTASSIUM AND WANT TO KNOW IF DR IS GOING TO PRESCRIBED VIT D AND POTASSIUM MEDICATION SINCE HER LEVELS ARE LOW. PT ALSO STATED SHE HAS NOT HEARD ANYTHING ABOUT SURG TO REMOVE CYST ON HER SCALP.  PT CAN BE REACHED [phone]

## 2022-01-27 ENCOUNTER — TELEPHONE (OUTPATIENT)
Dept: VASCULAR SURGERY | Age: 47
End: 2022-01-27

## 2022-01-27 DIAGNOSIS — L30.9 ECZEMA, UNSPECIFIED TYPE: ICD-10-CM

## 2022-01-27 RX ORDER — ERGOCALCIFEROL 1.25 MG/1
50000 CAPSULE ORAL WEEKLY
Qty: 6 CAPSULE | Refills: 0 | Status: SHIPPED | OUTPATIENT
Start: 2022-01-27 | End: 2022-02-26

## 2022-01-27 RX ORDER — MOMETASONE FUROATE 1 MG/G
CREAM TOPICAL
Qty: 45 G | Refills: 0 | Status: SHIPPED | OUTPATIENT
Start: 2022-01-27 | End: 2022-02-26

## 2022-01-27 NOTE — TELEPHONE ENCOUNTER
Patient referred from the surgery clinic for a excision of a scalp cyst.  Surgery letter received  1 hr OR time needed under MAC. Covid vaccinated    Pre op instructions reviewed including the need for a H&P and a  18 or older the day of surgery. Pre op packet picked up by patient. Post op to be scheduled by the surgery clinic.     Faxed to scheduling     Placed on calender and [place]

## 2022-01-27 NOTE — TELEPHONE ENCOUNTER
Neri Guerrero will call patient to discuss labs;also surgeon's office  will call her today or tomorrow to schedule surgery.

## 2022-02-01 ENCOUNTER — HOSPITAL ENCOUNTER (OUTPATIENT)
Dept: SLEEP CENTER | Age: 47
Discharge: HOME | End: 2022-02-01
Payer: COMMERCIAL

## 2022-02-01 ENCOUNTER — OFFICE VISIT (OUTPATIENT)
Dept: PULMONOLOGY | Age: 47
End: 2022-02-01
Payer: COMMERCIAL

## 2022-02-01 VITALS
HEIGHT: 63 IN | BODY MASS INDEX: 38.45 KG/M2 | DIASTOLIC BLOOD PRESSURE: 77 MMHG | SYSTOLIC BLOOD PRESSURE: 124 MMHG | WEIGHT: 217 LBS | OXYGEN SATURATION: 98 % | TEMPERATURE: 96.5 F | HEART RATE: 84 BPM

## 2022-02-01 DIAGNOSIS — G47.33 OBSTRUCTIVE SLEEP APNEA (ADULT) (PEDIATRIC): Primary | ICD-10-CM

## 2022-02-01 DIAGNOSIS — G47.33 OBSTRUCTIVE SLEEP APNEA: ICD-10-CM

## 2022-02-01 DIAGNOSIS — J45.30 MILD PERSISTENT ASTHMA IN ADULT WITHOUT COMPLICATION: Primary | Chronic | ICD-10-CM

## 2022-02-01 DIAGNOSIS — G47.33 OBSTRUCTIVE SLEEP APNEA (ADULT) (PEDIATRIC): ICD-10-CM

## 2022-02-01 PROCEDURE — 95811 POLYSOM 6/>YRS CPAP 4/> PARM: CPT

## 2022-02-01 PROCEDURE — G8427 DOCREV CUR MEDS BY ELIG CLIN: HCPCS | Performed by: INTERNAL MEDICINE

## 2022-02-01 PROCEDURE — 1111F DSCHRG MED/CURRENT MED MERGE: CPT | Performed by: INTERNAL MEDICINE

## 2022-02-01 PROCEDURE — 99213 OFFICE O/P EST LOW 20 MIN: CPT | Performed by: INTERNAL MEDICINE

## 2022-02-01 PROCEDURE — G8417 CALC BMI ABV UP PARAM F/U: HCPCS | Performed by: INTERNAL MEDICINE

## 2022-02-01 PROCEDURE — G8484 FLU IMMUNIZE NO ADMIN: HCPCS | Performed by: INTERNAL MEDICINE

## 2022-02-01 PROCEDURE — 1036F TOBACCO NON-USER: CPT | Performed by: INTERNAL MEDICINE

## 2022-02-01 RX ORDER — ALBUTEROL SULFATE 2.5 MG/3ML
2.5 SOLUTION RESPIRATORY (INHALATION) EVERY 6 HOURS PRN
Qty: 30 EACH | Refills: 3 | Status: SHIPPED | OUTPATIENT
Start: 2022-02-01 | End: 2022-03-16

## 2022-02-01 NOTE — PROGRESS NOTES
Ada Ch (:  1975) is a 48 y.o. female,Established patient, here for evaluation of the following chief complaint(s):  Follow-up (Covid long hauler)         ASSESSMENT/PLAN:  1. Mild persistent asthma in adult without complication  -     albuterol (PROVENTIL) (2.5 MG/3ML) 0.083% nebulizer solution; Take 3 mLs by nebulization every 6 hours as needed for Wheezing, Disp-30 each, R-3Normal  2. Obstructive sleep apnea  Asthma remains under good control, with daily use of ICS/LABA. She has not required short acting bronchodilator therapy. Recently hospitalized with Covid, discharged on room air. She is making recovery, decrease in fatigue. No worsening respiratory symptoms. Recently diagnosed with RODGER. She is getting her CPAP titration study tonight. We discussed treatment of RODGER and anticipated benefits improving tiredness. Return if symptoms worsen or fail to improve. Subjective   SUBJECTIVE/OBJECTIVE:  HPI   Ms. Ilir Andrews returns for follow-up for asthma, having recently been hospitalized with Covid. She had been vaccinated, but had not yet received a booster dose. She was hospitalized for 1 week, was able to discharge home on room air on 2022. She continues to complain of feeling more tired than she would expect, thinks she has long COVID. She has not had more complaints of asthma. She continues on Symbicort twice daily but has not required use of albuterol inhaler. She is concerned because she frequently has worsening respiratory symptoms in mid winter and wishes to be fully prepared. She does not have cough, wheezing, chest tightness, nocturnal chest symptoms. She was recently diagnosed with obstructive sleep apnea, and is scheduled to get CPAP titration study tonight.          Objective   Physical Exam       On this date 2022 I have spent 25 minutes reviewing previous notes, test results and face to face with the patient discussing the diagnosis and importance of compliance with the treatment plan as well as documenting on the day of the visit. An electronic signature was used to authenticate this note.     --Debbie Ayala MD

## 2022-02-02 ENCOUNTER — TELEPHONE (OUTPATIENT)
Dept: ENT CLINIC | Age: 47
End: 2022-02-02

## 2022-02-02 ENCOUNTER — TELEPHONE (OUTPATIENT)
Dept: INTERNAL MEDICINE CLINIC | Age: 47
End: 2022-02-02

## 2022-02-02 NOTE — PROGRESS NOTES
[facility] patients having surgery or anesthesia are required to be Covid tested OR to have been vaccinated at least 14 days prior to your procedure. It is very important to return our call to [phone] and notify the staff of your last vaccination date otherwise you will be required to complete Covid PCR test within the 5-6 days prior to surgery & quarantine. The results will need to be faxed to PreAdmission Testing at [phone]. PRIOR TO PROCEDURE DATE:        1. PLEASE FOLLOW ANY  GUIDELINES/ INSTRUCTIONS PRIOR TO YOUR PROCEDURE AS ADVISED BY YOUR SURGEON. 2. Arrange for someone to drive you home and be with you for the first 24 hours after discharge for your safety after your procedure for which you received sedation. Ensure it is someone we can share information with regarding your discharge. 3. You must contact your surgeon for instructions IF:   You are taking any blood thinners, aspirin, anti-inflammatory or vitamin E.   There is a change in your physical condition such as a cold, fever, rash, cuts, sores or any other infection, especially near your surgical site. 4. Do not drink alcohol the day before or day of your procedure. 5. A Pre-op History and Physical for surgery MUST be completed by your Physician or Urgent Care within 30 days of your procedure date. Please bring a copy with you on the day of your procedure and along with any other testing performed. THE DAY OF YOUR PROCEDURE:  1. Follow instructions for ARRIVAL TIME as DIRECTED BY YOUR SURGEON. 2. Enter the MAIN entrance from [employer] and follow the signs to the free [employer] or [employer] parking (offered free of charge 6am-5pm). 3. Enter the Main Entrance of the hospital (do not enter from the lower level of the parking garage). Upon entrance, check in with the  at the main desk on your left. If no one is available at the desk, proceed into the [place] [facility] Waiting Room and go through the door directly into the [place] [facility]. There is a Check-in desk ACROSS from Room 5 (marked with a sign hanging from the ceiling). The phone number for the surgery center is [phone]. 4. Please call [phone] option #2 option #2 if you have not been preregistered yet. On the day of your procedure bring your insurance card and photo ID. You will be registered at your bedside once brought back to your room. 5. DO NOT EAT ANYTHING eight hours prior to your arrival for surgery. May have 8 ounces of water 4 hours prior to your arrival for surgery. NOTE: ALL Gastric, Bariatric and Bowel surgery patients MUST follow their surgeon's instructions. 6. MEDICATIONS    Take the following medications with a SMALL sip of water: diltiazem, synthroid, cytomel,protonix   Bariatric patient's call surgeon if on diabetic medications as some need to be stopped 1 week preop   Use your usual dose of inhalers the morning of surgery. BRING your rescue inhaler with you to hospital.    Anesthesia does NOT want you to take insulin the morning of surgery. They will control your blood sugar while you are at the hospital. Please contact your ordering physician for instructions regarding your insulin the night before your procedure. If you have an insulin pump, please keep it set on basal rate. 7. Do not swallow water when brushing teeth. No gum, candy, mints or ice chips. Refrain from smoking or at least decrease the amount. 8. Dress in loose, comfortable clothing appropriate for redressing after your procedure. Do not wear jewelry (including body piercings), make-up (especially NO eye make-up), fingernail polish (NO toenail polish if foot/leg surgery), lotion, powders or metal hairclips. 9. Dentures, glasses, or contacts will need to be removed before your procedure.  Bring cases for your glasses, contacts, dentures, or hearing aids to protect them while you are in surgery. 10. If you use a CPAP, please bring it with you on the day of your procedure. 11. We recommend that valuable personal  belongings such as cash, cell phones, e-tablets or jewelry, be left at home during your stay. The hospital will not be responsible for valuables that are not secured in the hospital safe. However, if your insurance requires a co-pay, you may want to bring a method of payment, i.e. Check or credit card, if you wish to pay your co-pay the day of surgery. 12. If you are to stay overnight, you may bring a bag with personal items. Please have any large items you may need brought in by your family after your arrival to your hospital room. 15. If you have a Living Will or Durable Power of , please bring a copy on the day of your procedure. 15. With your permission, one family member may accompany you while you are being prepared for surgery. Once you are ready, additional family members may join you. HOW WE KEEP YOU SAFE and WORK TO PREVENT SURGICAL SITE INFECTIONS:  1. Health care workers should always check your ID bracelet to verify your name and birth date. You will be asked many times to state your name, date of birth, and allergies. 2. Health care workers should always clean their hands with soap or alcohol gel before providing care to you. It is okay to ask anyone if they cleaned their hands before they touch you. 3. You will be actively involved in verifying the type of procedure you are having and ensuring the correct surgical site. This will be confirmed multiple times prior to your procedure. Do NOT niesha your surgery site UNLESS instructed to by your surgeon. 4. Do not shave or wax for 72 hours prior to procedure near your operative site. Shaving with a razor can irritate your skin and make it easier to develop an infection.  On the day of your procedure, any hair that needs to be removed near the surgical site will be clipped by a healthcare worker using a special clippers designed to avoid skin irritation. 5. When you are in the operating room, your surgical site will be cleansed with a special soap, and in most cases, you will be given an antibiotic before the surgery begins. What to expect AFTER YOUR PROCEDURE:  1. Immediately following your procedure, your will be taken to the PACU for the first phase of your recovery. Your nurse will help you recover from any potential side effects of anesthesia, such as extreme drowsiness, changes in your vital signs or breathing patterns. Nausea, headache, muscle aches, or sore throat may also occur after anesthesia. Your nurse will help you manage these potential side effects. 2. For comfort and safety, arrange to have someone at home with you for the first 24 hours after discharge. 3. You and your family will be given written instructions about your diet, activity, dressing care, medications, and return visits. 4. Once at home, should issues with nausea, pain, or bleeding occur, or should you notice any signs of infection, you should call your surgeon. 5. Always clean your hands before and after caring for your wound. Do not let your family touch your surgery site without cleaning their hands. 6. Narcotic pain medications can cause significant constipation. You may want to add a stool softener to your postoperative medication schedule or speak to your surgeon on how best to manage this SIDE EFFECT. SPECIAL INSTRUCTIONS bring emergency inhaler, patient acknowlegdes understanding of this along with pre op instructions. Thank you for allowing us to care for you. We strive to exceed your expectations in the delivery of care and service provided to you and your family.      If you need to contact the [facility] staff for any reason, please call us at [phone]    Instructions reviewed with patient during preadmission testing phone interview.   Amy Zaldivar RN.2/2/2022 .1:21 PM      ADDITIONAL EDUCATIONAL INFORMATION REVIEWED PER PHONE WITH YOU AND/OR YOUR FAMILY:  Yes Hibiclens® Bathing Instructions   Yes Antibacterial Soap

## 2022-02-07 ENCOUNTER — OFFICE VISIT (OUTPATIENT)
Dept: INTERNAL MEDICINE CLINIC | Age: 47
End: 2022-02-07
Payer: COMMERCIAL

## 2022-02-07 VITALS
SYSTOLIC BLOOD PRESSURE: 125 MMHG | OXYGEN SATURATION: 94 % | TEMPERATURE: 96.8 F | DIASTOLIC BLOOD PRESSURE: 83 MMHG | HEIGHT: 63 IN | BODY MASS INDEX: 38.61 KG/M2 | WEIGHT: 217.9 LBS | HEART RATE: 79 BPM

## 2022-02-07 DIAGNOSIS — Z01.818 PRE-OP TESTING: Primary | ICD-10-CM

## 2022-02-07 PROCEDURE — 95811 POLYSOM 6/>YRS CPAP 4/> PARM: CPT | Performed by: INTERNAL MEDICINE

## 2022-02-07 PROCEDURE — 93005 ELECTROCARDIOGRAM TRACING: CPT | Performed by: STUDENT IN AN ORGANIZED HEALTH CARE EDUCATION/TRAINING PROGRAM

## 2022-02-07 PROCEDURE — 99213 OFFICE O/P EST LOW 20 MIN: CPT | Performed by: STUDENT IN AN ORGANIZED HEALTH CARE EDUCATION/TRAINING PROGRAM

## 2022-02-07 ASSESSMENT — ENCOUNTER SYMPTOMS
SHORTNESS OF BREATH: 0
DIARRHEA: 0
SINUS PAIN: 0
COUGH: 0
CONSTIPATION: 0
NAUSEA: 0
SORE THROAT: 0
WHEEZING: 0
EYE PAIN: 0
EYE REDNESS: 0
ABDOMINAL PAIN: 0

## 2022-02-07 NOTE — PATIENT INSTRUCTIONS
Please take your medications the morning of surgery with a small sip of water  Please wash your hair with your hair wash the morning before surgery

## 2022-02-07 NOTE — PROGRESS NOTES
Pre-Op Examination    Roxann Miranda                                               : 1975  Age: 48 y.o.   MRN: [id number]  Date : 2022    Referring Physician: Dr. Alfonso Desai    Procedure: Scalp cyst removal    HISTORY OF PRESENT ILLNESS:   The patient is a 48 y.o. female with past medical history of Asthma, Hashimoto's thyroiditis, iron deficiency anemia, previous MRSA positive abdominal and R thigh abscess, COVID-19 who presents for preoperative examination for a scalp cyst removal.    Planned anesthesia: MAC  Known anesthesia problems: None  Bleeding risk: Low  Personal or FH of DVT/PE:  None    Patient objection to receiving blood products: No    Past Medical History:        Diagnosis Date    Abdominal wall abscess 2017    Abdominal wall cellulitis 2017    Anal fissure 2015    Anemia, iron deficiency     Anesthesia     PATIENT REQUESTING IF NEEDS TO BE INTUBATED TO USE A GURDEEP TUBE     Asthma     Atrial fibrillation ([address]     Autoimmune hepatitis ([address]     Chronic sinusitis     COVID-19     History of blood transfusion     Hypothyroidism     Menorrhagia with regular cycle     Morbid obesity with BMI of 40.0-44.9, adult ([address] 2015    MRSA (methicillin resistant staph aureus) culture positive 2017    abdominal abscess    MRSA infection 2012    rt thigh abscess    RODGER (obstructive sleep apnea)     awaiting cpap machine to be delivered not due until after surgery 22    Pericarditis, acute     Retention of urine     Sinusitis        Past Surgical History:        Procedure Laterality Date    COLONOSCOPY N/A 2020    COLONOSCOPY DIAGNOSTIC performed by Figueroa Moreno MD at [facility]  2012    INCISION AND DRAINAGE POSTERIOR THIGH ABSCESS    RECTAL SURGERY  Aug 2011    repair of rectal fissures and anal skin tag removal    SINUS SURGERY      X 3    TONSILLECTOMY  2004    TONSILLECTOMY AND ADENOIDECTOMY 2005    (partial adenoidectomy)    UPPER GASTROINTESTINAL ENDOSCOPY N/A 12/29/2020    EGD BIOPSY performed by Yoni Damian MD at [facility]       Family History:       Problem Relation Age of Onset    High Blood Pressure Mother     Hypertension Mother     Elevated Lipids Mother     Other Mother         fatty liver    Heart Disease Father     Hypertension Father     Elevated Lipids Father     Coronary Art Dis Father     Liver Disease Father     Diabetes Maternal Aunt        Social History:   TOBACCO:   reports that she has never smoked. She has never used smokeless tobacco.  ETOH:   reports current alcohol use. OCCUPATION:      Allergies:  Latex, Clindamycin/lincomycin, Sulfa antibiotics, and Diflucan [fluconazole]    Current Medications:    Prior to Admission medications    Medication Sig Start Date End Date Taking? Authorizing Provider   albuterol (PROVENTIL) (2.5 MG/3ML) 0.083% nebulizer solution Take 3 mLs by nebulization every 6 hours as needed for Wheezing 2/1/22   Montrell Hatch MD   pantoprazole (PROTONIX) 40 MG tablet Take 1 tablet by mouth 2 times daily (before meals) 1/28/22   Tawnya Jeans, MD   vitamin D (ERGOCALCIFEROL) 1.25 MG (53457 UT) CAPS capsule Take 1 capsule by mouth once a week 1/27/22   Tawnya Jeans, MD   mometasone (ELOCON) 0.1 % cream Apply topically daily.  1/27/22   Tawnya Jeans, MD   sertraline (ZOLOFT) 25 MG tablet Take 1 tablet by mouth daily  Patient taking differently: Take 25 mg by mouth daily Takes mid morning 1/24/22   Tawnya Jeans, MD   Handicap Placard MISC by Does not apply route Valid from today to 04/30/22 1/24/22   Tawnya Jeans, MD   chlorhexidine (BETASEPT SURGICAL SCRUB) 4 % external liquid APPLY TO AFFECTED AREA(S) TOPICALLY AS NEEDED 1/24/22   Tawnya Jeans, MD   dilTIAZem (CARDIZEM CD) 120 MG extended release capsule TAKE 1 CAPSULE BY MOUTH ONE TIME A DAY 1/24/22   Tawnya Jeans, MD   cetirizine-psuedoephedrine (ALLERGY RELIEF D) 5-120 MG per extended release tablet TAKE 1 TABLET BY MOUTH 2 TIMES A DAY 1/24/22   Kathy Oneal MD   fluticasone [facility]) 50 MCG/ACT nasal spray APPLY 1 SPRAY IN THE AFFECTED NOSTRIL ONCE DAILY 1/24/22   Kathy Oneal MD   ketoconazole (NIZORAL) 2 % shampoo APPLY TO AFFECTED AREA(S) ONCE DAILY AS NEEDED 1/24/22   Kathy Oneal MD   lidocaine viscous hcl (XYLOCAINE) 2 % SOLN solution Take 15 mLs by mouth as needed for Irritation 1/17/22   Kathy Oneal MD   folic acid (FOLVITE) 1 MG tablet TAKE 1 TABLET BY MOUTH ONE TIME A DAY 12/6/21   Kathy Oneal MD   cyanocobalamin 1000 MCG/ML injection INJECT 1ML INTO THE SKIN ONCE MONTHLY 12/6/21   Kathy Oneal MD   urea (CARMOL) 10 % cream APPLY TO AFFECTED AREA(S) TOPICALLY AS NEEDED 12/6/21   Kathy Oneal MD   polyethylene glycol (MIRALAX) 17 GM/SCOOP POWD powder POUR 17 GRAMS OF POWDER INTO THE CAP OF THE BOTTLE. STIR THE POWDER IN A GLASS (4 TO 8 OZ) OF WATER, JUICE, SODA, COFFEE OR TEA UNTIL COMPLETELY DISSOLVED. DRINK THE SOLUTION. ONCE DAILY AS NEEDED FOR CONSTIPATION 12/6/21   Kathy Oneal MD   Saccharomyces boulardii (PROBIOTIC) 250 MG CAPS TAKE 1 CAPSULE BY MOUTH 2 TIMES A DAY 10/4/21   Irene Ramsay DO   budesonide-formoterol [facility]) 160-4.5 MCG/ACT AERO Inhale 2 puffs into the lungs 2 times daily 10/4/21   Jose Robles MD   montelukast (SINGULAIR) 10 MG tablet Take one tablet by mouth every evening 10/4/21   Jose Robles MD   budesonide (PULMICORT) 0.25 MG/2ML nebulizer suspension Take 2 mLs by nebulization 2 times daily 8/12/21   Donna Proctor MD   mupirocin (BACTROBAN) 2 % ointment Apply topically 3 times daily.  8/1/21   Jose Robles MD   albuterol sulfate  (90 Base) MCG/ACT inhaler INHALE 2 PUFF BY MOUTH EVERY 6 HOURS AS NEEDED FOR WHEEZING 8/1/21   Jose Robles MD   levothyroxine (SYNTHROID) 137 MCG tablet Take 1 tablet by mouth daily 7/26/21   Brittany Walker MD   liothyronine (CYTOMEL) 5 MCG tablet TAKE TWO TABLETS BY MOUTH DAILY 7/26/21   Mike Nagy MD   Insulin Syringe-Needle U-100 Dina Jo Ann INSULIN SYRINGE) 31G X 5/16\" 1 ML MISC AS DIRECTED 3/26/21   Singh Pedroza MD   Multiple Vitamins-Minerals (THERAPEUTIC MULTIVITAMIN-MINERALS) tablet Take 1 tablet by mouth daily 6/25/20   Sal Momin MD   Misc. Devices (HIBICLENS HAND PUMP 32OZ) MISC Use as needed for skin irritation or bumps. 6/22/20   Gaye Galeazzi, MD       REVIEW OF SYSTEMS:  Review of Systems   Constitutional: Negative for chills, diaphoresis, fatigue and fever. HENT: Negative for sinus pain, sore throat and tinnitus. Eyes: Negative for pain, redness and visual disturbance. Respiratory: Negative for cough, shortness of breath and wheezing. Cardiovascular: Negative for chest pain, palpitations and leg swelling. Gastrointestinal: Negative for abdominal pain, constipation, diarrhea and nausea. Endocrine: Negative for polydipsia and polyuria. Genitourinary: Negative for decreased urine volume and difficulty urinating. Neurological: Positive for headaches. Negative for dizziness and light-headedness. Psychiatric/Behavioral: Negative for agitation and confusion. Physical Exam:      Vitals: There were no vitals taken for this visit. There is no height or weight on file to calculate BMI. Wt Readings from Last 3 Encounters:   02/01/22 217 lb (98.4 kg)   01/24/22 217 lb 3.2 oz (98.5 kg)   01/24/22 217 lb 3.2 oz (98.5 kg)     Physical Exam  HENT:      Head: Normocephalic and atraumatic. Comments: Scalp cyst noted     Mouth/Throat:      Mouth: Mucous membranes are moist.   Eyes:      Extraocular Movements: Extraocular movements intact. Pupils: Pupils are equal, round, and reactive to light. Cardiovascular:      Rate and Rhythm: Normal rate and regular rhythm. Pulses: Normal pulses. Heart sounds: Normal heart sounds. No murmur heard. No friction rub. No gallop. Pulmonary:      Effort: Pulmonary effort is normal. No respiratory distress. Breath sounds: Normal breath sounds. No wheezing or rales. Abdominal:      General: Bowel sounds are normal. There is no distension. Palpations: Abdomen is soft. Tenderness: There is no abdominal tenderness. There is no guarding. Musculoskeletal:         General: Normal range of motion. Right lower leg: No edema. Left lower leg: No edema. Neurological:      Mental Status: She is alert and oriented to person, place, and time. Psychiatric:         Mood and Affect: Mood normal.         Behavior: Behavior normal.       EKG: NSR      Plan:   The patient is a 48 y.o. femalewith past medical history of Asthma, Hashimoto's thyroiditis, iron deficiency anemia, previous MRSA positive abdominal and R thigh abscess, COVID-19 who presents for preoperative examination for a scalp cyst removal    Pre-Operative Risk assessment using 2014 ACC/AHA guidelines     Emergent procedure No  Active Cardiac Condition No (decompensated HF, Arrhythmia, MI <3 weeks, severe valve disease)  Risk Level of Procedure Low Risk (endoscopy, superficial skin, breast, ambulatory, or cataract, etc.)  Revised Cardiac Risk Index Risk factors: None  Measurement of Exercise Tolerance before Surgery >4 Yes    According to the 2014 ACC/AHA pre-operative risk assessment guidelines Orly Gonzalez is a low risk for major cardiac complications during a low risk procedure and may continue as planned. Specific medication recommendations are listed below. Medications recommended to continue should be taken with a sip of water even when NPO. Further recommendations from consultants: None    1. Preoperative workup as follows: EKG was done which showed NSR with LVH  2. Change in medication regimen before surgery: Take Protonix, diltiazem, levothyroxine prior to surgery. 3. Prophylaxis for cardiac events with perioperative beta-blockers: Per surgeon  4. Deep vein thrombosis prophylaxis: Per surgeon    Jed Gutierrez MD  2/7/2022, 8:56 AM    Patient was staffed and discussed with Vivian Soliz MD

## 2022-02-08 ENCOUNTER — ANESTHESIA EVENT (OUTPATIENT)
Dept: OPERATING ROOM | Age: 47
End: 2022-02-08
Payer: COMMERCIAL

## 2022-02-08 ENCOUNTER — HOSPITAL ENCOUNTER (OUTPATIENT)
Age: 47
Setting detail: OUTPATIENT SURGERY
Discharge: HOME | End: 2022-02-08
Attending: SURGERY | Admitting: SURGERY
Payer: COMMERCIAL

## 2022-02-08 ENCOUNTER — ANESTHESIA (OUTPATIENT)
Dept: OPERATING ROOM | Age: 47
End: 2022-02-08
Payer: COMMERCIAL

## 2022-02-08 VITALS
BODY MASS INDEX: 38.45 KG/M2 | SYSTOLIC BLOOD PRESSURE: 102 MMHG | HEIGHT: 63 IN | DIASTOLIC BLOOD PRESSURE: 71 MMHG | HEART RATE: 72 BPM | WEIGHT: 217 LBS | TEMPERATURE: 96.5 F | OXYGEN SATURATION: 95 % | RESPIRATION RATE: 16 BRPM

## 2022-02-08 VITALS — DIASTOLIC BLOOD PRESSURE: 65 MMHG | OXYGEN SATURATION: 94 % | SYSTOLIC BLOOD PRESSURE: 107 MMHG

## 2022-02-08 DIAGNOSIS — G89.18 ACUTE POST-OPERATIVE PAIN: Primary | ICD-10-CM

## 2022-02-08 DIAGNOSIS — L72.9 SCALP CYST: ICD-10-CM

## 2022-02-08 LAB — PREGNANCY, URINE: NEGATIVE

## 2022-02-08 PROCEDURE — 7100000000 HC PACU RECOVERY - FIRST 15 MIN: Performed by: SURGERY

## 2022-02-08 PROCEDURE — 3700000001 HC ADD 15 MINUTES (ANESTHESIA): Performed by: SURGERY

## 2022-02-08 PROCEDURE — 2580000003 HC RX 258: Performed by: FAMILY MEDICINE

## 2022-02-08 PROCEDURE — 6360000002 HC RX W HCPCS: Performed by: SURGERY

## 2022-02-08 PROCEDURE — 6360000002 HC RX W HCPCS: Performed by: ANESTHESIOLOGY

## 2022-02-08 PROCEDURE — 11423 EXC H-F-NK-SP B9+MARG 2.1-3: CPT | Performed by: SURGERY

## 2022-02-08 PROCEDURE — 3600000012 HC SURGERY LEVEL 2 ADDTL 15MIN: Performed by: SURGERY

## 2022-02-08 PROCEDURE — 6360000002 HC RX W HCPCS: Performed by: NURSE ANESTHETIST, CERTIFIED REGISTERED

## 2022-02-08 PROCEDURE — 7100000001 HC PACU RECOVERY - ADDTL 15 MIN: Performed by: SURGERY

## 2022-02-08 PROCEDURE — 3600000002 HC SURGERY LEVEL 2 BASE: Performed by: SURGERY

## 2022-02-08 PROCEDURE — A4217 STERILE WATER/SALINE, 500 ML: HCPCS | Performed by: SURGERY

## 2022-02-08 PROCEDURE — 2500000003 HC RX 250 WO HCPCS: Performed by: SURGERY

## 2022-02-08 PROCEDURE — 2580000003 HC RX 258: Performed by: SURGERY

## 2022-02-08 PROCEDURE — 7100000010 HC PHASE II RECOVERY - FIRST 15 MIN: Performed by: SURGERY

## 2022-02-08 PROCEDURE — 88304 TISSUE EXAM BY PATHOLOGIST: CPT

## 2022-02-08 PROCEDURE — 7100000011 HC PHASE II RECOVERY - ADDTL 15 MIN: Performed by: SURGERY

## 2022-02-08 PROCEDURE — 3700000000 HC ANESTHESIA ATTENDED CARE: Performed by: SURGERY

## 2022-02-08 PROCEDURE — 2500000003 HC RX 250 WO HCPCS: Performed by: NURSE ANESTHETIST, CERTIFIED REGISTERED

## 2022-02-08 PROCEDURE — 6370000000 HC RX 637 (ALT 250 FOR IP): Performed by: SURGERY

## 2022-02-08 PROCEDURE — 84703 CHORIONIC GONADOTROPIN ASSAY: CPT

## 2022-02-08 PROCEDURE — 2709999900 HC NON-CHARGEABLE SUPPLY: Performed by: SURGERY

## 2022-02-08 RX ORDER — PROMETHAZINE HYDROCHLORIDE 25 MG/ML
6.25 INJECTION, SOLUTION INTRAMUSCULAR; INTRAVENOUS
Status: DISCONTINUED | OUTPATIENT
Start: 2022-02-08 | End: 2022-02-08 | Stop reason: HOSPADM

## 2022-02-08 RX ORDER — LABETALOL HYDROCHLORIDE 5 MG/ML
5 INJECTION, SOLUTION INTRAVENOUS EVERY 10 MIN PRN
Status: DISCONTINUED | OUTPATIENT
Start: 2022-02-08 | End: 2022-02-08 | Stop reason: HOSPADM

## 2022-02-08 RX ORDER — MIDAZOLAM HYDROCHLORIDE 1 MG/ML
INJECTION INTRAMUSCULAR; INTRAVENOUS PRN
Status: DISCONTINUED | OUTPATIENT
Start: 2022-02-08 | End: 2022-02-08 | Stop reason: SDUPTHER

## 2022-02-08 RX ORDER — OXYCODONE HYDROCHLORIDE 5 MG/1
5 TABLET ORAL EVERY 6 HOURS PRN
Qty: 8 TABLET | Refills: 0 | Status: SHIPPED | OUTPATIENT
Start: 2022-02-08 | End: 2022-02-13

## 2022-02-08 RX ORDER — MAGNESIUM HYDROXIDE 1200 MG/15ML
LIQUID ORAL CONTINUOUS PRN
Status: COMPLETED | OUTPATIENT
Start: 2022-02-08 | End: 2022-02-08

## 2022-02-08 RX ORDER — PROPOFOL 10 MG/ML
INJECTION, EMULSION INTRAVENOUS PRN
Status: DISCONTINUED | OUTPATIENT
Start: 2022-02-08 | End: 2022-02-08 | Stop reason: SDUPTHER

## 2022-02-08 RX ORDER — FENTANYL CITRATE 50 UG/ML
25 INJECTION, SOLUTION INTRAMUSCULAR; INTRAVENOUS EVERY 5 MIN PRN
Status: DISCONTINUED | OUTPATIENT
Start: 2022-02-08 | End: 2022-02-08 | Stop reason: HOSPADM

## 2022-02-08 RX ORDER — FENTANYL CITRATE 50 UG/ML
INJECTION, SOLUTION INTRAMUSCULAR; INTRAVENOUS PRN
Status: DISCONTINUED | OUTPATIENT
Start: 2022-02-08 | End: 2022-02-08 | Stop reason: SDUPTHER

## 2022-02-08 RX ORDER — HYDROCODONE BITARTRATE AND ACETAMINOPHEN 5; 325 MG/1; MG/1
1 TABLET ORAL
Status: DISCONTINUED | OUTPATIENT
Start: 2022-02-08 | End: 2022-02-08 | Stop reason: HOSPADM

## 2022-02-08 RX ORDER — SODIUM CHLORIDE 9 MG/ML
25 INJECTION, SOLUTION INTRAVENOUS PRN
Status: DISCONTINUED | OUTPATIENT
Start: 2022-02-08 | End: 2022-02-08 | Stop reason: HOSPADM

## 2022-02-08 RX ORDER — ONDANSETRON 2 MG/ML
4 INJECTION INTRAMUSCULAR; INTRAVENOUS
Status: COMPLETED | OUTPATIENT
Start: 2022-02-08 | End: 2022-02-08

## 2022-02-08 RX ORDER — GLYCOPYRROLATE 1 MG/5 ML
SYRINGE (ML) INTRAVENOUS PRN
Status: DISCONTINUED | OUTPATIENT
Start: 2022-02-08 | End: 2022-02-08 | Stop reason: SDUPTHER

## 2022-02-08 RX ORDER — LIDOCAINE HYDROCHLORIDE 10 MG/ML
INJECTION, SOLUTION INFILTRATION; PERINEURAL PRN
Status: DISCONTINUED | OUTPATIENT
Start: 2022-02-08 | End: 2022-02-08 | Stop reason: ALTCHOICE

## 2022-02-08 RX ORDER — OXYCODONE HYDROCHLORIDE 5 MG/1
5 TABLET ORAL
Status: COMPLETED | OUTPATIENT
Start: 2022-02-08 | End: 2022-02-08

## 2022-02-08 RX ORDER — MEPERIDINE HYDROCHLORIDE 25 MG/ML
12.5 INJECTION INTRAMUSCULAR; INTRAVENOUS; SUBCUTANEOUS EVERY 5 MIN PRN
Status: DISCONTINUED | OUTPATIENT
Start: 2022-02-08 | End: 2022-02-08 | Stop reason: HOSPADM

## 2022-02-08 RX ORDER — HYDRALAZINE HYDROCHLORIDE 20 MG/ML
5 INJECTION INTRAMUSCULAR; INTRAVENOUS EVERY 10 MIN PRN
Status: DISCONTINUED | OUTPATIENT
Start: 2022-02-08 | End: 2022-02-08 | Stop reason: HOSPADM

## 2022-02-08 RX ORDER — SODIUM CHLORIDE 0.9 % (FLUSH) 0.9 %
5-40 SYRINGE (ML) INJECTION EVERY 12 HOURS SCHEDULED
Status: DISCONTINUED | OUTPATIENT
Start: 2022-02-08 | End: 2022-02-08 | Stop reason: HOSPADM

## 2022-02-08 RX ORDER — BACITRACIN ZINC AND POLYMYXIN B SULFATE 500; 1000 [USP'U]/G; [USP'U]/G
OINTMENT TOPICAL
Qty: 1 EACH | Refills: 0 | Status: SHIPPED | OUTPATIENT
Start: 2022-02-08 | End: 2022-02-15

## 2022-02-08 RX ORDER — FENTANYL CITRATE 50 UG/ML
50 INJECTION, SOLUTION INTRAMUSCULAR; INTRAVENOUS EVERY 5 MIN PRN
Status: DISCONTINUED | OUTPATIENT
Start: 2022-02-08 | End: 2022-02-08 | Stop reason: HOSPADM

## 2022-02-08 RX ORDER — PROPOFOL 10 MG/ML
INJECTION, EMULSION INTRAVENOUS CONTINUOUS PRN
Status: DISCONTINUED | OUTPATIENT
Start: 2022-02-08 | End: 2022-02-08 | Stop reason: SDUPTHER

## 2022-02-08 RX ORDER — SODIUM CHLORIDE 0.9 % (FLUSH) 0.9 %
5-40 SYRINGE (ML) INJECTION PRN
Status: DISCONTINUED | OUTPATIENT
Start: 2022-02-08 | End: 2022-02-08 | Stop reason: HOSPADM

## 2022-02-08 RX ORDER — SODIUM CHLORIDE, SODIUM LACTATE, POTASSIUM CHLORIDE, CALCIUM CHLORIDE 600; 310; 30; 20 MG/100ML; MG/100ML; MG/100ML; MG/100ML
INJECTION, SOLUTION INTRAVENOUS CONTINUOUS
Status: DISCONTINUED | OUTPATIENT
Start: 2022-02-08 | End: 2022-02-08 | Stop reason: HOSPADM

## 2022-02-08 RX ADMIN — FENTANYL CITRATE 25 MCG: 50 INJECTION, SOLUTION INTRAMUSCULAR; INTRAVENOUS at 08:52

## 2022-02-08 RX ADMIN — HYDROMORPHONE HYDROCHLORIDE 0.5 MG: 1 INJECTION, SOLUTION INTRAMUSCULAR; INTRAVENOUS; SUBCUTANEOUS at 09:58

## 2022-02-08 RX ADMIN — FENTANYL CITRATE 25 MCG: 50 INJECTION, SOLUTION INTRAMUSCULAR; INTRAVENOUS at 10:16

## 2022-02-08 RX ADMIN — HYDROMORPHONE HYDROCHLORIDE 0.5 MG: 1 INJECTION, SOLUTION INTRAMUSCULAR; INTRAVENOUS; SUBCUTANEOUS at 10:05

## 2022-02-08 RX ADMIN — MIDAZOLAM HYDROCHLORIDE 2 MG: 2 INJECTION, SOLUTION INTRAMUSCULAR; INTRAVENOUS at 08:45

## 2022-02-08 RX ADMIN — FENTANYL CITRATE 25 MCG: 50 INJECTION, SOLUTION INTRAMUSCULAR; INTRAVENOUS at 10:35

## 2022-02-08 RX ADMIN — Medication 100 MG: at 08:52

## 2022-02-08 RX ADMIN — CEFAZOLIN SODIUM 2000 MG: 1 INJECTION, POWDER, FOR SOLUTION INTRAMUSCULAR; INTRAVENOUS at 08:45

## 2022-02-08 RX ADMIN — OXYCODONE HYDROCHLORIDE 5 MG: 5 TABLET ORAL at 10:43

## 2022-02-08 RX ADMIN — ONDANSETRON 4 MG: 2 INJECTION INTRAMUSCULAR; INTRAVENOUS at 10:17

## 2022-02-08 RX ADMIN — PROPOFOL 50 MG: 10 INJECTION, EMULSION INTRAVENOUS at 08:52

## 2022-02-08 RX ADMIN — SODIUM CHLORIDE, POTASSIUM CHLORIDE, SODIUM LACTATE AND CALCIUM CHLORIDE: 600; 310; 30; 20 INJECTION, SOLUTION INTRAVENOUS at 07:52

## 2022-02-08 RX ADMIN — Medication 0.1 MG: at 08:52

## 2022-02-08 RX ADMIN — FENTANYL CITRATE 25 MCG: 50 INJECTION, SOLUTION INTRAMUSCULAR; INTRAVENOUS at 08:55

## 2022-02-08 RX ADMIN — PROPOFOL 100 MCG/KG/MIN: 10 INJECTION, EMULSION INTRAVENOUS at 08:52

## 2022-02-08 RX ADMIN — SODIUM CHLORIDE, POTASSIUM CHLORIDE, SODIUM LACTATE AND CALCIUM CHLORIDE: 600; 310; 30; 20 INJECTION, SOLUTION INTRAVENOUS at 08:45

## 2022-02-08 ASSESSMENT — PULMONARY FUNCTION TESTS
PIF_VALUE: 1
PIF_VALUE: 0
PIF_VALUE: 1
PIF_VALUE: 1
PIF_VALUE: 0
PIF_VALUE: 1
PIF_VALUE: 0
PIF_VALUE: 1
PIF_VALUE: 0
PIF_VALUE: 1
PIF_VALUE: 0
PIF_VALUE: 1
PIF_VALUE: 0

## 2022-02-08 ASSESSMENT — PAIN DESCRIPTION - PAIN TYPE
TYPE: SURGICAL PAIN
TYPE: SURGICAL PAIN

## 2022-02-08 ASSESSMENT — PAIN DESCRIPTION - LOCATION
LOCATION: HEAD
LOCATION: HEAD

## 2022-02-08 ASSESSMENT — PAIN - FUNCTIONAL ASSESSMENT
PAIN_FUNCTIONAL_ASSESSMENT: 0-10
PAIN_FUNCTIONAL_ASSESSMENT: ACTIVITIES ARE NOT PREVENTED

## 2022-02-08 ASSESSMENT — PAIN SCALES - GENERAL
PAINLEVEL_OUTOF10: 6
PAINLEVEL_OUTOF10: 9
PAINLEVEL_OUTOF10: 10
PAINLEVEL_OUTOF10: 9
PAINLEVEL_OUTOF10: 6
PAINLEVEL_OUTOF10: 6
PAINLEVEL_OUTOF10: 5

## 2022-02-08 ASSESSMENT — PAIN DESCRIPTION - DESCRIPTORS
DESCRIPTORS: BURNING
DESCRIPTORS: ACHING;BURNING
DESCRIPTORS: DULL;HEADACHE

## 2022-02-08 NOTE — ANESTHESIA PRE PROCEDURE
Department of Anesthesiology  Preprocedure Note       Name:  Emmanuel Durham   Age:  48 y.o.  :  1975                                          MRN:  [id number]         Date:  2022      Surgeon: Barak Lopes):  Armando Burgos MD    Procedure: Procedure(s):  EXCISION OF SCALP CYST    Medications prior to admission:   Prior to Admission medications    Medication Sig Start Date End Date Taking? Authorizing Provider   albuterol (PROVENTIL) (2.5 MG/3ML) 0.083% nebulizer solution Take 3 mLs by nebulization every 6 hours as needed for Wheezing 22  Yes Montrell Hatch MD   pantoprazole (PROTONIX) 40 MG tablet Take 1 tablet by mouth 2 times daily (before meals) 22  Yes Tawnya Jeans, MD   vitamin D (ERGOCALCIFEROL) 1.25 MG (06932 UT) CAPS capsule Take 1 capsule by mouth once a week 22  Yes Tawnya Jeans, MD   mometasone (ELOCON) 0.1 % cream Apply topically daily.  22  Yes Tawnya Jeans, MD   sertraline (ZOLOFT) 25 MG tablet Take 1 tablet by mouth daily  Patient taking differently: Take 25 mg by mouth daily Takes mid morning 22  Yes Tawnya Jeans, MD   Handicap Placard MISC by Does not apply route Valid from today to 22  Yes Tawnya Jeans, MD   chlorhexidine (BETASEPT SURGICAL SCRUB) 4 % external liquid APPLY TO AFFECTED AREA(S) TOPICALLY AS NEEDED 22  Yes Tawnya Jeans, MD   dilTIAZem (CARDIZEM CD) 120 MG extended release capsule TAKE 1 CAPSULE BY MOUTH ONE TIME A DAY 22  Yes Tawnya Jeans, MD   cetirizine-psuedoephedrine (ALLERGY RELIEF D) 5-120 MG per extended release tablet TAKE 1 TABLET BY MOUTH 2 TIMES A DAY 22  Yes Tawnya Jeans, MD   fluticasone (FLONASE) 50 MCG/ACT nasal spray APPLY 1 SPRAY IN THE AFFECTED NOSTRIL ONCE DAILY 22  Yes Tawnya Jeans, MD   ketoconazole (NIZORAL) 2 % shampoo APPLY TO AFFECTED AREA(S) ONCE DAILY AS NEEDED 22  Yes Tawnya Jeans, MD   lidocaine viscous hcl (XYLOCAINE) 2 % SOLN solution Take 15 mLs by mouth as Imani Fitzgerald MD   Misc. Devices (HIBICLENS HAND PUMP 32OZ) MISC Use as needed for skin irritation or bumps. 6/22/20   Amy Piña MD       Current medications:    No current facility-administered medications for this encounter. Allergies:     Allergies   Allergen Reactions    Latex Swelling and Dermatitis     Also \"burning of skin\"      Clindamycin/Lincomycin Hives and Other (See Comments)     \"Throat closing\"    Sulfa Antibiotics Anaphylaxis and Hives    Diflucan [Fluconazole] Hives and Itching       Problem List:    Patient Active Problem List   Diagnosis Code    Hypothyroidism E03.9    Chronic sinusitis J32.9    Asthma in adult J45.909    Chronic rhinosinusitis J31.0, J32.9    Iron deficiency anemia D50.9    Eczema L30.9    Intestinal malabsorption K90.9    Thrombocytopenia ([facility]) D69.6    Chronic ITP (idiopathic thrombocytopenia) ([facility]) D69.3    Hyperlipidemia, mixed E78.2    Autoimmune hepatitis ([facility]) K75.4    Anxiety F41.9    Chronic pansinusitis J32.4    Atrial fibrillation with RVR ([facility]) I48.91    GERD without esophagitis K21.9    Acute respiratory failure due to COVID-19 ([facility] [address] U07.1, J96.00    Obstructive sleep apnea G47.33       Past Medical History:        Diagnosis Date    Abdominal wall abscess 2/8/2017    Abdominal wall cellulitis 2/6/2017    Anal fissure 4/30/2015    Anemia, iron deficiency     Anesthesia     PATIENT REQUESTING IF NEEDS TO BE INTUBATED TO USE A GURDEEP TUBE     Asthma     Atrial fibrillation ([facility])     Autoimmune hepatitis ([facility] [address]     Chronic sinusitis     COVID-19     History of blood transfusion     Hypothyroidism     Menorrhagia with regular cycle     Morbid obesity with BMI of 40.0-44.9, adult ([facility]) 5/26/2015    MRSA (methicillin resistant staph aureus) culture positive 02/03/2017    abdominal abscess    MRSA infection 08/20/2012    rt thigh abscess    RODGER (obstructive sleep apnea)     awaiting cpap machine to be delivered not due until after surgery 2/8/22    Pericarditis, acute     Retention of urine     Sinusitis        Past Surgical History:        Procedure Laterality Date    COLONOSCOPY N/A 12/29/2020    COLONOSCOPY DIAGNOSTIC performed by Figueroa Moreno MD at [address] HISTORY  8/22/2012    INCISION AND DRAINAGE POSTERIOR THIGH ABSCESS    RECTAL SURGERY  Aug 2011    repair of rectal fissures and anal skin tag removal    SINUS SURGERY      X 3    TONSILLECTOMY  2004    TONSILLECTOMY AND ADENOIDECTOMY  2005    (partial adenoidectomy)    UPPER GASTROINTESTINAL ENDOSCOPY N/A 12/29/2020    EGD BIOPSY performed by Figueroa Moreno MD at [address] History:    Social History     Tobacco Use    Smoking status: Never Smoker    Smokeless tobacco: Never Used   Substance Use Topics    Alcohol use: Yes     Alcohol/week: 0.0 standard drinks     Comment: 2 drinks/week                                 Counseling given: Not Answered      Vital Signs (Current):   Vitals:    02/02/22 1236   Weight: 217 lb (98.4 kg)   Height: 5' 3\" (1.6 m)                                              BP Readings from Last 3 Encounters:   02/07/22 125/83   02/01/22 124/77   01/24/22 (!) 143/92       NPO Status:                                                                                 BMI:   Wt Readings from Last 3 Encounters:   02/02/22 217 lb (98.4 kg)   02/07/22 217 lb 14.4 oz (98.8 kg)   02/01/22 217 lb (98.4 kg)     Body mass index is 38.44 kg/m².     CBC:   Lab Results   Component Value Date    WBC 6.5 01/24/2022    RBC 4.29 01/24/2022    RBC 4.18 05/10/2017    HGB 12.7 01/24/2022    HCT 38.9 01/24/2022    MCV 90.5 01/24/2022    RDW 14.5 01/24/2022     01/24/2022       CMP:   Lab Results   Component Value Date     01/24/2022    K 3.8 01/24/2022    K 4.0 12/07/2020     01/24/2022    CO2 22 01/24/2022    BUN 9 01/24/2022    CREATININE 0.7 01/24/2022    GFRAA >60 01/24/2022    GFRAA 100 09/10/2012    AGRATIO 1.2 01/24/2022    LABGLOM >60 01/24/2022    GLUCOSE 85 01/24/2022    PROT 7.2 01/24/2022    PROT 8.4 05/17/2012    CALCIUM 8.5 01/24/2022    BILITOT 0.6 01/24/2022    ALKPHOS 288 01/24/2022    AST 60 01/24/2022     01/24/2022       POC Tests: No results for input(s): POCGLU, POCNA, POCK, POCCL, POCBUN, POCHEMO, POCHCT in the last 72 hours. Coags:   Lab Results   Component Value Date    PROTIME 11.1 12/08/2020    INR 0.96 12/08/2020    APTT 29.0 06/02/2014       HCG (If Applicable):   Lab Results   Component Value Date    PREGTESTUR Negative 12/29/2020        ABGs: No results found for: PHART, PO2ART, FLE3XVK, IHP5KGD, BEART, K4MXEQWW     Type & Screen (If Applicable):  Lab Results   Component Value Date    LABABO AB 05/15/2012    LABRH Positive 05/15/2012       Drug/Infectious Status (If Applicable):  No results found for: HIV, HEPCAB    COVID-19 Screening (If Applicable):   Lab Results   Component Value Date    COVID19 DETECTED 01/05/2022           Anesthesia Evaluation  Patient summary reviewed and Nursing notes reviewed history of anesthetic complications:   Airway: Mallampati: II  TM distance: >3 FB   Neck ROM: full  Mouth opening: > = 3 FB Dental:      Comment: Several missing    Pulmonary:normal exam    (+) sleep apnea:  asthma:                            Cardiovascular:    (+) dysrhythmias: atrial fibrillation,                   Neuro/Psych:   Negative Neuro/Psych ROS              GI/Hepatic/Renal:   (+) GERD:, liver disease:,           Endo/Other:    (+) hypothyroidism::., .                 Abdominal:   (+) obese,           Vascular: negative vascular ROS. Other Findings:             Anesthesia Plan      MAC     ASA 3       Induction: intravenous. MIPS: Postoperative opioids intended. Anesthetic plan and risks discussed with patient. Plan discussed with CRNA.     Attending anesthesiologist reviewed and agrees with Preprocedure content              Hawa Amato Tylor Sal MD   2/8/2022

## 2022-02-08 NOTE — ANESTHESIA POSTPROCEDURE EVALUATION
Department of Anesthesiology  Postprocedure Note    Patient: Florecita Arshad  MRN: [id number]  YOB: 1975  Date of evaluation: 2/8/2022  Time:  11:23 AM     Procedure Summary     Date: 02/08/22 Room / Location: [address] [place] [address]    Anesthesia Start: 0845 Anesthesia Stop: 6416    Procedure: EXCISION OF SCALP CYST (N/A ) Diagnosis:       Scalp cyst      (Scalp cyst [L72.9])    Surgeons: Joss Duvall MD Responsible Provider: Mervin Jaime MD    Anesthesia Type: MAC ASA Status: 3          Anesthesia Type: MAC    Katalina Phase I: Katalina Score: 10    Katalina Phase II:      Last vitals: Reviewed and per EMR flowsheets.        Anesthesia Post Evaluation    Patient location during evaluation: PACU  Patient participation: complete - patient participated  Level of consciousness: awake and alert  Airway patency: patent  Nausea & Vomiting: no nausea and no vomiting  Complications: no  Cardiovascular status: hemodynamically stable  Respiratory status: acceptable  Hydration status: euvolemic

## 2022-02-08 NOTE — PROGRESS NOTES
Ambulatory Surgery/Procedure Discharge Note    Vitals:    02/08/22 1131   BP: 102/71   Pulse: 72   Resp: 16   Temp: 96.5 °F (35.8 °C)   SpO2: 95%       In: 600 [P.O.:100; I.V.:500]  Out: -     Restroom use offered before discharge. Yes  Pt is toileted and remains safe. Discharge instructions were read at bedside and tolerating PO well. No nausea reported. Scripts were filled and denies any pain for now. Incision over head is CDI. Place ice over it. Pain assessment:  none  Pain Level: 5        Patient discharged to home/self care.  Patient discharged via wheel chair by this nurse to waiting family/S.O.       2/8/2022 11:49 AM

## 2022-02-08 NOTE — PROGRESS NOTES
PACU Transfer to [facility]    Vitals:    02/08/22 1047   BP: (!) 135/90   Pulse: 68   Resp: 13   Temp: 97.9 °F (36.6 °C)   SpO2: 96%         Intake/Output Summary (Last 24 hours) at 2/8/2022 1055  Last data filed at 2/8/2022 1054  Gross per 24 hour   Intake 600 ml   Output --   Net 600 ml       Pain assessment:oxycodone given.   BP in range  Pain Level: 5    Patient transferred to care of ESTRELLA RN.    2/8/2022 10:55 AM

## 2022-02-08 NOTE — BRIEF OP NOTE
Brief Postoperative Note      Patient: Parisa Washburn  YOB: 1975  MRN: [id number]    Date of Procedure: 2/8/2022    Pre-Op Diagnosis: Scalp cyst [L72.9]    Post-Op Diagnosis: Same       Procedure(s):  EXCISION OF SCALP CYST    Surgeon(s):  Veena Wilson MD    Assistant:  Resident: Andrew Way MD    Anesthesia: Monitor Anesthesia Care    Estimated Blood Loss (mL): Minimal    Complications: None    Specimens:   ID Type Source Tests Collected by Time Destination   A : SOFT TISSUE MASS OF SCALP Tissue Tissue SURGICAL PATHOLOGY Veena Wilson MD 2/8/2022 8585      Findings:   2cm x 1cm scalp cyst removed with no complications. Further details to follow in full operative report. Plan:  Discharge home from [facility].      Electronically signed by Andrew Way MD on 2/8/2022 at 9:32 AM

## 2022-02-08 NOTE — H&P
Tanika Martinez    [id number]    [facility] Same Day Surgery Update H & P  Department of General Surgery   Surgical Service   Pre-operative History and Physical  Last H & P within the last 30 days. DIAGNOSIS:   Scalp cyst [L72.9]    Procedure(s):  EXCISION OF SCALP CYST    History obtained from: Patient interview and EHR      HISTORY OF PRESENT ILLNESS:   The patient is a 48 y.o. female with soft tissue mass of the scalp presents today for excision. Covid 19:  Patient denies fever, chills, worsening cough, or known exposure to Covid-19.        Past Medical History:        Diagnosis Date    Abdominal wall abscess 2/8/2017    Abdominal wall cellulitis 2/6/2017    Anal fissure 4/30/2015    Anemia, iron deficiency     Anesthesia     PATIENT REQUESTING IF NEEDS TO BE INTUBATED TO USE A GURDEEP TUBE     Asthma     Atrial fibrillation ([address]     Autoimmune hepatitis ([address]     Chronic sinusitis     COVID-19     History of blood transfusion     Hypothyroidism     Menorrhagia with regular cycle     Morbid obesity with BMI of 40.0-44.9, adult ([address] 5/26/2015    MRSA (methicillin resistant staph aureus) culture positive 02/03/2017    abdominal abscess    MRSA infection 08/20/2012    rt thigh abscess    RODGER (obstructive sleep apnea)     awaiting cpap machine to be delivered not due until after surgery 2/8/22    Pericarditis, acute     Retention of urine     Sinusitis      Past Surgical History:        Procedure Laterality Date    COLONOSCOPY N/A 12/29/2020    COLONOSCOPY DIAGNOSTIC performed by Priscilla Mcgowan MD at [address] [place] [address]  8/22/2012    INCISION AND DRAINAGE POSTERIOR THIGH ABSCESS    RECTAL SURGERY  Aug 2011    repair of rectal fissures and anal skin tag removal    SINUS SURGERY      X 3    TONSILLECTOMY  2004    TONSILLECTOMY AND ADENOIDECTOMY  2005    (partial adenoidectomy)    UPPER GASTROINTESTINAL ENDOSCOPY N/A 12/29/2020    EGD BIOPSY performed by Dash Luevano MD at [address]     Past Social History:  Social History     Socioeconomic History    Marital status: Single     Spouse name: None    Number of children: None    Years of education: None    Highest education level: None   Occupational History    Occupation: NA   Tobacco Use    Smoking status: Never Smoker    Smokeless tobacco: Never Used   Vaping Use    Vaping Use: Never used   Substance and Sexual Activity    Alcohol use: Yes     Alcohol/week: 0.0 standard drinks     Comment: 2 drinks/week     Drug use: No    Sexual activity: Yes   Other Topics Concern    None   Social History Narrative    None     Social Determinants of Health     Financial Resource Strain:     Difficulty of Paying Living Expenses: Not on file   Food Insecurity:     Worried About Running Out of Food in the Last Year: Not on file    Tangela of Food in the Last Year: Not on file   Transportation Needs:     Lack of Transportation (Medical): Not on file    Lack of Transportation (Non-Medical):  Not on file   Physical Activity:     Days of Exercise per Week: Not on file    Minutes of Exercise per Session: Not on file   Stress:     Feeling of Stress : Not on file   Social Connections:     Frequency of Communication with Friends and Family: Not on file    Frequency of Social Gatherings with Friends and Family: Not on file    Attends [employer] Services: Not on file    Active Member of [address] [place] [address] or Organizations: Not on file    Attends Club or Organization Meetings: Not on file    Marital Status: Not on file   Intimate Partner Violence:     Fear of Current or Ex-Partner: Not on file    Emotionally Abused: Not on file    Physically Abused: Not on file    Sexually Abused: Not on file   Housing Stability:     Unable to Pay for Housing in the Last Year: Not on file    Number of [place] in the Last Year: Not on file    Unstable Housing in the Last Year: Not on file         Medications Prior to Admission:      Prior to Admission medications    Medication Sig Start Date End Date Taking? Authorizing Provider   albuterol (PROVENTIL) (2.5 MG/3ML) 0.083% nebulizer solution Take 3 mLs by nebulization every 6 hours as needed for Wheezing 2/1/22  Yes Montrell Hatch MD   pantoprazole (PROTONIX) 40 MG tablet Take 1 tablet by mouth 2 times daily (before meals) 1/28/22  Yes Tawnya Jeans, MD   vitamin D (ERGOCALCIFEROL) 1.25 MG (37663 UT) CAPS capsule Take 1 capsule by mouth once a week 1/27/22  Yes Tawnya Jeans, MD   mometasone (ELOCON) 0.1 % cream Apply topically daily.  1/27/22  Yes Tawnya Jeans, MD   sertraline (ZOLOFT) 25 MG tablet Take 1 tablet by mouth daily  Patient taking differently: Take 25 mg by mouth daily Takes mid morning 1/24/22  Yes Tawnya Jeans, MD   Handicap Placard MISC by Does not apply route Valid from today to 04/30/22 1/24/22  Yes Tawnya Jeans, MD   chlorhexidine (BETASEPT SURGICAL SCRUB) 4 % external liquid APPLY TO AFFECTED AREA(S) TOPICALLY AS NEEDED 1/24/22  Yes Tawnya Jeans, MD   dilTIAZem (CARDIZEM CD) 120 MG extended release capsule TAKE 1 CAPSULE BY MOUTH ONE TIME A DAY 1/24/22  Yes Tawnya Jeans, MD   cetirizine-psuedoephedrine (ALLERGY RELIEF D) 5-120 MG per extended release tablet TAKE 1 TABLET BY MOUTH 2 TIMES A DAY 1/24/22  Yes Tawnya Jeans, MD   fluticasone (FLONASE) 50 MCG/ACT nasal spray APPLY 1 SPRAY IN THE AFFECTED NOSTRIL ONCE DAILY 1/24/22  Yes Tawnya Jeans, MD   ketoconazole (NIZORAL) 2 % shampoo APPLY TO AFFECTED AREA(S) ONCE DAILY AS NEEDED 1/24/22  Yes Tawnya Jeans, MD   folic acid (FOLVITE) 1 MG tablet TAKE 1 TABLET BY MOUTH ONE TIME A DAY 12/6/21  Yes Tawnya Jeans, MD   cyanocobalamin 1000 MCG/ML injection INJECT 1ML INTO THE SKIN ONCE MONTHLY 12/6/21  Yes Tawnya Jeans, MD   urea (CARMOL) 10 % cream APPLY TO AFFECTED AREA(S) TOPICALLY AS NEEDED 12/6/21  Yes Tawnya Jeans, MD   polyethylene glycol (MIRALAX) 17 GM/SCOOP POWD powder POUR 17 GRAMS OF POWDER INTO THE CAP OF THE BOTTLE. STIR THE POWDER IN A GLASS (4 TO 8 OZ) OF WATER, JUICE, SODA, COFFEE OR TEA UNTIL COMPLETELY DISSOLVED. DRINK THE SOLUTION. ONCE DAILY AS NEEDED FOR CONSTIPATION 12/6/21  Yes Anahi Adam MD   Saccharomyces boulardii (PROBIOTIC) 250 MG CAPS TAKE 1 CAPSULE BY MOUTH 2 TIMES A DAY 10/4/21  Yes Viv Loving DO   budesonide-formoterol [facility]) 160-4.5 MCG/ACT AERO Inhale 2 puffs into the lungs 2 times daily 10/4/21  Yes Sierra Bell MD   montelukast (SINGULAIR) 10 MG tablet Take one tablet by mouth every evening 10/4/21  Yes Sierra Bell MD   budesonide (PULMICORT) 0.25 MG/2ML nebulizer suspension Take 2 mLs by nebulization 2 times daily 8/12/21  Yes Rebecca Gaviria MD   mupirocin (BACTROBAN) 2 % ointment Apply topically 3 times daily.  8/1/21  Yes Sierra Bell MD   albuterol sulfate  (90 Base) MCG/ACT inhaler INHALE 2 PUFF BY MOUTH EVERY 6 HOURS AS NEEDED FOR WHEEZING 8/1/21  Yes Sierra Bell MD   levothyroxine (SYNTHROID) 137 MCG tablet Take 1 tablet by mouth daily 7/26/21  Yes Edwina Dunne MD   liothyronine (CYTOMEL) 5 MCG tablet TAKE TWO TABLETS BY MOUTH DAILY 7/26/21  Yes Edwina Dunne MD   Insulin Syringe-Needle U-100 Yuli Philip INSULIN SYRINGE) 31G X 5/16\" 1 ML MISC AS DIRECTED 3/26/21  Yes Sierra Bell MD   lidocaine viscous hcl (XYLOCAINE) 2 % SOLN solution Take 15 mLs by mouth as needed for Irritation  Patient not taking: Reported on 2/7/2022 1/17/22   Anahi Adam MD   Multiple Vitamins-Minerals (THERAPEUTIC MULTIVITAMIN-MINERALS) tablet Take 1 tablet by mouth daily 6/25/20   Rolly Hwang MD         Allergies:  Latex, Clindamycin/lincomycin, Sulfa antibiotics, and Diflucan [fluconazole]    PHYSICAL EXAM:      /84   Pulse 86   Temp 98.2 °F (36.8 °C) (Temporal)   Resp 16   Ht 5' 3\" (1.6 m)   Wt 217 lb (98.4 kg)   SpO2 95%   BMI 38.44 kg/m²      Airway:  Airway patent with no audible

## 2022-02-08 NOTE — PROGRESS NOTES
Pt arrived from OR, s/p EXCISION OF SCALP CYST, report received form JEFFERY and Dr. Marilia Cardoza

## 2022-02-08 NOTE — OP NOTE
Operative Note      Patient: Ursula Xiong  YOB: 1975  MRN: [id number]    Date of Procedure: 2/8/2022    Pre-Op Diagnosis: Scalp cyst [L72.9]    Post-Op Diagnosis: Same       Procedure(s):  EXCISION OF SCALP CYST    Surgeon(s):  Petar Saavedra MD    Assistant:   Resident: Jairo Solis MD    Anesthesia: Monitor Anesthesia Care    Estimated Blood Loss (mL): Minimal    Complications: None    Specimens:   ID Type Source Tests Collected by Time Destination   A : SOFT TISSUE MASS OF SCALP Tissue Tissue SURGICAL PATHOLOGY Petar Saavedra MD 2/8/2022 0486      Findings:   2cm x 1cm scalp cyst      Detailed Description of Procedure: The patient was transported to the operating room and placed in the supine position, after which the head of the table was raised to provide optimal exposure. A time-out was performed prior to injecting local anesthetic. Next, the cyst was palpated and an elliptical line was marked overlying the lesion. The area was then anesthetized with 1% lidocaine. After achieving adequate local anesthesia a #15 blade scalpel was used to make a skin incision along the line that was marked. Tenotomy scissors were then used to deepen the incision to reveal the deep edge of the cyst while minimizing the risk of rupturing the cyst wall. The cyst was freed completely from the attached tissue. The specimen that was removed was 2cm x 1cm and completely intact; it was sent to pathology for routine evaluation. After removal, the surgical site was inspected and electrocautery was used to achieve hemostasis. The site was then irrigated with normal saline and excellent hemostasis was noted.      The surgical site was then closed via interrupted 3-0 Nylon sutures. After initial closure, the site was cleansed with normal saline and dried. Bacitracin was applied.      Dr. Farnaz Scott was scrubbed and present for the entirety of the case and directed its course from start to finish.       Electronically signed by Liane Epperson MD on 2/8/2022 at 2:21 PM

## 2022-02-11 ENCOUNTER — TELEPHONE (OUTPATIENT)
Dept: PULMONOLOGY | Age: 47
End: 2022-02-11

## 2022-02-11 NOTE — TELEPHONE ENCOUNTER
Spoke with pt to review titration results. Unit to be ordered from [address] [place] [address]. F/U scheduled.

## 2022-02-11 NOTE — PROGRESS NOTES
Emmanuel Durham         : 1975 [address] [facility]     Diagnosis: [x] RODGER (G47.33) [] CSA (G47.31) [] Apnea (G47.30)   Length of Need: [x] 12 Months [] 99 Months [] Other:    Machine (ARPAN!): [] Respironics Dream Station   2   Auto [x] ResMed AirSense     Auto S11 [] Other:     [x]  CPAP () [] Bilevel ()   Mode: [x] Auto [] Spontaneous    Mode: [] Auto [] Spontaneous      P min 11 cmH2O  P max 20 cmH2O      Comfort Settings:   - Ramp Pressure: 5 cmH2O                                        - Ramp time: 15 min                                     -  Flex/EPR - 3 full time                                    - For ResMed Bilevel (TiMax-4 sec   TiMin- 0.2 sec)     Humidifier: [x] Heated ()        [x] Water chamber replacement ()/ 1 per 6 months        Mask:   [x] Nasal () /1 per 3 months [] Full Face () /1 per 3 months   [x] Patient choice -Size and fit mask [] Patient Choice - Size and fit mask   [] Dispense:  [] Dispense:    [x] Headgear () / 1 per 3 months [] Headgear () / 1 per 3 months   [] Replacement Nasal Cushion ()/2 per month [] Interface Replacement ()/1 per month   [x] Replacement Nasal Pillows ()/2 per month         Tubing: [x] Heated ()/1 per 3 months    [] Standard ()/1 per 3 months [] Other:           Filters: [x] Non-disposable ()/1 per 6 months     [x] Ultra-Fine, Disposable ()/2 per month        Miscellaneous: [] Chin Strap ()/ 1 per 6 months [] O2 bleed-in:       LPM   [] Oximetry on CPAP/Bilevel []  Other:    [x] Modem: ()         Start Order Date: 22    MEDICAL JUSTIFICATION:  I, the undersigned, certify that the above prescribed supplies are medically necessary for this patients wellbeing. In my opinion, the supplies are both reasonable and necessary in reference to accepted standards of medicalpractice in treatment of this patients condition.     Zenaida Rossi MD      NPI: [id number]       Order Signed Date: 22    Electronically signed by Estela Duron MD on 2022 at 1:33 PM    Leonalyssa Cates  1975  [facility] [address]  [phone] (home)   [phone] (mobile)      Insurance Info (confirm with patient if correct):  Payor/Plan Subscr  Sex Relation Sub.  Ins. ID Effective Group Num

## 2022-02-14 ENCOUNTER — TELEPHONE (OUTPATIENT)
Dept: INTERNAL MEDICINE CLINIC | Age: 47
End: 2022-02-14

## 2022-02-14 NOTE — TELEPHONE ENCOUNTER
PT NOT CLEAR ON IF SHE NEED TO COME TO THE MED CLINIC OR SURG CLINIC TO HAVE SUTURES REMOVED. PT STATED THEY SHOULD BE REMOVED IN ABOUT 14 DAYS POST CYST REMOVAL SURG.   [facility] [phone]

## 2022-02-16 NOTE — PROGRESS NOTES
Genna Sacks         : 1975  [address] [facility]    Diagnosis: [x] RODGER (G47.33) [] CSA (G47.31) [] Apnea (G47.30)   Length of Need: [x] 12 Months [] 99 Months [] Other:    Machine (ARPAN!): [] Respironics Dream Station   2   Auto [x] ResMed AirSense     Auto S11 [] Other:     [x]  CPAP () [] Bilevel ()   Mode: [x] Auto [] Spontaneous    Mode: [] Auto [] Spontaneous      P min 11 cmH2O  P max 20 cmH2O      Comfort Settings:   - Ramp Pressure: 5 cmH2O                                        - Ramp time: 15 min                                     -  Flex/EPR - 3 full time                                    - For ResMed Bilevel (TiMax-4 sec   TiMin- 0.2 sec)     Humidifier: [x] Heated ()        [x] Water chamber replacement ()/ 1 per 6 months        Mask:   [x] Nasal () /1 per 3 months [] Full Face () /1 per 3 months   [x] Patient choice -Size and fit mask [] Patient Choice - Size and fit mask   [] Dispense:  [] Dispense:    [x] Headgear () / 1 per 3 months [] Headgear () / 1 per 3 months   [] Replacement Nasal Cushion ()/2 per month [] Interface Replacement ()/1 per month   [x] Replacement Nasal Pillows ()/2 per month         Tubing: [x] Heated ()/1 per 3 months    [] Standard ()/1 per 3 months [] Other:           Filters: [x] Non-disposable ()/1 per 6 months     [x] Ultra-Fine, Disposable ()/2 per month        Miscellaneous: [] Chin Strap ()/ 1 per 6 months [] O2 bleed-in:       LPM   [] Oximetry on CPAP/Bilevel []  Other:    [x] Modem: ()         Start Order Date: 22    MEDICAL JUSTIFICATION:  I, the undersigned, certify that the above prescribed supplies are medically necessary for this patients wellbeing. In my opinion, the supplies are both reasonable and necessary in reference to accepted standards of medicalpractice in treatment of this patients condition.     Clara Bhakta MD      NPI: [id number]       Order Signed Date: 22    Electronically signed by Radhika Marquez MD on 2022 at 11:50 AM    Masonpabloopal Gonzalez  1975  [address],[facility] [address] [facility]  [phone] (home)   [phone] (mobile)      Insurance Info (confirm with patient if correct):  Payor/Plan Subscr  Sex Relation Sub.  Ins. ID Effective Group Num

## 2022-02-21 ENCOUNTER — OFFICE VISIT (OUTPATIENT)
Dept: INTERNAL MEDICINE CLINIC | Age: 47
End: 2022-02-21
Payer: COMMERCIAL

## 2022-02-21 VITALS
OXYGEN SATURATION: 98 % | RESPIRATION RATE: 16 BRPM | TEMPERATURE: 97.1 F | DIASTOLIC BLOOD PRESSURE: 91 MMHG | WEIGHT: 218.4 LBS | BODY MASS INDEX: 38.7 KG/M2 | SYSTOLIC BLOOD PRESSURE: 135 MMHG | HEIGHT: 63 IN | HEART RATE: 93 BPM

## 2022-02-21 DIAGNOSIS — L72.9 SCALP CYST: Primary | ICD-10-CM

## 2022-02-21 PROCEDURE — 99213 OFFICE O/P EST LOW 20 MIN: CPT | Performed by: STUDENT IN AN ORGANIZED HEALTH CARE EDUCATION/TRAINING PROGRAM

## 2022-02-21 NOTE — PATIENT INSTRUCTIONS
Do not scrub area for 7-10 days. May shower/shampoo hair without scrubbing wound area and pat wound area gently dry. Call for appointment if any problems with healed scalp area.

## 2022-02-21 NOTE — PROGRESS NOTES
Department of General Surgery - Adult  General Surgery Service  Resident Clinic Note      CC:  Excision of scalp cyst    History Obtained From:  patient    HISTORY OF PRESENT ILLNESS:  This is a 48 y.o. female with Hx of MRSA, hypothyroidism, and RODGER presents as a follow up from an excision of a scalp cyst. Patient denies any complications since surgery. No pain or drainage. Otherwise denies any acute complaints today. Pathology demonstrated a cyst without evidence of malignancy. Patient is here to have sutures removed.      Past Medical History:   Diagnosis Date    Abdominal wall abscess 2/8/2017    Abdominal wall cellulitis 2/6/2017    Anal fissure 4/30/2015    Anemia, iron deficiency     Anesthesia     PATIENT REQUESTING IF NEEDS TO BE INTUBATED TO USE A GURDEEP TUBE     Asthma     Atrial fibrillation [facility] [address]     Autoimmune hepatitis ([address]     Chronic sinusitis     COVID-19     History of blood transfusion     Hypothyroidism     Menorrhagia with regular cycle     Morbid obesity with BMI of 40.0-44.9, adult ([facility] [address] 5/26/2015    MRSA (methicillin resistant staph aureus) culture positive 02/03/2017    abdominal abscess    MRSA infection 08/20/2012    rt thigh abscess    RODGER (obstructive sleep apnea)     awaiting cpap machine to be delivered not due until after surgery 2/8/22    Pericarditis, acute     Retention of urine     Sinusitis      Past Surgical History:   Procedure Laterality Date    COLONOSCOPY N/A 12/29/2020    COLONOSCOPY DIAGNOSTIC performed by Pj Medeiros MD at [address] [place] [address]  8/22/2012    INCISION AND DRAINAGE POSTERIOR THIGH ABSCESS    PRE-MALIGNANT / BENIGN SKIN LESION EXCISION N/A 2/8/2022    EXCISION OF SCALP CYST performed by Monty Faria MD at [facility]  Aug 2011    repair of rectal fissures and anal skin tag removal    SINUS SURGERY      X 3    TONSILLECTOMY  2004    TONSILLECTOMY AND ADENOIDECTOMY  2005    (partial adenoidectomy)    UPPER GASTROINTESTINAL ENDOSCOPY N/A 12/29/2020    EGD BIOPSY performed by Socrates Cortes MD at [address]     Current Outpatient Medications   Medication Sig Dispense Refill    albuterol (PROVENTIL) (2.5 MG/3ML) 0.083% nebulizer solution Take 3 mLs by nebulization every 6 hours as needed for Wheezing 30 each 3    pantoprazole (PROTONIX) 40 MG tablet Take 1 tablet by mouth 2 times daily (before meals) 60 tablet 5    vitamin D (ERGOCALCIFEROL) 1.25 MG (46777 UT) CAPS capsule Take 1 capsule by mouth once a week 6 capsule 0    mometasone (ELOCON) 0.1 % cream Apply topically daily. 45 g 0    sertraline (ZOLOFT) 25 MG tablet Take 1 tablet by mouth daily (Patient taking differently: Take 25 mg by mouth daily Takes mid morning) 90 tablet 1    Handicap Placard MISC by Does not apply route Valid from today to 04/30/22 1 each 0    chlorhexidine (BETASEPT SURGICAL SCRUB) 4 % external liquid APPLY TO AFFECTED AREA(S) TOPICALLY AS NEEDED 473 mL 0    dilTIAZem (CARDIZEM CD) 120 MG extended release capsule TAKE 1 CAPSULE BY MOUTH ONE TIME A DAY 30 capsule 3    cetirizine-psuedoephedrine (ALLERGY RELIEF D) 5-120 MG per extended release tablet TAKE 1 TABLET BY MOUTH 2 TIMES A DAY 60 tablet 3    fluticasone (FLONASE) 50 MCG/ACT nasal spray APPLY 1 SPRAY IN THE AFFECTED NOSTRIL ONCE DAILY 16 g 3    ketoconazole (NIZORAL) 2 % shampoo APPLY TO AFFECTED AREA(S) ONCE DAILY AS NEEDED 912 mL 1    folic acid (FOLVITE) 1 MG tablet TAKE 1 TABLET BY MOUTH ONE TIME A DAY 30 tablet 3    urea (CARMOL) 10 % cream APPLY TO AFFECTED AREA(S) TOPICALLY AS NEEDED 85 g 2    polyethylene glycol (MIRALAX) 17 GM/SCOOP POWD powder POUR 17 GRAMS OF POWDER INTO THE CAP OF THE BOTTLE. STIR THE POWDER IN A GLASS (4 TO 8 OZ) OF WATER, JUICE, SODA, COFFEE OR TEA UNTIL COMPLETELY DISSOLVED. DRINK THE SOLUTION.  ONCE DAILY AS NEEDED FOR CONSTIPATION 238 g 5    Saccharomyces boulardii (PROBIOTIC) 250 MG CAPS TAKE 1 CAPSULE BY MOUTH 2 TIMES A  capsule 1    budesonide-formoterol (SYMBICORT) 160-4.5 MCG/ACT AERO Inhale 2 puffs into the lungs 2 times daily 1 each 5    montelukast (SINGULAIR) 10 MG tablet Take one tablet by mouth every evening 30 tablet 5    budesonide (PULMICORT) 0.25 MG/2ML nebulizer suspension Take 2 mLs by nebulization 2 times daily 60 ampule 5    mupirocin (BACTROBAN) 2 % ointment Apply topically 3 times daily. 1 Tube 1    albuterol sulfate  (90 Base) MCG/ACT inhaler INHALE 2 PUFF BY MOUTH EVERY 6 HOURS AS NEEDED FOR WHEEZING 1 Inhaler 5    levothyroxine (SYNTHROID) 137 MCG tablet Take 1 tablet by mouth daily 30 tablet 2    liothyronine (CYTOMEL) 5 MCG tablet TAKE TWO TABLETS BY MOUTH DAILY 60 tablet 5    Multiple Vitamins-Minerals (THERAPEUTIC MULTIVITAMIN-MINERALS) tablet Take 1 tablet by mouth daily 30 tablet 1    cyanocobalamin 1000 MCG/ML injection INJECT 1ML INTO THE SKIN ONCE MONTHLY 1 mL 2    Insulin Syringe-Needle U-100 (ULTICARE INSULIN SYRINGE) 31G X 5/16\" 1 ML MISC AS DIRECTED 100 each 1     No current facility-administered medications for this visit. Allergies   Allergen Reactions    Latex Swelling and Dermatitis     Also \"burning of skin\"      Clindamycin/Lincomycin Hives and Other (See Comments)     \"Throat closing\"    Sulfa Antibiotics Anaphylaxis and Hives    Diflucan [Fluconazole] Hives and Itching     Social History     Tobacco Use    Smoking status: Never Smoker    Smokeless tobacco: Never Used   Vaping Use    Vaping Use: Never used   Substance Use Topics    Alcohol use:  Yes     Alcohol/week: 0.0 standard drinks     Comment: 2 drinks/week     Drug use: No     Family History   Problem Relation Age of Onset    High Blood Pressure Mother     Hypertension Mother     Elevated Lipids Mother     Other Mother         fatty liver    Heart Disease Father     Hypertension Father     Elevated Lipids Father     Coronary Art Dis Father     Liver Disease Father    Dacia Ha Diabetes Maternal Aunt        REVIEW OF SYSTEMS:    A 14 point review of systems was conducted, significant findings as noted in HPI. All other systems negative. PHYSICAL EXAM:    Vitals:    02/21/22 0945   BP: (!) 135/91   Site: Right Upper Arm   Position: Sitting   Cuff Size: Large Adult   Pulse: 93   Resp: 16   Temp: 97.1 °F (36.2 °C)   TempSrc: Temporal   SpO2: 98%   Weight: 218 lb 6.4 oz (99.1 kg)   Height: 5' 3\" (1.6 m)       General appearance: alert, no acute distress, grooming appropriate  Eyes: No scleral icterus  Neck: trachea midline, no JVD, no lymphadenopathy  Chest/Lungs: Normal effort on RA  Cardiovascular: RRR, no murmurs/gallops/rubs  Abdomen: soft, non-tender, non-distended, no guarding/rigidity  Skin: warm and dry, no rashes, scalp incision c/d/i and well approximated. 3 sutures removed. Extremities: no edema, no cyanosis  Neuro: A&Ox3, no focal deficits, sensation intact    ASSESSMENT AND PLAN:  This is a 48 y.o. female with Hx of scalp cyst here for a post-op visit to remove 3 sutures. - Incision site cleaned and 3 sutures removed in their entirety  - Patient to follow up on an as-needed basis.      Johnathan Cash DO  02/21/22  10:31 AM

## 2022-02-25 DIAGNOSIS — E55.9 VITAMIN D DEFICIENCY: ICD-10-CM

## 2022-02-25 DIAGNOSIS — L30.9 ECZEMA, UNSPECIFIED TYPE: ICD-10-CM

## 2022-02-25 NOTE — TELEPHONE ENCOUNTER
Refill elocon cream, vit D weekly, and betasept surgical scrub  Last ov 2/7/22 Caryn  Next appt 4/25/22

## 2022-02-26 RX ORDER — ERGOCALCIFEROL 1.25 MG/1
CAPSULE ORAL
Qty: 6 CAPSULE | Refills: 0 | Status: SHIPPED | OUTPATIENT
Start: 2022-02-26 | End: 2022-04-01

## 2022-02-26 RX ORDER — MOMETASONE FUROATE 1 MG/G
CREAM TOPICAL
Qty: 45 G | Refills: 0 | Status: SHIPPED | OUTPATIENT
Start: 2022-02-26 | End: 2022-04-01

## 2022-02-26 RX ORDER — ANTISEPTIC SURGICAL SCRUB 0.04 MG/ML
SOLUTION TOPICAL
Qty: 473 ML | Refills: 0 | Status: SHIPPED | OUTPATIENT
Start: 2022-02-26 | End: 2022-04-01

## 2022-02-28 RX ORDER — LEVOTHYROXINE SODIUM 137 UG/1
TABLET ORAL
Qty: 30 TABLET | Refills: 2 | Status: SHIPPED | OUTPATIENT
Start: 2022-02-28 | End: 2022-03-25 | Stop reason: SDUPTHER

## 2022-02-28 NOTE — TELEPHONE ENCOUNTER
Medication:   Requested Prescriptions     Pending Prescriptions Disp Refills    levothyroxine (SYNTHROID) 137 MCG tablet [Pharmacy Med Name: LEVOTHYROXINE SODIUM 137MCG TABS] 30 tablet 2     Sig: TAKE 1 TABLET BY MOUTH ONE TIME A DAY       Last Filled:      Patient Phone Number: [phone] (home)     Last appt: 6/4/2021   Next appt: Due in [place]    Last Thyroid:   Lab Results   Component Value Date    TSH 8.28 09/17/2020    FT3 2.7 07/23/2021    T4FREE 0.9 07/23/2021

## 2022-03-10 ENCOUNTER — TELEPHONE (OUTPATIENT)
Dept: INTERNAL MEDICINE CLINIC | Age: 47
End: 2022-03-10

## 2022-03-10 NOTE — TELEPHONE ENCOUNTER
Patient called stating still having anxiety, extreme fatigue and muscle spasms, on Zoloft for 1 month. Please advise.

## 2022-03-16 ENCOUNTER — OFFICE VISIT (OUTPATIENT)
Dept: INTERNAL MEDICINE CLINIC | Age: 47
End: 2022-03-16
Payer: COMMERCIAL

## 2022-03-16 ENCOUNTER — OFFICE VISIT (OUTPATIENT)
Dept: ENT CLINIC | Age: 47
End: 2022-03-16
Payer: COMMERCIAL

## 2022-03-16 VITALS
HEART RATE: 94 BPM | OXYGEN SATURATION: 96 % | DIASTOLIC BLOOD PRESSURE: 79 MMHG | HEIGHT: 63 IN | SYSTOLIC BLOOD PRESSURE: 112 MMHG | RESPIRATION RATE: 16 BRPM | TEMPERATURE: 97.3 F | BODY MASS INDEX: 38.82 KG/M2 | WEIGHT: 219.1 LBS

## 2022-03-16 VITALS
HEIGHT: 63 IN | WEIGHT: 220 LBS | DIASTOLIC BLOOD PRESSURE: 90 MMHG | BODY MASS INDEX: 38.98 KG/M2 | SYSTOLIC BLOOD PRESSURE: 140 MMHG

## 2022-03-16 DIAGNOSIS — R21 RASH: ICD-10-CM

## 2022-03-16 DIAGNOSIS — F41.9 ANXIETY: ICD-10-CM

## 2022-03-16 DIAGNOSIS — E06.3 HYPOTHYROIDISM DUE TO HASHIMOTO'S THYROIDITIS: Chronic | ICD-10-CM

## 2022-03-16 DIAGNOSIS — H69.83 DYSFUNCTION OF BOTH EUSTACHIAN TUBES: ICD-10-CM

## 2022-03-16 DIAGNOSIS — H60.8X3 CHRONIC ECZEMATOUS OTITIS EXTERNA OF BOTH EARS: ICD-10-CM

## 2022-03-16 DIAGNOSIS — J32.4 CHRONIC PANSINUSITIS: Primary | ICD-10-CM

## 2022-03-16 DIAGNOSIS — J30.89 NON-SEASONAL ALLERGIC RHINITIS, UNSPECIFIED TRIGGER: ICD-10-CM

## 2022-03-16 DIAGNOSIS — E03.8 HYPOTHYROIDISM DUE TO HASHIMOTO'S THYROIDITIS: Chronic | ICD-10-CM

## 2022-03-16 DIAGNOSIS — M62.838 MUSCLE SPASM: Primary | ICD-10-CM

## 2022-03-16 DIAGNOSIS — R73.9 ELEVATED BLOOD SUGAR: ICD-10-CM

## 2022-03-16 PROCEDURE — G8427 DOCREV CUR MEDS BY ELIG CLIN: HCPCS | Performed by: OTOLARYNGOLOGY

## 2022-03-16 PROCEDURE — 99213 OFFICE O/P EST LOW 20 MIN: CPT | Performed by: OTOLARYNGOLOGY

## 2022-03-16 PROCEDURE — 99213 OFFICE O/P EST LOW 20 MIN: CPT | Performed by: STUDENT IN AN ORGANIZED HEALTH CARE EDUCATION/TRAINING PROGRAM

## 2022-03-16 PROCEDURE — 1036F TOBACCO NON-USER: CPT | Performed by: OTOLARYNGOLOGY

## 2022-03-16 PROCEDURE — G8417 CALC BMI ABV UP PARAM F/U: HCPCS | Performed by: OTOLARYNGOLOGY

## 2022-03-16 PROCEDURE — G8484 FLU IMMUNIZE NO ADMIN: HCPCS | Performed by: OTOLARYNGOLOGY

## 2022-03-16 RX ORDER — CETIRIZINE HYDROCHLORIDE 10 MG/1
10 TABLET ORAL DAILY
Qty: 90 TABLET | Refills: 1 | Status: SHIPPED | OUTPATIENT
Start: 2022-03-16

## 2022-03-16 RX ORDER — CYCLOBENZAPRINE HCL 5 MG
5 TABLET ORAL NIGHTLY PRN
Qty: 10 TABLET | Refills: 0 | Status: SHIPPED | OUTPATIENT
Start: 2022-03-16 | End: 2022-03-26

## 2022-03-16 RX ORDER — BUDESONIDE 0.25 MG/2ML
1 INHALANT ORAL 2 TIMES DAILY
COMMUNITY
End: 2022-03-25

## 2022-03-16 RX ORDER — SERTRALINE HYDROCHLORIDE 25 MG/1
50 TABLET, FILM COATED ORAL DAILY
Qty: 90 TABLET | Refills: 1 | Status: SHIPPED | OUTPATIENT
Start: 2022-03-16 | End: 2022-07-25

## 2022-03-16 ASSESSMENT — ENCOUNTER SYMPTOMS
CHOKING: 0
EYE ITCHING: 0
DIARRHEA: 0
SINUS PRESSURE: 0
SORE THROAT: 0
EYE PAIN: 0
SINUS PAIN: 0
NAUSEA: 0
VOICE CHANGE: 0
TROUBLE SWALLOWING: 0
EYE REDNESS: 0
SHORTNESS OF BREATH: 0
FACIAL SWELLING: 0
RHINORRHEA: 0
COUGH: 0

## 2022-03-16 ASSESSMENT — VISUAL ACUITY: OU: 1

## 2022-03-16 NOTE — PROGRESS NOTES
3/16/2022    Patient's Name: Cami Davies        : 1975)    CC: Fatigue, muscle spasms and Insomnia    HPI: 47 yo F here with a range of sx. Pt states she feels generally fatigued and this has been ongoing for months. Her fatigue is associated with b/l intermittent shoulder and upper back muscle spasms and tightness which is also chronic. Pt reports however that for the past few weeks,her muscles get so tight that she is unable to fall sleep at night. She states these sx have occurred before when her thyroid medications were adjusted and her thyroid levels were off. She states she know her thyroid level look got on her labs but she just does not feel well. He reports chronic constipation which is improved with Miralax    She also reports bumps on her left right which is intermittently recurring. This last eruption started about 1 week ago. Pt has been using elocon cream on it w/o resolution, unlike her previous rashes which responded to that. She states the rash is itchy and scaly and seems to be spreading. She denies wearing a bracelet or watch on her left wrist and has not exposed her wrist to any new creams or soaps. She states her anxiety is about the same and has no improved. She is continuing to take the Zoloft as prescribed. She denies and other sx including F/C, dizziness, HA, CP, SOB, palpitations, URI sx, abd pain, N/V/D and urinary sx    Review of Systems   As noted in HPI above    Prior to Visit Medications    Medication Sig Taking?  Authorizing Provider   budesonide (PULMICORT) 0.25 MG/2ML nebulizer suspension Take 1 ampule by nebulization 2 times daily uses in a sinus lavage Yes Historical Provider, MD   cyclobenzaprine (FLEXERIL) 5 MG tablet Take 1 tablet by mouth nightly as needed for Muscle spasms Yes Melodie Malik MD   cetirizine (ZYRTEC) 10 MG tablet Take 1 tablet by mouth daily Yes Melodie Malik MD   sertraline (ZOLOFT) 25 MG tablet Take 2 tablets by mouth daily Yes [facility] MD Arun   levothyroxine (SYNTHROID) 137 MCG tablet TAKE 1 TABLET BY MOUTH ONE TIME A DAY Yes Era Galvan MD   BETASEPT SURGICAL SCRUB 4 % external liquid APPLY TO AFFECTED AREA(S) TOPICALLY AS NEEDED Yes Ralene Peak, DO   vitamin D (ERGOCALCIFEROL) 1.25 MG (31751 UT) CAPS capsule TAKE 1 CAPSULE BY MOUTH ONE TIME WEEKLY Yes Ralene Peak, DO   mometasone (ELOCON) 0.1 % cream APPLY TO AFFECTED AREA(S) TOPICALLY ONCE DAILY Yes Ralene Peak, DO   mupirocin (BACTROBAN) 2 % ointment APPLY TO AFFECTED AREA(S) TOPICALLY THREE TIMES A DAY Yes Ralene Peak, DO   pantoprazole (PROTONIX) 40 MG tablet Take 1 tablet by mouth 2 times daily (before meals) Yes Lissette Harrison MD   Eric Pineda MISC by Does not apply route Valid from today to 04/30/22 Yes Lissette Harrison MD   dilTIAZem (CARDIZEM CD) 120 MG extended release capsule TAKE 1 CAPSULE BY MOUTH ONE TIME A DAY Yes Lissette Harrison MD   fluticasone (FLONASE) 50 MCG/ACT nasal spray APPLY 1 SPRAY IN THE AFFECTED NOSTRIL ONCE DAILY Yes Lissette Harrison MD   ketoconazole (NIZORAL) 2 % shampoo APPLY TO AFFECTED AREA(S) ONCE DAILY AS NEEDED Yes Lissette Harrison MD   folic acid (FOLVITE) 1 MG tablet TAKE 1 TABLET BY MOUTH ONE TIME A DAY Yes Lissette Harrison MD   cyanocobalamin 1000 MCG/ML injection INJECT 1ML INTO THE SKIN ONCE MONTHLY Yes Lissette Harrison MD   urea (CARMOL) 10 % cream APPLY TO AFFECTED AREA(S) TOPICALLY AS NEEDED Yes Lissette Harrison MD   polyethylene glycol (MIRALAX) 17 GM/SCOOP POWD powder POUR 17 GRAMS OF POWDER INTO THE CAP OF THE BOTTLE. STIR THE POWDER IN A GLASS (4 TO 8 OZ) OF WATER, JUICE, SODA, COFFEE OR TEA UNTIL COMPLETELY DISSOLVED. DRINK THE SOLUTION.  ONCE DAILY AS NEEDED FOR CONSTIPATION Yes Lissette Harrison MD   Saccharomyces boulardii (PROBIOTIC) 250 MG CAPS TAKE 1 CAPSULE BY MOUTH 2 TIMES A DAY Yes Ralene Peak, DO   budesonide-formoterol (SYMBICORT) 160-4.5 MCG/ACT AERO Inhale 2 puffs into the lungs 2 times daily Yes Kody Woods MD   montelukast (SINGULAIR) 10 MG tablet Take one tablet by mouth every evening Yes Kody Woods MD   liothyronine (CYTOMEL) 5 MCG tablet TAKE TWO TABLETS BY MOUTH DAILY Yes Mone Park MD   Insulin Syringe-Needle U-100 (ULTICARE INSULIN SYRINGE) 31G X 5/16\" 1 ML MISC AS DIRECTED Yes Kody Woods MD   Multiple Vitamins-Minerals (THERAPEUTIC MULTIVITAMIN-MINERALS) tablet Take 1 tablet by mouth daily Yes Elma Becerra MD   albuterol sulfate  (90 Base) MCG/ACT inhaler INHALE 2 PUFF BY MOUTH EVERY 6 HOURS AS NEEDED FOR WHEEZING  Patient not taking: Reported on 3/16/2022  Kody Woods MD        PHYSICAL EXAM    Vitals:    03/16/22 0830   BP: 112/79   Site: Left Upper Arm   Position: Sitting   Cuff Size: Large Adult   Pulse: 94   Resp: 16   Temp: 97.3 °F (36.3 °C)   TempSrc: Temporal   SpO2: 96%   Weight: 219 lb 1.6 oz (99.4 kg)   Height: 5' 3\" (1.6 m)      Estimated body mass index is 38.81 kg/m² as calculated from the following:    Height as of this encounter: 5' 3\" (1.6 m). Weight as of this encounter: 219 lb 1.6 oz (99.4 kg). Physical Exam  Constitutional:       General: She is not in acute distress. Appearance: Normal appearance. She is obese. She is not ill-appearing. HENT:      Head: Normocephalic and atraumatic. Eyes:      General: Vision grossly intact. Conjunctiva/sclera: Conjunctivae normal.   Cardiovascular:      Rate and Rhythm: Normal rate and regular rhythm. Pulses: Normal pulses. Heart sounds: Normal heart sounds, S1 normal and S2 normal. No murmur heard. No friction rub. No gallop. Pulmonary:      Effort: Pulmonary effort is normal. No respiratory distress. Breath sounds: Normal breath sounds and air entry. No wheezing or rales. Abdominal:      General: Bowel sounds are normal. There is no distension. Palpations: Abdomen is soft. Tenderness: There is no abdominal tenderness. Musculoskeletal:         General: Normal range of motion. Cervical back: Full passive range of motion without pain, normal range of motion and neck supple. Right lower leg: No edema. Left lower leg: No edema. Skin:     Capillary Refill: Capillary refill takes less than 2 seconds. Coloration: Skin is not pale. Findings: Rash (4 erythematous, pruritic 8mm diameter bumbs noted on dorsal surface of left wrist ) present. Neurological:      Mental Status: She is alert and oriented to person, place, and time. Mental status is at baseline. Psychiatric:         Mood and Affect: Mood normal.         Speech: Speech normal.         Judgment: Judgment normal.          ASSESSMENT AND PLAN    1. Muscle spasm  Likely related to hypothyroidism since pt states she frequently feels like this when thyroid is off. Will r/o autoimmune causes such as polymyositis   - START cyclobenzaprine (FLEXERIL) 5 MG tablet; Take 1 tablet by mouth nightly as needed for Muscle spasms  Dispense: 10 tablet; Refill: 0. Short course to help her sleep until she sees Endo. - Sedimentation Rate; Future  - C-Reactive Protein; Future  - Comprehensive Metabolic Panel; Future  - Magnesium; Future    2. Hypothyroidism due to Hashimoto's thyroiditis  Last TSH WNL at 1.70. But pt feels clinically unwell  - Continue current dose of Synthroid and Cytomel  - Call to make opponent to be seem by Endo within 1 week. - Comprehensive Metabolic Panel; Future    3. Anxiety  Unchanged   - INCREASE sertraline (ZOLOFT) 25 MG tablet; Take 2 tablets by mouth daily  Dispense: 90 tablet; Refill: 1    4. Non-seasonal allergic rhinitis, unspecified trigger  - STOP taking cetirizine-pseudoephedrine  - START cetirizine (ZYRTEC) 10 MG tablet; Take 1 tablet by mouth daily  Dispense: 90 tablet; Refill: 1    5. Rash  As described above   - Ambulatory referral to Dermatology  - Sedimentation Rate; Future  - C-Reactive Protein; Future    6.  Elevated blood sugar  Last A1c 6.5.  - Pt enrolled with weight clinic and working on diet and lifestyle medications discussed as length today   - Will defer starting medication at this time       Return in about 2 months (around 5/16/2022).      Pt discussed and staffed with Dr. Zurdo Shaw    Electronically signed by Author Shameka MD on 3/16/2022 at 9:22 AM

## 2022-03-16 NOTE — PROGRESS NOTES
Subjective:      Patient ID: Orly Gonzalez is a 48 y.o. female. HPI  Chief Complaint   Patient presents with    ear and throat issues      History of Present Illness  Alvaro Woods is a(n) 48 y.o. female who presents with a 3 day history of ear crackling nd swollen glands. Bilateral. No pain. Does clean ears. Has seasonal allergies and chronic sinusitis. Concern for hearing as a luciano. Pain: No  Location: ear and neck  Onset: As above  Timing: waxing and waning  Quality: aching  Severity: mild  Frequency: daily  Otalgia: No  Odynophagia: No  Dysphagia: No  Voice Changes: No  Lumps in neck: No  Modifying Factors: Non smoker  Nasal airway clear. Cannot get pulmicort respules through insurance.      Patient Active Problem List   Diagnosis    Hypothyroidism    Chronic sinusitis    Asthma in adult    Chronic rhinosinusitis    Iron deficiency anemia    Eczema    Intestinal malabsorption    Thrombocytopenia (HCC)    Chronic ITP (idiopathic thrombocytopenia) (HCC)    Hyperlipidemia, mixed    Autoimmune hepatitis ([address]    Anxiety    Chronic pansinusitis    Atrial fibrillation with RVR ([address]    GERD without esophagitis    Acute respiratory failure due to COVID-19 ([address]    Obstructive sleep apnea    Acute post-operative pain     Past Surgical History:   Procedure Laterality Date    COLONOSCOPY N/A 12/29/2020    COLONOSCOPY DIAGNOSTIC performed by Raciel Turner MD at [address] [place] [address]  8/22/2012    INCISION AND DRAINAGE POSTERIOR THIGH ABSCESS    PRE-MALIGNANT / BENIGN SKIN LESION EXCISION N/A 2/8/2022    EXCISION OF SCALP CYST performed by Bernice Peraza MD at [facility]  Aug 2011    repair of rectal fissures and anal skin tag removal    SINUS SURGERY      X 3    TONSILLECTOMY  2004    TONSILLECTOMY AND ADENOIDECTOMY  2005    (partial adenoidectomy)    UPPER GASTROINTESTINAL ENDOSCOPY N/A 12/29/2020    EGD BIOPSY performed by Reba Byrnes Genevieve Manrique MD at [facility]     Family History   Problem Relation Age of Onset    High Blood Pressure Mother     Hypertension Mother     Elevated Lipids Mother     Other Mother         fatty liver    Heart Disease Father     Hypertension Father     Elevated Lipids Father     Coronary Art Dis Father     Liver Disease Father     Diabetes Maternal Aunt      Social History     Socioeconomic History    Marital status: Single     Spouse name: Not on file    Number of children: Not on file    Years of education: Not on file    Highest education level: Not on file   Occupational History    Occupation: NA   Tobacco Use    Smoking status: Never Smoker    Smokeless tobacco: Never Used   Vaping Use    Vaping Use: Never used   Substance and Sexual Activity    Alcohol use: Yes     Alcohol/week: 0.0 standard drinks     Comment: 2 drinks/week     Drug use: No    Sexual activity: Yes   Other Topics Concern    Not on file   Social History Narrative    Not on file     Social Determinants of Health     Financial Resource Strain:     Difficulty of Paying Living Expenses: Not on file   Food Insecurity:     Worried About Running Out of Food in the Last Year: Not on file    Tangela of Food in the Last Year: Not on file   Transportation Needs:     Lack of Transportation (Medical): Not on file    Lack of Transportation (Non-Medical):  Not on file   Physical Activity:     Days of Exercise per Week: Not on file    Minutes of Exercise per Session: Not on file   Stress:     Feeling of Stress : Not on file   Social Connections:     Frequency of Communication with Friends and Family: Not on file    Frequency of Social Gatherings with Friends and Family: Not on file    Attends [employer] Services: Not on file    Active Member of Clubs or Organizations: Not on file    Attends Club or Organization Meetings: Not on file    Marital Status: Not on file   Intimate Partner Violence:     Fear of Current or Ex-Partner: Not on file    Emotionally Abused: Not on file    Physically Abused: Not on file    Sexually Abused: Not on file   Housing Stability:     Unable to Pay for Housing in the Last Year: Not on file    Number of Places Lived in the Last Year: Not on file    Unstable Housing in the Last Year: Not on file       DRUG/FOOD ALLERGIES: Latex, Clindamycin/lincomycin, Sulfa antibiotics, and Diflucan [fluconazole]    CURRENT MEDICATIONS  Prior to Admission medications    Medication Sig Start Date End Date Taking?  Authorizing Provider   budesonide (PULMICORT) 0.25 MG/2ML nebulizer suspension Take 1 ampule by nebulization 2 times daily uses in a sinus lavage   Yes Historical Provider, MD   cyclobenzaprine (FLEXERIL) 5 MG tablet Take 1 tablet by mouth nightly as needed for Muscle spasms 3/16/22 3/26/22 Yes Rodríguez Page MD   cetirizine (ZYRTEC) 10 MG tablet Take 1 tablet by mouth daily 3/16/22  Yes Rodríguez Page MD   sertraline (ZOLOFT) 25 MG tablet Take 2 tablets by mouth daily 3/16/22  Yes Rodríguez Page MD   levothyroxine (SYNTHROID) 137 MCG tablet TAKE 1 TABLET BY MOUTH ONE TIME A DAY 2/28/22  Yes Julio Gongora MD   BETASEPT SURGICAL SCRUB 4 % external liquid APPLY TO AFFECTED AREA(S) TOPICALLY AS NEEDED 2/26/22  Yes Luciano Essex, DO   vitamin D (ERGOCALCIFEROL) 1.25 MG (03744 UT) CAPS capsule TAKE 1 CAPSULE BY MOUTH ONE TIME WEEKLY 2/26/22  Yes Manus Essex, DO   mometasone (ELOCON) 0.1 % cream APPLY TO AFFECTED AREA(S) TOPICALLY ONCE DAILY 2/26/22  Yes Luciano Essex, DO   mupirocin (BACTROBAN) 2 % ointment APPLY TO AFFECTED AREA(S) TOPICALLY THREE TIMES A DAY 2/26/22  Yes Luciano Essex, DO   pantoprazole (PROTONIX) 40 MG tablet Take 1 tablet by mouth 2 times daily (before meals) 1/28/22  Yes MD Eric Lynch [facility] by Does not apply route Valid from today to 04/30/22 1/24/22  Yes Rodríguez Page MD   dilTIAZem (CARDIZEM CD) 120 MG extended release capsule TAKE 1 CAPSULE BY MOUTH ONE TIME A DAY 1/24/22  Yes Eris Villaseñor MD   fluticasone (FLONASE) 50 MCG/ACT nasal spray APPLY 1 SPRAY IN THE AFFECTED NOSTRIL ONCE DAILY 1/24/22  Yes Eris Villaseñor MD   ketoconazole (NIZORAL) 2 % shampoo APPLY TO AFFECTED AREA(S) ONCE DAILY AS NEEDED 1/24/22  Yes Eris Villaseñor MD   folic acid (FOLVITE) 1 MG tablet TAKE 1 TABLET BY MOUTH ONE TIME A DAY 12/6/21  Yes Eris Villaseñor MD   cyanocobalamin 1000 MCG/ML injection INJECT 1ML INTO THE SKIN ONCE MONTHLY 12/6/21  Yes Eris Villaseñor MD   urea (CARMOL) 10 % cream APPLY TO AFFECTED AREA(S) TOPICALLY AS NEEDED 12/6/21  Yes Eris Villaseñor MD   polyethylene glycol (MIRALAX) 17 GM/SCOOP POWD powder POUR 17 GRAMS OF POWDER INTO THE CAP OF THE BOTTLE. STIR THE POWDER IN A GLASS (4 TO 8 OZ) OF WATER, JUICE, SODA, COFFEE OR TEA UNTIL COMPLETELY DISSOLVED. DRINK THE SOLUTION.  ONCE DAILY AS NEEDED FOR CONSTIPATION 12/6/21  Yes Eris Villaseñor MD   Saccharomyces boulardii (PROBIOTIC) 250 MG CAPS TAKE 1 CAPSULE BY MOUTH 2 TIMES A DAY 10/4/21  Yes Michelle Demarco DO   budesonide-formoterol [facility]) 160-4.5 MCG/ACT AERO Inhale 2 puffs into the lungs 2 times daily 10/4/21  Yes Walker Bassett MD   montelukast (SINGULAIR) 10 MG tablet Take one tablet by mouth every evening 10/4/21  Yes Walker Bassett MD   liothyronine (CYTOMEL) 5 MCG tablet TAKE TWO TABLETS BY MOUTH DAILY 7/26/21  Yes Raffy Underwood MD   Insulin Syringe-Needle U-100 [facility] INSULIN SYRINGE) 31G X 5/16\" 1 ML MISC AS DIRECTED 3/26/21  Yes Walker Bassett MD   Multiple Vitamins-Minerals (THERAPEUTIC MULTIVITAMIN-MINERALS) tablet Take 1 tablet by mouth daily 6/25/20  Yes Augustin Mejia MD   albuterol sulfate  (90 Base) MCG/ACT inhaler INHALE 2 PUFF BY MOUTH EVERY 6 HOURS AS NEEDED FOR WHEEZING  Patient not taking: Reported on 3/16/2022 8/1/21   Walker Bassett MD       Lab Studies:  Lab Results   Component Value Date    WBC 6.5 01/24/2022    HGB 12.7 01/24/2022    HCT 38.9 01/24/2022    MCV 90.5 01/24/2022     (L) 01/24/2022     Lab Results   Component Value Date    GLUCOSE 85 01/24/2022    BUN 9 01/24/2022    CREATININE 0.7 01/24/2022    K 3.8 01/24/2022     01/24/2022     01/24/2022    CALCIUM 8.5 01/24/2022     Lab Results   Component Value Date    MG 2.00 02/06/2019     Lab Results   Component Value Date    PHOS 3.2 02/09/2017     Lab Results   Component Value Date    ALKPHOS 288 (H) 01/24/2022     (H) 01/24/2022    AST 60 (H) 01/24/2022    BILITOT 0.6 01/24/2022    PROT 7.2 01/24/2022         Review of Systems   Constitutional: Negative for activity change, appetite change, chills, fatigue and fever. HENT: Negative for congestion, ear discharge, ear pain, facial swelling, hearing loss, nosebleeds, postnasal drip, rhinorrhea, sinus pressure, sinus pain, sneezing, sore throat, tinnitus, trouble swallowing and voice change. Eyes: Negative for pain, redness, itching and visual disturbance. Respiratory: Negative for cough, choking and shortness of breath. Gastrointestinal: Negative for diarrhea and nausea. Endocrine: Negative for cold intolerance and heat intolerance. Objective:   Physical Exam  Constitutional:       General: She is not in acute distress. Appearance: She is well-developed. HENT:      Head: Not macrocephalic and not microcephalic. No Mejia's sign, abrasion, contusion, right periorbital erythema, left periorbital erythema or laceration. Right Ear: Hearing, tympanic membrane, ear canal and external ear normal. No decreased hearing noted. No laceration, drainage, swelling or tenderness. No middle ear effusion. No foreign body. No mastoid tenderness. No hemotympanum. Tympanic membrane is not injected, perforated, retracted or bulging. Tympanic membrane has normal mobility. Left Ear: Hearing, tympanic membrane, ear canal and external ear normal. No decreased hearing noted.  No laceration, drainage, swelling or tenderness. No middle ear effusion. No foreign body. No mastoid tenderness. No hemotympanum. Tympanic membrane is not injected, perforated, retracted or bulging. Tympanic membrane has normal mobility. Ears:      Lynn exam findings: does not lateralize. Right Rinne: AC > BC. Left Rinne: AC > BC. Nose: No mucosal edema or rhinorrhea. Mouth/Throat:      Lips: No lesions. Mouth: No oral lesions. Dentition: Normal dentition. Tongue: No lesions. Palate: No mass. Pharynx: No oropharyngeal exudate, posterior oropharyngeal erythema or uvula swelling. Tonsils: No tonsillar abscesses. Eyes:      Extraocular Movements:      Right eye: Normal extraocular motion and no nystagmus. Left eye: Normal extraocular motion and no nystagmus. Pupils:      Right eye: Pupil is reactive. Left eye: Pupil is reactive. Neck:      Thyroid: No thyroid mass or thyromegaly. Trachea: No tracheal tenderness or tracheal deviation. Cardiovascular:      Rate and Rhythm: Normal rate and regular rhythm. Pulmonary:      Breath sounds: No stridor. Musculoskeletal:      Cervical back: Normal range of motion and neck supple. No edema or erythema. No muscular tenderness. Lymphadenopathy:      Head:      Right side of head: No submental, submandibular, tonsillar, preauricular, posterior auricular or occipital adenopathy. Left side of head: No submental, submandibular, tonsillar, preauricular, posterior auricular or occipital adenopathy. Cervical: No cervical adenopathy. Right cervical: No superficial, deep or posterior cervical adenopathy. Left cervical: No superficial, deep or posterior cervical adenopathy. Skin:     General: Skin is warm and dry. Findings: No bruising, erythema or lesion. Neurological:      Mental Status: She is alert and oriented to person, place, and time.    Psychiatric:         Attention and Perception: Attention normal.         Mood and Affect: Mood normal. Mood is not anxious or depressed. Speech: Speech normal.         Assessment:       Diagnosis Orders   1. Chronic pansinusitis     2. Dysfunction of both eustachian tubes     3. Chronic eczematous otitis externa of both ears             Plan:      Chronic pansinusitis. Stable. Replace resule rinses with Xhance. Samples provided. ETD. Likely allergy related. Should improve with Xhance. Eczematous otitis externa. Stop ear cleaning. Add 3-4 drops of mineral oil daily. Follow up as needed.          Radha Ledesma MD

## 2022-03-16 NOTE — PATIENT INSTRUCTIONS
- STOP taking Allergy Relief D. Instead take Cetirizine alone for allergies  - START taking Flexeril as needed nightly  - Increase Zoloft to 50mg daily  - Call to schedule and appointment with your endocrinologist  - Go and get you labs done. Will call with the results  - Call to make an appointment with Dermatology.    - Follow up in 2 months

## 2022-03-22 ENCOUNTER — TELEPHONE (OUTPATIENT)
Dept: INTERNAL MEDICINE CLINIC | Age: 47
End: 2022-03-22

## 2022-03-25 ENCOUNTER — TELEMEDICINE (OUTPATIENT)
Dept: ENDOCRINOLOGY | Age: 47
End: 2022-03-25
Payer: COMMERCIAL

## 2022-03-25 ENCOUNTER — TELEPHONE (OUTPATIENT)
Dept: INTERNAL MEDICINE CLINIC | Age: 47
End: 2022-03-25

## 2022-03-25 DIAGNOSIS — E03.9 ACQUIRED HYPOTHYROIDISM: ICD-10-CM

## 2022-03-25 DIAGNOSIS — R73.03 PREDIABETES: Primary | ICD-10-CM

## 2022-03-25 PROCEDURE — G8427 DOCREV CUR MEDS BY ELIG CLIN: HCPCS | Performed by: INTERNAL MEDICINE

## 2022-03-25 PROCEDURE — 99214 OFFICE O/P EST MOD 30 MIN: CPT | Performed by: INTERNAL MEDICINE

## 2022-03-25 RX ORDER — LEVOTHYROXINE SODIUM 0.15 MG/1
TABLET ORAL
Qty: 30 TABLET | Refills: 3 | Status: SHIPPED | OUTPATIENT
Start: 2022-03-25 | End: 2022-09-08

## 2022-03-25 ASSESSMENT — ENCOUNTER SYMPTOMS
PHOTOPHOBIA: 0
COUGH: 0
BACK PAIN: 0

## 2022-03-25 NOTE — TELEPHONE ENCOUNTER
PT NEED A COPY OF HER IMMUNIZATION RECORDS  FAXED TO [address] [place], [place] [address]T ON A LETTERHEAD AND SIGNED BY AN MD OR AN RN. FAX # [phone].

## 2022-03-25 NOTE — PROGRESS NOTES
Patient was evaluated through a synchronous (real-time) audio-video  encounter. The patient (or guardian if applicable) is aware that this is a billable service, which includes applicable co-pays. This Virtual Visit was  conducted with patient's (and/or legal guardian's) consent. The visit was conducted pursuant to the emergency declaration under the [facility], [facility] authority and the [employer] Act. Patient identification was verified,  and a caregiver was present when appropriate. The patient was located in a state where the provider was licensed to provide care. Patient has a PMH of thyroid disease, anemia, obesity, pericarditis, GERD, autoimmune hepatitis. Interim:    Decrease energy all day  States feel like thyroid related  Cold intolerant  She had covid-19    Inquire about metformin and armour    Felt better on cytomel initially  Fatigue since on levothyroxine 125mcg    Has seen Dr. Sepulveda     She was diagnosed with Graves disease the at the age of 9 yrs  Had CARBALLO. Has been diagnosed with hypothyroidism since age of 3 yrs     Current thyroid medication: Levothyroxine 125mcg daily and cytomel 10 mcg daily. Dose of levothyroxine  has ranged from 112-150 in the past.    Takes it first thing in the morning on empty stomach- yes  Interfering medications including calcium, vitamin D , iron tablets, Proton pump inhibitors: no    9/20 TSH 8.3 FT4 0.7 FT3 2.2  6/21  TSH 4.99  A1c 5.8 %    Increase dose to 137 mcg    1/22 TSH 1.7    FH of hypothyroidism: Aunts and maternal grandmother. FH of thyroid cancer: no    She is feeling tired fatigued. Mild controlled   no worsening factors    Experiencing hair loss, dry skin. Pre-diabetes. A1c 5.7 % --->5.8%----->6.5%    Was stated on metformin 500 mg BID in 2019  Now off of it    Weight is stable.      No cycle for 2 years    She is having GI problems including IBS, Anal fissure.     Past Medical History:   Diagnosis Date    Abdominal wall abscess 2/8/2017    Abdominal wall cellulitis 2/6/2017    Anal fissure 4/30/2015    Anemia, iron deficiency     Anesthesia     PATIENT REQUESTING IF NEEDS TO BE INTUBATED TO USE A GURDEEP TUBE     Asthma     Atrial fibrillation [facility]     Autoimmune hepatitis ([facility]     Chronic sinusitis     COVID-19     History of blood transfusion     Hypothyroidism     Menorrhagia with regular cycle     Morbid obesity with BMI of 40.0-44.9, adult ([facility] 5/26/2015    MRSA (methicillin resistant staph aureus) culture positive 02/03/2017    abdominal abscess    MRSA infection 08/20/2012    rt thigh abscess    RODGER (obstructive sleep apnea)     awaiting cpap machine to be delivered not due until after surgery 2/8/22    Pericarditis, acute     Retention of urine     Sinusitis      Past Surgical History:   Procedure Laterality Date    COLONOSCOPY N/A 12/29/2020    COLONOSCOPY DIAGNOSTIC performed by Dafne Durant MD at [address] [place] [address]  8/22/2012    INCISION AND DRAINAGE POSTERIOR THIGH ABSCESS    PRE-MALIGNANT / BENIGN SKIN LESION EXCISION N/A 2/8/2022    EXCISION OF SCALP CYST performed by Jemima Chung MD at [facility]  Aug 2011    repair of rectal fissures and anal skin tag removal    SINUS SURGERY      X 3    TONSILLECTOMY  2004    TONSILLECTOMY AND ADENOIDECTOMY  2005    (partial adenoidectomy)    UPPER GASTROINTESTINAL ENDOSCOPY N/A 12/29/2020    EGD BIOPSY performed by Dafne Durant MD at [address] [place] [address]     Current Outpatient Medications   Medication Sig Dispense Refill    cyclobenzaprine (FLEXERIL) 5 MG tablet Take 1 tablet by mouth nightly as needed for Muscle spasms 10 tablet 0    cetirizine (ZYRTEC) 10 MG tablet Take 1 tablet by mouth daily 90 tablet 1    sertraline (ZOLOFT) 25 MG tablet Take 2 tablets by mouth daily 90 tablet 1    levothyroxine (SYNTHROID) 137 MCG tablet TAKE 1 TABLET BY MOUTH ONE TIME A DAY 30 tablet 2    BETASEPT SURGICAL SCRUB 4 % external liquid APPLY TO AFFECTED AREA(S) TOPICALLY AS NEEDED 473 mL 0    vitamin D (ERGOCALCIFEROL) 1.25 MG (40869 UT) CAPS capsule TAKE 1 CAPSULE BY MOUTH ONE TIME WEEKLY 6 capsule 0    mometasone (ELOCON) 0.1 % cream APPLY TO AFFECTED AREA(S) TOPICALLY ONCE DAILY 45 g 0    mupirocin (BACTROBAN) 2 % ointment APPLY TO AFFECTED AREA(S) TOPICALLY THREE TIMES A DAY 22 g 1    pantoprazole (PROTONIX) 40 MG tablet Take 1 tablet by mouth 2 times daily (before meals) 60 tablet 5    Handicap Placard MISC by Does not apply route Valid from today to 04/30/22 1 each 0    dilTIAZem (CARDIZEM CD) 120 MG extended release capsule TAKE 1 CAPSULE BY MOUTH ONE TIME A DAY 30 capsule 3    fluticasone (FLONASE) 50 MCG/ACT nasal spray APPLY 1 SPRAY IN THE AFFECTED NOSTRIL ONCE DAILY 16 g 3    ketoconazole (NIZORAL) 2 % shampoo APPLY TO AFFECTED AREA(S) ONCE DAILY AS NEEDED 737 mL 1    folic acid (FOLVITE) 1 MG tablet TAKE 1 TABLET BY MOUTH ONE TIME A DAY 30 tablet 3    cyanocobalamin 1000 MCG/ML injection INJECT 1ML INTO THE SKIN ONCE MONTHLY 1 mL 2    urea (CARMOL) 10 % cream APPLY TO AFFECTED AREA(S) TOPICALLY AS NEEDED 85 g 2    polyethylene glycol (MIRALAX) 17 GM/SCOOP POWD powder POUR 17 GRAMS OF POWDER INTO THE CAP OF THE BOTTLE. STIR THE POWDER IN A GLASS (4 TO 8 OZ) OF WATER, JUICE, SODA, COFFEE OR TEA UNTIL COMPLETELY DISSOLVED. DRINK THE SOLUTION.  ONCE DAILY AS NEEDED FOR CONSTIPATION 238 g 5    Saccharomyces boulardii (PROBIOTIC) 250 MG CAPS TAKE 1 CAPSULE BY MOUTH 2 TIMES A  capsule 1    budesonide-formoterol (SYMBICORT) 160-4.5 MCG/ACT AERO Inhale 2 puffs into the lungs 2 times daily 1 each 5    montelukast (SINGULAIR) 10 MG tablet Take one tablet by mouth every evening 30 tablet 5    albuterol sulfate  (90 Base) MCG/ACT inhaler INHALE 2 PUFF BY MOUTH EVERY 6 HOURS AS NEEDED FOR WHEEZING 1 Inhaler 5    liothyronine (CYTOMEL) 5 MCG tablet TAKE TWO TABLETS BY MOUTH DAILY 60 tablet 5    Insulin Syringe-Needle U-100 ([employer] INSULIN SYRINGE) 31G X 5/16\" 1 ML MISC AS DIRECTED 100 each 1    Multiple Vitamins-Minerals (THERAPEUTIC MULTIVITAMIN-MINERALS) tablet Take 1 tablet by mouth daily 30 tablet 1     No current facility-administered medications for this visit. Review of Systems   Constitutional: Negative for chills, diaphoresis and fever. Eyes: Negative for photophobia. Respiratory: Negative for cough. Cardiovascular: Negative for chest pain and palpitations. Endocrine: Negative for polydipsia. Genitourinary: Negative for dysuria, flank pain, frequency, hematuria and urgency. Musculoskeletal: Negative for back pain, myalgias and neck pain. Skin: Negative for rash. Allergic/Immunologic: Negative for environmental allergies. Neurological: Positive for headaches. Negative for dizziness, tremors and seizures. Hematological: Does not bruise/bleed easily. Psychiatric/Behavioral: Negative for hallucinations and suicidal ideas. The patient is not nervous/anxious. fatigue      PHYSICAL EXAMINATION:  [ INSTRUCTIONS:  \"[x]\" Indicates a positive item  \"[]\" Indicates a negative item  -- DELETE ALL ITEMS NOT EXAMINED]  Vital Signs: (As obtained by patient/caregiver or practitioner observation)    Blood pressure-  Heart rate-    Respiratory rate-    Temperature-  Pulse oximetry-     Constitutional Appears well-developed and well-nourished No apparent distress        Mental status  Alert and awake  Oriented to person/place/time  Able to follow commands      Eyes:  EOM    [x]  Normal    Sclera  [x]  Normal           Discharge [x]  None visible      HENT:   [x] Normocephalic, atraumatic.     [x] Mouth/Throat: Mucous membranes are moist.     External Ears [x] Normal  no discharge    Neck: [x] No visualized mass  no swelling    Pulmonary/Chest: [x] Respiratory effort normal.  [x] No visualized signs of difficulty breathing or respiratory distress             Musculoskeletal:   [x] Normal gait with no signs of ataxia         [x] Normal range of motion of neck          Head and neck stable, appears normal ROM, strength good    Neurological:        [x] No Facial Asymmetry (Cranial nerve 7 motor function) (limited exam to video visit)          [x] No gaze palsy                Skin:        [x] No significant exanthematous lesions or discoloration noted on facial skin                 Psychiatric:       [x] Normal Affect [x] No Hallucinations            Other pertinent observable physical exam findings-     Due to this being a TeleHealth encounter, evaluation of the following organ systems is limited: Vitals/Constitutional/EENT/Resp/CV/GI//MS/Neuro/Skin/Heme-Lymph-Imm. Services were provided through a video synchronous discussion virtually to substitute for in-person clinic visit. Lab Results   Component Value Date    TSH 8.28 (H) 09/17/2020    TSH 4.04 01/22/2019    TSH 3.50 10/23/2018    TSH 0.67 11/26/2016    TSH 2.35 04/28/2014       No visits with results within 1 Day(s) from this visit. Latest known visit with results is:   Admission on 02/08/2022, Discharged on 02/08/2022   Component Date Value Ref Range Status    Pregnancy, Urine 02/08/2022 Negative  Detects HCG level >25 MIU/mL Final    Comment: Note:  Always repeat results in question with a serum  quantitative pregnancy test. A serum hCG is positive  2-5 days before the urine hCG test.           Assessment/Plan    1. Hypothyroidism    This is a 53 yrs old female who has PMH of postablative hypothyroidism . She is on levothyroxine 137 mcg daily and cytomel 10 mcg daily. C/o fatigue. Last TSH normal  Advised may not be related to thyroid but can try higher levothyroxine dose  She is inquiring about armour, advised can plan to switch if symptoms do not improve    2. Pre-diabetes   Reviewed life style changes. A1c 5.7 %---> 5.7 % ---> 6.5%  She was on steroid for 2 months s/p covid-19    Was  on metformin 500 mg BID for 2 months and then stopped it as it was not working    She has made diet changes    Recheck A1c before next visit, may consider GLP-1 agonist     3. Anemia. Iron deficiency  As per hematology    4. Asthma. As per pulmonology    5. Chronic Sinusitis. As per ENT   Dr. Deedee Estrella     7. Obesity.  She requested referral to weight management team. Sees Dr. Virginie Iyer

## 2022-04-01 DIAGNOSIS — L30.9 ECZEMA, UNSPECIFIED TYPE: ICD-10-CM

## 2022-04-01 DIAGNOSIS — E06.3 HYPOTHYROIDISM DUE TO HASHIMOTO'S THYROIDITIS: ICD-10-CM

## 2022-04-01 DIAGNOSIS — E03.8 HYPOTHYROIDISM DUE TO HASHIMOTO'S THYROIDITIS: ICD-10-CM

## 2022-04-01 DIAGNOSIS — J45.41 MODERATE PERSISTENT ASTHMA WITH EXACERBATION: ICD-10-CM

## 2022-04-01 DIAGNOSIS — K90.9 INTESTINAL MALABSORPTION, UNSPECIFIED TYPE: Chronic | ICD-10-CM

## 2022-04-01 DIAGNOSIS — D50.0 IRON DEFICIENCY ANEMIA DUE TO CHRONIC BLOOD LOSS: ICD-10-CM

## 2022-04-01 DIAGNOSIS — E55.9 VITAMIN D DEFICIENCY: ICD-10-CM

## 2022-04-01 RX ORDER — CYANOCOBALAMIN 1000 UG/ML
INJECTION INTRAMUSCULAR; SUBCUTANEOUS
Qty: 1 ML | Refills: 2 | Status: SHIPPED | OUTPATIENT
Start: 2022-04-01

## 2022-04-01 RX ORDER — KETOCONAZOLE 20 MG/ML
SHAMPOO TOPICAL
Qty: 120 ML | Refills: 1 | Status: SHIPPED | OUTPATIENT
Start: 2022-04-01

## 2022-04-01 RX ORDER — UREA 10 %
LOTION (ML) TOPICAL
Qty: 85 G | Refills: 2 | Status: SHIPPED | OUTPATIENT
Start: 2022-04-01

## 2022-04-01 RX ORDER — ANTISEPTIC SURGICAL SCRUB 0.04 MG/ML
SOLUTION TOPICAL
Qty: 473 ML | Refills: 0 | Status: SHIPPED | OUTPATIENT
Start: 2022-04-01

## 2022-04-01 RX ORDER — ALBUTEROL SULFATE 90 UG/1
AEROSOL, METERED RESPIRATORY (INHALATION)
Qty: 18 EACH | Refills: 5 | Status: SHIPPED | OUTPATIENT
Start: 2022-04-01

## 2022-04-01 RX ORDER — LIOTHYRONINE SODIUM 5 UG/1
TABLET ORAL
Qty: 60 TABLET | Refills: 5 | Status: SHIPPED | OUTPATIENT
Start: 2022-04-01

## 2022-04-01 RX ORDER — ERGOCALCIFEROL 1.25 MG/1
CAPSULE ORAL
Qty: 6 CAPSULE | Refills: 0 | Status: SHIPPED | OUTPATIENT
Start: 2022-04-01

## 2022-04-01 RX ORDER — MOMETASONE FUROATE 1 MG/G
CREAM TOPICAL
Qty: 45 G | Refills: 0 | Status: SHIPPED | OUTPATIENT
Start: 2022-04-01

## 2022-04-01 NOTE — TELEPHONE ENCOUNTER
LOV- 03/25/22  NOV- none        Requested Prescriptions     Pending Prescriptions Disp Refills    liothyronine (CYTOMEL) 5 MCG tablet [Pharmacy Med Name: LIOTHYRONINE SODIUM 5MCG TABS] 60 tablet 5     Sig: TAKE TWO TABLETS BY MOUTH EVERY DAY

## 2022-04-04 ENCOUNTER — TELEPHONE (OUTPATIENT)
Dept: PULMONOLOGY | Age: 47
End: 2022-04-04

## 2022-04-08 NOTE — TELEPHONE ENCOUNTER
Patient called with a question about her Ciprofloxacin RX. She states she has been hospitalized with pneumonia since 1/5/22 and receiving IV antibiotics and was wondering how she proceed with her oral antibiotic once discharged. Please advise. Speech Therapy    Patient not seen in therapy today.    Unavailable due to medical tests/procedures.      Additional details:  Patient to go to OR/IR for procedure.         OBJECTIVE                     Therapy procedure time and total treatment time can be found documented on the Time Entry flowsheet

## 2022-05-25 ENCOUNTER — TELEPHONE (OUTPATIENT)
Dept: INTERNAL MEDICINE CLINIC | Age: 47
End: 2022-05-25

## 2022-05-25 NOTE — TELEPHONE ENCOUNTER
Returned call to Nixon Ramirez at [address] [facility] advised patient no longer needs this medication per Dr. Marina Wyatt

## 2022-05-25 NOTE — TELEPHONE ENCOUNTER
WILLIE FROM [facility] PHARM NEED FREQUENCY ON LIDOCAINE VISCOUS 2% SOLUTION.  [facility] 30 [phone]

## 2022-07-08 ENCOUNTER — APPOINTMENT (OUTPATIENT)
Dept: GENERAL RADIOLOGY | Age: 47
End: 2022-07-08
Payer: COMMERCIAL

## 2022-07-08 ENCOUNTER — HOSPITAL ENCOUNTER (EMERGENCY)
Age: 47
Discharge: HOME | End: 2022-07-08
Payer: COMMERCIAL

## 2022-07-08 VITALS
BODY MASS INDEX: 38.27 KG/M2 | OXYGEN SATURATION: 95 % | RESPIRATION RATE: 20 BRPM | HEART RATE: 88 BPM | DIASTOLIC BLOOD PRESSURE: 86 MMHG | SYSTOLIC BLOOD PRESSURE: 143 MMHG | WEIGHT: 216 LBS | TEMPERATURE: 98.2 F | HEIGHT: 63 IN

## 2022-07-08 DIAGNOSIS — M25.562 ACUTE PAIN OF LEFT KNEE: Primary | ICD-10-CM

## 2022-07-08 DIAGNOSIS — M25.462 EFFUSION OF LEFT KNEE: ICD-10-CM

## 2022-07-08 PROCEDURE — 73564 X-RAY EXAM KNEE 4 OR MORE: CPT

## 2022-07-08 PROCEDURE — 99283 EMERGENCY DEPT VISIT LOW MDM: CPT

## 2022-07-08 RX ORDER — LIDOCAINE 50 MG/G
1 PATCH TOPICAL DAILY
Qty: 30 PATCH | Refills: 0 | Status: SHIPPED | OUTPATIENT
Start: 2022-07-08 | End: 2022-09-02 | Stop reason: ALTCHOICE

## 2022-07-08 RX ORDER — METHYLPREDNISOLONE 4 MG/1
2 TABLET ORAL DAILY
Qty: 3 TABLET | Refills: 0 | Status: SHIPPED | OUTPATIENT
Start: 2022-07-08 | End: 2022-07-14

## 2022-07-08 ASSESSMENT — ENCOUNTER SYMPTOMS
SHORTNESS OF BREATH: 0
COLOR CHANGE: 0
BACK PAIN: 0
ABDOMINAL PAIN: 0

## 2022-07-09 NOTE — ED PROVIDER NOTES
[address] [facility]        Pt Name: Bigg Pitts  MRN: [id number]  [place] 1975  Date of evaluation: 7/8/2022  Provider: Sara Luna PA-C  PCP: Tana Umaña DO  Note Started: 10:45 PM EDT       JALEN. I have evaluated this patient. My supervising physician was available for consultation. CHIEF COMPLAINT       Chief Complaint   Patient presents with    Knee Pain     Patient states she has left knee pain and swellin that began four days ago. Patient states she placed a lidocaine patch on the knee with no relief. Patient denies injury. HISTORY OF PRESENT ILLNESS   (Location, Timing/Onset, Context/Setting, Quality, Duration, Modifying Factors, Severity, Associated Signs and Symptoms)  Note limiting factors. Chief Complaint: Left knee pain    Bigg Pitts is a 48 y.o. female who presents to the emergency department complaining of left knee pain without any known injury for the past 4 days. Pain is progressively worsened. Patient walked into the emergency department wearing high heels. 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 She denies any blunt trauma or fall. Denies numbness, tingling or weakness. Developed some swelling medially over the past few days. Does not have a history of gout. Denies any acute skin changes, leg swelling, posterior calf or thigh tenderness, chest pain or shortness of breath. Nursing Notes were all reviewed and agreed with or any disagreements were addressed in the HPI. REVIEW OF SYSTEMS    (2-9 systems for level 4, 10 or more for level 5)     Review of Systems   Constitutional: Negative for chills and fever. Respiratory: Negative for shortness of breath. Cardiovascular: Negative for chest pain, palpitations and leg swelling. Gastrointestinal: Negative for abdominal pain. Musculoskeletal: Positive for myalgias.  Negative for back pain, neck pain and neck stiffness. Skin: Negative for color change, pallor, rash and wound. Neurological: Negative for weakness and numbness. All other systems reviewed and are negative. Positives and Pertinent negatives as per HPI. Except as noted above in the ROS, all other systems were reviewed and negative.        PAST MEDICAL HISTORY     Past Medical History:   Diagnosis Date    Abdominal wall abscess 2/8/2017    Abdominal wall cellulitis 2/6/2017    Anal fissure 4/30/2015    Anemia, iron deficiency     Anesthesia     PATIENT REQUESTING IF NEEDS TO BE INTUBATED TO USE A GURDEEP TUBE     Asthma     Atrial fibrillation ([facility]     Autoimmune hepatitis ([facility]     Chronic sinusitis     COVID-19     History of blood transfusion     Hypothyroidism     Menorrhagia with regular cycle     Morbid obesity with BMI of 40.0-44.9, adult ([facility] 5/26/2015    MRSA (methicillin resistant staph aureus) culture positive 02/03/2017    abdominal abscess    MRSA infection 08/20/2012    rt thigh abscess    RODGER (obstructive sleep apnea)     awaiting cpap machine to be delivered not due until after surgery 2/8/22    Pericarditis, acute     Retention of urine     Sinusitis          SURGICAL HISTORY     Past Surgical History:   Procedure Laterality Date    COLONOSCOPY N/A 12/29/2020    COLONOSCOPY DIAGNOSTIC performed by Juliette Hernández MD at [address] [place] [address]  8/22/2012    INCISION AND DRAINAGE POSTERIOR THIGH ABSCESS    PRE-MALIGNANT / BENIGN SKIN LESION EXCISION N/A 2/8/2022    EXCISION OF SCALP CYST performed by Vale Leonardo MD at [facility]  Aug 2011    repair of rectal fissures and anal skin tag removal    SINUS SURGERY      X 3    TONSILLECTOMY  2004    TONSILLECTOMY AND ADENOIDECTOMY  2005    (partial adenoidectomy)    UPPER GASTROINTESTINAL ENDOSCOPY N/A 12/29/2020    EGD BIOPSY performed by Juliette Hernández MD at [address] [place] [address] CURRENTMEDICATIONS       Previous Medications    ALBUTEROL SULFATE HFA (VENTOLIN HFA) 108 (90 BASE) MCG/ACT INHALER    INHALE 2 PUFF BY MOUTH EVERY 6 HOURS AS NEEDED FOR WHEEZING    BETASEPT SURGICAL SCRUB 4 % EXTERNAL LIQUID    APPLY TO AFFECTED AREA(S) TOPICALLY AS NEEDED    BUDESONIDE-FORMOTEROL (SYMBICORT) 160-4.5 MCG/ACT AERO    Inhale 2 puffs into the lungs 2 times daily    CETIRIZINE (ZYRTEC) 10 MG TABLET    Take 1 tablet by mouth daily    CYANOCOBALAMIN 1000 MCG/ML INJECTION    INJECT 1ML INTO THE SKIN ONCE MONTHLY    DILTIAZEM (CARDIZEM CD) 120 MG EXTENDED RELEASE CAPSULE    TAKE 1 CAPSULE BY MOUTH ONE TIME A DAY    FLUTICASONE (FLONASE) 50 MCG/ACT NASAL SPRAY    APPLY 1 SPRAY IN THE AFFECTED NOSTRIL ONCE DAILY    FOLIC ACID (FOLVITE) 1 MG TABLET    TAKE 1 TABLET BY MOUTH ONE TIME A DAY    HANDICAP PLACARD MISC    by Does not apply route Valid from today to 04/30/22    INSULIN SYRINGE-NEEDLE U-100 (ULTICARE INSULIN SYRINGE) 31G X 5/16\" 1 ML MISC    AS DIRECTED    KETOCONAZOLE (NIZORAL) 2 % SHAMPOO    APPLY TO AFFECTED AREA(S) ONCE DAILY AS NEEDED    LEVOTHYROXINE (SYNTHROID) 150 MCG TABLET    TAKE 1 TABLET BY MOUTH ONE TIME A DAY    LIOTHYRONINE (CYTOMEL) 5 MCG TABLET    TAKE TWO TABLETS BY MOUTH EVERY DAY    MOMETASONE (ELOCON) 0.1 % CREAM    APPLY TO AFFECTED AREA(S) ONCE DAILY    MONTELUKAST (SINGULAIR) 10 MG TABLET    Take one tablet by mouth every evening    MULTIPLE VITAMINS-MINERALS (THERAPEUTIC MULTIVITAMIN-MINERALS) TABLET    Take 1 tablet by mouth daily    MUPIROCIN (BACTROBAN) 2 % OINTMENT    APPLY TO AFFECTED AREA(S) TOPICALLY THREE TIMES A DAY    PANTOPRAZOLE (PROTONIX) 40 MG TABLET    Take 1 tablet by mouth 2 times daily (before meals)    POLYETHYLENE GLYCOL (MIRALAX) 17 GM/SCOOP POWD POWDER    POUR 17 GRAMS OF POWDER INTO THE CAP OF THE BOTTLE. STIR THE POWDER IN A GLASS (4 TO 8 OZ) OF WATER, JUICE, SODA, COFFEE OR TEA UNTIL COMPLETELY DISSOLVED. DRINK THE SOLUTION.  ONCE DAILY AS NEEDED FOR CONSTIPATION    SACCHAROMYCES BOULARDII (PROBIOTIC) 250 MG CAPS    TAKE 1 CAPSULE BY MOUTH 2 TIMES A DAY    SERTRALINE (ZOLOFT) 25 MG TABLET    Take 2 tablets by mouth daily    UREA (UREA 10 HYDRATING) 10 % CREAM    APPLY TO AFFECTED AREA(S) AS NEEDED    VITAMIN D (ERGOCALCIFEROL) 1.25 MG (21578 UT) CAPS CAPSULE    TAKE 1 CAPSULE BY MOUTH ONE TIME WEEKLY         ALLERGIES     Latex, Clindamycin/lincomycin, Sulfa antibiotics, and Diflucan [fluconazole]    FAMILYHISTORY       Family History   Problem Relation Age of Onset    High Blood Pressure Mother     Hypertension Mother    Eveline Yefri Elevated Lipids Mother     Other Mother         fatty liver    Heart Disease Father     Hypertension Father     Elevated Lipids Father     Coronary Art Dis Father     Liver Disease Father     Diabetes Maternal Aunt           SOCIAL HISTORY       Social History     Tobacco Use    Smoking status: Never Smoker    Smokeless tobacco: Never Used   Vaping Use    Vaping Use: Never used   Substance Use Topics    Alcohol use: Yes     Alcohol/week: 0.0 standard drinks     Comment: 2 drinks/week     Drug use: No       SCREENINGS    [place] Coma Scale  Eye Opening: Spontaneous  Best Verbal Response: Oriented  Best Motor Response: Obeys commands  [place] Coma Scale Score: 15        PHYSICAL EXAM    (up to 7 for level 4, 8 or more for level 5)     ED Triage Vitals [07/08/22 2225]   BP Temp Temp Source Heart Rate Resp SpO2 Height Weight   (!) 143/86 98.2 °F (36.8 °C) Oral 88 20 95 % 5' 3\" (1.6 m) 216 lb (98 kg)       Physical Exam  Vitals and nursing note reviewed. Constitutional:       Appearance: She is well-developed. She is not diaphoretic. HENT:      Head: Normocephalic and atraumatic. Right Ear: External ear normal.      Left Ear: External ear normal.      Nose: Nose normal.      Mouth/Throat:      Mouth: Mucous membranes are moist.      Pharynx: Oropharynx is clear.    Eyes:      General:         Right eye: No discharge. Left eye: No discharge. Cardiovascular:      Rate and Rhythm: Normal rate. Pulses:           Femoral pulses are 2+ on the right side and 2+ on the left side. Dorsalis pedis pulses are 2+ on the right side and 2+ on the left side. Posterior tibial pulses are 2+ on the right side and 2+ on the left side. Pulmonary:      Effort: Pulmonary effort is normal.      Breath sounds: Normal breath sounds. Abdominal:      General: Bowel sounds are normal.      Palpations: Abdomen is soft. Tenderness: There is no abdominal tenderness. Musculoskeletal:         General: Normal range of motion. Cervical back: Normal range of motion. Right hip: Normal.      Left hip: Normal.      Right upper leg: Normal.      Left upper leg: Normal.      Right knee: Normal.      Left knee: Swelling (medial aspect) present. No deformity, effusion, erythema, ecchymosis, lacerations, bony tenderness or crepitus. Normal range of motion. Tenderness present over the medial joint line. No lateral joint line, MCL, LCL, ACL, PCL or patellar tendon tenderness. No LCL laxity, MCL laxity, ACL laxity or PCL laxity. Normal alignment, normal meniscus and normal patellar mobility. Normal pulse. Right lower leg: Normal.      Left lower leg: Normal.      Right ankle: Normal.      Right Achilles Tendon: Normal.      Left ankle: Normal.      Left Achilles Tendon: Normal.      Right foot: Normal.      Left foot: Normal.   Skin:     General: Skin is warm and dry. Capillary Refill: Capillary refill takes less than 2 seconds. Coloration: Skin is not jaundiced or pale. Findings: No bruising, erythema, lesion or rash. Neurological:      Mental Status: She is alert and oriented to person, place, and time. Sensory: No sensory deficit. Motor: No weakness.       Deep Tendon Reflexes: Reflexes normal.   Psychiatric:         Behavior: Behavior normal.         DIAGNOSTIC RESULTS is indicated. She is able to bear weight and ambulate. She is advised to follow-up with PCP and orthopedic referral for recheck and may return to ED per discharge instructions. Continue cryotherapy and elevation. My suspicion is low for subungual hematoma, paronychia, eponychia, felon, flexor tenosynovitis, PE, foreign body, tendon rupture, compartment syndrome, acute fracture, dislocation, DVT, arterial compromise or occlusion, limb ischemia, gout, septic joint, abscess, cellulitis, osteomyelitis, gonococcal arthritis, SCFE, avascular necrosis, Osgood-Schlatter syndrome, or other concerning pathology. Differentials include but not limited to strain, sprain, arthritis, bursitis, tendinitis, contusion, pes anserinus injury, ligamentous injury, meniscus injury. FINAL IMPRESSION      1. Acute pain of left knee    2. Effusion of left knee          DISPOSITION/PLAN   DISPOSITION Decision To Discharge 07/08/2022 11:21:25 PM      PATIENT REFERRED TO:  Lily Vaca DO  [facility]  [phone]    In 3 days      [facility] Emergency Department  [address] [facility]  [phone]    If symptoms worsen    Baltazar Bryan MD  [address]  [phone]      for orthopedic consultation      DISCHARGE MEDICATIONS:  New Prescriptions    LIDOCAINE (LIDODERM) 5 %    Place 1 patch onto the skin daily 12 hours on, 12 hours off.     METHYLPREDNISOLONE (MEDROL) 4 MG TABLET    Take 0.5 tablets by mouth daily for 6 days       DISCONTINUED MEDICATIONS:  Discontinued Medications    No medications on file              (Please note that portions of this note were completed with a voice recognition program.  Efforts were made to edit the dictations but occasionally words are mis-transcribed.)    Bryce Gerard PA-C (electronically signed)           Bryce Gerard PA-C  07/08/22 7841

## 2022-07-14 ENCOUNTER — OFFICE VISIT (OUTPATIENT)
Dept: ORTHOPEDIC SURGERY | Age: 47
End: 2022-07-14

## 2022-07-14 VITALS — BODY MASS INDEX: 38.27 KG/M2 | HEIGHT: 63 IN | WEIGHT: 216 LBS

## 2022-07-14 DIAGNOSIS — M25.562 LEFT KNEE PAIN, UNSPECIFIED CHRONICITY: Primary | ICD-10-CM

## 2022-07-14 PROCEDURE — G8427 DOCREV CUR MEDS BY ELIG CLIN: HCPCS | Performed by: ORTHOPAEDIC SURGERY

## 2022-07-14 PROCEDURE — 1036F TOBACCO NON-USER: CPT | Performed by: ORTHOPAEDIC SURGERY

## 2022-07-14 PROCEDURE — 99204 OFFICE O/P NEW MOD 45 MIN: CPT | Performed by: ORTHOPAEDIC SURGERY

## 2022-07-14 PROCEDURE — G8417 CALC BMI ABV UP PARAM F/U: HCPCS | Performed by: ORTHOPAEDIC SURGERY

## 2022-07-14 RX ORDER — METHYLPREDNISOLONE 4 MG/1
TABLET ORAL
Qty: 1 KIT | Refills: 0 | Status: SHIPPED | OUTPATIENT
Start: 2022-07-14 | End: 2022-07-20

## 2022-07-14 SDOH — HEALTH STABILITY: PHYSICAL HEALTH: ON AVERAGE, HOW MANY DAYS PER WEEK DO YOU ENGAGE IN MODERATE TO STRENUOUS EXERCISE (LIKE A BRISK WALK)?: 4 DAYS

## 2022-07-14 SDOH — HEALTH STABILITY: PHYSICAL HEALTH: ON AVERAGE, HOW MANY MINUTES DO YOU ENGAGE IN EXERCISE AT THIS LEVEL?: 30 MIN

## 2022-07-14 NOTE — Clinical Note
Dear Dr. Jolene Nieves,  I had the pleasure of evaluating your patient to my office today. Please see the attached note for full details of the visit.   With kind regards, Davie Saldana MD

## 2022-07-14 NOTE — LETTER
[facility]  [address] [place] [address]  Phone: [phone]  Fax: [phone]    Claude Richters, MD         July 14, 2022     Patient: Meenakshi Jenkins   YOB: 1975   Date of Visit: 7/14/2022       To Whom It May Concern: It is my medical opinion that Meenakshi Jenkins requires a disability parking placard for the following reasons:  She has limited walking ability due to an orthopedic condition. Duration of need: 3 months    If you have any questions or concerns, please don't hesitate to call.     Sincerely,             Claude Richters, MD

## 2022-07-15 DIAGNOSIS — F41.9 ANXIETY: ICD-10-CM

## 2022-07-15 DIAGNOSIS — E03.8 HYPOTHYROIDISM DUE TO HASHIMOTO'S THYROIDITIS: ICD-10-CM

## 2022-07-15 DIAGNOSIS — E06.3 HYPOTHYROIDISM DUE TO HASHIMOTO'S THYROIDITIS: ICD-10-CM

## 2022-07-18 NOTE — PROGRESS NOTES
7/14/2022     Reason for visit:  Left knee pain following injury    History of Present Illness: The patient is a 53-year-old female who presents for evaluation of her left knee. She reports injuring her self on 7/8/2022. She thinks he may have twisted her knee. She felt a popping sensation followed by significant pain. Ever since then she has had difficulty bearing weight. She reports some swelling. She also reports some catching and locking.     Medical History:  Past Medical History:   Diagnosis Date    Abdominal wall abscess 2/8/2017    Abdominal wall cellulitis 2/6/2017    Anal fissure 4/30/2015    Anemia, iron deficiency     Anesthesia     PATIENT REQUESTING IF NEEDS TO BE INTUBATED TO USE A GURDEEP TUBE     Asthma     Atrial fibrillation ([facility]     Autoimmune hepatitis ([address]     Chronic sinusitis     COVID-19     History of blood transfusion     Hypothyroidism     Menorrhagia with regular cycle     Morbid obesity with BMI of 40.0-44.9, adult ([facility] [address] 5/26/2015    MRSA (methicillin resistant staph aureus) culture positive 02/03/2017    abdominal abscess    MRSA infection 08/20/2012    rt thigh abscess    RODGER (obstructive sleep apnea)     awaiting cpap machine to be delivered not due until after surgery 2/8/22    Pericarditis, acute     Retention of urine     Sinusitis       Past Surgical History:   Procedure Laterality Date    COLONOSCOPY N/A 12/29/2020    COLONOSCOPY DIAGNOSTIC performed by Shaan Sanchez MD at [address] [facility]  8/22/2012    INCISION AND DRAINAGE POSTERIOR THIGH ABSCESS    PRE-MALIGNANT / BENIGN SKIN LESION EXCISION N/A 2/8/2022    EXCISION OF SCALP CYST performed by Lakeisha Andrade MD at [address] [facility]  Aug 2011    repair of rectal fissures and anal skin tag removal    SINUS SURGERY      X 3    TONSILLECTOMY  2004    TONSILLECTOMY AND ADENOIDECTOMY  2005    (partial adenoidectomy)    UPPER GASTROINTESTINAL ENDOSCOPY N/A 12/29/2020    EGD BIOPSY albuterol sulfate HFA (VENTOLIN HFA) 108 (90 Base) MCG/ACT inhaler INHALE 2 PUFF BY MOUTH EVERY 6 HOURS AS NEEDED FOR WHEEZING 18 each 5    BETASEPT SURGICAL SCRUB 4 % external liquid APPLY TO AFFECTED AREA(S) TOPICALLY AS NEEDED 473 mL 0    mometasone (ELOCON) 0.1 % cream APPLY TO AFFECTED AREA(S) ONCE DAILY 45 g 0    vitamin D (ERGOCALCIFEROL) 1.25 MG (89372 UT) CAPS capsule TAKE 1 CAPSULE BY MOUTH ONE TIME WEEKLY 6 capsule 0    urea (UREA 10 HYDRATING) 10 % cream APPLY TO AFFECTED AREA(S) AS NEEDED 85 g 2    levothyroxine (SYNTHROID) 150 MCG tablet TAKE 1 TABLET BY MOUTH ONE TIME A DAY 30 tablet 3    cetirizine (ZYRTEC) 10 MG tablet Take 1 tablet by mouth daily 90 tablet 1    sertraline (ZOLOFT) 25 MG tablet Take 2 tablets by mouth daily (Patient not taking: Reported on 7/8/2022) 90 tablet 1    mupirocin (BACTROBAN) 2 % ointment APPLY TO AFFECTED AREA(S) TOPICALLY THREE TIMES A DAY 22 g 1    pantoprazole (PROTONIX) 40 MG tablet Take 1 tablet by mouth 2 times daily (before meals) 60 tablet 5    Handicap Placard MISC by Does not apply route Valid from today to 04/30/22 1 each 0    dilTIAZem (CARDIZEM CD) 120 MG extended release capsule TAKE 1 CAPSULE BY MOUTH ONE TIME A DAY 30 capsule 3    fluticasone (FLONASE) 50 MCG/ACT nasal spray APPLY 1 SPRAY IN THE AFFECTED NOSTRIL ONCE DAILY 16 g 3    folic acid (FOLVITE) 1 MG tablet TAKE 1 TABLET BY MOUTH ONE TIME A DAY 30 tablet 3    polyethylene glycol (MIRALAX) 17 GM/SCOOP POWD powder POUR 17 GRAMS OF POWDER INTO THE CAP OF THE BOTTLE. STIR THE POWDER IN A GLASS (4 TO 8 OZ) OF WATER, JUICE, SODA, COFFEE OR TEA UNTIL COMPLETELY DISSOLVED. DRINK THE SOLUTION.  ONCE DAILY AS NEEDED FOR CONSTIPATION 238 g 5    Saccharomyces boulardii (PROBIOTIC) 250 MG CAPS TAKE 1 CAPSULE BY MOUTH 2 TIMES A  capsule 1    budesonide-formoterol (SYMBICORT) 160-4.5 MCG/ACT AERO Inhale 2 puffs into the lungs 2 times daily 1 each 5    montelukast (SINGULAIR) 10 MG tablet Take one tablet by symmetric reflexes     Imagin view x-rays of the left knee were obtained the office today on 2022 and reviewed. There is no fracture or dislocation. Very subtle early joint space narrowing the patellofemoral joint is noted. Assessment:  Left knee pain following injury. Concern for medial meniscus tear    Plan:  I discussed with the patient the differential diagnosis. Given the acuity of her injury combined with her mechanical symptoms and difficulty bearing weight we will obtain an MRI. She is in agreement. Following that study she will return to review the results. We also gave a Medrol Dosepak. Lastly, we gave a handicap placard for 3 months. Return following the MRI to review. Greater than 45 minutes were spent with this encounter. Time spent included evaluating the patient's chart prior to arrival.  Evaluating the patient in the office including history, physical examination, imaging reviewing, and counseling on next steps. Lastly, time was spent discussing orders with my staff as well as providing documentation in the chart. Radha Jo MD            Orthopaedic Surgery Sports Medicine and [address] [facility] and [address] [facility]            Team Physician [facility] ([place]      Disclaimer: This note was dictated with voice recognition software. Though review and correction are routine, we apologize for any errors.

## 2022-07-21 ENCOUNTER — HOSPITAL ENCOUNTER (EMERGENCY)
Age: 47
Discharge: HOME | End: 2022-07-21
Payer: COMMERCIAL

## 2022-07-21 ENCOUNTER — APPOINTMENT (OUTPATIENT)
Dept: GENERAL RADIOLOGY | Age: 47
End: 2022-07-21
Payer: COMMERCIAL

## 2022-07-21 VITALS
SYSTOLIC BLOOD PRESSURE: 136 MMHG | HEART RATE: 87 BPM | DIASTOLIC BLOOD PRESSURE: 86 MMHG | TEMPERATURE: 98.2 F | OXYGEN SATURATION: 97 % | WEIGHT: 222.88 LBS | BODY MASS INDEX: 39.49 KG/M2 | HEIGHT: 63 IN | RESPIRATION RATE: 20 BRPM

## 2022-07-21 DIAGNOSIS — S69.92XA HAND INJURY, LEFT, INITIAL ENCOUNTER: Primary | ICD-10-CM

## 2022-07-21 PROCEDURE — 99283 EMERGENCY DEPT VISIT LOW MDM: CPT

## 2022-07-21 PROCEDURE — 6370000000 HC RX 637 (ALT 250 FOR IP): Performed by: NURSE PRACTITIONER

## 2022-07-21 PROCEDURE — 73130 X-RAY EXAM OF HAND: CPT

## 2022-07-21 RX ORDER — ACETAMINOPHEN 500 MG
1000 TABLET ORAL ONCE
Status: COMPLETED | OUTPATIENT
Start: 2022-07-21 | End: 2022-07-21

## 2022-07-21 RX ORDER — TRAMADOL HYDROCHLORIDE 50 MG/1
50 TABLET ORAL EVERY 8 HOURS PRN
Qty: 9 TABLET | Refills: 0 | Status: SHIPPED | OUTPATIENT
Start: 2022-07-21 | End: 2022-07-24

## 2022-07-21 RX ORDER — ACETAMINOPHEN 500 MG
500 TABLET ORAL 4 TIMES DAILY PRN
Qty: 28 TABLET | Refills: 0 | Status: SHIPPED | OUTPATIENT
Start: 2022-07-21 | End: 2022-07-28

## 2022-07-21 RX ORDER — IBUPROFEN 600 MG/1
600 TABLET ORAL 3 TIMES DAILY PRN
Qty: 15 TABLET | Refills: 0 | Status: SHIPPED | OUTPATIENT
Start: 2022-07-21 | End: 2022-09-02 | Stop reason: ALTCHOICE

## 2022-07-21 RX ADMIN — IBUPROFEN 600 MG: 200 TABLET, FILM COATED ORAL at 18:04

## 2022-07-21 RX ADMIN — ACETAMINOPHEN 1000 MG: 500 TABLET ORAL at 18:04

## 2022-07-21 ASSESSMENT — PAIN DESCRIPTION - PAIN TYPE: TYPE: ACUTE PAIN

## 2022-07-21 ASSESSMENT — PAIN DESCRIPTION - LOCATION
LOCATION: FINGER (COMMENT WHICH ONE)
LOCATION: HAND

## 2022-07-21 ASSESSMENT — PAIN DESCRIPTION - FREQUENCY: FREQUENCY: CONTINUOUS

## 2022-07-21 ASSESSMENT — PAIN DESCRIPTION - DESCRIPTORS
DESCRIPTORS: ACHING
DESCRIPTORS: ACHING

## 2022-07-21 ASSESSMENT — PAIN - FUNCTIONAL ASSESSMENT: PAIN_FUNCTIONAL_ASSESSMENT: 0-10

## 2022-07-21 ASSESSMENT — PAIN SCALES - GENERAL
PAINLEVEL_OUTOF10: 9
PAINLEVEL_OUTOF10: 8

## 2022-07-21 ASSESSMENT — PAIN DESCRIPTION - ORIENTATION: ORIENTATION: LEFT

## 2022-07-21 NOTE — ED NOTES
Harish taped injured finger to middle finger and applied finger splint.  Pt tolerated well      Hernandez Gonzales RN  07/21/22 9530

## 2022-07-21 NOTE — ED PROVIDER NOTES
[address]  [address] [address]  Dept: [phone]  Loc: [address] ENCOUNTER        This patient was not seen or evaluated by the attending physician. I evaluated this patient, the attending physician was available for consultation. CHIEF COMPLAINT    Chief Complaint   Patient presents with    Hand Injury       HPI    Martin Mathews is a 48 y.o. nontoxic, well-appearing, mildly distressed female who presents with left hand/index finger pain. The onset was 2 days ago. The duration has been constant since the onset. The quality of the pain is \"aching\" and the severity is  8/10. The context is that the symptoms started after the patient attempted to  an industrial size trash bag and door into a trash band. He is right-hand dominant. Denies nausea, vomiting or fever, chills, sweats, loss of sensation in her hand or fingers, wrist, elbow, or shoulder pain, or other injuries/concerns.     REVIEW OF SYSTEMS    General: No fever or chills  Cardiac: No chest pain  Pulmonary: No shortness of breath  Musculoskeletal: see HPI      PAST MEDICAL & SURGICAL HISTORY    Past Medical History:   Diagnosis Date    Abdominal wall abscess 2/8/2017    Abdominal wall cellulitis 2/6/2017    Anal fissure 4/30/2015    Anemia, iron deficiency     Anesthesia     PATIENT REQUESTING IF NEEDS TO BE INTUBATED TO USE A GURDEEP TUBE     Asthma     Atrial fibrillation ([address]     Autoimmune hepatitis ([address]     Chronic sinusitis     COVID-19     History of blood transfusion     Hypothyroidism     Menorrhagia with regular cycle     Morbid obesity with BMI of 40.0-44.9, adult ([address] 5/26/2015    MRSA (methicillin resistant staph aureus) culture positive 02/03/2017    abdominal abscess    MRSA infection 08/20/2012    rt thigh abscess    RODGER (obstructive sleep apnea)     awaiting cpap machine to be delivered not due until after surgery 2/8/22    Pericarditis, acute     Retention of urine     Sinusitis      Past Surgical History:   Procedure Laterality Date    COLONOSCOPY N/A 12/29/2020    COLONOSCOPY DIAGNOSTIC performed by Fifi Tyler MD at [address] [facility]  8/22/2012    INCISION AND DRAINAGE POSTERIOR THIGH ABSCESS    PRE-MALIGNANT / BENIGN SKIN LESION EXCISION N/A 2/8/2022    EXCISION OF SCALP CYST performed by Ivan Campbell MD at [address] [facility]  Aug 2011    repair of rectal fissures and anal skin tag removal    SINUS SURGERY      X 3    TONSILLECTOMY  2004    TONSILLECTOMY AND ADENOIDECTOMY  2005    (partial adenoidectomy)    UPPER GASTROINTESTINAL ENDOSCOPY N/A 12/29/2020    EGD BIOPSY performed by Fifi Tyler MD at [address] [facility]  (may include discharge medications prescribed in the ED)  Current Outpatient Rx   Medication Sig Dispense Refill    lidocaine (LIDODERM) 5 % Place 1 patch onto the skin daily 12 hours on, 12 hours off.  30 patch 0    liothyronine (CYTOMEL) 5 MCG tablet TAKE TWO TABLETS BY MOUTH EVERY DAY 60 tablet 5    cyanocobalamin 1000 MCG/ML injection INJECT 1ML INTO THE SKIN ONCE MONTHLY 1 mL 2    ketoconazole (NIZORAL) 2 % shampoo APPLY TO AFFECTED AREA(S) ONCE DAILY AS NEEDED 120 mL 1    albuterol sulfate HFA (VENTOLIN HFA) 108 (90 Base) MCG/ACT inhaler INHALE 2 PUFF BY MOUTH EVERY 6 HOURS AS NEEDED FOR WHEEZING 18 each 5    BETASEPT SURGICAL SCRUB 4 % external liquid APPLY TO AFFECTED AREA(S) TOPICALLY AS NEEDED 473 mL 0    mometasone (ELOCON) 0.1 % cream APPLY TO AFFECTED AREA(S) ONCE DAILY 45 g 0    vitamin D (ERGOCALCIFEROL) 1.25 MG (12255 UT) CAPS capsule TAKE 1 CAPSULE BY MOUTH ONE TIME WEEKLY 6 capsule 0    urea (UREA 10 HYDRATING) 10 % cream APPLY TO AFFECTED AREA(S) AS NEEDED 85 g 2    levothyroxine (SYNTHROID) 150 MCG tablet TAKE 1 TABLET BY MOUTH ONE TIME A DAY 30 tablet 3    cetirizine (ZYRTEC) 10 MG tablet Take 1 Marital status: Single   Occupational History    Occupation: NA   Tobacco Use    Smoking status: Never    Smokeless tobacco: Never   Vaping Use    Vaping Use: Never used   Substance and Sexual Activity    Alcohol use:  Yes     Alcohol/week: 0.0 standard drinks     Comment: 2 drinks/week     Drug use: No    Sexual activity: Yes     Social Determinants of Health     Physical Activity: Insufficiently Active    Days of Exercise per Week: 4 days    Minutes of Exercise per Session: 30 min   Intimate Partner Violence: Not At Risk    Fear of Current or Ex-Partner: No    Emotionally Abused: No    Physically Abused: No    Sexually Abused: No     Family History   Problem Relation Age of Onset    High Blood Pressure Mother     Hypertension Mother     Elevated Lipids Mother     Other Mother         fatty liver    Heart Disease Father     Hypertension Father     Elevated Lipids Father     Coronary Art Dis Father     Liver Disease Father     Diabetes Maternal Aunt          PHYSICAL EXAM    VITAL SIGNS: /86   Pulse 87   Temp 98.2 °F (36.8 °C) (Oral)   Resp 20   Ht 5' 3\" (1.6 m)   Wt 222 lb 14.2 oz (101.1 kg)   SpO2 97%   BMI 39.48 kg/m²   Constitutional:  Well developed, well nourished, no acute distress, non-toxic appearance   HENT:  Atraumatic, external ears normal, nose normal  Neck:  normal range of motion, supple   Respiratory:  No respiratory distress  Cardiovascular:  regular rate and rhythm, no murmurs, rubs, or gallops.  + S1-S2  Vascular:  left radial pulse 2+, + brisk cap refill <2 seconds  GI:  Soft, normal bowel sounds, nontender   Musculoskeletal:  + tenderness of the LIF, + mild edema, no erythema, no acute deformities, no evidence of increased compartmental pressure  Integument:  Skin warm and dry, no redness or induration of the upper extremity skin  Neurologic:  Awake, alert, no slurred speech, sensory and motor intact in the affected limb  Psychiatric: Cooperative, pleasant affect    RADIOLOGY/PROCEDURES    XR HAND LEFT (MIN 3 VIEWS)    Result Date: 7/21/2022  No acute findings in the left hand. ED COURSE & MEDICAL DECISION MAKING    Pertinent Labs & Imaging studies reviewed and interpreted. (See chart for details)    Differential diagnosis: includes but not limited to Deep Vein Thrombosis, Arterial Injury/Ischemia, Fracture, Dislocation, Infection, Gout, Compartment Syndrome, Neurologic Deficit/Injury. Reports emergency department injury to her left hand/specifically the left index finger status post she picked up an industrial size trash bag to throw it in the trash bin and immediately felt discomfort in the left index finger. We will obtain imaging of the left hand attention LIF. Patient medicated with ibuprofen and Tylenol. Imaging pending. Work-up reveals:  XR left hand: As noted above, identifying no acute findings in the left hand. Patient is afebrile and nontoxic in appearance. Therefore, I have low suspicion for the presence of emergent medical condition at this time and feel that the risk of additional imaging, laboratory testing, and/or admission outweighs any potential benefit at this time. However, the patient was given strict return precautions. Patient will prescribe medication for symptomatic relief. CHRISS. Finger splint and christel tape. She was referred to the hand specialist-Dr. Sagar Hernández and will follow up with her PCP reevaluation next 1-2 days. Patient verbalized understanding is agreed with plan for discharge and follow-up. The patient was instructed to follow up as an outpatient in 1-2 days. The patient was instructed to return to the ED immediately for any new or worsening symptoms.   The patient verbalized understanding is agreeable with the plan for discharge and follow-up    FINAL IMPRESSION    In injury, left, initial encounter    PLAN  Discharge with outpatient follow-up      (Please note that this note was completed with a voice recognition program.  Every attempt was made to edit the dictations, but inevitably there remain words that are mis-transcribed.)          MAYELA Vogel - CNP  07/24/22 [id number]

## 2022-07-21 NOTE — ED TRIAGE NOTES
Left hand index finger pain going into hand. That started after throwing a large contractor bag into a dumpster.  Monday evening

## 2022-07-22 ENCOUNTER — HOSPITAL ENCOUNTER (OUTPATIENT)
Dept: MRI IMAGING | Age: 47
Discharge: HOME | End: 2022-07-22
Payer: COMMERCIAL

## 2022-07-22 DIAGNOSIS — M25.562 LEFT KNEE PAIN, UNSPECIFIED CHRONICITY: ICD-10-CM

## 2022-07-22 PROCEDURE — 73721 MRI JNT OF LWR EXTRE W/O DYE: CPT

## 2022-07-25 RX ORDER — FOLIC ACID 1 MG/1
TABLET ORAL
Qty: 30 TABLET | Refills: 5 | Status: SHIPPED | OUTPATIENT
Start: 2022-07-25

## 2022-07-25 RX ORDER — NICOTINE 14MG/24HR
PATCH, TRANSDERMAL 24 HOURS TRANSDERMAL
Qty: 60 CAPSULE | Refills: 5 | Status: SHIPPED | OUTPATIENT
Start: 2022-07-25

## 2022-07-25 RX ORDER — SERTRALINE HYDROCHLORIDE 25 MG/1
TABLET, FILM COATED ORAL
Qty: 60 TABLET | Refills: 1 | Status: SHIPPED | OUTPATIENT
Start: 2022-07-25 | End: 2022-09-02 | Stop reason: ALTCHOICE

## 2022-07-28 ENCOUNTER — OFFICE VISIT (OUTPATIENT)
Dept: ORTHOPEDIC SURGERY | Age: 47
End: 2022-07-28
Payer: COMMERCIAL

## 2022-07-28 VITALS — HEIGHT: 63 IN | BODY MASS INDEX: 39.34 KG/M2 | WEIGHT: 222 LBS

## 2022-07-28 DIAGNOSIS — M25.562 LEFT KNEE PAIN, UNSPECIFIED CHRONICITY: Primary | ICD-10-CM

## 2022-07-28 PROCEDURE — G8417 CALC BMI ABV UP PARAM F/U: HCPCS | Performed by: ORTHOPAEDIC SURGERY

## 2022-07-28 PROCEDURE — 20610 DRAIN/INJ JOINT/BURSA W/O US: CPT | Performed by: ORTHOPAEDIC SURGERY

## 2022-07-28 PROCEDURE — G8427 DOCREV CUR MEDS BY ELIG CLIN: HCPCS | Performed by: ORTHOPAEDIC SURGERY

## 2022-07-28 PROCEDURE — 1036F TOBACCO NON-USER: CPT | Performed by: ORTHOPAEDIC SURGERY

## 2022-07-28 PROCEDURE — 99214 OFFICE O/P EST MOD 30 MIN: CPT | Performed by: ORTHOPAEDIC SURGERY

## 2022-07-28 RX ORDER — METHYLPREDNISOLONE ACETATE 40 MG/ML
40 INJECTION, SUSPENSION INTRA-ARTICULAR; INTRALESIONAL; INTRAMUSCULAR; SOFT TISSUE ONCE
Status: COMPLETED | OUTPATIENT
Start: 2022-07-28 | End: 2022-07-28

## 2022-07-28 RX ORDER — BUPIVACAINE HYDROCHLORIDE 5 MG/ML
4 INJECTION, SOLUTION PERINEURAL ONCE
Status: COMPLETED | OUTPATIENT
Start: 2022-07-28 | End: 2022-07-28

## 2022-07-28 RX ADMIN — METHYLPREDNISOLONE ACETATE 40 MG: 40 INJECTION, SUSPENSION INTRA-ARTICULAR; INTRALESIONAL; INTRAMUSCULAR; SOFT TISSUE at 09:54

## 2022-07-28 RX ADMIN — BUPIVACAINE HYDROCHLORIDE 20 MG: 5 INJECTION, SOLUTION PERINEURAL at 09:53

## 2022-07-28 NOTE — PROGRESS NOTES
2022     Reason for visit:  Left knee pain following injury    History of Present Illness: The patient is a 64-year-old female who presents for evaluation of her left knee. She reports injuring her self on 2022. She thinks he may have twisted her knee. She felt a popping sensation followed by significant pain. Ever since then she has had difficulty bearing weight. She reports some swelling. She also reports some catching and locking. At the last visit we elected proceed with an MRI. She is here to review the results. Objective:  Ht 5' 3\" (1.6 m)   Wt 222 lb (100.7 kg)   BMI 39.33 kg/m²      Physical Exam:  The patient is well-appearing and in no apparent distress  Examination of the left knee   There is a small effusion, no gross deformity or skin changes  Range of motion reveals 0 to 125  Neg lachman, negative posterior drawer, no pain or laxity with varus or valgus stress at 0 degrees and 30 degrees of flexion  + medial joint line tenderness  5 out of 5 strength throughout distal muscle groups  Sensation is intact to light touch throughout all distributions  There is no calf swelling or tenderness  Palpable DP pulse, brisk cap refill, 2+ symmetric reflexes     Imagin view x-rays of the left knee were obtained the office today on 2022 and reviewed. There is no fracture or dislocation. Very subtle early joint space narrowing the patellofemoral joint is noted. MRI of the left knee was reviewed. Complex tearing of the medial meniscus is present, involving the posterior horn involving radial and longitudinal vertical components. 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 Assessment:  Left knee pain following injury. MRI reveals medial meniscus tear and early chondral degeneration of patellofemoral joint    Plan:  I discussed with the patient the diagnosis and treatment options. We discussed operative and nonoperative management.   At this point I do recommend nonoperative management. Nonoperative treatment options include activity modification, anti-inflammatory medications, physical therapy, and injections. The patient elected proceed with cortisone injection. Therefore injection was given to her left knee via sterile technique. The injection consisted of 40 mg of Depo-Medrol combined with 4 mL of 0.5% Marcaine. The patient tolerated this well. We also gave her a prescription for physical therapy. She will return to see us as needed. If she does remain symptomatic then we could consider arthroscopic partial meniscectomy. Greater than 30 minutes were spent with this encounter. Time spent included evaluating the patient's chart prior to arrival.  Evaluating the patient in the office including history, physical examination, imaging reviewing, and counseling on next steps. Lastly, time was spent discussing orders with my staff as well as providing documentation in the chart. Hal Person MD            Orthopaedic Surgery Sports Medicine and [address] [facility] and [facility]            Team Physician [facility] ([place]      Disclaimer: This note was dictated with voice recognition software. Though review and correction are routine, we apologize for any errors.

## 2022-08-02 ENCOUNTER — OFFICE VISIT (OUTPATIENT)
Dept: ORTHOPEDIC SURGERY | Age: 47
End: 2022-08-02
Payer: COMMERCIAL

## 2022-08-02 VITALS — RESPIRATION RATE: 16 BRPM | BODY MASS INDEX: 38.27 KG/M2 | WEIGHT: 216 LBS | HEIGHT: 63 IN

## 2022-08-02 DIAGNOSIS — S63.651A SPRAIN OF METACARPOPHALANGEAL (MCP) JOINT OF LEFT INDEX FINGER, INITIAL ENCOUNTER: Primary | ICD-10-CM

## 2022-08-02 PROCEDURE — 99214 OFFICE O/P EST MOD 30 MIN: CPT | Performed by: ORTHOPAEDIC SURGERY

## 2022-08-02 PROCEDURE — G8427 DOCREV CUR MEDS BY ELIG CLIN: HCPCS | Performed by: ORTHOPAEDIC SURGERY

## 2022-08-02 PROCEDURE — G8417 CALC BMI ABV UP PARAM F/U: HCPCS | Performed by: ORTHOPAEDIC SURGERY

## 2022-08-02 PROCEDURE — 1036F TOBACCO NON-USER: CPT | Performed by: ORTHOPAEDIC SURGERY

## 2022-08-02 NOTE — PROGRESS NOTES
This 48 y.o.  right hand dominant HR  is seen in referral for the ED at [facility]   with a chief complaint of injury to their left index finger, which was injured 2 weeks ago when the finger was \"pulled\" while she was lifting a heavy garbage bag. She noticed pain, mild swelling, and no bruising. She was evaluated at the Hospital 3 days later  Xrays were obtained , read as negative and the patient was splinted and christel taped and referred for hand/upper extremity evaluation and treatment. There is no history of additional significant injury. Symptoms have significantly improved since the date of injury. The pain assessment has been reviewed and is correct. She has a history of trigger fingers. The patient's social history, past medical history, family history, medications, allergies and review of systems, entered 8/2/22,  have been reviewed, and dated and are recorded in the chart. On physical examination the patient is Height: 5' 3\" (160 cm) tall and weighs Weight: 216 lb (98 kg). Respirations are 18 per minute. The patient is well nourished, is oriented to time and place, demonstrates appropriate mood and affect as well as normal gait and station. There is no soft tissue swelling present about the left hand, index finger. There is no discoloration. There is no deformity. Tenderness is not present on palpation in the area of the left hand, index finger  Range of motion of the hand and index finger is normal bilaterally and is not accompanied by pain. Skin is intact, as is distal circulation and sensation. Gross muscle strength is normal bilaterally   Hand and wrist joints are stable. There is soft, nonpainful triggering of the left middle and ring fingers, only with pressure over the A1 pulleys. There are no subcutaneous nodules or enlarged epitrochlear lymph nodes.     I have personally reviewed and interpreted all previous external imaging studies(Xrays), laboratory tests(HbA1c, TSH, CMP, CRP, ESR), diagnostic procedures and medical encounters pertinent to this patient's visit today. Review and independent interpretation of the recent external Xrays of the left hand and index finger demonstrate no significant abnormality. The radiologist's report concurs. Impression: Low grade sprain MP joint left index finger. The patient is furnished with a copy of the radiologist's report. The nature of this medical problem is fully discussed with the patient, including all treatment options. All questions are answered. She is instructed about activity precautions and a range of motion exercise program, which is fully discussed and demonstrated. The usual course of events in the resolution of the symptoms associated with this condition is fully discussed with the patient. As long as they progress as expected, they do not need to return for further follow up. They are, however, urged to call or return if they have questions or concerns or if full painless function of their hand and index finger has not returned by 3 weeks from today.

## 2022-08-04 ENCOUNTER — TELEPHONE (OUTPATIENT)
Dept: ORTHOPEDIC SURGERY | Age: 47
End: 2022-08-04

## 2022-08-04 NOTE — TELEPHONE ENCOUNTER
[address] TO PETE'S LINE TO DISCUSS GETTING HER ON THE SX [facility] FOR 8/26. WE WILL HAVE HER F/U THE WEEK HE RETURNS IN OFFICE PRIOR TO SX. ASKED SHE RETURN CALL TO [facility]S DIRECT LINE TO CONFIRM THIS IS HOW SHE WOULD LIKE TO PROCEED.

## 2022-08-04 NOTE — TELEPHONE ENCOUNTER
General Question     Subject: DISCUSS ABOUT SURGERY   Patient and /or Facility Request: Nolvia Gaviria \"[facility]\"  Contact Number: [phone]    PATIENT CALLED STATE THAT THE INJECTIONS IN HER KNEES IS STILL NOT HELPED. .. PATIENT WOULD LIKE TO DISCUSS ABOUT HAVING SURGERY. Jonas Chu PLEASE CALL PATIENT BACK AT THE ABOVE NUMBER. ..

## 2022-08-05 ENCOUNTER — TELEPHONE (OUTPATIENT)
Dept: ORTHOPEDIC SURGERY | Age: 47
End: 2022-08-05

## 2022-08-05 DIAGNOSIS — M25.562 LEFT KNEE PAIN, UNSPECIFIED CHRONICITY: Primary | ICD-10-CM

## 2022-08-05 NOTE — TELEPHONE ENCOUNTER
Spoke with pt to let her know Dr. Radha Hernández is still in [address]. I will f/u with her again on Monday. He can review my message at any time. Patient expressed understanding.

## 2022-08-05 NOTE — TELEPHONE ENCOUNTER
PATIENT HAS CONTRACTS SHE IS CURRENTLY SCHEDULED FOR AND WOULD LIKE TO TENTATIVELY GET ON THE SX BOOK FOR 9/23. We scheduled her a f/u in office 8/23 for pre-op discussion. Patient has been having increased pain and swelling since last visit. She received a medrol dose pack 7/14, which she notes gave her much relief. Unfortunately, this is not a medical that can be refilled. I let her know I would reach out to Dr. Stefano Elliott to see if there is anything else he would suggest.  She notes having received tramadol, ibuprofen, and tylenol from ED. Tylenol and ibuprofen did not help with her pain. Tramadol \"knocked me out\" per patient.  I will relay this and see if there is something else he may suggest.

## 2022-08-08 NOTE — TELEPHONE ENCOUNTER
[address] TO CONFIRM PATIENT WOULD LIKE CELEBREX. WE CANNOT SEND ANOTHER RX FOR REPEAT MEDROL DOSE PACK.

## 2022-08-09 RX ORDER — CELECOXIB 200 MG/1
200 CAPSULE ORAL DAILY
Qty: 60 CAPSULE | Refills: 3 | Status: CANCELLED | OUTPATIENT
Start: 2022-08-09

## 2022-08-09 NOTE — TELEPHONE ENCOUNTER
Per pt she can not take Celebrex due to heart condition. Celebrex shows as allergy in chart due to allergy to sulfa antibiotics. Please advise.

## 2022-08-12 NOTE — TELEPHONE ENCOUNTER
SPOKE WITH PT. SHE HAS TOLERATED DICLOFENAC AND LIDOCAINE PATCHES. PER DR. MIGUEL, OKAY FOR PATIENT TO TRY DICLOFENAC AND LIDOCAINE PATCHES. I HAVE CALLED THESE INTO THE PHARMACY FOR DR. CALLED PT BACK AND LET HER KNOW THESE HAVE BEEN PHONED INTO HER PHARMACY AND SHE MAY F/U WITH THEM IN REGARDS TO WHEN THEY WILL BE FILLED.

## 2022-08-12 NOTE — TELEPHONE ENCOUNTER
Prescription Refill     Medication Name:  PAIN  Pharmacy: [facility]  [place]: [phone]  Patient Contact Number:  [phone]    Patient can't take Celebrex.

## 2022-08-23 ENCOUNTER — OFFICE VISIT (OUTPATIENT)
Dept: ORTHOPEDIC SURGERY | Age: 47
End: 2022-08-23
Payer: COMMERCIAL

## 2022-08-23 VITALS — HEIGHT: 63 IN | BODY MASS INDEX: 38.27 KG/M2 | WEIGHT: 216 LBS

## 2022-08-23 DIAGNOSIS — M25.562 LEFT KNEE PAIN, UNSPECIFIED CHRONICITY: Primary | ICD-10-CM

## 2022-08-23 DIAGNOSIS — S83.242A ACUTE MEDIAL MENISCUS TEAR OF LEFT KNEE, INITIAL ENCOUNTER: ICD-10-CM

## 2022-08-23 PROCEDURE — 99215 OFFICE O/P EST HI 40 MIN: CPT | Performed by: ORTHOPAEDIC SURGERY

## 2022-08-23 PROCEDURE — 1036F TOBACCO NON-USER: CPT | Performed by: ORTHOPAEDIC SURGERY

## 2022-08-23 PROCEDURE — G8427 DOCREV CUR MEDS BY ELIG CLIN: HCPCS | Performed by: ORTHOPAEDIC SURGERY

## 2022-08-23 PROCEDURE — G8417 CALC BMI ABV UP PARAM F/U: HCPCS | Performed by: ORTHOPAEDIC SURGERY

## 2022-08-23 RX ORDER — LIDOCAINE 50 MG/G
1 PATCH TOPICAL DAILY
Qty: 10 PATCH | Refills: 0 | OUTPATIENT
Start: 2022-08-23 | End: 2022-09-02 | Stop reason: ALTCHOICE

## 2022-08-23 RX ORDER — METHYLPREDNISOLONE 4 MG/1
2 TABLET ORAL DAILY
Qty: 3 TABLET | Refills: 0 | Status: SHIPPED | OUTPATIENT
Start: 2022-08-23 | End: 2022-08-29

## 2022-08-25 ENCOUNTER — TELEPHONE (OUTPATIENT)
Dept: ORTHOPEDIC SURGERY | Age: 47
End: 2022-08-25

## 2022-08-26 NOTE — PROGRESS NOTES
2022     Reason for visit:  Left knee pain following injury    History of Present Illness: The patient is a 53-year-old female who presents for evaluation of her left knee. She reports injuring her self on 2022. She thinks he may have twisted her knee. She felt a popping sensation followed by significant pain. Ever since then she has had difficulty bearing weight. She reports some swelling. She also reports some catching and locking. At the last visit we elected proceed with nonoperative management. We did proceed with a cortisone injection and physical therapy. However despite this she has continued to be symptomatic. She has pain on a daily basis. She also reports catching and locking. Objective:  Ht 5' 3\" (1.6 m)   Wt 216 lb (98 kg)   BMI 38.26 kg/m²      Physical Exam:  The patient is well-appearing and in no apparent distress  Examination of the left knee   There is a small effusion, no gross deformity or skin changes  Range of motion reveals 0 to 125  Neg lachman, negative posterior drawer, no pain or laxity with varus or valgus stress at 0 degrees and 30 degrees of flexion  + medial joint line tenderness  5 out of 5 strength throughout distal muscle groups  Sensation is intact to light touch throughout all distributions  There is no calf swelling or tenderness  Palpable DP pulse, brisk cap refill, 2+ symmetric reflexes     Imagin view x-rays of the left knee were obtained the office today on 2022 and reviewed. There is no fracture or dislocation. Very subtle early joint space narrowing the patellofemoral joint is noted. MRI of the left knee was reviewed. Complex tearing of the medial meniscus is present, involving the posterior horn involving radial and longitudinal vertical components. 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 Assessment:  Left knee pain following injury.   MRI reveals medial meniscus tear and early chondral degeneration of patellofemoral joint    Plan:  I discussed with the patient the diagnosis and treatment options. We discussed operative and nonoperative management. Unfortunately the patient does remain symptomatic despite initial conservative measures. She is also having mechanical symptoms. Therefore I do feel that she would be a candidate for left knee arthroscopy with partial medial meniscectomy. The risks of surgery were defined as but not limited to infection, bleeding, damage to blood vessels or nerves, need for additional surgery. I also told the patient that the ultimate prognosis depends on the amount of chondral degeneration as well as how well her body tolerates meniscal deficiency. She may continue to have some pain as result of meniscal deficiency and would be at risk for progressive degeneration of the knee. She does understand this and likes to proceed. Greater than 40 minutes were spent with this encounter. Time spent included evaluating the patient's chart prior to arrival.  Evaluating the patient in the office including history, physical examination, imaging reviewing, and counseling on next steps. Lastly, time was spent discussing orders with my staff as well as providing documentation in the chart. Judith Beck MD            Orthopaedic Surgery Sports Medicine and [address] [place] [address] and [address] [facility] Physician [facility] ([place])      Disclaimer: This note was dictated with voice recognition software. Though review and correction are routine, we apologize for any errors.

## 2022-08-29 ENCOUNTER — TELEPHONE (OUTPATIENT)
Dept: ENT CLINIC | Age: 47
End: 2022-08-29

## 2022-08-29 ENCOUNTER — TELEPHONE (OUTPATIENT)
Dept: ORTHOPEDIC SURGERY | Age: 47
End: 2022-08-29

## 2022-08-29 NOTE — TELEPHONE ENCOUNTER
Patient is calling to see if you would call in a prescription for a sinus infection she has right now, ciprofloxacin at the [facility] [address]

## 2022-08-29 NOTE — TELEPHONE ENCOUNTER
CPT: 74603  AUTH: [employer] PER WEBSITE  INSURANCE: [facility]  LOCATION [facility]  NOTE: DOC SCANNED

## 2022-08-30 DIAGNOSIS — J32.4 CHRONIC PANSINUSITIS: Primary | ICD-10-CM

## 2022-08-30 RX ORDER — MONTELUKAST SODIUM 10 MG/1
TABLET ORAL
Qty: 30 TABLET | Refills: 0 | Status: SHIPPED | OUTPATIENT
Start: 2022-08-30

## 2022-08-30 RX ORDER — CIPROFLOXACIN 500 MG/1
500 TABLET, FILM COATED ORAL 2 TIMES DAILY
Qty: 20 TABLET | Refills: 0 | Status: SHIPPED | OUTPATIENT
Start: 2022-08-30 | End: 2022-09-09

## 2022-08-30 RX ORDER — BUDESONIDE AND FORMOTEROL FUMARATE DIHYDRATE 160; 4.5 UG/1; UG/1
2 AEROSOL RESPIRATORY (INHALATION) 2 TIMES DAILY
Qty: 1 EACH | Refills: 0 | Status: SHIPPED | OUTPATIENT
Start: 2022-08-30

## 2022-09-02 ENCOUNTER — TELEPHONE (OUTPATIENT)
Dept: ENT CLINIC | Age: 47
End: 2022-09-02

## 2022-09-02 DIAGNOSIS — J32.4 CHRONIC PANSINUSITIS: Primary | ICD-10-CM

## 2022-09-02 RX ORDER — METHYLPREDNISOLONE 4 MG/1
TABLET ORAL
Qty: 1 KIT | Refills: 0 | Status: SHIPPED | OUTPATIENT
Start: 2022-09-02 | End: 2022-09-08

## 2022-09-02 NOTE — TELEPHONE ENCOUNTER
Patient would like a short script of Medrol Dose Fam. She is starting to runny nose, PND, sore throat, cough, chest congestion. Pfizer dose 1 and 2

## 2022-09-06 ENCOUNTER — TELEPHONE (OUTPATIENT)
Dept: ORTHOPEDIC SURGERY | Age: 47
End: 2022-09-06

## 2022-09-07 ENCOUNTER — TELEPHONE (OUTPATIENT)
Dept: ORTHOPEDIC SURGERY | Age: 47
End: 2022-09-07

## 2022-09-07 DIAGNOSIS — M25.562 LEFT KNEE PAIN, UNSPECIFIED CHRONICITY: Primary | ICD-10-CM

## 2022-09-07 DIAGNOSIS — S83.242A ACUTE MEDIAL MENISCUS TEAR OF LEFT KNEE, INITIAL ENCOUNTER: ICD-10-CM

## 2022-09-07 RX ORDER — ONDANSETRON 4 MG/1
4 TABLET, FILM COATED ORAL EVERY 8 HOURS PRN
Qty: 21 TABLET | Refills: 0 | Status: CANCELLED | OUTPATIENT
Start: 2022-09-07 | End: 2022-09-14

## 2022-09-07 RX ORDER — TRAMADOL HYDROCHLORIDE 50 MG/1
50 TABLET ORAL EVERY 4 HOURS PRN
Qty: 30 TABLET | Refills: 0 | Status: CANCELLED | OUTPATIENT
Start: 2022-09-07 | End: 2022-09-14

## 2022-09-07 RX ORDER — ASPIRIN 325 MG
325 TABLET, DELAYED RELEASE (ENTERIC COATED) ORAL DAILY
Qty: 14 TABLET | Refills: 0 | Status: CANCELLED | OUTPATIENT
Start: 2022-09-07 | End: 2022-09-21

## 2022-09-07 NOTE — PROGRESS NOTES
Name_______________________________________Printed:____________________  Date and time of surgery___9/9/2022__________0730___________Arrival Time:_______0600_________   1. The instructions given regarding when and if a patient needs to stop oral intake prior to surgery varies. Follow the specific instructions you were given                  __x_Nothing to eat or to drink after Midnight the night before.                   ____Carbo loading or ERAS instructions will be given to select patients-if you have been given those instructions -please do the following                           The evening before your surgery after dinner before midnight drink 40 ounces of gatorade. If you are diabetic use sugar free. The morning of surgery drink 40 ounces of water. This needs to be finished 3 hours prior to your surgery start time. 2. Take the following pills with a small sip of water on the morning of surgery_albuterol inhaler symicort ditiazem levothyroxine pantoprozole singulair__________________________________________________                  Do not take blood pressure medications ending in pril or sartan the jyalen prior to surgery or the morning of surgery_   3. Aspirin, Ibuprofen, Advil, Naproxen, Vitamin E and other Anti-inflammatory products and supplements should be stopped for 5 -7days before surgery or as directed by your physician. 4. Check with your Doctor regarding stopping Plavix, Coumadin,Eliquis, Lovenox,Effient,Pradaxa,Xarelto, Fragmin or other blood thinners and follow their instructions. 5. Do not smoke, and do not drink any alcoholic beverages 24 hours prior to surgery. This includes NA Beer. Refrain from the usage of any recreational drugs. 6. You may brush your teeth and gargle the morning of surgery. DO NOT SWALLOW WATER   7. You MUST make arrangements for a responsible adult to stay on site while you are here and take you home after your surgery.  You will not be allowed to leave alone or drive yourself home. It is strongly suggested someone stay with you the first 24 hrs. Your surgery will be cancelled if you do not have a ride home. 8. A parent/legal guardian must accompany a child scheduled for surgery and plan to stay at the hospital until the child is discharged. Please do not bring other children with you. 9. Please wear simple, loose fitting clothing to the hospital.  Jonathan Loco not bring valuables (money, credit cards, checkbooks, etc.) Do not wear any makeup (including no eye makeup) or nail polish on your fingers or toes. 10. DO NOT wear any jewelry or piercings on day of surgery. All body piercing jewelry must be removed. 11. If you have ___dentures, they will be removed before going to the OR; we will provide you a container. If you wear ___contact lenses or ___glasses, they will be removed; please bring a case for them. 12. Please see your family doctor/pediatrician for a history & physical and/or concerning medications. Bring any test results/reports from your physician's office. PCP__________________Phone___________H&P Appt. Date________             13 If you  have a Living Will and Durable Power of  for Healthcare, please bring in a copy. 15. Notify your Surgeon if you develop any illness between now and surgery  time, cough, cold, fever, sore throat, nausea, vomiting, etc.  Please notify your surgeon if you experience dizziness, shortness of breath or blurred vision between now & the time of your surgery             15. DO NOT shave your operative site 96 hours prior to surgery. For face & neck surgery, men may use an electric razor 48 hours prior to surgery. 16. Shower the night before or morning of surgery using an antibacterial soap or as you have been instructed. 17. To provide excellent care visitors will be limited to one in the room at any given time.              18.  Please bring picture ID and insurance card. 19.  Visit our web site for additional information:  [email or web address]              20.During flu season no children under the age of 15 are permitted in the hospital for the safety of all patients. 21. If you take a long acting insulin in the evening only  take half of your usual  dose the night  before your procedure              22. If you use a c-pap please bring DOS if staying overnight,             23.For your convenience [facility] has a pharmacy on site to fill your prescriptions. 24. If you use oxygen and have a portable tank please bring it  with you the DOS             25. Bring a complete list of all your medications with name and dose include any supplements. 26. Other__________________________________________   *Please call pre admission testing if you any further questions   Dash Villavicencio   [address] [place] [address]. [place]  [phone]   [address] [place] [address]       VISITOR POLICY(subject to change)    Current policy is 2 visitors per patient. No children. A mask is required. Visiting hours are 8a-8p. Overnight visitors will be at the discretion of the nurse. All above information reviewed with patient in person or by phone. Patient verbalizes understanding. All questions and concerns addressed.                                                                                                  Patient/Rep__patient__________________                                                                                                                                    PRE OP INSTRUCTIONS

## 2022-09-08 ENCOUNTER — ANESTHESIA EVENT (OUTPATIENT)
Dept: OPERATING ROOM | Age: 47
End: 2022-09-08
Payer: COMMERCIAL

## 2022-09-08 ENCOUNTER — TELEPHONE (OUTPATIENT)
Dept: INTERNAL MEDICINE CLINIC | Age: 47
End: 2022-09-08

## 2022-09-08 DIAGNOSIS — I48.91 ATRIAL FIBRILLATION WITH RVR (HCC): Primary | ICD-10-CM

## 2022-09-08 RX ORDER — LEVOTHYROXINE SODIUM 0.15 MG/1
TABLET ORAL
Qty: 30 TABLET | Refills: 3 | Status: SHIPPED | OUTPATIENT
Start: 2022-09-08 | End: 2022-09-19

## 2022-09-08 RX ORDER — DILTIAZEM HYDROCHLORIDE 120 MG/1
120 CAPSULE, COATED, EXTENDED RELEASE ORAL DAILY
Qty: 30 CAPSULE | Refills: 3 | Status: SHIPPED | OUTPATIENT
Start: 2022-09-08

## 2022-09-08 NOTE — TELEPHONE ENCOUNTER
Medication:   Requested Prescriptions     Pending Prescriptions Disp Refills    levothyroxine (SYNTHROID) 150 MCG tablet [Pharmacy Med Name: LEVOTHYROXINE 150 MCG TABLET] 30 tablet 3     Sig: TAKE ONE TABLET BY MOUTH DAILY       Last Filled:      Patient Phone Number: [phone] (home)     Last appt: 3/25/2022   Next appt: Due in 03/2023  Lab Results   Component Value Date/Time    TSH 8.28 09/17/2020 04:09 PM    FT3 2.7 07/23/2021 01:41 PM    T4FREE 0.9 07/23/2021 01:41 PM

## 2022-09-09 ENCOUNTER — ANESTHESIA (OUTPATIENT)
Dept: OPERATING ROOM | Age: 47
End: 2022-09-09
Payer: COMMERCIAL

## 2022-09-09 ENCOUNTER — HOSPITAL ENCOUNTER (OUTPATIENT)
Age: 47
Setting detail: OUTPATIENT SURGERY
Discharge: HOME | End: 2022-09-09
Attending: ORTHOPAEDIC SURGERY | Admitting: ORTHOPAEDIC SURGERY
Payer: COMMERCIAL

## 2022-09-09 ENCOUNTER — TELEPHONE (OUTPATIENT)
Dept: ORTHOPEDIC SURGERY | Age: 47
End: 2022-09-09

## 2022-09-09 VITALS
OXYGEN SATURATION: 95 % | RESPIRATION RATE: 15 BRPM | TEMPERATURE: 97 F | HEIGHT: 63 IN | BODY MASS INDEX: 39.51 KG/M2 | WEIGHT: 223 LBS | SYSTOLIC BLOOD PRESSURE: 114 MMHG | DIASTOLIC BLOOD PRESSURE: 77 MMHG | HEART RATE: 69 BPM

## 2022-09-09 DIAGNOSIS — M25.562 LEFT KNEE PAIN, UNSPECIFIED CHRONICITY: Primary | ICD-10-CM

## 2022-09-09 DIAGNOSIS — S83.242A ACUTE MEDIAL MENISCUS TEAR OF LEFT KNEE, INITIAL ENCOUNTER: ICD-10-CM

## 2022-09-09 PROCEDURE — 2709999900 HC NON-CHARGEABLE SUPPLY: Performed by: ORTHOPAEDIC SURGERY

## 2022-09-09 PROCEDURE — 7100000000 HC PACU RECOVERY - FIRST 15 MIN: Performed by: ORTHOPAEDIC SURGERY

## 2022-09-09 PROCEDURE — 2580000003 HC RX 258: Performed by: ORTHOPAEDIC SURGERY

## 2022-09-09 PROCEDURE — 2500000003 HC RX 250 WO HCPCS: Performed by: ORTHOPAEDIC SURGERY

## 2022-09-09 PROCEDURE — 6360000002 HC RX W HCPCS: Performed by: ORTHOPAEDIC SURGERY

## 2022-09-09 PROCEDURE — 6370000000 HC RX 637 (ALT 250 FOR IP): Performed by: ANESTHESIOLOGY

## 2022-09-09 PROCEDURE — 2500000003 HC RX 250 WO HCPCS: Performed by: ANESTHESIOLOGY

## 2022-09-09 PROCEDURE — 6360000002 HC RX W HCPCS: Performed by: ANESTHESIOLOGY

## 2022-09-09 PROCEDURE — 6360000002 HC RX W HCPCS

## 2022-09-09 PROCEDURE — 7100000001 HC PACU RECOVERY - ADDTL 15 MIN: Performed by: ORTHOPAEDIC SURGERY

## 2022-09-09 PROCEDURE — 7100000010 HC PHASE II RECOVERY - FIRST 15 MIN: Performed by: ORTHOPAEDIC SURGERY

## 2022-09-09 PROCEDURE — 7100000011 HC PHASE II RECOVERY - ADDTL 15 MIN: Performed by: ORTHOPAEDIC SURGERY

## 2022-09-09 PROCEDURE — 3600000004 HC SURGERY LEVEL 4 BASE: Performed by: ORTHOPAEDIC SURGERY

## 2022-09-09 PROCEDURE — 3700000000 HC ANESTHESIA ATTENDED CARE: Performed by: ORTHOPAEDIC SURGERY

## 2022-09-09 PROCEDURE — 2580000003 HC RX 258: Performed by: ANESTHESIOLOGY

## 2022-09-09 PROCEDURE — 3700000001 HC ADD 15 MINUTES (ANESTHESIA): Performed by: ORTHOPAEDIC SURGERY

## 2022-09-09 PROCEDURE — 3600000014 HC SURGERY LEVEL 4 ADDTL 15MIN: Performed by: ORTHOPAEDIC SURGERY

## 2022-09-09 RX ORDER — ONDANSETRON 2 MG/ML
4 INJECTION INTRAMUSCULAR; INTRAVENOUS
Status: DISCONTINUED | OUTPATIENT
Start: 2022-09-09 | End: 2022-09-09 | Stop reason: HOSPADM

## 2022-09-09 RX ORDER — DEXAMETHASONE SODIUM PHOSPHATE 4 MG/ML
INJECTION, SOLUTION INTRA-ARTICULAR; INTRALESIONAL; INTRAMUSCULAR; INTRAVENOUS; SOFT TISSUE PRN
Status: DISCONTINUED | OUTPATIENT
Start: 2022-09-09 | End: 2022-09-09 | Stop reason: SDUPTHER

## 2022-09-09 RX ORDER — LIDOCAINE HYDROCHLORIDE 10 MG/ML
1 INJECTION, SOLUTION EPIDURAL; INFILTRATION; INTRACAUDAL; PERINEURAL
Status: CANCELLED | OUTPATIENT
Start: 2022-09-09 | End: 2022-09-09

## 2022-09-09 RX ORDER — TRAMADOL HYDROCHLORIDE 50 MG/1
50 TABLET ORAL EVERY 4 HOURS PRN
Qty: 30 TABLET | Refills: 0 | Status: SHIPPED | OUTPATIENT
Start: 2022-09-09 | End: 2022-09-27 | Stop reason: SDUPTHER

## 2022-09-09 RX ORDER — LIDOCAINE HYDROCHLORIDE 10 MG/ML
0.5 INJECTION, SOLUTION EPIDURAL; INFILTRATION; INTRACAUDAL; PERINEURAL ONCE
Status: DISCONTINUED | OUTPATIENT
Start: 2022-09-09 | End: 2022-09-09 | Stop reason: HOSPADM

## 2022-09-09 RX ORDER — HYDROMORPHONE HCL 110MG/55ML
0.5 PATIENT CONTROLLED ANALGESIA SYRINGE INTRAVENOUS EVERY 5 MIN PRN
Status: COMPLETED | OUTPATIENT
Start: 2022-09-09 | End: 2022-09-09

## 2022-09-09 RX ORDER — LIDOCAINE HYDROCHLORIDE 20 MG/ML
INJECTION, SOLUTION EPIDURAL; INFILTRATION; INTRACAUDAL; PERINEURAL PRN
Status: DISCONTINUED | OUTPATIENT
Start: 2022-09-09 | End: 2022-09-09 | Stop reason: SDUPTHER

## 2022-09-09 RX ORDER — ONDANSETRON 4 MG/1
4 TABLET, FILM COATED ORAL EVERY 8 HOURS PRN
Qty: 21 TABLET | Refills: 0 | Status: SHIPPED | OUTPATIENT
Start: 2022-09-09 | End: 2022-09-16

## 2022-09-09 RX ORDER — MIDAZOLAM HYDROCHLORIDE 1 MG/ML
INJECTION INTRAMUSCULAR; INTRAVENOUS PRN
Status: DISCONTINUED | OUTPATIENT
Start: 2022-09-09 | End: 2022-09-09 | Stop reason: SDUPTHER

## 2022-09-09 RX ORDER — ONDANSETRON 2 MG/ML
INJECTION INTRAMUSCULAR; INTRAVENOUS PRN
Status: DISCONTINUED | OUTPATIENT
Start: 2022-09-09 | End: 2022-09-09 | Stop reason: SDUPTHER

## 2022-09-09 RX ORDER — ASPIRIN 325 MG
325 TABLET, DELAYED RELEASE (ENTERIC COATED) ORAL DAILY
Qty: 14 TABLET | Refills: 0 | Status: SHIPPED | OUTPATIENT
Start: 2022-09-09 | End: 2022-09-23

## 2022-09-09 RX ORDER — FENTANYL CITRATE 50 UG/ML
25 INJECTION, SOLUTION INTRAMUSCULAR; INTRAVENOUS EVERY 5 MIN PRN
Status: COMPLETED | OUTPATIENT
Start: 2022-09-09 | End: 2022-09-09

## 2022-09-09 RX ORDER — HYDRALAZINE HYDROCHLORIDE 20 MG/ML
10 INJECTION INTRAMUSCULAR; INTRAVENOUS
Status: DISCONTINUED | OUTPATIENT
Start: 2022-09-09 | End: 2022-09-09 | Stop reason: HOSPADM

## 2022-09-09 RX ORDER — PROCHLORPERAZINE EDISYLATE 5 MG/ML
5 INJECTION INTRAMUSCULAR; INTRAVENOUS
Status: DISCONTINUED | OUTPATIENT
Start: 2022-09-09 | End: 2022-09-09 | Stop reason: HOSPADM

## 2022-09-09 RX ORDER — SODIUM CHLORIDE, SODIUM LACTATE, POTASSIUM CHLORIDE, CALCIUM CHLORIDE 600; 310; 30; 20 MG/100ML; MG/100ML; MG/100ML; MG/100ML
INJECTION, SOLUTION INTRAVENOUS CONTINUOUS PRN
Status: DISCONTINUED | OUTPATIENT
Start: 2022-09-09 | End: 2022-09-09 | Stop reason: SDUPTHER

## 2022-09-09 RX ORDER — OXYCODONE HYDROCHLORIDE 5 MG/1
5 TABLET ORAL
Status: DISCONTINUED | OUTPATIENT
Start: 2022-09-09 | End: 2022-09-09 | Stop reason: HOSPADM

## 2022-09-09 RX ORDER — FENTANYL CITRATE 50 UG/ML
INJECTION, SOLUTION INTRAMUSCULAR; INTRAVENOUS
Status: COMPLETED
Start: 2022-09-09 | End: 2022-09-09

## 2022-09-09 RX ORDER — METHYLPREDNISOLONE 4 MG/1
4 TABLET ORAL SEE ADMIN INSTRUCTIONS
COMMUNITY
End: 2022-10-20

## 2022-09-09 RX ORDER — LABETALOL HYDROCHLORIDE 5 MG/ML
10 INJECTION, SOLUTION INTRAVENOUS
Status: DISCONTINUED | OUTPATIENT
Start: 2022-09-09 | End: 2022-09-09 | Stop reason: HOSPADM

## 2022-09-09 RX ORDER — PROPOFOL 10 MG/ML
INJECTION, EMULSION INTRAVENOUS PRN
Status: DISCONTINUED | OUTPATIENT
Start: 2022-09-09 | End: 2022-09-09 | Stop reason: SDUPTHER

## 2022-09-09 RX ORDER — OXYCODONE HYDROCHLORIDE 5 MG/1
10 TABLET ORAL EVERY 4 HOURS PRN
Status: DISCONTINUED | OUTPATIENT
Start: 2022-09-09 | End: 2022-09-09 | Stop reason: HOSPADM

## 2022-09-09 RX ORDER — SODIUM CHLORIDE, SODIUM LACTATE, POTASSIUM CHLORIDE, CALCIUM CHLORIDE 600; 310; 30; 20 MG/100ML; MG/100ML; MG/100ML; MG/100ML
INJECTION, SOLUTION INTRAVENOUS CONTINUOUS
Status: DISCONTINUED | OUTPATIENT
Start: 2022-09-09 | End: 2022-09-09 | Stop reason: HOSPADM

## 2022-09-09 RX ORDER — HYDROMORPHONE HCL 110MG/55ML
PATIENT CONTROLLED ANALGESIA SYRINGE INTRAVENOUS
Status: COMPLETED
Start: 2022-09-09 | End: 2022-09-09

## 2022-09-09 RX ORDER — ACETAMINOPHEN 500 MG
1000 TABLET ORAL ONCE
Status: COMPLETED | OUTPATIENT
Start: 2022-09-09 | End: 2022-09-09

## 2022-09-09 RX ORDER — FENTANYL CITRATE 50 UG/ML
INJECTION, SOLUTION INTRAMUSCULAR; INTRAVENOUS PRN
Status: DISCONTINUED | OUTPATIENT
Start: 2022-09-09 | End: 2022-09-09 | Stop reason: SDUPTHER

## 2022-09-09 RX ADMIN — FENTANYL CITRATE 25 MCG: 50 INJECTION, SOLUTION INTRAMUSCULAR; INTRAVENOUS at 08:57

## 2022-09-09 RX ADMIN — ACETAMINOPHEN 1000 MG: 500 TABLET ORAL at 09:09

## 2022-09-09 RX ADMIN — ONDANSETRON 4 MG: 2 INJECTION INTRAMUSCULAR; INTRAVENOUS at 07:55

## 2022-09-09 RX ADMIN — HYDROMORPHONE HYDROCHLORIDE 0.5 MG: 2 INJECTION, SOLUTION INTRAMUSCULAR; INTRAVENOUS; SUBCUTANEOUS at 08:39

## 2022-09-09 RX ADMIN — OXYCODONE 10 MG: 5 TABLET ORAL at 09:09

## 2022-09-09 RX ADMIN — PROPOFOL 200 MG: 10 INJECTION, EMULSION INTRAVENOUS at 07:42

## 2022-09-09 RX ADMIN — DEXAMETHASONE SODIUM PHOSPHATE 10 MG: 4 INJECTION, SOLUTION INTRAMUSCULAR; INTRAVENOUS at 07:55

## 2022-09-09 RX ADMIN — HYDROMORPHONE HYDROCHLORIDE 0.5 MG: 2 INJECTION, SOLUTION INTRAMUSCULAR; INTRAVENOUS; SUBCUTANEOUS at 08:45

## 2022-09-09 RX ADMIN — SODIUM CHLORIDE, POTASSIUM CHLORIDE, SODIUM LACTATE AND CALCIUM CHLORIDE: 600; 310; 30; 20 INJECTION, SOLUTION INTRAVENOUS at 07:09

## 2022-09-09 RX ADMIN — FENTANYL CITRATE 50 MCG: 50 INJECTION, SOLUTION INTRAMUSCULAR; INTRAVENOUS at 07:54

## 2022-09-09 RX ADMIN — HYDROMORPHONE HYDROCHLORIDE 0.5 MG: 2 INJECTION, SOLUTION INTRAMUSCULAR; INTRAVENOUS; SUBCUTANEOUS at 08:55

## 2022-09-09 RX ADMIN — CEFAZOLIN 2000 MG: 2 INJECTION, POWDER, FOR SOLUTION INTRAMUSCULAR; INTRAVENOUS at 07:35

## 2022-09-09 RX ADMIN — FENTANYL CITRATE 25 MCG: 50 INJECTION, SOLUTION INTRAMUSCULAR; INTRAVENOUS at 09:15

## 2022-09-09 RX ADMIN — HYDROMORPHONE HYDROCHLORIDE 0.5 MG: 2 INJECTION, SOLUTION INTRAMUSCULAR; INTRAVENOUS; SUBCUTANEOUS at 08:50

## 2022-09-09 RX ADMIN — FENTANYL CITRATE 25 MCG: 50 INJECTION, SOLUTION INTRAMUSCULAR; INTRAVENOUS at 09:21

## 2022-09-09 RX ADMIN — SODIUM CHLORIDE, POTASSIUM CHLORIDE, SODIUM LACTATE AND CALCIUM CHLORIDE: 600; 310; 30; 20 INJECTION, SOLUTION INTRAVENOUS at 07:39

## 2022-09-09 RX ADMIN — FENTANYL CITRATE 25 MCG: 50 INJECTION, SOLUTION INTRAMUSCULAR; INTRAVENOUS at 09:08

## 2022-09-09 RX ADMIN — LIDOCAINE HYDROCHLORIDE 100 MG: 20 INJECTION, SOLUTION EPIDURAL; INFILTRATION; INTRACAUDAL; PERINEURAL at 07:42

## 2022-09-09 RX ADMIN — MIDAZOLAM 2 MG: 1 INJECTION INTRAMUSCULAR; INTRAVENOUS at 07:30

## 2022-09-09 RX ADMIN — FENTANYL CITRATE 50 MCG: 50 INJECTION, SOLUTION INTRAMUSCULAR; INTRAVENOUS at 07:30

## 2022-09-09 ASSESSMENT — PAIN DESCRIPTION - ORIENTATION
ORIENTATION: LEFT

## 2022-09-09 ASSESSMENT — ENCOUNTER SYMPTOMS: SHORTNESS OF BREATH: 0

## 2022-09-09 ASSESSMENT — PAIN DESCRIPTION - DESCRIPTORS
DESCRIPTORS: ACHING
DESCRIPTORS: BURNING;THROBBING;ACHING
DESCRIPTORS: ACHING

## 2022-09-09 ASSESSMENT — PAIN DESCRIPTION - LOCATION
LOCATION: LEG
LOCATION: LEG
LOCATION: KNEE
LOCATION: LEG

## 2022-09-09 ASSESSMENT — PAIN SCALES - GENERAL
PAINLEVEL_OUTOF10: 5
PAINLEVEL_OUTOF10: 10
PAINLEVEL_OUTOF10: 10
PAINLEVEL_OUTOF10: 9
PAINLEVEL_OUTOF10: 10
PAINLEVEL_OUTOF10: 10
PAINLEVEL_OUTOF10: 8
PAINLEVEL_OUTOF10: 10

## 2022-09-09 ASSESSMENT — PAIN - FUNCTIONAL ASSESSMENT
PAIN_FUNCTIONAL_ASSESSMENT: ACTIVITIES ARE NOT PREVENTED
PAIN_FUNCTIONAL_ASSESSMENT: 0-10

## 2022-09-09 NOTE — PROGRESS NOTES
CLINICAL PHARMACY NOTE: MEDS TO BEDS    Total # of Prescriptions Filled: 3   The following medications were delivered to the patient:  Tramadol  Asa 325  Ondansetron 4    Additional Documentation:  Delivered to car patient signed

## 2022-09-09 NOTE — PROGRESS NOTES
Discharge instructions review with patient and mom. All home medications have been reviewed, pt v/u. Discharge instructions signed. Pt discharged via wheelchair. Pt discharged with ice pack, crutches, discharge paperwork and all belongings. Mom taking stable pt home.

## 2022-09-09 NOTE — DISCHARGE INSTRUCTIONS
Orthopedic Surgery Discharge Instructions    Medications:   A pain medication has been prescribed for you. Please take this as instructed by the pharmacist.  An anti-nausea medication has been prescribed for you to use as needed for nausea. Either aspirin or a blood thinner medication may have been prescribed for you. Please take this as instructed. Activity:  Weightbearing as tolerated with crutches as needed. Incision/dressings: You may remove your dressing in 72 hours. Until then the dressing needs to remain clean and dry. Following removal dressing please apply dry dressing daily. You may shower in 72 hours and allow the water to run over the incision. However no submerging underwater or getting in pools. Follow-up: If you do not already have a postoperative follow-up appointment scheduled you should call to schedule an appointment within 10 to 14 days of surgery. Emergency or after hours questions:  Please call the main call center at [phone] for all questions or concerns during evening and weekend hours. The call center will direct your call to the on-call physician to answer your questions and concerns. During weekly office hours you may call my assistant, Dee, at [phone]. Ronel Bedolla MD            Orthopaedic Surgery Sports Medicine and [facility] and [address] [facility] Physician [facility])        [address] your seatbelt home. You are under the influence of drugs-do not drink alcohol, drive, operate machinery, make any important decisions or sign any legal documents for 24 hours. A responsible adult needs to be with you for 24 hours. You may experience lightheadedness, dizziness, or sleepiness following surgery.   Rest at home today- increase activity as tolerated. Progress slowly to a regular diet unless your physician has instructed you otherwise. Drink plenty of water. If persistent nausea and vomiting becomes a problem, call your physician. Coughing, sore throat and muscle aches are other side effects of anesthesia, and should improve with time. Do not drive or operate machinery while taking narcotics. Females of childbearing potential and on hormonal birth control, should use two forms of contraception following procedure if given a medication called sugammadex and/or emend. Additional contraception should be used for 7 days for sugammadex and/or 28 days for emend. These medications have a potential to reduce the effectiveness of hormonal birth control. GENERAL SURGERY DISCHARGE INSTRUCTIONS    Follow your surgeons instructions. Follow up with your surgeon as directed. Observe the operative area for signs of excessive bleeding. If needed apply pressure,elevate if able and contact your surgeon. Observe the operative site for any signs of infection- such as increased pain,redness,fever greater than 101 degrees,swelling, foul odor or drainage. Contact your surgeon if any of these symptoms are present. Keep operative site clean and dry. Do not remove dressing unless instructed to by surgeon. Apply ice as directed. If unable to urinate once you are at home,  notify your surgeon or go to the Emergency Room. Avoid pulling,pushing or tugging to suture line. If you become short of breath call your doctor or go to the ER. Take medications as directed. Pain medication should be taken with food. Do not drive or operate machinery while taking narcotics. For any problems or question call your surgeon.

## 2022-09-09 NOTE — ANESTHESIA POSTPROCEDURE EVALUATION
Department of Anesthesiology  Postprocedure Note    Patient: Jay Jay Moreira  MRN: [id number]  YOB: 1975  Date of evaluation: 9/9/2022      Procedure Summary     Date: 09/09/22 Room / Location: [address]    Anesthesia Start: 0770 Anesthesia Stop: 9555    Procedure: LEFT KNEE ARTHROSCOPY; PARTIAL MEDIAL MENISCECTOMY (Left: Knee) Diagnosis:       Tear of medial meniscus of left knee, initial encounter      (Tear of medial meniscus of left knee, initial encounter [D72.119U])    Surgeons: Aida Hewitt MD Responsible Provider: Israel Arango MD    Anesthesia Type: general ASA Status: 3          Anesthesia Type: No value filed.     Katalina Phase I: Katalina Score: 10    Katalina Phase II:        Anesthesia Post Evaluation    Patient location during evaluation: PACU  Patient participation: complete - patient participated  Level of consciousness: awake and alert  Airway patency: patent  Nausea & Vomiting: no nausea and no vomiting  Complications: no  Cardiovascular status: hemodynamically stable  Respiratory status: acceptable  Hydration status: stable  Multimodal analgesia pain management approach

## 2022-09-09 NOTE — PROGRESS NOTES
Pt awake and alert at this time. Pt on RA, and VSS. Pain being managed-see MAR, tolerating PO. Skin warm , palpable pulses and able to wiggle left toes. Pt meets criteria to be discharged from Phase 1.

## 2022-09-09 NOTE — ANESTHESIA PRE PROCEDURE
Department of Anesthesiology  Preprocedure Note       Name:  Amparo Alexander   Age:  48 y.o.  :  1975                                          MRN:  [id number]         Date:  2022      Surgeon: Neo Mcdonald):  Chun Sanabria MD    Procedure: Procedure(s):  LEFT KNEE ARTHROSCOPY; PARTIAL MEDIAL MENISCECTOMY    Medications prior to admission:   Prior to Admission medications    Medication Sig Start Date End Date Taking? Authorizing Provider   methylPREDNISolone (MEDROL DOSEPACK) 4 MG tablet Take 4 mg by mouth See Admin Instructions Take by mouth.    Yes Historical Provider, MD   levothyroxine (SYNTHROID) 150 MCG tablet TAKE ONE TABLET BY MOUTH DAILY 22   Gabino Esqueda MD   dilTIAZem (CARDIZEM CD) 120 MG extended release capsule Take 1 capsule by mouth daily 22   Indy Mathew MD   montelukast (SINGULAIR) 10 MG tablet Take one tablet by mouth every evening 22   Cristin Hampton MD   budesonide-formoterol [facility]) 160-4.5 MCG/ACT AERO Inhale 2 puffs into the lungs 2 times daily 22   Cristin Hampton MD   ciprofloxacin (CIPRO) 500 MG tablet Take 1 tablet by mouth 2 times daily for 10 days 22  Amber Hooks MD   Saccharomyces boulardii (PROBIOTIC) 250 MG CAPS TAKE ONE CAPSULE BY MOUTH TWICE A DAY 22   Pia Chow MD   folic acid (FOLVITE) 1 MG tablet TAKE ONE TABLET BY MOUTH DAILY 22   Pia Chow MD   acetaminophen (TYLENOL) 500 MG tablet Take 1 tablet by mouth 4 times daily as needed for Pain 22  MAYELA Leary - CNP   liothyronine (CYTOMEL) 5 MCG tablet TAKE TWO TABLETS BY MOUTH EVERY DAY 22   Gabino Esqueda MD   cyanocobalamin 1000 MCG/ML injection INJECT 1ML INTO THE SKIN ONCE MONTHLY 22   Juan Diego Harrison MD   ketoconazole (NIZORAL) 2 % shampoo APPLY TO AFFECTED AREA(S) ONCE DAILY AS NEEDED 22   Juan Diego Harrison MD   albuterol sulfate HFA (VENTOLIN HFA) 108 (90 Base) MCG/ACT inhaler INHALE 2 PUFF BY MOUTH EVERY 6 HOURS AS NEEDED FOR WHEEZING 4/1/22   Liseth Manley MD   BETASEPT SURGICAL SCRUB 4 % external liquid APPLY TO AFFECTED AREA(S) TOPICALLY AS NEEDED 4/1/22   Liseth Manley MD   mometasone (ELOCON) 0.1 % cream APPLY TO AFFECTED AREA(S) ONCE DAILY 4/1/22   Liseth Manley MD   vitamin D (ERGOCALCIFEROL) 1.25 MG (88935 UT) CAPS capsule TAKE 1 CAPSULE BY MOUTH ONE TIME WEEKLY 4/1/22   Liseth Manley MD   urea (UREA 10 HYDRATING) 10 % cream APPLY TO AFFECTED AREA(S) AS NEEDED 4/1/22   Liseth Manley MD   cetirizine (ZYRTEC) 10 MG tablet Take 1 tablet by mouth daily 3/16/22   Neymar Nguyen MD   mupirocin (BACTROBAN) 2 % ointment APPLY TO AFFECTED AREA(S) TOPICALLY THREE TIMES A DAY 2/26/22   Eloisa Rosas DO   pantoprazole (PROTONIX) 40 MG tablet Take 1 tablet by mouth 2 times daily (before meals) 1/28/22   Neymar Nguyen MD   fluticasone (FLONASE) 50 MCG/ACT nasal spray APPLY 1 SPRAY IN THE AFFECTED NOSTRIL ONCE DAILY 1/24/22   Neymar Nguyen MD   polyethylene glycol (MIRALAX) 17 GM/SCOOP POWD powder POUR 17 GRAMS OF POWDER INTO THE CAP OF THE BOTTLE. STIR THE POWDER IN A GLASS (4 TO 8 OZ) OF WATER, JUICE, SODA, COFFEE OR TEA UNTIL COMPLETELY DISSOLVED. DRINK THE SOLUTION. ONCE DAILY AS NEEDED FOR CONSTIPATION 12/6/21   Neymar Nguyen MD   Insulin Syringe-Needle U-100 [place] [facility] INSULIN SYRINGE) 31G X 5/16\" 1 ML MISC AS DIRECTED 3/26/21   Abran Jackson MD   Multiple Vitamins-Minerals (THERAPEUTIC MULTIVITAMIN-MINERALS) tablet Take 1 tablet by mouth daily 6/25/20   Andrade Hoyos MD       Current medications:    Current Facility-Administered Medications   Medication Dose Route Frequency Provider Last Rate Last Admin    ortho mix injection   Injection On Call Tess Magana MD        tranexamic acid (CYKLOKAPRON) 1,000 mg in sodium chloride 0.9 % 60 mL IVPB  1,000 mg IntraVENous On Call to [address] Eduardo Sandoval MD           Allergies:     Allergies   Allergen Reactions    Latex Swelling and Dermatitis Also \"burning of skin\"      Clindamycin/Lincomycin Hives and Other (See Comments)     \"Throat closing\"    Sulfa Antibiotics Anaphylaxis and Hives    Diflucan [Fluconazole] Hives and Itching       Problem List:    Patient Active Problem List   Diagnosis Code    Hypothyroidism E03.9    Chronic sinusitis J32.9    Asthma in adult J45.909    Chronic rhinosinusitis 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.0, 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.9    Iron deficiency anemia D50.9    Eczema L30.9    Intestinal malabsorption K90.9    Thrombocytopenia ([facility]) D69.6    Chronic ITP (idiopathic thrombocytopenia) ([facility]) D69.3    Hyperlipidemia, mixed E78.2    Autoimmune hepatitis ([facility] K75.4    Anxiety F41.9    Chronic pansinusitis J32.4    Atrial fibrillation with RVR ([facility]) I48.91    GERD without esophagitis K21.9    Acute respiratory failure due to COVID-19 ([facility]) U07.1, J96.00    Obstructive sleep apnea G47.33    Acute post-operative pain G89.18    Sprain of metacarpophalangeal joint of left index finger S63.323B       Past Medical History:        Diagnosis Date    A-fib ([facility]     Abdominal wall abscess 02/08/2017    Abdominal wall cellulitis 02/06/2017    Anal fissure 04/30/2015    Anemia, iron deficiency     Anesthesia     PATIENT REQUESTING IF NEEDS TO BE INTUBATED TO USE A GURDEEP TUBE     Asthma     Atrial fibrillation ([facility])     [facility]     Chronic sinusitis     COVID-19     History of blood transfusion     Hypothyroidism     Menorrhagia with regular cycle     Morbid obesity with BMI of 40.0-44.9, adult ([facility]) 05/26/2015    MRSA (methicillin resistant staph aureus) culture positive 02/03/2017    abdominal abscess    MRSA infection 08/20/2012    rt thigh abscess    RODGER (obstructive sleep apnea)     awaiting cpap machine to be delivered not due until after surgery 2/8/22    Pericarditis, acute     Retention of urine     Sinusitis        Past Surgical History:        Procedure Laterality Date    COLONOSCOPY N/A 12/29/2020 COLONOSCOPY DIAGNOSTIC performed by Juliette Hernández MD at [address] [facility]  8/22/2012    INCISION AND DRAINAGE POSTERIOR THIGH ABSCESS    PRE-MALIGNANT / BENIGN SKIN LESION EXCISION N/A 2/8/2022    EXCISION OF SCALP CYST performed by Vale Leonardo MD at [facility]  Aug 2011    repair of rectal fissures and anal skin tag removal    SINUS SURGERY      X 3    TONSILLECTOMY  2004    TONSILLECTOMY AND ADENOIDECTOMY  2005    (partial adenoidectomy)    UPPER GASTROINTESTINAL ENDOSCOPY N/A 12/29/2020    EGD BIOPSY performed by Juliette Hernández MD at [address] History:    Social History     Tobacco Use    Smoking status: Never    Smokeless tobacco: Never   Substance Use Topics    Alcohol use: Yes     Alcohol/week: 0.0 standard drinks     Comment: 2 drinks/week                                 Counseling given: Not Answered      Vital Signs (Current):   Vitals:    09/07/22 1355 09/09/22 0657   BP:  133/83   Pulse:  84   Resp:  16   Temp:  97.5 °F (36.4 °C)   TempSrc:  Temporal   SpO2:  97%   Weight: 216 lb (98 kg) 223 lb (101.2 kg)   Height: 5' 3\" (1.6 m) 5' 3\" (1.6 m)                                              BP Readings from Last 3 Encounters:   09/09/22 133/83   07/21/22 136/86   07/08/22 (!) 143/86       NPO Status: Time of last liquid consumption: 1159                        Time of last solid consumption: 1130                        Date of last liquid consumption: 09/08/22                        Date of last solid food consumption: 09/08/22    BMI:   Wt Readings from Last 3 Encounters:   09/09/22 223 lb (101.2 kg)   08/23/22 216 lb (98 kg)   08/02/22 216 lb (98 kg)     Body mass index is 39.5 kg/m².     CBC:   Lab Results   Component Value Date/Time    WBC 6.5 01/24/2022 11:56 AM    RBC 4.29 01/24/2022 11:56 AM    RBC 4.18 05/10/2017 04:00 PM    HGB 12.7 01/24/2022 11:56 AM    HCT 38.9 01/24/2022 11:56 AM    MCV 90.5 01/24/2022 11:56 AM RDW 14.5 01/24/2022 11:56 AM     01/24/2022 11:56 AM       CMP:   Lab Results   Component Value Date/Time     01/24/2022 11:56 AM    K 3.8 01/24/2022 11:56 AM    K 4.0 12/07/2020 08:17 AM     01/24/2022 11:56 AM    CO2 22 01/24/2022 11:56 AM    BUN 9 01/24/2022 11:56 AM    CREATININE 0.7 01/24/2022 11:56 AM    GFRAA >60 01/24/2022 11:56 AM    GFRAA 100 09/10/2012 01:20 PM    AGRATIO 1.2 01/24/2022 11:56 AM    LABGLOM >60 01/24/2022 11:56 AM    GLUCOSE 85 01/24/2022 11:56 AM    PROT 7.2 01/24/2022 11:56 AM    PROT 8.4 05/17/2012 05:30 AM    CALCIUM 8.5 01/24/2022 11:56 AM    BILITOT 0.6 01/24/2022 11:56 AM    ALKPHOS 288 01/24/2022 11:56 AM    AST 60 01/24/2022 11:56 AM     01/24/2022 11:56 AM       POC Tests: No results for input(s): POCGLU, POCNA, POCK, POCCL, POCBUN, POCHEMO, POCHCT in the last 72 hours.     Coags:   Lab Results   Component Value Date/Time    PROTIME 11.1 12/08/2020 04:28 PM    INR 0.96 12/08/2020 04:28 PM    APTT 29.0 06/02/2014 12:55 PM       HCG (If Applicable):   Lab Results   Component Value Date    PREGTESTUR Negative 02/08/2022        ABGs: No results found for: PHART, PO2ART, TAH5YEH, HUR4XJO, BEART, Y1UYEMMF     Type & Screen (If Applicable):  Lab Results   Component Value Date    LABABO AB 05/15/2012    LABRH Positive 05/15/2012       Drug/Infectious Status (If Applicable):  No results found for: HIV, HEPCAB    COVID-19 Screening (If Applicable):   Lab Results   Component Value Date/Time    COVID19 DETECTED 01/05/2022 09:58 AM           Anesthesia Evaluation  Patient summary reviewed and Nursing notes reviewed no history of anesthetic complications:   Airway: Mallampati: I  TM distance: >3 FB   Neck ROM: full  Mouth opening: > = 3 FB   Dental:    (+) partials      Pulmonary:   (+) sleep apnea:  asthma:     (-) shortness of breath                           Cardiovascular:    (+) dysrhythmias: atrial fibrillation,     (-) hypertension and  angina Neuro/Psych:      (-) CVA           GI/Hepatic/Renal:   (+) GERD:, liver disease:,           Endo/Other:    (+) hypothyroidism::., .    (-) diabetes mellitus               Abdominal:             Vascular:     - PVD. Other Findings:           Anesthesia Plan      general     ASA 3       Induction: intravenous. MIPS: Postoperative opioids intended and Prophylactic antiemetics administered. Anesthetic plan and risks discussed with patient. Use of blood products discussed with patient whom. Plan discussed with CRNA.                     Angela Lal MD   9/9/2022

## 2022-09-09 NOTE — H&P
Reason for visit:  Left knee pain following injury     History of Present Illness: The patient is a 64-year-old female who presents for evaluation of her left knee. She reports injuring her self on 2022. She thinks he may have twisted her knee. She felt a popping sensation followed by significant pain. Ever since then she has had difficulty bearing weight. She reports some swelling. She also reports some catching and locking. At the last visit we elected proceed with nonoperative management. We did proceed with a cortisone injection and physical therapy. However despite this she has continued to be symptomatic. She has pain on a daily basis. She also reports catching and locking. Objective:  Ht 5' 3\" (1.6 m)   Wt 216 lb (98 kg)   BMI 38.26 kg/m²       Physical Exam:  The patient is well-appearing and in no apparent distress  CV-RRR  Pulm-CTAB    Examination of the left knee   There is a small effusion, no gross deformity or skin changes  Range of motion reveals 0 to 125  Neg lachman, negative posterior drawer, no pain or laxity with varus or valgus stress at 0 degrees and 30 degrees of flexion  + medial joint line tenderness  5 out of 5 strength throughout distal muscle groups  Sensation is intact to light touch throughout all distributions  There is no calf swelling or tenderness  Palpable DP pulse, brisk cap refill, 2+ symmetric reflexes      Imagin view x-rays of the left knee were obtained the office today on 2022 and reviewed. There is no fracture or dislocation. Very subtle early joint space narrowing the patellofemoral joint is noted. MRI of the left knee was reviewed. Complex tearing of the medial meniscus is present, involving the posterior horn involving radial and longitudinal vertical components. 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 Assessment:  Left knee pain following injury.   MRI reveals medial meniscus tear and early chondral degeneration of patellofemoral joint     Plan:  I discussed with the patient the diagnosis and treatment options. We discussed operative and nonoperative management. Unfortunately the patient does remain symptomatic despite initial conservative measures. She is also having mechanical symptoms. Therefore I do feel that she would be a candidate for left knee arthroscopy with partial medial meniscectomy. The risks of surgery were defined as but not limited to infection, bleeding, damage to blood vessels or nerves, need for additional surgery. I also told the patient that the ultimate prognosis depends on the amount of chondral degeneration as well as how well her body tolerates meniscal deficiency. She may continue to have some pain as result of meniscal deficiency and would be at risk for progressive degeneration of the knee. She does understand this and likes to proceed.

## 2022-09-12 NOTE — OP NOTE
Operative Note      Patient: Amy Fisher  YOB: 1975  MRN: [id number]    Date of Procedure: 9/9/2022    Pre-Op Diagnosis: Tear of medial meniscus of left knee, initial encounter [S83.242A]    Post-Op Diagnosis: Same       Procedure(s):  LEFT KNEE ARTHROSCOPY; PARTIAL MEDIAL MENISCECTOMY    Surgeon(s):  Shaun Batista MD    Assistant:   Surgical Assistant: Dejuan Arceo    Anesthesia: General    Estimated Blood Loss (mL): Minimal    Complications: None    Specimens:   * No specimens in log *    Implants:  * No implants in log *      Drains: * No LDAs found *    Findings: per dictation    Detailed Description of Procedure: The patient was met in the preoperative holding area. Surgical site was identified marked. Informed consent was obtained. Patient was taken back to the operative room placed on table supine position. General anesthesia was performed. Examination under anesthesia demonstrated a full passive range of motion with no evidence of ligamentous instability. Thigh tourniquet was placed. Lower extremity was prepped and draped using sterile technique. Preop of antibiotics were given within 30 minutes of skin incision. Formal timeout was performed in which the correct patient, surgical site, and procedure was reaffirmed. The Bhat was insufflated to 250 mmHg. Anterior inferior lateral incision was made. Arthroscope was introduced into the notch and the patellofemoral joint. Diagnostic arthroscopy was performed. the patellofemoral joint exhibited no evidence of chondral abnormality. Medial lateral gutters were examined and there were no loose bodies present. Trochlear cartilage was intact. Medial compartment was entered. Overall cartilage surface was intact within the medial femoral condyle. An 18-gauge spinal needle was used to gently trephinate the MCL in order to increase her working distance within the medial compartment. The meniscus was visualized. There was complex tearing involving the junction of the posterior body and posterior horn. This was essentially an oblique radial type of tear. It did proceed from the inner portions of the meniscus to the capsule. Arthroscopic biter was introduced to resect the degenerative tearing components followed by arthroscopic shaver. We did probe this area and determined it was stable. The notch was evaluated. ACL and PCL were intact. Lateral compartment is entered. The lateral compartment exhibited superficial chondral fibrillation and partial-thickness chondral loss of the tibial plateau but no evidence of chondral abnormality lateral femoral condyle. The lateral meniscus exhibited some degenerative fraying of the inner third but no discrete tear. The knee was thoroughly irrigated. All instruments removed. Local anesthesia was injected the portal sites. Closure was performed with a 4-0 Monocryl in buried fashion. Sterile dressings were provided in form of Xeroform, 4 x 4's, ABD, Ace. Patient successfully extubated taken covering excellent condition. At the end of procedure all counts correct. I was present for the entire case.        Electronically signed by Claude Richters, MD on 9/12/2022 at 3:57 PM

## 2022-09-19 ENCOUNTER — TELEPHONE (OUTPATIENT)
Dept: ORTHOPEDIC SURGERY | Age: 47
End: 2022-09-19

## 2022-09-19 ENCOUNTER — APPOINTMENT (OUTPATIENT)
Dept: GENERAL RADIOLOGY | Age: 47
End: 2022-09-19
Payer: COMMERCIAL

## 2022-09-19 ENCOUNTER — HOSPITAL ENCOUNTER (EMERGENCY)
Age: 47
Discharge: HOME | End: 2022-09-19
Attending: EMERGENCY MEDICINE
Payer: COMMERCIAL

## 2022-09-19 VITALS
HEART RATE: 82 BPM | HEIGHT: 63 IN | DIASTOLIC BLOOD PRESSURE: 94 MMHG | OXYGEN SATURATION: 99 % | SYSTOLIC BLOOD PRESSURE: 143 MMHG | BODY MASS INDEX: 40.2 KG/M2 | WEIGHT: 226.85 LBS | RESPIRATION RATE: 18 BRPM | TEMPERATURE: 98.9 F

## 2022-09-19 DIAGNOSIS — L03.116 CELLULITIS OF LEFT LOWER EXTREMITY: Primary | ICD-10-CM

## 2022-09-19 DIAGNOSIS — M79.89 LEFT LEG SWELLING: Primary | ICD-10-CM

## 2022-09-19 LAB
A/G RATIO: 1.1 (ref 1.1–2.2)
ALBUMIN SERPL-MCNC: 4.2 G/DL (ref 3.4–5)
ALP BLD-CCNC: 234 U/L (ref 40–129)
ALT SERPL-CCNC: 59 U/L (ref 10–40)
ANION GAP SERPL CALCULATED.3IONS-SCNC: 9 MMOL/L (ref 3–16)
AST SERPL-CCNC: 48 U/L (ref 15–37)
BASOPHILS ABSOLUTE: 0 K/UL (ref 0–0.2)
BASOPHILS RELATIVE PERCENT: 0.4 %
BILIRUB SERPL-MCNC: 0.3 MG/DL (ref 0–1)
BUN BLDV-MCNC: 15 MG/DL (ref 7–20)
CALCIUM SERPL-MCNC: 9.4 MG/DL (ref 8.3–10.6)
CHLORIDE BLD-SCNC: 106 MMOL/L (ref 99–110)
CO2: 23 MMOL/L (ref 21–32)
CREAT SERPL-MCNC: 0.8 MG/DL (ref 0.6–1.1)
EOSINOPHILS ABSOLUTE: 0.1 K/UL (ref 0–0.6)
EOSINOPHILS RELATIVE PERCENT: 1.6 %
GFR AFRICAN AMERICAN: >60
GFR NON-AFRICAN AMERICAN: >60
GLUCOSE BLD-MCNC: 90 MG/DL (ref 70–99)
HCT VFR BLD CALC: 31.4 % (ref 36–48)
HEMOGLOBIN: 10.3 G/DL (ref 12–16)
LACTIC ACID: 0.6 MMOL/L (ref 0.4–2)
LYMPHOCYTES ABSOLUTE: 1.2 K/UL (ref 1–5.1)
LYMPHOCYTES RELATIVE PERCENT: 16 %
MCH RBC QN AUTO: 28.9 PG (ref 26–34)
MCHC RBC AUTO-ENTMCNC: 32.8 G/DL (ref 31–36)
MCV RBC AUTO: 88.2 FL (ref 80–100)
MONOCYTES ABSOLUTE: 0.6 K/UL (ref 0–1.3)
MONOCYTES RELATIVE PERCENT: 7.9 %
NEUTROPHILS ABSOLUTE: 5.4 K/UL (ref 1.7–7.7)
NEUTROPHILS RELATIVE PERCENT: 74.1 %
PDW BLD-RTO: 14.5 % (ref 12.4–15.4)
PLATELET # BLD: 147 K/UL (ref 135–450)
PMV BLD AUTO: 9 FL (ref 5–10.5)
POTASSIUM REFLEX MAGNESIUM: 3.9 MMOL/L (ref 3.5–5.1)
RBC # BLD: 3.57 M/UL (ref 4–5.2)
SODIUM BLD-SCNC: 138 MMOL/L (ref 136–145)
TOTAL PROTEIN: 8 G/DL (ref 6.4–8.2)
WBC # BLD: 7.3 K/UL (ref 4–11)

## 2022-09-19 PROCEDURE — 85025 COMPLETE CBC W/AUTO DIFF WBC: CPT

## 2022-09-19 PROCEDURE — 87040 BLOOD CULTURE FOR BACTERIA: CPT

## 2022-09-19 PROCEDURE — 83605 ASSAY OF LACTIC ACID: CPT

## 2022-09-19 PROCEDURE — 99284 EMERGENCY DEPT VISIT MOD MDM: CPT

## 2022-09-19 PROCEDURE — 73590 X-RAY EXAM OF LOWER LEG: CPT

## 2022-09-19 PROCEDURE — 80053 COMPREHEN METABOLIC PANEL: CPT

## 2022-09-19 PROCEDURE — 6370000000 HC RX 637 (ALT 250 FOR IP): Performed by: PHYSICIAN ASSISTANT

## 2022-09-19 RX ORDER — LEVOTHYROXINE SODIUM 0.07 MG/1
75 TABLET ORAL DAILY
Qty: 90 TABLET | Refills: 1 | Status: SHIPPED | OUTPATIENT
Start: 2022-09-19

## 2022-09-19 RX ORDER — DOXYCYCLINE HYCLATE 100 MG
100 TABLET ORAL 2 TIMES DAILY
Qty: 20 TABLET | Refills: 0 | Status: SHIPPED | OUTPATIENT
Start: 2022-09-19 | End: 2022-09-29

## 2022-09-19 RX ORDER — DOXYCYCLINE HYCLATE 100 MG
100 TABLET ORAL ONCE
Status: COMPLETED | OUTPATIENT
Start: 2022-09-19 | End: 2022-09-19

## 2022-09-19 RX ORDER — HYDROCODONE BITARTRATE AND ACETAMINOPHEN 5; 325 MG/1; MG/1
1 TABLET ORAL ONCE
Status: COMPLETED | OUTPATIENT
Start: 2022-09-19 | End: 2022-09-19

## 2022-09-19 RX ADMIN — DOXYCYCLINE HYCLATE 100 MG: 100 TABLET, COATED ORAL at 20:38

## 2022-09-19 RX ADMIN — HYDROCODONE BITARTRATE AND ACETAMINOPHEN 1 TABLET: 5; 325 TABLET ORAL at 17:18

## 2022-09-19 ASSESSMENT — PAIN DESCRIPTION - LOCATION
LOCATION: LEG
LOCATION: LEG

## 2022-09-19 ASSESSMENT — PAIN - FUNCTIONAL ASSESSMENT: PAIN_FUNCTIONAL_ASSESSMENT: 0-10

## 2022-09-19 ASSESSMENT — PAIN DESCRIPTION - ORIENTATION
ORIENTATION: LEFT
ORIENTATION: LEFT

## 2022-09-19 ASSESSMENT — PAIN SCALES - GENERAL
PAINLEVEL_OUTOF10: 7
PAINLEVEL_OUTOF10: 7
PAINLEVEL_OUTOF10: 6

## 2022-09-19 ASSESSMENT — PAIN DESCRIPTION - DESCRIPTORS: DESCRIPTORS: BURNING

## 2022-09-19 NOTE — ED PROVIDER NOTES
[address] [facility]        Pt Name: Michael Barbosa  MRN: [id number]  [place] 1975  Date of evaluation: 9/19/2022  Provider: LEILANI Sinclair  PCP: Jorge Miller DO  Note Started: 6:41 PM EDT        I have seen and evaluated this patient with my supervising physician Patricia Freedman, [facility]       Chief Complaint   Patient presents with    Knee Pain     Left; had knee surgery on 9/10; states that yesterday she started with pain and redness to her left shin; denies pain to her calf area; on baby asa       HISTORY OF PRESENT ILLNESS   (Location, Timing/Onset, Context/Setting, Quality, Duration, Modifying Factors, Severity, Associated Signs and Symptoms)  Note limiting factors. Chief Complaint: Leg pain, redness     Michael Barbosa is a 48 y.o. female who presents to the emergency department today with complaints of leg pain, redness. Patient had surgery on 9/10, and yesterday developed some pain and redness to the left shin. She states she has no pain or swelling in her calf. She states the area feels very warm to the touch. She has not had any fevers, chills. She has no further complaints. Nursing Notes were all reviewed and agreed with or any disagreements were addressed in the HPI. REVIEW OF SYSTEMS    (2-9 systems for level 4, 10 or more for level 5)     Review of Systems    Positives and Pertinent negatives as per HPI. Except as noted above in the ROS, all other systems were reviewed and negative.        PAST MEDICAL HISTORY     Past Medical History:   Diagnosis Date    A-fib [facility])     Abdominal wall abscess 02/08/2017    Abdominal wall cellulitis 02/06/2017    Anal fissure 04/30/2015    Anemia, iron deficiency     Anesthesia     PATIENT REQUESTING IF NEEDS TO BE INTUBATED TO USE A GURDEEP TUBE     Asthma     Atrial fibrillation ([address]     Autoimmune hepatitis ([facility]     Chronic sinusitis     COVID-19     History of blood transfusion     Hypothyroidism     Menorrhagia with regular cycle     Morbid obesity with BMI of 40.0-44.9, adult ([address] 05/26/2015    MRSA (methicillin resistant staph aureus) culture positive 02/03/2017    abdominal abscess    MRSA infection 08/20/2012    rt thigh abscess    RODGER (obstructive sleep apnea)     awaiting cpap machine to be delivered not due until after surgery 2/8/22    Pericarditis, acute     Retention of urine     Sinusitis          SURGICAL HISTORY     Past Surgical History:   Procedure Laterality Date    COLONOSCOPY N/A 12/29/2020    COLONOSCOPY DIAGNOSTIC performed by Augustin Lopez MD at [address] [facility] ARTHROSCOPY Left 9/9/2022    LEFT KNEE ARTHROSCOPY; PARTIAL MEDIAL MENISCECTOMY performed by Judith Beck MD at [id number] [place] [address]  8/22/2012    INCISION AND DRAINAGE POSTERIOR THIGH ABSCESS    PRE-MALIGNANT / BENIGN SKIN LESION EXCISION N/A 2/8/2022    EXCISION OF SCALP CYST performed by Brent Talavera MD at [facility]  Aug 2011    repair of rectal fissures and anal skin tag removal    SINUS SURGERY      X 3    TONSILLECTOMY  2004    TONSILLECTOMY AND ADENOIDECTOMY  2005    (partial adenoidectomy)    UPPER GASTROINTESTINAL ENDOSCOPY N/A 12/29/2020    EGD BIOPSY performed by Augustin Lopez MD at [address]       Previous Medications    ACETAMINOPHEN (TYLENOL) 500 MG TABLET    Take 1 tablet by mouth 4 times daily as needed for Pain    ALBUTEROL SULFATE HFA (VENTOLIN HFA) 108 (90 BASE) MCG/ACT INHALER    INHALE 2 PUFF BY MOUTH EVERY 6 HOURS AS NEEDED FOR WHEEZING    ASPIRIN 325 MG EC TABLET    Take 1 tablet by mouth daily for 14 days    BETASEPT SURGICAL SCRUB 4 % EXTERNAL LIQUID    APPLY TO AFFECTED AREA(S) TOPICALLY AS NEEDED    BUDESONIDE-FORMOTEROL (SYMBICORT) 160-4.5 MCG/ACT AERO    Inhale 2 puffs into the lungs 2 times daily    CETIRIZINE (ZYRTEC) 10 MG TABLET    Take 1 tablet by mouth daily    CYANOCOBALAMIN 1000 MCG/ML INJECTION    INJECT 1ML INTO THE SKIN ONCE MONTHLY    DILTIAZEM (CARDIZEM CD) 120 MG EXTENDED RELEASE CAPSULE    Take 1 capsule by mouth daily    FLUTICASONE (FLONASE) 50 MCG/ACT NASAL SPRAY    APPLY 1 SPRAY IN THE AFFECTED NOSTRIL ONCE DAILY    FOLIC ACID (FOLVITE) 1 MG TABLET    TAKE ONE TABLET BY MOUTH DAILY    INSULIN SYRINGE-NEEDLE U-100 (ULTICARE INSULIN SYRINGE) 31G X 5/16\" 1 ML MISC    AS DIRECTED    KETOCONAZOLE (NIZORAL) 2 % SHAMPOO    APPLY TO AFFECTED AREA(S) ONCE DAILY AS NEEDED    LEVOTHYROXINE (SYNTHROID) 75 MCG TABLET    Take 1 tablet by mouth daily    LIOTHYRONINE (CYTOMEL) 5 MCG TABLET    TAKE TWO TABLETS BY MOUTH EVERY DAY    METHYLPREDNISOLONE (MEDROL DOSEPACK) 4 MG TABLET    Take 4 mg by mouth See Admin Instructions Take by mouth. MOMETASONE (ELOCON) 0.1 % CREAM    APPLY TO AFFECTED AREA(S) ONCE DAILY    MONTELUKAST (SINGULAIR) 10 MG TABLET    Take one tablet by mouth every evening    MULTIPLE VITAMINS-MINERALS (THERAPEUTIC MULTIVITAMIN-MINERALS) TABLET    Take 1 tablet by mouth daily    MUPIROCIN (BACTROBAN) 2 % OINTMENT    APPLY TO AFFECTED AREA(S) TOPICALLY THREE TIMES A DAY    PANTOPRAZOLE (PROTONIX) 40 MG TABLET    Take 1 tablet by mouth 2 times daily (before meals)    POLYETHYLENE GLYCOL (MIRALAX) 17 GM/SCOOP POWD POWDER    POUR 17 GRAMS OF POWDER INTO THE CAP OF THE BOTTLE. STIR THE POWDER IN A GLASS (4 TO 8 OZ) OF WATER, JUICE, SODA, COFFEE OR TEA UNTIL COMPLETELY DISSOLVED. DRINK THE SOLUTION.  ONCE DAILY AS NEEDED FOR CONSTIPATION    SACCHAROMYCES BOULARDII (PROBIOTIC) 250 MG CAPS    TAKE ONE CAPSULE BY MOUTH TWICE A DAY    UREA (UREA 10 HYDRATING) 10 % CREAM    APPLY TO AFFECTED AREA(S) AS NEEDED    VITAMIN D (ERGOCALCIFEROL) 1.25 MG (49058 UT) CAPS CAPSULE    TAKE 1 CAPSULE BY MOUTH ONE TIME WEEKLY         ALLERGIES     Latex, Clindamycin/lincomycin, Sulfa antibiotics, and Diflucan [fluconazole]    FAMILYHISTORY Family History   Problem Relation Age of Onset    High Blood Pressure Mother     Hypertension Mother     Elevated Lipids Mother     Other Mother         fatty liver    Heart Disease Father     Hypertension Father     Elevated Lipids Father     Coronary Art Dis Father     Liver Disease Father     Diabetes Maternal Aunt           SOCIAL HISTORY       Social History     Tobacco Use    Smoking status: Never    Smokeless tobacco: Never   Vaping Use    Vaping Use: Never used   Substance Use Topics    Alcohol use: Yes     Alcohol/week: 0.0 standard drinks     Comment: 2 drinks/week     Drug use: No       SCREENINGS    Una Coma Scale  Eye Opening: Spontaneous  Best Verbal Response: Oriented  Best Motor Response: Obeys commands  [place] Coma Scale Score: 15        PHYSICAL EXAM    (up to 7 for level 4, 8 or more for level 5)     ED Triage Vitals [09/19/22 1643]   BP Temp Temp Source Heart Rate Resp SpO2 Height Weight   (!) 143/94 98.9 °F (37.2 °C) Oral 82 18 99 % 5' 3\" (1.6 m) 226 lb 13.7 oz (102.9 kg)       Physical Exam          DIAGNOSTIC RESULTS   LABS:    Labs Reviewed   CBC WITH AUTO DIFFERENTIAL - Abnormal; Notable for the following components:       Result Value    RBC 3.57 (*)     Hemoglobin 10.3 (*)     Hematocrit 31.4 (*)     All other components within normal limits   COMPREHENSIVE METABOLIC PANEL W/ REFLEX TO MG FOR LOW K - Abnormal; Notable for the following components:    Alkaline Phosphatase 234 (*)     ALT 59 (*)     AST 48 (*)     All other components within normal limits   CULTURE, BLOOD 1   CULTURE, BLOOD 2   LACTIC ACID       When ordered only abnormal lab results are displayed. All other labs were within normal range or not returned as of this dictation. EKG: When ordered, EKG's are interpreted by the Emergency Department Physician in the absence of a cardiologist.  Please see their note for interpretation of EKG.     RADIOLOGY:   Non-plain film images such as CT, Ultrasound and MRI are read by the radiologist. Plain radiographic images are visualized and preliminarily interpreted by the ED Provider with the below findings:        Interpretation per the Radiologist below, if available at the time of this note:    XR TIBIA FIBULA LEFT (2 VIEWS)   Final Result      1. No evidence of soft tissue air or other focal soft tissue abnormality. 2.  No acute bony abnormality and no plain radiographic evidence of   osteomyelitis. XR TIBIA FIBULA LEFT (2 VIEWS)    Result Date: 9/19/2022  EXAM: XR Left Tibia and Fibula, 2 Views EXAM DATE/TIME: 9/19/2022 5:03 pm CLINICAL HISTORY: ORDERING SYSTEM PROVIDED  recent surgery, now with swelling/redness to left lower leg  TECHNOLOGIST PROVIDED HISTORY:  Reason for exam:->recent surgery, now with swelling/redness to left lower leg  Reason for Exam: pain, redness and swelling post surgery TECHNIQUE: Frontal and lateral views of the left tibia and fibula. COMPARISON: No relevant prior studies available. FINDINGS: Bones/joints:  No acute bony abnormality and no plain radiographic evidence of osteomyelitis. No dislocation. Soft tissues:  No evidence of soft tissue air or other focal soft tissue abnormality. No radiopaque foreign body. 1.  No evidence of soft tissue air or other focal soft tissue abnormality. 2.  No acute bony abnormality and no plain radiographic evidence of osteomyelitis.            PROCEDURES   Unless otherwise noted below, none     Procedures      CONSULTS:  IP CONSULT TO ORTHOPEDIC SURGERY      EMERGENCY DEPARTMENT COURSE and DIFFERENTIAL DIAGNOSIS/MDM:   Vitals:    Vitals:    09/19/22 1643   BP: (!) 143/94   Pulse: 82   Resp: 18   Temp: 98.9 °F (37.2 °C)   TempSrc: Oral   SpO2: 99%   Weight: 226 lb 13.7 oz (102.9 kg)   Height: 5' 3\" (1.6 m)       Patient was given the following medications:  Medications   doxycycline hyclate (VIBRA-TABS) tablet 100 mg (has no administration in time range)   HYDROcodone-acetaminophen (NORCO) 5-325 MG per tablet 1 tablet (1 tablet Oral Given 9/19/22 4001)         Is this patient to be included in the SEP-1 Core Measure due to severe sepsis or septic shock? No   Exclusion criteria - the patient is NOT to be included for SEP-1 Core Measure due to:  2+ SIRS criteria are not met    ED COURSE & MEDICAL DECISION MAKING    - The patient presented to the ER with complaints of redness, pain to left shin. Vital signs were reviewed. Exam as above. Labs, Imaging ordered. - Pertinent Labs & Imaging studies reviewed. (See chart for details)   -  Patient seen and evaluated in the emergency department. -  Triage and nursing notes reviewed and incorporated. -  Old chart records reviewed and incorporated. -   I have seen and evaluated this patient with my supervising physician Ashley Mendez DO.  -  Differential diagnosis includes: Cellulitis, lymphangitis, compartment syndrome, DVT, contusion, other  -  Work-up included:  See above  -  ED treatment included: Doxycycline, Norco  - Consults: Ortho -I discussed the patient with Dr. Pa Plascencia, orthopedic surgeon on-call. He recommended that given the reassuring laboratory work-up, it would be appropriate to trial outpatient antibiotics, doxycycline would be appropriate given her allergy profile, and to have her follow-up with Dr. Robert Morrell in the next 1 to 2 days  -  Results discussed with patient and/or family. Labs show no leukocytosis, hemoglobin 10.3, hematocrit 31.4. Metabolic panel with no concerning abnormalities. Lactic acid is not elevated at 0.6. Imaging studies show no evidence of soft tissue air or other focal soft tissue abnormality. At this time, we recommend discharge, as the patient is stable for outpatient management. She is given strict return precautions. The patient and/or family is agreeable with plan of care and disposition.  -  Disposition: Home in stable condition  - Critical Care:   The total critical care time I independently spent while evaluating and treating this patient was 10 minutes. This excludes time spent doing separately billable procedures. This includes time at the bedside, data interpretation, medication management, obtaining critical history from collateral sources if the patient is unable to provide it directly, and physician consultation. Specifics of interventions taken and potentially life-threatening diagnostic considerations are listed above in the medical decision making. If this was a shared visit with an physician, the time in this attestation is non-concurrent critical care time out of the total shared critical care time provided by the physician and myself. FINAL IMPRESSION      1.  Cellulitis of left lower extremity          DISPOSITION/PLAN   DISPOSITION Decision To Discharge 09/19/2022 08:21:41 PM      PATIENT REFERRED TO:  Rodriguez Woods DO  [facility]  [phone]    Schedule an appointment as soon as possible for a visit       Navneet Nicolas MD  [address]  [address] [place] [address]  [place] [phone]    Call in 1 day  for a re-check in  1-2  days    [facility] Emergency Department  [address] [place] [address]  [phone]    If symptoms worsen    DISCHARGE MEDICATIONS:  New Prescriptions    DOXYCYCLINE HYCLATE (VIBRA-TABS) 100 MG TABLET    Take 1 tablet by mouth 2 times daily for 10 days       DISCONTINUED MEDICATIONS:  Discontinued Medications    No medications on file              (Please note that portions of this note were completed with a voice recognition program.  Efforts were made to edit the dictations but occasionally words are mis-transcribed.)    Torrance Goldmann, PA (electronically signed)           Torrance Goldmann, [place]  09/19/22 6829

## 2022-09-19 NOTE — ED PROVIDER NOTES
[facility]      Pt Name: Gaudencio Steward  MRN: [id number]  [place] 1975  Date of evaluation: 9/19/2022  Provider: Nguyễn Lobato, [facility],6Th [facility]  Chief Complaint   Patient presents with    Knee Pain     Left; had knee surgery on 9/10; states that yesterday she started with pain and redness to her left shin; denies pain to her calf area; on baby asa       I have fully participated in the care of Gaudencio Steward and have had a face-to-face evaluation. I have reviewed and agree with all pertinent clinical information, and midlevel provider's history, and physical exam. I have also reviewed the labs and imaging studies and treatment plan. I have also reviewed and agree with the medications, allergies and past medical history section for this Gaudencio Steward. I agree with the diagnosis, and I concur. I wore personal protective equipment when I was in the room the entire time. This includes gloves, N95 mask, face shield, and a glove over my stethoscope for protection.     Past Medical History:   Diagnosis Date    A-fib [facility])     Abdominal wall abscess 02/08/2017    Abdominal wall cellulitis 02/06/2017    Anal fissure 04/30/2015    Anemia, iron deficiency     Anesthesia     PATIENT REQUESTING IF NEEDS TO BE INTUBATED TO USE A GURDEEP TUBE     Asthma     Atrial fibrillation ([address]     Autoimmune hepatitis ([facility] [address]     Chronic sinusitis     COVID-19     History of blood transfusion     Hypothyroidism     Menorrhagia with regular cycle     Morbid obesity with BMI of 40.0-44.9, adult ([address] 05/26/2015    MRSA (methicillin resistant staph aureus) culture positive 02/03/2017    abdominal abscess    MRSA infection 08/20/2012    rt thigh abscess    RODGER (obstructive sleep apnea)     awaiting cpap machine to be delivered not due until after surgery 2/8/22    Pericarditis, acute     Retention of urine     Sinusitis        MDM:  Gaudencio Steward is a 48 y.o. female who presents with redness on the anterior left shin. She had a minute discectomy a few days ago. She has had some bruising in the area. But she started developing this area of redness. She denies any fevers or chills. She denies any nausea vomiting. Nothing makes it better or worse. She describes it as moderate. Physical exam reveals an area of erythema of the anterior left shin. It measures 3 cm x 7.5 cm. There is no drainage or ulceration noted. There is no streaking or adenopathy. Cap refills less than 2 seconds. X-rays and laboratory work did not reveal a cause for her symptoms. The physician assistant spoke to Dr. Ladonna Hopson who wanted the patient to follow-up with the orthopedic surgeon tomorrow and place patient on antibiotics. Therefore, patient was started on antibiotics and instructed to follow with her orthopedic surgeon tomorrow and return to the emergency department if any problems. Vitals:    09/19/22 1643   BP: (!) 143/94   Pulse: 82   Resp: 18   Temp: 98.9 °F (37.2 °C)   SpO2: 99%       Lab results  Labs Reviewed   CBC WITH AUTO DIFFERENTIAL - Abnormal; Notable for the following components:       Result Value    RBC 3.57 (*)     Hemoglobin 10.3 (*)     Hematocrit 31.4 (*)     All other components within normal limits   COMPREHENSIVE METABOLIC PANEL W/ REFLEX TO MG FOR LOW K - Abnormal; Notable for the following components:    Alkaline Phosphatase 234 (*)     ALT 59 (*)     AST 48 (*)     All other components within normal limits   CULTURE, BLOOD 1   CULTURE, BLOOD 2   LACTIC ACID         Radiology results  XR TIBIA FIBULA LEFT (2 VIEWS)   Final Result      1. No evidence of soft tissue air or other focal soft tissue abnormality. 2.  No acute bony abnormality and no plain radiographic evidence of   osteomyelitis. See discharge instructions for specific medications, discharge information, and treatments.  They were verbally instructed to return to emergency if any problems. Medications   doxycycline hyclate (VIBRA-TABS) tablet 100 mg (has no administration in time range)   HYDROcodone-acetaminophen (NORCO) 5-325 MG per tablet 1 tablet (1 tablet Oral Given 9/19/22 1718)       New Prescriptions    DOXYCYCLINE HYCLATE (VIBRA-TABS) 100 MG TABLET    Take 1 tablet by mouth 2 times daily for 10 days       The patient's blood pressure was found to be elevated according to CMS/Medicare and the Affordable Care Act/[facility] criteria. Elevated blood pressure could occur because of pain or anxiety or other reasons and does not mean that they need to have their blood pressure treated or medications otherwise adjusted. However, this could also be a sign that they will need to have their blood pressure treated or medications changed. The patient was instructed to follow up closely with their personal physician to have their blood pressure rechecked. The patient was instructed to take a list of recent blood pressure readings to their next visit with their personal physician. IMPRESSIONS:  1.  Cellulitis of left lower extremity                Arya Stafford DO  09/19/22 2033

## 2022-09-19 NOTE — TELEPHONE ENCOUNTER
Medication:   Requested Prescriptions     Pending Prescriptions Disp Refills    levothyroxine (SYNTHROID) 150 MCG tablet [Pharmacy Med Name: LEVOTHYROXINE 150 MCG TABLET] 30 tablet 3     Sig: TAKE ONE TABLET BY MOUTH DAILY       Last Filled:      Patient Phone Number: [phone] (home)     Last appt: 3/25/2022   Next appt: Due next year    Last Thyroid:   Lab Results   Component Value Date/Time    TSH 8.28 09/17/2020 04:09 PM    FT3 2.7 07/23/2021 01:41 PM    T4FREE 0.9 07/23/2021 01:41 PM

## 2022-09-19 NOTE — ED NOTES
Assessed pt leg in lobby. Left lower leg is warm to the touch, PMSx4 .        Janes Veloz RN  09/19/22 6874

## 2022-09-19 NOTE — Clinical Note
Valeri Meredith was seen and treated in our emergency department on 9/19/2022. She may return to work on 09/21/2022. If you have any questions or concerns, please don't hesitate to call.       Eve Daley, DO

## 2022-09-20 ENCOUNTER — OFFICE VISIT (OUTPATIENT)
Dept: ORTHOPEDIC SURGERY | Age: 47
End: 2022-09-20

## 2022-09-20 ENCOUNTER — HOSPITAL ENCOUNTER (OUTPATIENT)
Dept: VASCULAR LAB | Age: 47
Discharge: HOME | End: 2022-09-20
Payer: COMMERCIAL

## 2022-09-20 VITALS — BODY MASS INDEX: 40.04 KG/M2 | HEIGHT: 63 IN | WEIGHT: 226 LBS

## 2022-09-20 DIAGNOSIS — M79.89 LEFT LEG SWELLING: Primary | ICD-10-CM

## 2022-09-20 DIAGNOSIS — S83.242A ACUTE MEDIAL MENISCUS TEAR OF LEFT KNEE, INITIAL ENCOUNTER: ICD-10-CM

## 2022-09-20 DIAGNOSIS — M79.89 LEFT LEG SWELLING: ICD-10-CM

## 2022-09-20 PROCEDURE — 99024 POSTOP FOLLOW-UP VISIT: CPT | Performed by: ORTHOPAEDIC SURGERY

## 2022-09-20 PROCEDURE — 93971 EXTREMITY STUDY: CPT

## 2022-09-20 RX ORDER — METHYLPREDNISOLONE 4 MG/1
TABLET ORAL
Qty: 1 KIT | Refills: 0 | Status: SHIPPED | OUTPATIENT
Start: 2022-09-20 | End: 2022-10-20

## 2022-09-20 NOTE — ED NOTES
Provider order placed for patient's discharge. Provider reviewed decision to discharge with the patient. Discharge paperwork and any prescriptions were reviewed with the patient. Patient verbalized understanding of discharge education and any prescriptions and has no further questions or further needs at this time. Patient left with all personal belongings and was stable upon departure. Patient thanked for choosing [facility] ([facility]) and informed to return should any need arise.        Xiomara Sawyer RN  09/19/22 1731

## 2022-09-23 LAB
BLOOD CULTURE, ROUTINE: NORMAL
CULTURE, BLOOD 2: NORMAL

## 2022-09-26 NOTE — PROGRESS NOTES
9/20/2022     Reason for visit:  Status post left knee arthroscopy with partial medial meniscectomy on 9/9/2022    History of Present Illness: The patient returns for postop evaluation. She does report some increased swelling and bruising of the leg. She also reports swelling of the calf. Otherwise she reports some mild soreness about the leg. No numbness or tingling. No fever or chills. Objective:  Ht 5' 3\" (1.6 m)   Wt 226 lb (102.5 kg)   LMP 11/09/2020 (Exact Date)   BMI 40.03 kg/m²      Physical Exam:  The patient is well-appearing and in no apparent distress  Examination of the left knee   There is a small effusion, mild ecchymosis present of the leg  Range of motion reveals 0 to 120  Neg lachman, negative posterior drawer, no pain or laxity with varus or valgus stress at 0 degrees and 30 degrees of flexion  no joint line tenderness  5 out of 5 strength throughout distal muscle groups  Sensation is intact to light touch throughout all distributions  There is some mild Swelling present  Palpable DP pulse, brisk cap refill, 2+ symmetric reflexes     Assessment:  Status post left knee arthroscopy with partial medial meniscectomy on 9/9/2022    Plan: At this point would recommend an ultrasound of the lower extremity. We will start physical therapy. We will prescribe a Medrol Dosepak. Return in 4 weeks. Madhuri Maciel MD            Orthopaedic Surgery Sports Medicine and [facility] and [facility]            Team Physician [facility] ([place])      Disclaimer: This note was dictated with voice recognition software. Though review and correction are routine, we apologize for any errors.

## 2022-09-27 ENCOUNTER — HOSPITAL ENCOUNTER (OUTPATIENT)
Dept: PHYSICAL THERAPY | Age: 47
Setting detail: THERAPIES SERIES
Discharge: HOME | End: 2022-09-27
Payer: COMMERCIAL

## 2022-09-27 ENCOUNTER — TELEPHONE (OUTPATIENT)
Dept: ORTHOPEDIC SURGERY | Age: 47
End: 2022-09-27

## 2022-09-27 DIAGNOSIS — M25.562 LEFT KNEE PAIN, UNSPECIFIED CHRONICITY: ICD-10-CM

## 2022-09-27 DIAGNOSIS — S83.242A ACUTE MEDIAL MENISCUS TEAR OF LEFT KNEE, INITIAL ENCOUNTER: ICD-10-CM

## 2022-09-27 PROCEDURE — 97530 THERAPEUTIC ACTIVITIES: CPT

## 2022-09-27 PROCEDURE — 97163 PT EVAL HIGH COMPLEX 45 MIN: CPT

## 2022-09-27 PROCEDURE — 97110 THERAPEUTIC EXERCISES: CPT

## 2022-09-27 PROCEDURE — 97016 VASOPNEUMATIC DEVICE THERAPY: CPT

## 2022-09-27 RX ORDER — TRAMADOL HYDROCHLORIDE 50 MG/1
50 TABLET ORAL EVERY 4 HOURS PRN
Qty: 30 TABLET | Refills: 0 | Status: SHIPPED | OUTPATIENT
Start: 2022-09-27 | End: 2022-10-04

## 2022-09-27 NOTE — FLOWSHEET NOTE
[address] [facility] Physical Therapy  Phone: [phone]   Fax: [phone]    Physical Therapy Daily Treatment Note    Date:  2022     Patient Name:  Ny Juan    :  1975  MRN: [id number]  Medical Diagnosis:  Left leg swelling [M79.89]  Acute medial meniscus tear of left knee, initial encounter [S83.809A]  Treatment Diagnosis:  Decrease left knee AROM,  hip/knee muscle weakness, difficulty walking                    Insurance/Certification information:  PT Insurance Information: [facility]  Physician Information:  Everton Jha MD    Plan of care signed (Y/N): []  Yes [x]  No     Date of Patient follow up with Physician:      Progress Report: []  Yes  [x]  No     Date Range for reporting period:  Beginnin2022  Ending:     Progress report due (10 Rx/or 30 days whichever is less): visit #10 or  (date)     Recertification due (POC duration/ or 90 days whichever is less): visit # or  (date)     Visit # Insurance Allowable Auth required?  Date Range   eval  / 30 visit  pending [x]  Yes  []  No pending       Units approved Units used Date Range   pending         Latex Allergy:  [x]NO      []YES  Preferred Language for Healthcare:   [x]English       []other:    Functional Scale:           Date assessed:  TO physical FS primary measure score = 33; risk adjusted = 36  2022    Pain level:  8/10     SUBJECTIVE:  See eval    OBJECTIVE: See eval      RESTRICTIONS/PRECAUTIONS:  Hypothyroidism    Exercises/Interventions:     Therapeutic Exercises (92552) Resistance / level Sets/sec Reps Notes          Reviewed  HEP  see below                                                         Therapeutic Activities (47776)                                          Neuromuscular Re-ed (61339)                                                 Manual Intervention (53361)                                                   Girth (cm) L - pre vaso / post vaso R - pre-vaso / post-vaso   Mid-patellar 43/  TBD    Suprapatellar 46/TBD       Modalities:  9/27 : Vaso to left knee with in supine with LE elevated with pillow x 10 min    Pt. Education:  09/27/2022  -patient educated on diagnosis, prognosis and expectations for rehab  -all patient questions were answered    Home Exercise Program:  Access Code: [email or web address]  URL: [email or web address]  Date: 09/27/2022  Prepared by: Bhumi Maurer    Exercises  Supine Quad Set - 1 x daily - 7 x weekly - 1 sets - 10 reps  Small Range Straight Leg Raise - 1 x daily - 7 x weekly - 1 sets - 10 reps  Supine Knee Extension Strengthening - 1 x daily - 7 x weekly - 1 sets - 10 reps  Supine Bridge - 1 x daily - 7 x weekly - 1 sets - 10 reps        Therapeutic Exercise and NMR EXR  [] (06177) Provided verbal/tactile cueing for activities related to strengthening, flexibility, endurance, ROM for improvements in  [] LE / Lumbar: LE, proximal hip, and core control with self care, mobility, lifting, ambulation. [] UE / Cervical: cervical, postural, scapular, scapulothoracic and UE control with self care, reaching, carrying, lifting, house/yardwork, driving, computer work.  [] (57529) Provided verbal/tactile cueing for activities related to improving balance, coordination, kinesthetic sense, posture, motor skill, proprioception to assist with   [] LE / lumbar: LE, proximal hip, and core control in self care, mobility, lifting, ambulation and eccentric single leg control.    [] UE / cervical: cervical, scapular, scapulothoracic and UE control with self care, reaching, carrying, lifting, house/yardwork, driving, computer work.   [] (43267) Therapist is in constant attendance of 2 or more patients providing skilled therapy interventions, but not providing any significant amount of measurable one-on-one time to either patient, for improvements in  [] LE / lumbar: LE, proximal hip, and core control in self care, mobility, lifting, ambulation and eccentric single leg control. [] UE / cervical: cervical, scapular, scapulothoracic and UE control with self care, reaching, carrying, lifting, house/yardwork, driving, computer work. NMR and Therapeutic Activities:    [] (53889 or 03640) Provided verbal/tactile cueing for activities related to improving balance, coordination, kinesthetic sense, posture, motor skill, proprioception and motor activation to allow for proper function of   [] LE: / Lumbar core, proximal hip and LE with self care and ADLs  [] UE / Cervical: cervical, postural, scapular, scapulothoracic and UE control with self care, carrying, lifting, driving, computer work.   [] (91665) Gait Re-education- Provided training and instruction to the patient for proper LE, core and proximal hip recruitment and positioning and eccentric body weight control with ambulation re-education including up and down stairs     Home Management Training / Self Care:  [] (08694) Provided self-care/home management training related to activities of daily living and compensatory training, and/or use of adaptive equipment for improvement with: ADLs and compensatory training, meal preparation, safety procedures and instruction in use of adaptive equipment, including bathing, grooming, dressing, personal hygiene, basic household cleaning and chores.      Home Exercise Program:    [x] (30362) Reviewed/Progressed HEP activities related to strengthening, flexibility, endurance, ROM of   [] LE / Lumbar: core, proximal hip and LE for functional self-care, mobility, lifting and ambulation/stair navigation   [] UE / Cervical: cervical, postural, scapular, scapulothoracic and UE control with self care, reaching, carrying, lifting, house/yardwork, driving, computer work  [] (90918)Reviewed/Progressed HEP activities related to improving balance, coordination, kinesthetic sense, posture, motor skill, proprioception of   [] LE: core, proximal hip and LE for self care, mobility, lifting, and ambulation/stair navigation    [] UE / Cervical: cervical, postural,  scapular, scapulothoracic and UE control with self care, reaching, carrying, lifting, house/yardwork, driving, computer work    Manual Treatments:  PROM / STM / Oscillations-Mobs:  G-I, II, III, IV (PA's, Inf., Post.)  [] (32161) Provided manual therapy to mobilize LE, proximal hip and/or LS spine soft tissue/joints for the purpose of modulating pain, promoting relaxation,  increasing ROM, reducing/eliminating soft tissue swelling/inflammation/restriction, improving soft tissue extensibility and allowing for proper ROM for normal function with   [] LE / lumbar: self care, mobility, lifting and ambulation. [] UE / Cervical: self care, reaching, carrying, lifting, house/yardwork, driving, computer work. Modalities:  [] (29276) Vasopneumatic compression: Utilized vasopneumatic compression to decrease edema / swelling for the purpose of improving mobility and quad tone / recruitment which will allow for increased overall function including but not limited to self-care, transfers, ambulation, and ascending / descending stairs. Charges:  Timed Code Treatment Minutes: 38   Total Treatment Minutes: 56     [] EVAL - LOW (67063)   [] EVAL - MOD (71783)  [x] EVAL - HIGH (99978)  [] RE-EVAL (82431)  [x] UC(60172) x 1      [] Ionto  [] NMR (28672) x       [x] Vaso  [] Manual (25080) x       [] Ultrasound  [x] TA x  1      [] Mech Traction (67225)  [] Aquatic Therapy x     [] ES (un) (26775):   [] Home Management Training x  [] ES(attended) (93716)   [] Dry Needling 1-2 muscles (45065):  [] Dry Needling 3+ muscles (413501)  [] Group:      [] Other:     GOALS:    Patient stated goal:   [] Progressing: [] Met: [] Not Met: [] Adjusted     Therapist goals for Patient:   Short Term Goals: To be achieved in: 2 weeks  1. Independent in HEP and progression per patient tolerance, in order to prevent re-injury.    [] Progressing: [] Met: [] Not Met: [] Adjusted  2. Patient will have a decrease in pain to facilitate improvement in movement, function, and ADLs as indicated by Functional Deficits. [] Progressing: [] Met: [] Not Met: [] Adjusted     Long Term Goals: To be achieved in: 6 weeks / DC   1. Patient to improve FOTO score of at least 53 to assist with reaching prior level of function. [] Progressing: [] Met: [] Not Met: [] Adjusted  2. Patient will demonstrate increased right knee AROM to 125 deg to allow for proper joint functioning as indicated by patients Functional Deficits. [] Progressing: [] Met: [] Not Met: [] Adjusted  3. Patient will demonstrate an increase in right knee to +4/5 Strength to at least  as well as good proximal hip strength and control to allow for proper functional mobility as indicated by patients Functional Deficits. [] Progressing: [] Met: [] Not Met: [] Adjusted  4. Patient will return to functional activities including ambulating level and unlevel surfaces  without increased symptoms or restriction and with normalized gait pattern. [] Progressing: [] Met: [] Not Met: [] Adjusted  5. Patient to demonstrate proper squat form to demonstrate hip/knee control for ADLs. [] Progressing: [] Met: [] Not Met: [] Adjusted    Overall Progression Towards Functional goals/ Treatment Progress Update:  [] Patient is progressing as expected towards functional goals listed. [] Progression is slowed due to complexities/Impairments listed. [] Progression has been slowed due to co-morbidities.   [x] Plan just implemented, too soon to assess goals progression <30days   [] Goals require adjustment due to lack of progress  [] Patient is not progressing as expected and requires additional follow up with physician  [] Other    Persisting Functional Limitations/Impairments:  [x]Sleeping []Sitting               [x]Standing [x]Transfers        [x]Walking [x]Kneeling               [x]Stairs [x]Squatting / bending   [x]ADLs []Reaching  []Lifting [x]Housework  []Driving []Job related tasks  []Sports/Recreation []Other:        ASSESSMENT:  See eval  Treatment/Activity Tolerance:  [] Patient able to complete tx [] Patient limited by fatigue  [] Patient limited by pain  [] Patient limited by other medical complications  [] Other:     Prognosis: [] Good [] Fair  [] Poor    Patient Requires Follow-up: [x] Yes  [] No    Plan for next treatment session:    PLAN: See eval. PT 2x / week for 6 weeks. [] Continue per plan of care [] Alter current plan (see comments)  [x] Plan of care initiated [] Hold pending MD visit [] Discharge    Electronically signed by: Olinda Gutiérrez, PT PT, DPT    Note: If patient does not return for scheduled/ recommended follow up visits, this note will serve as a discharge from care along with most recent update on progress.

## 2022-09-27 NOTE — PLAN OF CARE
difficulty sleeping at night. Since Pt reports that she likely may have torn meniscus wearing high heels ambulating up/down 4 flights of steps for 6 years. Relevant Medical History:  Functional Outcome: FOTO physical FS primary measure score = 33; Risk adjusted = 36    Pain Scale: 9/10  Easing factors: Ice  Provocative factors:  walking, bending, all etc    Type: [x]Constant   []Intermittent  []Radiating []Localized []other:     Numbness/Tingling: occasional in left foot in dependent     Occupation/School: Human resource training     Living Status/Prior Level of Function:Prior to this injury / incident, pt was independent with ADLs and IADLs,  Patient lives alone in a 3rd floor apartment  32 steps ( 4 flights) . OBJECTIVE:   Palpation: mod to severe tenderness over medial leg and foot     Functional Mobility/Transfers:  Mod I     Posture: normal    Bandages/Dressings/Incisions: Bruising medial leg from knee to ankle     Gait: (include devices/WB status)   antalgic stiff gait pattern on LLE     Dermatomes Normal Abnormal Comments   inguinal area (L1)  x     anterior mid-thigh (L2) x     distal ant thigh/med knee (L3) x     medial lower leg and foot (L4) x     lateral lower leg and foot (L5) x     posterior calf (S1) x     medial calcaneus (S2) x       Lumbar AROM screen: [x] [place]  [] abnormal:     PROM AROM    L R L R   Hip Flexion       Hip Abduction       Hip ER       Hip IR       Knee Flexion   94 135   Knee Extension   0    Dorsiflexion        Plantarflexion        Inversion        Eversion            Strength (0-5) / Myotomes Left Right   Hip Flexion - supine     Hip Flexion - seated (L1-2) 4 +4   Hip Abduction     Hip Adduction     Hip ER     Hip IR     Quads (L2-4) 4 5   Hamstrings 4    Ankle Dorsiflexion (L4-5) 4 5   Ankle Plantarflexion (S1-2) 4 5   Ankle Inversion 4 5   Ankle Eversion (S1-2) 4 5   Great Toe Extension (L5)          Flexibility     Hamstrings (90/90)     ITB (Yashira)     Quads (Ely's) Hip Flexor ([place])          Girth (cm)     Mid patella 37    Suprapatellar 55    Figure 8     Transmalleolar     Metatarsal Heads         Joint mobility:    []Normal    [x]Hypo   []Hyper    Orthopaedic Special Tests  Positive  Negative  NT Comments    Hip   x    KELLI / Laci's       FADIR       Scour       Trendelenburg              Knee   x    Lachman's / Anterior Drawer       Posterior Drawer       Varus Stress       Valgus Stress       Saray's        Appley's       Thessaly's       Patellar Tracking              Ankle   x    Anterior Drawer       Talar Tilt       Clark       Janice's                   Balance: Fair                          [x] Patient history, allergies, meds reviewed. Medical chart reviewed. See intake form. Review Of Systems (ROS):  [x]Performed Review of systems (Integumentary, CardioPulmonary, Neurological) by intake and observation. Intake form has been scanned into medical record. Patient has been instructed to contact their primary care physician regarding ROS issues if not already being addressed at this time.       Co-morbidities/Complexities (which will affect course of rehabilitation):   []None        []Hx of COVID   Arthritic conditions   []Rheumatoid arthritis (M05.9)  []Osteoarthritis (M19.91)  []Gout   Cardiovascular conditions   []Hypertension (I10)  []Hyperlipidemia (E78.5)  []Angina pectoris (I20)  []Atherosclerosis (I70)  []Pacemaker  []Hx of CABG/stent/  cardiac surgeries   Musculoskeletal conditions   []Disc pathology   []Congenital spine pathologies   []Osteoporosis (M81.8)  []Osteopenia (M85.8)  []Scoliosis       Endocrine conditions   [x]Hypothyroid (E03.9)  []Hyperthyroid Gastrointestinal conditions   []Constipation (F17.18)   Metabolic conditions   []Morbid obesity (E66.01)  []Diabetes type 1(E10.65) or 2 (E11.65)   []Neuropathy (G60.9)     Cardio/Pulmonary conditions   []Asthma (J45)  []Coughing   []COPD (J44.9)  []CHF  []A-fib   Psychological Disorders  []Anxiety (F41.9)  []Depression (F32.9)   []Other:   Developmental Disorders  []Autism (F84.0)  []CP (G80)  []Down Syndrome (Q90.9)  []Developmental delay     Neurological conditions  []Prior Stroke (I69.30)  []Parkinson's (G20)  []Encephalopathy (G93.40)  []MS (G35)  []Post-polio (G14)  []SCI  []TBI  []ALS Other conditions  []Fibromyalgia (M79.7)  []Vertigo  []Syncope  []Kidney Failure  []Cancer      []currently undergoing                treatment  []Pregnancy  []Incontinence   Prior surgeries  []involved limb  []previous spinal surgery  [] section birth  []hysterectomy  []bowel / bladder surgery  []other relevant surgeries   [x]Other:   Anemia            Barriers to/and or personal factors that will affect rehab potential:              []Age  []Sex    []Smoker              []Motivation/Lack of Motivation                        []Co-Morbidities              []Cognitive Function, education/learning barriers              []Environmental, home barriers              []profession/work barriers  []past PT/medical experience  []other:  Justification:     Falls Risk Assessment (30 days):   [x] Falls Risk assessed and no intervention required. [] Falls Risk assessed and Patient requires intervention due to being higher risk   TUG score (>12s at risk):     [] Falls education provided, including        ASSESSMENT:  The patient is a 49 yo female who presents with s/p partial menisectomy to left knee. She demonstrates left knee decrease AROM, decrease quad strength, increase swelling, and difficulty walking. Patient had her surgery 20 after discovering right knee torn mensicus after several years of wearing heels walking up 4 flights of steps. She benefit from skilled PT services to address left knee quad weakness, mobility, and balance.       Functional Impairments:     []Noted lumbar/proximal hip/LE joint hypomobility   [x]Decreased LE functional ROM   []Decreased core/proximal hip strength and neuromuscular control   [x]Decreased LE functional strength   []Reduced balance/proprioceptive control   []other:      Functional Activity Limitations (from functional questionnaire and intake)   []Reduced ability to tolerate prolonged functional positions   [x]Reduced ability or difficulty with changes of positions or transfers between positions   []Reduced ability to maintain good posture and demonstrate good body mechanics with sitting, bending, and lifting   []Reduced ability to sleep   [] Reduced ability or tolerance with driving and/or computer work   []Reduced ability to perform lifting, carrying tasks   [x]Reduced ability to squat   [x]Reduced ability to forward bend   [x]Reduced ability to ambulate prolonged functional periods/distances/surfaces   [x]Reduced ability to ascend/descend stairs   []Reduced ability to run, hop, cut or jump   []other:    Participation Restrictions   [x]Reduced participation in self care activities   [x]Reduced participation in home management activities   []Reduced participation in work activities   [x]Reduced participation in social activities. []Reduced participation in sport/recreation activities. Classification :    [x]Signs/symptoms consistent with post-surgical status including decreased ROM, strength and function.    []Signs/symptoms consistent with joint sprain/strain   []Signs/symptoms consistent with patella-femoral syndrome   []Signs/symptoms consistent with knee OA/hip OA   []Signs/symptoms consistent with internal derangement of knee/Hip   []Signs/symptoms consistent with functional hip weakness/NMR control      []Signs/symptoms consistent with tendinitis/tendinosis    []signs/symptoms consistent with pathology which may benefit from Dry needling      []other:      Prognosis/Rehab Potential:      [x]Excellent   []Good    []Fair   []Poor    Tolerance of evaluation/treatment:    [x]Excellent   []Good    []Fair   []Poor    Physical Therapy Evaluation Complexity Justification  [x] A history of present problem with:  [] no personal factors and/or comorbidities that impact the plan of care;  [x]1-2 personal factors and/or comorbidities that impact the plan of care  []3 personal factors and/or comorbidities that impact the plan of care  [x] An examination of body systems using standardized tests and measures addressing any of the following: body structures and functions (impairments), activity limitations, and/or participation restrictions;:  [x] a total of 1-2 or more elements   [] a total of 3 or more elements   [] a total of 4 or more elements   [x] A clinical presentation with:  [x] stable and/or uncomplicated characteristics   [] evolving clinical presentation with changing characteristics  [] unstable and unpredictable characteristics;   [x] Clinical decision making of [] Low, [] moderate, [x] high complexity using standardized patient assessment instrument and/or measurable assessment of functional outcome. [] EVAL (LOW) 17791 (typically 15 minutes face-to-face)  [] EVAL (MOD) 41361 (typically 30 minutes face-to-face)  [x] EVAL (HIGH) 38396 (typically 45 minutes face-to-face)  [] RE-EVAL     PLAN:   Frequency/Duration:  2 days per week for 6 Weeks:  Interventions:  [x]  Therapeutic exercise including: strength training, ROM, for Lower extremity and core   [x]  NMR activation and proprioception for LE, Glutes and Core   [x]  Manual therapy as indicated for LE, Hip and spine to include: Dry Needling/IASTM, STM, PROM, Gr I-IV mobilizations, manipulation. [x] Modalities as needed that may include: thermal agents, E-stim, Biofeedback, US, iontophoresis as indicated  [x] Patient education on joint protection, postural re-education, activity modification, progression of HEP. HEP instruction: Written HEP instructions provided and reviewed.      GOALS:  Patient stated goal:   [] Progressing: [] Met: [] Not Met: [] Adjusted    Therapist goals for Patient:   Short Term Goals: To be achieved in: 2 weeks  1. Independent in HEP and progression per patient tolerance, in order to prevent re-injury. [] Progressing: [] Met: [] Not Met: [] Adjusted  2. Patient will have a decrease in pain to facilitate improvement in movement, function, and ADLs as indicated by Functional Deficits. [] Progressing: [] Met: [] Not Met: [] Adjusted    Long Term Goals: To be achieved in: 6 weeks / DC   1. Patient to improve FOTO score of at least 53 to assist with reaching prior level of function. [] Progressing: [] Met: [] Not Met: [] Adjusted  2. Patient will demonstrate increased right knee AROM to 125 deg to allow for proper joint functioning as indicated by patients Functional Deficits. [] Progressing: [] Met: [] Not Met: [] Adjusted  3. Patient will demonstrate an increase in right knee to +4/5 Strength to at least  as well as good proximal hip strength and control to allow for proper functional mobility as indicated by patients Functional Deficits. [] Progressing: [] Met: [] Not Met: [] Adjusted  4. Patient will return to functional activities including ambulating level and unlevel surfaces  without increased symptoms or restriction and with normalized gait pattern. [] Progressing: [] Met: [] Not Met: [] Adjusted  5. Patient to demonstrate proper squat form to demonstrate hip/knee control for ADLs.    [] Progressing: [] Met: [] Not Met: [] Adjusted     Electronically signed by:  Stanley Hayden, PT , DPT

## 2022-09-27 NOTE — TELEPHONE ENCOUNTER
Ice machine, ice pad with velcro, and knee sleeve are all things not covered by insurance. LVM explaining the difference and where she can get each of these items, [employer] versus in office. I did send her request for Tramadol to Anjali Rubalcava. He will sign at end of clinic and it will go to pharmacy once signed.

## 2022-09-27 NOTE — TELEPHONE ENCOUNTER
Patient request letter for cold compression wrap and compression sleeve. Letter written an in chart. She will  to take to medical supply store. Explained to pt these are not typically covered by insurance.

## 2022-09-27 NOTE — TELEPHONE ENCOUNTER
Pt was in therapy where they wrapped her leg with a compression ice wrap. Pt would like a prescription for a leg size compression ice wrap to help with the bruising on her legs  Pt also would like a refill on her Tramadol- [facility] - [facility]. Diclofenac is not helping, and the tylenol and ibuprofen is not helping.   Pls call [phone] (home)

## 2022-10-12 ENCOUNTER — HOSPITAL ENCOUNTER (OUTPATIENT)
Dept: PHYSICAL THERAPY | Age: 47
Setting detail: THERAPIES SERIES
Discharge: HOME | End: 2022-10-12
Payer: COMMERCIAL

## 2022-10-12 ENCOUNTER — TELEPHONE (OUTPATIENT)
Dept: ORTHOPEDIC SURGERY | Age: 47
End: 2022-10-12

## 2022-10-12 PROCEDURE — 97016 VASOPNEUMATIC DEVICE THERAPY: CPT

## 2022-10-12 PROCEDURE — 97110 THERAPEUTIC EXERCISES: CPT

## 2022-10-12 PROCEDURE — 97140 MANUAL THERAPY 1/> REGIONS: CPT

## 2022-10-12 NOTE — FLOWSHEET NOTE
[address] [facility] Physical Therapy  Phone: [phone]   Fax: [phone]    Physical Therapy Daily Treatment Note    Date:  10/12/2022     Patient Name:  Triny Nolen    :  1975  MRN: [id number]  Medical Diagnosis:  Left leg swelling [M79.89]  Acute medial meniscus tear of left knee, initial encounter [S83.957A]  Treatment Diagnosis:  Decrease left knee AROM,  hip/knee muscle weakness, difficulty walking                    Insurance/Certification information:  PT [facility] Information: [facility] [place]  Physician Information:  Patricia Medina MD    Plan of care signed (Y/N): []  Yes [x]  No     Date of Patient follow up with Physician:      Progress Report: []  Yes  [x]  No     Date Range for reporting period:  Beginnin2022  Ending:     Progress report due (10 Rx/or 30 days whichever is less): visit #10 or  (date)     Recertification due (POC duration/ or 90 days whichever is less): visit # or  (date)     Visit # Insurance Allowable Auth required? Date Range   Eval+   30  [x]  Yes  []  No 22-22       Units approved Units used Date Range   48         Latex Allergy:  [x]NO      []YES  Preferred Language for Healthcare:   [x]English       []other:    Functional Scale:           Date assessed:  TO physical FS primary measure score = 33; risk adjusted = 36  2022    Pain level:  8/10     SUBJECTIVE:  Patient reports that her swelling is improving since last treatment session .      OBJECTIVE: 10/12: Left knee ext 0 ; flexion [id number]      RESTRICTIONS/PRECAUTIONS:  Hypothyroidism    Exercises/Interventions:     Therapeutic Exercises (82048) Resistance / level Sets/sec Reps Notes   Nustep  3 4 min     IB/HR   /210\"     HSS  Hip flexor   \"  2\"            TG   12 x    Small SLR  Bridging   2/3'  23\" 10x  10x    LAQ blue  15x                  Therapeutic Activities (52328)                                          Neuromuscular Re-ed (73220) Manual Intervention (74979)       STM to left Hamstring tendon (lat), patellar mobs (inf/med/lat) X 12 min                                           Girth (cm) L - pre vaso / post vaso  10/12 R - pre-vaso / post-vaso   Mid-patellar 44 cm/  42    Suprapatellar 48 cm/45       Modalities:  10/12,9/27 : Vaso to left knee with in supine with LE elevated with pillow x 10 min    Pt. Education:  10/12/2022  -patient educated on diagnosis, prognosis and expectations for rehab  -all patient questions were answered    Home Exercise Program:  Access Code: [email or web address]  URL: [employer]/  Date: 09/27/2022  Prepared by: Florencia Shanks    Exercises  Supine Quad Set - 1 x daily - 7 x weekly - 1 sets - 10 reps  Small Range Straight Leg Raise - 1 x daily - 7 x weekly - 1 sets - 10 reps  Supine Knee Extension Strengthening - 1 x daily - 7 x weekly - 1 sets - 10 reps  Supine Bridge - 1 x daily - 7 x weekly - 1 sets - 10 reps        Therapeutic Exercise and NMR EXR  [] (77988) Provided verbal/tactile cueing for activities related to strengthening, flexibility, endurance, ROM for improvements in  [] LE / Lumbar: LE, proximal hip, and core control with self care, mobility, lifting, ambulation. [] UE / Cervical: cervical, postural, scapular, scapulothoracic and UE control with self care, reaching, carrying, lifting, house/yardwork, driving, computer work.  [] (22193) Provided verbal/tactile cueing for activities related to improving balance, coordination, kinesthetic sense, posture, motor skill, proprioception to assist with   [] LE / lumbar: LE, proximal hip, and core control in self care, mobility, lifting, ambulation and eccentric single leg control.    [] UE / cervical: cervical, scapular, scapulothoracic and UE control with self care, reaching, carrying, lifting, house/yardwork, driving, computer work.   [] (37088) Therapist is in constant attendance of 2 or more patients providing skilled therapy interventions, but not providing any significant amount of measurable one-on-one time to either patient, for improvements in  [] LE / lumbar: LE, proximal hip, and core control in self care, mobility, lifting, ambulation and eccentric single leg control. [] UE / cervical: cervical, scapular, scapulothoracic and UE control with self care, reaching, carrying, lifting, house/yardwork, driving, computer work. NMR and Therapeutic Activities:    [] (93302 or 13316) Provided verbal/tactile cueing for activities related to improving balance, coordination, kinesthetic sense, posture, motor skill, proprioception and motor activation to allow for proper function of   [] LE: / Lumbar core, proximal hip and LE with self care and ADLs  [] UE / Cervical: cervical, postural, scapular, scapulothoracic and UE control with self care, carrying, lifting, driving, computer work.   [] (60099) Gait Re-education- Provided training and instruction to the patient for proper LE, core and proximal hip recruitment and positioning and eccentric body weight control with ambulation re-education including up and down stairs     Home Management Training / Self Care:  [] (30304) Provided self-care/home management training related to activities of daily living and compensatory training, and/or use of adaptive equipment for improvement with: ADLs and compensatory training, meal preparation, safety procedures and instruction in use of adaptive equipment, including bathing, grooming, dressing, personal hygiene, basic household cleaning and chores.      Home Exercise Program:    [x] (62838) Reviewed/Progressed HEP activities related to strengthening, flexibility, endurance, ROM of   [] LE / Lumbar: core, proximal hip and LE for functional self-care, mobility, lifting and ambulation/stair navigation   [] UE / Cervical: cervical, postural, scapular, scapulothoracic and UE control with self care, reaching, carrying, lifting, house/yardwork, driving, computer work  [] (18775)Reviewed/Progressed HEP activities related to improving balance, coordination, kinesthetic sense, posture, motor skill, proprioception of   [] LE: core, proximal hip and LE for self care, mobility, lifting, and ambulation/stair navigation    [] UE / Cervical: cervical, postural,  scapular, scapulothoracic and UE control with self care, reaching, carrying, lifting, house/yardwork, driving, computer work    Manual Treatments:  PROM / STM / Oscillations-Mobs:  G-I, II, III, IV (PA's, Inf., Post.)  [] ([id number]) Provided manual therapy to mobilize LE, proximal hip and/or LS spine soft tissue/joints for the purpose of modulating pain, promoting relaxation,  increasing ROM, reducing/eliminating soft tissue swelling/inflammation/restriction, improving soft tissue extensibility and allowing for proper ROM for normal function with   [] LE / lumbar: self care, mobility, lifting and ambulation. [] UE / Cervical: self care, reaching, carrying, lifting, house/yardwork, driving, computer work. Modalities:  [] (87349) Vasopneumatic compression: Utilized vasopneumatic compression to decrease edema / swelling for the purpose of improving mobility and quad tone / recruitment which will allow for increased overall function including but not limited to self-care, transfers, ambulation, and ascending / descending stairs.        Charges:  Timed Code Treatment Minutes: 55   Total Treatment Minutes: 65     [] EVAL - LOW (08565)   [] EVAL - MOD (39033)  [] EVAL - HIGH (04952)  [] RE-EVAL (18555)  [x] RH(04533) x 2      [] Ionto  [] NMR (45247) x       [x] Vaso  [x] Manual (49143) x  1     [] Ultrasound  [] TA x        [] Mech Traction (18234)  [] Aquatic Therapy x     [] ES (un) (91706):   [] Home Management Training x  [] ES(attended) (58859)   [] Dry Needling 1-2 muscles (13082):  [] Dry Needling 3+ muscles (150294)  [] Group:      [] Other:     GOALS:    Patient stated goal:   [] Progressing: [] Met: [] Not Met: [] Adjusted     Therapist goals for Patient:   Short Term Goals: To be achieved in: 2 weeks  1. Independent in HEP and progression per patient tolerance, in order to prevent re-injury. [] Progressing: [] Met: [] Not Met: [] Adjusted  2. Patient will have a decrease in pain to facilitate improvement in movement, function, and ADLs as indicated by Functional Deficits. [] Progressing: [] Met: [] Not Met: [] Adjusted     Long Term Goals: To be achieved in: 6 weeks / DC   1. Patient to improve FOTO score of at least 53 to assist with reaching prior level of function. [] Progressing: [] Met: [] Not Met: [] Adjusted  2. Patient will demonstrate increased right knee AROM to 125 deg to allow for proper joint functioning as indicated by patients Functional Deficits. [] Progressing: [] Met: [] Not Met: [] Adjusted  3. Patient will demonstrate an increase in right knee to +4/5 Strength to at least  as well as good proximal hip strength and control to allow for proper functional mobility as indicated by patients Functional Deficits. [] Progressing: [] Met: [] Not Met: [] Adjusted  4. Patient will return to functional activities including ambulating level and unlevel surfaces  without increased symptoms or restriction and with normalized gait pattern. [] Progressing: [] Met: [] Not Met: [] Adjusted  5. Patient to demonstrate proper squat form to demonstrate hip/knee control for ADLs. [] Progressing: [] Met: [] Not Met: [] Adjusted    Overall Progression Towards Functional goals/ Treatment Progress Update:  [] Patient is progressing as expected towards functional goals listed. [] Progression is slowed due to complexities/Impairments listed. [] Progression has been slowed due to co-morbidities.   [x] Plan just implemented, too soon to assess goals progression <30days   [] Goals require adjustment due to lack of progress  [] Patient is not progressing as expected and requires additional follow up with physician  [] Other    Persisting Functional Limitations/Impairments:  [x]Sleeping []Sitting               [x]Standing [x]Transfers        [x]Walking [x]Kneeling               [x]Stairs [x]Squatting / bending   [x]ADLs []Reaching  []Lifting  [x]Housework  []Driving []Job related tasks  []Sports/Recreation []Other:        ASSESSMENT:   The patient began quad activating exercises supine and standing. Educated pt on proper gait technique heel strike to create terminal knee extension. Pt had noticeable swelling and tenderness left  lateral hamstring tendonous. The patient will further benefit from PT to address quad and hamstring strengthening. Treatment/Activity Tolerance:  [] Patient able to complete tx [] Patient limited by fatigue  [] Patient limited by pain  [] Patient limited by other medical complications  [] Other:     Prognosis: [] Good [] Fair  [] Poor    Patient Requires Follow-up: [x] Yes  [] No    Plan for next treatment session:  Continue with Quad and Hamstring strengthening     PLAN: See eval. PT 2x / week for 6 weeks. [] Continue per plan of care [] Alter current plan (see comments)  [x] Plan of care initiated [] Hold pending MD visit [] Discharge    Electronically signed by: Chris Rabago, PT PT, DPT    Note: If patient does not return for scheduled/ recommended follow up visits, this note will serve as a discharge from care along with most recent update on progress.

## 2022-10-12 NOTE — TELEPHONE ENCOUNTER
PATIENT CALLED IN REQUESTING UPDATED PARKING PLACARD LETTER TO REFLECT DATE 10/15/22. SHE IS GOING TO PT AT 2:15 AND WOULD LIKE TO PICK THIS UP WHEN SHE GOES. LETTER WRITTEN. I LET PT KNOW THE LETTER CAN BE PRINTED OUT FOR PATIENT AT HER APPT WITH THEM.

## 2022-10-14 ENCOUNTER — TELEPHONE (OUTPATIENT)
Dept: ORTHOPEDIC SURGERY | Age: 47
End: 2022-10-14

## 2022-10-14 ENCOUNTER — HOSPITAL ENCOUNTER (OUTPATIENT)
Dept: PHYSICAL THERAPY | Age: 47
Setting detail: THERAPIES SERIES
Discharge: HOME | End: 2022-10-14
Payer: COMMERCIAL

## 2022-10-14 DIAGNOSIS — M25.562 LEFT KNEE PAIN, UNSPECIFIED CHRONICITY: Primary | ICD-10-CM

## 2022-10-14 PROCEDURE — 97140 MANUAL THERAPY 1/> REGIONS: CPT

## 2022-10-14 PROCEDURE — 97110 THERAPEUTIC EXERCISES: CPT

## 2022-10-14 PROCEDURE — 97016 VASOPNEUMATIC DEVICE THERAPY: CPT

## 2022-10-14 NOTE — FLOWSHEET NOTE
[address] [facility] Physical Therapy  Phone: [phone]   Fax: [phone]    Physical Therapy Daily Treatment Note    Date:  10/14/2022     Patient Name:  Chung Roman    :  1975  MRN: [id number]  Medical Diagnosis:  Left leg swelling [M79.89]  Acute medial meniscus tear of left knee, initial encounter [S83.242A]  Treatment Diagnosis:  Decrease left knee AROM,  hip/knee muscle weakness, difficulty walking                    Insurance/Certification information:  PT Insurance Information: [facility]  Physician Information:  Renata Downing MD    Plan of care signed (Y/N): []  Yes [x]  No     Date of Patient follow up with Physician:      Progress Report: []  Yes  [x]  No     Date Range for reporting period:  Beginnin2022  Ending:     Progress report due (10 Rx/or 30 days whichever is less): visit #10 or  (date)     Recertification due (POC duration/ or 90 days whichever is less): visit # or  (date)     Visit # Insurance Allowable Auth required?  Date Range   Eval+   30  [x]  Yes  []  No 22-22       Units approved Units used Date Range   48 12 22       Latex Allergy:  [x]NO      []YES  Preferred Language for Healthcare:   [x]English       []other:    Functional Scale:           Date assessed:  FOTO physical FS primary measure score = 33; risk adjusted = 36  2022    Pain level:  8/10     SUBJECTIVE:  Patient reports that she having some achiness     OBJECTIVE: 10/14: Patient ambulated mild antalgic pattern ; Left knee ext 0; Flexion AROM 117  10/12: Left knee ext 0 ; flexion [id number]      RESTRICTIONS/PRECAUTIONS:  Hypothyroidism    Exercises/Interventions:     Therapeutic Exercises (55785) Resistance / level Sets/sec Reps Notes   Nustep  3 4 min     IB/HR   Soleus   /210\"    10/14   HSS  Hip flexor   \"  \"            TG   15 x Squat to 70 deg   Small SLR  Bridging   Sidelying hip abd  2/3'  2/3\" 10x  10x  15x    LAQ blue  15x Glider side lunges    10 10/14 added          Therapeutic Activities (25893)                                          Neuromuscular Re-ed (73174)                                                 Manual Intervention ([id number])       STM to left Hamstring tendon (lat), patellar mobs (inf/med/lat) X 12 min                                           Girth (cm) L - pre vaso / post vaso  10/12 R - pre-vaso / post-vaso   Mid-patellar 44 cm/  42    Suprapatellar 48 cm/45       Modalities:  10/14,10/12,9/27 : Vaso to left knee with in supine with LE elevated with pillow x 10 min    Pt. Education:  10/12/2022  -patient educated on diagnosis, prognosis and expectations for rehab  -all patient questions were answered    Home Exercise Program:  Access Code: [email or web address]  URL: [email or web address]  Date: 09/27/2022  Prepared by: Indio Nickerson    Exercises  Supine Quad Set - 1 x daily - 7 x weekly - 1 sets - 10 reps  Small Range Straight Leg Raise - 1 x daily - 7 x weekly - 1 sets - 10 reps  Supine Knee Extension Strengthening - 1 x daily - 7 x weekly - 1 sets - 10 reps  Supine Bridge - 1 x daily - 7 x weekly - 1 sets - 10 reps        Therapeutic Exercise and NMR EXR  [] (12616) Provided verbal/tactile cueing for activities related to strengthening, flexibility, endurance, ROM for improvements in  [] LE / Lumbar: LE, proximal hip, and core control with self care, mobility, lifting, ambulation. [] UE / Cervical: cervical, postural, scapular, scapulothoracic and UE control with self care, reaching, carrying, lifting, house/yardwork, driving, computer work.  [] (63669) Provided verbal/tactile cueing for activities related to improving balance, coordination, kinesthetic sense, posture, motor skill, proprioception to assist with   [] LE / lumbar: LE, proximal hip, and core control in self care, mobility, lifting, ambulation and eccentric single leg control.    [] UE / cervical: cervical, scapular, scapulothoracic and UE control with self care, reaching, carrying, lifting, house/yardwork, driving, computer work.   [] (39093) Therapist is in constant attendance of 2 or more patients providing skilled therapy interventions, but not providing any significant amount of measurable one-on-one time to either patient, for improvements in  [] LE / lumbar: LE, proximal hip, and core control in self care, mobility, lifting, ambulation and eccentric single leg control. [] UE / cervical: cervical, scapular, scapulothoracic and UE control with self care, reaching, carrying, lifting, house/yardwork, driving, computer work. NMR and Therapeutic Activities:    [] (22836 or 74141) Provided verbal/tactile cueing for activities related to improving balance, coordination, kinesthetic sense, posture, motor skill, proprioception and motor activation to allow for proper function of   [] LE: / Lumbar core, proximal hip and LE with self care and ADLs  [] UE / Cervical: cervical, postural, scapular, scapulothoracic and UE control with self care, carrying, lifting, driving, computer work.   [] (15915) Gait Re-education- Provided training and instruction to the patient for proper LE, core and proximal hip recruitment and positioning and eccentric body weight control with ambulation re-education including up and down stairs     Home Management Training / Self Care:  [] (90128) Provided self-care/home management training related to activities of daily living and compensatory training, and/or use of adaptive equipment for improvement with: ADLs and compensatory training, meal preparation, safety procedures and instruction in use of adaptive equipment, including bathing, grooming, dressing, personal hygiene, basic household cleaning and chores.      Home Exercise Program:    [x] (05615) Reviewed/Progressed HEP activities related to strengthening, flexibility, endurance, ROM of   [] LE / Lumbar: core, proximal hip and LE for functional self-care, mobility, lifting and ambulation/stair navigation   [] UE / Cervical: cervical, postural, scapular, scapulothoracic and UE control with self care, reaching, carrying, lifting, house/yardwork, driving, computer work  [] (86793)Reviewed/Progressed HEP activities related to improving balance, coordination, kinesthetic sense, posture, motor skill, proprioception of   [] LE: core, proximal hip and LE for self care, mobility, lifting, and ambulation/stair navigation    [] UE / Cervical: cervical, postural,  scapular, scapulothoracic and UE control with self care, reaching, carrying, lifting, house/yardwork, driving, computer work    Manual Treatments:  PROM / STM / Oscillations-Mobs:  G-I, II, III, IV (PA's, Inf., Post.)  [] (10616) Provided manual therapy to mobilize LE, proximal hip and/or LS spine soft tissue/joints for the purpose of modulating pain, promoting relaxation,  increasing ROM, reducing/eliminating soft tissue swelling/inflammation/restriction, improving soft tissue extensibility and allowing for proper ROM for normal function with   [] LE / lumbar: self care, mobility, lifting and ambulation. [] UE / Cervical: self care, reaching, carrying, lifting, house/yardwork, driving, computer work. Modalities:  [] (82302) Vasopneumatic compression: Utilized vasopneumatic compression to decrease edema / swelling for the purpose of improving mobility and quad tone / recruitment which will allow for increased overall function including but not limited to self-care, transfers, ambulation, and ascending / descending stairs.        Charges:  Timed Code Treatment Minutes: 60   Total Treatment Minutes: 70     [] EVAL - LOW (87411)   [] EVAL - MOD (15321)  [] EVAL - HIGH (75197)  [] RE-EVAL (34839)  [x] JJ(20520) x 3      [] Ionto  [] NMR (46458) x       [x] Vaso  [x] Manual (34184) x  1     [] Ultrasound  [] TA x        [] Mech Traction (29276)  [] Aquatic Therapy x     [] ES (un) (14232):   [] Home Management Training x  [] ES(attended) (53478)   [] Dry Needling 1-2 muscles (22864):  [] Dry Needling 3+ muscles (369298)  [] Group:      [] Other:     GOALS:    Patient stated goal:   [] Progressing: [] Met: [] Not Met: [] Adjusted     Therapist goals for Patient:   Short Term Goals: To be achieved in: 2 weeks  1. Independent in HEP and progression per patient tolerance, in order to prevent re-injury. [] Progressing: [] Met: [] Not Met: [] Adjusted  2. Patient will have a decrease in pain to facilitate improvement in movement, function, and ADLs as indicated by Functional Deficits. [] Progressing: [] Met: [] Not Met: [] Adjusted     Long Term Goals: To be achieved in: 6 weeks / DC   1. Patient to improve FOTO score of at least 53 to assist with reaching prior level of function. [] Progressing: [] Met: [] Not Met: [] Adjusted  2. Patient will demonstrate increased right knee AROM to 125 deg to allow for proper joint functioning as indicated by patients Functional Deficits. [] Progressing: [] Met: [] Not Met: [] Adjusted  3. Patient will demonstrate an increase in right knee to +4/5 Strength to at least  as well as good proximal hip strength and control to allow for proper functional mobility as indicated by patients Functional Deficits. [] Progressing: [] Met: [] Not Met: [] Adjusted  4. Patient will return to functional activities including ambulating level and unlevel surfaces  without increased symptoms or restriction and with normalized gait pattern. [] Progressing: [] Met: [] Not Met: [] Adjusted  5. Patient to demonstrate proper squat form to demonstrate hip/knee control for ADLs. [] Progressing: [] Met: [] Not Met: [] Adjusted    Overall Progression Towards Functional goals/ Treatment Progress Update:  [] Patient is progressing as expected towards functional goals listed. [] Progression is slowed due to complexities/Impairments listed. [] Progression has been slowed due to co-morbidities.   [x] Plan just implemented, too soon to assess goals progression <30days   [] Goals require adjustment due to lack of progress  [] Patient is not progressing as expected and requires additional follow up with physician  [] Other    Persisting Functional Limitations/Impairments:  [x]Sleeping []Sitting               [x]Standing [x]Transfers        [x]Walking [x]Kneeling               [x]Stairs [x]Squatting / bending   [x]ADLs []Reaching  []Lifting  [x]Housework  []Driving []Job related tasks  []Sports/Recreation []Other:        ASSESSMENT:  Patient tolerated glider side lunges emphasizing left knee flex and quad activation. Pt demonstrated improved AROM with knee flexion after manual therapy. She still has mild to moderate swelling at knee joint. Pt educated on graded  progression of exercises. Will add step ups next visit to continue with quad activation as tolerated. The patient began quad activating exercises supine and standing. Educated pt on proper gait technique heel strike to create terminal knee extension. Pt had noticeable swelling and tenderness left  lateral hamstring tendonous. The patient will further benefit from PT to address quad and hamstring strengthening. Treatment/Activity Tolerance:  [] Patient able to complete tx [] Patient limited by fatigue  [] Patient limited by pain  [] Patient limited by other medical complications  [] Other:     Prognosis: [] Good [] Fair  [] Poor    Patient Requires Follow-up: [x] Yes  [] No    Plan for next treatment session:  Continue with Quad and Hamstring strengthening     PLAN: See angelia PT 2x / week for 6 weeks. [] Continue per plan of care [] Alter current plan (see comments)  [x] Plan of care initiated [] Hold pending MD visit [] Discharge    Electronically signed by: Gricel Alegria PT PT, DPT    Note: If patient does not return for scheduled/ recommended follow up visits, this note will serve as a discharge from care along with most recent update on progress.

## 2022-10-17 RX ORDER — LIDOCAINE 50 MG/G
1 PATCH TOPICAL DAILY
Qty: 30 PATCH | Refills: 0 | Status: SHIPPED | OUTPATIENT
Start: 2022-10-17 | End: 2022-11-21

## 2022-10-19 ENCOUNTER — TELEPHONE (OUTPATIENT)
Dept: INTERNAL MEDICINE CLINIC | Age: 47
End: 2022-10-19

## 2022-10-19 ENCOUNTER — HOSPITAL ENCOUNTER (OUTPATIENT)
Dept: PHYSICAL THERAPY | Age: 47
Setting detail: THERAPIES SERIES
Discharge: HOME | End: 2022-10-19
Payer: COMMERCIAL

## 2022-10-19 DIAGNOSIS — H10.33 ACUTE CONJUNCTIVITIS OF BOTH EYES, UNSPECIFIED ACUTE CONJUNCTIVITIS TYPE: Primary | ICD-10-CM

## 2022-10-19 PROCEDURE — 97110 THERAPEUTIC EXERCISES: CPT

## 2022-10-19 PROCEDURE — 97016 VASOPNEUMATIC DEVICE THERAPY: CPT

## 2022-10-19 NOTE — TELEPHONE ENCOUNTER
Patient requesting something for pink eye. She can be reached at [phone].  Patient recently has surgery and lives on the 3rd floor

## 2022-10-19 NOTE — FLOWSHEET NOTE
[address] [facility] Physical Therapy  Phone: [phone]   Fax: [phone]    Physical Therapy Daily Treatment Note    Date:  10/19/2022     Patient Name:  Bryanna Serna    :  1975  MRN: [id number]  Medical Diagnosis:  Left leg swelling [M79.89]  Acute medial meniscus tear of left knee, initial encounter [S83.242A]  Treatment Diagnosis:  Decrease left knee AROM,  hip/knee muscle weakness, difficulty walking                    Insurance/Certification information:  PT Insurance Information: [facility]  Physician Information:  Ana Peck MD    Plan of care signed (Y/N): []  Yes [x]  No     Date of Patient follow up with Physician:      Progress Report: []  Yes  [x]  No     Date Range for reporting period:  Beginnin2022  Ending:     Progress report due (10 Rx/or 30 days whichever is less): visit #10 or  (date)     Recertification due (POC duration/ or 90 days whichever is less): visit # or  (date)     Visit # Insurance Allowable Auth required? Date Range   Eval+  3/12 30  [x]  Yes  []  No 22-22       Units approved Units used Date Range   48 12 22       Latex Allergy:  [x]NO      []YES  Preferred Language for Healthcare:   [x]English       []other:    Functional Scale:           Date assessed:  TO physical FS primary measure score = 33; risk adjusted = 36  2022    Pain level:  7/10     SUBJECTIVE:  Patient reports that two days ago she over did with walking .  St    OBJECTIVE: 10/19: Session began 15 min late  Right knee Flexion AROM 120deg  10/14: Patient ambulated mild antalgic pattern ; Left knee ext 0; Flexion AROM 117  10/12: Left knee ext 0 ; flexion [id number]      RESTRICTIONS/PRECAUTIONS:  Hypothyroidism    Exercises/Interventions:     Therapeutic Exercises (84515) Resistance / level Sets/sec Reps Notes   Nustep  3 4 min     IB/HR   Soleus   /210\"    10/14   HSS  Hip flexor   \"  \"            TG   15 x Squat to 70 deg Small SLR  Bridging     Bridging w/ knee ext  Sidelying hip abd  2/3'  2/3\" 115x  10    LAQ blue  15x    Glider side lunges    10 10/14 added   Lateral walkouts orange  2x laps  10/19   Therapeutic Activities (18827)                                          Neuromuscular Re-ed (85404)       Tandem stance on Airex  2/20'  10/19 added                                      Manual Intervention ([id number])       STM to left Hamstring tendon (lat), patellar mobs (inf/med/lat)                                           Girth (cm) L - pre vaso / post vaso  10/19 R - pre-vaso / post-vaso   Mid-patellar 42 cm/ 40cm    Suprapatellar 48 cm/46cm       Modalities:  10/19,10/14,10/12,9/27 : Vaso to left knee with in supine with LE elevated with pillow x 10 min    Pt. Education:  10/12/2022  -patient educated on diagnosis, prognosis and expectations for rehab  -all patient questions were answered    Home Exercise Program:  Access Code: [email or web address]  URL: [email or web address]  Date: 09/27/2022  Prepared by: Devon Moreno    Exercises  Supine Quad Set - 1 x daily - 7 x weekly - 1 sets - 10 reps  Small Range Straight Leg Raise - 1 x daily - 7 x weekly - 1 sets - 10 reps  Supine Knee Extension Strengthening - 1 x daily - 7 x weekly - 1 sets - 10 reps  Supine Bridge - 1 x daily - 7 x weekly - 1 sets - 10 reps        Therapeutic Exercise and NMR EXR  [] (20288) Provided verbal/tactile cueing for activities related to strengthening, flexibility, endurance, ROM for improvements in  [] LE / Lumbar: LE, proximal hip, and core control with self care, mobility, lifting, ambulation.   [] UE / Cervical: cervical, postural, scapular, scapulothoracic and UE control with self care, reaching, carrying, lifting, house/yardwork, driving, computer work.  [] (19714) Provided verbal/tactile cueing for activities related to improving balance, coordination, kinesthetic sense, posture, motor skill, proprioception to assist with   [] LE / lumbar: LE, proximal hip, and core control in self care, mobility, lifting, ambulation and eccentric single leg control. [] UE / cervical: cervical, scapular, scapulothoracic and UE control with self care, reaching, carrying, lifting, house/yardwork, driving, computer work.   [] (72426) Therapist is in constant attendance of 2 or more patients providing skilled therapy interventions, but not providing any significant amount of measurable one-on-one time to either patient, for improvements in  [] LE / lumbar: LE, proximal hip, and core control in self care, mobility, lifting, ambulation and eccentric single leg control. [] UE / cervical: cervical, scapular, scapulothoracic and UE control with self care, reaching, carrying, lifting, house/yardwork, driving, computer work. NMR and Therapeutic Activities:    [] (79621 or 37202) Provided verbal/tactile cueing for activities related to improving balance, coordination, kinesthetic sense, posture, motor skill, proprioception and motor activation to allow for proper function of   [] LE: / Lumbar core, proximal hip and LE with self care and ADLs  [] UE / Cervical: cervical, postural, scapular, scapulothoracic and UE control with self care, carrying, lifting, driving, computer work.   [] (84506) Gait Re-education- Provided training and instruction to the patient for proper LE, core and proximal hip recruitment and positioning and eccentric body weight control with ambulation re-education including up and down stairs     Home Management Training / Self Care:  [] (92009) Provided self-care/home management training related to activities of daily living and compensatory training, and/or use of adaptive equipment for improvement with: ADLs and compensatory training, meal preparation, safety procedures and instruction in use of adaptive equipment, including bathing, grooming, dressing, personal hygiene, basic household cleaning and chores.      Home Exercise Program:    [x] (41871) Reviewed/Progressed HEP activities related to strengthening, flexibility, endurance, ROM of   [] LE / Lumbar: core, proximal hip and LE for functional self-care, mobility, lifting and ambulation/stair navigation   [] UE / Cervical: cervical, postural, scapular, scapulothoracic and UE control with self care, reaching, carrying, lifting, house/yardwork, driving, computer work  [] (76185)Reviewed/Progressed HEP activities related to improving balance, coordination, kinesthetic sense, posture, motor skill, proprioception of   [] LE: core, proximal hip and LE for self care, mobility, lifting, and ambulation/stair navigation    [] UE / Cervical: cervical, postural,  scapular, scapulothoracic and UE control with self care, reaching, carrying, lifting, house/yardwork, driving, computer work    Manual Treatments:  PROM / STM / Oscillations-Mobs:  G-I, II, III, IV (PA's, Inf., Post.)  [] (01799) Provided manual therapy to mobilize LE, proximal hip and/or LS spine soft tissue/joints for the purpose of modulating pain, promoting relaxation,  increasing ROM, reducing/eliminating soft tissue swelling/inflammation/restriction, improving soft tissue extensibility and allowing for proper ROM for normal function with   [] LE / lumbar: self care, mobility, lifting and ambulation. [] UE / Cervical: self care, reaching, carrying, lifting, house/yardwork, driving, computer work. Modalities:  [] (06856) Vasopneumatic compression: Utilized vasopneumatic compression to decrease edema / swelling for the purpose of improving mobility and quad tone / recruitment which will allow for increased overall function including but not limited to self-care, transfers, ambulation, and ascending / descending stairs.        Charges:  Timed Code Treatment Minutes: 36   Total Treatment Minutes: 46     [] EVAL - LOW (47254)   [] EVAL - MOD (96892)  [] EVAL - HIGH (37754)  [] RE-EVAL (95237)  [x] HZ(89976) x 2      [] Ionto  [] NMR (99573) x       [x] Vaso  [x] Manual (73463) x       [] Ultrasound  [] TA x        [] Mech Traction (67955)  [] Aquatic Therapy x     [] ES (un) (47364):   [] Home Management Training x  [] ES(attended) (54783)   [] Dry Needling 1-2 muscles (40840):  [] Dry Needling 3+ muscles (893148)  [] Group:      [] Other:     GOALS:    Patient stated goal:   [] Progressing: [] Met: [] Not Met: [] Adjusted     Therapist goals for Patient:   Short Term Goals: To be achieved in: 2 weeks  1. Independent in HEP and progression per patient tolerance, in order to prevent re-injury. [] Progressing: [] Met: [] Not Met: [] Adjusted  2. Patient will have a decrease in pain to facilitate improvement in movement, function, and ADLs as indicated by Functional Deficits. [] Progressing: [] Met: [] Not Met: [] Adjusted     Long Term Goals: To be achieved in: 6 weeks / DC   1. Patient to improve FOTO score of at least 53 to assist with reaching prior level of function. [] Progressing: [] Met: [] Not Met: [] Adjusted  2. Patient will demonstrate increased right knee AROM to 125 deg to allow for proper joint functioning as indicated by patients Functional Deficits. [] Progressing: [] Met: [] Not Met: [] Adjusted  3. Patient will demonstrate an increase in right knee to +4/5 Strength to at least  as well as good proximal hip strength and control to allow for proper functional mobility as indicated by patients Functional Deficits. [] Progressing: [] Met: [] Not Met: [] Adjusted  4. Patient will return to functional activities including ambulating level and unlevel surfaces  without increased symptoms or restriction and with normalized gait pattern. [] Progressing: [] Met: [] Not Met: [] Adjusted  5. Patient to demonstrate proper squat form to demonstrate hip/knee control for ADLs.    [] Progressing: [] Met: [] Not Met: [] Adjusted    Overall Progression Towards Functional goals/ Treatment Progress Update:  [] Patient is progressing as expected towards functional goals listed. [] Progression is slowed due to complexities/Impairments listed. [] Progression has been slowed due to co-morbidities. [x] Plan just implemented, too soon to assess goals progression <30days   [] Goals require adjustment due to lack of progress  [] Patient is not progressing as expected and requires additional follow up with physician  [] Other    Persisting Functional Limitations/Impairments:  [x]Sleeping []Sitting               [x]Standing [x]Transfers        [x]Walking [x]Kneeling               [x]Stairs [x]Squatting / bending   [x]ADLs []Reaching  []Lifting  [x]Housework  []Driving []Job related tasks  []Sports/Recreation []Other:        ASSESSMENT:  Patient tolerated progression of exercises this date; lateral walkouts with T-band and tandem balance on Airex. Pt was also introduced to bridging w/ knee ext for glut and Hamstring control. Swelling was reduce with VASO treatment. Pt will benefit  from graded progression of closed chain exercises complimented with manual intervention. Treatment/Activity Tolerance:  [] Patient able to complete tx [] Patient limited by fatigue  [] Patient limited by pain  [] Patient limited by other medical complications  [] Other:     Prognosis: [] Good [] Fair  [] Poor    Patient Requires Follow-up: [x] Yes  [] No    Plan for next treatment session:  Continue with Quad and Hamstring strengthening     PLAN: See janette. PT 2x / week for 6 weeks. [] Continue per plan of care [] Alter current plan (see comments)  [x] Plan of care initiated [] Hold pending MD visit [] Discharge    Electronically signed by: Daniela Carvalho, PT PT, DPT    Note: If patient does not return for scheduled/ recommended follow up visits, this note will serve as a discharge from care along with most recent update on progress.

## 2022-10-20 ENCOUNTER — OFFICE VISIT (OUTPATIENT)
Dept: ENT CLINIC | Age: 47
End: 2022-10-20
Payer: COMMERCIAL

## 2022-10-20 VITALS — HEIGHT: 63 IN | BODY MASS INDEX: 39.51 KG/M2 | WEIGHT: 223 LBS

## 2022-10-20 DIAGNOSIS — R04.0 EPISTAXIS: ICD-10-CM

## 2022-10-20 DIAGNOSIS — J32.4 CHRONIC PANSINUSITIS: Primary | ICD-10-CM

## 2022-10-20 PROCEDURE — 30901 CONTROL OF NOSEBLEED: CPT | Performed by: OTOLARYNGOLOGY

## 2022-10-20 PROCEDURE — 31231 NASAL ENDOSCOPY DX: CPT | Performed by: OTOLARYNGOLOGY

## 2022-10-20 RX ORDER — FLUTICASONE PROPIONATE 50 MCG
2 SPRAY, SUSPENSION (ML) NASAL DAILY
Qty: 2 EACH | Refills: 5 | Status: SHIPPED | OUTPATIENT
Start: 2022-10-20 | End: 2022-11-19

## 2022-10-20 RX ORDER — CIPROFLOXACIN 500 MG/1
500 TABLET, FILM COATED ORAL 2 TIMES DAILY
Qty: 28 TABLET | Refills: 0 | Status: SHIPPED | OUTPATIENT
Start: 2022-10-20 | End: 2022-11-03

## 2022-10-20 NOTE — PROGRESS NOTES
Patient complains of a sinus infection and bleeding from left nostril. A few weeks. Long history of CRS and recurrent sinusitis. Had sinus surgery in past. Congested. Pressure. Rhinorrhea. Post nasal drip. Left sided nasal bleeding. Due to the patients chronic sinus disease and/or history of sinonasal neoplasm for surveillance a nasal endoscopy with or without debridement will be performed to complete a significant physical examination of the patient which cannot be performed by anterior rhinoscopy alone (failure of complete examination of the paranasal sinuses). Failure to provide this procedure may lead to late detection of significant chronic benign disease, acute exacerbation, resolution or failure of early diagnosis of recurrent cancer. The procedure report is present in the body of the chart. Nasal Endoscopy    Pre OP: CRS  Post OP: CRS with acute flare  Reason: New sinusitis symtoms  Procedure: Nasal endoscopy  Surgeon: Miky Aleman  Anesthesia: Afrin with 2% lidocaine  Estimated Blood Loss: None    PE: Fresh blood left anterior septum. Middle turbinate edema. After obtaining verbal consent from the patient 1% lidocaine with afrin was sprayed into the nasal cavities. After allowing a time for anesthesia, a nasal endoscope was placed into the nostril. The septum, inferior, and middle turbinates were examined. The middle meatus, and sphenoethmoid recess was examined bilaterally. Cultures were not obtained from the sinuses. There were no complications. Pertinent positives included: There was edema and purulence in the left middle meatus. There was edema and purulence at the right middle meatus. Polyps were not identified in the sinuses. Masses were not identified. Tolerated well without complication. I attest that I was present for and did the entire procedure myself.     Control of Epistaxis    Pre Op: left sided Epistaxis  Post Op: Same    After consent was obtained a christi soaked with lidocaine was placed in the left nasal cavity. After 5 minutes cautery was performed of the left anterior septum. Nasal endoscopy was not needed to perform the control of bleeding. The bleeding site was not posterior. Hemostasis was obtained. The patient tolerated well. No complications. I attest that I was present for and did the entire procedure myself. Rx to pharmacy. Call if not imrpoved. May need right sided cautery.

## 2022-10-21 ENCOUNTER — HOSPITAL ENCOUNTER (OUTPATIENT)
Dept: PHYSICAL THERAPY | Age: 47
Setting detail: THERAPIES SERIES
Discharge: HOME | End: 2022-10-21
Payer: COMMERCIAL

## 2022-10-21 PROCEDURE — 97112 NEUROMUSCULAR REEDUCATION: CPT

## 2022-10-21 PROCEDURE — 97110 THERAPEUTIC EXERCISES: CPT

## 2022-10-21 PROCEDURE — 97530 THERAPEUTIC ACTIVITIES: CPT

## 2022-10-21 RX ORDER — CIPROFLOXACIN HYDROCHLORIDE 3.5 MG/ML
1 SOLUTION/ DROPS TOPICAL
Qty: 6 ML | Refills: 0 | Status: SHIPPED | OUTPATIENT
Start: 2022-10-21 | End: 2022-10-31

## 2022-10-21 NOTE — FLOWSHEET NOTE
[address] [facility] Physical Therapy  Phone: [phone]   Fax: [phone]    Physical Therapy Daily Treatment Note    Date:  10/21/2022     Patient Name:  Tino Forbes    :  1975  MRN: [id number]  Medical Diagnosis:  Left leg swelling [M79.89]  Acute medial meniscus tear of left knee, initial encounter [S83.795A]  Treatment Diagnosis:  Decrease left knee AROM,  hip/knee muscle weakness, difficulty walking                    Insurance/Certification information:  PT Insurance Information: [facility]  Physician Information:  Brian Anthony MD    Plan of care signed (Y/N): []  Yes [x]  No     Date of Patient follow up with Physician:      Progress Report: []  Yes  [x]  No     Date Range for reporting period:  Beginnin2022  Ending:     Progress report due (10 Rx/or 30 days whichever is less): visit #10 or  (date)     Recertification due (POC duration/ or 90 days whichever is less): visit # or  (date)     Visit # Insurance Allowable Auth required? Date Range   Eval+   30  [x]  Yes  []  No 22-22       Units approved Units used Date Range   48 15 22       Latex Allergy:  [x]NO      []YES  Preferred Language for Healthcare:   [x]English       []other:    Functional Scale:           Date assessed:  FOTO physical FS primary measure score = 33; risk adjusted = 36  2022    Pain level:  7/10     SUBJECTIVE:  Patient reports that two days ago she over did with walking .  St    OBJECTIVE: 10/21:Right knee Flexion AROM 117 deg    10/19: Session began 15 min late  Right knee Flexion AROM 120deg  10/14: Patient ambulated mild antalgic pattern ; Left knee ext 0; Flexion AROM 117  10/12: Left knee ext 0 ; flexion [id number]      RESTRICTIONS/PRECAUTIONS:  Hypothyroidism    Exercises/Interventions:     Therapeutic Exercises (25691) Resistance / level Sets/sec Reps Notes   Nustep  3 4 min     IB/HR   Soleus   3/30/\"    10/14   HSS  Hip flexor   \" TG   15 x Squat to 70 deg   Small SLR  Bridging     Bridging w/ knee ext  Sidelying hip abd  2/3'  2/3\" 11   LAQ blue     Glider side lunges    15 10/14 added   Lateral walkouts orange  2x laps  10/19   Therapeutic Activities (33821)                                          Neuromuscular Re-ed (02299)       Tandem stance on Airex  2/20'  10/19 added   SLS  EO/EC   2/15\"  10/21                                Manual Intervention (91374)       STM to left Hamstring tendon (lat), patellar mobs (inf/med/lat)                                           Girth (cm) L - pre vaso / post vaso  10/19 R - pre-vaso / post-vaso   Mid-patellar 42 cm/ 40cm    Suprapatellar 48 cm/46cm       Modalities:  10/21,10/19,10/14,10/12,9/27 : Vaso to left knee with in supine with LE elevated with pillow x 10 min    Pt. Education:  10/12/2022  -patient educated on diagnosis, prognosis and expectations for rehab  -all patient questions were answered    Home Exercise Program:  Access Code: [email or web address]  URL: [email or web address]  Date: 09/27/2022  Prepared by: Chris Keep    Exercises  Supine Quad Set - 1 x daily - 7 x weekly - 1 sets - 10 reps  Small Range Straight Leg Raise - 1 x daily - 7 x weekly - 1 sets - 10 reps  Supine Knee Extension Strengthening - 1 x daily - 7 x weekly - 1 sets - 10 reps  Supine Bridge - 1 x daily - 7 x weekly - 1 sets - 10 reps        Therapeutic Exercise and NMR EXR  [] (35962) Provided verbal/tactile cueing for activities related to strengthening, flexibility, endurance, ROM for improvements in  [] LE / Lumbar: LE, proximal hip, and core control with self care, mobility, lifting, ambulation.   [] UE / Cervical: cervical, postural, scapular, scapulothoracic and UE control with self care, reaching, carrying, lifting, house/yardwork, driving, computer work.  [] (07062) Provided verbal/tactile cueing for activities related to improving balance, coordination, kinesthetic sense, posture, motor skill, proprioception to assist with   [] LE / lumbar: LE, proximal hip, and core control in self care, mobility, lifting, ambulation and eccentric single leg control. [] UE / cervical: cervical, scapular, scapulothoracic and UE control with self care, reaching, carrying, lifting, house/yardwork, driving, computer work.   [] (45640) Therapist is in constant attendance of 2 or more patients providing skilled therapy interventions, but not providing any significant amount of measurable one-on-one time to either patient, for improvements in  [] LE / lumbar: LE, proximal hip, and core control in self care, mobility, lifting, ambulation and eccentric single leg control. [] UE / cervical: cervical, scapular, scapulothoracic and UE control with self care, reaching, carrying, lifting, house/yardwork, driving, computer work.      NMR and Therapeutic Activities:    [] (53311 or 53923) Provided verbal/tactile cueing for activities related to improving balance, coordination, kinesthetic sense, posture, motor skill, proprioception and motor activation to allow for proper function of   [] LE: / Lumbar core, proximal hip and LE with self care and ADLs  [] UE / Cervical: cervical, postural, scapular, scapulothoracic and UE control with self care, carrying, lifting, driving, computer work.   [] (89416) Gait Re-education- Provided training and instruction to the patient for proper LE, core and proximal hip recruitment and positioning and eccentric body weight control with ambulation re-education including up and down stairs     Home Management Training / Self Care:  [] (53728) Provided self-care/home management training related to activities of daily living and compensatory training, and/or use of adaptive equipment for improvement with: ADLs and compensatory training, meal preparation, safety procedures and instruction in use of adaptive equipment, including bathing, grooming, dressing, personal hygiene, basic household cleaning and chores. Home Exercise Program:    [x] (93698) Reviewed/Progressed HEP activities related to strengthening, flexibility, endurance, ROM of   [] LE / Lumbar: core, proximal hip and LE for functional self-care, mobility, lifting and ambulation/stair navigation   [] UE / Cervical: cervical, postural, scapular, scapulothoracic and UE control with self care, reaching, carrying, lifting, house/yardwork, driving, computer work  [] (13522)Reviewed/Progressed HEP activities related to improving balance, coordination, kinesthetic sense, posture, motor skill, proprioception of   [] LE: core, proximal hip and LE for self care, mobility, lifting, and ambulation/stair navigation    [] UE / Cervical: cervical, postural,  scapular, scapulothoracic and UE control with self care, reaching, carrying, lifting, house/yardwork, driving, computer work    Manual Treatments:  PROM / STM / Oscillations-Mobs:  G-I, II, III, IV (PA's, Inf., Post.)  [] (17716) Provided manual therapy to mobilize LE, proximal hip and/or LS spine soft tissue/joints for the purpose of modulating pain, promoting relaxation,  increasing ROM, reducing/eliminating soft tissue swelling/inflammation/restriction, improving soft tissue extensibility and allowing for proper ROM for normal function with   [] LE / lumbar: self care, mobility, lifting and ambulation. [] UE / Cervical: self care, reaching, carrying, lifting, house/yardwork, driving, computer work. Modalities:  [] (37601) Vasopneumatic compression: Utilized vasopneumatic compression to decrease edema / swelling for the purpose of improving mobility and quad tone / recruitment which will allow for increased overall function including but not limited to self-care, transfers, ambulation, and ascending / descending stairs.        Charges:  Timed Code Treatment Minutes: 40   Total Treatment Minutes: 50     [] EVAL - LOW (36880)   [] EVAL - MOD (42472)  [] EVAL - HIGH (15413)  [] Maggy Stabs (17680)  [x] RM(20132) x 1     [] Ionto  [x] NMR (88494) x 1      [x] Vaso  [x] Manual (86445) x       [] Ultrasound  [] TA x        [] Mech Traction (19136)  [] Aquatic Therapy x     [] ES (un) (07379):   [] Home Management Training x  [] ES(attended) (08032)   [] Dry Needling 1-2 muscles (08072):  [] Dry Needling 3+ muscles (432832)  [] Group:      [] Other:     GOALS:    Patient stated goal:   [] Progressing: [] Met: [] Not Met: [] Adjusted     Therapist goals for Patient:   Short Term Goals: To be achieved in: 2 weeks  1. Independent in HEP and progression per patient tolerance, in order to prevent re-injury. [] Progressing: [] Met: [] Not Met: [] Adjusted  2. Patient will have a decrease in pain to facilitate improvement in movement, function, and ADLs as indicated by Functional Deficits. [] Progressing: [] Met: [] Not Met: [] Adjusted     Long Term Goals: To be achieved in: 6 weeks / DC   1. Patient to improve FOTO score of at least 53 to assist with reaching prior level of function. [] Progressing: [] Met: [] Not Met: [] Adjusted  2. Patient will demonstrate increased right knee AROM to 125 deg to allow for proper joint functioning as indicated by patients Functional Deficits. [] Progressing: [] Met: [] Not Met: [] Adjusted  3. Patient will demonstrate an increase in right knee to +4/5 Strength to at least  as well as good proximal hip strength and control to allow for proper functional mobility as indicated by patients Functional Deficits. [] Progressing: [] Met: [] Not Met: [] Adjusted  4. Patient will return to functional activities including ambulating level and unlevel surfaces  without increased symptoms or restriction and with normalized gait pattern. [] Progressing: [] Met: [] Not Met: [] Adjusted  5. Patient to demonstrate proper squat form to demonstrate hip/knee control for ADLs.    [] Progressing: [] Met: [] Not Met: [] Adjusted    Overall Progression Towards Functional goals/ Treatment Progress Update:  [] Patient is progressing as expected towards functional goals listed. [] Progression is slowed due to complexities/Impairments listed. [] Progression has been slowed due to co-morbidities. [x] Plan just implemented, too soon to assess goals progression <30days   [] Goals require adjustment due to lack of progress  [] Patient is not progressing as expected and requires additional follow up with physician  [] Other    Persisting Functional Limitations/Impairments:  [x]Sleeping []Sitting               [x]Standing [x]Transfers        [x]Walking [x]Kneeling               [x]Stairs [x]Squatting / bending   [x]ADLs []Reaching  []Lifting  [x]Housework  []Driving []Job related tasks  []Sports/Recreation []Other:        ASSESSMENT:  Patient challenged with SLS proprioceptive facilitation EO/EC. Reeducated pt proper form with glider lunges in all directions. Pt encouraged to modified exercises at home especially on days she is doing increase standing and walking. She will further benefit from graded progression of Quad strengthening to increase confidence in mobility. Patient tolerated progression of exercises this date; lateral walkouts with T-band and tandem balance on Airex. Pt was also introduced to bridging w/ knee ext for glut and Hamstring control. Swelling was reduce with VASO treatment. Pt will benefit  from graded progression of closed chain exercises complimented with manual intervention. Treatment/Activity Tolerance:  [] Patient able to complete tx [] Patient limited by fatigue  [] Patient limited by pain  [] Patient limited by other medical complications  [] Other:     Prognosis: [] Good [] Fair  [] Poor    Patient Requires Follow-up: [x] Yes  [] No    Plan for next treatment session:  Continue with Quad and Hamstring strengthening     PLAN: See janette. PT 2x / week for 6 weeks.    [] Continue per plan of care [] Alter current plan (see comments)  [x] Plan of care initiated [] Hold pending MD visit [] Discharge    Electronically signed by: Hermilo Queen, PT PT, DPT    Note: If patient does not return for scheduled/ recommended follow up visits, this note will serve as a discharge from care along with most recent update on progress.

## 2022-10-25 ENCOUNTER — APPOINTMENT (OUTPATIENT)
Dept: PHYSICAL THERAPY | Age: 47
End: 2022-10-25
Payer: COMMERCIAL

## 2022-10-27 ENCOUNTER — HOSPITAL ENCOUNTER (OUTPATIENT)
Dept: PHYSICAL THERAPY | Age: 47
Setting detail: THERAPIES SERIES
Discharge: HOME | End: 2022-10-27
Payer: COMMERCIAL

## 2022-10-27 NOTE — FLOWSHEET NOTE
[address]     Physical Therapy  Cancellation/No-show Note  Patient Name:  Tino Forbes  :  1975   Date:  10/27/2022  Cancelled visits to date: 0  No-shows to date: 1    Patient status for today's appointment patient:  []  Cancelled  []  Rescheduled appointment  [x]  No-show 10/27     Reason given by patient:  []  Patient ill  []  Conflicting appointment  []  No transportation    []  Conflict with work  []  No reason given  []  Other:     Comments:      Phone call information:   []  Phone call made today to patient at _ time at number provided:      []  Patient answered, conversation as follows:    []  Patient did not answer, message left as follows:  [x]  Phone call not made today  []  Phone call not needed - pt contacted us to cancel and provided reason for cancellation.      Electronically signed by:  Jeni Aguilar PT

## 2022-10-28 ENCOUNTER — HOSPITAL ENCOUNTER (OUTPATIENT)
Dept: PHYSICAL THERAPY | Age: 47
Setting detail: THERAPIES SERIES
Discharge: HOME | End: 2022-10-28
Payer: COMMERCIAL

## 2022-10-28 DIAGNOSIS — M79.662 PAIN OF LEFT CALF: Primary | ICD-10-CM

## 2022-10-28 PROCEDURE — 97140 MANUAL THERAPY 1/> REGIONS: CPT

## 2022-10-28 PROCEDURE — 97530 THERAPEUTIC ACTIVITIES: CPT

## 2022-10-28 PROCEDURE — 97110 THERAPEUTIC EXERCISES: CPT

## 2022-10-28 PROCEDURE — 97016 VASOPNEUMATIC DEVICE THERAPY: CPT

## 2022-10-28 NOTE — FLOWSHEET NOTE
[address] [facility]  Phone: [phone]   Fax: [phone]    Physical Therapy Daily Treatment Note    Date:  10/28/2022     Patient Name:  Oscar Chan    :  1975  MRN: [id number]  Medical Diagnosis:  Left leg swelling [M79.89]  Acute medial meniscus tear of left knee, initial encounter [S83.242A]  Treatment Diagnosis:  Decrease left knee AROM,  hip/knee muscle weakness, difficulty walking                    Insurance/Certification information:  PT Insurance Information: [facility]  Physician Information:  Viri Leon MD    Plan of care signed (Y/N): []  Yes [x]  No     Date of Patient follow up with Physician:      Progress Report: []  Yes  [x]  No     Date Range for reporting period:  Beginnin2022  PN:   Ending:     Progress report due (10 Rx/or 30 days whichever is less): visit #10 or PN 84/8 or     Recertification due (POC duration/ or 90 days whichever is less): visit # or  (date)     Visit # Insurance Allowable Auth required? Date Range   Eval+   30  [x]  Yes  []  No 22-22       Units approved Units used Date Range   48 19 22       Latex Allergy:  [x]NO      []YES  Preferred Language for Healthcare:   [x]English       []other:    Functional Scale:           Date assessed:  TO physical FS primary measure score = 33; risk adjusted = 36  2022    Pain level:  10     SUBJECTIVE:  Patient reports that she had an incident on Tuesday sliding off a stool that dropped down suddenly causing her to come down on her L knee. OBJECTIVE:   10/28: Right knee Flexion AROM 113 deg   due to mild to moderate swelling ; Negative Homans sign .  Having  pt call Dr office to get a preventive scan to Rule out blood clot/ DVT    10/21:Right knee Flexion AROM 117 deg    10/19: Session began 15 min late  Right knee Flexion AROM 120deg  10/14: Patient ambulated mild antalgic pattern ; Left knee ext 0; Flexion AROM 117  10/12: Left knee ext 0 ; flexion [id number]      RESTRICTIONS/PRECAUTIONS:  Hypothyroidism    Exercises/Interventions:     Therapeutic Exercises (86395) Resistance / level Sets/sec Reps Notes   Nustep  3 6 min     IB/HR   Soleus   3/30/\"    10/14   HSS  Hip flexor   2/20\"  2/20\"            TG   15 x Squat to 70 deg   Small SLR  Bridging     Bridging w/ knee ext  Sidelying hip abd iso against wall  Heel Slides   2/3'  2/3\"      2 10x  10x  110  20           10/28   LAQ blue     Glider side lunges    10/14 added   Lateral walkouts orange  10/19   Therapeutic Activities (61443)       Discussed and educated pt on DVT  10 min                                  Neuromuscular Re-ed (48737)       Tandem stance on Airex   10/19 added   SLS  EO/EC    10/21                                Manual Intervention (13444)       STM to left Hamstring tendon (lat), patellar mobs (inf/med/lat)      EOB tibiofemoral mob ant/pos 8 min                                    10/28 Clinically observed mild to moderate edema in LLE  compared to RLE  Girth (cm) L - pre vaso / post vaso  10/19 R - pre-vaso / post-vaso   Mid-patellar 42 cm/ 40cm    Suprapatellar 48 cm/46cm       Modalities:  10/28,10/21,10/19,10/14,10/12,9/27 : Vaso to left knee with in supine with LE elevated with pillow x 10 min    Pt. Education:  10/12/2022  -patient educated on diagnosis, prognosis and expectations for rehab  -all patient questions were answered    Home Exercise Program:  Access Code: [email or web address]  URL: [email or web address]  Date: 09/27/2022  Prepared by: Ijeoma Ramsay    Exercises  Supine Quad Set - 1 x daily - 7 x weekly - 1 sets - 10 reps  Small Range Straight Leg Raise - 1 x daily - 7 x weekly - 1 sets - 10 reps  Supine Knee Extension Strengthening - 1 x daily - 7 x weekly - 1 sets - 10 reps  Supine Bridge - 1 x daily - 7 x weekly - 1 sets - 10 reps        Therapeutic Exercise and NMR EXR  [] (65821) Provided verbal/tactile cueing for activities related to strengthening, flexibility, endurance, ROM for improvements in  [] LE / Lumbar: LE, proximal hip, and core control with self care, mobility, lifting, ambulation. [] UE / Cervical: cervical, postural, scapular, scapulothoracic and UE control with self care, reaching, carrying, lifting, house/yardwork, driving, computer work.  [] (87332) Provided verbal/tactile cueing for activities related to improving balance, coordination, kinesthetic sense, posture, motor skill, proprioception to assist with   [] LE / lumbar: LE, proximal hip, and core control in self care, mobility, lifting, ambulation and eccentric single leg control. [] UE / cervical: cervical, scapular, scapulothoracic and UE control with self care, reaching, carrying, lifting, house/yardwork, driving, computer work.   [] (10218) Therapist is in constant attendance of 2 or more patients providing skilled therapy interventions, but not providing any significant amount of measurable one-on-one time to either patient, for improvements in  [] LE / lumbar: LE, proximal hip, and core control in self care, mobility, lifting, ambulation and eccentric single leg control. [] UE / cervical: cervical, scapular, scapulothoracic and UE control with self care, reaching, carrying, lifting, house/yardwork, driving, computer work.      NMR and Therapeutic Activities:    [] (99494 or 64604) Provided verbal/tactile cueing for activities related to improving balance, coordination, kinesthetic sense, posture, motor skill, proprioception and motor activation to allow for proper function of   [] LE: / Lumbar core, proximal hip and LE with self care and ADLs  [] UE / Cervical: cervical, postural, scapular, scapulothoracic and UE control with self care, carrying, lifting, driving, computer work.   [] (94305) Gait Re-education- Provided training and instruction to the patient for proper LE, core and proximal hip recruitment and positioning and eccentric body weight control with ambulation re-education including up and down stairs     Home Management Training / Self Care:  [] (59036) Provided self-care/home management training related to activities of daily living and compensatory training, and/or use of adaptive equipment for improvement with: ADLs and compensatory training, meal preparation, safety procedures and instruction in use of adaptive equipment, including bathing, grooming, dressing, personal hygiene, basic household cleaning and chores. Home Exercise Program:    [x] (78212) Reviewed/Progressed HEP activities related to strengthening, flexibility, endurance, ROM of   [] LE / Lumbar: core, proximal hip and LE for functional self-care, mobility, lifting and ambulation/stair navigation   [] UE / Cervical: cervical, postural, scapular, scapulothoracic and UE control with self care, reaching, carrying, lifting, house/yardwork, driving, computer work  [] (32870)Reviewed/Progressed HEP activities related to improving balance, coordination, kinesthetic sense, posture, motor skill, proprioception of   [] LE: core, proximal hip and LE for self care, mobility, lifting, and ambulation/stair navigation    [] UE / Cervical: cervical, postural,  scapular, scapulothoracic and UE control with self care, reaching, carrying, lifting, house/yardwork, driving, computer work    Manual Treatments:  PROM / STM / Oscillations-Mobs:  G-I, II, III, IV (PA's, Inf., Post.)  [] (12036) Provided manual therapy to mobilize LE, proximal hip and/or LS spine soft tissue/joints for the purpose of modulating pain, promoting relaxation,  increasing ROM, reducing/eliminating soft tissue swelling/inflammation/restriction, improving soft tissue extensibility and allowing for proper ROM for normal function with   [] LE / lumbar: self care, mobility, lifting and ambulation. [] UE / Cervical: self care, reaching, carrying, lifting, house/yardwork, driving, computer work.      Modalities:  [] (52822) Vasopneumatic compression: Utilized vasopneumatic compression to decrease edema / swelling for the purpose of improving mobility and quad tone / recruitment which will allow for increased overall function including but not limited to self-care, transfers, ambulation, and ascending / descending stairs. Charges:  Timed Code Treatment Minutes: 53   Total Treatment Minutes: 63     [] EVAL - LOW (47148)   [] EVAL - MOD (92395)  [] EVAL - HIGH (82074)  [] RE-EVAL (75589)  [x] EJ(62341) x 2     [] Ionto  [x] NMR (10006) x      [x] Vaso  [x] Manual (93119) x  1     [] Ultrasound  [x] TA x  1      [] Mech Traction (37692)  [] Aquatic Therapy x     [] ES (un) (49300):   [] Home Management Training x  [] ES(attended) (44481)   [] Dry Needling 1-2 muscles (64425):  [] Dry Needling 3+ muscles (973178)  [] Group:      [] Other:     GOALS:    Patient stated goal:   [] Progressing: [] Met: [] Not Met: [] Adjusted     Therapist goals for Patient:   Short Term Goals: To be achieved in: 2 weeks  1. Independent in HEP and progression per patient tolerance, in order to prevent re-injury. [] Progressing: [] Met: [] Not Met: [] Adjusted  2. Patient will have a decrease in pain to facilitate improvement in movement, function, and ADLs as indicated by Functional Deficits. [] Progressing: [] Met: [] Not Met: [] Adjusted     Long Term Goals: To be achieved in: 6 weeks / DC   1. Patient to improve FOTO score of at least 53 to assist with reaching prior level of function. [] Progressing: [] Met: [] Not Met: [] Adjusted  2. Patient will demonstrate increased right knee AROM to 125 deg to allow for proper joint functioning as indicated by patients Functional Deficits. [] Progressing: [] Met: [] Not Met: [] Adjusted  3. Patient will demonstrate an increase in right knee to +4/5 Strength to at least  as well as good proximal hip strength and control to allow for proper functional mobility as indicated by patients Functional Deficits.    [] Progressing: [] Met: [] Not Met: [] Adjusted  4. Patient will return to functional activities including ambulating level and unlevel surfaces  without increased symptoms or restriction and with normalized gait pattern. [] Progressing: [] Met: [] Not Met: [] Adjusted  5. Patient to demonstrate proper squat form to demonstrate hip/knee control for ADLs. [] Progressing: [] Met: [] Not Met: [] Adjusted    Overall Progression Towards Functional goals/ Treatment Progress Update:  [] Patient is progressing as expected towards functional goals listed. [] Progression is slowed due to complexities/Impairments listed. [] Progression has been slowed due to co-morbidities. [x] Plan just implemented, too soon to assess goals progression <30days   [] Goals require adjustment due to lack of progress  [] Patient is not progressing as expected and requires additional follow up with physician  [] Other    Persisting Functional Limitations/Impairments:  [x]Sleeping []Sitting               [x]Standing [x]Transfers        [x]Walking [x]Kneeling               [x]Stairs [x]Squatting / bending   [x]ADLs []Reaching  []Lifting  [x]Housework  []Driving []Job related tasks  []Sports/Recreation []Other:        ASSESSMENT:  10/28: Patient assessed for possible DVT prior to treatment due fall incident mentioned above. Pt educated on the pathology of blood clots and recommended pt consult physician office as a preventive measure. Treatment session was modified to mat exercises involving quad and glut activation. Pt had noticeable LLE limited end range knee flexion compared to previous visits with tenderness in the gastroc region. Will resume progression of exercise activity upon clearing DVT. Pt is taking a baby aspirin post op. Therapist suspect muscle response from fall incident , however will confirm with pt next visit that  the issue is resolved.               Treatment/Activity Tolerance:  [] Patient able to complete tx [] Patient limited by fatigue  [] Patient limited by pain  [] Patient limited by other medical complications  [] Other:     Prognosis: [] Good [] Fair  [] Poor    Patient Requires Follow-up: [x] Yes  [] No    Plan for next treatment session:  Continue with Quad and Hamstring strengthening     PLAN: See eval. PT 2x / week for 6 weeks. [x] Continue per plan of care [] Alter current plan (see comments)  [] Plan of care initiated [] Hold pending MD visit [] Discharge    Electronically signed by: Nayely Santizo, PT PT, DPT    Note: If patient does not return for scheduled/ recommended follow up visits, this note will serve as a discharge from care along with most recent update on progress.

## 2022-10-31 ENCOUNTER — HOSPITAL ENCOUNTER (OUTPATIENT)
Dept: VASCULAR LAB | Age: 47
Discharge: HOME | End: 2022-10-31
Payer: COMMERCIAL

## 2022-10-31 DIAGNOSIS — M79.662 PAIN OF LEFT CALF: ICD-10-CM

## 2022-10-31 PROCEDURE — 93971 EXTREMITY STUDY: CPT

## 2022-11-01 ENCOUNTER — APPOINTMENT (OUTPATIENT)
Dept: PHYSICAL THERAPY | Age: 47
End: 2022-11-01
Payer: COMMERCIAL

## 2022-11-02 ENCOUNTER — HOSPITAL ENCOUNTER (OUTPATIENT)
Dept: PHYSICAL THERAPY | Age: 47
Setting detail: THERAPIES SERIES
Discharge: HOME | End: 2022-11-02
Payer: COMMERCIAL

## 2022-11-02 NOTE — FLOWSHEET NOTE
[address]     Physical Therapy  Cancellation/No-show Note  Patient Name:  Oscar Chan  :  1975   Date:  2022  Cancelled visits to date: 1  No-shows to date: 1    Patient status for today's appointment patient:  [x]  Cancelled   []  Rescheduled appointment  []  No-show 10/27     Reason given by patient:  []  Patient ill  []  Conflicting appointment  []  No transportation    []  Conflict with work  []  No reason given  [x]  Other:     Comments:  knee swelling and too much pain     Phone call information:   []  Phone call made today to patient at _ time at number provided:      []  Patient answered, conversation as follows:    []  Patient did not answer, message left as follows:  []  Phone call not made today  [x]  Phone call not needed - pt contacted us to cancel and provided reason for cancellation.      Electronically signed by:  Dean Costa, PT DPT, OCS, OMT-C

## 2022-11-04 ENCOUNTER — APPOINTMENT (OUTPATIENT)
Dept: PHYSICAL THERAPY | Age: 47
End: 2022-11-04
Payer: COMMERCIAL

## 2022-11-08 ENCOUNTER — APPOINTMENT (OUTPATIENT)
Dept: PHYSICAL THERAPY | Age: 47
End: 2022-11-08
Payer: COMMERCIAL

## 2022-11-08 ENCOUNTER — TELEPHONE (OUTPATIENT)
Dept: PULMONOLOGY | Age: 47
End: 2022-11-08

## 2022-11-08 NOTE — TELEPHONE ENCOUNTER
Called patient and left message that yearly appointment would need to be scheduled before paperwork for DME could be filled out.

## 2022-11-11 ENCOUNTER — APPOINTMENT (OUTPATIENT)
Dept: PHYSICAL THERAPY | Age: 47
End: 2022-11-11
Payer: COMMERCIAL

## 2022-11-11 ENCOUNTER — HOSPITAL ENCOUNTER (OUTPATIENT)
Dept: PHYSICAL THERAPY | Age: 47
Setting detail: THERAPIES SERIES
End: 2022-11-11
Payer: COMMERCIAL

## 2022-11-14 ENCOUNTER — TELEPHONE (OUTPATIENT)
Dept: PULMONOLOGY | Age: 47
End: 2022-11-14

## 2022-11-14 NOTE — TELEPHONE ENCOUNTER
Kaylee Isidro from [address] [place] [address] following up on fax she sent on 11/2, requesting we fill out medicare PAP form and fax back to [phone]. If any questions, call Bhakti at [phone] ext.  [address]

## 2022-11-15 ENCOUNTER — APPOINTMENT (OUTPATIENT)
Dept: PHYSICAL THERAPY | Age: 47
End: 2022-11-15
Payer: COMMERCIAL

## 2022-11-15 NOTE — TELEPHONE ENCOUNTER
Called Jia Mccall and let her know that patient was called on 11/08/22 and left a message stating she needed to schedule an appointment before we could fill out any paperwork.

## 2022-11-18 ENCOUNTER — APPOINTMENT (OUTPATIENT)
Dept: PHYSICAL THERAPY | Age: 47
End: 2022-11-18
Payer: COMMERCIAL

## 2022-11-18 ENCOUNTER — HOSPITAL ENCOUNTER (OUTPATIENT)
Dept: PHYSICAL THERAPY | Age: 47
Setting detail: THERAPIES SERIES
Discharge: HOME | End: 2022-11-18
Payer: COMMERCIAL

## 2022-11-18 PROCEDURE — 97530 THERAPEUTIC ACTIVITIES: CPT

## 2022-11-18 PROCEDURE — 97016 VASOPNEUMATIC DEVICE THERAPY: CPT

## 2022-11-18 PROCEDURE — 97112 NEUROMUSCULAR REEDUCATION: CPT

## 2022-11-18 PROCEDURE — 97110 THERAPEUTIC EXERCISES: CPT

## 2022-11-18 RX ORDER — FLUTICASONE PROPIONATE 50 MCG
SPRAY, SUSPENSION (ML) NASAL
Qty: 2 EACH | Refills: 2 | Status: SHIPPED | OUTPATIENT
Start: 2022-11-18

## 2022-11-18 NOTE — PROGRESS NOTES
[address] [facility] Physical Therapy  Phone: [phone]   Fax: [phone]    Physical Therapy Daily Treatment Note    Date:  2022     Patient Name:  Valerie Sanabria    :  1975  MRN: [id number]  Medical Diagnosis:  Left leg swelling [M79.89]  Acute medial meniscus tear of left knee, initial encounter [S83.579A]  Treatment Diagnosis:  Decrease left knee AROM,  hip/knee muscle weakness, difficulty walking                    Insurance/Certification information:  PT Insurance Information: [facility]  Physician Information:  Gurjit Bhatti MD    Plan of care signed (Y/N): []  Yes [x]  No     Date of Patient follow up with Physician:      Progress Report: []  Yes  [x]  No     Date Range for reporting period:  Beginnin2022  PN:   Ending:     Progress report due (10 Rx/or 30 days whichever is less): visit #10 or PN 32/ or     Recertification due (POC duration/ or 90 days whichever is less): visit # or  (date)     Visit # Insurance Allowable Auth required? Date Range   Eval+   30  [x]  Yes  []  No 22-22       Units approved Units used Date Range   48 22 22       Latex Allergy:  [x]NO      []YES  Preferred Language for Healthcare:   [x]English       []other:    Functional Scale:           Date assessed:  FOTO physical FS primary measure score = 33; risk adjusted = 36  2022  FOTO physical FS primary measure score= 49                                                   2022  Pain level:  7/10     SUBJECTIVE:  Patient reports that she's  been having right knee pain and swelling over the last few days. 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      OBJECTIVE:    : Antalgic gait on RLE  upon entry therapy            PROM AROM     L R L R   Hip Flexion           Hip Abduction           Hip ER           Hip IR           Knee Flexion     94 vs115 135   Knee Extension     0     Dorsiflexion            Plantarflexion Inversion            Eversion           10/28: Left knee Flexion AROM 113 deg   due to mild to moderate swelling ; Negative Homans sign . Having  pt call Dr office to get a preventive scan to Rule out blood clot/ DVT    10/21:left knee Flexion AROM 117 deg    10/19: Session began 15 min late  Left knee Flexion AROM 120deg  10/14: Patient ambulated mild antalgic pattern ; Left knee ext 0; Flexion AROM 117  10/12: Left knee ext 0 ; flexion [id number]      RESTRICTIONS/PRECAUTIONS:  Hypothyroidism    Exercises/Interventions:     Therapeutic Exercises (32554) Resistance / level Sets/sec Reps Notes   Nustep  3 6 min     IB/HR   Soleus   3/30/\"    10/14   HSS  Hip flexor   2/20\"  2/20\"            TG   Squat to 70 deg   Small SLR  Bridging     Bridging w/ knee ext  Sidelying hip abd iso against wall  Heel Slides   2/3'  2/3\"      2 1          10/28   LAQ blue     Glider side lunges    15 10/14 added   Lateral walkouts orange  10/19   Therapeutic Activities (34511)       Discussed and educated pt on DVT       FSU  LSU 6'  4'  10  10 11/18                        Neuromuscular Re-ed (72300)       Tandem stance on Airex   10/19 added   SLS  EO/EC   2/15\"  10/21                                Manual Intervention (11118)       STM to left Hamstring tendon (lat), patellar mobs (inf/med/lat)      EOB tibiofemoral mob ant/pos 8 min                                    10/28 Clinically observed mild to moderate edema in LLE  compared to RLE  Girth (cm) L - pre vaso / post vaso  11/18 R - pre-vaso / post-vaso   Mid-patellar 43 cm/ 42cm    Suprapatellar 45 cm/43 cm       Modalities:  11/18, 10/28,10/21,10/19,10/14,10/12,9/27 : Vaso to left knee with in supine with LE elevated with pillow x 10 min    Pt. Education:  10/12/2022  -patient educated on diagnosis, prognosis and expectations for rehab  -all patient questions were answered    Home Exercise Program:  Access Code: [email or web address]  URL: [email or web address]  Date: 09/27/2022  Prepared by: Reina Terry    Exercises  Supine Quad Set - 1 x daily - 7 x weekly - 1 sets - 10 reps  Small Range Straight Leg Raise - 1 x daily - 7 x weekly - 1 sets - 10 reps  Supine Knee Extension Strengthening - 1 x daily - 7 x weekly - 1 sets - 10 reps  Supine Bridge - 1 x daily - 7 x weekly - 1 sets - 10 reps        Therapeutic Exercise and NMR EXR  [] (84708) Provided verbal/tactile cueing for activities related to strengthening, flexibility, endurance, ROM for improvements in  [] LE / Lumbar: LE, proximal hip, and core control with self care, mobility, lifting, ambulation. [] UE / Cervical: cervical, postural, scapular, scapulothoracic and UE control with self care, reaching, carrying, lifting, house/yardwork, driving, computer work.  [] (37435) Provided verbal/tactile cueing for activities related to improving balance, coordination, kinesthetic sense, posture, motor skill, proprioception to assist with   [] LE / lumbar: LE, proximal hip, and core control in self care, mobility, lifting, ambulation and eccentric single leg control. [] UE / cervical: cervical, scapular, scapulothoracic and UE control with self care, reaching, carrying, lifting, house/yardwork, driving, computer work.   [] (26987) Therapist is in constant attendance of 2 or more patients providing skilled therapy interventions, but not providing any significant amount of measurable one-on-one time to either patient, for improvements in  [] LE / lumbar: LE, proximal hip, and core control in self care, mobility, lifting, ambulation and eccentric single leg control. [] UE / cervical: cervical, scapular, scapulothoracic and UE control with self care, reaching, carrying, lifting, house/yardwork, driving, computer work.      NMR and Therapeutic Activities:    [] (32976 or 51219) Provided verbal/tactile cueing for activities related to improving balance, coordination, kinesthetic sense, posture, motor skill, proprioception and motor activation to allow for proper function of   [] LE: / Lumbar core, proximal hip and LE with self care and ADLs  [] UE / Cervical: cervical, postural, scapular, scapulothoracic and UE control with self care, carrying, lifting, driving, computer work.   [] (60886) Gait Re-education- Provided training and instruction to the patient for proper LE, core and proximal hip recruitment and positioning and eccentric body weight control with ambulation re-education including up and down stairs     Home Management Training / Self Care:  [] (32597) Provided self-care/home management training related to activities of daily living and compensatory training, and/or use of adaptive equipment for improvement with: ADLs and compensatory training, meal preparation, safety procedures and instruction in use of adaptive equipment, including bathing, grooming, dressing, personal hygiene, basic household cleaning and chores.      Home Exercise Program:    [x] (20030) Reviewed/Progressed HEP activities related to strengthening, flexibility, endurance, ROM of   [] LE / Lumbar: core, proximal hip and LE for functional self-care, mobility, lifting and ambulation/stair navigation   [] UE / Cervical: cervical, postural, scapular, scapulothoracic and UE control with self care, reaching, carrying, lifting, house/yardwork, driving, computer work  [] (14030)Reviewed/Progressed HEP activities related to improving balance, coordination, kinesthetic sense, posture, motor skill, proprioception of   [] LE: core, proximal hip and LE for self care, mobility, lifting, and ambulation/stair navigation    [] UE / Cervical: cervical, postural,  scapular, scapulothoracic and UE control with self care, reaching, carrying, lifting, house/yardwork, driving, computer work    Manual Treatments:  PROM / STM / Oscillations-Mobs:  G-I, II, III, IV (PA's, Inf., Post.)  [] (40851) Provided manual therapy to mobilize LE, proximal hip and/or LS spine soft tissue/joints for the purpose of modulating pain, promoting relaxation,  increasing ROM, reducing/eliminating soft tissue swelling/inflammation/restriction, improving soft tissue extensibility and allowing for proper ROM for normal function with   [] LE / lumbar: self care, mobility, lifting and ambulation. [] UE / Cervical: self care, reaching, carrying, lifting, house/yardwork, driving, computer work. Modalities:  [] (89467) Vasopneumatic compression: Utilized vasopneumatic compression to decrease edema / swelling for the purpose of improving mobility and quad tone / recruitment which will allow for increased overall function including but not limited to self-care, transfers, ambulation, and ascending / descending stairs. Charges:  Timed Code Treatment Minutes: 45   Total Treatment Minutes: 55     [] EVAL - LOW (15145)   [] EVAL - MOD (61062)  [] EVAL - HIGH (60687)  [] RE-EVAL (28888)  [x] KB(14276) x 1    [] Ionto  [x] NMR (12798) x 1      [x] Vaso  [] Manual (52412) x       [] Ultrasound  [x] TA x  1      [] Mech Traction (50092)  [] Aquatic Therapy x     [] ES (un) (46314):   [] Home Management Training x  [] ES(attended) (01076)   [] Dry Needling 1-2 muscles (37575):  [] Dry Needling 3+ muscles (795005)  [] Group:      [] Other:     GOALS:    Patient stated goal:   [] Progressing: [] Met: [] Not Met: [] Adjusted     Therapist goals for Patient:   Short Term Goals: To be achieved in: 2 weeks  1. Independent in HEP and progression per patient tolerance, in order to prevent re-injury. [x] Progressing: [] Met: [] Not Met: [] Adjusted  2. Patient will have a decrease in pain to facilitate improvement in movement, function, and ADLs as indicated by Functional Deficits. [x] Progressing: [] Met: [] Not Met: [] Adjusted     Long Term Goals: To be achieved in: 6 weeks / DC   1. Patient to improve FOTO score of at least 53 to assist with reaching prior level of function.   [x] Progressing: [] Met: [] Not Met: [] Adjusted  2. Patient will demonstrate increased right knee AROM to 125 deg to allow for proper joint functioning as indicated by patients Functional Deficits. [x] Progressing: [] Met: [] Not Met: [] Adjusted  3. Patient will demonstrate an increase in right knee to +4/5 Strength to at least  as well as good proximal hip strength and control to allow for proper functional mobility as indicated by patients Functional Deficits. [x] Progressing: [] Met: [] Not Met: [] Adjusted  4. Patient will return to functional activities including ambulating level and unlevel surfaces  without increased symptoms or restriction and with normalized gait pattern. [x] Progressing: [] Met: [] Not Met: [] Adjusted  5. Patient to demonstrate proper squat form to demonstrate hip/knee control for ADLs. [x] Progressing: [] Met: [] Not Met: [] Adjusted    Overall Progression Towards Functional goals/ Treatment Progress Update:  [] Patient is progressing as expected towards functional goals listed. [] Progression is slowed due to complexities/Impairments listed. [] Progression has been slowed due to co-morbidities. [x] Plan just implemented, too soon to assess goals progression <30days   [] Goals require adjustment due to lack of progress  [] Patient is not progressing as expected and requires additional follow up with physician  [] Other    Persisting Functional Limitations/Impairments:  [x]Sleeping []Sitting               [x]Standing [x]Transfers        [x]Walking [x]Kneeling               [x]Stairs [x]Squatting / bending   [x]ADLs []Reaching  []Lifting  [x]Housework  []Driving []Job related tasks  []Sports/Recreation []Other:        ASSESSMENT:  11/18: Patient returned to PT services after missing several visits. She had noticeable swelling in Left knee. Pt continues to ambulate with decrease terminal knee extension/ extensor lag pattern and decrease left quad control.  Pt encouraged to utilize ice at the end of the day to assist with swelling. She will further benefit from quad and Hamstring strengthening to increase Left knee stability and reduce compensation on contralateral LE.       10/28: Patient assessed for possible DVT prior to treatment due fall incident mentioned above. Pt educated on the pathology of blood clots and recommended pt consult physician office as a preventive measure. Treatment session was modified to mat exercises involving quad and glut activation. Pt had noticeable LLE limited end range knee flexion compared to previous visits with tenderness in the gastroc region. Will resume progression of exercise activity upon clearing DVT. Pt is taking a baby aspirin post op. Therapist suspect muscle response from fall incident , however will confirm with pt next visit that  the issue is resolved. Treatment/Activity Tolerance:  [] Patient able to complete tx [] Patient limited by fatigue  [] Patient limited by pain  [] Patient limited by other medical complications  [] Other:     Prognosis: [] Good [] Fair  [] Poor    Patient Requires Follow-up: [x] Yes  [] No    Plan for next treatment session:  Continue with Quad and Hamstring strengthening     PLAN: See eval. PT 2x / week for 6 weeks. [x] Continue per plan of care [] Alter current plan (see comments)  [] Plan of care initiated [] Hold pending MD visit [] Discharge    Electronically signed by: Walker Blanco, PT PT, DPT    Note: If patient does not return for scheduled/ recommended follow up visits, this note will serve as a discharge from care along with most recent update on progress.

## 2022-11-18 NOTE — TELEPHONE ENCOUNTER
Requested Prescriptions     Pending Prescriptions Disp Refills    fluticasone (FLONASE) 50 MCG/ACT nasal spray [Pharmacy Med Name: FLUTICASONE PROP 50 MCG SPRAY] 2 each 2     Sig: SPRAY ONE SPRAY IN EACH NOSTRIL ONCE DAILY       Last Clinic Visit:  3/16/2022     Next Clinic Appointment:  Visit date not found

## 2022-11-19 DIAGNOSIS — M25.562 LEFT KNEE PAIN, UNSPECIFIED CHRONICITY: ICD-10-CM

## 2022-11-19 DIAGNOSIS — H10.33 ACUTE CONJUNCTIVITIS OF BOTH EYES, UNSPECIFIED ACUTE CONJUNCTIVITIS TYPE: ICD-10-CM

## 2022-11-21 ENCOUNTER — HOSPITAL ENCOUNTER (OUTPATIENT)
Dept: PHYSICAL THERAPY | Age: 47
Setting detail: THERAPIES SERIES
Discharge: HOME | End: 2022-11-21
Payer: COMMERCIAL

## 2022-11-21 RX ORDER — CIPROFLOXACIN HYDROCHLORIDE 3.5 MG/ML
SOLUTION/ DROPS TOPICAL
Qty: 10 ML | OUTPATIENT
Start: 2022-11-21

## 2022-11-21 RX ORDER — BUDESONIDE AND FORMOTEROL FUMARATE DIHYDRATE 160; 4.5 UG/1; UG/1
AEROSOL RESPIRATORY (INHALATION)
Qty: 10.2 G | Refills: 3 | Status: SHIPPED | OUTPATIENT
Start: 2022-11-21 | End: 2023-01-10 | Stop reason: SDUPTHER

## 2022-11-21 RX ORDER — MONTELUKAST SODIUM 10 MG/1
TABLET ORAL
Qty: 30 TABLET | Refills: 0 | Status: SHIPPED | OUTPATIENT
Start: 2022-11-21

## 2022-11-21 RX ORDER — LIDOCAINE 50 MG/G
PATCH TOPICAL
Qty: 30 PATCH | Refills: 0 | Status: SHIPPED | OUTPATIENT
Start: 2022-11-21

## 2022-11-21 NOTE — FLOWSHEET NOTE
[address]     Physical Therapy  Cancellation/No-show Note  Patient Name:  Kaylin Roberts  :  1975   Date:  2022  Cancelled visits to date: 1  No-shows to date: 2    Patient status for today's appointment patient:  [x]  Cancelled   []  Rescheduled appointment  [x]  No-show 10/27,      Reason given by patient:  []  Patient ill  []  Conflicting appointment  []  No transportation    []  Conflict with work  []  No reason given  [x]  Other:     Comments:    Phone call information:   []  Phone call made today to patient at _ time at number provided:      []  Patient answered, conversation as follows:    [x]  Patient did not answer, message left as follows:  :Left a voice mail reminder of next appointment    []  Phone call not made today  []  Phone call not needed - pt contacted us to cancel and provided reason for cancellation.      Electronically signed by:  Daniela Carvalho, PT DPT,, OMT-C

## 2022-11-22 ENCOUNTER — PATIENT MESSAGE (OUTPATIENT)
Dept: ENT CLINIC | Age: 47
End: 2022-11-22

## 2022-11-22 DIAGNOSIS — R05.1 ACUTE COUGH: ICD-10-CM

## 2022-11-22 DIAGNOSIS — J01.90 ACUTE SINUSITIS, RECURRENCE NOT SPECIFIED, UNSPECIFIED LOCATION: Primary | ICD-10-CM

## 2022-11-23 RX ORDER — PREDNISONE 10 MG/1
TABLET ORAL
Qty: 38 TABLET | Refills: 0 | Status: SHIPPED | OUTPATIENT
Start: 2022-11-23

## 2022-11-23 RX ORDER — CIPROFLOXACIN 500 MG/1
500 TABLET, FILM COATED ORAL 2 TIMES DAILY
Qty: 42 TABLET | Refills: 0 | Status: SHIPPED | OUTPATIENT
Start: 2022-11-23 | End: 2022-12-14

## 2022-11-23 RX ORDER — GUAIFENESIN AND CODEINE PHOSPHATE 100; 10 MG/5ML; MG/5ML
5 SOLUTION ORAL 3 TIMES DAILY PRN
Qty: 180 ML | Refills: 0 | Status: SHIPPED | OUTPATIENT
Start: 2022-11-23 | End: 2022-12-11

## 2022-11-23 NOTE — TELEPHONE ENCOUNTER
Requesting medication, according to her medication I do not see where you have prescribed her cough syrup with codeine.  Last OV 10/20/22

## 2022-11-25 ENCOUNTER — HOSPITAL ENCOUNTER (OUTPATIENT)
Dept: PHYSICAL THERAPY | Age: 47
Setting detail: THERAPIES SERIES
Discharge: HOME | End: 2022-11-25
Payer: COMMERCIAL

## 2022-11-25 PROCEDURE — 97140 MANUAL THERAPY 1/> REGIONS: CPT

## 2022-11-25 PROCEDURE — 97112 NEUROMUSCULAR REEDUCATION: CPT

## 2022-11-25 PROCEDURE — 97016 VASOPNEUMATIC DEVICE THERAPY: CPT

## 2022-11-25 PROCEDURE — 97110 THERAPEUTIC EXERCISES: CPT

## 2022-11-25 NOTE — PROGRESS NOTES
[address] [facility]  Phone: [phone]   Fax: [phone]    Physical Therapy Daily Treatment Note    Date:  2022     Patient Name:  Ruth Gosselin    :  1975  MRN: [id number]  Medical Diagnosis:  Left leg swelling [M79.89]  Acute medial meniscus tear of left knee, initial encounter [S83.411A]  Treatment Diagnosis:  Decrease left knee AROM,  hip/knee muscle weakness, difficulty walking                    Insurance/Certification information:  PT Insurance Information: [facility]  Physician Information:  Cindy Rodriguez MD    Plan of care signed (Y/N): []  Yes [x]  No     Date of Patient follow up with Physician:      Progress Report: []  Yes  [x]  No     Date Range for reporting period:  Beginnin2022  PN:   Ending:     Progress report due (10 Rx/or 30 days whichever is less): visit #10 or PN 67/ or /    Recertification due (POC duration/ or 90 days whichever is less): visit # or  (date)     Visit # Insurance Allowable Auth required?  Date Range   Eval+   30  [x]  Yes  []  No 22-22       Units approved Units used Date Range   48 22 22       Latex Allergy:  [x]NO      []YES  Preferred Language for Healthcare:   [x]English       []other:    Functional Scale:           Date assessed:  FOTO physical FS primary measure score = 33; risk adjusted = 36  2022  FOTO physical FS primary measure score= 49                                                   2022  Pain level:  5-7/10     SUBJECTIVE:  Patient reports that she continues have swelling in Left knee    OBJECTIVE:    : Antalgic gait on RLE  upon entry therapy            PROM AROM     L R L R   Hip Flexion           Hip Abduction           Hip ER           Hip IR           Knee Flexion     94 vs115 135   Knee Extension     0     Dorsiflexion            Plantarflexion            Inversion            Eversion           10/28: Left knee Flexion AROM 113 deg   due to mild to moderate swelling ; Negative Homans sign . Having  pt call Dr office to get a preventive scan to Rule out blood clot/ DVT    10/21:left knee Flexion AROM 117 deg    10/19: Session began 15 min late  Left knee Flexion AROM 120deg  10/14: Patient ambulated mild antalgic pattern ; Left knee ext 0; Flexion AROM 117  10/12: Left knee ext 0 ; flexion [id number]      RESTRICTIONS/PRECAUTIONS:  Hypothyroidism    Exercises/Interventions:     Therapeutic Exercises (36655) Resistance / level Sets/sec Reps Notes   Nustep      Bike        IB/HR   Soleus   3/30/\"    10/14   HSS  Hip flexor   2/20\"  2/20\"     SLS Pulse double  SLS Pulse alternating  Blue  2  2 10 B  10 B 11/25   TG  SL squat    15 x  10 R/L  Squat to 70 deg   Small SLR  Bridging     Bridging w/ knee ext  Sidelying hip abd iso against wall  Heel Slides   2/3'  2/3\"      2 1          10/28   LAQ blue     Glider side lunges    15 10/14 added   Lateral walkouts Lime  2.5x laps  11/25   Therapeutic Activities (80402)       Discussed and educated pt on DVT       FSU  with contralateral LE tap  LSU 6'  6'  10  10 11/25 updated                         Neuromuscular Re-ed (91907)       Tandem stance on Airex  2/20'  10/19 added   SLS  EO/EC    10/21                                Manual Intervention ([id number])       STM to left Hamstring tendon (lat), patellar mobs (inf/med/lat)      EOB tibiofemoral mob ant/pos 10 min                                    10/28 Clinically observed mild to moderate edema in LLE  compared to RLE  Girth (cm) L - pre vaso / post vaso  11/25 R - pre-vaso / post-vaso   Mid-patellar 43 cm/ cm    Suprapatellar 47 cm/ cm       Modalities:  11/25, 11/18, 10/28,10/21,10/19,10/14,10/12,9/27 : Vaso to left knee with in supine with LE elevated with pillow x 10 min    Pt.  Education:  10/12/2022  -patient educated on diagnosis, prognosis and expectations for rehab  -all patient questions were answered    Home Exercise Program:  Access Code: [email or web address]  URL: [email or web address]  Date: 09/27/2022  Prepared by: Devon Moreno    Exercises  Supine Quad Set - 1 x daily - 7 x weekly - 1 sets - 10 reps  Small Range Straight Leg Raise - 1 x daily - 7 x weekly - 1 sets - 10 reps  Supine Knee Extension Strengthening - 1 x daily - 7 x weekly - 1 sets - 10 reps  Supine Bridge - 1 x daily - 7 x weekly - 1 sets - 10 reps        Therapeutic Exercise and NMR EXR  [] (29414) Provided verbal/tactile cueing for activities related to strengthening, flexibility, endurance, ROM for improvements in  [] LE / Lumbar: LE, proximal hip, and core control with self care, mobility, lifting, ambulation. [] UE / Cervical: cervical, postural, scapular, scapulothoracic and UE control with self care, reaching, carrying, lifting, house/yardwork, driving, computer work.  [] (23399) Provided verbal/tactile cueing for activities related to improving balance, coordination, kinesthetic sense, posture, motor skill, proprioception to assist with   [] LE / lumbar: LE, proximal hip, and core control in self care, mobility, lifting, ambulation and eccentric single leg control. [] UE / cervical: cervical, scapular, scapulothoracic and UE control with self care, reaching, carrying, lifting, house/yardwork, driving, computer work.   [] (50159) Therapist is in constant attendance of 2 or more patients providing skilled therapy interventions, but not providing any significant amount of measurable one-on-one time to either patient, for improvements in  [] LE / lumbar: LE, proximal hip, and core control in self care, mobility, lifting, ambulation and eccentric single leg control. [] UE / cervical: cervical, scapular, scapulothoracic and UE control with self care, reaching, carrying, lifting, house/yardwork, driving, computer work.      NMR and Therapeutic Activities:    [] (00655 or 60580) Provided verbal/tactile cueing for activities related to improving balance, coordination, kinesthetic sense, posture, motor skill, proprioception and motor activation to allow for proper function of   [] LE: / Lumbar core, proximal hip and LE with self care and ADLs  [] UE / Cervical: cervical, postural, scapular, scapulothoracic and UE control with self care, carrying, lifting, driving, computer work.   [] (74770) Gait Re-education- Provided training and instruction to the patient for proper LE, core and proximal hip recruitment and positioning and eccentric body weight control with ambulation re-education including up and down stairs     Home Management Training / Self Care:  [] (89567) Provided self-care/home management training related to activities of daily living and compensatory training, and/or use of adaptive equipment for improvement with: ADLs and compensatory training, meal preparation, safety procedures and instruction in use of adaptive equipment, including bathing, grooming, dressing, personal hygiene, basic household cleaning and chores.      Home Exercise Program:    [x] (73633) Reviewed/Progressed HEP activities related to strengthening, flexibility, endurance, ROM of   [] LE / Lumbar: core, proximal hip and LE for functional self-care, mobility, lifting and ambulation/stair navigation   [] UE / Cervical: cervical, postural, scapular, scapulothoracic and UE control with self care, reaching, carrying, lifting, house/yardwork, driving, computer work  [] (75538)Reviewed/Progressed HEP activities related to improving balance, coordination, kinesthetic sense, posture, motor skill, proprioception of   [] LE: core, proximal hip and LE for self care, mobility, lifting, and ambulation/stair navigation    [] UE / Cervical: cervical, postural,  scapular, scapulothoracic and UE control with self care, reaching, carrying, lifting, house/yardwork, driving, computer work    Manual Treatments:  PROM / STM / Oscillations-Mobs:  G-I, II, III, IV (PA's, Inf., Post.)  [] (57926) Provided manual therapy to mobilize LE, proximal hip and/or LS spine soft tissue/joints for the purpose of modulating pain, promoting relaxation,  increasing ROM, reducing/eliminating soft tissue swelling/inflammation/restriction, improving soft tissue extensibility and allowing for proper ROM for normal function with   [] LE / lumbar: self care, mobility, lifting and ambulation. [] UE / Cervical: self care, reaching, carrying, lifting, house/yardwork, driving, computer work. Modalities:  [] (49914) Vasopneumatic compression: Utilized vasopneumatic compression to decrease edema / swelling for the purpose of improving mobility and quad tone / recruitment which will allow for increased overall function including but not limited to self-care, transfers, ambulation, and ascending / descending stairs. Charges:  Timed Code Treatment Minutes: 48   Total Treatment Minutes: 62     [] EVAL - LOW (94326)   [] EVAL - MOD (16830)  [] EVAL - HIGH (40017)  [] RE-EVAL (85576)  [x] SH(28427) x 1    [] Ionto  [x] NMR (85067) x 1      [x] Vaso  [x] Manual (39146) x 1      [] Ultrasound  [] TA x       [] Mech Traction (97070)  [] Aquatic Therapy x     [] ES (un) (22221):   [] Home Management Training x  [] ES(attended) (50669)   [] Dry Needling 1-2 muscles (15208):  [] Dry Needling 3+ muscles (391128)  [] Group:      [] Other:     GOALS:    Patient stated goal:   [] Progressing: [] Met: [] Not Met: [] Adjusted     Therapist goals for Patient:   Short Term Goals: To be achieved in: 2 weeks  1. Independent in HEP and progression per patient tolerance, in order to prevent re-injury. [x] Progressing: [] Met: [] Not Met: [] Adjusted  2. Patient will have a decrease in pain to facilitate improvement in movement, function, and ADLs as indicated by Functional Deficits. [x] Progressing: [] Met: [] Not Met: [] Adjusted     Long Term Goals: To be achieved in: 6 weeks / DC   1.  Patient to improve FOTO score of at least 53 to assist with reaching prior level of demonstrated improved ROM after manual intervention Mobs. Will consider having patient trial alternating and Land vs Aquatic session in the future to assist with quad strengthening and unloading on LLE.      11/18: Patient returned to PT services after missing several visits. She had noticeable swelling in Left knee. Pt continues to ambulate with decrease terminal knee extension/ extensor lag pattern and decrease left quad control. Pt encouraged to utilize ice at the end of the day to assist with swelling. She will further benefit from quad and Hamstring strengthening to increase Left knee stability and reduce compensation on contralateral LE. Treatment/Activity Tolerance:  [] Patient able to complete tx [] Patient limited by fatigue  [] Patient limited by pain  [] Patient limited by other medical complications  [] Other:     Prognosis: [] Good [] Fair  [] Poor    Patient Requires Follow-up: [x] Yes  [] No    Plan for next treatment session:  Continue with Quad and Hamstring strengthening     PLAN: See janette. PT 2x / week for 6 weeks. [x] Continue per plan of care [] Alter current plan (see comments)  [] Plan of care initiated [] Hold pending MD visit [] Discharge    Electronically signed by: Melissa Grissom, PT PT, DPT    Note: If patient does not return for scheduled/ recommended follow up visits, this note will serve as a discharge from care along with most recent update on progress.

## 2022-12-02 ENCOUNTER — HOSPITAL ENCOUNTER (OUTPATIENT)
Dept: PHYSICAL THERAPY | Age: 47
Setting detail: THERAPIES SERIES
Discharge: HOME | End: 2022-12-02
Payer: COMMERCIAL

## 2022-12-02 PROCEDURE — 97016 VASOPNEUMATIC DEVICE THERAPY: CPT

## 2022-12-02 PROCEDURE — 97112 NEUROMUSCULAR REEDUCATION: CPT

## 2022-12-02 PROCEDURE — 97110 THERAPEUTIC EXERCISES: CPT

## 2022-12-02 NOTE — FLOWSHEET NOTE
[address] [facility] Physical Therapy  Phone: [phone]   Fax: [phone]    Physical Therapy Daily Treatment Note    Date:  2022     Patient Name:  Suellen Fernandez    :  1975  MRN: [id number]  Medical Diagnosis:  Left leg swelling [M79.89]  Acute medial meniscus tear of left knee, initial encounter [S83.012A]  Treatment Diagnosis:  Decrease left knee AROM,  hip/knee muscle weakness, difficulty walking                    Insurance/Certification information:  PT Insurance Information: [facility]  Physician Information:  Jayne Cardenas MD    Plan of care signed (Y/N): []  Yes [x]  No     Date of Patient follow up with Physician:      Progress Report: []  Yes  [x]  No     Date Range for reporting period:  Beginnin2022  PN:   Ending:     Progress report due (10 Rx/or 30 days whichever is less): visit #10 or PN  or     Recertification due (POC duration/ or 90 days whichever is less): visit # or  (date)     Visit # Insurance Allowable Auth required?  Date Range   Eval+   30  [x]  Yes  []  No 22-22       Units approved Units used Date Range   48 22 22       Latex Allergy:  [x]NO      []YES  Preferred Language for Healthcare:   [x]English       []other:    Functional Scale:           Date assessed:  FOTO physical FS primary measure score = 33; risk adjusted = 36  2022  FOTO physical FS primary measure score= 49                                                   2022  Pain level:  5-7/10     SUBJECTIVE:  Patient reports that her left knee felt stiff   OBJECTIVE:    : Antalgic gait on RLE  upon entry therapy            PROM AROM     L R L R   Hip Flexion           Hip Abduction           Hip ER           Hip IR           Knee Flexion     94 vs115 135   Knee Extension     0     Dorsiflexion            Plantarflexion            Inversion            Eversion           10/28: Left knee Flexion AROM 113 deg   due to mild to moderate swelling ; Negative Homans sign . Having  pt call Dr office to get a preventive scan to Rule out blood clot/ DVT    10/21:left knee Flexion AROM 117 deg    10/19: Session began 15 min late  Left knee Flexion AROM 120deg  10/14: Patient ambulated mild antalgic pattern ; Left knee ext 0; Flexion AROM 117  10/12: Left knee ext 0 ; flexion [id number]      RESTRICTIONS/PRECAUTIONS:  Hypothyroidism    Exercises/Interventions:     Therapeutic Exercises (26964) Resistance / level Sets/sec Reps Notes   Nustep      Bike        IB/HR   Soleus   3/30/\"    10/14   HSS  Hip flexor   2/20\"  2/20\"     SLS Pulse double  SLS Pulse alternating  Blue  2  2 10 B  10 B 11/25   TG  SL squat    15 x  10 R/L  Squat to 70 deg   Small SLR  Bridging     Bridging w/ knee ext  Sidelying hip abd iso against wall  Heel Slides   2/3'  2/3\"      2 1          10/28   LAQ blue     Glider side lunges    10/14 added   Lateral walkouts Lime  2.5x laps  11/25   Therapeutic Activities (40411)       Discussed and educated pt on DVT       FSU  with contralateral LE tap  LSU 6'  6'  10  10 11/25 updated                         Neuromuscular Re-ed (37800)       Tandem stance on Airex   10/19 added   SLS  EO/EC   W/ head turns   2/15\"  12/2 added                               Manual Intervention ([id number])       STM to left Hamstring tendon (lat), patellar mobs (inf/med/lat)      EOB tibiofemoral mob ant/pos                                  AquaticTherapy Dates of Service:   Aquatic Visits Exercises/Activities: Challenge patient as tolerated   Transfers:  [x] Stairs   [] Ramp  [] Chair Lift   % Immersion:            Ambulation/ Warm up:   UE Exercises:       Forward  x Shoulder Shrugs      Lateral   x Shoulder Circles      Retro   x Scapular Retraction      Cariocas    Push Downs      Heel/Toe Walking   x Punching       Rowing       Elbow Flex/Ext       Shldr Flex/Ext       [employer] aBd/aDd    LE Exercises:  Shldr Horiz aBd/aDd    HR/TR x [employer] IR/ER Marches x Arm Circles    Squats  x PNF Diagonals    Hamstring Curls x Wall Push Ups    Hip Flexion (SLR) x     Hip aBduction (SLR) x     Hip Extension (SLR) x      Hip aDduction (SLR) x     Hip Circles x Functional:    Hip IR/Er x Step up forward x   Hip Hikes x Step up lateral  x     Step down        Lunges Forward x     Lunges Retro x     Lunges Lateral     Balance:        SLS  x      Tandem Stance x      NBOS eyes open x Seated:     NBOS eyes closed x Ankle pumps     Hand to Opposite Knee x Ankle Circles     Fwd Step ups to SLS x Knee Flex/Ext    Lateral Step ups to SLS  Hip aBd/aDd    Stop/Go Gait   Bicycle       Ankle DF/PF      Ankle Inv/Ev    Stretching:       Gastroc/Soleus x     Hamstring  x Deep Water:    Knee Flex Stretch x Jog    Piriformis   Noodle Hang    Hip Flexor  Traction at [facility]       ITB  Cool Down:    Quad  Fwd Walking    Mid Back   Lat Walking    UT  Retro Walking    Post Shoulder  Noodle float    Ladder Pull      Pec Stretch            Core: Other:    TrA set      Pelvic Tilts      Multifidi Walk outs c paddle      PNF Chop/Lifts                          Aquatic Abbreviation Key  B= Belt DB= Dumbells T= Theratube   H= Hydrotone N= Noodles W= Weights   P= Paddles S= Speedo equipment K= Kickboard      Pt. Education: Gave pt tour of pool, explained how to use lockers, what to wear, that it would be in group setting, and showed pt aquatic equipment. 10/28 Clinically observed mild to moderate edema in LLE  compared to RLE  Girth (cm) L - pre vaso / post vaso  12/2 R - pre-vaso / post-vaso   Mid-patellar 42 cm/41 cm    Suprapatellar 47 cm/ 45cm       Modalities:  11/25, 11/18, 10/28,10/21,10/19,10/14,10/12,9/27 : Vaso to left knee with in supine with LE elevated with pillow x 10 min    Pt.  Education:  10/12/2022  -patient educated on diagnosis, prognosis and expectations for rehab  -all patient questions were answered    Home Exercise Program:  Access Code: [email or web address]  URL: [email or web address]  Date: 09/27/2022  Prepared by: Susan Cabrera    Exercises  Supine Quad Set - 1 x daily - 7 x weekly - 1 sets - 10 reps  Small Range Straight Leg Raise - 1 x daily - 7 x weekly - 1 sets - 10 reps  Supine Knee Extension Strengthening - 1 x daily - 7 x weekly - 1 sets - 10 reps  Supine Bridge - 1 x daily - 7 x weekly - 1 sets - 10 reps        Therapeutic Exercise and NMR EXR  [] (65035) Provided verbal/tactile cueing for activities related to strengthening, flexibility, endurance, ROM for improvements in  [] LE / Lumbar: LE, proximal hip, and core control with self care, mobility, lifting, ambulation. [] UE / Cervical: cervical, postural, scapular, scapulothoracic and UE control with self care, reaching, carrying, lifting, house/yardwork, driving, computer work.  [] (60923) Provided verbal/tactile cueing for activities related to improving balance, coordination, kinesthetic sense, posture, motor skill, proprioception to assist with   [] LE / lumbar: LE, proximal hip, and core control in self care, mobility, lifting, ambulation and eccentric single leg control. [] UE / cervical: cervical, scapular, scapulothoracic and UE control with self care, reaching, carrying, lifting, house/yardwork, driving, computer work.   [] (92045) Therapist is in constant attendance of 2 or more patients providing skilled therapy interventions, but not providing any significant amount of measurable one-on-one time to either patient, for improvements in  [] LE / lumbar: LE, proximal hip, and core control in self care, mobility, lifting, ambulation and eccentric single leg control. [] UE / cervical: cervical, scapular, scapulothoracic and UE control with self care, reaching, carrying, lifting, house/yardwork, driving, computer work.      NMR and Therapeutic Activities:    [] (01187 or 66449) Provided verbal/tactile cueing for activities related to improving balance, coordination, kinesthetic sense, posture, motor skill, proprioception and motor activation to allow for proper function of   [] LE: / Lumbar core, proximal hip and LE with self care and ADLs  [] UE / Cervical: cervical, postural, scapular, scapulothoracic and UE control with self care, carrying, lifting, driving, computer work.   [] (13643) Gait Re-education- Provided training and instruction to the patient for proper LE, core and proximal hip recruitment and positioning and eccentric body weight control with ambulation re-education including up and down stairs     Home Management Training / Self Care:  [] (01488) Provided self-care/home management training related to activities of daily living and compensatory training, and/or use of adaptive equipment for improvement with: ADLs and compensatory training, meal preparation, safety procedures and instruction in use of adaptive equipment, including bathing, grooming, dressing, personal hygiene, basic household cleaning and chores.      Home Exercise Program:    [x] (03984) Reviewed/Progressed HEP activities related to strengthening, flexibility, endurance, ROM of   [] LE / Lumbar: core, proximal hip and LE for functional self-care, mobility, lifting and ambulation/stair navigation   [] UE / Cervical: cervical, postural, scapular, scapulothoracic and UE control with self care, reaching, carrying, lifting, house/yardwork, driving, computer work  [] (95810)Reviewed/Progressed HEP activities related to improving balance, coordination, kinesthetic sense, posture, motor skill, proprioception of   [] LE: core, proximal hip and LE for self care, mobility, lifting, and ambulation/stair navigation    [] UE / Cervical: cervical, postural,  scapular, scapulothoracic and UE control with self care, reaching, carrying, lifting, house/yardwork, driving, computer work    Manual Treatments:  PROM / STM / Oscillations-Mobs:  G-I, II, III, IV (PA's, Inf., Post.)  [] (02027) Provided manual therapy to mobilize LE, proximal hip and/or LS spine soft tissue/joints for the purpose of modulating pain, promoting relaxation,  increasing ROM, reducing/eliminating soft tissue swelling/inflammation/restriction, improving soft tissue extensibility and allowing for proper ROM for normal function with   [] LE / lumbar: self care, mobility, lifting and ambulation. [] UE / Cervical: self care, reaching, carrying, lifting, house/yardwork, driving, computer work. Modalities:  [x] (34398) Vasopneumatic compression: Utilized vasopneumatic compression to decrease edema / swelling for the purpose of improving mobility and quad tone / recruitment which will allow for increased overall function including but not limited to self-care, transfers, ambulation, and ascending / descending stairs.   [] (79245) Aquatic therapy with therapeutic exercise. Provided verbal and tactile cueing for activities related to strengthening, flexibility, endurance, ROM for improvements in  [] LE / lumbar: LE, proximal hip, and core control in self care, mobility, lifting, ambulation and eccentric single leg control. [] UE / cervical: cervical, scapular, scapulothoracic and UE control with self care, reaching, carrying, lifting, house/yardwork, driving, computer work.   [] (63563) Therapist is in constant attendance of 2 or more patients providing skilled therapy interventions, but not providing any significant amount of measurable one-on-one time to either patient, for improvements in  [] LE / lumbar: LE, proximal hip, and core control in self care, mobility, lifting, ambulation and eccentric single leg control. [] UE / cervical: cervical, scapular, scapulothoracic and UE control with self care, reaching, carrying, lifting, house/yardwork, driving, computer work.       Charges:  Timed Code Treatment Minutes: 44   Total Treatment Minutes: 54     [] EVAL - LOW (63376)   [] EVAL - MOD (66100)  [] EVAL - HIGH (57740)  [] RE-EVAL (91826)  [x] VW(44357) x 2    [] Ionto  [x] NMR (37205) x 1      [x] Vaso  [x] Manual (03649) x       [] Ultrasound  [] TA x       [] Mech Traction (71445)  [] Aquatic Therapy x     [] ES (un) (15033):   [] Home Management Training x  [] ES(attended) (08754)   [] Dry Needling 1-2 muscles (16291):  [] Dry Needling 3+ muscles (077125)  [] Group:      [] Other:     GOALS:    Patient stated goal:   [] Progressing: [] Met: [] Not Met: [] Adjusted     Therapist goals for Patient:   Short Term Goals: To be achieved in: 2 weeks  1. Independent in HEP and progression per patient tolerance, in order to prevent re-injury. [x] Progressing: [] Met: [] Not Met: [] Adjusted  2. Patient will have a decrease in pain to facilitate improvement in movement, function, and ADLs as indicated by Functional Deficits. [x] Progressing: [] Met: [] Not Met: [] Adjusted     Long Term Goals: To be achieved in: 6 weeks / DC   1. Patient to improve FOTO score of at least 53 to assist with reaching prior level of function. [x] Progressing: [] Met: [] Not Met: [] Adjusted  2. Patient will demonstrate increased right knee AROM to 125 deg to allow for proper joint functioning as indicated by patients Functional Deficits. [x] Progressing: [] Met: [] Not Met: [] Adjusted  3. Patient will demonstrate an increase in right knee to +4/5 Strength to at least  as well as good proximal hip strength and control to allow for proper functional mobility as indicated by patients Functional Deficits. [x] Progressing: [] Met: [] Not Met: [] Adjusted  4. Patient will return to functional activities including ambulating level and unlevel surfaces  without increased symptoms or restriction and with normalized gait pattern. [x] Progressing: [] Met: [] Not Met: [] Adjusted  5. Patient to demonstrate proper squat form to demonstrate hip/knee control for ADLs.    [x] Progressing: [] Met: [] Not Met: [] Adjusted    Overall Progression Towards Functional goals/ Treatment Progress Update:  [] Patient is progressing as expected towards functional goals listed. [] Progression is slowed due to complexities/Impairments listed. [] Progression has been slowed due to co-morbidities. [x] Plan just implemented, too soon to assess goals progression <30days   [] Goals require adjustment due to lack of progress  [] Patient is not progressing as expected and requires additional follow up with physician  [] Other    Persisting Functional Limitations/Impairments:  [x]Sleeping []Sitting               [x]Standing [x]Transfers        [x]Walking [x]Kneeling               [x]Stairs [x]Squatting / bending   [x]ADLs []Reaching  []Lifting  [x]Housework  []Driving []Job related tasks  []Sports/Recreation []Other:        ASSESSMENT: 12/2: Patient continued with closed chain quad strengthening and balance activities . She demonstrated mild fatigue with increase challenge. The patient has a tendency to compensate at times with right LE during fatigue or challenge. Her swelling seems to be improving week to week. Patient claims to be handling stairs better up/down. Patient to began Aquatic Therapy next session. Will alternate land and  Aquatic  to gain optimal strength and mobility to return to normal.                  Treatment/Activity Tolerance:  [] Patient able to complete tx [] Patient limited by fatigue  [] Patient limited by pain  [] Patient limited by other medical complications  [] Other:     Prognosis: [] Good [] Fair  [] Poor    Patient Requires Follow-up: [x] Yes  [] No    Plan for next treatment session:  Continue with Quad and Hamstring strengthening     PLAN: See janette. PT 2x / week for 6 weeks.    [x] Continue per plan of care [] Alter current plan (see comments)  [] Plan of care initiated [] Hold pending MD visit [] Discharge    Electronically signed by: Dina Ocampo, PT PT, DPT    Note: If patient does not return for scheduled/ recommended follow up visits, this note will serve as a discharge from care along with most recent update on progress.

## 2022-12-06 ENCOUNTER — OFFICE VISIT (OUTPATIENT)
Dept: ORTHOPEDIC SURGERY | Age: 47
End: 2022-12-06

## 2022-12-06 DIAGNOSIS — M25.562 LEFT KNEE PAIN, UNSPECIFIED CHRONICITY: Primary | ICD-10-CM

## 2022-12-06 PROCEDURE — 99024 POSTOP FOLLOW-UP VISIT: CPT | Performed by: ORTHOPAEDIC SURGERY

## 2022-12-07 ENCOUNTER — HOSPITAL ENCOUNTER (OUTPATIENT)
Dept: PHYSICAL THERAPY | Age: 47
Setting detail: THERAPIES SERIES
Discharge: HOME | End: 2022-12-07
Payer: COMMERCIAL

## 2022-12-07 NOTE — FLOWSHEET NOTE
[facility] [address]     Physical Therapy  Cancellation/No-show Note  Patient Name:  Christelle Choi  :  1975   Date:  2022  Cancelled visits to date: 1  No-shows to date: 3    Patient status for today's appointment patient:  [x]  Cancelled   []  Rescheduled appointment  [x]  No-show 10/27, ,      Reason given by patient:  []  Patient ill  []  Conflicting appointment  []  No transportation    []  Conflict with work  []  No reason given  [x]  Other:     Comments:    Phone call information:   []  Phone call made today to patient at _ time at number provided:      []  Patient answered, conversation as follows:    []  Patient did not answer, message left as follows:  :Left a voice mail reminder of next appointment    []  Phone call not made today  []  Phone call not needed - pt contacted us to cancel and provided reason for cancellation.      Electronically signed by:  Lupe Lacy, PT DPT,, OMT-C

## 2022-12-09 ENCOUNTER — HOSPITAL ENCOUNTER (OUTPATIENT)
Dept: PHYSICAL THERAPY | Age: 47
Setting detail: THERAPIES SERIES
Discharge: HOME | End: 2022-12-09
Payer: COMMERCIAL

## 2022-12-09 NOTE — FLOWSHEET NOTE
[email or web address]     Physical Therapy  Cancellation/No-show Note  Patient Name:  Samantha Natarajan  :  1975   Date:  2022  Cancelled visits to date: 1  No-shows to date: 3    Patient status for today's appointment patient:  [x]  Cancelled ,   []  Rescheduled appointment  [x]  No-show 10/27, ,      Reason given by patient:  []  Patient ill  []  Conflicting appointment  []  No transportation    []  Conflict with work  []  No reason given  [x]  Other:   thrown off  with  work schedule    Comments:    Phone call information:   []  Phone call made today to patient at _ time at number provided:      []  Patient answered, conversation as follows:    []  Patient did not answer, message left as follows:  :Left a voice mail reminder of next appointment    []  Phone call not made today  []  Phone call not needed - pt contacted us to cancel and provided reason for cancellation.      Electronically signed by:  Jose Gonzalez PT DPT,, OMT-C

## 2022-12-13 NOTE — PROGRESS NOTES
12/6/2022     Reason for visit:  Status post left knee arthroscopy with partial medial meniscectomy on 9/9/2022    History of Present Illness: The patient returns for postop evaluation. She reports significant provement since the last visit. She does have less pain and swelling. She has noticed a small mass over her tibia though. Objective:  [facility] 11/09/2020 (Exact Date)      Physical Exam:  The patient is well-appearing and in no apparent distress  Examination of the left knee   There is a trace effusion,  Range of motion reveals 0 to 120  Neg lachman, negative posterior drawer, no pain or laxity with varus or valgus stress at 0 degrees and 30 degrees of flexion  no joint line tenderness  Small palpable mass over the anterior distal tibia. Mildly painful  5 out of 5 strength throughout distal muscle groups  Sensation is intact to light touch throughout all distributions  There is some mild Swelling present  Palpable DP pulse, brisk cap refill, 2+ symmetric reflexes     Assessment:  Status post left knee arthroscopy with partial medial meniscectomy on 9/9/2022    Plan:  Overall the patient is doing much better. It is unclear what this mass represents. It is likely a collection of swelling. However I did tell her that if it persists or gets larger or more painful to call us and the next up would be either an ultrasound or an MRI to further evaluated. Butch Andrews MD            Orthopaedic Surgery Sports Medicine and [address] and [address] [facility]            Team Physician [facility] ([place]      Disclaimer: This note was dictated with voice recognition software. Though review and correction are routine, we apologize for any errors.

## 2022-12-14 ENCOUNTER — APPOINTMENT (OUTPATIENT)
Dept: PHYSICAL THERAPY | Age: 47
End: 2022-12-14
Payer: COMMERCIAL

## 2022-12-16 ENCOUNTER — HOSPITAL ENCOUNTER (OUTPATIENT)
Dept: PHYSICAL THERAPY | Age: 47
Setting detail: THERAPIES SERIES
Discharge: HOME | End: 2022-12-16
Payer: COMMERCIAL

## 2022-12-16 PROCEDURE — 97530 THERAPEUTIC ACTIVITIES: CPT

## 2022-12-16 PROCEDURE — 97110 THERAPEUTIC EXERCISES: CPT

## 2022-12-16 PROCEDURE — 97016 VASOPNEUMATIC DEVICE THERAPY: CPT

## 2022-12-16 NOTE — FLOWSHEET NOTE
[address] [facility]  Phone: [phone]   Fax: [phone]    Physical Therapy Daily Treatment Note    Date:  2022     Patient Name:  Oscar Chan    :  1975  MRN: [id number]  Medical Diagnosis:  Left leg swelling [M79.89]  Acute medial meniscus tear of left knee, initial encounter [S83.244A]  Treatment Diagnosis:  Decrease left knee AROM,  hip/knee muscle weakness, difficulty walking                    Insurance/Certification information:  [facility] Information: [facility]  Physician Information:  Viri Leon MD    Plan of care signed (Y/N): []  Yes [x]  No     Date of Patient follow up with Physician:      Progress Report: []  Yes  [x]  No     Date Range for reporting period:  Beginnin2022  PN: ,   Ending:     Progress report due (10 Rx/or 30 days whichever is less): visit #10 or PN     Recertification due (POC duration/ or 90 days whichever is less): visit # or  (date)     Visit # Insurance Allowable Auth required? Date Range   Eval+   30  [x]  Yes  []  No 22-22       Units approved Units used Date Range   48 25 22       Latex Allergy:  [x]NO      []YES  Preferred Language for Healthcare:   [x]English       []other:    Functional Scale:           Date assessed:  FOTO physical FS primary measure score = 33; risk adjusted = 36  2022  FOTO physical FS primary measure score= 49                                                   2022  FOTO physical FS primary measure score=                                                        2022    Pain level:  5-7/10     SUBJECTIVE:  Patient reports having a  bad week with her left knee. Says she feels like it catching while ambulating .    OBJECTIVE:   : Antalgic gait on RLE  upon entry therapy            PROM AROM     L R L R   Hip Flexion           Hip Abduction           Hip ER           Hip IR           Knee Flexion     115 vs 133 135 PNF Diagonals    Hamstring Curls x Wall Push Ups    Hip Flexion (SLR) x     Hip aBduction (SLR) x     Hip Extension (SLR) x      Hip aDduction (SLR) x     Hip Circles x Functional:    Hip IR/Er x Step up forward x   Hip Hikes x Step up lateral  x     Step down        Lunges Forward x     Lunges Retro x     Lunges Lateral     Balance:        SLS  x      Tandem Stance x      NBOS eyes open x Seated:     NBOS eyes closed x Ankle pumps     Hand to Opposite Knee x Ankle Circles     Fwd Step ups to SLS x Knee Flex/Ext    Lateral Step ups to SLS  Hip aBd/aDd    Stop/Go Gait   Bicycle       Ankle DF/PF      Ankle Inv/Ev    Stretching:       Gastroc/Soleus x     Hamstring  x Deep Water:    Knee Flex Stretch x Jog    Piriformis   Noodle Hang    Hip Flexor  Traction at [facility]       ITB  Cool Down:    Quad  Fwd Walking    Mid Back   Lat Walking    UT  Retro Walking    Post Shoulder  Noodle float    Ladder Pull      Pec Stretch            Core: Other:    TrA set      Pelvic Tilts      Multifidi Walk outs c paddle      PNF Chop/Lifts                          Aquatic Abbreviation Key  B= Belt DB= Dumbells T= Theratube   H= Hydrotone N= Noodles W= Weights   P= Paddles S= Speedo equipment K= Kickboard      Pt. Education: Gave pt tour of pool, explained how to use lockers, what to wear, that it would be in group setting, and showed pt aquatic equipment. 10/28 Clinically observed mild to moderate edema in LLE  compared to RLE  Girth (cm) L - pre vaso / post vaso  12/2 R - pre-vaso / post-vaso   Mid-patellar 39 cm/38 cm    Suprapatellar 45 cm/44 cm       Modalities:  12/16,11/25, 11/18, 10/28,10/21,10/19,10/14,10/12,9/27 : Vaso to left knee with in supine with LE elevated with pillow x 10 min    Pt. Education:  10/12/2022  -patient educated on diagnosis, prognosis and expectations for rehab  -all patient questions were answered    Home Exercise Program:  Access Code: [email or web address]  URL: [email or web address]  Date: 09/27/2022  Prepared by: Lee León    Exercises  Supine Quad Set - 1 x daily - 7 x weekly - 1 sets - 10 reps  Small Range Straight Leg Raise - 1 x daily - 7 x weekly - 1 sets - 10 reps  Supine Knee Extension Strengthening - 1 x daily - 7 x weekly - 1 sets - 10 reps  Supine Bridge - 1 x daily - 7 x weekly - 1 sets - 10 reps        Therapeutic Exercise and NMR EXR  [] (15662) Provided verbal/tactile cueing for activities related to strengthening, flexibility, endurance, ROM for improvements in  [] LE / Lumbar: LE, proximal hip, and core control with self care, mobility, lifting, ambulation. [] UE / Cervical: cervical, postural, scapular, scapulothoracic and UE control with self care, reaching, carrying, lifting, house/yardwork, driving, computer work.  [] (13076) Provided verbal/tactile cueing for activities related to improving balance, coordination, kinesthetic sense, posture, motor skill, proprioception to assist with   [] LE / lumbar: LE, proximal hip, and core control in self care, mobility, lifting, ambulation and eccentric single leg control. [] UE / cervical: cervical, scapular, scapulothoracic and UE control with self care, reaching, carrying, lifting, house/yardwork, driving, computer work.   [] (89881) Therapist is in constant attendance of 2 or more patients providing skilled therapy interventions, but not providing any significant amount of measurable one-on-one time to either patient, for improvements in  [] LE / lumbar: LE, proximal hip, and core control in self care, mobility, lifting, ambulation and eccentric single leg control. [] UE / cervical: cervical, scapular, scapulothoracic and UE control with self care, reaching, carrying, lifting, house/yardwork, driving, computer work.      NMR and Therapeutic Activities:    [] (28037 or 14237) Provided verbal/tactile cueing for activities related to improving balance, coordination, kinesthetic sense, posture, motor skill, proprioception and motor activation to allow for proper function of   [] LE: / Lumbar core, proximal hip and LE with self care and ADLs  [] UE / Cervical: cervical, postural, scapular, scapulothoracic and UE control with self care, carrying, lifting, driving, computer work.   [] (37307) Gait Re-education- Provided training and instruction to the patient for proper LE, core and proximal hip recruitment and positioning and eccentric body weight control with ambulation re-education including up and down stairs     Home Management Training / Self Care:  [] (11592) Provided self-care/home management training related to activities of daily living and compensatory training, and/or use of adaptive equipment for improvement with: ADLs and compensatory training, meal preparation, safety procedures and instruction in use of adaptive equipment, including bathing, grooming, dressing, personal hygiene, basic household cleaning and chores.      Home Exercise Program:    [x] (20560) Reviewed/Progressed HEP activities related to strengthening, flexibility, endurance, ROM of   [] LE / Lumbar: core, proximal hip and LE for functional self-care, mobility, lifting and ambulation/stair navigation   [] UE / Cervical: cervical, postural, scapular, scapulothoracic and UE control with self care, reaching, carrying, lifting, house/yardwork, driving, computer work  [] (05285)Reviewed/Progressed HEP activities related to improving balance, coordination, kinesthetic sense, posture, motor skill, proprioception of   [] LE: core, proximal hip and LE for self care, mobility, lifting, and ambulation/stair navigation    [] UE / Cervical: cervical, postural,  scapular, scapulothoracic and UE control with self care, reaching, carrying, lifting, house/yardwork, driving, computer work    Manual Treatments:  PROM / STM / Oscillations-Mobs:  G-I, II, III, IV (PA's, Inf., Post.)  [] (82967) Provided manual therapy to mobilize LE, proximal hip and/or LS spine soft tissue/joints for the purpose of modulating pain, promoting relaxation,  increasing ROM, reducing/eliminating soft tissue swelling/inflammation/restriction, improving soft tissue extensibility and allowing for proper ROM for normal function with   [] LE / lumbar: self care, mobility, lifting and ambulation. [] UE / Cervical: self care, reaching, carrying, lifting, house/yardwork, driving, computer work. Modalities:  [x] (21395) Vasopneumatic compression: Utilized vasopneumatic compression to decrease edema / swelling for the purpose of improving mobility and quad tone / recruitment which will allow for increased overall function including but not limited to self-care, transfers, ambulation, and ascending / descending stairs.   [] (65331) Aquatic therapy with therapeutic exercise. Provided verbal and tactile cueing for activities related to strengthening, flexibility, endurance, ROM for improvements in  [] LE / lumbar: LE, proximal hip, and core control in self care, mobility, lifting, ambulation and eccentric single leg control. [] UE / cervical: cervical, scapular, scapulothoracic and UE control with self care, reaching, carrying, lifting, house/yardwork, driving, computer work.   [] (99489) Therapist is in constant attendance of 2 or more patients providing skilled therapy interventions, but not providing any significant amount of measurable one-on-one time to either patient, for improvements in  [] LE / lumbar: LE, proximal hip, and core control in self care, mobility, lifting, ambulation and eccentric single leg control. [] UE / cervical: cervical, scapular, scapulothoracic and UE control with self care, reaching, carrying, lifting, house/yardwork, driving, computer work.       Charges:  Timed Code Treatment Minutes: 33   Total Treatment Minutes: 45     [] EVAL - LOW (20341)   [] EVAL - MOD (63041)  [] EVAL - HIGH (18840)  [] RE-EVAL (90497)  [x] RD(86129) x 1    [] Ionto  [] NMR (72999) x 1      [x] Vaso  [] Manual listed. [] Progression is slowed due to complexities/Impairments listed. [] Progression has been slowed due to co-morbidities. [x] Plan just implemented, too soon to assess goals progression <30days   [] Goals require adjustment due to lack of progress  [] Patient is not progressing as expected and requires additional follow up with physician  [] Other    Persisting Functional Limitations/Impairments:  [x]Sleeping []Sitting               [x]Standing [x]Transfers        [x]Walking [x]Kneeling               [x]Stairs [x]Squatting / bending   [x]ADLs []Reaching  []Lifting  [x]Housework  []Driving []Job related tasks  []Sports/Recreation []Other:        ASSESSMENT: 12/16: Patient began session late. She continued with quad strengthening and balance. The patient had discomfort with knee flexion > 70 deg. Overall, pt swelling was down in Left knee vs previous sessions. Discussed having gel replacement to left knee vs corticosteroid due to her blood sugar. She will further benefit from skilled PT to improve quad /Hamstring strengthening . 12/2: Patient continued with closed chain quad strengthening and balance activities . She demonstrated mild fatigue with increase challenge. The patient has a tendency to compensate at times with right LE during fatigue or challenge. Her swelling seems to be improving week to week. Patient claims to be handling stairs better up/down. Patient to began Aquatic Therapy next session.  Will alternate land and  Aquatic  to gain optimal strength and mobility to return to normal.                  Treatment/Activity Tolerance:  [] Patient able to complete tx [] Patient limited by fatigue  [] Patient limited by pain  [] Patient limited by other medical complications  [] Other:     Prognosis: [] Good [] Fair  [] Poor    Patient Requires Follow-up: [x] Yes  [] No    Plan for next treatment session:  Continue with Quad and Hamstring strengthening : PROGRESS CKC exercises as tolerated    PLAN: See eval. PT 2x / week for 6 weeks. [x] Continue per plan of care [] Alter current plan (see comments)  [] Plan of care initiated [] Hold pending MD visit [] Discharge    Electronically signed by: Ning José, PT PT, DPT    Note: If patient does not return for scheduled/ recommended follow up visits, this note will serve as a discharge from care along with most recent update on progress.

## 2022-12-20 DIAGNOSIS — F41.9 ANXIETY: ICD-10-CM

## 2022-12-21 ENCOUNTER — OFFICE VISIT (OUTPATIENT)
Dept: ENT CLINIC | Age: 47
End: 2022-12-21
Payer: COMMERCIAL

## 2022-12-21 ENCOUNTER — APPOINTMENT (OUTPATIENT)
Dept: PHYSICAL THERAPY | Age: 47
End: 2022-12-21
Payer: COMMERCIAL

## 2022-12-21 ENCOUNTER — HOSPITAL ENCOUNTER (OUTPATIENT)
Dept: PHYSICAL THERAPY | Age: 47
Setting detail: THERAPIES SERIES
Discharge: HOME | End: 2022-12-21
Payer: COMMERCIAL

## 2022-12-21 VITALS
SYSTOLIC BLOOD PRESSURE: 137 MMHG | HEART RATE: 80 BPM | DIASTOLIC BLOOD PRESSURE: 91 MMHG | TEMPERATURE: 98.1 F | HEIGHT: 63 IN | BODY MASS INDEX: 40.4 KG/M2 | WEIGHT: 228 LBS

## 2022-12-21 DIAGNOSIS — J01.90 ACUTE SINUSITIS, RECURRENCE NOT SPECIFIED, UNSPECIFIED LOCATION: ICD-10-CM

## 2022-12-21 DIAGNOSIS — E06.3 HYPOTHYROIDISM DUE TO HASHIMOTO'S THYROIDITIS: ICD-10-CM

## 2022-12-21 DIAGNOSIS — E03.8 HYPOTHYROIDISM DUE TO HASHIMOTO'S THYROIDITIS: ICD-10-CM

## 2022-12-21 DIAGNOSIS — J32.4 CHRONIC PANSINUSITIS: Primary | ICD-10-CM

## 2022-12-21 DIAGNOSIS — R49.0 HOARSE VOICE QUALITY: ICD-10-CM

## 2022-12-21 PROCEDURE — 97112 NEUROMUSCULAR REEDUCATION: CPT

## 2022-12-21 PROCEDURE — 97110 THERAPEUTIC EXERCISES: CPT

## 2022-12-21 PROCEDURE — 97016 VASOPNEUMATIC DEVICE THERAPY: CPT

## 2022-12-21 PROCEDURE — G8417 CALC BMI ABV UP PARAM F/U: HCPCS | Performed by: OTOLARYNGOLOGY

## 2022-12-21 PROCEDURE — G8427 DOCREV CUR MEDS BY ELIG CLIN: HCPCS | Performed by: OTOLARYNGOLOGY

## 2022-12-21 PROCEDURE — G8484 FLU IMMUNIZE NO ADMIN: HCPCS | Performed by: OTOLARYNGOLOGY

## 2022-12-21 PROCEDURE — 1036F TOBACCO NON-USER: CPT | Performed by: OTOLARYNGOLOGY

## 2022-12-21 PROCEDURE — 31231 NASAL ENDOSCOPY DX: CPT | Performed by: OTOLARYNGOLOGY

## 2022-12-21 PROCEDURE — 97530 THERAPEUTIC ACTIVITIES: CPT

## 2022-12-21 PROCEDURE — 99214 OFFICE O/P EST MOD 30 MIN: CPT | Performed by: OTOLARYNGOLOGY

## 2022-12-21 RX ORDER — AMOXICILLIN AND CLAVULANATE POTASSIUM 875; 125 MG/1; MG/1
1 TABLET, FILM COATED ORAL 2 TIMES DAILY
Qty: 28 TABLET | Refills: 0 | Status: SHIPPED | OUTPATIENT
Start: 2022-12-21 | End: 2023-01-04

## 2022-12-21 RX ORDER — CETIRIZINE HYDROCHLORIDE, PSEUDOEPHEDRINE HYDROCHLORIDE 5; 120 MG/1; MG/1
1 TABLET, FILM COATED, EXTENDED RELEASE ORAL 2 TIMES DAILY
Qty: 180 TABLET | Refills: 1 | Status: SHIPPED | OUTPATIENT
Start: 2022-12-21 | End: 2023-01-20

## 2022-12-21 RX ORDER — FLUTICASONE PROPIONATE 93 UG/1
1 SPRAY, METERED NASAL 2 TIMES DAILY
Qty: 16 ML | Refills: 5 | Status: SHIPPED | OUTPATIENT
Start: 2022-12-21

## 2022-12-21 RX ORDER — FOLIC ACID 1 MG/1
TABLET ORAL
Qty: 30 TABLET | Refills: 5 | Status: SHIPPED | OUTPATIENT
Start: 2022-12-21

## 2022-12-21 RX ORDER — PREDNISONE 10 MG/1
TABLET ORAL
Qty: 38 TABLET | Refills: 0 | Status: SHIPPED | OUTPATIENT
Start: 2022-12-21

## 2022-12-21 ASSESSMENT — ENCOUNTER SYMPTOMS
DIARRHEA: 0
EYE REDNESS: 0
RHINORRHEA: 0
EYE ITCHING: 0
VOICE CHANGE: 1
FACIAL SWELLING: 0
SHORTNESS OF BREATH: 0
SINUS PAIN: 0
NAUSEA: 0
SORE THROAT: 0
TROUBLE SWALLOWING: 0
EYE PAIN: 0
COUGH: 0
CHOKING: 0
SINUS PRESSURE: 0

## 2022-12-21 NOTE — TELEPHONE ENCOUNTER
Requested Prescriptions     Pending Prescriptions Disp Refills    folic acid (FOLVITE) 1 MG tablet 30 tablet 5     Sig: TAKE ONE TABLET BY MOUTH DAILY       Last Clinic Visit:  3/16/2022     Next Clinic Appointment:  Visit date not found    's NPI is inactive status with Medicaid. Needs another doctor to sign.

## 2022-12-21 NOTE — PROGRESS NOTES
Subjective:      Patient ID: Jignesh Galan is a 50 y.o. female. HPI  Chief Complaint   Patient presents with    Hoarse voice  History of Present Illness:Kenroy is a(n) 50 y.o. female who presents with a one week history fof hoarse voice and post nasal drip. Has history of chronic sinusitis with occasional flares. Non smoker. [place].  Has \"gigs\" over holidays    Patient Active Problem List   Diagnosis    Hypothyroidism    Chronic sinusitis    Asthma in adult    Chronic rhinosinusitis    Iron deficiency anemia    Eczema    Intestinal malabsorption    Thrombocytopenia (HCC)    Chronic ITP (idiopathic thrombocytopenia) (HCC)    Hyperlipidemia, mixed    Autoimmune hepatitis (HCC)    Anxiety    Chronic pansinusitis    Atrial fibrillation with RVR (HCC)    GERD without esophagitis    Acute respiratory failure due to COVID-19 [facility])    Obstructive sleep apnea    Acute post-operative pain    Sprain of metacarpophalangeal joint of left index finger     Past Surgical History:   Procedure Laterality Date    COLONOSCOPY N/A 12/29/2020    COLONOSCOPY DIAGNOSTIC performed by Quiana Wood MD [facility]    LEFT KNEE ARTHROSCOPY; PARTIAL MEDIAL MENISCECTOMY performed by Sea Eagle MD at [place] [address]  8/22/2012    INCISION AND DRAINAGE POSTERIOR THIGH ABSCESS    PRE-MALIGNANT / BENIGN SKIN LESION EXCISION N/A 2/8/2022    EXCISION OF SCALP CYST performed by Cindy Shelton MD at [address] [place] [address]  Aug 2011    repair of rectal fissures and anal skin tag removal    SINUS SURGERY      X 3    TONSILLECTOMY  2004    TONSILLECTOMY AND ADENOIDECTOMY  2005    (partial adenoidectomy)    UPPER GASTROINTESTINAL ENDOSCOPY N/A 12/29/2020    EGD BIOPSY performed by Quiana Wood MD at [address] [place] [address]     Family History   Problem Relation Age of Onset    High Blood Pressure Mother     Hypertension Mother     Elevated Lipids Mother     Other Mother fatty liver    Heart Disease Father     Hypertension Father     Elevated Lipids Father     Coronary Art Dis Father     Liver Disease Father     Diabetes Maternal Aunt      Social History     Socioeconomic History    Marital status: Single     Spouse name: Not on file    Number of children: Not on file    Years of education: Not on file    Highest education level: Not on file   Occupational History    Occupation: NA   Tobacco Use    Smoking status: Never    Smokeless tobacco: Never   Vaping Use    Vaping Use: Never used   Substance and Sexual Activity    Alcohol use: Yes     Alcohol/week: 0.0 standard drinks     Comment: 2 drinks/week     Drug use: No    Sexual activity: Yes   Other Topics Concern    Not on file   Social History Narrative    Not on file     Social Determinants of Health     Financial Resource Strain: Not on file   Food Insecurity: Not on file   Transportation Needs: Not on file   Physical Activity: Insufficiently Active    Days of Exercise per Week: 4 days    Minutes of Exercise per Session: 30 min   Stress: Not on file   Social Connections: Not on file   Intimate Partner Violence: Not At Risk    Fear of Current or Ex-Partner: No    Emotionally Abused: No    Physically Abused: No    Sexually Abused: No   Housing Stability: Not on file       DRUG/FOOD ALLERGIES: Latex, Clindamycin/lincomycin, Sulfa antibiotics, and Diflucan [fluconazole]    CURRENT MEDICATIONS  Prior to Admission medications    Medication Sig Start Date End Date Taking?  Authorizing Provider   predniSONE (DELTASONE) 10 MG tablet 4 tabs a day for 5 days, 3 tabs a day for 3 days, 2 tabs a day for 3 days,1 tab a day for 3 days 11/23/22  Yes Caryn Zafar MD   montelukast (SINGULAIR) 10 MG tablet TAKE ONE TABLET BY MOUTH EVERY EVENING 11/21/22  Yes Elizabeth Araujo MD   lidocaine (LIDODERM) 5 % APPLY 1 PATCH TO AFFECTED AREA FOR 12 HOURS IN A 24 HOUR PERIOD 11/21/22  Yes Gin Carr MD   SYMBICORT 160-4.5 MCG/ACT AERO INHALE TWO PUFFS BY MOUTH TWICE A DAY 11/21/22  Yes Pillo Suarez MD   fluticasone [facility]) 50 MCG/ACT nasal spray SPRAY ONE SPRAY IN EACH NOSTRIL ONCE DAILY 11/18/22  Yes Yesi Hull MD   levothyroxine (SYNTHROID) 75 MCG tablet Take 1 tablet by mouth daily 9/19/22  Yes Flo Guzman MD   dilTIAZem (CARDIZEM CD) 120 MG extended release capsule Take 1 capsule by mouth daily 9/8/22  Yes Rosa M Morales MD   Saccharomyces boulardii (PROBIOTIC) 250 MG CAPS TAKE ONE CAPSULE BY MOUTH TWICE A DAY 7/25/22  Yes Norberto Gonzalez MD   folic acid (FOLVITE) 1 MG tablet TAKE ONE TABLET BY MOUTH DAILY 7/25/22  Yes Norberto Gonzalez MD   liothyronine (CYTOMEL) 5 MCG tablet TAKE TWO TABLETS BY MOUTH EVERY DAY 4/1/22  Yes Flo Guzman MD   cyanocobalamin 1000 MCG/ML injection INJECT 1ML INTO THE SKIN ONCE MONTHLY 4/1/22  Yes Maggie Rodriguez MD   ketoconazole (NIZORAL) 2 % shampoo APPLY TO AFFECTED AREA(S) ONCE DAILY AS NEEDED 4/1/22  Yes Maggie Rodriguez MD   albuterol sulfate HFA (VENTOLIN HFA) 108 (90 Base) MCG/ACT inhaler INHALE 2 PUFF BY MOUTH EVERY 6 HOURS AS NEEDED FOR WHEEZING 4/1/22  Yes Maggie Rodriguez MD   BETASEPT SURGICAL SCRUB 4 % external liquid APPLY TO AFFECTED AREA(S) TOPICALLY AS NEEDED 4/1/22  Yes Maggie Rodriguez MD   mometasone (ELOCON) 0.1 % cream APPLY TO AFFECTED AREA(S) ONCE DAILY 4/1/22  Yes Maggie Rodriguez MD   vitamin D (ERGOCALCIFEROL) 1.25 MG (59333 UT) CAPS capsule TAKE 1 CAPSULE BY MOUTH ONE TIME WEEKLY 4/1/22  Yes Maggie Rodriguez MD   urea (UREA 10 HYDRATING) 10 % cream APPLY TO AFFECTED AREA(S) AS NEEDED 4/1/22  Yes Maggie Rodriguez MD   cetirizine (ZYRTEC) 10 MG tablet Take 1 tablet by mouth daily 3/16/22  Yes Lucas Baires MD   mupirocin (BACTROBAN) 2 % ointment APPLY TO AFFECTED AREA(S) TOPICALLY THREE TIMES A DAY 2/26/22  Yes Raghavendra Luz DO   pantoprazole (PROTONIX) 40 MG tablet Take 1 tablet by mouth 2 times daily (before meals) 1/28/22  Yes Lucas Baires MD   fluticasone [facility]) 50 MCG/ACT nasal spray APPLY 1 SPRAY IN THE AFFECTED NOSTRIL ONCE DAILY 1/24/22  Yes Colby Bowen MD   polyethylene glycol (MIRALAX) 17 GM/SCOOP POWD powder POUR 17 GRAMS OF POWDER INTO THE CAP OF THE BOTTLE. STIR THE POWDER IN A GLASS (4 TO 8 OZ) OF WATER, JUICE, SODA, COFFEE OR TEA UNTIL COMPLETELY DISSOLVED. DRINK THE SOLUTION. ONCE DAILY AS NEEDED FOR CONSTIPATION 12/6/21  Yes Colby Bowen MD   Insulin Syringe-Needle U-100 Marrie Houlton INSULIN SYRINGE) 31G X 5/16\" 1 ML MISC AS DIRECTED 3/26/21  Yes El Hammans, MD   Multiple Vitamins-Minerals (THERAPEUTIC MULTIVITAMIN-MINERALS) tablet Take 1 tablet by mouth daily 6/25/20  Yes Claudio Mena MD   fluticasone [facility]) 50 MCG/ACT nasal spray 2 sprays by Nasal route daily 10/20/22 11/19/22  Eddie Aguilar MD   aspirin 325 MG EC tablet Take 1 tablet by mouth daily for 14 days 9/9/22 9/23/22  Lynnette Haider MD   acetaminophen (TYLENOL) 500 MG tablet Take 1 tablet by mouth 4 times daily as needed for Pain 7/21/22 7/28/22  Carlos Handley, APRN - CNP         Lab Studies:  Lab Results   Component Value Date    WBC 7.3 09/19/2022    HGB 10.3 (L) 09/19/2022    HCT 31.4 (L) 09/19/2022    MCV 88.2 09/19/2022     09/19/2022     Lab Results   Component Value Date    GLUCOSE 90 09/19/2022    BUN 15 09/19/2022    CREATININE 0.8 09/19/2022    K 3.9 09/19/2022     09/19/2022     09/19/2022    CALCIUM 9.4 09/19/2022     Lab Results   Component Value Date    MG 2.00 02/06/2019     Lab Results   Component Value Date    PHOS 3.2 02/09/2017     Lab Results   Component Value Date    ALKPHOS 234 (H) 09/19/2022    ALT 59 (H) 09/19/2022    AST 48 (H) 09/19/2022    BILITOT 0.3 09/19/2022    PROT 8.0 09/19/2022      Review of Systems   Constitutional:  Negative for activity change, appetite change, chills, fatigue and fever. HENT:  Positive for postnasal drip and voice change.  Negative for congestion, ear discharge, ear pain, facial swelling, hearing loss, nosebleeds, rhinorrhea, sinus pressure, sinus pain, sneezing, sore throat, tinnitus and trouble swallowing. Eyes:  Negative for pain, redness, itching and visual disturbance. Respiratory:  Negative for cough, choking and shortness of breath. Gastrointestinal:  Negative for diarrhea and nausea. Endocrine: Negative for cold intolerance and heat intolerance. Objective:   Physical Exam  Constitutional:       General: She is not in acute distress. Appearance: She is well-developed. HENT:      Head: Not macrocephalic and not microcephalic. No Mejia's sign, abrasion, contusion, right periorbital erythema, left periorbital erythema or laceration. Nose: No nasal deformity, septal deviation, laceration, mucosal edema or rhinorrhea. Right Nostril: No epistaxis or occlusion. Left Nostril: No epistaxis or occlusion. Right Turbinates: Not enlarged, swollen or pale. Left Turbinates: Not enlarged, swollen or pale. Right Sinus: No maxillary sinus tenderness or frontal sinus tenderness. Left Sinus: No maxillary sinus tenderness or frontal sinus tenderness. Mouth/Throat:      Lips: No lesions. Mouth: Mucous membranes are moist. No oral lesions. Dentition: Normal dentition. Tongue: No lesions. Palate: No mass. Pharynx: Uvula midline. No oropharyngeal exudate, posterior oropharyngeal erythema or uvula swelling. Tonsils: No tonsillar abscesses. Eyes:      General:         Right eye: No discharge. Left eye: No discharge. Extraocular Movements:      Right eye: Normal extraocular motion. Left eye: Normal extraocular motion. Conjunctiva/sclera:      Right eye: Right conjunctiva is not injected. No chemosis or exudate. Left eye: Left conjunctiva is not injected. No chemosis or exudate. Neck:      Thyroid: No thyroid mass or thyromegaly. Trachea: No tracheal tenderness or tracheal deviation. Cardiovascular:      Rate and Rhythm: Normal rate and regular rhythm. Pulmonary:      Effort: No tachypnea or respiratory distress. Breath sounds: No stridor. Musculoskeletal:      Cervical back: Normal range of motion and neck supple. No edema or erythema. No muscular tenderness. Lymphadenopathy:      Head:      Right side of head: No submental, submandibular, tonsillar, preauricular, posterior auricular or occipital adenopathy. Left side of head: No submental, submandibular, tonsillar, preauricular, posterior auricular or occipital adenopathy. Cervical: No cervical adenopathy. Right cervical: No superficial, deep or posterior cervical adenopathy. Left cervical: No superficial, deep or posterior cervical adenopathy. Skin:     General: Skin is warm and dry. Findings: No lesion. Neurological:      Mental Status: She is alert and oriented to person, place, and time. Psychiatric:         Mood and Affect: Mood is not anxious or depressed. Due to the patients chronic sinus disease and/or history of sinonasal neoplasm for surveillance a nasal endoscopy with or without debridement will be performed to complete a significant physical examination of the patient which cannot be performed by anterior rhinoscopy alone (failure of complete examination of the paranasal sinuses). Failure to provide this procedure may lead to late detection of significant chronic benign disease, acute exacerbation, resolution or failure of early diagnosis of recurrent cancer. The procedure report is present in the body of the chart. Nasal Endoscopy    Pre OP: post nasal drip  Post OP: Right acute ethmoid sinusitis  Reason: Concern for new infection, cultures  Procedure: Nasal endoscopy  Surgeon: Carlos Chan  Anesthesia: Afrin with 2% lidocaine  Estimated Blood Loss: None      After obtaining verbal consent from the patient 1% lidocaine with afrin was sprayed into the nasal cavities.   After allowing a time for anesthesia, a nasal endoscope was placed into the nostril. The septum, inferior, and middle turbinates were examined. The middle meatus, and sphenoethmoid recess was examined bilaterally. Cultures were obtained from the right ethmoid sinuses. There were no complications. Pertinent positives included: There was not edema and purulence in the left middle meatus. There was not edema and purulence at the right middle meatus. Polyps were not identified in the sinuses. Masses were not identified. Purulence in right anterior ethmoid    Tolerated well without complication. I attest that I was present for and did the entire procedure myself. Assessment:       Diagnosis Orders   1. Chronic pansinusitis  Culture, Anaerobic and Aerobic    cetirizine-psuedoephedrine (ZYRTEC-D) 5-120 MG per extended release tablet    predniSONE (DELTASONE) 10 MG tablet    amoxicillin-clavulanate (AUGMENTIN) 875-125 MG per tablet    Fluticasone Propionate (Tony Ditty) 93 MCG/ACT EXHU      2. Hoarse voice quality  predniSONE (DELTASONE) 10 MG tablet      3. Acute sinusitis, recurrence not specified, unspecified location  predniSONE (DELTASONE) 10 MG tablet    amoxicillin-clavulanate (AUGMENTIN) 875-125 MG per tablet              Plan:      Refilled maintenance allergy and sinus medications. Added pred and augmentin for voice and sinusitis. Await cultures. The patient was counseled for sinusitis and received a Augmentin prescription medication(s) for this diagnosis. The mechanism of action for the prescription(s) were explained/reviewed. The appropriate usage was described and technique for topical use explained if appropriate. Questions from the patient were answered and the prescription(s) were e-prescribed to the appropriate pharmacy. Follow up as needed.          Irlanda Schmid MD

## 2022-12-21 NOTE — FLOWSHEET NOTE
[address] [facility] Therapy  Phone: [phone]   Fax: [phone]  Physical Therapy Re-Certification Plan of Care    Dear Brian Anthony MD  ,    We had the pleasure of treating the following patient for physical therapy services at [facility]. A summary of our findings can be found in the updated assessment below. This includes our plan of care. If you have any questions or concerns regarding these findings, please do not hesitate to contact me at the office phone number checked above. Thank you for the referral.     Physician Signature:________________________________Date:__________________  By signing above (or electronic signature), therapist's plan is approved by physician      Functional Outcome:     FOTO: 62    Overall Response to Treatment:   []Patient is responding well to treatment and improvement is noted with regards  to goals   []Patient should continue to improve in reasonable time if they continue HEP   []Patient has plateaued and is no longer responding to skilled PT intervention    []Patient is getting worse and would benefit from return to referring MD   []Patient unable to adhere to initial POC   [x]Other: The patient  has made good progress toward functional rehab goals. She  has improved quad strength and left knee flexion. However, patient at times ambulates with antalgic gait in LLE lacking terminal knee extension  and has catching in left knee. Patient to further benefit from Hamstring strengthening and dynamic balance activities to improve functional mobilities. Date range of Visits: 22 to 22   Total Visits: 10    Recommendation:    [x]Continue PT 2x / wk for 5 weeks.                []Hold PT, pending MD visit     Physical Therapy Daily Treatment Note    Date:  2022     Patient Name:  Tino Forbes    :  1975  MRN: [id number]  Medical Diagnosis:  Left leg swelling [M79.89]  Acute medial meniscus tear of left knee, initial encounter [S83.669F]  Treatment Diagnosis:  Decrease left knee AROM,  hip/knee muscle weakness, difficulty walking                    Insurance/Certification information:  PT Insurance Information: [facility]  Physician Information:  Stella Carey MD    Plan of care signed (Y/N): []  Yes [x]  No     Date of Patient follow up with Physician:      Progress Report: []  Yes  [x]  No     Date Range for reporting period:  Beginnin2022  PN: ,   Ending:     Progress report due (10 Rx/or 30 days whichever is less): visit #10 or PN 64/08    Recertification due (POC duration/ or 90 days whichever is less): visit # or  (date)     Visit # Insurance Allowable Auth required? Date Range   Eval+  10/12 30  [x]  Yes  []  No 22-22       Units approved Units used Date Range   48 25 22       Latex Allergy:  [x]NO      []YES  Preferred Language for Healthcare:   [x]English       []other:    Functional Scale:           Date assessed:  FOTO physical FS primary measure score = 33; risk adjusted = 36  2022  FOTO physical FS primary measure score= 49                                                   2022  FOTO physical FS primary measure score= 57                                                   2022    Pain level:  5-7/10     SUBJECTIVE:  Patient reports that she's had minor catching in left knee continues.   OBJECTIVE:   : Antalgic gait on LLE  upon entry therapy            PROM AROM     L R L R   Hip Flexion           Hip Abduction           Hip ER           Hip IR           Knee Flexion     115 vs 133 135   Knee Extension     0     Dorsiflexion            Plantarflexion            Inversion            Eversion               RESTRICTIONS/PRECAUTIONS:  Hypothyroidism    Exercises/Interventions:     Therapeutic Exercises (96016) Resistance / level Sets/sec Reps Notes   Nustep      Bike        IB/HR   Soleus   3/30/\"    10/14 HSS  Hip flexor   2/20\"  2/20\"     SLS Pulse double  SLS Pulse alternating   SLS lateral Pulsing  Blue  11/25   TG :  SL squat   Hip abd   15 x  10 R/L   10 R/L Squat to 70 deg    12/21   Small SLR  Bridging     Bridging w/ knee ext  Sidelying hip abd iso against wall  Heel Slides   2/3'  2/3\"      2 1          10/28   LAQ blue     Glider side lunges    10/14 added   Lateral walkouts Lime    11/25   Therapeutic Activities (31210)       Discussed and educated pt on DVT       FSU  with contralateral LE tap  LSU SLS 6'  6'  10  10 11/25 updated   12/21 updated   Review ROM and progress                      Neuromuscular Re-ed (36233)       Tandem stance on Airex   10/19 added   SLS  EO/EC   W/ head turns    12/2 added   Rocker board   Balance   A/P,  M/L    30\"     10 12/21 unable to lateral tapping                         Manual Intervention ([id number])       STM to left Hamstring tendon (lat), patellar mobs (inf/med/lat)      EOB tibiofemoral mob ant/pos                                  AquaticTherapy Dates of Service:   Aquatic Visits Exercises/Activities: Challenge patient as tolerated   Transfers:  [x] Stairs   [] Ramp  [] Chair Lift   % Immersion:            Ambulation/ Warm up:   UE Exercises:       Forward  x Shoulder Shrugs      Lateral   x Shoulder Circles      Retro   x Scapular Retraction      [place]    Push Downs      Heel/Toe Walking   x Punching       Rowing       Elbow Flex/Ext       Shldr Flex/Ext       [employer] aBd/aDd    LE Exercises:  Shldr Horiz aBd/aDd    HR/TR x Shldr IR/ER    Marches x Arm Circles    Squats  x PNF Diagonals    Hamstring Curls x Wall Push Ups    Hip Flexion (SLR) x     Hip aBduction (SLR) x     Hip Extension (SLR) x      Hip aDduction (SLR) x     Hip Circles x Functional:    Hip IR/Er x Step up forward x   Hip Hikes x Step up lateral  x     Step down        Lunges Forward x     Lunges Retro x     Lunges Lateral     Balance:        SLS  x      Tandem Stance x      NBOS eyes open x Seated:     NBOS eyes closed x Ankle pumps     Hand to Opposite Knee x Ankle Circles     Fwd Step ups to SLS x Knee Flex/Ext    Lateral Step ups to SLS  Hip aBd/aDd    Stop/Go Gait   Bicycle       Ankle DF/PF      Ankle Inv/Ev    Stretching:       Gastroc/Soleus x     Hamstring  x Deep Water:    Knee Flex Stretch x Jog    Piriformis   Noodle Hang    Hip Flexor  Traction at [facility]      DKTC       ITB  Cool Down:    Quad  Fwd Walking    Mid Back   Lat Walking    UT  Retro Walking    Post Shoulder  Noodle float    Ladder Pull      Pec Stretch            Core: Other:    TrA set      Pelvic Tilts      Multifidi Walk outs c paddle      PNF Chop/Lifts                          Aquatic Abbreviation Key  B= Belt DB= Dumbells T= Theratube   H= Hydrotone N= Noodles W= Weights   P= Paddles S= Speedo equipment K= Kickboard      Pt. Education: Gave pt tour of pool, explained how to use lockers, what to wear, that it would be in group setting, and showed pt aquatic equipment. 10/28 Clinically observed mild to moderate edema in LLE  compared to RLE  Girth (cm) L - pre vaso / post vaso  12/21 R - pre-vaso / post-vaso   Mid-patellar 40 cm/38.4 cm    Suprapatellar 45 cm/43 cm       Modalities:  12/21,12/16,11/25, 11/18, 10/28,10/21,10/19,10/14,10/12,9/27 : Vaso to left knee with in supine with LE elevated with pillow x 10 min    Pt. Education:  10/12/2022  -patient educated on diagnosis, prognosis and expectations for rehab  -all patient questions were answered    Home Exercise Program:  Access Code: [email or web address]  URL: [email or web address] [email or web address]  Date: 09/27/2022  Prepared by: Devon Moreno    Exercises  Supine Quad Set - 1 x daily - 7 x weekly - 1 sets - 10 reps  Small Range Straight Leg Raise - 1 x daily - 7 x weekly - 1 sets - 10 reps  Supine Knee Extension Strengthening - 1 x daily - 7 x weekly - 1 sets - 10 reps  Supine Bridge - 1 x daily - 7 x weekly - 1 sets - 10 reps        Therapeutic Exercise and NMR EXR  [] [phone]) Provided verbal/tactile cueing for activities related to strengthening, flexibility, endurance, ROM for improvements in  [] LE / Lumbar: LE, proximal hip, and core control with self care, mobility, lifting, ambulation. [] UE / Cervical: cervical, postural, scapular, scapulothoracic and UE control with self care, reaching, carrying, lifting, house/yardwork, driving, computer work.  [] (20682) Provided verbal/tactile cueing for activities related to improving balance, coordination, kinesthetic sense, posture, motor skill, proprioception to assist with   [] LE / lumbar: LE, proximal hip, and core control in self care, mobility, lifting, ambulation and eccentric single leg control. [] UE / cervical: cervical, scapular, scapulothoracic and UE control with self care, reaching, carrying, lifting, house/yardwork, driving, computer work.   [] (68831) Therapist is in constant attendance of 2 or more patients providing skilled therapy interventions, but not providing any significant amount of measurable one-on-one time to either patient, for improvements in  [] LE / lumbar: LE, proximal hip, and core control in self care, mobility, lifting, ambulation and eccentric single leg control. [] UE / cervical: cervical, scapular, scapulothoracic and UE control with self care, reaching, carrying, lifting, house/yardwork, driving, computer work.      NMR and Therapeutic Activities:    [] (28053 or 86845) Provided verbal/tactile cueing for activities related to improving balance, coordination, kinesthetic sense, posture, motor skill, proprioception and motor activation to allow for proper function of   [] LE: / Lumbar core, proximal hip and LE with self care and ADLs  [] UE / Cervical: cervical, postural, scapular, scapulothoracic and UE control with self care, carrying, lifting, driving, computer work.   [] (22436) Gait Re-education- Provided training and instruction to the patient for proper LE, core and proximal hip recruitment and positioning and eccentric body weight control with ambulation re-education including up and down stairs     Home Management Training / Self Care:  [] (99952) Provided self-care/home management training related to activities of daily living and compensatory training, and/or use of adaptive equipment for improvement with: ADLs and compensatory training, meal preparation, safety procedures and instruction in use of adaptive equipment, including bathing, grooming, dressing, personal hygiene, basic household cleaning and chores. Home Exercise Program:    [x] (85822) Reviewed/Progressed HEP activities related to strengthening, flexibility, endurance, ROM of   [] LE / Lumbar: core, proximal hip and LE for functional self-care, mobility, lifting and ambulation/stair navigation   [] UE / Cervical: cervical, postural, scapular, scapulothoracic and UE control with self care, reaching, carrying, lifting, house/yardwork, driving, computer work  [] (11143)Reviewed/Progressed HEP activities related to improving balance, coordination, kinesthetic sense, posture, motor skill, proprioception of   [] LE: core, proximal hip and LE for self care, mobility, lifting, and ambulation/stair navigation    [] UE / Cervical: cervical, postural,  scapular, scapulothoracic and UE control with self care, reaching, carrying, lifting, house/yardwork, driving, computer work    Manual Treatments:  PROM / STM / Oscillations-Mobs:  G-I, II, III, IV (PA's, Inf., Post.)  [] (62023) Provided manual therapy to mobilize LE, proximal hip and/or LS spine soft tissue/joints for the purpose of modulating pain, promoting relaxation,  increasing ROM, reducing/eliminating soft tissue swelling/inflammation/restriction, improving soft tissue extensibility and allowing for proper ROM for normal function with   [] LE / lumbar: self care, mobility, lifting and ambulation.     [] UE / Cervical: self care, reaching, carrying, lifting, house/yardwork, driving, computer work. Modalities:  [x] (29293) Vasopneumatic compression: Utilized vasopneumatic compression to decrease edema / swelling for the purpose of improving mobility and quad tone / recruitment which will allow for increased overall function including but not limited to self-care, transfers, ambulation, and ascending / descending stairs.   [] (25087) Aquatic therapy with therapeutic exercise. Provided verbal and tactile cueing for activities related to strengthening, flexibility, endurance, ROM for improvements in  [] LE / lumbar: LE, proximal hip, and core control in self care, mobility, lifting, ambulation and eccentric single leg control. [] UE / cervical: cervical, scapular, scapulothoracic and UE control with self care, reaching, carrying, lifting, house/yardwork, driving, computer work.   [] (12482) Therapist is in constant attendance of 2 or more patients providing skilled therapy interventions, but not providing any significant amount of measurable one-on-one time to either patient, for improvements in  [] LE / lumbar: LE, proximal hip, and core control in self care, mobility, lifting, ambulation and eccentric single leg control. [] UE / cervical: cervical, scapular, scapulothoracic and UE control with self care, reaching, carrying, lifting, house/yardwork, driving, computer work.       Charges:  Timed Code Treatment Minutes: 42   Total Treatment Minutes: 54     [] EVAL - LOW (68768)   [] EVAL - MOD (42374)  [] EVAL - HIGH (46455)  [] RE-EVAL (55616)  [x] GT(35162) x 1    [] Ionto  [x] NMR (27086) x 1      [x] Vaso  [] Manual (44952) x       [] Ultrasound  [x] TA x  1     [] Mech Traction (78003)  [] Aquatic Therapy x     [] ES (un) (92366):   [] Home Management Training x  [] ES(attended) (67789)   [] Dry Needling 1-2 muscles (60727):  [] Dry Needling 3+ muscles (782209)  [] Group:      [] Other:     GOALS:    Patient stated goal:   [] Progressing: [] Met: [] Not Met: [] Adjusted     Therapist goals for Patient:   Short Term Goals: To be achieved in: 2 weeks  1. Independent in HEP and progression per patient tolerance, in order to prevent re-injury. [] Progressing: [x] Met: [] Not Met: [] Adjusted  2. Patient will have a decrease in pain to facilitate improvement in movement, function, and ADLs as indicated by Functional Deficits. [] Progressing: [x] Met: [] Not Met: [] Adjusted     Long Term Goals: To be achieved in: 6 weeks / DC   1. Patient to improve FOTO score of at least 53 to assist with reaching prior level of function. [] Progressing: [x] Met: [] Not Met: [] Adjusted  2. Patient will demonstrate increased right knee AROM to 125 deg to allow for proper joint functioning as indicated by patients Functional Deficits. [] Progressing: [x] Met: [] Not Met: [] Adjusted  3. Patient will demonstrate an increase in right knee to +4/5 Strength to at least  as well as good proximal hip strength and control to allow for proper functional mobility as indicated by patients Functional Deficits. [x] Progressing: [] Met: [] Not Met: [] Adjusted  4. Patient will return to functional activities including ambulating level and unlevel surfaces  without increased symptoms or restriction and with normalized gait pattern. [x] Progressing: [] Met: [] Not Met: [] Adjusted  5. Patient to demonstrate proper squat form to demonstrate hip/knee control for ADLs. [x] Progressing: [] Met: [] Not Met: [] Adjusted    Overall Progression Towards Functional goals/ Treatment Progress Update:  [] Patient is progressing as expected towards functional goals listed. [] Progression is slowed due to complexities/Impairments listed. [] Progression has been slowed due to co-morbidities.   [x] Plan just implemented, too soon to assess goals progression <30days   [] Goals require adjustment due to lack of progress  [] Patient is not progressing as expected and requires additional follow up with physician  [] Other    Persisting Functional Limitations/Impairments:  [x]Sleeping []Sitting               [x]Standing [x]Transfers        [x]Walking [x]Kneeling               [x]Stairs [x]Squatting / bending   [x]ADLs []Reaching  []Lifting  [x]Housework  []Driving []Job related tasks  []Sports/Recreation []Other:        ASSESSMENT:  See Summary above                Treatment/Activity Tolerance:  [] Patient able to complete tx [] Patient limited by fatigue  [] Patient limited by pain  [] Patient limited by other medical complications  [] Other:     Prognosis: [] Good [] Fair  [] Poor    Patient Requires Follow-up: [x] Yes  [] No    Plan for next treatment session: Will continue  PROGRESS CKC exercises and Hamstring strengthening     PLAN:  PT 2x / week for 5 weeks. [] Continue per plan of care [x] Alter current plan (see comments)  [] Plan of care initiated [] Hold pending MD visit [] Discharge    Electronically signed by: Jeff Hernandez PT PT, DPT    Note: If patient does not return for scheduled/ recommended follow up visits, this note will serve as a discharge from care along with most recent update on progress.

## 2022-12-22 ENCOUNTER — TELEPHONE (OUTPATIENT)
Dept: ORTHOPEDIC SURGERY | Age: 47
End: 2022-12-22

## 2022-12-22 DIAGNOSIS — M17.12 PRIMARY OSTEOARTHRITIS OF LEFT KNEE: Primary | ICD-10-CM

## 2022-12-22 NOTE — TELEPHONE ENCOUNTER
Patient called in asking if we could start a PA for gel injections for her. I will confirm this is okay with Dr. Carri Cook and submit for patient.

## 2022-12-24 LAB
GRAM STAIN RESULT: ABNORMAL
ORGANISM: ABNORMAL
WOUND/ABSCESS: ABNORMAL

## 2022-12-29 DIAGNOSIS — B37.9 YEAST INFECTION: Primary | ICD-10-CM

## 2022-12-29 RX ORDER — ITRACONAZOLE 10 MG/ML
200 SOLUTION ORAL DAILY
Qty: 200 ML | Refills: 0 | Status: SHIPPED | OUTPATIENT
Start: 2022-12-29 | End: 2023-01-08

## 2023-01-01 ENCOUNTER — APPOINTMENT (OUTPATIENT)
Dept: GENERAL RADIOLOGY | Age: 48
End: 2023-01-01
Payer: COMMERCIAL

## 2023-01-01 ENCOUNTER — HOSPITAL ENCOUNTER (EMERGENCY)
Age: 48
Discharge: HOME | End: 2023-01-01
Payer: COMMERCIAL

## 2023-01-01 VITALS
DIASTOLIC BLOOD PRESSURE: 71 MMHG | HEIGHT: 63 IN | WEIGHT: 230.38 LBS | OXYGEN SATURATION: 97 % | BODY MASS INDEX: 40.82 KG/M2 | RESPIRATION RATE: 18 BRPM | SYSTOLIC BLOOD PRESSURE: 118 MMHG | HEART RATE: 90 BPM | TEMPERATURE: 98.2 F

## 2023-01-01 DIAGNOSIS — J45.21 MILD INTERMITTENT ASTHMA WITH EXACERBATION: Primary | ICD-10-CM

## 2023-01-01 DIAGNOSIS — U07.1 COVID: ICD-10-CM

## 2023-01-01 LAB
A/G RATIO: 0.9 (ref 1.1–2.2)
ALBUMIN SERPL-MCNC: 3.7 G/DL (ref 3.4–5)
ALP BLD-CCNC: 185 U/L (ref 40–129)
ALT SERPL-CCNC: 59 U/L (ref 10–40)
ANION GAP SERPL CALCULATED.3IONS-SCNC: 11 MMOL/L (ref 3–16)
AST SERPL-CCNC: 49 U/L (ref 15–37)
BASOPHILS ABSOLUTE: 0 K/UL (ref 0–0.2)
BASOPHILS RELATIVE PERCENT: 0.4 %
BILIRUB SERPL-MCNC: 0.4 MG/DL (ref 0–1)
BUN BLDV-MCNC: 17 MG/DL (ref 7–20)
CALCIUM SERPL-MCNC: 9.2 MG/DL (ref 8.3–10.6)
CHLORIDE BLD-SCNC: 105 MMOL/L (ref 99–110)
CO2: 20 MMOL/L (ref 21–32)
CREAT SERPL-MCNC: 0.7 MG/DL (ref 0.6–1.1)
EOSINOPHILS ABSOLUTE: 0 K/UL (ref 0–0.6)
EOSINOPHILS RELATIVE PERCENT: 0.3 %
GFR SERPL CREATININE-BSD FRML MDRD: >60 ML/MIN/{1.73_M2}
GLUCOSE BLD-MCNC: 123 MG/DL (ref 70–99)
HCT VFR BLD CALC: 34.6 % (ref 36–48)
HEMOGLOBIN: 10.6 G/DL (ref 12–16)
LYMPHOCYTES ABSOLUTE: 1.1 K/UL (ref 1–5.1)
LYMPHOCYTES RELATIVE PERCENT: 8.5 %
MCH RBC QN AUTO: 24.9 PG (ref 26–34)
MCHC RBC AUTO-ENTMCNC: 30.8 G/DL (ref 31–36)
MCV RBC AUTO: 80.8 FL (ref 80–100)
MONOCYTES ABSOLUTE: 0.8 K/UL (ref 0–1.3)
MONOCYTES RELATIVE PERCENT: 6.3 %
NEUTROPHILS ABSOLUTE: 10.5 K/UL (ref 1.7–7.7)
NEUTROPHILS RELATIVE PERCENT: 84.5 %
PDW BLD-RTO: 15.7 % (ref 12.4–15.4)
PLATELET # BLD: 193 K/UL (ref 135–450)
PMV BLD AUTO: 8.8 FL (ref 5–10.5)
POTASSIUM REFLEX MAGNESIUM: 4.1 MMOL/L (ref 3.5–5.1)
PRO-BNP: 15 PG/ML (ref 0–124)
RAPID INFLUENZA  B AGN: NEGATIVE
RAPID INFLUENZA A AGN: NEGATIVE
RBC # BLD: 4.28 M/UL (ref 4–5.2)
SARS-COV-2, NAAT: DETECTED
SODIUM BLD-SCNC: 136 MMOL/L (ref 136–145)
TOTAL PROTEIN: 7.6 G/DL (ref 6.4–8.2)
TROPONIN: <0.01 NG/ML
WBC # BLD: 12.4 K/UL (ref 4–11)

## 2023-01-01 PROCEDURE — 85025 COMPLETE CBC W/AUTO DIFF WBC: CPT

## 2023-01-01 PROCEDURE — 87804 INFLUENZA ASSAY W/OPTIC: CPT

## 2023-01-01 PROCEDURE — 83880 ASSAY OF NATRIURETIC PEPTIDE: CPT

## 2023-01-01 PROCEDURE — 71046 X-RAY EXAM CHEST 2 VIEWS: CPT

## 2023-01-01 PROCEDURE — 6370000000 HC RX 637 (ALT 250 FOR IP): Performed by: PHYSICIAN ASSISTANT

## 2023-01-01 PROCEDURE — 80053 COMPREHEN METABOLIC PANEL: CPT

## 2023-01-01 PROCEDURE — 87635 SARS-COV-2 COVID-19 AMP PRB: CPT

## 2023-01-01 PROCEDURE — 99285 EMERGENCY DEPT VISIT HI MDM: CPT

## 2023-01-01 PROCEDURE — 94760 N-INVAS EAR/PLS OXIMETRY 1: CPT

## 2023-01-01 PROCEDURE — 94640 AIRWAY INHALATION TREATMENT: CPT

## 2023-01-01 PROCEDURE — 84484 ASSAY OF TROPONIN QUANT: CPT

## 2023-01-01 PROCEDURE — 93005 ELECTROCARDIOGRAM TRACING: CPT | Performed by: PHYSICIAN ASSISTANT

## 2023-01-01 RX ORDER — BENZONATATE 200 MG/1
200 CAPSULE ORAL 3 TIMES DAILY PRN
Qty: 30 CAPSULE | Refills: 0 | Status: SHIPPED | OUTPATIENT
Start: 2023-01-01 | End: 2023-01-08

## 2023-01-01 RX ORDER — IPRATROPIUM BROMIDE AND ALBUTEROL SULFATE 2.5; .5 MG/3ML; MG/3ML
1 SOLUTION RESPIRATORY (INHALATION) EVERY 4 HOURS
Qty: 360 ML | Refills: 0 | Status: SHIPPED | OUTPATIENT
Start: 2023-01-01

## 2023-01-01 RX ORDER — IPRATROPIUM BROMIDE AND ALBUTEROL SULFATE 2.5; .5 MG/3ML; MG/3ML
1 SOLUTION RESPIRATORY (INHALATION) ONCE
Status: COMPLETED | OUTPATIENT
Start: 2023-01-01 | End: 2023-01-01

## 2023-01-01 RX ADMIN — IPRATROPIUM BROMIDE AND ALBUTEROL SULFATE 1 AMPULE: .5; 3 SOLUTION RESPIRATORY (INHALATION) at 11:12

## 2023-01-01 ASSESSMENT — ENCOUNTER SYMPTOMS
EYE REDNESS: 0
CONSTIPATION: 0
ABDOMINAL PAIN: 0
COUGH: 1
BACK PAIN: 0
DIARRHEA: 0
VOMITING: 0
EYE PAIN: 0
SHORTNESS OF BREATH: 1
SORE THROAT: 0
NAUSEA: 0

## 2023-01-01 ASSESSMENT — PAIN - FUNCTIONAL ASSESSMENT
PAIN_FUNCTIONAL_ASSESSMENT: NONE - DENIES PAIN
PAIN_FUNCTIONAL_ASSESSMENT: NONE - DENIES PAIN

## 2023-01-01 ASSESSMENT — LIFESTYLE VARIABLES
HOW MANY STANDARD DRINKS CONTAINING ALCOHOL DO YOU HAVE ON A TYPICAL DAY: 1 OR 2
HOW OFTEN DO YOU HAVE A DRINK CONTAINING ALCOHOL: 2-3 TIMES A WEEK

## 2023-01-01 NOTE — ED PROVIDER NOTES
Seen and evaluated by JALEN. EKG: Normal sinus rhythm with a rate of 99. Normal axis. Normal intervals and durations. LVH noted. Nonspecific lateral T wave changes present. These changes appear to be new when compared to previous EKG on 2/7/2022.      Denny Nicolas DO  01/01/23 0001

## 2023-01-01 NOTE — ED NOTES
Patient to room 35. Patient presents to the ED complaining of cough/congestion, states she had positive covid test at home and would like it checked. She states she had covid appx 1 year ago and developed pneumonia, and would like to make sure it has not developed again.     Patient alert and oriented, respirations even and easy       Nadira Hinton RN  01/01/23 [id number]

## 2023-01-01 NOTE — LETTER
[facility] Emergency Department  [address] [facility] [address]  Phone: [phone]               January 1, 2023    Patient: Eliud Bucio   YOB: 1975   Date of Visit: 1/1/2023       To Whom It May Concern:    Eliud Bucio was seen and treated in our emergency department on 1/1/2023. She may return to work on 1/6/2023 as long as patient is afebrile and continues mask wearing until day 10 from symptom onset. .      Sincerely,       Haylee Alston RN         Signature:__________________________________

## 2023-01-02 LAB
EKG ATRIAL RATE: 99 BPM
EKG DIAGNOSIS: NORMAL
EKG P AXIS: 28 DEGREES
EKG P-R INTERVAL: 154 MS
EKG Q-T INTERVAL: 340 MS
EKG QRS DURATION: 74 MS
EKG QTC CALCULATION (BAZETT): 436 MS
EKG R AXIS: -12 DEGREES
EKG T AXIS: 98 DEGREES
EKG VENTRICULAR RATE: 99 BPM

## 2023-01-02 PROCEDURE — 93010 ELECTROCARDIOGRAM REPORT: CPT | Performed by: INTERNAL MEDICINE

## 2023-01-02 NOTE — ED PROVIDER NOTES
[address] [facility]        Pt Name: Fernando Williamson  MRN: [id number]  [place] 1975  Date of evaluation: 1/1/2023  Provider: Jose Carlos Matt PA-C  PCP: Luca Page MD  Note Started: 7:38 PM EST 1/1/23      JALEN. I have evaluated this patient. My supervising physician was available for consultation. CHIEF COMPLAINT       Chief Complaint   Patient presents with    Shortness of Breath     Pt states she tested positive for covid yesterday and has had sob since         HISTORY OF PRESENT ILLNESS: 1 or more Elements     History From: SOB      Fernando Williamson is a 50 y.o. female who presents to the emergency department with complaint of a cough and shortness of breath that has been present for the past several days. She took a couple COVID test, however they were negative initially. She states she took 1 this morning that was positive which prompted her to come into the emergency department. States that her symptoms have been improving, but still persistent. She has not tried take anything to help alleviate her symptoms. Denies fever, chills. Patient has chronic sinusitis and is currently on Augmentin. She is also on steroids for this. Taking 10 mg daily at this time, and at the end of her 2-week taper. Nursing Notes were all reviewed and agreed with or any disagreements were addressed in the HPI. REVIEW OF SYSTEMS :      Review of Systems   Constitutional:  Negative for chills and fever. HENT:  Negative for ear pain and sore throat. Eyes:  Negative for pain and redness. Respiratory:  Positive for cough and shortness of breath. Cardiovascular:  Negative for chest pain and leg swelling. Gastrointestinal:  Negative for abdominal pain, constipation, diarrhea, nausea and vomiting. Genitourinary:  Negative for dysuria and hematuria. Musculoskeletal:  Negative for back pain and neck pain.    Skin:  Negative for rash and wound. Neurological:  Negative for light-headedness and headaches. Positives and Pertinent negatives as per HPI.      SURGICAL HISTORY     Past Surgical History:   Procedure Laterality Date    COLONOSCOPY N/A 12/29/2020    COLONOSCOPY DIAGNOSTIC performed by Kalyan Chopra MD at [facility] ARTHROSCOPY Left 9/9/2022    LEFT KNEE ARTHROSCOPY; PARTIAL MEDIAL MENISCECTOMY performed by Navneet Nicolas MD at [place] [address]  8/22/2012    INCISION AND DRAINAGE POSTERIOR THIGH ABSCESS    PRE-MALIGNANT / BENIGN SKIN LESION EXCISION N/A 2/8/2022    EXCISION OF SCALP CYST performed by Augustin Kirk MD at [address] [facility]  Aug 2011    repair of rectal fissures and anal skin tag removal    SINUS SURGERY      X 3    TONSILLECTOMY  2004    TONSILLECTOMY AND ADENOIDECTOMY  2005    (partial adenoidectomy)    UPPER GASTROINTESTINAL ENDOSCOPY N/A 12/29/2020    EGD BIOPSY performed by Kalyan Chopra MD at [address] [place] [address] Medication List as of 1/1/2023  1:31 PM        CONTINUE these medications which have NOT CHANGED    Details   itraconazole (SPORANOX) 10 MG/ML solution Take 20 mLs by mouth daily for 10 days, Disp-200 mL, R-0Normal      cetirizine-psuedoephedrine (ZYRTEC-D) 5-120 MG per extended release tablet Take 1 tablet by mouth 2 times daily, Disp-180 tablet, R-1Normal      !! predniSONE (DELTASONE) 10 MG tablet 4 tabs a day for 5 days, 3 tabs a day for 3 days, 2 tabs a day for 3 days,1 tab a day for 3 days, Disp-38 tablet, R-0Normal      amoxicillin-clavulanate (AUGMENTIN) 875-125 MG per tablet Take 1 tablet by mouth 2 times daily for 14 days, Disp-28 tablet, R-0Normal      Fluticasone Propionate (XHANCE) 93 MCG/ACT EXHU 1 spray by Nasal route 2 times daily, Disp-16 mL, [id number]      folic acid (FOLVITE) 1 MG tablet TAKE ONE TABLET BY MOUTH DAILY, Disp-30 tablet, R-5Normal      !! predniSONE (DELTASONE) 10 MG tablet 4 tabs a day for 5 days, 3 tabs a day for 3 days, 2 tabs a day for 3 days,1 tab a day for 3 days, Disp-38 tablet, R-0Normal      montelukast (SINGULAIR) 10 MG tablet TAKE ONE TABLET BY MOUTH EVERY EVENING, Disp-30 tablet, R-0Normal      lidocaine (LIDODERM) 5 % APPLY 1 PATCH TO AFFECTED AREA FOR 12 HOURS IN A 24 HOUR PERIOD, Disp-30 patch, R-0Normal      SYMBICORT 160-4.5 MCG/ACT AERO INHALE TWO PUFFS BY MOUTH TWICE A DAY, Disp-10.2 g, R-3Normal      !! fluticasone (FLONASE) 50 MCG/ACT nasal spray SPRAY ONE SPRAY IN EACH NOSTRIL ONCE DAILY, Disp-2 each, R-2Normal      levothyroxine (SYNTHROID) 75 MCG tablet Take 1 tablet by mouth daily, Disp-90 tablet, R-1Normal      aspirin 325 MG EC tablet Take 1 tablet by mouth daily for 14 days, Disp-14 tablet, R-0Normal      dilTIAZem (CARDIZEM CD) 120 MG extended release capsule Take 1 capsule by mouth daily, Disp-30 capsule, R-3Normal      Saccharomyces boulardii (PROBIOTIC) 250 MG CAPS TAKE ONE CAPSULE BY MOUTH TWICE A DAY, Disp-60 capsule, R-5Normal      acetaminophen (TYLENOL) 500 MG tablet Take 1 tablet by mouth 4 times daily as needed for Pain, Disp-28 tablet, R-0Print      liothyronine (CYTOMEL) 5 MCG tablet TAKE TWO TABLETS BY MOUTH EVERY DAY, Disp-60 tablet, R-5Normal      cyanocobalamin 1000 MCG/ML injection INJECT 1ML INTO THE SKIN ONCE MONTHLY, Disp-1 mL, R-2Normal      ketoconazole (NIZORAL) 2 % shampoo APPLY TO AFFECTED AREA(S) ONCE DAILY AS NEEDED, Disp-120 mL, R-1, Normal      albuterol sulfate HFA (VENTOLIN HFA) 108 (90 Base) MCG/ACT inhaler INHALE 2 PUFF BY MOUTH EVERY 6 HOURS AS NEEDED FOR WHEEZING, Disp-18 each, R-5Normal      BETASEPT SURGICAL SCRUB 4 % external liquid APPLY TO AFFECTED AREA(S) TOPICALLY AS NEEDED, Disp-473 mL, R-0, Normal      mometasone (ELOCON) 0.1 % cream APPLY TO AFFECTED AREA(S) ONCE DAILY, Disp-45 g, R-0, Normal      vitamin D (ERGOCALCIFEROL) 1.25 MG (76671 UT) CAPS capsule TAKE 1 CAPSULE BY MOUTH ONE TIME WEEKLY, Disp-6 capsule, R-0Normal      urea (UREA 10 HYDRATING) 10 % cream APPLY TO AFFECTED AREA(S) AS NEEDED, Disp-85 g, R-2Normal      cetirizine (ZYRTEC) 10 MG tablet Take 1 tablet by mouth daily, Disp-90 tablet, R-1Normal      mupirocin (BACTROBAN) 2 % ointment APPLY TO AFFECTED AREA(S) TOPICALLY THREE TIMES A DAY, Disp-22 g, R-1, Normal      pantoprazole (PROTONIX) 40 MG tablet Take 1 tablet by mouth 2 times daily (before meals), Disp-60 tablet, R-5Normal      !! fluticasone (FLONASE) 50 MCG/ACT nasal spray APPLY 1 SPRAY IN THE AFFECTED NOSTRIL ONCE DAILY, Disp-16 g, R-3Normal      polyethylene glycol (MIRALAX) 17 GM/SCOOP POWD powder POUR 17 GRAMS OF POWDER INTO THE CAP OF THE BOTTLE. STIR THE POWDER IN A GLASS (4 TO 8 OZ) OF WATER, JUICE, SODA, COFFEE OR TEA UNTIL COMPLETELY DISSOLVED. DRINK THE SOLUTION. ONCE DAILY AS NEEDED FOR CONSTIPATION, Disp-238 g, R-5Normal      Insulin Syringe-Needle U-100 (ULTICARE INSULIN SYRINGE) 31G X 5/16\" 1 ML MISC Disp-100 each, R-1, NormalAS DIRECTED      Multiple Vitamins-Minerals (THERAPEUTIC MULTIVITAMIN-MINERALS) tablet Take 1 tablet by mouth daily, Disp-30 tablet, R-1Normal       !! - Potential duplicate medications found. Please discuss with provider. ALLERGIES     Latex, Clindamycin/lincomycin, Sulfa antibiotics, and Diflucan [fluconazole]    FAMILYHISTORY       Family History   Problem Relation Age of Onset    High Blood Pressure Mother     Hypertension Mother     Elevated Lipids Mother     Other Mother         fatty liver    Heart Disease Father     Hypertension Father     Elevated Lipids Father     Coronary Art Dis Father     Liver Disease Father     Diabetes Maternal Aunt         SOCIAL HISTORY       Social History     Tobacco Use    Smoking status: Never    Smokeless tobacco: Never   Vaping Use    Vaping Use: Never used   Substance Use Topics    Alcohol use:  Yes     Alcohol/week: 0.0 standard drinks     Comment: 2 drinks/week     Drug use: No       SCREENINGS        [place] Coma Scale  Eye Opening: Spontaneous  Best Verbal Response: Oriented  Best Motor Response: Obeys commands  Una Coma Scale Score: 15                CIWA Assessment  BP: 118/71  Heart Rate: 90           PHYSICAL EXAM  1 or more Elements     ED Triage Vitals [01/01/23 1040]   BP Temp Temp Source Heart Rate Resp SpO2 Height Weight   128/74 98.2 °F (36.8 °C) Oral (!) 102 20 96 % 5' 3\" (1.6 m) 230 lb 6.1 oz (104.5 kg)       Physical Exam  Constitutional:       General: She is not in acute distress. Appearance: Normal appearance. She is not ill-appearing, toxic-appearing or diaphoretic. HENT:      Head: Normocephalic and atraumatic. Right Ear: External ear normal.      Left Ear: External ear normal.      Nose: Nose normal.   Eyes:      General:         Right eye: No discharge. Left eye: No discharge. Pulmonary:      Effort: Pulmonary effort is normal. No respiratory distress. Breath sounds: Wheezing present. Musculoskeletal:         General: Normal range of motion. Cervical back: Normal range of motion. Skin:     General: Skin is warm and dry. Neurological:      General: No focal deficit present. Mental Status: She is alert and oriented to person, place, and time.    Psychiatric:         Mood and Affect: Mood normal.         Behavior: Behavior normal.         DIAGNOSTIC RESULTS   LABS:    Labs Reviewed   COVID-19, RAPID - Abnormal; Notable for the following components:       Result Value    SARS-CoV-2, NAAT DETECTED (*)     All other components within normal limits   CBC WITH AUTO DIFFERENTIAL - Abnormal; Notable for the following components:    WBC 12.4 (*)     Hemoglobin 10.6 (*)     Hematocrit 34.6 (*)     MCH 24.9 (*)     MCHC 30.8 (*)     RDW 15.7 (*)     Neutrophils Absolute 10.5 (*)     All other components within normal limits   COMPREHENSIVE METABOLIC PANEL W/ REFLEX TO MG FOR LOW K - Abnormal; Notable for the following components:    CO2 20 (*)     Glucose 123 (*) Albumin/Globulin Ratio 0.9 (*)     Alkaline Phosphatase 185 (*)     ALT 59 (*)     AST 49 (*)     All other components within normal limits   RAPID INFLUENZA A/B ANTIGENS   TROPONIN   BRAIN NATRIURETIC PEPTIDE       When ordered only abnormal lab results are displayed. All other labs were within normal range or not returned as of this dictation. EKG: When ordered, EKG's are interpreted by the Emergency Department Physician in the absence of a cardiologist.  Please see their note for interpretation of EKG. RADIOLOGY:   Non-plain film images such as CT, Ultrasound and MRI are read by the radiologist. Plain radiographic images are visualized and preliminarily interpreted by the ED Provider with the below findings:      Interpretation per the Radiologist below, if available at the time of this note:    XR CHEST (2 VW)   Final Result   No significant findings in the chest.           XR CHEST (2 VW)    Result Date: 1/1/2023  EXAMINATION: TWO XRAY VIEWS OF THE CHEST 1/1/2023 11:05 am COMPARISON: 01/05/2022 radiograph HISTORY: ORDERING SYSTEM PROVIDED HISTORY: chest tightness TECHNOLOGIST PROVIDED HISTORY: Reason for exam:->chest tightness Reason for Exam: chest tightness; sob FINDINGS: The heart, mediastinum and pulmonary vascularity are normal.  Lungs are well-expanded and clear. No skeletal abnormalities are present in the chest.     No significant findings in the chest.       No results found.     PROCEDURES   Unless otherwise noted below, none     Procedures    CRITICAL CARE TIME (.cctime)       PAST MEDICAL HISTORY      has a past medical history of A-fib ([address] Abdominal wall abscess (02/08/2017), Abdominal wall cellulitis (02/06/2017), Anal fissure (04/30/2015), Anemia, iron deficiency, Anesthesia, Asthma, Atrial fibrillation ([address] Autoimmune hepatitis ([address] Chronic sinusitis, COVID-19, History of blood transfusion, Hypothyroidism, Menorrhagia with regular cycle, Morbid obesity with BMI of 40.0-44.9, adult ([facility] (05/26/2015), MRSA (methicillin resistant staph aureus) culture positive (02/03/2017), MRSA infection (08/20/2012), RODGER (obstructive sleep apnea), Pericarditis, acute, Retention of urine, and Sinusitis. EMERGENCY DEPARTMENT COURSE and DIFFERENTIAL DIAGNOSIS/MDM:   Vitals:    Vitals:    01/01/23 1040 01/01/23 1114 01/01/23 1230   BP: 128/74  118/71   Pulse: (!) 102 92 90   Resp: 20 18 18   Temp: 98.2 °F (36.8 °C)     TempSrc: Oral     SpO2: 96% 96% 97%   Weight: 230 lb 6.1 oz (104.5 kg)     Height: 5' 3\" (1.6 m)         Patient was given the following medications:  Medications   ipratropium-albuterol (DUONEB) nebulizer solution 1 ampule (1 ampule Inhalation Given 1/1/23 1112)             Is this patient to be included in the SEP-1 Core Measure due to severe sepsis or septic shock? No   Exclusion criteria - the patient is NOT to be included for SEP-1 Core Measure due to:  Viral etiology found or highly suspected (including COVID-19) without concomitant bacterial infection    Chronic Conditions affecting care:   has a past medical history of A-fib ([facility] Abdominal wall abscess (02/08/2017), Abdominal wall cellulitis (02/06/2017), Anal fissure (04/30/2015), Anemia, iron deficiency, Anesthesia, Asthma, Atrial fibrillation ([facility] Autoimmune hepatitis ([facility] Chronic sinusitis, COVID-19, History of blood transfusion, Hypothyroidism, Menorrhagia with regular cycle, Morbid obesity with BMI of 40.0-44.9, adult ([facility] (05/26/2015), MRSA (methicillin resistant staph aureus) culture positive (02/03/2017), MRSA infection (08/20/2012), RODGER (obstructive sleep apnea), Pericarditis, acute, Retention of urine, and Sinusitis. CONSULTS: (Who and What was discussed)  None          CC/HPI Summary, DDx, ED Course, and Reassessment:  This is a 50y.o. year old, well-appearing female with  has a past medical history of A-fib ([address] Abdominal wall abscess, Abdominal wall cellulitis, Anal fissure, Anemia, iron deficiency, Anesthesia, Asthma, Atrial fibrillation ([facility] Autoimmune hepatitis ([facility] Chronic sinusitis, COVID-19, History of blood transfusion, Hypothyroidism, Menorrhagia with regular cycle, Morbid obesity with BMI of 40.0-44.9, adult ([facility] MRSA (methicillin resistant staph aureus) culture positive, MRSA infection, RODGER (obstructive sleep apnea), Pericarditis, acute, Retention of urine, and Sinusitis. who presents to the ED with complaint of sob, cough that began several days ago. Vitals upon arrival show wnl. Physical Exam shows as above. Blood work was done and shows:   -Mild leukocytosis and I contribute this to her 2-week prednisone taper that she is still taking  Covid/Flu: covid +    EKG: interpreted by my attending, please see note     Imaging was reviewed and interpreted by radiologist as above showing:   -No acute findings      Ddx includes: COVID, flu, pneumonia, other    Management Given:  -DuoNeb,, symptoms improved    Disposition: Discharge  Patient was informed to return to the Emergency Department if any new worsening or more concerning symptoms occur, in agreement with plan. Shared decision making was practiced, and patient discharged in stable condition. Informed to follow up with PCP  for further evaluation. Medications were prescribed as below. All questions were answered. I am the Primary Clinician of Record. FINAL IMPRESSION      1. Mild intermittent asthma with exacerbation    2.  COVID          DISPOSITION/PLAN     DISPOSITION Decision To Discharge 01/01/2023 01:30:27 PM      PATIENT REFERRED TO:  Keiko Thomas MD  [address]  [address] [facility]  [phone]    Schedule an appointment as soon as possible for a visit in 1 day  for reevaluation    [facility] Emergency Department  [address] [address]  [phone]  Go to   As needed, If symptoms worsen      DISCHARGE MEDICATIONS:  Discharge Medication List as of 1/1/2023 1:31 PM        START taking these medications    Details   ipratropium-albuterol (DUONEB) 0.5-2.5 (3) MG/3ML SOLN nebulizer solution Inhale 3 mLs into the lungs every 4 hours, Disp-360 mL, R-0Normal      benzonatate (TESSALON) 200 MG capsule Take 1 capsule by mouth 3 times daily as needed for Cough, Disp-30 capsule, R-0Normal      nirmatrelvir/ritonavir (PAXLOVID) 20 x 150 MG & 10 x 100MG TBPK Take 3 tablets (two 150 mg nirmatrelvir and one 100 mg ritonavir tablets) by mouth every 12 hours for 5 days. , Disp-30 tablet, R-0Normal             DISCONTINUED MEDICATIONS:  Discharge Medication List as of 1/1/2023  1:31 PM                 (Please note that portions of this note were completed with a voice recognition program.  Efforts were made to edit the dictations but occasionally words are mis-transcribed.)    Wallace Avila PA-C (electronically signed)       Wallace Avila PA-C  01/01/23 1943

## 2023-01-10 ENCOUNTER — HOSPITAL ENCOUNTER (OUTPATIENT)
Dept: GENERAL RADIOLOGY | Age: 48
Discharge: HOME | End: 2023-01-10
Payer: COMMERCIAL

## 2023-01-10 ENCOUNTER — OFFICE VISIT (OUTPATIENT)
Dept: INTERNAL MEDICINE CLINIC | Age: 48
End: 2023-01-10
Payer: COMMERCIAL

## 2023-01-10 VITALS
SYSTOLIC BLOOD PRESSURE: 122 MMHG | TEMPERATURE: 97 F | DIASTOLIC BLOOD PRESSURE: 84 MMHG | HEIGHT: 63 IN | WEIGHT: 227.6 LBS | HEART RATE: 84 BPM | BODY MASS INDEX: 40.33 KG/M2 | OXYGEN SATURATION: 99 %

## 2023-01-10 DIAGNOSIS — E06.3 HYPOTHYROIDISM DUE TO HASHIMOTO'S THYROIDITIS: Chronic | ICD-10-CM

## 2023-01-10 DIAGNOSIS — E03.8 HYPOTHYROIDISM DUE TO HASHIMOTO'S THYROIDITIS: Chronic | ICD-10-CM

## 2023-01-10 DIAGNOSIS — M54.50 ACUTE BILATERAL LOW BACK PAIN WITHOUT SCIATICA: ICD-10-CM

## 2023-01-10 DIAGNOSIS — M25.562 LEFT KNEE PAIN, UNSPECIFIED CHRONICITY: ICD-10-CM

## 2023-01-10 DIAGNOSIS — M54.50 ACUTE BILATERAL LOW BACK PAIN WITHOUT SCIATICA: Primary | ICD-10-CM

## 2023-01-10 DIAGNOSIS — J32.4 CHRONIC PANSINUSITIS: ICD-10-CM

## 2023-01-10 DIAGNOSIS — D50.0 IRON DEFICIENCY ANEMIA DUE TO CHRONIC BLOOD LOSS: ICD-10-CM

## 2023-01-10 DIAGNOSIS — S83.242A ACUTE MEDIAL MENISCUS TEAR OF LEFT KNEE, INITIAL ENCOUNTER: ICD-10-CM

## 2023-01-10 DIAGNOSIS — L30.9 ECZEMA, UNSPECIFIED TYPE: ICD-10-CM

## 2023-01-10 DIAGNOSIS — M17.12 PRIMARY OSTEOARTHRITIS OF LEFT KNEE: Primary | ICD-10-CM

## 2023-01-10 DIAGNOSIS — K90.9 INTESTINAL MALABSORPTION, UNSPECIFIED TYPE: Chronic | ICD-10-CM

## 2023-01-10 DIAGNOSIS — I48.91 ATRIAL FIBRILLATION WITH RVR (HCC): ICD-10-CM

## 2023-01-10 DIAGNOSIS — J45.41 MODERATE PERSISTENT ASTHMA WITH EXACERBATION: ICD-10-CM

## 2023-01-10 LAB — TSH REFLEX: 3.31 UIU/ML (ref 0.27–4.2)

## 2023-01-10 PROCEDURE — 99213 OFFICE O/P EST LOW 20 MIN: CPT | Performed by: STUDENT IN AN ORGANIZED HEALTH CARE EDUCATION/TRAINING PROGRAM

## 2023-01-10 PROCEDURE — 72100 X-RAY EXAM L-S SPINE 2/3 VWS: CPT

## 2023-01-10 PROCEDURE — 6360000002 HC RX W HCPCS: Performed by: STUDENT IN AN ORGANIZED HEALTH CARE EDUCATION/TRAINING PROGRAM

## 2023-01-10 RX ORDER — BUDESONIDE AND FORMOTEROL FUMARATE DIHYDRATE 160; 4.5 UG/1; UG/1
AEROSOL RESPIRATORY (INHALATION)
Qty: 10.2 G | Refills: 3 | Status: SHIPPED | OUTPATIENT
Start: 2023-01-10

## 2023-01-10 RX ORDER — DILTIAZEM HYDROCHLORIDE 120 MG/1
120 CAPSULE, COATED, EXTENDED RELEASE ORAL DAILY
Qty: 30 CAPSULE | Refills: 3 | Status: SHIPPED | OUTPATIENT
Start: 2023-01-10

## 2023-01-10 RX ORDER — FLUTICASONE PROPIONATE 50 MCG
SPRAY, SUSPENSION (ML) NASAL
Qty: 16 G | Refills: 3 | Status: SHIPPED | OUTPATIENT
Start: 2023-01-10

## 2023-01-10 RX ORDER — FLUTICASONE PROPIONATE 93 UG/1
1 SPRAY, METERED NASAL 2 TIMES DAILY
Qty: 16 ML | Refills: 5 | Status: SHIPPED | OUTPATIENT
Start: 2023-01-10

## 2023-01-10 RX ORDER — IPRATROPIUM BROMIDE AND ALBUTEROL SULFATE 2.5; .5 MG/3ML; MG/3ML
1 SOLUTION RESPIRATORY (INHALATION) EVERY 4 HOURS
Qty: 360 ML | Refills: 0 | Status: SHIPPED | OUTPATIENT
Start: 2023-01-10

## 2023-01-10 RX ORDER — LEVOTHYROXINE SODIUM 0.07 MG/1
75 TABLET ORAL DAILY
Qty: 90 TABLET | Refills: 1 | Status: SHIPPED | OUTPATIENT
Start: 2023-01-10

## 2023-01-10 RX ORDER — ALBUTEROL SULFATE 90 UG/1
AEROSOL, METERED RESPIRATORY (INHALATION)
Qty: 18 EACH | Refills: 5 | Status: SHIPPED | OUTPATIENT
Start: 2023-01-10

## 2023-01-10 RX ORDER — ASPIRIN 325 MG
325 TABLET, DELAYED RELEASE (ENTERIC COATED) ORAL DAILY
Qty: 14 TABLET | Refills: 0 | Status: SHIPPED | OUTPATIENT
Start: 2023-01-10 | End: 2023-01-24

## 2023-01-10 RX ORDER — CETIRIZINE HYDROCHLORIDE, PSEUDOEPHEDRINE HYDROCHLORIDE 5; 120 MG/1; MG/1
1 TABLET, FILM COATED, EXTENDED RELEASE ORAL 2 TIMES DAILY
Qty: 180 TABLET | Refills: 1 | Status: SHIPPED | OUTPATIENT
Start: 2023-01-10 | End: 2023-02-09

## 2023-01-10 RX ORDER — LIOTHYRONINE SODIUM 5 UG/1
TABLET ORAL
Qty: 60 TABLET | Refills: 5 | Status: SHIPPED | OUTPATIENT
Start: 2023-01-10

## 2023-01-10 RX ORDER — KETOROLAC TROMETHAMINE 30 MG/ML
30 INJECTION, SOLUTION INTRAMUSCULAR; INTRAVENOUS ONCE
Status: COMPLETED | OUTPATIENT
Start: 2023-01-10 | End: 2023-01-10

## 2023-01-10 RX ORDER — METHOCARBAMOL 500 MG/1
500 TABLET, FILM COATED ORAL 4 TIMES DAILY PRN
Qty: 40 TABLET | Refills: 0 | Status: SHIPPED | OUTPATIENT
Start: 2023-01-10 | End: 2023-01-14

## 2023-01-10 RX ORDER — TRIAMCINOLONE ACETONIDE 40 MG/ML
40 INJECTION, SUSPENSION INTRA-ARTICULAR; INTRAMUSCULAR ONCE
Status: COMPLETED | OUTPATIENT
Start: 2023-01-10 | End: 2023-01-10

## 2023-01-10 RX ORDER — FOLIC ACID 1 MG/1
TABLET ORAL
Qty: 30 TABLET | Refills: 5 | Status: SHIPPED | OUTPATIENT
Start: 2023-01-10

## 2023-01-10 RX ORDER — PANTOPRAZOLE SODIUM 40 MG/1
40 TABLET, DELAYED RELEASE ORAL
Qty: 60 TABLET | Refills: 5 | Status: SHIPPED | OUTPATIENT
Start: 2023-01-10

## 2023-01-10 RX ORDER — MOMETASONE FUROATE 1 MG/G
CREAM TOPICAL
Qty: 45 G | Refills: 0 | Status: SHIPPED | OUTPATIENT
Start: 2023-01-10

## 2023-01-10 RX ADMIN — TRIAMCINOLONE ACETONIDE 40 MG: 40 INJECTION, SUSPENSION INTRA-ARTICULAR; INTRAMUSCULAR at 10:13

## 2023-01-10 RX ADMIN — KETOROLAC TROMETHAMINE 30 MG: 30 INJECTION, SOLUTION INTRAMUSCULAR; INTRAVENOUS at 10:09

## 2023-01-10 ASSESSMENT — PAIN SCALES - GENERAL: PAINLEVEL_OUTOF10: 6

## 2023-01-10 ASSESSMENT — PAIN - FUNCTIONAL ASSESSMENT: PAIN_FUNCTIONAL_ASSESSMENT: PREVENTS OR INTERFERES SOME ACTIVE ACTIVITIES AND ADLS

## 2023-01-10 ASSESSMENT — PAIN DESCRIPTION - DESCRIPTORS: DESCRIPTORS: ACHING

## 2023-01-10 ASSESSMENT — PAIN DESCRIPTION - LOCATION: LOCATION: BACK

## 2023-01-10 ASSESSMENT — PAIN DESCRIPTION - ORIENTATION: ORIENTATION: LOWER

## 2023-01-10 NOTE — PROGRESS NOTES
[facility] Outpatient [facility] Medicine [facility]    Bea Castillo is a 50 y.o. female, here for evaluation of the following concerns:    HPI  Patient is a 40-year-old female who presented with chief complaint of lower back pain. Patient mentions that her back pain started on Saturday. Reports that she has dull pain on her lower back which get worse with exertion. She rates the pain at its 4 out of 10 at rest and 9 out of 10 on exertion. She also endorses decreased range of motion. She denies having any fall or trauma. Mentions that the pain is on bilateral lower back, nonradiating. Patient denies having numbness, weakness, lower extremity edema, chest pain, shortness of breath, nausea, vomiting. Patient reports that she took ibuprofen for the lower back pain which did not improve the pain. She denies having cough, nausea, vomiting, constipation, diarrhea. Review of Systems   All other systems reviewed and are negative. MEDICATIONS:  Prior to Visit Medications    Medication Sig Taking? Authorizing Provider   methocarbamol (ROBAXIN) 500 MG tablet Take 1 tablet by mouth 4 times daily as needed (Back pain) WARNING:  May cause drowsiness. May impair ability to operate vehicles or machinery. Do not use in combination with alcohol.  Yes Elke Castillo MD   albuterol sulfate HFA (VENTOLIN HFA) 108 (90 Base) MCG/ACT inhaler INHALE 2 PUFF BY MOUTH EVERY 6 HOURS AS NEEDED FOR WHEEZING Yes Elke Castillo MD   aspirin 325 MG EC tablet Take 1 tablet by mouth daily for 14 days Yes Elke Castillo MD   cetirizine-psuedoephedrine (ZYRTEC-D) 5-120 MG per extended release tablet Take 1 tablet by mouth 2 times daily Yes Elke Castillo MD   fluticasone (FLONASE) 50 MCG/ACT nasal spray APPLY 1 SPRAY IN THE AFFECTED NOSTRIL ONCE DAILY Yes Elke Castillo MD   dilTIAZem (CARDIZEM CD) 120 MG extended release capsule Take 1 capsule by mouth daily Yes Elke Castillo MD   Fluticasone Propionate (Conchetta Crumbly) 93 MCG/ACT EXHU 1 spray by Nasal route 2 times daily Yes Ozzie Sigala MD   folic acid (FOLVITE) 1 MG tablet TAKE ONE TABLET BY MOUTH DAILY Yes Ozzie Sigala MD   ipratropium-albuterol (DUONEB) 0.5-2.5 (3) MG/3ML SOLN nebulizer solution Inhale 3 mLs into the lungs every 4 hours Yes Ozzie Sigala MD   levothyroxine (SYNTHROID) 75 MCG tablet Take 1 tablet by mouth daily Yes Ozzie Sigala MD   liothyronine (CYTOMEL) 5 MCG tablet TAKE TWO TABLETS BY MOUTH EVERY DAY Yes Ozzie Sigala MD   mometasone (ELOCON) 0.1 % cream APPLY TO AFFECTED AREA(S) ONCE DAILY Yes Ozzie Sigala MD   pantoprazole (PROTONIX) 40 MG tablet Take 1 tablet by mouth 2 times daily (before meals) Yes Ozzie Sigala MD   budesonide-formoterol (SYMBICORT) 160-4.5 MCG/ACT AERO INHALE TWO PUFFS BY MOUTH TWICE A DAY Yes Ozzie Sigala MD   montelukast (SINGULAIR) 10 MG tablet TAKE ONE TABLET BY MOUTH Jody Martel Yes Pierre Antonio MD   lidocaine (LIDODERM) 5 % APPLY 1 PATCH TO AFFECTED AREA FOR 12 HOURS IN A 24 HOUR PERIOD Yes Rafael Todd MD   Saccharomyces boulardii (PROBIOTIC) 250 MG CAPS TAKE ONE CAPSULE BY MOUTH TWICE A DAY Yes Rissa San MD   acetaminophen (TYLENOL) 500 MG tablet Take 1 tablet by mouth 4 times daily as needed for Pain Yes Leesa Amaral Se, APRN - CNP   cyanocobalamin 1000 MCG/ML injection INJECT 1ML INTO THE SKIN ONCE MONTHLY Yes Alena Camara MD   ketoconazole (NIZORAL) 2 % shampoo APPLY TO AFFECTED AREA(S) ONCE DAILY AS NEEDED Yes Alena Camara MD   BETASEPT SURGICAL SCRUB 4 % external liquid APPLY TO AFFECTED AREA(S) TOPICALLY AS NEEDED Yes Alena Camara MD   vitamin D (ERGOCALCIFEROL) 1.25 MG (78726 UT) CAPS capsule TAKE 1 CAPSULE BY MOUTH ONE TIME WEEKLY Yes Alena Camara MD   urea (UREA 10 HYDRATING) 10 % cream APPLY TO AFFECTED AREA(S) AS NEEDED Yes Alena Camara MD   mupirocin (BACTROBAN) 2 % ointment APPLY TO AFFECTED AREA(S) TOPICALLY THREE TIMES A DAY Yes Lawernce Cotton, DO   polyethylene glycol (MIRALAX) 17 GM/SCOOP POWD powder POUR 17 GRAMS OF POWDER INTO THE CAP OF THE BOTTLE. STIR THE POWDER IN A GLASS (4 TO 8 OZ) OF WATER, JUICE, SODA, COFFEE OR TEA UNTIL COMPLETELY DISSOLVED. DRINK THE SOLUTION. ONCE DAILY AS NEEDED FOR CONSTIPATION Yes Carla Dye MD   Insulin Syringe-Needle U-100 (ULTICARE INSULIN SYRINGE) 31G X 5/16\" 1 ML MISC AS DIRECTED Yes Jeanie Fermin MD   Multiple Vitamins-Minerals (THERAPEUTIC MULTIVITAMIN-MINERALS) tablet Take 1 tablet by mouth daily Yes Yelitza Doherty MD        Vitals:    01/10/23 0902   BP: 122/84   Site: Right Upper Arm   Position: Sitting   Cuff Size: Large Adult   Pulse: 84   Temp: 97 °F (36.1 °C)   TempSrc: Temporal   SpO2: 99%   Weight: 227 lb 9.6 oz (103.2 kg)   Height: 5' 3\" (1.6 m)      Estimated body mass index is 40.32 kg/m² as calculated from the following:    Height as of this encounter: 5' 3\" (1.6 m). Weight as of this encounter: 227 lb 9.6 oz (103.2 kg). Physical Exam  Vitals reviewed. Constitutional:       Appearance: She is obese. HENT:      Head: Normocephalic and atraumatic. Nose: Nose normal.      Mouth/Throat:      Mouth: Mucous membranes are moist.   Eyes:      Extraocular Movements: Extraocular movements intact. Conjunctiva/sclera: Conjunctivae normal.      Pupils: Pupils are equal, round, and reactive to light. Cardiovascular:      Rate and Rhythm: Normal rate and regular rhythm. Pulmonary:      Effort: Pulmonary effort is normal.      Breath sounds: Normal breath sounds. Abdominal:      Palpations: Abdomen is soft. Musculoskeletal:      Cervical back: Normal range of motion and neck supple. Comments: Tenderness present on lumbar region. Neurological:      General: No focal deficit present. Mental Status: She is alert and oriented to person, place, and time. ASSESSMENT/PLAN:     1. Acute bilateral low back pain without sciatica:  -Patient presented with lower back pain.   Back pain started on Saturday. Reports that the pain gets worse on exertion. She denies having any trauma or fall.  -On physical exam, tenderness present on the lower back region. There is also decrease in range of motion. -     ketorolac (TORADOL) injection 30 mg; Ketorolac is contraindicated in patients with advanced renal impairment and in patients at risk of renal failure due to volume depletion. For 72years of age and older OR weight less than 50 kg, use 15 mg IV every 6 hours; MAX dose: 60 mg/day. Dose greater than 30 mg must be administered via intramuscular route. Do not administer for more than 5 days. 30 mg, IntraVENous, ONCE, 1 dose, On Tue 1/10/23 at 1015Do not administer for more than 5 days. -     XR LUMBAR SPINE (2-3 VIEWS); Future  -     methocarbamol (ROBAXIN) 500 MG tablet; Take 1 tablet by mouth 4 times daily as needed (Back pain) WARNING:  May cause drowsiness. May impair ability to operate vehicles or machinery. Do not use in combination with alcohol., Disp-40 tablet, R-0Print  -     triamcinolone acetonide (KENALOG-40) injection 40 mg; 40 mg, IntraMUSCular, ONCE, 1 dose, On Tue 1/10/23 at 1015    2. Hypothyroidism due to Hashimoto's thyroiditis:  -Patient denies having palpitation, chest pain, constipation, diarrhea, tremor, heat or cold intolerance. Denies having weight changes.  -     TSH with Reflex; Future  -     folic acid (FOLVITE) 1 MG tablet; TAKE ONE TABLET BY MOUTH DAILY, Disp-30 tablet, R-5Normal  -     liothyronine (CYTOMEL) 5 MCG tablet; TAKE TWO TABLETS BY MOUTH EVERY DAY, Disp-60 tablet, R-5Normal    3. Moderate persistent asthma with exacerbation  -     albuterol sulfate HFA (VENTOLIN HFA) 108 (90 Base) MCG/ACT inhaler; INHALE 2 PUFF BY MOUTH EVERY 6 HOURS AS NEEDED FOR WHEEZING, Disp-18 each, R-5Normal  -     fluticasone (FLONASE) 50 MCG/ACT nasal spray; APPLY 1 SPRAY IN THE AFFECTED NOSTRIL ONCE DAILY, Disp-16 g, R-3Normal    5. Chronic pansinusitis:  -Follows up with ENT.   - cetirizine-psuedoephedrine (ZYRTEC-D) 5-120 MG per extended release tablet; Take 1 tablet by mouth 2 times daily, Disp-180 tablet, R-1Normal  -     Fluticasone Propionate (XHANCE) 93 MCG/ACT EXHU; 1 spray by Nasal route 2 times daily, Disp-16 mL, R-5Normal    6. Eczema, unspecified type  -     mometasone (ELOCON) 0.1 % cream; APPLY TO AFFECTED AREA(S) ONCE DAILY, Disp-45 g, R-0, Normal    Return in about 3 months (around 4/10/2023). The patient was staffed with the teaching attending: Dr. Kalia Schmidt. An electronic signature was used to authenticate this note.     --Elke Castillo MD

## 2023-01-10 NOTE — PATIENT INSTRUCTIONS
- Start taking Robaxin for back pain  - X-ray of lower back  - Check TSH before next visit  - Follow up in three months

## 2023-01-17 DIAGNOSIS — K90.9 INTESTINAL MALABSORPTION, UNSPECIFIED TYPE: Chronic | ICD-10-CM

## 2023-01-17 DIAGNOSIS — D50.0 IRON DEFICIENCY ANEMIA DUE TO CHRONIC BLOOD LOSS: ICD-10-CM

## 2023-01-17 RX ORDER — CYANOCOBALAMIN 1000 UG/ML
INJECTION, SOLUTION INTRAMUSCULAR; SUBCUTANEOUS
Qty: 1 ML | Refills: 2 | Status: SHIPPED | OUTPATIENT
Start: 2023-01-17

## 2023-01-17 NOTE — TELEPHONE ENCOUNTER
Requested Prescriptions     Pending Prescriptions Disp Refills    cyanocobalamin 1000 MCG/ML injection 1 mL 2     Sig: inject 1ml into the skin once monthly       Last Clinic Visit:  1/10/2023     Next Clinic Appointment:  4/11/2023

## 2023-01-20 ENCOUNTER — TELEMEDICINE (OUTPATIENT)
Dept: ENDOCRINOLOGY | Age: 48
End: 2023-01-20

## 2023-01-20 ENCOUNTER — TELEPHONE (OUTPATIENT)
Dept: ORTHOPEDIC SURGERY | Age: 48
End: 2023-01-20

## 2023-01-20 DIAGNOSIS — E03.9 ACQUIRED HYPOTHYROIDISM: Primary | ICD-10-CM

## 2023-01-20 DIAGNOSIS — R73.03 PREDIABETES: ICD-10-CM

## 2023-01-20 RX ORDER — LIOTHYRONINE SODIUM 5 UG/1
TABLET ORAL
Qty: 180 TABLET | Refills: 5 | Status: SHIPPED | OUTPATIENT
Start: 2023-01-20

## 2023-01-20 RX ORDER — LEVOTHYROXINE SODIUM 0.15 MG/1
150 TABLET ORAL DAILY
Qty: 90 TABLET | Refills: 1 | Status: SHIPPED | OUTPATIENT
Start: 2023-01-20 | End: 2024-01-20

## 2023-01-20 RX ORDER — SERTRALINE HYDROCHLORIDE 25 MG/1
TABLET, FILM COATED ORAL
COMMUNITY
Start: 2022-11-19

## 2023-01-20 RX ORDER — SEMAGLUTIDE 1.34 MG/ML
INJECTION, SOLUTION SUBCUTANEOUS
Qty: 1.5 ML | Refills: 3 | Status: SHIPPED | OUTPATIENT
Start: 2023-01-20

## 2023-01-20 ASSESSMENT — ENCOUNTER SYMPTOMS
BACK PAIN: 0
COUGH: 0
PHOTOPHOBIA: 0

## 2023-01-20 NOTE — PROGRESS NOTES
Patient was evaluated through a synchronous (real-time) audio-video  encounter. The patient (or guardian if applicable) is aware that this is a billable service, which includes applicable co-pays. This Virtual Visit was  conducted with patient's (and/or legal guardian's) consent. The visit was conducted pursuant to the emergency declaration under the [place] Act and the National Emergencies Act, 1135 waiver authority and the Coronavirus Preparedness and Response Supplemental Appropriations Act. Patient identification was verified,  and a caregiver was present when appropriate. The patient was located in a state where the provider was licensed to provide care.  Patient home  Provider home  Patient video issue, completed through phone    Patient has a PMH of thyroid disease, anemia, obesity, pericarditis, GERD, autoimmune hepatitis.     Interim:    Feels tired  Weight gain  Has been on intermittent steroids  Cytomel initially helping  Saw Weight management  She was on 137mcg but 75mcg sent by PCP?  States last A1c 6.1    She had covid-19 again    Inquire about metformin and armour    Felt better on cytomel initially  Fatigue since on levothyroxine 125mcg    Has seen Dr. Harris    She was diagnosed with Graves disease the at the age of 3 yrs  Had CARBALLO.  Has been diagnosed with hypothyroidism since age of 7 yrs     Current thyroid medication: Levothyroxine 125mcg daily and cytomel 10 mcg daily.     Dose of levothyroxine  has ranged from 112-150 in the past.    Takes it first thing in the morning on empty stomach- yes  Interfering medications including calcium, vitamin D , iron tablets, Proton pump inhibitors: no    9/20 TSH 8.3 FT4 0.7 FT3 2.2  6/21  TSH 4.99  A1c 5.8 %    Increase dose to 137 mcg    1/22 TSH 1.7  1/23 TSH 3.31    FH of hypothyroidism: Aunts and maternal grandmother.     FH of thyroid cancer: no    She is feeling tired fatigued.     Mild controlled   no worsening factors    Experiencing hair  loss, dry skin. Pre-diabetes. A1c 5.7 % --->5.8%----->6.5%    Was stated on metformin 500 mg BID in 2019  Now off of it    Weight is stable. No cycle for 2 years    She is having GI problems including IBS, Anal fissure.     Past Medical History:   Diagnosis Date    A-fib [facility])     Abdominal wall abscess 02/08/2017    Abdominal wall cellulitis 02/06/2017    Anal fissure 04/30/2015    Anemia, iron deficiency     Anesthesia     PATIENT REQUESTING IF NEEDS TO BE INTUBATED TO USE A GURDEEP TUBE     Asthma     Atrial fibrillation ([facility]     Autoimmune hepatitis ([facility]     Chronic sinusitis     COVID-19     History of blood transfusion     Hypothyroidism     Menorrhagia with regular cycle     Morbid obesity with BMI of 40.0-44.9, adult ([facility] 05/26/2015    MRSA (methicillin resistant staph aureus) culture positive 02/03/2017    abdominal abscess    MRSA infection 08/20/2012    rt thigh abscess    RODGER (obstructive sleep apnea)     awaiting cpap machine to be delivered not due until after surgery 2/8/22    Pericarditis, acute     Retention of urine     Sinusitis      Past Surgical History:   Procedure Laterality Date    COLONOSCOPY N/A 12/29/2020    [facility]    LEFT KNEE ARTHROSCOPY; PARTIAL MEDIAL MENISCECTOMY performed by Mariusz Francisco MD at [place] [address]  8/22/2012    INCISION AND DRAINAGE POSTERIOR THIGH ABSCESS    PRE-MALIGNANT / BENIGN SKIN LESION EXCISION N/A 2/8/2022    EXCISION OF SCALP CYST performed by Alis Bosch MD at [address] [place] [address]  Aug 2011    repair of rectal fissures and anal skin tag removal    SINUS SURGERY      X 3    TONSILLECTOMY  2004    TONSILLECTOMY AND ADENOIDECTOMY  2005    (partial adenoidectomy)    UPPER GASTROINTESTINAL ENDOSCOPY N/A 12/29/2020    EGD BIOPSY performed by Trish Heart MD at [address] [facility] ENDOSCOPY     Current Outpatient Medications Medication Sig Dispense Refill    sertraline (ZOLOFT) 25 MG tablet       cyanocobalamin 1000 MCG/ML injection inject 1ml into the skin once monthly 1 mL 2    albuterol sulfate HFA (VENTOLIN HFA) 108 (90 Base) MCG/ACT inhaler INHALE 2 PUFF BY MOUTH EVERY 6 HOURS AS NEEDED FOR WHEEZING 18 each 5    aspirin 325 MG EC tablet Take 1 tablet by mouth daily for 14 days 14 tablet 0    cetirizine-psuedoephedrine (ZYRTEC-D) 5-120 MG per extended release tablet Take 1 tablet by mouth 2 times daily 180 tablet 1    fluticasone (FLONASE) 50 MCG/ACT nasal spray APPLY 1 SPRAY IN THE AFFECTED NOSTRIL ONCE DAILY 16 g 3    dilTIAZem (CARDIZEM CD) 120 MG extended release capsule Take 1 capsule by mouth daily 30 capsule 3    Fluticasone Propionate (XHANCE) 93 MCG/ACT EXHU 1 spray by Nasal route 2 times daily 16 mL 5    folic acid (FOLVITE) 1 MG tablet TAKE ONE TABLET BY MOUTH DAILY 30 tablet 5    ipratropium-albuterol (DUONEB) 0.5-2.5 (3) MG/3ML SOLN nebulizer solution Inhale 3 mLs into the lungs every 4 hours 360 mL 0    levothyroxine (SYNTHROID) 75 MCG tablet Take 1 tablet by mouth daily 90 tablet 1    liothyronine (CYTOMEL) 5 MCG tablet TAKE TWO TABLETS BY MOUTH EVERY DAY 60 tablet 5    mometasone (ELOCON) 0.1 % cream APPLY TO AFFECTED AREA(S) ONCE DAILY 45 g 0    pantoprazole (PROTONIX) 40 MG tablet Take 1 tablet by mouth 2 times daily (before meals) 60 tablet 5    budesonide-formoterol (SYMBICORT) 160-4.5 MCG/ACT AERO INHALE TWO PUFFS BY MOUTH TWICE A DAY 10.2 g 3    montelukast (SINGULAIR) 10 MG tablet TAKE ONE TABLET BY MOUTH EVERY EVENING 30 tablet 0    lidocaine (LIDODERM) 5 % APPLY 1 PATCH TO AFFECTED AREA FOR 12 HOURS IN A 24 HOUR PERIOD 30 patch 0    Saccharomyces boulardii (PROBIOTIC) 250 MG CAPS TAKE ONE CAPSULE BY MOUTH TWICE A DAY 60 capsule 5    acetaminophen (TYLENOL) 500 MG tablet Take 1 tablet by mouth 4 times daily as needed for Pain 28 tablet 0    ketoconazole (NIZORAL) 2 % shampoo APPLY TO AFFECTED AREA(S) ONCE DAILY AS NEEDED 120 mL 1    BETASEPT SURGICAL SCRUB 4 % external liquid APPLY TO AFFECTED AREA(S) TOPICALLY AS NEEDED 473 mL 0    vitamin D (ERGOCALCIFEROL) 1.25 MG (13493 UT) CAPS capsule TAKE 1 CAPSULE BY MOUTH ONE TIME WEEKLY 6 capsule 0    urea (UREA 10 HYDRATING) 10 % cream APPLY TO AFFECTED AREA(S) AS NEEDED 85 g 2    mupirocin (BACTROBAN) 2 % ointment APPLY TO AFFECTED AREA(S) TOPICALLY THREE TIMES A DAY 22 g 1    polyethylene glycol (MIRALAX) 17 GM/SCOOP POWD powder POUR 17 GRAMS OF POWDER INTO THE CAP OF THE BOTTLE. STIR THE POWDER IN A GLASS (4 TO 8 OZ) OF WATER, JUICE, SODA, COFFEE OR TEA UNTIL COMPLETELY DISSOLVED. DRINK THE SOLUTION. ONCE DAILY AS NEEDED FOR CONSTIPATION 238 g 5    Insulin Syringe-Needle U-100 (ULTICARE INSULIN SYRINGE) 31G X 5/16\" 1 ML MISC AS DIRECTED 100 each 1    Multiple Vitamins-Minerals (THERAPEUTIC MULTIVITAMIN-MINERALS) tablet Take 1 tablet by mouth daily 30 tablet 1     No current facility-administered medications for this visit. Review of Systems   Constitutional:  Negative for chills, diaphoresis and fever. Eyes:  Negative for photophobia. Respiratory:  Negative for cough. Cardiovascular:  Negative for chest pain and palpitations. Endocrine: Negative for polydipsia. Genitourinary:  Negative for dysuria, flank pain, frequency, hematuria and urgency. Musculoskeletal:  Negative for back pain, myalgias and neck pain. Skin:  Negative for rash. Allergic/Immunologic: Negative for environmental allergies. Neurological:  Positive for headaches. Negative for dizziness, tremors and seizures. Hematological:  Does not bruise/bleed easily. Psychiatric/Behavioral:  Negative for hallucinations and suicidal ideas. The patient is not nervous/anxious.   fatigue      PHYSICAL EXAMINATION:  [ INSTRUCTIONS:  \"[x]\" Indicates a positive item  \"[]\" Indicates a negative item  -- DELETE ALL ITEMS NOT EXAMINED]  Vital Signs: (As obtained by patient/caregiver or practitioner observation)    Blood pressure-  Heart rate-    Respiratory rate-    Temperature-  Pulse oximetry-     Constitutional Appears well-developed and well-nourished No apparent distress        Mental status  Alert and awake  Oriented to person/place/time  Able to follow commands      Eyes:  EOM    [x]  Normal    Sclera  [x]  Normal           Discharge [x]  None visible      HENT:   [x] Normocephalic, atraumatic.    [x] Mouth/Throat: Mucous membranes are moist.     External Ears [x] Normal  no discharge    Neck: [x] No visualized mass  no swelling    Pulmonary/Chest: [x] Respiratory effort normal.  [x] No visualized signs of difficulty breathing or respiratory distress             Musculoskeletal:   [x] Normal gait with no signs of ataxia         [x] Normal range of motion of neck          Head and neck stable, appears normal ROM, strength good    Neurological:        [x] No Facial Asymmetry (Cranial nerve 7 motor function) (limited exam to video visit)          [x] No gaze palsy                Skin:        [x] No significant exanthematous lesions or discoloration noted on facial skin                 Psychiatric:       [x] Normal Affect [x] No Hallucinations            Other pertinent observable physical exam findings-     Due to this being a TeleHealth encounter, evaluation of the following organ systems is limited: Vitals/Constitutional/EENT/Resp/CV/GI//MS/Neuro/Skin/Heme-Lymph-Imm.      Services were provided through a video synchronous discussion virtually to substitute for in-person clinic visit.               Lab Results   Component Value Date    TSH 8.28 (H) 09/17/2020    TSH 4.04 01/22/2019    TSH 3.50 10/23/2018    TSH 0.67 11/26/2016    TSH 2.35 04/28/2014       No visits with results within 1 Day(s) from this visit.   Latest known visit with results is:   Orders Only on 01/10/2023   Component Date Value Ref Range Status    TSH 01/10/2023 3.31  0.27 - 4.20 uIU/mL Final         Assessment/Plan    1.  Hypothyroidism    This is a 53 yrs old female who has PMH of postablative hypothyroidism . She is on levothyroxine 137 mcg daily and cytomel 10 mcg daily. Increase levothyroxine to 150mcg  C/o fatigue. Last TSH normal  Advised may not be related to thyroid but can try higher levothyroxine dose  She is inquiring about armour, advised can plan to switch if symptoms do not improve    2. Pre-diabetes   Reviewed life style changes. A1c 5.7 %---> 5.7 % ---> 6.5%    She wants to avoid DM    She was on steroid for 2 months s/p covid-19    Was  on metformin 500 mg BID for 2 months and then stopped it as it was not working    She has made diet changes    Start ozempic as trying to loose weight     3. Anemia. Iron deficiency  As per hematology    4. Asthma. As per pulmonology    5. Chronic Sinusitis. As per ENT   Dr. Anthony Moran     7. Obesity.  She requested referral to weight management team. Sees Dr. Romi Franco

## 2023-01-20 NOTE — TELEPHONE ENCOUNTER
PATIENT CALLED INTO OFFICE ASKING FOR UPDATE ON GEL. RELAYED TO PATIENT PER LAST MESSAGE ON REFERRAL: AUTH DUE ON 1/22, SO WE SHOULD RECEIVED A DETERMINATION SOME TIME ON Monday. I LET PATIENT KNOW WE WOULD FOLLOW UP ONCE DETERMINATION IS RECEIVED TO GET HER SCHEDULED. ALL QUESTIONS ANSWERED. PATIENT EXPRESSED UNDERSTANDING.

## 2023-01-22 DIAGNOSIS — S83.242A ACUTE MEDIAL MENISCUS TEAR OF LEFT KNEE, INITIAL ENCOUNTER: ICD-10-CM

## 2023-01-22 DIAGNOSIS — M25.562 LEFT KNEE PAIN, UNSPECIFIED CHRONICITY: ICD-10-CM

## 2023-01-31 ENCOUNTER — OFFICE VISIT (OUTPATIENT)
Dept: ORTHOPEDIC SURGERY | Age: 48
End: 2023-01-31
Payer: COMMERCIAL

## 2023-01-31 VITALS — HEIGHT: 63 IN | BODY MASS INDEX: 40.22 KG/M2 | WEIGHT: 227 LBS

## 2023-01-31 DIAGNOSIS — M17.12 PRIMARY OSTEOARTHRITIS OF LEFT KNEE: Primary | ICD-10-CM

## 2023-01-31 PROCEDURE — G8417 CALC BMI ABV UP PARAM F/U: HCPCS | Performed by: ORTHOPAEDIC SURGERY

## 2023-01-31 PROCEDURE — 20610 DRAIN/INJ JOINT/BURSA W/O US: CPT | Performed by: ORTHOPAEDIC SURGERY

## 2023-01-31 PROCEDURE — G8484 FLU IMMUNIZE NO ADMIN: HCPCS | Performed by: ORTHOPAEDIC SURGERY

## 2023-01-31 PROCEDURE — 99213 OFFICE O/P EST LOW 20 MIN: CPT | Performed by: ORTHOPAEDIC SURGERY

## 2023-01-31 PROCEDURE — G8427 DOCREV CUR MEDS BY ELIG CLIN: HCPCS | Performed by: ORTHOPAEDIC SURGERY

## 2023-01-31 PROCEDURE — 1036F TOBACCO NON-USER: CPT | Performed by: ORTHOPAEDIC SURGERY

## 2023-01-31 RX ORDER — TRAMADOL HYDROCHLORIDE 50 MG/1
50 TABLET ORAL EVERY 4 HOURS PRN
Qty: 30 TABLET | Refills: 0 | Status: SHIPPED | OUTPATIENT
Start: 2023-01-31 | End: 2023-02-05

## 2023-01-31 NOTE — PROGRESS NOTES
1/31/2023     Reason for visit:  Status post left knee arthroscopy with partial medial meniscectomy on 9/9/2022    History of Present Illness: The patient returns for postop evaluation. She does report getting physical therapy. She has some continued discomfort diffuse about the knee. Objective:  Ht 5' 3\" (1.6 m)   Wt 227 lb (103 kg)   LMP 11/09/2020 (Exact Date)   BMI 40.21 kg/m²      Physical Exam:  The patient is well-appearing and in no apparent distress  Examination of the left knee   There is a trace effusion,  Range of motion reveals 0 to 120  Neg lachman, negative posterior drawer, no pain or laxity with varus or valgus stress at 0 degrees and 30 degrees of flexion  no joint line tenderness  Small palpable mass over the anterior distal tibia. Mildly painful  5 out of 5 strength throughout distal muscle groups  Sensation is intact to light touch throughout all distributions  There is some mild Swelling present  Palpable DP pulse, brisk cap refill, 2+ symmetric reflexes     Assessment:  Status post left knee arthroscopy with partial medial meniscectomy on 9/9/2022    I discussed with the patient the diagnosis and treatment options. We discussed operative and nonoperative management. At this point I do recommend nonoperative management. Nonoperative treatment options include activity modification, anti-inflammatory medications, physical therapy, and injections. The patient is interested in hyaluronic acid injection. Therefore Durolane injection was given via sterile technique to the left knee. She tolerated this well. The patient will return as needed in the future. Greater than 20 minutes were spent with this encounter. Time spent included evaluating the patient's chart prior to arrival.  Evaluating the patient in the office including history, physical examination, imaging reviewing, and counseling on next steps.   Lastly, time was spent discussing orders with my staff as well as providing documentation in the chart. Radha Silverman MD            Orthopaedic Surgery Sports Medicine and [address] [facility]            Team Physician [facility] ([place]      Disclaimer: This note was dictated with voice recognition software. Though review and correction are routine, we apologize for any errors.

## 2023-02-08 DIAGNOSIS — L30.9 ECZEMA, UNSPECIFIED TYPE: ICD-10-CM

## 2023-02-08 RX ORDER — KETOCONAZOLE 20 MG/ML
SHAMPOO TOPICAL
Qty: 120 ML | Refills: 1 | Status: SHIPPED | OUTPATIENT
Start: 2023-02-08

## 2023-02-10 DIAGNOSIS — M25.562 LEFT KNEE PAIN, UNSPECIFIED CHRONICITY: ICD-10-CM

## 2023-02-10 DIAGNOSIS — S83.242A ACUTE MEDIAL MENISCUS TEAR OF LEFT KNEE, INITIAL ENCOUNTER: ICD-10-CM

## 2023-02-10 RX ORDER — ASPIRIN 81 MG/1
TABLET ORAL
Qty: 30 TABLET | Refills: 2 | Status: SHIPPED | OUTPATIENT
Start: 2023-02-10

## 2023-02-10 NOTE — PROGRESS NOTES
Prescription for Aspirin changed to 81mg and extended to take indefinitely. Given, history of A-fib, as well as hypertension and diabetes, need to consider change to DOAC.

## 2023-03-14 DIAGNOSIS — M17.12 PRIMARY OSTEOARTHRITIS OF LEFT KNEE: Primary | ICD-10-CM

## 2023-03-14 RX ORDER — METHYLPREDNISOLONE 4 MG/1
TABLET ORAL
Qty: 1 KIT | Refills: 0 | Status: SHIPPED | OUTPATIENT
Start: 2023-03-14

## 2023-03-14 NOTE — TELEPHONE ENCOUNTER
PATIENT REQUESTING A MEDROL DOSE PACK FOR INCREASED SWELLING. SHE WOULD ALSO LIKE REPEAT INJECTION: IT HAS NOT BEEN 6 MONTHS, SO REPEAT GEL INAPPROPRIATE. I WILL F/U WITH LAMONT IN REGARDS TO MEDROL DOSE PACK PRIOR TO RETURNING PATIENT'S CALL.

## 2023-03-15 NOTE — TELEPHONE ENCOUNTER
SPOKE WITH PATIENT. IT IS TOO SOON FOR REPEAT GEL. SHE IS GOING TO TRY THE MEDROL DOSE PACK AND IF SHE CONTINUES TO HAVE ISSUES, SHE WILL [address] F/U WITH CC FOR CORTISONE INJECTION. ALL QUESTIONS ANSWERED. PATIENT EXPRESSED UNDERSTANDING.

## 2023-03-21 NOTE — PLAN OF CARE
Problem: Airway Clearance - Ineffective  Goal: Achieve or maintain patent airway  Outcome: Ongoing     Problem: Gas Exchange - Impaired  Goal: Absence of hypoxia  Outcome: Ongoing  Goal: Promote optimal lung function  Outcome: Ongoing     Problem: Breathing Pattern - Ineffective  Goal: Ability to achieve and maintain a regular respiratory rate  Outcome: Ongoing     Problem: Body Temperature -  Risk of, Imbalanced  Goal: Ability to maintain a body temperature within defined limits  Outcome: Ongoing  Goal: Will regain or maintain usual level of consciousness  Outcome: Ongoing  Goal: Complications related to the disease process, condition or treatment will be avoided or minimized  Outcome: Ongoing     Problem: Isolation Precautions - Risk of Spread of Infection  Goal: Prevent transmission of infection  Outcome: Ongoing     Problem: Nutrition Deficits  Goal: Optimize nutritional status  Outcome: Ongoing     Problem: Risk for Fluid Volume Deficit  Goal: Maintain normal heart rhythm  Outcome: Ongoing  Goal: Maintain absence of muscle cramping  Outcome: Ongoing  Goal: Maintain normal serum potassium, sodium, calcium, phosphorus, and pH  Outcome: Ongoing     Problem: Loneliness or Risk for Loneliness  Goal: Demonstrate positive use of time alone when socialization is not possible  Outcome: Ongoing     Problem: Fatigue  Goal: Verbalize increase energy and improved vitality  Outcome: Ongoing     Problem: Patient Education: Go to Patient Education Activity  Goal: Patient/Family Education  Outcome: Ongoing     Problem: Pain:  Description: Pain management should include both nonpharmacologic and pharmacologic interventions.   Goal: Pain level will decrease  Description: Pain level will decrease  Outcome: Ongoing  Goal: Control of acute pain  Description: Control of acute pain  Outcome: Ongoing  Goal: Control of chronic pain  Description: Control of chronic pain  Outcome: Ongoing   Pt able to express presence/absence of pain and rate pain appropriately using numerical scale. Pain/discomfort being managed with PRN analgesics per MD orders (see MAR). Pain assessed every shift and after interventions. Problem: Falls - Risk of:  Goal: Will remain free from falls  Description: Will remain free from falls  Outcome: Ongoing  Goal: Absence of physical injury  Description: Absence of physical injury  Outcome: Ongoing   Pt free from falls this shift. Fall precautions in place at all times. Call light always within reach. Pt able and agreeable to contact for safety appropriately. Prednisone Pregnancy And Lactation Text: This medication is Pregnancy Category C and it isn't know if it is safe during pregnancy. This medication is excreted in breast milk.

## 2023-04-13 ENCOUNTER — OFFICE VISIT (OUTPATIENT)
Dept: ORTHOPEDIC SURGERY | Age: 48
End: 2023-04-13

## 2023-04-13 VITALS — HEIGHT: 63 IN | BODY MASS INDEX: 39.69 KG/M2 | WEIGHT: 224 LBS

## 2023-04-13 DIAGNOSIS — M17.12 PRIMARY OSTEOARTHRITIS OF LEFT KNEE: Primary | ICD-10-CM

## 2023-04-13 RX ORDER — BUPIVACAINE HYDROCHLORIDE 5 MG/ML
4 INJECTION, SOLUTION PERINEURAL ONCE
Status: COMPLETED | OUTPATIENT
Start: 2023-04-13 | End: 2023-04-13

## 2023-04-13 RX ORDER — METHYLPREDNISOLONE ACETATE 40 MG/ML
40 INJECTION, SUSPENSION INTRA-ARTICULAR; INTRALESIONAL; INTRAMUSCULAR; SOFT TISSUE ONCE
Status: COMPLETED | OUTPATIENT
Start: 2023-04-13 | End: 2023-04-13

## 2023-04-13 RX ADMIN — BUPIVACAINE HYDROCHLORIDE 20 MG: 5 INJECTION, SOLUTION PERINEURAL at 11:28

## 2023-04-13 RX ADMIN — METHYLPREDNISOLONE ACETATE 40 MG: 40 INJECTION, SUSPENSION INTRA-ARTICULAR; INTRALESIONAL; INTRAMUSCULAR; SOFT TISSUE at 11:28

## 2023-04-20 NOTE — PROGRESS NOTES
4/13/2023     Reason for visit:  Status post left knee arthroscopy with partial medial meniscectomy on 9/9/2022    History of Present Illness: The patient returns for postop evaluation. She does report getting physical therapy. She has some continued discomfort diffuse about the knee. Objective:  Ht 5' 3\" (1.6 m)   Wt 224 lb (101.6 kg)   LMP 07/14/2020   BMI 39.68 kg/m²      Physical Exam:  The patient is well-appearing and in no apparent distress  Examination of the left knee   There is a trace effusion,  Range of motion reveals 0 to 120  Neg lachman, negative posterior drawer, no pain or laxity with varus or valgus stress at 0 degrees and 30 degrees of flexion  no joint line tenderness  Small palpable mass over the anterior distal tibia. Mildly painful  5 out of 5 strength throughout distal muscle groups  Sensation is intact to light touch throughout all distributions  There is some mild Swelling present  Palpable DP pulse, brisk cap refill, 2+ symmetric reflexes     Assessment:  Status post left knee arthroscopy with partial medial meniscectomy on 9/9/2022    I discussed with the patient the diagnosis and treatment options. We discussed operative and nonoperative management. At this point I do recommend nonoperative management. Nonoperative treatment options include activity modification, anti-inflammatory medications, physical therapy, and injections. The patient elected proceed with cortisone injection. Therefore injection was given to the left knee via sterile technique. The injection consisted of 40 mg of Depo-Medrol combined with 4 mL of 0.5% Marcaine. The patient tolerated this well. She will return to see me as needed. Greater than 20 minutes were spent with this encounter. Time spent included evaluating the patient's chart prior to arrival.  Evaluating the patient in the office including history, physical examination, imaging reviewing, and counseling on next steps.   Lastly, time

## 2023-05-02 ENCOUNTER — TELEPHONE (OUTPATIENT)
Dept: INTERNAL MEDICINE CLINIC | Age: 48
End: 2023-05-02

## 2023-05-03 ENCOUNTER — HOSPITAL ENCOUNTER (EMERGENCY)
Age: 48
Discharge: HOME | End: 2023-05-03
Payer: COMMERCIAL

## 2023-05-03 ENCOUNTER — TELEPHONE (OUTPATIENT)
Dept: INTERNAL MEDICINE CLINIC | Age: 48
End: 2023-05-03

## 2023-05-03 VITALS
SYSTOLIC BLOOD PRESSURE: 154 MMHG | HEART RATE: 92 BPM | RESPIRATION RATE: 16 BRPM | BODY MASS INDEX: 39.53 KG/M2 | DIASTOLIC BLOOD PRESSURE: 91 MMHG | TEMPERATURE: 98.5 F | HEIGHT: 63 IN | WEIGHT: 223.11 LBS | OXYGEN SATURATION: 99 %

## 2023-05-03 DIAGNOSIS — E03.9 HYPOTHYROIDISM, UNSPECIFIED TYPE: Chronic | ICD-10-CM

## 2023-05-03 DIAGNOSIS — J01.91 ACUTE RECURRENT SINUSITIS, UNSPECIFIED LOCATION: ICD-10-CM

## 2023-05-03 DIAGNOSIS — B37.9 YEAST INFECTION: ICD-10-CM

## 2023-05-03 DIAGNOSIS — R03.0 ELEVATED BLOOD PRESSURE READING: ICD-10-CM

## 2023-05-03 DIAGNOSIS — N93.9 VAGINAL BLEEDING: Primary | ICD-10-CM

## 2023-05-03 DIAGNOSIS — N93.9 ABNORMAL UTERINE BLEEDING: Primary | ICD-10-CM

## 2023-05-03 LAB
ALBUMIN SERPL-MCNC: 4 G/DL (ref 3.4–5)
ALBUMIN/GLOB SERPL: 0.9 {RATIO} (ref 1.1–2.2)
ALP SERPL-CCNC: 313 U/L (ref 40–129)
ALT SERPL-CCNC: 101 U/L (ref 10–40)
ANION GAP SERPL CALCULATED.3IONS-SCNC: 10 MMOL/L (ref 3–16)
AST SERPL-CCNC: 86 U/L (ref 15–37)
BACTERIA URNS QL MICRO: ABNORMAL /HPF
BASOPHILS # BLD: 0 K/UL (ref 0–0.2)
BASOPHILS NFR BLD: 0.3 %
BILIRUB SERPL-MCNC: 0.3 MG/DL (ref 0–1)
BILIRUB UR QL STRIP.AUTO: NEGATIVE
BUN SERPL-MCNC: 12 MG/DL (ref 7–20)
CALCIUM SERPL-MCNC: 9.3 MG/DL (ref 8.3–10.6)
CHLORIDE SERPL-SCNC: 104 MMOL/L (ref 99–110)
CLARITY UR: CLEAR
CO2 SERPL-SCNC: 20 MMOL/L (ref 21–32)
COLOR UR: ABNORMAL
CREAT SERPL-MCNC: 0.7 MG/DL (ref 0.6–1.1)
DEPRECATED RDW RBC AUTO: 16.8 % (ref 12.4–15.4)
EOSINOPHIL # BLD: 0.1 K/UL (ref 0–0.6)
EOSINOPHIL NFR BLD: 1.9 %
EPI CELLS #/AREA URNS AUTO: 6 /HPF (ref 0–5)
GFR SERPLBLD CREATININE-BSD FMLA CKD-EPI: >60 ML/MIN/{1.73_M2}
GLUCOSE SERPL-MCNC: 98 MG/DL (ref 70–99)
GLUCOSE UR STRIP.AUTO-MCNC: NEGATIVE MG/DL
HCG UR QL: NEGATIVE
HCT VFR BLD AUTO: 36.2 % (ref 36–48)
HGB BLD-MCNC: 11.5 G/DL (ref 12–16)
HGB UR QL STRIP.AUTO: ABNORMAL
HYALINE CASTS #/AREA URNS AUTO: 0 /LPF (ref 0–8)
KETONES UR STRIP.AUTO-MCNC: NEGATIVE MG/DL
LEUKOCYTE ESTERASE UR QL STRIP.AUTO: ABNORMAL
LIPASE SERPL-CCNC: 56 U/L (ref 13–60)
LYMPHOCYTES # BLD: 1 K/UL (ref 1–5.1)
LYMPHOCYTES NFR BLD: 16.7 %
MCH RBC QN AUTO: 25.8 PG (ref 26–34)
MCHC RBC AUTO-ENTMCNC: 31.9 G/DL (ref 31–36)
MCV RBC AUTO: 80.7 FL (ref 80–100)
MONOCYTES # BLD: 0.5 K/UL (ref 0–1.3)
MONOCYTES NFR BLD: 8.4 %
NEUTROPHILS # BLD: 4.4 K/UL (ref 1.7–7.7)
NEUTROPHILS NFR BLD: 72.7 %
NITRITE UR QL STRIP.AUTO: NEGATIVE
PH UR STRIP.AUTO: 6.5 [PH] (ref 5–8)
PLATELET # BLD AUTO: 167 K/UL (ref 135–450)
PMV BLD AUTO: 9.4 FL (ref 5–10.5)
POTASSIUM SERPL-SCNC: 4.1 MMOL/L (ref 3.5–5.1)
PROT SERPL-MCNC: 8.4 G/DL (ref 6.4–8.2)
PROT UR STRIP.AUTO-MCNC: ABNORMAL MG/DL
RBC # BLD AUTO: 4.48 M/UL (ref 4–5.2)
RBC CLUMPS #/AREA URNS AUTO: 1241 /HPF (ref 0–4)
SODIUM SERPL-SCNC: 134 MMOL/L (ref 136–145)
SP GR UR STRIP.AUTO: 1.01 (ref 1–1.03)
TSH SERPL DL<=0.005 MIU/L-ACNC: 1.29 UIU/ML (ref 0.27–4.2)
UA COMPLETE W REFLEX CULTURE PNL UR: ABNORMAL
UA DIPSTICK W REFLEX MICRO PNL UR: YES
URN SPEC COLLECT METH UR: ABNORMAL
UROBILINOGEN UR STRIP-ACNC: 0.2 E.U./DL
WBC # BLD AUTO: 6.1 K/UL (ref 4–11)
WBC #/AREA URNS AUTO: 5 /HPF (ref 0–5)

## 2023-05-03 PROCEDURE — 36415 COLL VENOUS BLD VENIPUNCTURE: CPT

## 2023-05-03 PROCEDURE — 99283 EMERGENCY DEPT VISIT LOW MDM: CPT

## 2023-05-03 PROCEDURE — 80053 COMPREHEN METABOLIC PANEL: CPT

## 2023-05-03 PROCEDURE — 84703 CHORIONIC GONADOTROPIN ASSAY: CPT

## 2023-05-03 PROCEDURE — 84443 ASSAY THYROID STIM HORMONE: CPT

## 2023-05-03 PROCEDURE — 81001 URINALYSIS AUTO W/SCOPE: CPT

## 2023-05-03 PROCEDURE — 85025 COMPLETE CBC W/AUTO DIFF WBC: CPT

## 2023-05-03 PROCEDURE — 83690 ASSAY OF LIPASE: CPT

## 2023-05-03 RX ORDER — AMOXICILLIN AND CLAVULANATE POTASSIUM 875; 125 MG/1; MG/1
1 TABLET, FILM COATED ORAL 2 TIMES DAILY
Qty: 42 TABLET | Refills: 0 | Status: SHIPPED | OUTPATIENT
Start: 2023-05-03 | End: 2023-05-24

## 2023-05-03 ASSESSMENT — PAIN - FUNCTIONAL ASSESSMENT
PAIN_FUNCTIONAL_ASSESSMENT: NONE - DENIES PAIN
PAIN_FUNCTIONAL_ASSESSMENT: 0-10

## 2023-05-03 ASSESSMENT — PAIN DESCRIPTION - LOCATION: LOCATION: ABDOMEN

## 2023-05-03 ASSESSMENT — PAIN SCALES - GENERAL: PAINLEVEL_OUTOF10: 5

## 2023-05-03 NOTE — DISCHARGE INSTRUCTIONS
Home in stable condition to stay well-hydrated and may use OTC medication such as ibuprofen per box directions for comfort if needed, and monitor for gradual treatment. Call your OB/GYN to request outpatient follow-up and further care. Return to the ER for any emergency worsening or concern.

## 2023-05-03 NOTE — PROGRESS NOTES
Pt called clinic and said she is having period like bleeding with cramps and breast tenderness    Bleeding onset 2 days ago, stable. Similar to her periods  Pt has not had periods for ~ 2 years  Also has breast tenderness  Moderate amount of blood with clots. Associated cramps. Pt has chronic sinusitis. Was seen on 04/18 for similar complaints, sent home on 7 days of augmentin. Symptoms improved mildly and then returned. Previously saw Dr Benjie Mendez who prescribed her 21 days of augmentin which resolved her sx. Will resend prescription for 21 days. Advised pt that if sx does not improve, she needs to call ENT. Occasionally gets asthma exacerbation with this, currently not having difficulty breathing.  Advised pt to call clinic if she is having difficulty breathing (medrol dospack works)    Gricel Dougherty MD  PGY3

## 2023-05-03 NOTE — ED PROVIDER NOTES
[address] [facility]        Pt Name: Bret Parker  MRN: [id number]  [place] 1975  Date of evaluation: 5/3/2023  Provider: Nirali Baxter PA-C  PCP: Marina Bahena MD  Note Started: 4:38 PM EDT 5/3/23      JALEN. I have evaluated this patient. My supervising physician was available for consultation. CHIEF COMPLAINT       Chief Complaint   Patient presents with    Vaginal Bleeding     Bleeding started yesterday. Postmenopausal. Cramping and breasts are tender        HISTORY OF PRESENT ILLNESS: 1 or more Elements     History From: patient            Chief Complaint:uncomfortable    Bret Parker is a 50 y.o. female who presents that she does have a lot of ongoing health issues from complex ongoing thyroid issues to intermittent A-fib that she is controlled for on Cardizem, insulin resistance, and has been not having menstrual cycles for the last 2 years. She states also that she has been not having sex the last couple of years and doesn't think she is pregnant. Yesterday however she started to have some very light vaginal bleeding and that continued into the night and today now with some cramping that she is having.,  Mild nausea, no vomiting, and tender feeling breasts. Patient states that she is able to be going on. He does have a history previously of very heavy menstrual cycles that actually her blood counts drop pretty low as well. She indicates that today she did call the office of one of her providers who indicated that it could be some time before she could be evaluated by OB/GYN. Based on this patient decided to come to the ER. The last time she had thyroid levels checked as far as the patient recalls could have been in January of this year. Nursing Notes were all reviewed and agreed with or any disagreements were addressed in the HPI.     REVIEW OF SYSTEMS :      Review of Systems  Positive history as above, no fevers

## 2023-05-05 RX ORDER — ITRACONAZOLE 10 MG/ML
200 SOLUTION ORAL DAILY
Qty: 150 ML | Refills: 0 | Status: SHIPPED | OUTPATIENT
Start: 2023-05-05 | End: 2023-05-15

## 2023-05-07 DIAGNOSIS — M25.562 LEFT KNEE PAIN, UNSPECIFIED CHRONICITY: ICD-10-CM

## 2023-05-07 DIAGNOSIS — S83.242A ACUTE MEDIAL MENISCUS TEAR OF LEFT KNEE, INITIAL ENCOUNTER: ICD-10-CM

## 2023-05-08 ENCOUNTER — TELEPHONE (OUTPATIENT)
Dept: INTERNAL MEDICINE CLINIC | Age: 48
End: 2023-05-08

## 2023-05-08 DIAGNOSIS — J45.41 MODERATE PERSISTENT ASTHMA WITH EXACERBATION: Primary | ICD-10-CM

## 2023-05-08 RX ORDER — METHYLPREDNISOLONE 4 MG/1
TABLET ORAL
Qty: 1 KIT | Refills: 0 | Status: SHIPPED | OUTPATIENT
Start: 2023-05-08 | End: 2023-05-14

## 2023-05-08 RX ORDER — ASPIRIN 81 MG/1
TABLET ORAL
Qty: 30 TABLET | Refills: 5 | Status: SHIPPED | OUTPATIENT
Start: 2023-05-08

## 2023-05-08 NOTE — TELEPHONE ENCOUNTER
Called patient and told her meds were sent to [facility] at [address] [place] [address].     Advised that if sx gets worse, either make appt with [facility] clinic or ENT

## 2023-05-08 NOTE — TELEPHONE ENCOUNTER
Will send Medrol dospak to [address] in [address] [place] [address]    Attempted to call, call going to   Discussed with pt last week about this     Fidelia Michel MD   PGY3

## 2023-05-08 NOTE — TELEPHONE ENCOUNTER
Requested Prescriptions     Pending Prescriptions Disp Refills    aspirin (ASPIRIN LOW DOSE) 81 MG EC tablet 30 tablet 5     Sig: TAKE ONE TABLET BY MOUTH DAILY       Last Clinic Visit:  4/11/2023     Next Clinic Appointment:  5/8/2023

## 2023-05-22 ENCOUNTER — TELEPHONE (OUTPATIENT)
Dept: ENT CLINIC | Age: 48
End: 2023-05-22

## 2023-05-22 DIAGNOSIS — L73.9 NASAL FOLLICULITIS: Primary | ICD-10-CM

## 2023-06-06 ENCOUNTER — TELEPHONE (OUTPATIENT)
Dept: INTERNAL MEDICINE CLINIC | Age: 48
End: 2023-06-06

## 2023-06-06 DIAGNOSIS — D50.0 IRON DEFICIENCY ANEMIA DUE TO CHRONIC BLOOD LOSS: ICD-10-CM

## 2023-06-06 DIAGNOSIS — K90.9 INTESTINAL MALABSORPTION, UNSPECIFIED TYPE: Chronic | ICD-10-CM

## 2023-06-06 RX ORDER — MONTELUKAST SODIUM 10 MG/1
10 TABLET ORAL NIGHTLY
Qty: 90 TABLET | Refills: 1 | Status: SHIPPED | OUTPATIENT
Start: 2023-06-06

## 2023-06-06 RX ORDER — CYANOCOBALAMIN 1000 UG/ML
INJECTION, SOLUTION INTRAMUSCULAR; SUBCUTANEOUS
Qty: 1 ML | Refills: 2 | Status: SHIPPED | OUTPATIENT
Start: 2023-06-06

## 2023-06-06 NOTE — TELEPHONE ENCOUNTER
Requested Prescriptions     Pending Prescriptions Disp Refills    montelukast (SINGULAIR) 10 MG tablet 90 tablet 1     Sig: Take 1 tablet by mouth nightly       Last Clinic Visit:  4/11/2023     Next Clinic Appointment:  10/10/2023

## 2023-06-06 NOTE — TELEPHONE ENCOUNTER
Requested Prescriptions     Pending Prescriptions Disp Refills    cyanocobalamin 1000 MCG/ML injection 1 mL 2     Sig: inject 1ml into the skin once monthly       Last Clinic Visit:  4/11/2023     Next Clinic Appointment:  10/10/2023

## 2023-06-19 ENCOUNTER — TELEPHONE (OUTPATIENT)
Dept: INTERNAL MEDICINE CLINIC | Age: 48
End: 2023-06-19

## 2023-06-23 ENCOUNTER — TELEPHONE (OUTPATIENT)
Dept: ORTHOPEDIC SURGERY | Age: 48
End: 2023-06-23

## 2023-06-23 ENCOUNTER — TELEPHONE (OUTPATIENT)
Dept: INTERNAL MEDICINE CLINIC | Age: 48
End: 2023-06-23

## 2023-06-23 NOTE — TELEPHONE ENCOUNTER
Dr. Dimple richter with writing one last parking placard letter. Sent patient MyChart message notifying her the note is available via [address]

## 2023-06-23 NOTE — TELEPHONE ENCOUNTER
General Question     Subject: HANDICAPPED PLAQUER  Patient and /or Facility Request: Lesly Ruano \"Vee\"  Contact Number: [phone]    [facility] INSTEAD OF HERE AND THEY CALLED US. ..    THE PT IS REQUESTING A RENEWAL TO HER HANDICAPPED PLACQUER

## 2023-06-23 NOTE — TELEPHONE ENCOUNTER
Spoke to this patient this morning about her wanting to get her temporarily handicap placard extended. Patient seemed a little frustrated when talking to me the nurse. I called the orthopedic office to see what the Doctor wanted her to do. They stated they will get in touch with her and take care of this matter.

## 2023-06-23 NOTE — TELEPHONE ENCOUNTER
Patient last seen 4/13/23. I will have to reach out to [address] to see if he is willing to write another placard letter as she has not been given one since January and she did not have sx.

## 2023-06-29 ENCOUNTER — TELEPHONE (OUTPATIENT)
Dept: ORTHOPEDIC SURGERY | Age: 48
End: 2023-06-29

## 2023-07-03 RX ORDER — BUDESONIDE AND FORMOTEROL FUMARATE DIHYDRATE 160; 4.5 UG/1; UG/1
AEROSOL RESPIRATORY (INHALATION)
Qty: 10.2 G | Refills: 3 | Status: SHIPPED | OUTPATIENT
Start: 2023-07-03

## 2023-07-03 NOTE — TELEPHONE ENCOUNTER
Requested Prescriptions     Pending Prescriptions Disp Refills    budesonide-formoterol (SYMBICORT) 160-4.5 MCG/ACT AERO 10.2 g 3     Sig: INHALE TWO PUFFS BY MOUTH TWICE A DAY       Last Clinic Visit:  4/11/2023     Next Clinic Appointment:  10/10/2023

## 2023-07-12 ENCOUNTER — TELEPHONE (OUTPATIENT)
Dept: ENT CLINIC | Age: 48
End: 2023-07-12

## 2023-07-12 NOTE — TELEPHONE ENCOUNTER
Patient called to say she is having sinus  and ear inflammation please call in a solumedrol pack  please and thank you

## 2023-07-13 DIAGNOSIS — J45.41 MODERATE PERSISTENT ASTHMA WITH EXACERBATION: ICD-10-CM

## 2023-07-13 RX ORDER — METHYLPREDNISOLONE 4 MG/1
TABLET ORAL
Qty: 1 KIT | Refills: 0 | Status: SHIPPED | OUTPATIENT
Start: 2023-07-13 | End: 2023-07-19

## 2023-08-09 ENCOUNTER — TELEPHONE (OUTPATIENT)
Dept: INTERNAL MEDICINE CLINIC | Age: 48
End: 2023-08-09

## 2023-08-09 DIAGNOSIS — L30.9 ECZEMA, UNSPECIFIED TYPE: ICD-10-CM

## 2023-08-09 DIAGNOSIS — M54.50 ACUTE BILATERAL LOW BACK PAIN WITHOUT SCIATICA: ICD-10-CM

## 2023-08-09 RX ORDER — METHOCARBAMOL 500 MG/1
500 TABLET, FILM COATED ORAL 4 TIMES DAILY PRN
Qty: 40 TABLET | Refills: 0 | Status: SHIPPED | OUTPATIENT
Start: 2023-08-09 | End: 2023-09-08

## 2023-08-09 RX ORDER — MOMETASONE FUROATE 1 MG/G
CREAM TOPICAL
Qty: 45 G | Refills: 0 | Status: SHIPPED | OUTPATIENT
Start: 2023-08-09

## 2023-08-09 NOTE — TELEPHONE ENCOUNTER
Patient was called and stated she just ran out of medications and she was taking them 3 times a week twice a day. States pain is not any worse in her back but has been on this medication and it controls the pain. Requested Prescriptions     Pending Prescriptions Disp Refills    methocarbamol (ROBAXIN) 500 MG tablet 40 tablet 0     Sig: Take 1 tablet by mouth 4 times daily as needed (Back pain) WARNING:  May cause drowsiness. May impair ability to operate vehicles or machinery. Do not use in combination with alcohol.        Last Clinic Visit:  4/11/2023     Next Clinic Appointment:  10/10/2023

## 2023-08-11 DIAGNOSIS — L73.9 NASAL FOLLICULITIS: ICD-10-CM

## 2023-08-14 DIAGNOSIS — M25.562 LEFT KNEE PAIN, UNSPECIFIED CHRONICITY: ICD-10-CM

## 2023-08-14 DIAGNOSIS — S83.242A ACUTE MEDIAL MENISCUS TEAR OF LEFT KNEE, INITIAL ENCOUNTER: ICD-10-CM

## 2023-08-14 RX ORDER — ASPIRIN 81 MG/1
TABLET ORAL
Qty: 90 TABLET | Refills: 1 | Status: SHIPPED | OUTPATIENT
Start: 2023-08-14

## 2023-08-14 NOTE — TELEPHONE ENCOUNTER
Requested Prescriptions     Pending Prescriptions Disp Refills    aspirin (ASPIRIN LOW DOSE) 81 MG EC tablet 90 tablet 1     Sig: TAKE ONE TABLET BY MOUTH DAILY       Last Clinic Visit:  4/11/2023     Next Clinic Appointment:  10/10/2023

## 2023-08-15 DIAGNOSIS — M54.50 ACUTE BILATERAL LOW BACK PAIN WITHOUT SCIATICA: ICD-10-CM

## 2023-08-15 RX ORDER — METHOCARBAMOL 500 MG/1
500 TABLET, FILM COATED ORAL 4 TIMES DAILY PRN
Qty: 40 TABLET | Refills: 0 | OUTPATIENT
Start: 2023-08-15 | End: 2023-09-14

## 2023-08-15 RX ORDER — PANTOPRAZOLE SODIUM 40 MG/1
40 TABLET, DELAYED RELEASE ORAL
Qty: 60 TABLET | Refills: 5 | Status: SHIPPED | OUTPATIENT
Start: 2023-08-15

## 2023-08-15 NOTE — TELEPHONE ENCOUNTER
Requested Prescriptions     Pending Prescriptions Disp Refills    methocarbamol (ROBAXIN) 500 MG tablet 40 tablet 0     Sig: Take 1 tablet by mouth 4 times daily as needed (Back pain) WARNING:  May cause drowsiness. May impair ability to operate vehicles or machinery. Do not use in combination with alcohol.     pantoprazole (PROTONIX) 40 MG tablet 60 tablet 5     Sig: Take 1 tablet by mouth 2 times daily (before meals)       Last Clinic Visit:  4/11/2023     Next Clinic Appointment:  10/10/2023

## 2023-08-15 NOTE — TELEPHONE ENCOUNTER
Rx on 8/9 was sent to [facility] by accident; This order set up to be sent to pt's preferred pharmacy [facility])    Requested Prescriptions     Pending Prescriptions Disp Refills    methocarbamol (ROBAXIN) 500 MG tablet 40 tablet 0     Sig: Take 1 tablet by mouth 4 times daily as needed (Back pain) WARNING:  May cause drowsiness. May impair ability to operate vehicles or machinery. Do not use in combination with alcohol.        Last Clinic Visit:  4/11/2023     Next Clinic Appointment:  10/10/2023

## 2023-08-18 RX ORDER — PANTOPRAZOLE SODIUM 40 MG/1
40 TABLET, DELAYED RELEASE ORAL
Qty: 60 TABLET | Refills: 5 | Status: CANCELLED | OUTPATIENT
Start: 2023-08-18

## 2023-09-05 DIAGNOSIS — I48.91 ATRIAL FIBRILLATION WITH RVR (HCC): ICD-10-CM

## 2023-09-05 RX ORDER — LEVOTHYROXINE SODIUM 0.15 MG/1
150 TABLET ORAL
Qty: 90 TABLET | Refills: 1 | Status: SHIPPED | OUTPATIENT
Start: 2023-09-05 | End: 2024-09-04

## 2023-09-05 RX ORDER — DILTIAZEM HYDROCHLORIDE 120 MG/1
120 CAPSULE, COATED, EXTENDED RELEASE ORAL DAILY
Qty: 30 CAPSULE | Refills: 3 | Status: SHIPPED | OUTPATIENT
Start: 2023-09-05

## 2023-09-05 NOTE — TELEPHONE ENCOUNTER
Requested Prescriptions     Pending Prescriptions Disp Refills    dilTIAZem (CARDIZEM CD) 120 MG extended release capsule 30 capsule 3     Sig: Take 1 capsule by mouth daily       Last Clinic Visit:  4/11/2023     Next Clinic Appointment:  10/10/2023

## 2023-09-20 ENCOUNTER — OFFICE VISIT (OUTPATIENT)
Dept: ENT CLINIC | Age: 48
End: 2023-09-20
Payer: COMMERCIAL

## 2023-09-20 VITALS
BODY MASS INDEX: 40.57 KG/M2 | TEMPERATURE: 97.5 F | SYSTOLIC BLOOD PRESSURE: 123 MMHG | WEIGHT: 229 LBS | HEIGHT: 63 IN | DIASTOLIC BLOOD PRESSURE: 84 MMHG | HEART RATE: 85 BPM

## 2023-09-20 DIAGNOSIS — R49.0 HOARSENESS: ICD-10-CM

## 2023-09-20 DIAGNOSIS — M54.50 ACUTE BILATERAL LOW BACK PAIN WITHOUT SCIATICA: ICD-10-CM

## 2023-09-20 DIAGNOSIS — J32.4 CHRONIC PANSINUSITIS: ICD-10-CM

## 2023-09-20 DIAGNOSIS — R49.0 HOARSE VOICE QUALITY: ICD-10-CM

## 2023-09-20 DIAGNOSIS — M54.2 NECK PAIN: Primary | ICD-10-CM

## 2023-09-20 PROCEDURE — 31231 NASAL ENDOSCOPY DX: CPT | Performed by: OTOLARYNGOLOGY

## 2023-09-20 PROCEDURE — 1036F TOBACCO NON-USER: CPT | Performed by: OTOLARYNGOLOGY

## 2023-09-20 PROCEDURE — G8428 CUR MEDS NOT DOCUMENT: HCPCS | Performed by: OTOLARYNGOLOGY

## 2023-09-20 PROCEDURE — 99214 OFFICE O/P EST MOD 30 MIN: CPT | Performed by: OTOLARYNGOLOGY

## 2023-09-20 PROCEDURE — G8417 CALC BMI ABV UP PARAM F/U: HCPCS | Performed by: OTOLARYNGOLOGY

## 2023-09-20 RX ORDER — METHOCARBAMOL 500 MG/1
500 TABLET, FILM COATED ORAL 4 TIMES DAILY PRN
Qty: 40 TABLET | Refills: 0 | Status: SHIPPED | OUTPATIENT
Start: 2023-09-20 | End: 2023-10-10 | Stop reason: SDUPTHER

## 2023-09-20 RX ORDER — PREDNISONE 10 MG/1
TABLET ORAL
Qty: 38 TABLET | Refills: 0 | Status: SHIPPED | OUTPATIENT
Start: 2023-09-20

## 2023-09-20 ASSESSMENT — ENCOUNTER SYMPTOMS
NAUSEA: 0
SHORTNESS OF BREATH: 0
EYE REDNESS: 0
RHINORRHEA: 0
EYE ITCHING: 0
SORE THROAT: 0
CHOKING: 0
FACIAL SWELLING: 0
EYE PAIN: 0
TROUBLE SWALLOWING: 0
VOICE CHANGE: 1
DIARRHEA: 0
COUGH: 0
SINUS PAIN: 0
SINUS PRESSURE: 0

## 2023-09-20 NOTE — PROGRESS NOTES
Subjective:      Patient ID: Tex Rinaldi is a 50 y.o. female. HPI  Chief Complaint   Patient presents with    neck problem, sinus follow up. History of Present Illness  Head/Neck    Zara Weldon is a(n) 50 y.o. female who presents with sore neck. Left below chin. Has bad teeth. Was recently on amoxil. Feels like its sticking and uncomfortable. Also with increase nasal congestion and sore throat. Concern for infection.    Severity: moderate  Frequency: constantly  Otalgia: No  Odynophagia: No  Dysphagia: No  Voice Changes: No  Lumps in neck: No  Modifying Factors: Non smoker  Associates Signs and Symptoms: none    Patient Active Problem List   Diagnosis    Hypothyroidism    Chronic sinusitis    Asthma in adult    Chronic rhinosinusitis    Iron deficiency anemia    Eczema    Intestinal malabsorption    Thrombocytopenia (HCC)    Chronic ITP (idiopathic thrombocytopenia) (HCC)    Hyperlipidemia, mixed    Autoimmune hepatitis (HCC)    Anxiety    Chronic pansinusitis    Atrial fibrillation with RVR (HCC)    GERD without esophagitis    Acute respiratory failure due to COVID-19 [facility])    Obstructive sleep apnea    Acute post-operative pain    Sprain of metacarpophalangeal joint of left index finger     Past Surgical History:   Procedure Laterality Date    COLONOSCOPY N/A 12/29/2020    COLONOSCOPY DIAGNOSTIC performed by Rolando Quesada MD at [address] ARTHROSCOPY Left 9/9/2022    LEFT KNEE ARTHROSCOPY; PARTIAL MEDIAL MENISCECTOMY performed by Yemi Clark MD at [address] [facility]  8/22/2012    INCISION AND DRAINAGE POSTERIOR THIGH ABSCESS    PRE-MALIGNANT / BENIGN SKIN LESION EXCISION N/A 2/8/2022    EXCISION OF SCALP CYST performed by Bruce Myers MD at [address] [facility]  Aug 2011    repair of rectal fissures and anal skin tag removal    SINUS SURGERY      X 3    TONSILLECTOMY  2004    TONSILLECTOMY AND ADENOIDECTOMY  2005    (partial adenoidectomy)    UPPER

## 2023-09-20 NOTE — TELEPHONE ENCOUNTER
PT LVM STATING SHE NEVER GOT A CALL BACK RE: REQUEST FOR HANDICAP PLACARD OR RX FOR METHOCARBAMOL.  PT WANTS A CALL BACK -[phone]

## 2023-09-20 NOTE — TELEPHONE ENCOUNTER
Requested Prescriptions     Pending Prescriptions Disp Refills    methocarbamol (ROBAXIN) 500 MG tablet 40 tablet 0     Sig: Take 1 tablet by mouth 4 times daily as needed (Back pain) WARNING:  May cause drowsiness. May impair ability to operate vehicles or machinery. Do not use in combination with alcohol. Last Clinic Visit:  4/11/2023     Next Clinic Appointment:  10/10/2023    Patient called asking for refill. States she has intermittent back spasms that can be severe.

## 2023-09-28 ENCOUNTER — TELEPHONE (OUTPATIENT)
Dept: INTERNAL MEDICINE CLINIC | Age: 48
End: 2023-09-28

## 2023-09-28 NOTE — TELEPHONE ENCOUNTER
PT STATED HER HANDICAP PLACARD RX  HAD THE INCORRECT EXPIRATION DATE/YEAR.   PT CAN BE REACHED [phone]

## 2023-09-29 NOTE — TELEPHONE ENCOUNTER
Called patient back to notify we have filled a paper script for the handicap placard 9/29/2023 - 09/29/2024

## 2023-10-03 DIAGNOSIS — E06.3 HYPOTHYROIDISM DUE TO HASHIMOTO'S THYROIDITIS: Chronic | ICD-10-CM

## 2023-10-03 DIAGNOSIS — E03.8 HYPOTHYROIDISM DUE TO HASHIMOTO'S THYROIDITIS: Chronic | ICD-10-CM

## 2023-10-03 RX ORDER — FOLIC ACID 1 MG/1
TABLET ORAL
Qty: 30 TABLET | Refills: 5 | Status: SHIPPED | OUTPATIENT
Start: 2023-10-03

## 2023-10-03 NOTE — TELEPHONE ENCOUNTER
Requested Prescriptions     Pending Prescriptions Disp Refills    folic acid (FOLVITE) 1 MG tablet 30 tablet 5     Sig: TAKE ONE TABLET BY MOUTH DAILY       Last Clinic Visit:  4/11/2023     Next Clinic Appointment:  10/10/2023

## 2023-10-07 SDOH — ECONOMIC STABILITY: HOUSING INSECURITY
IN THE LAST 12 MONTHS, WAS THERE A TIME WHEN YOU DID NOT HAVE A STEADY PLACE TO SLEEP OR SLEPT IN A SHELTER (INCLUDING NOW)?: NO

## 2023-10-07 SDOH — ECONOMIC STABILITY: FOOD INSECURITY: WITHIN THE PAST 12 MONTHS, YOU WORRIED THAT YOUR FOOD WOULD RUN OUT BEFORE YOU GOT MONEY TO BUY MORE.: NEVER TRUE

## 2023-10-07 SDOH — ECONOMIC STABILITY: FOOD INSECURITY: WITHIN THE PAST 12 MONTHS, THE FOOD YOU BOUGHT JUST DIDN'T LAST AND YOU DIDN'T HAVE MONEY TO GET MORE.: NEVER TRUE

## 2023-10-07 SDOH — ECONOMIC STABILITY: INCOME INSECURITY: HOW HARD IS IT FOR YOU TO PAY FOR THE VERY BASICS LIKE FOOD, HOUSING, MEDICAL CARE, AND HEATING?: SOMEWHAT HARD

## 2023-10-07 SDOH — ECONOMIC STABILITY: TRANSPORTATION INSECURITY
IN THE PAST 12 MONTHS, HAS LACK OF TRANSPORTATION KEPT YOU FROM MEETINGS, WORK, OR FROM GETTING THINGS NEEDED FOR DAILY LIVING?: NO

## 2023-10-07 ASSESSMENT — PATIENT HEALTH QUESTIONNAIRE - PHQ9
2. FEELING DOWN, DEPRESSED OR HOPELESS: SEVERAL DAYS
SUM OF ALL RESPONSES TO PHQ9 QUESTIONS 1 & 2: 2
SUM OF ALL RESPONSES TO PHQ QUESTIONS 1-9: 2
1. LITTLE INTEREST OR PLEASURE IN DOING THINGS: SEVERAL DAYS
SUM OF ALL RESPONSES TO PHQ9 QUESTIONS 1 & 2: 2
2. FEELING DOWN, DEPRESSED OR HOPELESS: 1
1. LITTLE INTEREST OR PLEASURE IN DOING THINGS: 1
SUM OF ALL RESPONSES TO PHQ QUESTIONS 1-9: 2

## 2023-10-10 ENCOUNTER — OFFICE VISIT (OUTPATIENT)
Dept: INTERNAL MEDICINE CLINIC | Age: 48
End: 2023-10-10
Payer: COMMERCIAL

## 2023-10-10 VITALS
RESPIRATION RATE: 18 BRPM | HEART RATE: 90 BPM | OXYGEN SATURATION: 97 % | TEMPERATURE: 97.9 F | BODY MASS INDEX: 42.78 KG/M2 | SYSTOLIC BLOOD PRESSURE: 129 MMHG | WEIGHT: 237.7 LBS | DIASTOLIC BLOOD PRESSURE: 79 MMHG

## 2023-10-10 DIAGNOSIS — E78.2 HYPERLIPIDEMIA, MIXED: Chronic | ICD-10-CM

## 2023-10-10 DIAGNOSIS — Z23 NEEDS FLU SHOT: ICD-10-CM

## 2023-10-10 DIAGNOSIS — E03.9 HYPOTHYROIDISM, UNSPECIFIED TYPE: Primary | Chronic | ICD-10-CM

## 2023-10-10 DIAGNOSIS — R73.03 PREDIABETES: ICD-10-CM

## 2023-10-10 DIAGNOSIS — M54.50 ACUTE BILATERAL LOW BACK PAIN WITHOUT SCIATICA: ICD-10-CM

## 2023-10-10 LAB — HBA1C MFR BLD: 5.9 %

## 2023-10-10 PROCEDURE — 83036 HEMOGLOBIN GLYCOSYLATED A1C: CPT

## 2023-10-10 PROCEDURE — 99213 OFFICE O/P EST LOW 20 MIN: CPT

## 2023-10-10 PROCEDURE — 90674 CCIIV4 VAC NO PRSV 0.5 ML IM: CPT

## 2023-10-10 PROCEDURE — G0008 ADMIN INFLUENZA VIRUS VAC: HCPCS

## 2023-10-10 PROCEDURE — 6360000002 HC RX W HCPCS

## 2023-10-10 RX ORDER — KETOCONAZOLE 20 MG/ML
SHAMPOO TOPICAL
Qty: 120 ML | Refills: 1 | Status: SHIPPED | OUTPATIENT
Start: 2023-10-10

## 2023-10-10 RX ORDER — ANTISEPTIC SURGICAL SCRUB 0.04 MG/ML
SOLUTION TOPICAL
Qty: 473 ML | Refills: 0 | Status: SHIPPED | OUTPATIENT
Start: 2023-10-10

## 2023-10-10 RX ORDER — CYANOCOBALAMIN 1000 UG/ML
INJECTION, SOLUTION INTRAMUSCULAR; SUBCUTANEOUS
Qty: 1 ML | Refills: 2 | Status: SHIPPED | OUTPATIENT
Start: 2023-10-10

## 2023-10-10 RX ORDER — METHOCARBAMOL 500 MG/1
500 TABLET, FILM COATED ORAL 4 TIMES DAILY PRN
Qty: 40 TABLET | Refills: 0 | Status: SHIPPED | OUTPATIENT
Start: 2023-10-10 | End: 2023-11-09

## 2023-10-10 RX ADMIN — INFLUENZA A VIRUS A/GEORGIA/12/2022 CVR-167 (H1N1) ANTIGEN (MDCK CELL DERIVED, PROPIOLACTONE INACTIVATED), INFLUENZA A VIRUS A/DARWIN/11/2021 (H3N2) ANTIGEN (MDCK CELL DERIVED, PROPIOLACTONE INACTIVATED),INFLUENZA B VIRUS B/SINGAPORE/WUH4618/2021 ANTIGEN (MDCK CELL DERIVED, PROPIOLACTONE INACTIVATED),INFLUENZA B VIRUS B/SINGAPORE/INFTT-16-0610/2016 ANTIGEN (MDCK CELL DERIVED, PROPIOLACTONE INACTIVATED) 0.5 ML: 15; 15; 15; 15 INJECTION, SUSPENSION INTRAMUSCULAR at 10:12

## 2023-10-10 NOTE — PATIENT INSTRUCTIONS
- I have placed a referral to massage therapy for you. Please reach out to the number on the referral sheet if you don't hear from them. - Please reach out to your Endocrinologist about an appointment. - Refills have been ordered. Continue medications as you are.

## 2023-10-11 NOTE — PLAN OF CARE
Problem: Airway Clearance - Ineffective  Goal: Achieve or maintain patent airway  Outcome: Ongoing     Problem: Gas Exchange - Impaired  Goal: Absence of hypoxia  Outcome: Ongoing  Goal: Promote optimal lung function  Outcome: Ongoing     Problem: Breathing Pattern - Ineffective  Goal: Ability to achieve and maintain a regular respiratory rate  Outcome: Ongoing     Problem:  Body Temperature -  Risk of, Imbalanced  Goal: Ability to maintain a body temperature within defined limits  Outcome: Ongoing  Goal: Will regain or maintain usual level of consciousness  Outcome: Ongoing  Goal: Complications related to the disease process, condition or treatment will be avoided or minimized  Outcome: Ongoing     Problem: Isolation Precautions - Risk of Spread of Infection  Goal: Prevent transmission of infection  Outcome: Ongoing     Problem: Nutrition Deficits  Goal: Optimize nutritional status  Outcome: Ongoing     Problem: Risk for Fluid Volume Deficit  Goal: Maintain normal heart rhythm  Outcome: Ongoing  Goal: Maintain absence of muscle cramping  Outcome: Ongoing  Goal: Maintain normal serum potassium, sodium, calcium, phosphorus, and pH  Outcome: Ongoing     Problem: Loneliness or Risk for Loneliness  Goal: Demonstrate positive use of time alone when socialization is not possible  Outcome: Ongoing     Problem: Fatigue  Goal: Verbalize increase energy and improved vitality  Outcome: Ongoing     Problem: Patient Education: Go to Patient Education Activity  Goal: Patient/Family Education  Outcome: Ongoing with RW/fair balance

## 2023-11-06 ENCOUNTER — OFFICE VISIT (OUTPATIENT)
Dept: ENT CLINIC | Age: 48
End: 2023-11-06
Payer: COMMERCIAL

## 2023-11-06 VITALS
WEIGHT: 233 LBS | BODY MASS INDEX: 41.29 KG/M2 | SYSTOLIC BLOOD PRESSURE: 137 MMHG | HEART RATE: 88 BPM | HEIGHT: 63 IN | TEMPERATURE: 97.5 F | DIASTOLIC BLOOD PRESSURE: 79 MMHG

## 2023-11-06 DIAGNOSIS — J02.9 PHARYNGITIS, UNSPECIFIED ETIOLOGY: ICD-10-CM

## 2023-11-06 DIAGNOSIS — J32.4 CHRONIC PANSINUSITIS: Primary | ICD-10-CM

## 2023-11-06 DIAGNOSIS — J04.0 LARYNGITIS: ICD-10-CM

## 2023-11-06 PROCEDURE — G8417 CALC BMI ABV UP PARAM F/U: HCPCS | Performed by: OTOLARYNGOLOGY

## 2023-11-06 PROCEDURE — 1036F TOBACCO NON-USER: CPT | Performed by: OTOLARYNGOLOGY

## 2023-11-06 PROCEDURE — 99214 OFFICE O/P EST MOD 30 MIN: CPT | Performed by: OTOLARYNGOLOGY

## 2023-11-06 PROCEDURE — G8482 FLU IMMUNIZE ORDER/ADMIN: HCPCS | Performed by: OTOLARYNGOLOGY

## 2023-11-06 PROCEDURE — G8428 CUR MEDS NOT DOCUMENT: HCPCS | Performed by: OTOLARYNGOLOGY

## 2023-11-06 PROCEDURE — 3017F COLORECTAL CA SCREEN DOC REV: CPT | Performed by: OTOLARYNGOLOGY

## 2023-11-06 RX ORDER — AMOXICILLIN AND CLAVULANATE POTASSIUM 875; 125 MG/1; MG/1
1 TABLET, FILM COATED ORAL 2 TIMES DAILY
Qty: 20 TABLET | Refills: 0 | Status: SHIPPED | OUTPATIENT
Start: 2023-11-06 | End: 2023-11-16

## 2023-11-06 RX ORDER — PREDNISONE 10 MG/1
TABLET ORAL
Qty: 38 TABLET | Refills: 0 | Status: SHIPPED | OUTPATIENT
Start: 2023-11-06

## 2023-11-06 ASSESSMENT — ENCOUNTER SYMPTOMS
DIARRHEA: 0
COUGH: 0
SHORTNESS OF BREATH: 0
CHOKING: 0
SORE THROAT: 0
SINUS PRESSURE: 1
NAUSEA: 0
EYE REDNESS: 0
TROUBLE SWALLOWING: 0
EYE ITCHING: 0
FACIAL SWELLING: 0
SINUS PAIN: 0
VOICE CHANGE: 1
RHINORRHEA: 0
EYE PAIN: 0

## 2023-11-06 NOTE — PROGRESS NOTES
Eyes:      General:         Right eye: No discharge. Left eye: No discharge. Extraocular Movements:      Right eye: Normal extraocular motion. Left eye: Normal extraocular motion. Conjunctiva/sclera:      Right eye: Right conjunctiva is not injected. No chemosis or exudate. Left eye: Left conjunctiva is not injected. No chemosis or exudate. Neck:      Thyroid: No thyroid mass or thyromegaly. Cardiovascular:      Rate and Rhythm: Normal rate and regular rhythm. Pulmonary:      Effort: No tachypnea or respiratory distress. Lymphadenopathy:      Head:      Right side of head: No preauricular or posterior auricular adenopathy. Left side of head: No preauricular or posterior auricular adenopathy. Cervical:      Right cervical: No superficial, deep or posterior cervical adenopathy. Left cervical: No superficial, deep or posterior cervical adenopathy. Neurological:      Mental Status: She is alert and oriented to person, place, and time. Assessment:       Diagnosis Orders   1. Chronic pansinusitis  amoxicillin-clavulanate (AUGMENTIN) 875-125 MG per tablet    predniSONE (DELTASONE) 10 MG tablet      2. Pharyngitis, unspecified etiology  amoxicillin-clavulanate (AUGMENTIN) 875-125 MG per tablet    predniSONE (DELTASONE) 10 MG tablet      3. Laryngitis  amoxicillin-clavulanate (AUGMENTIN) 875-125 MG per tablet    predniSONE (DELTASONE) 10 MG tablet              Plan:      Acute flare of chronic sinus disease with laryngitis. The patient was counseled for pharyngitis and received a prednisone and Augmentin prescription medication(s) for this diagnosis. The mechanism of action for the prescription(s) were explained/reviewed. The appropriate usage was described and technique for topical use explained if appropriate. Questions from the patient were answered and the prescription(s) were e-prescribed to the appropriate pharmacy.           Devan Melendrez MD

## 2023-11-21 DIAGNOSIS — M54.50 ACUTE BILATERAL LOW BACK PAIN WITHOUT SCIATICA: ICD-10-CM

## 2023-11-21 RX ORDER — METHOCARBAMOL 500 MG/1
500 TABLET, FILM COATED ORAL 4 TIMES DAILY PRN
Qty: 40 TABLET | Refills: 0 | Status: SHIPPED | OUTPATIENT
Start: 2023-11-21 | End: 2023-12-07 | Stop reason: SDUPTHER

## 2023-11-21 NOTE — TELEPHONE ENCOUNTER
PT LVM WANTING TO KNOW IF SHE CAN GET A REFILL ON METHOCARBAMOL UNTIL SHE CAN SEE PHY THERAPY. PT CAN BE REACHED -[phone]

## 2023-11-21 NOTE — TELEPHONE ENCOUNTER
Requested Prescriptions     Pending Prescriptions Disp Refills    methocarbamol (ROBAXIN) 500 MG tablet 40 tablet 0     Sig: Take 1 tablet by mouth 4 times daily as needed (Back pain) WARNING:  May cause drowsiness.  May impair ability to operate vehicles or machinery.  Do not use in combination with alcohol.       Last Clinic Visit:  10/10/2023     Next Clinic Appointment:  1/24/2024     Body Location Override (Optional - Billing Will Still Be Based On Selected Body Map Location If Applicable): Left Nasal Ala Anesthesia Volume In Cc: 0 Did You Provide Opioid Counseling: No Repair Type: Flap Suturegard Retention Suture: 2-0 Nylon Retention Suture Bite Size: 3 mm Length To Time In Minutes Device Was In Place: 10 Number Of Hemigard Strips Per Side: 1 Simple / Intermediate / Complex Repair - Final Wound Length In Cm: 3.7 Complex Requirements: Extensive Undermining Performed?: Yes Distance Of Undermining In Cm (Required): 1.2 Undermining Type: Entire Wound Debridement Text: The wound edges were debrided prior to proceeding with the closure to facilitate wound healing. Helical Rim Text: The closure involved the helical rim. Vermilion Border Text: The closure involved the vermilion border. Nostril Rim Text: The closure involved the nostril rim. Retention Suture Text: Retention sutures were placed to support the closure and prevent dehiscence. Flap Type: Rotation Flap Skin Substitute: EpiFix Micronized Suture Removal: 14 days Type Of Previous Surgery (Optional- Ie Mohs Surgery): Mohs Number Of Stages Required To Clear Tumor (Optional): 3 Date Of Previous Biopsy (Optional): 8/12/2021 Previous Accession (Optional): [id number] Detail Level: Detailed Anesthesia Type: 1% lidocaine with epinephrine Additional Anesthesia Volume In Cc: 6 Hemostasis: Electrocautery Estimated Blood Loss (Cc): minimal Brow Lift Text: A midfrontal incision was made medially to the defect to allow access to the tissues just superior to the left eyebrow. Following careful dissection inferiorly in a supraperiosteal plane to the level of the left eyebrow, several 3-0 monocryl sutures were used to resuspend the eyebrow orbicularis oculi muscular unit to the superior frontal bone periosteum. This resulted in an appropriate reapproximation of static eyebrow symmetry and correction of the left brow ptosis. Deep Sutures: 5-0 PolySyn Epidermal Sutures: 5-0 Nylon Epidermal Closure: running Wound Care: Bacitracin Dressing: dry sterile dressing Unna Boot Text: An Unna boot was placed to help immobilize the limb and facilitate more rapid healing. Suturegard Intro: Intraoperative tissue expansion was performed, utilizing the SUTUREGARD device, in order to reduce wound tension. Suturegard Body: The suture ends were repeatedly re-tightened and re-clamped to achieve the desired tissue expansion. Hemigard Intro: Due to skin fragility and wound tension, it was decided to use HEMIGARD adhesive retention suture devices to permit a linear closure. The skin was cleaned and dried for a 6cm distance away from the wound. Excessive hair, if present, was removed to allow for adhesion. Hemigard Postcare Instructions: The HEMIGARD strips are to remain completely dry for at least 5-7 days. Epidermal Closure Graft Donor Site (Optional): simple interrupted Graft Donor Site Bandage (Optional-Leave Blank If You Don't Want In Note): Steri-strips and a pressure bandage were applied to the donor site. Closure 2 Information: This tab is for additional flaps and grafts, including complex repair and grafts and complex repair and flaps. You can also specify a different location for the additional defect, if the location is the same you do not need to select a new one. We will insert the automated text for the repair you select below just as we do for solitary flaps and grafts. Please note that at this time if you select a location with a different insurance zone you will need to override the ICD10 and CPT if appropriate. Closure 3 Information: This tab is for additional flaps and grafts above and beyond our usual structured repairs. Please note if you enter information here it will not currently bill and you will need to add the billing information manually. Closure 4 Information: This tab is for additional flaps and grafts above and beyond our usual structured repairs.  Please note if you enter information here it will not currently bill and you will need to add the billing information manually. Complex Repair Preamble Text (Leave Blank If You Do Not Want): Extensive wide undermining was performed. Intermediate Repair Preamble Text (Leave Blank If You Do Not Want): Undermining was performed with blunt dissection. Flap Thinning Complex Repair Preamble Text (Leave Blank If You Do Not Want): An incision was made along the previous flap suture line. Undermining was performed beneath the flap and redundant tissue was removed to restore the normal contour of the skin. Non-Graft Cartilage Fenestration Text: The cartilage was fenestrated with a 2mm punch biopsy to help facilitate healing. Graft Cartilage Fenestration Text: The cartilage was fenestrated with a 2mm punch biopsy to help facilitate graft survival and healing. Preparation Of Recipient Site - Flap: The eschar was removed surgically with sharp dissection to facilitate appropriate wound healing of the following adjacent tissue rearrangement. Preparation Of Recipient Site - Graft: The eschar was removed surgically with sharp dissection to facilitate appropriate survival of the following graft. Preparation Of Recipient Site - Flap Takedown: The eschar and granulation tissue was removed surgically with sharp dissection to facilitate appropriate healing after division and inset of the proximal and distal interpolation flap. Secondary Intention Text (Leave Blank If You Do Not Want): The defect will heal with secondary intention. No Repair - Repaired With Adjacent Surgical Defect Text (Leave Blank If You Do Not Want): After obtaining clear surgical margins the defect was repaired concurrently with another surgical defect which was in close approximation. Referred To Oculoplastics For Closure Text (Leave Blank If You Do Not Want): After obtaining clear surgical margins the patient was sent to oculoplastics for surgical repair. The patient understands they will receive post-surgical care and follow-up from the referring physician's office. Referred To Otolaryngology For Closure Text (Leave Blank If You Do Not Want): After obtaining clear surgical margins the patient was sent to otolaryngology for surgical repair. The patient understands they will receive post-surgical care and follow-up from the referring physician's office. Referred To Plastics For Closure Text (Leave Blank If You Do Not Want): After obtaining clear surgical margins the patient was sent to plastics for surgical repair. The patient understands they will receive post-surgical care and follow-up from the referring physician's office. Referred To Asc For Closure Text (Leave Blank If You Do Not Want): After obtaining clear surgical margins the patient was sent to an [facility] for surgical repair. The patient understands they will receive post-surgical care and follow-up from the [facility] physician. Referred To Mid-Level For Closure Text (Leave Blank If You Do Not Want): After obtaining clear surgical margins the patient was sent to a mid-level provider for surgical repair. The patient understands they will receive post-surgical care and follow-up from the mid-level provider. Repair Performed By Another Provider Text (Leave Blank If You Do Not Want): After obtaining clear surgical margins the defect was repaired by another provider. Advancement Flap (Single) Text: The defect edges were debeveled with a #15 scalpel blade. Given the location of the defect and the proximity to free margins a single advancement flap was deemed most appropriate. Using a sterile surgical marker, an appropriate advancement flap was drawn incorporating the defect and placing the expected incisions within the relaxed skin tension lines where possible. The area thus outlined was incised deep to adipose tissue with a #15 scalpel blade. The skin margins were undermined to an appropriate distance in all directions utilizing iris scissors. Advancement Flap (Double) Text: The defect edges were debeveled with a #15 scalpel blade. Given the location of the defect and the proximity to free margins a double advancement flap was deemed most appropriate. Using a sterile surgical marker, the appropriate advancement flaps were drawn incorporating the defect and placing the expected incisions within the relaxed skin tension lines where possible. The area thus outlined was incised deep to adipose tissue with a #15 scalpel blade. The skin margins were undermined to an appropriate distance in all directions utilizing iris scissors. Burow's Advancement Flap Text: The defect edges were debeveled with a #15 scalpel blade. Given the location of the defect and the proximity to free margins a Burow's advancement flap was deemed most appropriate. Using a sterile surgical marker, the appropriate advancement flap was drawn incorporating the defect and placing the expected incisions within the relaxed skin tension lines where possible. The area thus outlined was incised deep to adipose tissue with a #15 scalpel blade. The skin margins were undermined to an appropriate distance in all directions utilizing iris scissors. Chonodrocutaneous Helical Advancement Flap Text: The defect edges were debeveled with a #15 scalpel blade. Given the location of the defect and the proximity to free margins a chondrocutaneous helical advancement flap was deemed most appropriate. Using a sterile surgical marker, the appropriate advancement flap was drawn incorporating the defect and placing the expected incisions within the relaxed skin tension lines where possible. The area thus outlined was incised deep to adipose tissue with a #15 scalpel blade. The skin margins were undermined to an appropriate distance in all directions utilizing iris scissors. Crescentic Advancement Flap Text: The defect edges were debeveled with a #15 scalpel blade. Given the location of the defect and the proximity to free margins a crescentic advancement flap was deemed most appropriate. Using a sterile surgical marker, the appropriate advancement flap was drawn incorporating the defect and placing the expected incisions within the relaxed skin tension lines where possible. The area thus outlined was incised deep to adipose tissue with a #15 scalpel blade. The skin margins were undermined to an appropriate distance in all directions utilizing iris scissors. A-T Advancement Flap Text: The defect edges were debeveled with a #15 scalpel blade. Given the location of the defect, shape of the defect and the proximity to free margins an A-T advancement flap was deemed most appropriate. Using a sterile surgical marker, an appropriate advancement flap was drawn incorporating the defect and placing the expected incisions within the relaxed skin tension lines where possible. The area thus outlined was incised deep to adipose tissue with a #15 scalpel blade. The skin margins were undermined to an appropriate distance in all directions utilizing iris scissors. O-T Advancement Flap Text: The defect edges were debeveled with a #15 scalpel blade. Given the location of the defect, shape of the defect and the proximity to free margins an O-T advancement flap was deemed most appropriate. Using a sterile surgical marker, an appropriate advancement flap was drawn incorporating the defect and placing the expected incisions within the relaxed skin tension lines where possible. The area thus outlined was incised deep to adipose tissue with a #15 scalpel blade. The skin margins were undermined to an appropriate distance in all directions utilizing iris scissors. O-L Flap Text: The defect edges were debeveled with a #15 scalpel blade. Given the location of the defect, shape of the defect and the proximity to free margins an O-L flap was deemed most appropriate. Using a sterile surgical marker, an appropriate advancement flap was drawn incorporating the defect and placing the expected incisions within the relaxed skin tension lines where possible. The area thus outlined was incised deep to adipose tissue with a #15 scalpel blade. The skin margins were undermined to an appropriate distance in all directions utilizing iris scissors. O-Z Flap Text: The defect edges were debeveled with a #15 scalpel blade. Given the location of the defect, shape of the defect and the proximity to free margins an O-Z flap was deemed most appropriate. Using a sterile surgical marker, an appropriate transposition flap was drawn incorporating the defect and placing the expected incisions within the relaxed skin tension lines where possible. The area thus outlined was incised deep to adipose tissue with a #15 scalpel blade. The skin margins were undermined to an appropriate distance in all directions utilizing iris scissors. Double O-Z Flap Text: The defect edges were debeveled with a #15 scalpel blade. Given the location of the defect, shape of the defect and the proximity to free margins a Double O-Z flap was deemed most appropriate. Using a sterile surgical marker, an appropriate transposition flap was drawn incorporating the defect and placing the expected incisions within the relaxed skin tension lines where possible. The area thus outlined was incised deep to adipose tissue with a #15 scalpel blade. The skin margins were undermined to an appropriate distance in all directions utilizing iris scissors. V-Y Flap Text: The defect edges were debeveled with a #15 scalpel blade. Given the location of the defect, shape of the defect and the proximity to free margins a V-Y flap was deemed most appropriate. Using a sterile surgical marker, an appropriate advancement flap was drawn incorporating the defect and placing the expected incisions within the relaxed skin tension lines where possible. The area thus outlined was incised deep to adipose tissue with a #15 scalpel blade. The skin margins were undermined to an appropriate distance in all directions utilizing iris scissors. Advancement-Rotation Flap Text: The defect edges were debeveled with a #15 scalpel blade. Given the location of the defect, shape of the defect and the proximity to free margins an advancement-rotation flap was deemed most appropriate. Using a sterile surgical marker, an appropriate flap was drawn incorporating the defect and placing the expected incisions within the relaxed skin tension lines where possible. The area thus outlined was incised deep to adipose tissue with a #15 scalpel blade. The skin margins were undermined to an appropriate distance in all directions utilizing iris scissors. Mercedes Flap Text: The defect edges were debeveled with a #15 scalpel blade. Given the location of the defect, shape of the defect and the proximity to free margins a Mercedes flap was deemed most appropriate. Using a sterile surgical marker, an appropriate advancement flap was drawn incorporating the defect and placing the expected incisions within the relaxed skin tension lines where possible. The area thus outlined was incised deep to adipose tissue with a #15 scalpel blade. The skin margins were undermined to an appropriate distance in all directions utilizing iris scissors. Modified Advancement Flap Text: The defect edges were debeveled with a #15 scalpel blade. Given the location of the defect, shape of the defect and the proximity to free margins a modified advancement flap was deemed most appropriate. Using a sterile surgical marker, an appropriate advancement flap was drawn incorporating the defect and placing the expected incisions within the relaxed skin tension lines where possible. The area thus outlined was incised deep to adipose tissue with a #15 scalpel blade. The skin margins were undermined to an appropriate distance in all directions utilizing iris scissors. Mucosal Advancement Flap Text: Given the location of the defect, shape of the defect and the proximity to free margins a mucosal advancement flap was deemed most appropriate. Incisions were made with a 15 blade scalpel in the appropriate fashion along the cutaneous vermilion border and the mucosal lip. The remaining actinically damaged mucosal tissue was excised. The mucosal advancement flap was then elevated to the gingival sulcus with care taken to preserve the neurovascular structures and advanced into the primary defect. Care was taken to ensure that precise realignment of the vermilion border was achieved. Peng Advancement Flap Text: The defect edges were debeveled with a #15 scalpel blade. Given the location of the defect, shape of the defect and the proximity to free margins a Peng advancement flap was deemed most appropriate. Using a sterile surgical marker, an appropriate advancement flap was drawn incorporating the defect and placing the expected incisions within the relaxed skin tension lines where possible. The area thus outlined was incised deep to adipose tissue with a #15 scalpel blade. The skin margins were undermined to an appropriate distance in all directions utilizing iris scissors. Hatchet Flap Text: The defect edges were debeveled with a #15 scalpel blade. Given the location of the defect, shape of the defect and the proximity to free margins a hatchet flap was deemed most appropriate. Using a sterile surgical marker, an appropriate hatchet flap was drawn incorporating the defect and placing the expected incisions within the relaxed skin tension lines where possible. The area thus outlined was incised deep to adipose tissue with a #15 scalpel blade. The skin margins were undermined to an appropriate distance in all directions utilizing iris scissors. Rotation Flap Text: The defect edges were debeveled with a #15 scalpel blade. Given the location of the defect, shape of the defect and the proximity to free margins a rotation flap was deemed most appropriate. Using a sterile surgical marker, an appropriate rotation flap was drawn incorporating the defect and placing the expected incisions within the relaxed skin tension lines where possible. The area thus outlined was incised deep to adipose tissue with a #15 scalpel blade. The skin margins were undermined to an appropriate distance in all directions utilizing iris scissors. Spiral Flap Text: The defect edges were debeveled with a #15 scalpel blade. Given the location of the defect, shape of the defect and the proximity to free margins a spiral flap was deemed most appropriate. Using a sterile surgical marker, an appropriate rotation flap was drawn incorporating the defect and placing the expected incisions within the relaxed skin tension lines where possible. The area thus outlined was incised deep to adipose tissue with a #15 scalpel blade. The skin margins were undermined to an appropriate distance in all directions utilizing iris scissors. Staged Advancement Flap Text: The defect edges were debeveled with a #15 scalpel blade. Given the location of the defect, shape of the defect and the proximity to free margins a staged advancement flap was deemed most appropriate. Using a sterile surgical marker, an appropriate advancement flap was drawn incorporating the defect and placing the expected incisions within the relaxed skin tension lines where possible. The area thus outlined was incised deep to adipose tissue with a #15 scalpel blade. The skin margins were undermined to an appropriate distance in all directions utilizing iris scissors. Star Wedge Flap Text: The defect edges were debeveled with a #15 scalpel blade. Given the location of the defect, shape of the defect and the proximity to free margins a star wedge flap was deemed most appropriate. Using a sterile surgical marker, an appropriate rotation flap was drawn incorporating the defect and placing the expected incisions within the relaxed skin tension lines where possible. The area thus outlined was incised deep to adipose tissue with a #15 scalpel blade. The skin margins were undermined to an appropriate distance in all directions utilizing iris scissors. Transposition Flap Text: The defect edges were debeveled with a #15 scalpel blade. Given the location of the defect and the proximity to free margins a transposition flap was deemed most appropriate. Using a sterile surgical marker, an appropriate transposition flap was drawn incorporating the defect. The area thus outlined was incised deep to adipose tissue with a #15 scalpel blade. The skin margins were undermined to an appropriate distance in all directions utilizing iris scissors. Muscle Hinge Flap Text: The defect edges were debeveled with a #15 scalpel blade. Given the size, depth and location of the defect and the proximity to free margins a muscle hinge flap was deemed most appropriate. Using a sterile surgical marker, an appropriate hinge flap was drawn incorporating the defect. The area thus outlined was incised with a #15 scalpel blade. The skin margins were undermined to an appropriate distance in all directions utilizing iris scissors. Mustarde Flap Text: The defect edges were debeveled with a #15 scalpel blade. Given the size, depth and location of the defect and the proximity to free margins a Mustarde flap was deemed most appropriate. Using a sterile surgical marker, an appropriate flap was drawn incorporating the defect. The area thus outlined was incised with a #15 scalpel blade. The skin margins were undermined to an appropriate distance in all directions utilizing iris scissors. Nasal Turnover Hinge Flap Text: The defect edges were debeveled with a #15 scalpel blade. Given the size, depth, location of the defect and the defect being full thickness a nasal turnover hinge flap was deemed most appropriate. Using a sterile surgical marker, an appropriate hinge flap was drawn incorporating the defect. The area thus outlined was incised with a #15 scalpel blade. The flap was designed to recreate the nasal mucosal lining and the alar rim. The skin margins were undermined to an appropriate distance in all directions utilizing iris scissors. Nasalis-Muscle-Based Myocutaneous Island Pedicle Flap Text: Using a #15 blade, an incision was made around the donor flap to the level of the nasalis muscle. Wide lateral undermining was then performed in both the subcutaneous plane above the nasalis muscle, and in a submuscular plane just above periosteum. This allowed the formation of a free nasalis muscle axial pedicle (based on the angular artery) which was still attached to the actual cutaneous flap, increasing its mobility and vascular viability. Hemostasis was obtained with pinpoint electrocoagulation. The flap was mobilized into position and the pivotal anchor points positioned and stabilized with buried interrupted sutures. Subcutaneous and dermal tissues were closed in a multilayered fashion with sutures. Tissue redundancies were excised, and the epidermal edges were apposed without significant tension and sutured with sutures. Orbicularis Oris Muscle Flap Text: The defect edges were debeveled with a #15 scalpel blade. Given that the defect affected the competency of the oral sphincter an orbicularis oris muscle flap was deemed most appropriate to restore this competency and normal muscle function. Using a sterile surgical marker, an appropriate flap was drawn incorporating the defect. The area thus outlined was incised with a #15 scalpel blade. Melolabial Transposition Flap Text: The defect edges were debeveled with a #15 scalpel blade. Given the location of the defect and the proximity to free margins a melolabial flap was deemed most appropriate. Using a sterile surgical marker, an appropriate melolabial transposition flap was drawn incorporating the defect. The area thus outlined was incised deep to adipose tissue with a #15 scalpel blade. The skin margins were undermined to an appropriate distance in all directions utilizing iris scissors. Rhombic Flap Text: The defect edges were debeveled with a #15 scalpel blade. Given the location of the defect and the proximity to free margins a rhombic flap was deemed most appropriate. Using a sterile surgical marker, an appropriate rhombic flap was drawn incorporating the defect. The area thus outlined was incised deep to adipose tissue with a #15 scalpel blade. The skin margins were undermined to an appropriate distance in all directions utilizing iris scissors. Rhomboid Transposition Flap Text: The defect edges were debeveled with a #15 scalpel blade. Given the location of the defect and the proximity to free margins a rhomboid transposition flap was deemed most appropriate. Using a sterile surgical marker, an appropriate rhomboid flap was drawn incorporating the defect. The area thus outlined was incised deep to adipose tissue with a #15 scalpel blade. The skin margins were undermined to an appropriate distance in all directions utilizing iris scissors. Bi-Rhombic Flap Text: The defect edges were debeveled with a #15 scalpel blade. Given the location of the defect and the proximity to free margins a bi-rhombic flap was deemed most appropriate. Using a sterile surgical marker, an appropriate rhombic flap was drawn incorporating the defect. The area thus outlined was incised deep to adipose tissue with a #15 scalpel blade. The skin margins were undermined to an appropriate distance in all directions utilizing iris scissors. Helical Rim Advancement Flap Text: The defect edges were debeveled with a #15 blade scalpel. Given the location of the defect and the proximity to free margins (helical rim) a double helical rim advancement flap was deemed most appropriate. Using a sterile surgical marker, the appropriate advancement flaps were drawn incorporating the defect and placing the expected incisions between the helical rim and antihelix where possible. The area thus outlined was incised through and through with a #15 scalpel blade. With a skin hook and iris scissors, the flaps were gently and sharply undermined and freed up. Bilateral Helical Rim Advancement Flap Text: The defect edges were debeveled with a #15 blade scalpel. Given the location of the defect and the proximity to free margins (helical rim) a bilateral helical rim advancement flap was deemed most appropriate. Using a sterile surgical marker, the appropriate advancement flaps were drawn incorporating the defect and placing the expected incisions between the helical rim and antihelix where possible. The area thus outlined was incised through and through with a #15 scalpel blade. With a skin hook and iris scissors, the flaps were gently and sharply undermined and freed up. Ear Star Wedge Flap Text: The defect edges were debeveled with a #15 blade scalpel. Given the location of the defect and the proximity to free margins (helical rim) an ear star wedge flap was deemed most appropriate. Using a sterile surgical marker, the appropriate flap was drawn incorporating the defect and placing the expected incisions between the helical rim and antihelix where possible. The area thus outlined was incised through and through with a #15 scalpel blade. Banner Transposition Flap Text: The defect edges were debeveled with a #15 scalpel blade. Given the location of the defect and the proximity to free margins a Banner transposition flap was deemed most appropriate. Using a sterile surgical marker, an appropriate flap drawn around the defect. The area thus outlined was incised deep to adipose tissue with a #15 scalpel blade. The skin margins were undermined to an appropriate distance in all directions utilizing iris scissors. Bilobed Flap Text: The defect edges were debeveled with a #15 scalpel blade. Given the location of the defect and the proximity to free margins a bilobe flap was deemed most appropriate. Using a sterile surgical marker, an appropriate bilobe flap drawn around the defect. The area thus outlined was incised deep to adipose tissue with a #15 scalpel blade. The skin margins were undermined to an appropriate distance in all directions utilizing iris scissors. Bilobed Transposition Flap Text: The defect edges were debeveled with a #15 scalpel blade. Given the location of the defect and the proximity to free margins a bilobed transposition flap was deemed most appropriate. Using a sterile surgical marker, an appropriate bilobe flap drawn around the defect. The area thus outlined was incised deep to adipose tissue with a #15 scalpel blade. The skin margins were undermined to an appropriate distance in all directions utilizing iris scissors. Trilobed Flap Text: The defect edges were debeveled with a #15 scalpel blade. Given the location of the defect and the proximity to free margins a trilobed flap was deemed most appropriate. Using a sterile surgical marker, an appropriate trilobed flap drawn around the defect. The area thus outlined was incised deep to adipose tissue with a #15 scalpel blade. The skin margins were undermined to an appropriate distance in all directions utilizing iris scissors. Dorsal Nasal Flap Text: The defect edges were debeveled with a #15 scalpel blade. Given the location of the defect and the proximity to free margins a dorsal nasal flap was deemed most appropriate. Using a sterile surgical marker, an appropriate dorsal nasal flap was drawn around the defect. The area thus outlined was incised deep to adipose tissue with a #15 scalpel blade. The skin margins were undermined to an appropriate distance in all directions utilizing iris scissors. Island Pedicle Flap Text: The defect edges were debeveled with a #15 scalpel blade. Given the location of the defect, shape of the defect and the proximity to free margins an island pedicle advancement flap was deemed most appropriate. Using a sterile surgical marker, an appropriate advancement flap was drawn incorporating the defect, outlining the appropriate donor tissue and placing the expected incisions within the relaxed skin tension lines where possible. The area thus outlined was incised deep to adipose tissue with a #15 scalpel blade. The skin margins were undermined to an appropriate distance in all directions around the primary defect and laterally outward around the island pedicle utilizing iris scissors. There was minimal undermining beneath the pedicle flap. Island Pedicle Flap With Canthal Suspension Text: The defect edges were debeveled with a #15 scalpel blade. Given the location of the defect, shape of the defect and the proximity to free margins an island pedicle advancement flap was deemed most appropriate. Using a sterile surgical marker, an appropriate advancement flap was drawn incorporating the defect, outlining the appropriate donor tissue and placing the expected incisions within the relaxed skin tension lines where possible. The area thus outlined was incised deep to adipose tissue with a #15 scalpel blade. The skin margins were undermined to an appropriate distance in all directions around the primary defect and laterally outward around the island pedicle utilizing iris scissors. There was minimal undermining beneath the pedicle flap. A suspension suture was placed in the canthal tendon to prevent tension and prevent ectropion. Alar Island Pedicle Flap Text: The defect edges were debeveled with a #15 scalpel blade. Given the location of the defect, shape of the defect and the proximity to the alar rim an island pedicle advancement flap was deemed most appropriate. Using a sterile surgical marker, an appropriate advancement flap was drawn incorporating the defect, outlining the appropriate donor tissue and placing the expected incisions within the nasal ala running parallel to the alar rim. The area thus outlined was incised with a #15 scalpel blade. The skin margins were undermined minimally to an appropriate distance in all directions around the primary defect and laterally outward around the island pedicle utilizing iris scissors. There was minimal undermining beneath the pedicle flap. Double Island Pedicle Flap Text: The defect edges were debeveled with a #15 scalpel blade. Given the location of the defect, shape of the defect and the proximity to free margins a double island pedicle advancement flap was deemed most appropriate. Using a sterile surgical marker, an appropriate advancement flap was drawn incorporating the defect, outlining the appropriate donor tissue and placing the expected incisions within the relaxed skin tension lines where possible. The area thus outlined was incised deep to adipose tissue with a #15 scalpel blade. The skin margins were undermined to an appropriate distance in all directions around the primary defect and laterally outward around the island pedicle utilizing iris scissors. There was minimal undermining beneath the pedicle flap. Island Pedicle Flap-Requiring Vessel Identification Text: The defect edges were debeveled with a #15 scalpel blade. Given the location of the defect, shape of the defect and the proximity to free margins an island pedicle advancement flap was deemed most appropriate. Using a sterile surgical marker, an appropriate advancement flap was drawn, based on the axial vessel mentioned above, incorporating the defect, outlining the appropriate donor tissue and placing the expected incisions within the relaxed skin tension lines where possible. The area thus outlined was incised deep to adipose tissue with a #15 scalpel blade. The skin margins were undermined to an appropriate distance in all directions around the primary defect and laterally outward around the island pedicle utilizing iris scissors. There was minimal undermining beneath the pedicle flap. Keystone Flap Text: The defect edges were debeveled with a #15 scalpel blade. Given the location of the defect, shape of the defect a keystone flap was deemed most appropriate. Using a sterile surgical marker, an appropriate keystone flap was drawn incorporating the defect, outlining the appropriate donor tissue and placing the expected incisions within the relaxed skin tension lines where possible. The area thus outlined was incised deep to adipose tissue with a #15 scalpel blade. The skin margins were undermined to an appropriate distance in all directions around the primary defect and laterally outward around the flap utilizing iris scissors. O-T Plasty Text: The defect edges were debeveled with a #15 scalpel blade. Given the location of the defect, shape of the defect and the proximity to free margins an O-T plasty was deemed most appropriate. Using a sterile surgical marker, an appropriate O-T plasty was drawn incorporating the defect and placing the expected incisions within the relaxed skin tension lines where possible. The area thus outlined was incised deep to adipose tissue with a #15 scalpel blade. The skin margins were undermined to an appropriate distance in all directions utilizing iris scissors. O-Z Plasty Text: The defect edges were debeveled with a #15 scalpel blade. Given the location of the defect, shape of the defect and the proximity to free margins an O-Z plasty (double transposition flap) was deemed most appropriate. Using a sterile surgical marker, the appropriate transposition flaps were drawn incorporating the defect and placing the expected incisions within the relaxed skin tension lines where possible. The area thus outlined was incised deep to adipose tissue with a #15 scalpel blade. The skin margins were undermined to an appropriate distance in all directions utilizing iris scissors. Hemostasis was achieved with electrocautery. The flaps were then transposed into place, one clockwise and the other counterclockwise, and anchored with interrupted buried subcutaneous sutures. Double O-Z Plasty Text: The defect edges were debeveled with a #15 scalpel blade. Given the location of the defect, shape of the defect and the proximity to free margins a Double O-Z plasty (double transposition flap) was deemed most appropriate. Using a sterile surgical marker, the appropriate transposition flaps were drawn incorporating the defect and placing the expected incisions within the relaxed skin tension lines where possible. The area thus outlined was incised deep to adipose tissue with a #15 scalpel blade. The skin margins were undermined to an appropriate distance in all directions utilizing iris scissors. Hemostasis was achieved with electrocautery. The flaps were then transposed into place, one clockwise and the other counterclockwise, and anchored with interrupted buried subcutaneous sutures. V-Y Plasty Text: The defect edges were debeveled with a #15 scalpel blade. Given the location of the defect, shape of the defect and the proximity to free margins an V-Y advancement flap was deemed most appropriate. Using a sterile surgical marker, an appropriate advancement flap was drawn incorporating the defect and placing the expected incisions within the relaxed skin tension lines where possible. The area thus outlined was incised deep to adipose tissue with a #15 scalpel blade. The skin margins were undermined to an appropriate distance in all directions utilizing iris scissors. H Plasty Text: Given the location of the defect, shape of the defect and the proximity to free margins a H-plasty was deemed most appropriate for repair. Using a sterile surgical marker, the appropriate advancement arms of the H-plasty were drawn incorporating the defect and placing the expected incisions within the relaxed skin tension lines where possible. The area thus outlined was incised deep to adipose tissue with a #15 scalpel blade. The skin margins were undermined to an appropriate distance in all directions utilizing iris scissors. The opposing advancement arms were then advanced into place in opposite direction and anchored with interrupted buried subcutaneous sutures. W Plasty Text: The lesion was extirpated to the level of the fat with a #15 scalpel blade. Given the location of the defect, shape of the defect and the proximity to free margins a W-plasty was deemed most appropriate for repair. Using a sterile surgical marker, the appropriate transposition arms of the W-plasty were drawn incorporating the defect and placing the expected incisions within the relaxed skin tension lines where possible. The area thus outlined was incised deep to adipose tissue with a #15 scalpel blade. The skin margins were undermined to an appropriate distance in all directions utilizing iris scissors. The opposing transposition arms were then transposed into place in opposite direction and anchored with interrupted buried subcutaneous sutures. Z Plasty Text: The lesion was extirpated to the level of the fat with a #15 scalpel blade. Given the location of the defect, shape of the defect and the proximity to free margins a Z-plasty was deemed most appropriate for repair. Using a sterile surgical marker, the appropriate transposition arms of the Z-plasty were drawn incorporating the defect and placing the expected incisions within the relaxed skin tension lines where possible. The area thus outlined was incised deep to adipose tissue with a #15 scalpel blade. The skin margins were undermined to an appropriate distance in all directions utilizing iris scissors. The opposing transposition arms were then transposed into place in opposite direction and anchored with interrupted buried subcutaneous sutures. Zygomaticofacial Flap Text: Given the location of the defect, shape of the defect and the proximity to free margins a zygomaticofacial flap was deemed most appropriate for repair. Using a sterile surgical marker, the appropriate flap was drawn incorporating the defect and placing the expected incisions within the relaxed skin tension lines where possible. The area thus outlined was incised deep to adipose tissue with a #15 scalpel blade with preservation of a vascular pedicle. The skin margins were undermined to an appropriate distance in all directions utilizing iris scissors. The flap was then placed into the defect and anchored with interrupted buried subcutaneous sutures. Cheek Interpolation Flap Text: A decision was made to reconstruct the defect utilizing an interpolation axial flap and a staged reconstruction. A telfa template was made of the defect. This telfa template was then used to outline the Cheek Interpolation flap. The donor area for the pedicle flap was then injected with anesthesia. The flap was excised through the skin and subcutaneous tissue down to the layer of the underlying musculature. The interpolation flap was carefully excised within this deep plane to maintain its blood supply. The edges of the donor site were undermined. The donor site was closed in a primary fashion. The pedicle was then rotated into position and sutured. Once the tube was sutured into place, adequate blood supply was confirmed with blanching and refill. The pedicle was then wrapped with xeroform gauze and dressed appropriately with a telfa and gauze bandage to ensure continued blood supply and protect the attached pedicle. Cheek-To-Nose Interpolation Flap Text: A decision was made to reconstruct the defect utilizing an interpolation axial flap and a staged reconstruction. A telfa template was made of the defect. This telfa template was then used to outline the Cheek-To-Nose Interpolation flap. The donor area for the pedicle flap was then injected with anesthesia. The flap was excised through the skin and subcutaneous tissue down to the layer of the underlying musculature. The interpolation flap was carefully excised within this deep plane to maintain its blood supply. The edges of the donor site were undermined. The donor site was closed in a primary fashion. The pedicle was then rotated into position and sutured. Once the tube was sutured into place, adequate blood supply was confirmed with blanching and refill. The pedicle was then wrapped with xeroform gauze and dressed appropriately with a telfa and gauze bandage to ensure continued blood supply and protect the attached pedicle. Interpolation Flap Text: A decision was made to reconstruct the defect utilizing an interpolation axial flap and a staged reconstruction. A telfa template was made of the defect. This telfa template was then used to outline the interpolation flap. The donor area for the pedicle flap was then injected with anesthesia. The flap was excised through the skin and subcutaneous tissue down to the layer of the underlying musculature. The interpolation flap was carefully excised within this deep plane to maintain its blood supply. The edges of the donor site were undermined. The donor site was closed in a primary fashion. The pedicle was then rotated into position and sutured. Once the tube was sutured into place, adequate blood supply was confirmed with blanching and refill. The pedicle was then wrapped with xeroform gauze and dressed appropriately with a telfa and gauze bandage to ensure continued blood supply and protect the attached pedicle. Melolabial Interpolation Flap Text: A decision was made to reconstruct the defect utilizing an interpolation axial flap and a staged reconstruction. A telfa template was made of the defect. This telfa template was then used to outline the melolabial interpolation flap. The donor area for the pedicle flap was then injected with anesthesia. The flap was excised through the skin and subcutaneous tissue down to the layer of the underlying musculature. The pedicle flap was carefully excised within this deep plane to maintain its blood supply. The edges of the donor site were undermined. The donor site was closed in a primary fashion. The pedicle was then rotated into position and sutured. Once the tube was sutured into place, adequate blood supply was confirmed with blanching and refill. The pedicle was then wrapped with xeroform gauze and dressed appropriately with a telfa and gauze bandage to ensure continued blood supply and protect the attached pedicle. Mastoid Interpolation Flap Text: A decision was made to reconstruct the defect utilizing an interpolation axial flap and a staged reconstruction. A telfa template was made of the defect. This telfa template was then used to outline the mastoid interpolation flap. The donor area for the pedicle flap was then injected with anesthesia. The flap was excised through the skin and subcutaneous tissue down to the layer of the underlying musculature. The pedicle flap was carefully excised within this deep plane to maintain its blood supply. The edges of the donor site were undermined. The donor site was closed in a primary fashion. The pedicle was then rotated into position and sutured. Once the tube was sutured into place, adequate blood supply was confirmed with blanching and refill. The pedicle was then wrapped with xeroform gauze and dressed appropriately with a telfa and gauze bandage to ensure continued blood supply and protect the attached pedicle. Posterior Auricular Interpolation Flap Text: A decision was made to reconstruct the defect utilizing an interpolation axial flap and a staged reconstruction. A telfa template was made of the defect. This telfa template was then used to outline the posterior auricular interpolation flap. The donor area for the pedicle flap was then injected with anesthesia. The flap was excised through the skin and subcutaneous tissue down to the layer of the underlying musculature. The pedicle flap was carefully excised within this deep plane to maintain its blood supply. The edges of the donor site were undermined. The donor site was closed in a primary fashion. The pedicle was then rotated into position and sutured. Once the tube was sutured into place, adequate blood supply was confirmed with blanching and refill. The pedicle was then wrapped with xeroform gauze and dressed appropriately with a telfa and gauze bandage to ensure continued blood supply and protect the attached pedicle. Paramedian Forehead Flap Text: A decision was made to reconstruct the defect utilizing an interpolation axial flap and a staged reconstruction. A telfa template was made of the defect. This telfa template was then used to outline the paramedian forehead pedicle flap. The donor area for the pedicle flap was then injected with anesthesia. The flap was excised through the skin and subcutaneous tissue down to the layer of the underlying musculature. The pedicle flap was carefully excised within this deep plane to maintain its blood supply. The edges of the donor site were undermined. The donor site was closed in a primary fashion. The pedicle was then rotated into position and sutured. Once the tube was sutured into place, adequate blood supply was confirmed with blanching and refill. The pedicle was then wrapped with xeroform gauze and dressed appropriately with a telfa and gauze bandage to ensure continued blood supply and protect the attached pedicle. Cheiloplasty (Less Than 50%) Text: A decision was made to reconstruct the defect with a  cheiloplasty. The defect was undermined extensively. Additional orbicularis oris muscle was excised with a 15 blade scalpel. The defect was converted into a full thickness wedge, of less than 50% of the vertical height of the lip, to facilite a better cosmetic result. Small vessels were then tied off with 5-0 monocyrl. The orbicularis oris, superficial fascia, adipose and dermis were then reapproximated. After the deeper layers were approximated the epidermis was reapproximated with particular care given to realign the vermilion border. Cheiloplasty (Complex) Text: A decision was made to reconstruct the defect with a  cheiloplasty. The defect was undermined extensively. Additional orbicularis oris muscle was excised with a 15 blade scalpel. The defect was converted into a full thickness wedge to facilite a better cosmetic result. Small vessels were then tied off with 5-0 monocyrl. The orbicularis oris, superficial fascia, adipose and dermis were then reapproximated. After the deeper layers were approximated the epidermis was reapproximated with particular care given to realign the vermilion border. Ear Wedge Repair Text: A wedge excision was completed by carrying down an excision through the full thickness of the ear and cartilage with an inward facing Burow's triangle. The wound was then closed in a layered fashion. Full Thickness Lip Wedge Repair (Flap) Text: Given the location of the defect and the proximity to free margins a full thickness wedge repair was deemed most appropriate. Using a sterile surgical marker, the appropriate repair was drawn incorporating the defect and placing the expected incisions perpendicular to the vermilion border. The vermilion border was also meticulously outlined to ensure appropriate reapproximation during the repair. The area thus outlined was incised through and through with a #15 scalpel blade. The muscularis and dermis were reaproximated with deep sutures following hemostasis. Care was taken to realign the vermilion border before proceeding with the superficial closure. Once the vermilion was realigned the superfical and mucosal closure was finished. Ftsg Text: The defect edges were debeveled with a #15 scalpel blade. Given the location of the defect, shape of the defect and the proximity to free margins a full thickness skin graft was deemed most appropriate. Using a sterile surgical marker, the primary defect shape was transferred to the donor site. The area thus outlined was incised deep to adipose tissue with a #15 scalpel blade. The harvested graft was then trimmed of adipose tissue until only dermis and epidermis was left. The skin margins of the secondary defect were undermined to an appropriate distance in all directions utilizing iris scissors. The secondary defect was closed with interrupted buried subcutaneous sutures. The skin edges were then re-apposed with running  sutures. The skin graft was then placed in the primary defect and oriented appropriately. Split-Thickness Skin Graft Text: The defect edges were debeveled with a #15 scalpel blade. Given the location of the defect, shape of the defect and the proximity to free margins a split thickness skin graft was deemed most appropriate. Using a sterile surgical marker, the primary defect shape was transferred to the donor site. The split thickness graft was then harvested. The skin graft was then placed in the primary defect and oriented appropriately. Burow's Graft Text: The defect edges were debeveled with a #15 scalpel blade. Given the location of the defect, shape of the defect, the proximity to free margins and the presence of a standing cone deformity a Burow's skin graft was deemed most appropriate. The standing cone was removed and this tissue was then trimmed to the shape of the primary defect. The adipose tissue was also removed until only dermis and epidermis were left. The skin margins of the secondary defect were undermined to an appropriate distance in all directions utilizing iris scissors. The secondary defect was closed with interrupted buried subcutaneous sutures. The skin edges were then re-apposed with running  sutures. The skin graft was then placed in the primary defect and oriented appropriately. Cartilage Graft Text: The defect edges were debeveled with a #15 scalpel blade. Given the location of the defect, shape of the defect, the fact the defect involved a full thickness cartilage defect a cartilage graft was deemed most appropriate. An appropriate donor site was identified, cleansed, and anesthetized. The cartilage graft was then harvested and transferred to the recipient site, oriented appropriately and then sutured into place. The secondary defect was then repaired using a primary closure. Composite Graft Text: The defect edges were debeveled with a #15 scalpel blade. Given the location of the defect, shape of the defect, the proximity to free margins and the fact the defect was full thickness a composite graft was deemed most appropriate. The defect was outline and then transferred to the donor site. A full thickness graft was then excised from the donor site. The graft was then placed in the primary defect, oriented appropriately and then sutured into place. The secondary defect was then repaired using a primary closure. Epidermal Autograft Text: The defect edges were debeveled with a #15 scalpel blade. Given the location of the defect, shape of the defect and the proximity to free margins an epidermal autograft was deemed most appropriate. Using a sterile surgical marker, the primary defect shape was transferred to the donor site. The epidermal graft was then harvested. The skin graft was then placed in the primary defect and oriented appropriately. Dermal Autograft Text: The defect edges were debeveled with a #15 scalpel blade. Given the location of the defect, shape of the defect and the proximity to free margins a dermal autograft was deemed most appropriate. Using a sterile surgical marker, the primary defect shape was transferred to the donor site. The area thus outlined was incised deep to adipose tissue with a #15 scalpel blade. The harvested graft was then trimmed of adipose and epidermal tissue until only dermis was left. The skin graft was then placed in the primary defect and oriented appropriately. Skin Substitute Text: The defect edges were debeveled with a #15 scalpel blade. Given the location of the defect, shape of the defect and the proximity to free margins a skin substitute graft was deemed most appropriate. The graft material was trimmed to fit the size of the defect. The graft was then placed in the primary defect and oriented appropriately. Skin Substitute Paste Text: The defect edges were debeveled with a #15 scalpel blade. Given the location of the defect, shape of the defect and the proximity to free margins a skin substitute micronized graft was deemed most appropriate. The entire vial contents were admixed with 0.5ccs of sterile saline, formed into a paste and then evenly spread over the entire wound bed. Skin Substitute Injection Text: The defect edges were debeveled with a #15 scalpel blade. Given the location of the defect, shape of the defect and the proximity to free margins a skin substitute micronized graft was deemed most appropriate. The entire vial contents were admixed with 3.0ccs of sterile saline and then injected subcutaneously throughout the entire wound bed. Tissue Cultured Epidermal Autograft Text: The defect edges were debeveled with a #15 scalpel blade. Given the location of the defect, shape of the defect and the proximity to free margins a tissue cultured epidermal autograft was deemed most appropriate. The graft was then trimmed to fit the size of the defect. The graft was then placed in the primary defect and oriented appropriately. Xenograft Text: The defect edges were debeveled with a #15 scalpel blade. Given the location of the defect, shape of the defect and the proximity to free margins a xenograft was deemed most appropriate. The graft was then trimmed to fit the size of the defect. The graft was then placed in the primary defect and oriented appropriately. Purse String (Simple) Text: Given the location of the defect and the characteristics of the surrounding skin a purse string closure was deemed most appropriate. Undermining was performed circumfirentially around the surgical defect. A purse string suture was then placed and tightened. Purse String (Intermediate) Text: Given the location of the defect and the characteristics of the surrounding skin a purse string intermediate closure was deemed most appropriate. Undermining was performed circumfirentially around the surgical defect. A purse string suture was then placed and tightened. Partial Purse String (Simple) Text: Given the location of the defect and the characteristics of the surrounding skin a simple purse string closure was deemed most appropriate. Undermining was performed circumfirentially around the surgical defect. A purse string suture was then placed and tightened. Wound tension only allowed a partial closure of the circular defect. Partial Purse String (Intermediate) Text: Given the location of the defect and the characteristics of the surrounding skin an intermediate purse string closure was deemed most appropriate. Undermining was performed circumfirentially around the surgical defect. A purse string suture was then placed and tightened. Wound tension only allowed a partial closure of the circular defect. Localized Dermabrasion Text: The patient was draped in routine manner. Localized dermabrasion using 3 x 17 mm wire brush was performed in routine manner to papillary dermis. This spot dermabrasion is being performed to complete skin cancer reconstruction. It also will eliminate the other sun damaged precancerous cells that are known to be part of the regional effect of a lifetime's worth of sun exposure. This localized dermabrasion is therapeutic and should not be considered cosmetic in any regard. Tarsorrhaphy Text: A tarsorrhaphy was performed using Frost sutures. Complex Repair And Flap Additional Text (Will Appearing After The Standard Complex Repair Text): The complex repair was not sufficient to completely close the primary defect. The remaining additional defect was repaired with the flap mentioned below. Complex Repair And Graft Additional Text (Will Appearing After The Standard Complex Repair Text): The complex repair was not sufficient to completely close the primary defect. The remaining additional defect was repaired with the graft mentioned below. Cheek Interpolation Flap Division And Inset Text: Division and inset of the cheek interpolation flap was performed to achieve optimal aesthetic result, restore normal anatomic appearance and avoid distortion of normal anatomy, expedite and facilitate wound healing, achieve optimal functional result and because linear closure either not possible or would produce suboptimal result. The patient was prepped and draped in the usual manner. The pedicle was infiltrated with local anesthesia. The pedicle was sectioned with a #15 blade. The pedicle was de-bulked and trimmed to match the shape of the defect. Hemostasis was achieved. The flap donor site and free margin of the flap were secured with deep buried sutures and the wound edges were re-approximated. Cheek To Nose Interpolation Flap Division And Inset Text: Division and inset of the cheek to nose interpolation flap was performed to achieve optimal aesthetic result, restore normal anatomic appearance and avoid distortion of normal anatomy, expedite and facilitate wound healing, achieve optimal functional result and because linear closure either not possible or would produce suboptimal result. The patient was prepped and draped in the usual manner. The pedicle was infiltrated with local anesthesia. The pedicle was sectioned with a #15 blade. The pedicle was de-bulked and trimmed to match the shape of the defect. Hemostasis was achieved. The flap donor site and free margin of the flap were secured with deep buried sutures and the wound edges were re-approximated. Melolabial Interpolation Flap Division And Inset Text: Division and inset of the melolabial interpolation flap was performed to achieve optimal aesthetic result, restore normal anatomic appearance and avoid distortion of normal anatomy, expedite and facilitate wound healing, achieve optimal functional result and because linear closure either not possible or would produce suboptimal result. The patient was prepped and draped in the usual manner. The pedicle was infiltrated with local anesthesia. The pedicle was sectioned with a #15 blade. The pedicle was de-bulked and trimmed to match the shape of the defect. Hemostasis was achieved. The flap donor site and free margin of the flap were secured with deep buried sutures and the wound edges were re-approximated. Mastoid Interpolation Flap Division And Inset Text: Division and inset of the mastoid interpolation flap was performed to achieve optimal aesthetic result, restore normal anatomic appearance and avoid distortion of normal anatomy, expedite and facilitate wound healing, achieve optimal functional result and because linear closure either not possible or would produce suboptimal result. The patient was prepped and draped in the usual manner. The pedicle was infiltrated with local anesthesia. The pedicle was sectioned with a #15 blade. The pedicle was de-bulked and trimmed to match the shape of the defect. Hemostasis was achieved. The flap donor site and free margin of the flap were secured with deep buried sutures and the wound edges were re-approximated. Paramedian Forehead Flap Division And Inset Text: Division and inset of the paramedian forehead flap was performed to achieve optimal aesthetic result, restore normal anatomic appearance and avoid distortion of normal anatomy, expedite and facilitate wound healing, achieve optimal functional result and because linear closure either not possible or would produce suboptimal result. The patient was prepped and draped in the usual manner. The pedicle was infiltrated with local anesthesia. The pedicle was sectioned with a #15 blade. The pedicle was de-bulked and trimmed to match the shape of the defect. Hemostasis was achieved. The flap donor site and free margin of the flap were secured with deep buried sutures and the wound edges were re-approximated. Posterior Auricular Interpolation Flap Division And Inset Text: Division and inset of the posterior auricular interpolation flap was performed to achieve optimal aesthetic result, restore normal anatomic appearance and avoid distortion of normal anatomy, expedite and facilitate wound healing, achieve optimal functional result and because linear closure either not possible or would produce suboptimal result. The patient was prepped and draped in the usual manner. The pedicle was infiltrated with local anesthesia. The pedicle was sectioned with a #15 blade. The pedicle was de-bulked and trimmed to match the shape of the defect. Hemostasis was achieved. The flap donor site and free margin of the flap were secured with deep buried sutures and the wound edges were re-approximated. Manual Repair Warning Statement: We plan on removing the manually selected variable below in favor of our much easier automatic structured text blocks found in the previous tab. We decided to do this to help make the flow better and give you the full power of structured data. Manual selection is never going to be ideal in our platform and I would encourage you to avoid using manual selection from this point on, especially since I will be sunsetting this feature. It is important that you do one of two things with the customized text below. First, you can save all of the text in a word file so you can have it for future reference. Second, transfer the text to the appropriate area in the Library tab. Lastly, if there is a flap or graft type which we do not have you need to let us know right away so I can add it in before the variable is hidden. No need to panic, we plan to give you roughly 6 months to make the change. Consent: The rationale for the repair was explained to the patient and consent was obtained. The risks, benefits and alternatives to therapy were discussed in detail. Specifically, the risks of infection, scarring, bleeding, prolonged wound healing, incomplete removal, allergy to anesthesia, nerve injury and recurrence were addressed. Prior to the procedure, the treatment site was clearly identified and confirmed by the patient. All components of Universal Protocol/PAUSE Rule completed. Post-Care Instructions: I reviewed with the patient in detail post-care instructions. Patient is not to engage in any heavy lifting, exercise, or swimming for the next 14 days. Should the patient develop any fevers, chills, bleeding, severe pain patient will contact the office immediately. Pain Refusal Text: I offered to prescribe pain medication but the patient refused to take this medication. Where Do You Want The Question To Include Opioid Counseling Located?: Case Summary Tab Information: Selecting Yes will display possible errors in your note based on the variables you have selected. This validation is only offered as a suggestion for you. PLEASE NOTE THAT THE VALIDATION TEXT WILL BE REMOVED WHEN YOU FINALIZE YOUR NOTE. IF YOU WANT TO FAX A PRELIMINARY NOTE YOU WILL NEED TO TOGGLE THIS TO 'NO' IF YOU DO NOT WANT IT IN YOUR FAXED NOTE.

## 2023-11-29 RX ORDER — IPRATROPIUM BROMIDE AND ALBUTEROL SULFATE 2.5; .5 MG/3ML; MG/3ML
1 SOLUTION RESPIRATORY (INHALATION) EVERY 4 HOURS
Qty: 360 ML | Refills: 1 | Status: SHIPPED | OUTPATIENT
Start: 2023-11-29

## 2023-12-04 DIAGNOSIS — M54.50 ACUTE BILATERAL LOW BACK PAIN WITHOUT SCIATICA: ICD-10-CM

## 2023-12-04 RX ORDER — METHOCARBAMOL 500 MG/1
TABLET, FILM COATED ORAL
Qty: 40 TABLET | Refills: 0 | OUTPATIENT
Start: 2023-12-04

## 2023-12-04 NOTE — TELEPHONE ENCOUNTER
PT STATED METHOCARBAMOL NEED TO BE SENT TO [facility].  IT WAS SENT TO [facility]. PT ALSO STATED SHE NEED REFILL ON ZYRTEC D, AND ELOCON. PLEASE SEND TO [facility] PER PT LISTED ON PROFILE

## 2023-12-07 DIAGNOSIS — M54.50 ACUTE BILATERAL LOW BACK PAIN WITHOUT SCIATICA: ICD-10-CM

## 2023-12-07 DIAGNOSIS — L30.9 ECZEMA, UNSPECIFIED TYPE: ICD-10-CM

## 2023-12-07 RX ORDER — METHOCARBAMOL 500 MG/1
500 TABLET, FILM COATED ORAL 4 TIMES DAILY PRN
Qty: 40 TABLET | Refills: 0 | Status: SHIPPED | OUTPATIENT
Start: 2023-12-07 | End: 2023-12-07 | Stop reason: SDUPTHER

## 2023-12-07 RX ORDER — CETIRIZINE HYDROCHLORIDE, PSEUDOEPHEDRINE HYDROCHLORIDE 5; 120 MG/1; MG/1
1 TABLET, FILM COATED, EXTENDED RELEASE ORAL 2 TIMES DAILY
Qty: 60 TABLET | Refills: 0 | OUTPATIENT
Start: 2023-12-07 | End: 2024-01-06

## 2023-12-07 RX ORDER — CETIRIZINE HYDROCHLORIDE, PSEUDOEPHEDRINE HYDROCHLORIDE 5; 120 MG/1; MG/1
1 TABLET, FILM COATED, EXTENDED RELEASE ORAL 2 TIMES DAILY
Qty: 60 TABLET | Refills: 0 | Status: SHIPPED | OUTPATIENT
Start: 2023-12-07 | End: 2024-01-17

## 2023-12-07 RX ORDER — MOMETASONE FUROATE 1 MG/G
CREAM TOPICAL
Qty: 45 G | Refills: 0 | Status: SHIPPED | OUTPATIENT
Start: 2023-12-07

## 2023-12-07 RX ORDER — METHOCARBAMOL 500 MG/1
500 TABLET, FILM COATED ORAL 4 TIMES DAILY PRN
Qty: 40 TABLET | Refills: 0 | Status: SHIPPED | OUTPATIENT
Start: 2023-12-07 | End: 2024-01-06

## 2023-12-07 NOTE — TELEPHONE ENCOUNTER
PT LVM STATING SHE NEED REFILL ON ELOCON CREAM, AND ZYRTEC D. PLEASE SEND TO [facility] LISTED ON PROFILE. PT CAN BE REACHED [phone]

## 2023-12-07 NOTE — TELEPHONE ENCOUNTER
Requested Prescriptions     Pending Prescriptions Disp Refills    cetirizine-psuedoephedrine (ZYRTEC-D) 5-120 MG per extended release tablet 60 tablet 0     Sig: Take 1 tablet by mouth 2 times daily       Last Clinic Visit:  10/10/2023     Next Clinic Appointment:  1/24/2024

## 2023-12-30 DIAGNOSIS — J32.4 CHRONIC PANSINUSITIS: ICD-10-CM

## 2023-12-30 DIAGNOSIS — I48.91 ATRIAL FIBRILLATION WITH RVR (HCC): ICD-10-CM

## 2024-01-02 DIAGNOSIS — S83.242A ACUTE MEDIAL MENISCUS TEAR OF LEFT KNEE, INITIAL ENCOUNTER: ICD-10-CM

## 2024-01-02 DIAGNOSIS — M25.562 LEFT KNEE PAIN, UNSPECIFIED CHRONICITY: ICD-10-CM

## 2024-01-02 RX ORDER — FLUTICASONE PROPIONATE 93 UG/1
SPRAY, METERED NASAL
Qty: 48 ML | Refills: 5 | Status: SHIPPED | OUTPATIENT
Start: 2024-01-02

## 2024-01-02 RX ORDER — LEVOTHYROXINE SODIUM 0.15 MG/1
150 TABLET ORAL
Qty: 30 TABLET | Refills: 1 | Status: SHIPPED | OUTPATIENT
Start: 2024-01-02 | End: 2025-01-01

## 2024-01-02 RX ORDER — ASPIRIN 81 MG/1
TABLET ORAL
Qty: 90 TABLET | Refills: 1 | Status: SHIPPED | OUTPATIENT
Start: 2024-01-02

## 2024-01-02 RX ORDER — DILTIAZEM HYDROCHLORIDE 120 MG/1
120 CAPSULE, COATED, EXTENDED RELEASE ORAL DAILY
Qty: 90 CAPSULE | Refills: 0 | Status: SHIPPED | OUTPATIENT
Start: 2024-01-02

## 2024-01-02 NOTE — TELEPHONE ENCOUNTER
Requested Prescriptions     Pending Prescriptions Disp Refills    dilTIAZem (CARDIZEM CD) 120 MG extended release capsule [Pharmacy Med Name: dilTIAZem 24H ER (CD) 120 MG CP] 90 capsule 0     Sig: TAKE 1 CAPSULE BY MOUTH DAILY    levothyroxine (SYNTHROID) 150 MCG tablet [Pharmacy Med Name: LEVOTHYROXINE 150 MCG TABLET] 30 tablet 1     Sig: TAKE 1 TABLET BY MOUTH DAILY       Last Clinic Visit:  10/10/2023     Next Clinic Appointment:  1/24/2024

## 2024-01-03 ENCOUNTER — TELEPHONE (OUTPATIENT)
Dept: ENT CLINIC | Age: 49
End: 2024-01-03

## 2024-01-03 NOTE — TELEPHONE ENCOUNTER
Patient called to say she took the covid buster and Now she has sinus and drainage please advise ,does she need a different RX or should she come in to see the Dr ? Thank you

## 2024-01-04 DIAGNOSIS — J32.4 CHRONIC PANSINUSITIS: ICD-10-CM

## 2024-01-04 DIAGNOSIS — I48.91 ATRIAL FIBRILLATION WITH RVR (HCC): ICD-10-CM

## 2024-01-04 RX ORDER — DILTIAZEM HYDROCHLORIDE 120 MG/1
120 CAPSULE, COATED, EXTENDED RELEASE ORAL DAILY
Qty: 90 CAPSULE | Refills: 0 | OUTPATIENT
Start: 2024-01-04

## 2024-01-04 RX ORDER — FLUTICASONE PROPIONATE 93 UG/1
SPRAY, METERED NASAL
Qty: 48 ML | Refills: 5 | OUTPATIENT
Start: 2024-01-04

## 2024-01-16 DIAGNOSIS — E53.8 B12 DEFICIENCY: Primary | ICD-10-CM

## 2024-01-16 RX ORDER — CYANOCOBALAMIN 1000 UG/ML
1000 INJECTION, SOLUTION INTRAMUSCULAR; SUBCUTANEOUS
Qty: 1 ML | Refills: 2 | Status: SHIPPED | OUTPATIENT
Start: 2024-01-16 | End: 2024-04-15

## 2024-01-17 RX ORDER — CETIRIZINE HYDROCHLORIDE, PSEUDOEPHEDRINE HYDROCHLORIDE 5; 120 MG/1; MG/1
1 TABLET, FILM COATED, EXTENDED RELEASE ORAL 2 TIMES DAILY
Qty: 60 TABLET | Refills: 0 | Status: SHIPPED | OUTPATIENT
Start: 2024-01-17

## 2024-01-17 NOTE — TELEPHONE ENCOUNTER
Requested Prescriptions     Pending Prescriptions Disp Refills    cetirizine-psuedoephedrine (ZYRTEC-D) 5-120 MG per extended release tablet [Pharmacy Med Name: CETIRIZINE-PSE ER 5-120 MG TAB] 60 tablet 0     Sig: TAKE 1 TABLET BY MOUTH TWICE A DAY       Last Clinic Visit:  10/10/2023     Next Clinic Appointment:  1/24/2024      Patient was previously on this medication but not on her list anymore.

## 2024-01-24 ENCOUNTER — OFFICE VISIT (OUTPATIENT)
Dept: INTERNAL MEDICINE CLINIC | Age: 49
End: 2024-01-24
Payer: COMMERCIAL

## 2024-01-24 VITALS
SYSTOLIC BLOOD PRESSURE: 129 MMHG | WEIGHT: 232.9 LBS | DIASTOLIC BLOOD PRESSURE: 87 MMHG | OXYGEN SATURATION: 99 % | RESPIRATION RATE: 20 BRPM | HEIGHT: 63 IN | TEMPERATURE: 97.3 F | BODY MASS INDEX: 41.27 KG/M2 | HEART RATE: 99 BPM

## 2024-01-24 DIAGNOSIS — N92.6 ABNORMAL BLEEDING IN MENSTRUAL CYCLE: ICD-10-CM

## 2024-01-24 DIAGNOSIS — J32.4 CHRONIC PANSINUSITIS: Chronic | ICD-10-CM

## 2024-01-24 DIAGNOSIS — K90.9 INTESTINAL MALABSORPTION, UNSPECIFIED TYPE: ICD-10-CM

## 2024-01-24 DIAGNOSIS — E66.9 OBESITY, UNSPECIFIED CLASSIFICATION, UNSPECIFIED OBESITY TYPE, UNSPECIFIED WHETHER SERIOUS COMORBIDITY PRESENT: ICD-10-CM

## 2024-01-24 DIAGNOSIS — E06.3 HYPOTHYROIDISM DUE TO HASHIMOTO'S THYROIDITIS: ICD-10-CM

## 2024-01-24 DIAGNOSIS — E78.2 HYPERLIPIDEMIA, MIXED: ICD-10-CM

## 2024-01-24 DIAGNOSIS — G56.03 BILATERAL CARPAL TUNNEL SYNDROME: ICD-10-CM

## 2024-01-24 DIAGNOSIS — J45.41 MODERATE PERSISTENT ASTHMA WITH EXACERBATION: ICD-10-CM

## 2024-01-24 DIAGNOSIS — M62.838 MUSCLE SPASM: ICD-10-CM

## 2024-01-24 DIAGNOSIS — E03.8 HYPOTHYROIDISM DUE TO HASHIMOTO'S THYROIDITIS: ICD-10-CM

## 2024-01-24 DIAGNOSIS — I48.91 ATRIAL FIBRILLATION WITH RVR (HCC): ICD-10-CM

## 2024-01-24 PROCEDURE — 99213 OFFICE O/P EST LOW 20 MIN: CPT

## 2024-01-24 RX ORDER — METHOCARBAMOL 500 MG/1
500 TABLET, FILM COATED ORAL 4 TIMES DAILY
Qty: 40 TABLET | Refills: 0 | Status: SHIPPED | OUTPATIENT
Start: 2024-01-24 | End: 2024-02-03

## 2024-01-24 RX ORDER — POLYETHYLENE GLYCOL 3350 17 G/17G
17 POWDER, FOR SOLUTION ORAL DAILY
Qty: 1530 G | Refills: 1 | Status: SHIPPED | OUTPATIENT
Start: 2024-01-24 | End: 2024-07-22

## 2024-01-24 RX ORDER — ANTISEPTIC SURGICAL SCRUB 0.04 MG/ML
SOLUTION TOPICAL
Qty: 473 ML | Refills: 0 | Status: SHIPPED | OUTPATIENT
Start: 2024-01-24

## 2024-01-24 RX ORDER — ALBUTEROL SULFATE 90 UG/1
AEROSOL, METERED RESPIRATORY (INHALATION)
Qty: 18 EACH | Refills: 5 | Status: SHIPPED | OUTPATIENT
Start: 2024-01-24

## 2024-01-24 RX ORDER — SACCHAROMYCES BOULARDII 250 MG
250 CAPSULE ORAL 2 TIMES DAILY
Qty: 14 CAPSULE | Refills: 0 | Status: SHIPPED | OUTPATIENT
Start: 2024-01-24 | End: 2024-01-31

## 2024-01-24 RX ORDER — M-VIT,TX,IRON,MINS/CALC/FOLIC 27MG-0.4MG
1 TABLET ORAL DAILY
Qty: 30 TABLET | Refills: 1 | Status: SHIPPED | OUTPATIENT
Start: 2024-01-24

## 2024-01-24 NOTE — PATIENT INSTRUCTIONS
- Referral for weight management clinic placed. Please call them if you do not hear back. [phone]  - I have ordered an EMG study to help diagnose the numbness and tingling in your hands. Please reach out if you do not hear from the scheduling department. [phone]  - I have ordered Robaxin for your muscle aches. Please be careful when taking this. If you feel drowsy, do not drive or engage in other activities that may put you in harm.  - Start taking Florastor (probiotic).   - Refills have been ordered. Please  at your pharmacy.   - Return to clinic in 5 weeks.

## 2024-01-24 NOTE — PROGRESS NOTES
[facility]    Kenroy Griggs is a 48 y.o. female, here for evaluation of the following concerns:    HPI    Hand Numbness and Tingling  Ongoing problem since 2022.  She states that initially it flared up after she was diagnosed with COVID and had long COVID.  She notes that intermittently since receiving COVID vaccinations and boosters, this problem has flared up.  Of note, she received a booster earlier this month and states that it again started to worsen.  She experiences numbness and tingling in both arms, from the elbows to the fingertips.  She states that it happens about 3-4 times per day and also wakes her up at night at least once.  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  Apparently she has had an EMG before for evaluation of carpal tunnel syndrome, however cannot find records in our chart.  She states this was done at another system.    Neck Pain  She continues to endorse pain in the bilateral trapezius muscles.  She states that this is worse with wearing a bra, as well as the recent stress that she has experienced morning about her numbness and tingling.  She takes Robaxin for this pain, with good effect.  She was referred to PT in the past.  She never received a call back from them, and when she tried to call and leave a message she never heard back.  She does not want to try PT at this time.      Recurrent Sinusitis  Has had recurrent sinusitis for past few months.  She recently saw otolaryngology early November 2023.  At the time she had increased congestion postnasal drip and hoarseness.  She was diagnosed with acute sinusitis with laryngitis, and received steroids as well as Augmentin.  She feels much better today.  No fever, chills, purulent drainage from nares.  She denies any congestion today.  She she is no longer on steroids.    Breakthrough Bleeding  At last

## 2024-01-24 NOTE — ASSESSMENT & PLAN NOTE
Had visit with otolaryngology.  Had recent bout of acute sinusitis in November 2023.  Received steroids, antibiotics.  No longer an issue, no active symptoms today.  - Continue off medication  - Follow-up with ENT regularly

## 2024-02-07 DIAGNOSIS — G56.03 BILATERAL CARPAL TUNNEL SYNDROME: Primary | ICD-10-CM

## 2024-02-21 RX ORDER — CETIRIZINE HYDROCHLORIDE, PSEUDOEPHEDRINE HYDROCHLORIDE 5; 120 MG/1; MG/1
1 TABLET, FILM COATED, EXTENDED RELEASE ORAL 2 TIMES DAILY
Qty: 60 TABLET | Refills: 2 | OUTPATIENT
Start: 2024-02-21

## 2024-02-21 NOTE — TELEPHONE ENCOUNTER
Please let the patient know that they should have 2 refills left on the prescription sent on 2/21/24. Thanks!

## 2024-02-21 NOTE — TELEPHONE ENCOUNTER
Requested Prescriptions     Pending Prescriptions Disp Refills    cetirizine-psuedoephedrine (ZYRTEC-D) 5-120 MG per extended release tablet 60 tablet 2     Sig: Take 1 tablet by mouth 2 times daily     Refused Prescriptions Disp Refills    cetirizine-psuedoephedrine (ZYRTEC-D) 5-120 MG per extended release tablet 60 tablet 2     Sig: Take 1 tablet by mouth 2 times daily     Refused By: CHANI OVERTON      Reason for Refusal: Patient has requested refill too soon       Last Clinic Visit:  1/24/2024     Next Clinic Appointment:  2/28/2024    Sent to wrong pharmacy  please send to [facility]

## 2024-02-21 NOTE — TELEPHONE ENCOUNTER
Requested Prescriptions     Pending Prescriptions Disp Refills    cetirizine-psuedoephedrine (ZYRTEC-D) 5-120 MG per extended release tablet 60 tablet 2     Sig: Take 1 tablet by mouth 2 times daily       Last Clinic Visit:  1/24/2024     Next Clinic Appointment:  2/28/2024

## 2024-02-22 ENCOUNTER — TELEPHONE (OUTPATIENT)
Dept: ENT CLINIC | Age: 49
End: 2024-02-22

## 2024-02-22 DIAGNOSIS — J04.0 LARYNGITIS: ICD-10-CM

## 2024-02-22 DIAGNOSIS — J01.90 ACUTE SINUSITIS, RECURRENCE NOT SPECIFIED, UNSPECIFIED LOCATION: ICD-10-CM

## 2024-02-22 DIAGNOSIS — J32.4 CHRONIC PANSINUSITIS: ICD-10-CM

## 2024-02-22 DIAGNOSIS — J02.9 PHARYNGITIS, UNSPECIFIED ETIOLOGY: ICD-10-CM

## 2024-02-22 RX ORDER — CETIRIZINE HYDROCHLORIDE, PSEUDOEPHEDRINE HYDROCHLORIDE 5; 120 MG/1; MG/1
1 TABLET, FILM COATED, EXTENDED RELEASE ORAL 2 TIMES DAILY
Qty: 60 TABLET | Refills: 2 | Status: SHIPPED | OUTPATIENT
Start: 2024-02-22

## 2024-02-22 RX ORDER — AMOXICILLIN AND CLAVULANATE POTASSIUM 875; 125 MG/1; MG/1
1 TABLET, FILM COATED ORAL 2 TIMES DAILY
Qty: 28 TABLET | Refills: 0 | Status: SHIPPED | OUTPATIENT
Start: 2024-02-22 | End: 2024-03-07

## 2024-02-22 RX ORDER — PREDNISONE 10 MG/1
TABLET ORAL
Qty: 38 TABLET | Refills: 0 | Status: SHIPPED | OUTPATIENT
Start: 2024-02-22

## 2024-02-22 NOTE — TELEPHONE ENCOUNTER
Wasn't sure - would you or should she make an appointment   [FreeTextEntry1] : Anticipatory guidance and parent education given.  History and physical consistent with pharyngitis. Rapid strep test negative in office, throat culture sent to lab. Will follow up by phone if throat culture results are positive. Advise supportive care. Tylenol or Motrin as needed for pain or fever. Increase fluids, rest. Follow up if symptoms persist or worsen.

## 2024-02-23 DIAGNOSIS — N92.6 ABNORMAL BLEEDING IN MENSTRUAL CYCLE: ICD-10-CM

## 2024-02-23 DIAGNOSIS — E78.2 HYPERLIPIDEMIA, MIXED: ICD-10-CM

## 2024-02-23 LAB
ALBUMIN SERPL-MCNC: 4.3 G/DL (ref 3.4–5)
ALBUMIN/GLOB SERPL: 1 {RATIO} (ref 1.1–2.2)
ALP SERPL-CCNC: 202 U/L (ref 40–129)
ALT SERPL-CCNC: 47 U/L (ref 10–40)
ANION GAP SERPL CALCULATED.3IONS-SCNC: 12 MMOL/L (ref 3–16)
AST SERPL-CCNC: 60 U/L (ref 15–37)
BACTERIA URNS QL MICRO: NORMAL /HPF
BASOPHILS # BLD: 0.1 K/UL (ref 0–0.2)
BASOPHILS NFR BLD: 1.3 %
BILIRUB SERPL-MCNC: 0.3 MG/DL (ref 0–1)
BUN SERPL-MCNC: 13 MG/DL (ref 7–20)
CALCIUM SERPL-MCNC: 9.1 MG/DL (ref 8.3–10.6)
CHLORIDE SERPL-SCNC: 105 MMOL/L (ref 99–110)
CO2 SERPL-SCNC: 22 MMOL/L (ref 21–32)
CREAT SERPL-MCNC: 0.6 MG/DL (ref 0.6–1.1)
DEPRECATED RDW RBC AUTO: 17.2 % (ref 12.4–15.4)
EOSINOPHIL # BLD: 0.1 K/UL (ref 0–0.6)
EOSINOPHIL NFR BLD: 2.4 %
EPI CELLS #/AREA URNS AUTO: 1 /HPF (ref 0–5)
GFR SERPLBLD CREATININE-BSD FMLA CKD-EPI: >60 ML/MIN/{1.73_M2}
GLUCOSE SERPL-MCNC: 93 MG/DL (ref 70–99)
HCT VFR BLD AUTO: 32.5 % (ref 36–48)
HGB BLD-MCNC: 10.2 G/DL (ref 12–16)
HYALINE CASTS #/AREA URNS AUTO: 0 /LPF (ref 0–8)
LYMPHOCYTES # BLD: 1.6 K/UL (ref 1–5.1)
LYMPHOCYTES NFR BLD: 26.7 %
MCH RBC QN AUTO: 22.6 PG (ref 26–34)
MCHC RBC AUTO-ENTMCNC: 31.2 G/DL (ref 31–36)
MCV RBC AUTO: 72.3 FL (ref 80–100)
MONOCYTES # BLD: 0.5 K/UL (ref 0–1.3)
MONOCYTES NFR BLD: 7.8 %
NEUTROPHILS # BLD: 3.7 K/UL (ref 1.7–7.7)
NEUTROPHILS NFR BLD: 61.8 %
PLATELET # BLD AUTO: 188 K/UL (ref 135–450)
PMV BLD AUTO: 9.6 FL (ref 5–10.5)
POTASSIUM SERPL-SCNC: 4.4 MMOL/L (ref 3.5–5.1)
PROT SERPL-MCNC: 8.6 G/DL (ref 6.4–8.2)
RBC # BLD AUTO: 4.5 M/UL (ref 4–5.2)
RBC CLUMPS #/AREA URNS AUTO: 1 /HPF (ref 0–4)
SODIUM SERPL-SCNC: 139 MMOL/L (ref 136–145)
TSH SERPL DL<=0.005 MIU/L-ACNC: 3.9 UIU/ML (ref 0.27–4.2)
URN SPEC COLLECT METH UR: NORMAL
WBC # BLD AUTO: 6 K/UL (ref 4–11)
WBC #/AREA URNS AUTO: 0 /HPF (ref 0–5)

## 2024-02-26 ENCOUNTER — TELEPHONE (OUTPATIENT)
Dept: ADMINISTRATIVE | Age: 49
End: 2024-02-26

## 2024-02-26 ENCOUNTER — OFFICE VISIT (OUTPATIENT)
Dept: ENT CLINIC | Age: 49
End: 2024-02-26
Payer: COMMERCIAL

## 2024-02-26 ENCOUNTER — TELEPHONE (OUTPATIENT)
Dept: ENT CLINIC | Age: 49
End: 2024-02-26

## 2024-02-26 VITALS
BODY MASS INDEX: 40.93 KG/M2 | DIASTOLIC BLOOD PRESSURE: 78 MMHG | TEMPERATURE: 97.7 F | SYSTOLIC BLOOD PRESSURE: 122 MMHG | WEIGHT: 231 LBS | HEIGHT: 63 IN | HEART RATE: 97 BPM

## 2024-02-26 DIAGNOSIS — J32.4 CHRONIC PANSINUSITIS: Primary | ICD-10-CM

## 2024-02-26 DIAGNOSIS — R49.0 HOARSE VOICE QUALITY: ICD-10-CM

## 2024-02-26 DIAGNOSIS — B37.9 YEAST INFECTION: ICD-10-CM

## 2024-02-26 PROCEDURE — G8428 CUR MEDS NOT DOCUMENT: HCPCS | Performed by: OTOLARYNGOLOGY

## 2024-02-26 PROCEDURE — 3017F COLORECTAL CA SCREEN DOC REV: CPT | Performed by: OTOLARYNGOLOGY

## 2024-02-26 PROCEDURE — 1036F TOBACCO NON-USER: CPT | Performed by: OTOLARYNGOLOGY

## 2024-02-26 PROCEDURE — G8482 FLU IMMUNIZE ORDER/ADMIN: HCPCS | Performed by: OTOLARYNGOLOGY

## 2024-02-26 PROCEDURE — G8417 CALC BMI ABV UP PARAM F/U: HCPCS | Performed by: OTOLARYNGOLOGY

## 2024-02-26 PROCEDURE — 99214 OFFICE O/P EST MOD 30 MIN: CPT | Performed by: OTOLARYNGOLOGY

## 2024-02-26 RX ORDER — ITRACONAZOLE 10 MG/ML
200 SOLUTION ORAL DAILY
Qty: 200 ML | Refills: 0 | Status: SHIPPED | OUTPATIENT
Start: 2024-02-26 | End: 2024-03-07

## 2024-02-26 ASSESSMENT — ENCOUNTER SYMPTOMS
SORE THROAT: 0
DIARRHEA: 0
CHOKING: 0
SHORTNESS OF BREATH: 0
COUGH: 0
FACIAL SWELLING: 0
TROUBLE SWALLOWING: 0
SINUS PAIN: 0
NAUSEA: 0
VOICE CHANGE: 0
SINUS PRESSURE: 0
EYE PAIN: 0
EYE REDNESS: 0
RHINORRHEA: 0
EYE ITCHING: 0

## 2024-02-26 NOTE — TELEPHONE ENCOUNTER
----- Message from Fer Camacho MD sent at 2/24/2024  9:55 AM EST -----  She can cancel Modaty if feeling better.   ----- Message -----  From: Milvia Chanel LPN  Sent: 2/23/2024   2:02 PM EST  To: Fer Camacho MD    You had sent in antibiotic for patient yesterday for sinus infection. She is on your schedule for next Tuesday. Do you still want to see her (keep appointment)?

## 2024-02-26 NOTE — PROGRESS NOTES
change, chills, fatigue and fever.   HENT:  Positive for congestion and postnasal drip. Negative for ear discharge, ear pain, facial swelling, hearing loss, nosebleeds, rhinorrhea, sinus pressure, sinus pain, sneezing, sore throat, tinnitus, trouble swallowing and voice change.    Eyes:  Negative for pain, redness, itching and visual disturbance.   Respiratory:  Negative for cough, choking and shortness of breath.    Gastrointestinal:  Negative for diarrhea and nausea.   Endocrine: Negative for cold intolerance and heat intolerance.       Objective:   Physical Exam  Constitutional:       Appearance: She is well-developed.   HENT:      Head: Not macrocephalic and not microcephalic. No Mejia's sign, abrasion, right periorbital erythema, left periorbital erythema or laceration.      Nose: No nasal deformity, septal deviation, laceration, mucosal edema or rhinorrhea.      Right Nostril: No epistaxis or occlusion.      Left Nostril: No epistaxis or occlusion.      Right Turbinates: Not enlarged, swollen or pale.      Left Turbinates: Not enlarged, swollen or pale.      Right Sinus: No maxillary sinus tenderness or frontal sinus tenderness.      Left Sinus: No maxillary sinus tenderness or frontal sinus tenderness.        Mouth/Throat:      Lips: No lesions.      Mouth: Mucous membranes are moist.      Tongue: No lesions.      Palate: No mass.      Pharynx: Uvula midline. No oropharyngeal exudate or posterior oropharyngeal erythema.      Tonsils: No tonsillar abscesses.     Eyes:      General:         Right eye: No discharge.         Left eye: No discharge.      Extraocular Movements:      Right eye: Normal extraocular motion.      Left eye: Normal extraocular motion.      Conjunctiva/sclera:      Right eye: Right conjunctiva is not injected. No chemosis or exudate.     Left eye: Left conjunctiva is not injected. No chemosis or exudate.  Neck:      Thyroid: No thyroid mass or thyromegaly.   Cardiovascular:      Rate and

## 2024-02-26 NOTE — TELEPHONE ENCOUNTER
Called patient and informed her she may cancel her appointment if she is feeling better. Patient wishes to keep her appointment, as also, she is requesting a letter for work stating she has chronic sinusitis and wants to present letter to her employer.  Patient informed will need to speak with Dr Camacho at appointment time.

## 2024-02-26 NOTE — TELEPHONE ENCOUNTER
Submitted PA for Itraconazole 10MG/ML solution   Via CMM (Key: [id number])  STATUS: PENDING.    Follow up done daily; if no decision with in three days we will refax.  If another three days goes by with no decision will call the insurance for status.

## 2024-02-27 ENCOUNTER — TELEPHONE (OUTPATIENT)
Dept: ENT CLINIC | Age: 49
End: 2024-02-27

## 2024-02-27 NOTE — TELEPHONE ENCOUNTER
The medication was DENIED; DENIAL letter uploaded to [employer].    Letter is a generic denial communication. Called plan and spoke with Sheryl. Reports that they did not receive the clinical documentation needed to approve case. Also reports preferred treatment for fugal infections is fluconazole.     Reports that another case was submitted and under review from today. Case from yesterday has been denied. Case from today went for additional review. Looks like key was resubmitted by Yoly Danielson? Case was left open.     If this requires a response please respond to the pool ( P [facility] MEDICATION PRE-AUTH).      Thank you please advise patient.

## 2024-02-27 NOTE — TELEPHONE ENCOUNTER
Called and informed patient that work letter has been sent to her via my chart. She states \"did it get sent PDF? I cannot screen shot if it is not sent PDF\".  I explained I pulled up the letter and sent it via my chart, I am unaware if it was sent PDF.  She will check and call back if it is not.

## 2024-02-28 ENCOUNTER — OFFICE VISIT (OUTPATIENT)
Dept: INTERNAL MEDICINE CLINIC | Age: 49
End: 2024-02-28
Payer: COMMERCIAL

## 2024-02-28 VITALS
DIASTOLIC BLOOD PRESSURE: 97 MMHG | HEART RATE: 93 BPM | WEIGHT: 231.5 LBS | TEMPERATURE: 97.2 F | HEIGHT: 63 IN | BODY MASS INDEX: 41.02 KG/M2 | OXYGEN SATURATION: 96 % | SYSTOLIC BLOOD PRESSURE: 150 MMHG | RESPIRATION RATE: 20 BRPM

## 2024-02-28 DIAGNOSIS — K75.4 AUTOIMMUNE HEPATITIS (HCC): ICD-10-CM

## 2024-02-28 DIAGNOSIS — G56.03 BILATERAL CARPAL TUNNEL SYNDROME: Primary | ICD-10-CM

## 2024-02-28 DIAGNOSIS — D50.0 IRON DEFICIENCY ANEMIA DUE TO CHRONIC BLOOD LOSS: ICD-10-CM

## 2024-02-28 DIAGNOSIS — L73.9 NASAL FOLLICULITIS: ICD-10-CM

## 2024-02-28 DIAGNOSIS — N64.59 ABNORMAL BREAST EXAM: ICD-10-CM

## 2024-02-28 DIAGNOSIS — L30.9 ECZEMA, UNSPECIFIED TYPE: ICD-10-CM

## 2024-02-28 DIAGNOSIS — J32.4 CHRONIC PANSINUSITIS: Chronic | ICD-10-CM

## 2024-02-28 DIAGNOSIS — R21 RASH: ICD-10-CM

## 2024-02-28 PROCEDURE — 99213 OFFICE O/P EST LOW 20 MIN: CPT

## 2024-02-28 RX ORDER — BENZOCAINE/MENTHOL 6 MG-10 MG
LOZENGE MUCOUS MEMBRANE
Qty: 30 G | Refills: 1 | Status: SHIPPED | OUTPATIENT
Start: 2024-02-28 | End: 2024-03-06

## 2024-02-28 RX ORDER — MOMETASONE FUROATE 1 MG/G
CREAM TOPICAL
Qty: 45 G | Refills: 0 | Status: SHIPPED | OUTPATIENT
Start: 2024-02-28

## 2024-02-28 RX ORDER — KETOCONAZOLE 20 MG/ML
SHAMPOO TOPICAL
Qty: 120 ML | Refills: 1 | Status: SHIPPED | OUTPATIENT
Start: 2024-02-28

## 2024-02-28 ASSESSMENT — PATIENT HEALTH QUESTIONNAIRE - PHQ9
SUM OF ALL RESPONSES TO PHQ QUESTIONS 1-9: 1
2. FEELING DOWN, DEPRESSED OR HOPELESS: 1
SUM OF ALL RESPONSES TO PHQ QUESTIONS 1-9: 1
SUM OF ALL RESPONSES TO PHQ9 QUESTIONS 1 & 2: 1
1. LITTLE INTEREST OR PLEASURE IN DOING THINGS: 0
SUM OF ALL RESPONSES TO PHQ QUESTIONS 1-9: 1
SUM OF ALL RESPONSES TO PHQ QUESTIONS 1-9: 1

## 2024-02-28 NOTE — ASSESSMENT & PLAN NOTE
Has another flare of sinusitis. Improving. Got meds from ENT.   Causing asthma to flare up.   - continue steroid taper, abx, antifungal per ENT directions  - f/u with ENT per their reccomendation

## 2024-02-28 NOTE — PROGRESS NOTES
EMG report showing b/l mod to severe carpal tunnel syndrome. Patient referred to hand surgery. Attempted to call pt to inform her but dialing busy. Will attempt to contact later this week and inform her.

## 2024-02-28 NOTE — ASSESSMENT & PLAN NOTE
Labs showing reduced ALT, AST compared to past values. Never followed with GI, Hepatology. Numbers improved and patient not having any symptoms currently. Will consult GI in future if symptoms or concerns arise.

## 2024-02-28 NOTE — PROGRESS NOTES
ointment APPLY TO AFFECTED AREA(S) THREE TIMES A DAY Yes Mahin Zee MD   hydrocortisone 1 % cream Apply topically 2 times daily. Yes Mahin Zee MD   itraconazole (SPORANOX) 10 MG/ML solution Take 20 mLs by mouth daily for 10 days Yes Fer Camacho MD   cetirizine-psuedoephedrine (ZYRTEC-D) 5-120 MG per extended release tablet Take 1 tablet by mouth 2 times daily Yes Alex Medina MD   amoxicillin-clavulanate (AUGMENTIN) 875-125 MG per tablet Take 1 tablet by mouth 2 times daily for 14 days Yes Fer Camacho MD   predniSONE (DELTASONE) 10 MG tablet 4 tabs a day for 5 days, 3 tabs a day for 3 days, 2 tabs a day for 3 days,1 tab a day for 3 days Yes Fer Camacho MD   Multiple Vitamins-Minerals (THERAPEUTIC MULTIVITAMIN-MINERALS) tablet Take 1 tablet by mouth daily Yes Mahin Zee MD   albuterol sulfate HFA (VENTOLIN HFA) 108 (90 Base) MCG/ACT inhaler INHALE 2 PUFF BY MOUTH EVERY 6 HOURS AS NEEDED FOR WHEEZING Yes Mahin Zee MD   chlorhexidine (BETASEPT SURGICAL SCRUB) 4 % external liquid Apply topically daily as needed. Yes Mahin Zee MD   cyanocobalamin 1000 MCG/ML injection Inject 1 mL into the muscle every 30 days inject 1ml into the skin once monthly Yes Tamy Barnett MD   dilTIAZem (CARDIZEM CD) 120 MG extended release capsule TAKE 1 CAPSULE BY MOUTH DAILY Yes An Cabrera MD   Fluticasone Propionate (XHANCE) 93 MCG/ACT EXHU SPRAY ONE SPRAY IN EACH NOSTRIL TWICE DAILY Yes Fre Camacho MD   levothyroxine (SYNTHROID) 150 MCG tablet TAKE 1 TABLET BY MOUTH DAILY Yes An Cabrera MD   aspirin (ASPIRIN LOW DOSE) 81 MG EC tablet TAKE ONE TABLET BY MOUTH DAILY Yes Prince Ge MD   ipratropium 0.5 mg-albuterol 2.5 mg (DUONEB) 0.5-2.5 (3) MG/3ML SOLN nebulizer solution Inhale 3 mLs into the lungs every 4 hours Yes Prince Ge MD   folic acid (FOLVITE) 1 MG tablet TAKE ONE TABLET BY MOUTH DAILY Yes Castillo Adkins

## 2024-02-28 NOTE — ASSESSMENT & PLAN NOTE
Not had mammo since 2017.   Has what appears to be eczema over right and left areola.   Will obtain mammo to screen.   - ordered mammo

## 2024-02-28 NOTE — PATIENT INSTRUCTIONS
-  refills from pharmacy.   - Schedule mammogram.   - Get your iron labs done.   - Follow up with me in around 2 months.

## 2024-02-28 NOTE — ASSESSMENT & PLAN NOTE
Patient with fatigue, low Hb 12. Had iron infusions in past, last >1 year ago.   - ordered Iron labs to evaluate for DOM  - will review results and determine need for IV iron infusions as she has had in past

## 2024-02-29 ENCOUNTER — TELEPHONE (OUTPATIENT)
Dept: INTERNAL MEDICINE CLINIC | Age: 49
End: 2024-02-29

## 2024-02-29 NOTE — PROGRESS NOTES
Patient updated on hand surgery referral. Spoke to her over phone regarding this, patient expressed thanks and understanding.

## 2024-02-29 NOTE — PROGRESS NOTES
Attempted to call patient x2. Still ringing busy. Please let me know if she calls back as I have results to relay to her.

## 2024-03-06 ENCOUNTER — TELEPHONE (OUTPATIENT)
Dept: INTERNAL MEDICINE CLINIC | Age: 49
End: 2024-03-06

## 2024-03-06 DIAGNOSIS — G56.03 BILATERAL CARPAL TUNNEL SYNDROME: Primary | ICD-10-CM

## 2024-03-06 NOTE — TELEPHONE ENCOUNTER
PT STATED SHE NEED NEW REF SENT TO A DIFFERENT ORTHO DR. PT STATED SHE DOES NOT WANT TO SEE ORTHO DR SHE WAS REFERRED TO. PLEASE SEND NEW REF TO DR ANEUDY SWENSON WITH [facility]. [phone]. PT ALSO WANTS A CALL BACK WHEN REF SENT. PT CAN BE REACHED [phone]

## 2024-03-06 NOTE — PROGRESS NOTES
The patient had a referral placed for orthopedic evaluation of carpal tunnel. She does not want to see the orthopedist she was referred to. She requested a referral to [facility]. Referral placed with number provided. Attempted to call patient back and inform, unable to reach.

## 2024-03-06 NOTE — TELEPHONE ENCOUNTER
Patient want's this referral for her hands and arms states has numbness in them. Also want's Emg sent to Kimberlee's office.

## 2024-03-07 NOTE — TELEPHONE ENCOUNTER
Patient notified that the Ortho. Referral and EMG results was sent to Dr. Mohan office at the [facility]. She stated she understood.

## 2024-03-12 DIAGNOSIS — E06.3 HYPOTHYROIDISM DUE TO HASHIMOTO'S THYROIDITIS: Chronic | ICD-10-CM

## 2024-03-12 DIAGNOSIS — I48.91 ATRIAL FIBRILLATION WITH RVR (HCC): ICD-10-CM

## 2024-03-12 DIAGNOSIS — E03.8 HYPOTHYROIDISM DUE TO HASHIMOTO'S THYROIDITIS: Chronic | ICD-10-CM

## 2024-03-12 RX ORDER — FOLIC ACID 1 MG/1
TABLET ORAL
Qty: 90 TABLET | Refills: 3 | Status: SHIPPED | OUTPATIENT
Start: 2024-03-12

## 2024-03-12 RX ORDER — PANTOPRAZOLE SODIUM 40 MG/1
40 TABLET, DELAYED RELEASE ORAL
Qty: 180 TABLET | Refills: 3 | Status: SHIPPED | OUTPATIENT
Start: 2024-03-12

## 2024-03-12 RX ORDER — DILTIAZEM HYDROCHLORIDE 120 MG/1
120 CAPSULE, COATED, EXTENDED RELEASE ORAL DAILY
Qty: 90 CAPSULE | Refills: 0 | Status: SHIPPED | OUTPATIENT
Start: 2024-03-12

## 2024-03-12 NOTE — TELEPHONE ENCOUNTER
Requested Prescriptions     Pending Prescriptions Disp Refills    folic acid (FOLVITE) 1 MG tablet [Pharmacy Med Name: FOLIC ACID 1 MG TABLET] 90 tablet      Sig: TAKE 1 TABLET BY MOUTH DAILY       Last Clinic Visit:  2/28/2024     Next Clinic Appointment:  5/8/2024

## 2024-03-12 NOTE — TELEPHONE ENCOUNTER
Requested Prescriptions     Pending Prescriptions Disp Refills    pantoprazole (PROTONIX) 40 MG tablet [Pharmacy Med Name: PANTOPRAZOLE SOD DR 40 MG TAB] 180 tablet      Sig: TAKE 1 TABLET BY MOUTH TWICE A DAY BEFORE MEALS       Last Clinic Visit:  2/28/2024     Next Clinic Appointment:  3/12/2024

## 2024-03-13 RX ORDER — LIOTHYRONINE SODIUM 5 UG/1
TABLET ORAL
Qty: 180 TABLET | Refills: 5 | Status: SHIPPED | OUTPATIENT
Start: 2024-03-13

## 2024-03-13 NOTE — TELEPHONE ENCOUNTER
Medication:   Requested Prescriptions     Pending Prescriptions Disp Refills    liothyronine (CYTOMEL) 5 MCG tablet [Pharmacy Med Name: LIOTHYRONINE SOD 5 MCG TAB] 180 tablet 5     Sig: TAKE TWO TABLETS BY MOUTH DAILY       Last Filled:      Patient Phone Number: [phone] (home)     Last appt: 1/20/2023   Next appt: Visit date not found    Last Thyroid:   Lab Results   Component Value Date/Time    TSH 8.28 09/17/2020 04:09 PM    FT3 2.7 07/23/2021 01:41 PM    T4FREE 0.9 07/23/2021 01:41 PM

## 2024-03-19 ENCOUNTER — TELEPHONE (OUTPATIENT)
Dept: ENT CLINIC | Age: 49
End: 2024-03-19

## 2024-03-19 DIAGNOSIS — J32.4 CHRONIC PANSINUSITIS: ICD-10-CM

## 2024-03-19 DIAGNOSIS — J01.90 ACUTE SINUSITIS, RECURRENCE NOT SPECIFIED, UNSPECIFIED LOCATION: ICD-10-CM

## 2024-03-19 RX ORDER — PREDNISONE 10 MG/1
TABLET ORAL
Qty: 38 TABLET | Refills: 0 | Status: SHIPPED | OUTPATIENT
Start: 2024-03-19

## 2024-03-19 RX ORDER — AMOXICILLIN AND CLAVULANATE POTASSIUM 875; 125 MG/1; MG/1
1 TABLET, FILM COATED ORAL 2 TIMES DAILY
Qty: 42 TABLET | Refills: 0 | Status: SHIPPED | OUTPATIENT
Start: 2024-03-19 | End: 2024-04-09

## 2024-03-19 NOTE — TELEPHONE ENCOUNTER
Called and spoke with patient to get more information re: sinus infection and request for (the two medications) steroid and antibiotic medications. States finished antibiotic 5 days ago and prednisone yesterday. \"Symptoms have started up all over again.\" States has yellow green nasal discharge, post nasal drip, hoarse throat, cough \"going into my chest.\" Sinus and ear congestion. Is using dinus rinse, flonase and zyrtec d, as well as Benadryl at night if she needs to.   Pharmacy on file is correct.

## 2024-03-19 NOTE — TELEPHONE ENCOUNTER
Patient has called to say she still has infection  please extend  the (2)RX for sinus please call patient and let her know the RX has been called in  thank you

## 2024-03-22 ENCOUNTER — TELEPHONE (OUTPATIENT)
Dept: ENT CLINIC | Age: 49
End: 2024-03-22

## 2024-03-22 NOTE — TELEPHONE ENCOUNTER
Patient called back  to give more information  [place] [facility] for medicaid  [phone] they need information on this  RX  Itraconazole  they need to know why it  was prescribe  . She  uses this to keep down yeast infections because of the Augmentin  please call when this is done  thank you

## 2024-03-22 NOTE — TELEPHONE ENCOUNTER
Patient called says the Anti fungal medication sent to her [facility] was denied and will need a prior authorization sent first.    Please give her Pharmacy a call and please contact the patient back with any updates as she will need this medicine before starting the other round of antibiotics.    Thanks!

## 2024-03-25 ENCOUNTER — HOSPITAL ENCOUNTER (EMERGENCY)
Age: 49
Discharge: HOME | End: 2024-03-26
Attending: EMERGENCY MEDICINE
Payer: COMMERCIAL

## 2024-03-25 ENCOUNTER — APPOINTMENT (OUTPATIENT)
Dept: GENERAL RADIOLOGY | Age: 49
End: 2024-03-25
Payer: COMMERCIAL

## 2024-03-25 DIAGNOSIS — J18.9 ATYPICAL PNEUMONIA: Primary | ICD-10-CM

## 2024-03-25 LAB
ALBUMIN SERPL-MCNC: 4.2 G/DL (ref 3.4–5)
ALBUMIN/GLOB SERPL: 1 {RATIO} (ref 1.1–2.2)
ALP SERPL-CCNC: 172 U/L (ref 40–129)
ALT SERPL-CCNC: 35 U/L (ref 10–40)
ANION GAP SERPL CALCULATED.3IONS-SCNC: 20 MMOL/L (ref 3–16)
AST SERPL-CCNC: 29 U/L (ref 15–37)
BASE EXCESS BLDV CALC-SCNC: -5.2 MMOL/L
BASOPHILS # BLD: 0.1 K/UL (ref 0–0.2)
BASOPHILS NFR BLD: 0.7 %
BILIRUB SERPL-MCNC: <0.2 MG/DL (ref 0–1)
BUN SERPL-MCNC: 13 MG/DL (ref 7–20)
CALCIUM SERPL-MCNC: 8.9 MG/DL (ref 8.3–10.6)
CHLORIDE SERPL-SCNC: 104 MMOL/L (ref 99–110)
CO2 BLDV-SCNC: 21 MMOL/L
CO2 SERPL-SCNC: 17 MMOL/L (ref 21–32)
COHGB MFR BLDV: 1.6 %
CREAT SERPL-MCNC: 0.8 MG/DL (ref 0.6–1.1)
DEPRECATED RDW RBC AUTO: 17.8 % (ref 12.4–15.4)
EOSINOPHIL # BLD: 0 K/UL (ref 0–0.6)
EOSINOPHIL NFR BLD: 0 %
GFR SERPLBLD CREATININE-BSD FMLA CKD-EPI: 89 ML/MIN/{1.73_M2}
GLUCOSE SERPL-MCNC: 113 MG/DL (ref 70–99)
HCO3 BLDV-SCNC: 20 MMOL/L (ref 23–29)
HCT VFR BLD AUTO: 29.8 % (ref 36–48)
HGB BLD-MCNC: 9.2 G/DL (ref 12–16)
LYMPHOCYTES # BLD: 1.1 K/UL (ref 1–5.1)
LYMPHOCYTES NFR BLD: 11.3 %
MCH RBC QN AUTO: 22.1 PG (ref 26–34)
MCHC RBC AUTO-ENTMCNC: 30.7 G/DL (ref 31–36)
MCV RBC AUTO: 72 FL (ref 80–100)
METHGB MFR BLDV: 0.8 %
MONOCYTES # BLD: 0.7 K/UL (ref 0–1.3)
MONOCYTES NFR BLD: 7.4 %
NEUTROPHILS # BLD: 7.8 K/UL (ref 1.7–7.7)
NEUTROPHILS NFR BLD: 80.6 %
O2 THERAPY: ABNORMAL
PCO2 BLDV: 34.3 MMHG (ref 40–50)
PH BLDV: 7.36 [PH] (ref 7.35–7.45)
PLATELET # BLD AUTO: 241 K/UL (ref 135–450)
PMV BLD AUTO: 8.7 FL (ref 5–10.5)
PO2 BLDV: 43 MMHG
POTASSIUM SERPL-SCNC: 3.4 MMOL/L (ref 3.5–5.1)
PROT SERPL-MCNC: 8.6 G/DL (ref 6.4–8.2)
RBC # BLD AUTO: 4.14 M/UL (ref 4–5.2)
SAO2 % BLDV: 74 %
SODIUM SERPL-SCNC: 141 MMOL/L (ref 136–145)
WBC # BLD AUTO: 9.7 K/UL (ref 4–11)

## 2024-03-25 PROCEDURE — 71045 X-RAY EXAM CHEST 1 VIEW: CPT

## 2024-03-25 PROCEDURE — 93005 ELECTROCARDIOGRAM TRACING: CPT | Performed by: EMERGENCY MEDICINE

## 2024-03-25 PROCEDURE — 99285 EMERGENCY DEPT VISIT HI MDM: CPT

## 2024-03-25 PROCEDURE — 85025 COMPLETE CBC W/AUTO DIFF WBC: CPT

## 2024-03-25 PROCEDURE — 96365 THER/PROPH/DIAG IV INF INIT: CPT

## 2024-03-25 PROCEDURE — 82803 BLOOD GASES ANY COMBINATION: CPT

## 2024-03-25 PROCEDURE — 83735 ASSAY OF MAGNESIUM: CPT

## 2024-03-25 PROCEDURE — 83880 ASSAY OF NATRIURETIC PEPTIDE: CPT

## 2024-03-25 PROCEDURE — 80053 COMPREHEN METABOLIC PANEL: CPT

## 2024-03-25 PROCEDURE — 84484 ASSAY OF TROPONIN QUANT: CPT

## 2024-03-25 ASSESSMENT — PAIN - FUNCTIONAL ASSESSMENT: PAIN_FUNCTIONAL_ASSESSMENT: PREVENTS OR INTERFERES SOME ACTIVE ACTIVITIES AND ADLS

## 2024-03-25 ASSESSMENT — PAIN DESCRIPTION - ONSET: ONSET: ON-GOING

## 2024-03-25 ASSESSMENT — PAIN SCALES - GENERAL: PAINLEVEL_OUTOF10: 8

## 2024-03-25 ASSESSMENT — PAIN DESCRIPTION - ORIENTATION: ORIENTATION: MID

## 2024-03-25 ASSESSMENT — PAIN DESCRIPTION - DESCRIPTORS: DESCRIPTORS: TIGHTNESS

## 2024-03-25 ASSESSMENT — PAIN DESCRIPTION - FREQUENCY: FREQUENCY: CONTINUOUS

## 2024-03-25 ASSESSMENT — PAIN DESCRIPTION - LOCATION: LOCATION: CHEST

## 2024-03-25 ASSESSMENT — PAIN DESCRIPTION - PAIN TYPE: TYPE: ACUTE PAIN

## 2024-03-25 NOTE — TELEPHONE ENCOUNTER
Called insurance authorization team-the want this writer to do a peer to peer due to med being denied twice. I explained patient has allergy to Fluconazole and it was on original information that was sent by us to them. Still asking for Peer to Peer.  Called and L/M on patients VM with above status.

## 2024-03-26 ENCOUNTER — APPOINTMENT (OUTPATIENT)
Dept: CT IMAGING | Age: 49
End: 2024-03-26
Payer: COMMERCIAL

## 2024-03-26 VITALS
WEIGHT: 231.48 LBS | BODY MASS INDEX: 41.02 KG/M2 | HEART RATE: 92 BPM | TEMPERATURE: 97.6 F | DIASTOLIC BLOOD PRESSURE: 89 MMHG | SYSTOLIC BLOOD PRESSURE: 135 MMHG | RESPIRATION RATE: 18 BRPM | OXYGEN SATURATION: 99 % | HEIGHT: 63 IN

## 2024-03-26 LAB
APTT BLD: 21.8 SEC (ref 22.7–35.9)
EKG ATRIAL RATE: 101 BPM
EKG DIAGNOSIS: NORMAL
EKG P AXIS: 17 DEGREES
EKG P-R INTERVAL: 144 MS
EKG Q-T INTERVAL: 344 MS
EKG QRS DURATION: 76 MS
EKG QTC CALCULATION (BAZETT): 446 MS
EKG R AXIS: -21 DEGREES
EKG T AXIS: 110 DEGREES
EKG VENTRICULAR RATE: 101 BPM
INR PPP: 0.97 (ref 0.84–1.16)
MAGNESIUM SERPL-MCNC: 2.1 MG/DL (ref 1.8–2.4)
NT-PROBNP SERPL-MCNC: <36 PG/ML (ref 0–124)
PROTHROMBIN TIME: 12.9 SEC (ref 11.5–14.8)
TROPONIN, HIGH SENSITIVITY: 13 NG/L (ref 0–14)
TROPONIN, HIGH SENSITIVITY: 13 NG/L (ref 0–14)

## 2024-03-26 PROCEDURE — 6360000004 HC RX CONTRAST MEDICATION: Performed by: EMERGENCY MEDICINE

## 2024-03-26 PROCEDURE — 93010 ELECTROCARDIOGRAM REPORT: CPT | Performed by: INTERNAL MEDICINE

## 2024-03-26 PROCEDURE — 6360000002 HC RX W HCPCS: Performed by: EMERGENCY MEDICINE

## 2024-03-26 PROCEDURE — 84484 ASSAY OF TROPONIN QUANT: CPT

## 2024-03-26 PROCEDURE — 94640 AIRWAY INHALATION TREATMENT: CPT

## 2024-03-26 PROCEDURE — 85610 PROTHROMBIN TIME: CPT

## 2024-03-26 PROCEDURE — 96365 THER/PROPH/DIAG IV INF INIT: CPT

## 2024-03-26 PROCEDURE — 85730 THROMBOPLASTIN TIME PARTIAL: CPT

## 2024-03-26 PROCEDURE — 2580000003 HC RX 258: Performed by: EMERGENCY MEDICINE

## 2024-03-26 PROCEDURE — 6370000000 HC RX 637 (ALT 250 FOR IP): Performed by: EMERGENCY MEDICINE

## 2024-03-26 PROCEDURE — 71260 CT THORAX DX C+: CPT

## 2024-03-26 PROCEDURE — 94760 N-INVAS EAR/PLS OXIMETRY 1: CPT

## 2024-03-26 RX ORDER — IPRATROPIUM BROMIDE AND ALBUTEROL SULFATE 2.5; .5 MG/3ML; MG/3ML
3 SOLUTION RESPIRATORY (INHALATION) ONCE
Status: COMPLETED | OUTPATIENT
Start: 2024-03-26 | End: 2024-03-26

## 2024-03-26 RX ORDER — AZITHROMYCIN 250 MG/1
TABLET, FILM COATED ORAL
Qty: 6 TABLET | Refills: 0 | Status: SHIPPED | OUTPATIENT
Start: 2024-03-26 | End: 2024-04-05

## 2024-03-26 RX ORDER — AZITHROMYCIN 250 MG/1
TABLET, FILM COATED ORAL
Qty: 6 TABLET | Refills: 0 | Status: SHIPPED | OUTPATIENT
Start: 2024-03-26 | End: 2024-03-26

## 2024-03-26 RX ADMIN — IOPAMIDOL 75 ML: 755 INJECTION, SOLUTION INTRAVENOUS at 01:05

## 2024-03-26 RX ADMIN — AZITHROMYCIN MONOHYDRATE 500 MG: 500 INJECTION, POWDER, LYOPHILIZED, FOR SOLUTION INTRAVENOUS at 02:20

## 2024-03-26 RX ADMIN — IPRATROPIUM BROMIDE AND ALBUTEROL SULFATE 3 DOSE: 2.5; .5 SOLUTION RESPIRATORY (INHALATION) at 01:03

## 2024-03-26 NOTE — ED NOTES
Provider order placed for patient's discharge. Provider reviewed decision to discharge with the patient. Discharge paperwork and any prescriptions were reviewed with the patient. Patient verbalized understanding of discharge education and any prescriptions and has no further questions or further needs at this time. Patient left with all personal belongings and was stable upon departure. Patient thanked for choosing [facility] and informed to return should any need arise.

## 2024-03-26 NOTE — ED TRIAGE NOTES
Patient to the ER with complaints of worsening shortness of breath, cough, and chest tightness.  Patient is currently being treated for a URI and gave herself 4 nebulizer treatments today that helped up until the last one.   The 4th treatment didn't work and she says she feels \"hoarse\" now.     Patient has hx of A-fib and asthma.

## 2024-03-26 NOTE — TELEPHONE ENCOUNTER
Received call from Kenyetta Santiago Prior-Authorization pharmacist. Gave additional information to Kenyetta, patient is now approved for itraconazole.   Patient called and informed. I will send approval to patient pharmacy when I receive the approval letter. Patient verbalized understanding.

## 2024-03-26 NOTE — ED PROVIDER NOTES
[facility] EMERGENCY DEPARTMENT    EMERGENCY DEPARTMENT ENCOUNTER        Patient Name: Kenroy Griggs  MRN: [id number]  Birthdate 1975  Date of evaluation: 3/25/2024  PCP: Rosalie Fitzgerald MD  Note Started: 2:44 AM EDT 3/26/24    CHIEF COMPLAINT       Chest Pain and Shortness of Breath      HISTORY OF PRESENT ILLNESS: 1 or more Elements       Kenroy Griggs is a 48 y.o. female who presents to the emergency department for evaluation of chest pain shortness of breath.  Reports having chest pain and shortness of breath for the last several weeks.  States she was on Augmentin prescription for about 2 weeks and then when she stopped taking the Augmentin, she started having worsening cough and drainage. Says she had 3 week prescription of augmentin and prednisone called in by her PCP.  However, despite restarting the Augmentin and taking the prednisone, she is not feeling any better.  She has albuterol neb treatments at home.  Despite taking them, says she is not feeling any better.  Denies having chest pain or abdominal pain.    No other complaints, modifying factors or associated symptoms.     History obtained by the patient unless stated otherwise as above on HPI.   No limitations unless specified as above on HPI.     Past medical history:   Past Medical History:   Diagnosis Date    Autoimmune hepatitis (HCC)     Chronic sinusitis     COVID-19     Hypothyroidism        Past surgical history:   Past Surgical History:   Procedure Laterality Date    COLONOSCOPY N/A 12/29/2020    COLONOSCOPY DIAGNOSTIC performed by Prince Hankins MD at [facility] ENDOSCOPY    KNEE ARTHROSCOPY Left 9/9/2022    LEFT KNEE ARTHROSCOPY; PARTIAL MEDIAL MENISCECTOMY performed by Rio Ty MD at [facility] OR    OTHER SURGICAL HISTORY  8/22/2012    INCISION AND DRAINAGE POSTERIOR THIGH ABSCESS    PRE-MALIGNANT / BENIGN SKIN LESION EXCISION N/A 2/8/2022    EXCISION OF SCALP CYST performed by Milan Win MD at [facility] OR  FINDINGS: The heart is top-normal in size.  The pulmonary vessels are normal.  There are mild linear densities scattered along the lung bases which is more prominent.  No consolidation or effusion is seen.     Mild bibasilar discoid atelectasis or scarring which is more prominent with no obvious acute infiltrate or effusion         Bedside Ultrasound, as interpreted by me, if performed:    No results found.    PROCEDURES     Unless otherwise noted below, none     Procedures    CRITICAL CARE TIME     I personally spent a total of 0 minutes of critical care time in obtaining history, performing a physical exam, bedside monitoring of interventions, collecting and interpreting tests and discussion with consultants but excluding time spent performing procedures, treating other patients and teaching time.                                                                                                         EMERGENCY DEPARTMENT COURSE and DIFFERENTIAL DIAGNOSIS/MDM:     Patient seen and evaluated. At presentation, patient was awake, alert, afebrile, he was agreeable, and satting 99% on room air.  She was noted to be coughing.  Minimal wheezing appreciated bilaterally.  Given DuoNebs for symptoms. Differential diagnosis includes ACS, arrhythmia, PE, among others.  VBG shows pH of 7.364, pCO2 34, bicarb 20.  BNP less than 36 not consistent with heart failure.  Troponin within the normal limits at 13.  Creatinine is normal.  Liver enzymes within normal limits.  No leukocytosis.  Hemoglobin is 9.2.  Repeat troponin again 13.  CT PE obtained due to ongoing symptoms.  CT shows no evidence of pulmonary embolism and atypical/viral pneumonia.  Discussed the results of the workup with patient.  Discussed the risks associated with antibiotic use and the potential to develop antibiotic resistance or even C. difficile infections.  Discussed following up with her ENT doctor or her PCP to see if she needs to continue the Augmentin.  In

## 2024-03-26 NOTE — DISCHARGE INSTRUCTIONS
Take the z leatha as prescribed. Please follow up with your PCP and/or ENT tomorrow to discuss your current antibiotics.   If persistent or worsening symptoms, or if you have any concerns, return to ED immediately.

## 2024-03-28 ENCOUNTER — TELEPHONE (OUTPATIENT)
Dept: INTERNAL MEDICINE CLINIC | Age: 49
End: 2024-03-28

## 2024-03-28 ENCOUNTER — OFFICE VISIT (OUTPATIENT)
Dept: INTERNAL MEDICINE CLINIC | Age: 49
End: 2024-03-28
Payer: COMMERCIAL

## 2024-03-28 VITALS
SYSTOLIC BLOOD PRESSURE: 142 MMHG | HEIGHT: 63 IN | DIASTOLIC BLOOD PRESSURE: 83 MMHG | WEIGHT: 234.9 LBS | OXYGEN SATURATION: 98 % | RESPIRATION RATE: 20 BRPM | TEMPERATURE: 98.2 F | BODY MASS INDEX: 41.62 KG/M2 | HEART RATE: 99 BPM

## 2024-03-28 DIAGNOSIS — R05.2 SUBACUTE COUGH: Primary | ICD-10-CM

## 2024-03-28 PROCEDURE — 99213 OFFICE O/P EST LOW 20 MIN: CPT

## 2024-03-28 RX ORDER — LEVOFLOXACIN 500 MG/1
500 TABLET, FILM COATED ORAL DAILY
Qty: 7 TABLET | Refills: 0 | Status: SHIPPED | OUTPATIENT
Start: 2024-03-28 | End: 2024-03-28

## 2024-03-28 RX ORDER — LEVOFLOXACIN 750 MG/1
750 TABLET, FILM COATED ORAL DAILY
Qty: 7 TABLET | Refills: 0 | Status: SHIPPED | OUTPATIENT
Start: 2024-03-28 | End: 2024-04-04

## 2024-03-28 RX ORDER — LEVOFLOXACIN 500 MG/1
750 TABLET, FILM COATED ORAL DAILY
Qty: 11 TABLET | Refills: 0 | Status: SHIPPED | OUTPATIENT
Start: 2024-03-28 | End: 2024-03-28

## 2024-03-28 RX ORDER — CODEINE PHOSPHATE/GUAIFENESIN 10-100MG/5
5 LIQUID (ML) ORAL 2 TIMES DAILY PRN
Qty: 237 ML | Refills: 1 | Status: SHIPPED | OUTPATIENT
Start: 2024-03-28 | End: 2024-05-14

## 2024-03-28 RX ORDER — BENZONATATE 200 MG/1
200 CAPSULE ORAL 3 TIMES DAILY PRN
Qty: 60 CAPSULE | Refills: 1 | Status: SHIPPED | OUTPATIENT
Start: 2024-03-28 | End: 2024-05-07

## 2024-03-28 RX ORDER — IPRATROPIUM BROMIDE AND ALBUTEROL SULFATE 2.5; .5 MG/3ML; MG/3ML
1 SOLUTION RESPIRATORY (INHALATION) EVERY 4 HOURS
Qty: 360 ML | Refills: 1 | Status: SHIPPED | OUTPATIENT
Start: 2024-03-28

## 2024-03-28 ASSESSMENT — ENCOUNTER SYMPTOMS
SHORTNESS OF BREATH: 1
RHINORRHEA: 1
COUGH: 1
GASTROINTESTINAL NEGATIVE: 1
ALLERGIC/IMMUNOLOGIC NEGATIVE: 1
EYES NEGATIVE: 1
CHEST TIGHTNESS: 1
WHEEZING: 1

## 2024-03-28 NOTE — PROGRESS NOTES
Clarification of antibiotics.    Per .    Stop the Augmentin now. Start the Levaquin today (patient has not taken Augmentin yet today) Continue the Azithromycin as ordered and take until gone. (patient has 2-3 days of Azithromycin left). The Levaquin and Azithromycin will be taken together for the next 2-3 days.  Patient understands.

## 2024-03-28 NOTE — TELEPHONE ENCOUNTER
PT STATED SHE NEED CLARIFICATION RE: ANTIBIOTICS MEDICATION OLD AND NEW RX'S. PT CAN BE REACHED [phone]

## 2024-03-28 NOTE — PROGRESS NOTES
[facility]    Kenroy Griggs is a 48 y.o. female, here for evaluation of the following concerns:      HPI  Patient arrived to the clinic today with ongoing cough, congestion, postnasal drip, feeling ill.  She has been having these symptoms for about a month now, she received a 2-week course of Augmentin, prednisone taper and itraconazole.  Her symptoms improved during that time however 4 days after the 2-week course, she started deteriorating again.  Her ENT physician prescribed another course of Augmentin and prednisone taper since 3/19/24 however that did not improve her symptoms.  She visited the ED on 3/25/24 where she had a chest CT scan done which showed a possible atypical/viral pneumonia and she was started on Z-Fam.  She mentions that her symptoms have failed to improve and she continues to feel ill.    Review of Systems   Constitutional: Negative.    HENT:  Positive for congestion and rhinorrhea.    Eyes: Negative.    Respiratory:  Positive for cough, chest tightness, shortness of breath and wheezing.    Cardiovascular: Negative.    Gastrointestinal: Negative.    Endocrine: Negative.    Genitourinary: Negative.    Musculoskeletal: Negative.    Allergic/Immunologic: Negative.    Neurological: Negative.    Hematological: Negative.    Psychiatric/Behavioral: Negative.         MEDICATIONS:  Prior to Visit Medications    Medication Sig Taking? Authorizing Provider   guaiFENesin-codeine (TUSSI-ORGANIDIN NR) 100-10 MG/5ML syrup Take 5 mLs by mouth 2 times daily as needed for Cough or Congestion for up to 47 days. Max Daily Amount: 10 mLs Yes Alex Medina MD   benzonatate (TESSALON) 200 MG capsule Take 1 capsule by mouth 3 times daily as needed for Cough Yes Alex Medina MD   ipratropium 0.5 mg-albuterol 2.5 mg (DUONEB) 0.5-2.5 (3) MG/3ML SOLN nebulizer solution Inhale 3 mLs into the lungs every 4 hours Yes Alex Medina MD   levoFLOXacin (LEVAQUIN) 750 MG tablet

## 2024-03-28 NOTE — PATIENT INSTRUCTIONS
Thank you for visiting the [facility] outpatient clinic!  I prescribed Levaquin which provides better coverage for your pneumonia.  Please start taking 1 pill of 750 mg a day for the next 7 days.  Please stop taking Augmentin as you will now be taking Augmentin.  I have also prescribed Tessalon Perles, please take 1 pill 3 times daily as needed for your cough.  I also prescribed a cough syrup please take it twice daily as needed for your cough.  Please use your nebulizer 4 times daily as it should help your lungs with the cough medications.  Please return to the clinic to see how we are doing.  Please do not hesitate to call us or visit the ED if your symptoms do not improve or worsen prior to your next visit.

## 2024-03-29 ENCOUNTER — TELEPHONE (OUTPATIENT)
Dept: INTERNAL MEDICINE CLINIC | Age: 49
End: 2024-03-29

## 2024-03-29 NOTE — TELEPHONE ENCOUNTER
PT LVM STATING RX FOR GUAIFENESIN-CODEINE NEEDED A P.A. PT STATED SHE PAID OUT OF POCKET FOR IT AND SHE WANTS P.A. SENT TO HER [facility] SO SHE CAN GET HER MONEY BACK. PT CAN BE REACHED [phone]

## 2024-03-31 ENCOUNTER — HOSPITAL ENCOUNTER (EMERGENCY)
Age: 49
Discharge: HOME | End: 2024-03-31
Payer: COMMERCIAL

## 2024-03-31 VITALS
WEIGHT: 230 LBS | SYSTOLIC BLOOD PRESSURE: 134 MMHG | HEIGHT: 63 IN | DIASTOLIC BLOOD PRESSURE: 86 MMHG | RESPIRATION RATE: 18 BRPM | BODY MASS INDEX: 40.75 KG/M2 | HEART RATE: 79 BPM | OXYGEN SATURATION: 99 % | TEMPERATURE: 97.9 F

## 2024-03-31 DIAGNOSIS — S61.011A LACERATION OF RIGHT THUMB WITHOUT FOREIGN BODY WITHOUT DAMAGE TO NAIL, INITIAL ENCOUNTER: Primary | ICD-10-CM

## 2024-03-31 PROCEDURE — 12001 RPR S/N/AX/GEN/TRNK 2.5CM/<: CPT

## 2024-03-31 PROCEDURE — 99282 EMERGENCY DEPT VISIT SF MDM: CPT

## 2024-03-31 ASSESSMENT — PAIN DESCRIPTION - ORIENTATION: ORIENTATION: RIGHT

## 2024-03-31 ASSESSMENT — LIFESTYLE VARIABLES
HOW MANY STANDARD DRINKS CONTAINING ALCOHOL DO YOU HAVE ON A TYPICAL DAY: 3 OR 4
HOW OFTEN DO YOU HAVE A DRINK CONTAINING ALCOHOL: 2-3 TIMES A WEEK

## 2024-03-31 ASSESSMENT — PAIN DESCRIPTION - DESCRIPTORS: DESCRIPTORS: THROBBING

## 2024-03-31 ASSESSMENT — PAIN - FUNCTIONAL ASSESSMENT: PAIN_FUNCTIONAL_ASSESSMENT: 0-10

## 2024-03-31 ASSESSMENT — PAIN DESCRIPTION - LOCATION: LOCATION: FINGER (COMMENT WHICH ONE)

## 2024-03-31 ASSESSMENT — PAIN SCALES - GENERAL: PAINLEVEL_OUTOF10: 7

## 2024-03-31 NOTE — ED PROVIDER NOTES
visualized the radiologic plain film image(s) with the below findings:      Interpretation per the Radiologist below, if available at the time of this note:    No orders to display     No results found.   No results found.    MEDICAL DECISION MAKING / ED COURSE:      PROCEDURES:   Lac Repair    Date/Time: 3/31/2024 4:37 PM    Performed by: Caleb Spaulding PA-C  Authorized by: Caleb Spaulding PA-C    Consent:     Consent obtained:  Verbal    Consent given by:  Patient  Universal protocol:     Patient identity confirmed:  Verbally with patient  Anesthesia:     Anesthesia method:  Nerve block    Block anesthetic:  Bupivacaine 0.5% w/o epi    Block outcome:  Anesthesia achieved  Laceration details:     Location:  Finger    Finger location:  R thumb    Length (cm):  2    Depth (mm):  2  Pre-procedure details:     Preparation:  Patient was prepped and draped in usual sterile fashion  Exploration:     Limited defect created (wound extended): no      Hemostasis achieved with:  Direct pressure    Imaging outcome: foreign body not noted      Wound exploration: wound explored through full range of motion and entire depth of wound visualized      Wound extent: areolar tissue violated      Wound extent: no fascia violation noted, no foreign bodies/material noted, no tendon damage noted and no underlying fracture noted      Contaminated: no    Treatment:     Area cleansed with:  Chlorhexidine    Amount of cleaning:  Standard    Visualized foreign bodies/material removed: no      Debridement:  None    Undermining:  Minimal    Scar revision: no      Layers repaired: None.  Approximation:     Approximation:  Close  Repair type:     Repair type:  Complex  Post-procedure details:     Dressing: Vaseline gauze and sterile bandaging.    Procedure completion:  Tolerated well, no immediate complications      Patient was given:  Medications - No data to display    CONSULTS: (Who and What was discussed)  None      Chronic Conditions

## 2024-03-31 NOTE — DISCHARGE INSTRUCTIONS
Keep the area relatively clean and dry and covered with Neosporin and new bandage at least a couple of times daily.  Over-the-counter medication such as Tylenol or ibuprofen for comfort is appropriate.  Monitor for gradual improvement and follow-up with your family doctor for further care and treatment if needed.  Stitches are self dissolving and should come loose in about a week to a week and a half.  They should be removed if still present in 2 weeks.  Return to the ER for any emergency worsening or concern.

## 2024-04-03 ENCOUNTER — PROCEDURE VISIT (OUTPATIENT)
Dept: SPEECH THERAPY | Age: 49
End: 2024-04-03
Payer: COMMERCIAL

## 2024-04-03 DIAGNOSIS — R49.0 DYSPHONIA: Primary | ICD-10-CM

## 2024-04-03 DIAGNOSIS — J38.4 VOCAL FOLD EDEMA: ICD-10-CM

## 2024-04-03 DIAGNOSIS — R49.0 MUSCLE TENSION DYSPHONIA: ICD-10-CM

## 2024-04-03 PROCEDURE — 31579 LARYNGOSCOPY TELESCOPIC: CPT | Performed by: SPEECH-LANGUAGE PATHOLOGIST

## 2024-04-03 PROCEDURE — 92507 TX SP LANG VOICE COMM INDIV: CPT | Performed by: SPEECH-LANGUAGE PATHOLOGIST

## 2024-04-03 PROCEDURE — 92524 BEHAVRAL QUALIT ANALYS VOICE: CPT | Performed by: SPEECH-LANGUAGE PATHOLOGIST

## 2024-04-03 NOTE — PROGRESS NOTES
[facility]  Videostroboscopic Examination of the Larynx      BACKGROUND HISTORY:   PMHx of dysphonia d/t chronic pansinusitis w/ initial onset ~2013; professional voice user as a luciano/performer, including participation w/ multiple choirs/groups, [employer] team at [employer], and solo performances. Reported chronic sinus infections w/ subsequent severe dysphonia vs aphonia; typically starts antibiotics + steroids w/ return of voice after 4-5 days. Occasional triggering of asthma w/ severe coughing which prolongs vocal recovery as well; recently seen in ER (3/25/24) w/ dx of pneumonia. Endorsed no vocal difficulties/concerns when not sick; able to reach traditional range w/ adequate control. Excellent vocal awareness + hygiene w/ no phonotrauma. Concerned for potential worsening of allergies this year; interested in repeat allergy testing (last completed >5 yrs prior). Taking Pantoprazole 40 mg in AM and monitoring diet for GERD.     Surgical/Medical History: See the above.  Hydration: >64 oz of water  Smoking History: None  Caffeine Intake: Tea    Previous SLP Evaluations:  VLS 1/3/22  VLS revealed generalized edema of the TVFs w/ erythema on bilateral medial edges at striking zone; suspect d/t prolonged coughing over the last 2 weeks. Increased vascularities on medial-superior surface of RTVF. Area of increased edema on anterior-medial 2/3rd of LTVF; pliable and vibratory in nature. TVFs stiff w/ moderately reduced superior-inferior and mediolateral wave; functional periodicity despite decreased movement. Glottic closure w/ posterior gap. Mild supraglottic compression.     EVALUATION:   Perceptual Quality:  Pt presented with mild-no dysphonia characterized by intermittent roughness; ongoing cough + throat clear d/t recovery from recent sinus infection + asthma flare.     Flexible Stroboscopy Laryngoscopy  Procedure : Flexible Stroboscopy Laryngoscopy  Performed by: Imelda Forrest, SLP  Anesthesia: Afrin

## 2024-04-04 ENCOUNTER — OFFICE VISIT (OUTPATIENT)
Dept: INTERNAL MEDICINE CLINIC | Age: 49
End: 2024-04-04
Payer: COMMERCIAL

## 2024-04-04 VITALS
DIASTOLIC BLOOD PRESSURE: 82 MMHG | SYSTOLIC BLOOD PRESSURE: 128 MMHG | OXYGEN SATURATION: 100 % | RESPIRATION RATE: 18 BRPM | WEIGHT: 231.9 LBS | HEART RATE: 80 BPM | BODY MASS INDEX: 41.08 KG/M2 | TEMPERATURE: 97 F

## 2024-04-04 DIAGNOSIS — J18.9 ATYPICAL PNEUMONIA: Primary | ICD-10-CM

## 2024-04-04 PROCEDURE — 99213 OFFICE O/P EST LOW 20 MIN: CPT

## 2024-04-04 RX ORDER — GUAIFENESIN 600 MG/1
600 TABLET, EXTENDED RELEASE ORAL 2 TIMES DAILY
Qty: 30 TABLET | Refills: 0 | Status: SHIPPED | OUTPATIENT
Start: 2024-04-04 | End: 2024-04-19

## 2024-04-04 RX ORDER — PREDNISONE 5 MG/1
TABLET ORAL
Qty: 8 TABLET | Refills: 0 | Status: SHIPPED | OUTPATIENT
Start: 2024-04-04 | End: 2024-04-09

## 2024-04-04 RX ORDER — LEVOFLOXACIN 750 MG/1
750 TABLET, FILM COATED ORAL DAILY
Qty: 7 TABLET | Refills: 0 | Status: SHIPPED | OUTPATIENT
Start: 2024-04-04 | End: 2024-04-04

## 2024-04-04 RX ORDER — PREDNISONE 5 MG/1
TABLET ORAL
Qty: 8 TABLET | Refills: 0 | Status: SHIPPED | OUTPATIENT
Start: 2024-04-04 | End: 2024-04-04

## 2024-04-04 RX ORDER — GUAIFENESIN 600 MG/1
600 TABLET, EXTENDED RELEASE ORAL 2 TIMES DAILY
Qty: 30 TABLET | Refills: 0 | Status: SHIPPED | OUTPATIENT
Start: 2024-04-04 | End: 2024-04-04

## 2024-04-04 RX ORDER — LEVOFLOXACIN 750 MG/1
750 TABLET, FILM COATED ORAL DAILY
Qty: 7 TABLET | Refills: 0 | Status: SHIPPED | OUTPATIENT
Start: 2024-04-04 | End: 2024-04-11

## 2024-04-04 ASSESSMENT — ENCOUNTER SYMPTOMS
ABDOMINAL DISTENTION: 0
ABDOMINAL PAIN: 0
CONSTIPATION: 0
WHEEZING: 1
CHEST TIGHTNESS: 0
TROUBLE SWALLOWING: 0
DIARRHEA: 0
CHOKING: 0
NAUSEA: 0
COUGH: 1
SHORTNESS OF BREATH: 0
EYE REDNESS: 0

## 2024-04-04 NOTE — PATIENT INSTRUCTIONS
Return in about 1 week (around 4/11/2024) for Follow up PNA.   -Please take your labs before follow up    -We prescribed 7 day course of Levaquin and 5 day course of prednisone, please take it as instructed

## 2024-04-04 NOTE — PROGRESS NOTES
[facility]    Kenroy Griggs is a 48 y.o. female, here for evaluation of the following concerns:    HPI    Patient has a PMH of Hypothyroidism, sinus infections, atrial fibrillation, hyperlipidemia, anxiety, RODGER.    Patient comes today for follow up. Patient has been feeling better but not resolved with regarding her pneumonia.  Patient consulted on 2/26/2024 to ENT due to sinusitis.  Patient reported she was started on 14-day course of Augmentin.  When patient follow-up she was still having symptoms and visited the ED on 3/25/2024.  Patient completed had 5-day course of azithromycin and 14-day course of prednisone.  In the ED patient was started on azithromycin and prednisone course.  Patient consulted our clinic on 3/28/2024 when Augmentin was switched to Levaquin, patient completed 7-day course of Levaquin.  Patient reports her symptoms had improved but not resolved completely.  She is still having cough with greenish sputum production.  Patient also reported wheezing overnight.  Mild shortness of breath.    Patient denies any exertional chest pain, , palpitations, syncope, orthopnea, edema or paroxysmal nocturnal dyspnea. She denies constitutional symptoms of fevers, night sweats. The patient denies abdominal or flank pain, anorexia, nausea or vomiting, dysphagia, change in bowel habits or black or bloody stools or weight loss.      Review of Systems   Constitutional:  Negative for activity change, appetite change, chills, diaphoresis, fatigue, fever and unexpected weight change.   HENT:  Negative for trouble swallowing.    Eyes:  Negative for redness and visual disturbance.   Respiratory:  Positive for cough and wheezing. Negative for choking, chest tightness and shortness of breath.    Cardiovascular:  Negative for chest pain and palpitations.   Gastrointestinal:  Negative for abdominal distention, abdominal pain, constipation, diarrhea and nausea.   Endocrine:

## 2024-04-04 NOTE — ASSESSMENT & PLAN NOTE
Her symptoms and physical exam are improving.  Will give the patient 7 more day course with levofloxacin with 5 more day course of prednisone taper and will follow-up with the patient in a week.  Sputum culture was ordered.  I also contacted patient pulmonologist Dr. Acosta and provide an update.  Patient stated that she will call Dr. Landers in case her pneumonia is not resolving.

## 2024-04-19 ENCOUNTER — TELEPHONE (OUTPATIENT)
Dept: ENT CLINIC | Age: 49
End: 2024-04-19

## 2024-04-19 NOTE — TELEPHONE ENCOUNTER
Patient called to ask for 2 refills  Levosloxacin and a steroid  to be called in  to her [facility],  please let patient know this is done  thank you

## 2024-04-23 ENCOUNTER — TELEPHONE (OUTPATIENT)
Dept: BARIATRICS/WEIGHT MGMT | Age: 49
End: 2024-04-23

## 2024-04-26 ENCOUNTER — OFFICE VISIT (OUTPATIENT)
Dept: BARIATRICS/WEIGHT MGMT | Age: 49
End: 2024-04-26

## 2024-04-26 VITALS
HEIGHT: 60 IN | DIASTOLIC BLOOD PRESSURE: 94 MMHG | WEIGHT: 232 LBS | BODY MASS INDEX: 45.55 KG/M2 | SYSTOLIC BLOOD PRESSURE: 149 MMHG

## 2024-04-26 DIAGNOSIS — E66.01 MORBID OBESITY WITH BMI OF 40.0-44.9, ADULT (HCC): Primary | ICD-10-CM

## 2024-04-26 NOTE — PROGRESS NOTES
Kenroy Griggs is a 48 y.o. female with a date of birth of 1975.    Vitals:    04/26/24 1243 04/26/24 1247   BP: (!) 159/91 (!) 149/94   Site:  Left Lower Arm   Position:  Sitting   Weight: 105.2 kg (232 lb)    Height: 1.532 m (5' 0.3\")     BMI: Body mass index is 44.86 kg/m². Obesity Classification: Class III    Weight History:   Wt Readings from Last 3 Encounters:   04/26/24 105.2 kg (232 lb)   04/04/24 105.2 kg (231 lb 14.4 oz)   03/31/24 104.3 kg (230 lb)       Pt attended Medical Weight Management Seminar. Patient was educated on low-carb diet protocol. Nutrition and habit guidelines were discussed and written information was provided. Bariatric Nutrition Questionnaire completed during class and scanned into media.       Goals  Weight: 170 lbs  Health Improvement: permanent lifestyle change, reduced joint pain, better breathing, GERD, CPAP gone, better self-image, more energy    Assessment  Nutritional Needs: RMR=(9.99 x 105.2) + (6.25 x 153.2) - (4.92 x 48 y.o.) -161 = 1601 kcal x 1.3 (sedentary activity factor)= 2081 kcal - 1000 (for 2 lb weight loss/week)= 1081 kcal.    Patient has participated in the following weight loss programs: Fasting and Nutrition Counseling with RD.   Patient has not participated in weight loss medications.  Patient has participated in meal replacement/liquid diets. Smoothies  Patient does not have history of bariatric surgery.     Allergy to blue dye. Hx eating out of stress/emotions/boredom and night eating. Eats once per day, drinks a lot a beverages. Thyroid disease.     Plan  Plan/Recommendations:    1200 kcal LCMP  Optifast:  interested   Diet Medications:  interested  Bariatric Surgery:  no  1:1 RD Visit:  no    PES Statement: Overweight/Obesity related to lack of exercise, sedentary lifestyle, unhealthy eating habits, and unsuccessful diet attempts as evidenced by BMI. Body mass index is 44.86 kg/m².    Handouts: frozen meals, protein shakes    Will follow up as

## 2024-05-10 DIAGNOSIS — R21 RASH: ICD-10-CM

## 2024-05-13 RX ORDER — KETOCONAZOLE 20 MG/ML
SHAMPOO TOPICAL
Qty: 120 ML | Refills: 1 | Status: SHIPPED | OUTPATIENT
Start: 2024-05-13

## 2024-05-13 NOTE — TELEPHONE ENCOUNTER
Requested Prescriptions     Pending Prescriptions Disp Refills    ketoconazole (NIZORAL) 2 % shampoo [Pharmacy Med Name: KETOCONAZOLE 2% SHAMPOO] 120 mL 1     Sig: APPLY TOPICALLY DAILY AS NEEDED       Last Clinic Visit:  4/4/2024     Next Clinic Appointment:  Visit date not found

## 2024-05-22 ENCOUNTER — TELEPHONE (OUTPATIENT)
Dept: ENT CLINIC | Age: 49
End: 2024-05-22

## 2024-05-22 DIAGNOSIS — J32.9 SINUSITIS, UNSPECIFIED CHRONICITY, UNSPECIFIED LOCATION: Primary | ICD-10-CM

## 2024-05-22 RX ORDER — AMOXICILLIN AND CLAVULANATE POTASSIUM 875; 125 MG/1; MG/1
1 TABLET, FILM COATED ORAL 2 TIMES DAILY
Qty: 28 TABLET | Refills: 0 | Status: SHIPPED | OUTPATIENT
Start: 2024-05-22 | End: 2024-06-05

## 2024-05-22 NOTE — TELEPHONE ENCOUNTER
Patient has called to say she has a sinus infection ,she did ask for appt but nothing available and she will be traveling next week please call in RX for sinus infection thank you

## 2024-05-31 RX ORDER — ITRACONAZOLE 10 MG/ML
SOLUTION ORAL
Qty: 200 ML | Refills: 0 | Status: SHIPPED | OUTPATIENT
Start: 2024-05-31

## 2024-06-03 ENCOUNTER — TELEPHONE (OUTPATIENT)
Dept: INTERNAL MEDICINE CLINIC | Age: 49
End: 2024-06-03

## 2024-06-03 DIAGNOSIS — J32.4 CHRONIC PANSINUSITIS: Primary | ICD-10-CM

## 2024-06-03 DIAGNOSIS — J32.4 CHRONIC PANSINUSITIS: ICD-10-CM

## 2024-06-03 RX ORDER — PREDNISONE 20 MG/1
20 TABLET ORAL DAILY
Qty: 5 TABLET | Refills: 0 | Status: SHIPPED | OUTPATIENT
Start: 2024-06-03 | End: 2024-06-08

## 2024-06-03 RX ORDER — PREDNISONE 20 MG/1
20 TABLET ORAL DAILY
Qty: 5 TABLET | Refills: 0 | Status: SHIPPED | OUTPATIENT
Start: 2024-06-03 | End: 2024-06-03

## 2024-06-03 NOTE — TELEPHONE ENCOUNTER
PT WANTS TO KNOW IF SHE CAN GET PREDNISONE RX FOR SINUS INFECTION? PT STATED SHE HAS BEEN ON ANTIBIOTIC FROM ENT FOR ABOUT A WEEK NOW.  PT STATED ENT TOLD HER TO CALL PCP FOR PREDNISONE RX. PT CAN BE REACHED [phone]

## 2024-06-03 NOTE — TELEPHONE ENCOUNTER
Tried to call the patient back but the it did not go through. Please let the patient know that we are prescribing Prednisone 20 mg for 5 days. She can pick it up form her Pharmacy

## 2024-06-12 DIAGNOSIS — E78.2 HYPERLIPIDEMIA, MIXED: Primary | Chronic | ICD-10-CM

## 2024-06-12 DIAGNOSIS — E03.9 HYPOTHYROIDISM, UNSPECIFIED TYPE: Chronic | ICD-10-CM

## 2024-06-12 RX ORDER — LEVOTHYROXINE SODIUM 0.15 MG/1
150 TABLET ORAL
Qty: 30 TABLET | Refills: 1 | Status: SHIPPED | OUTPATIENT
Start: 2024-06-12 | End: 2024-06-13 | Stop reason: SDUPTHER

## 2024-06-13 DIAGNOSIS — E03.9 HYPOTHYROIDISM, UNSPECIFIED TYPE: Chronic | ICD-10-CM

## 2024-06-13 RX ORDER — LEVOTHYROXINE SODIUM 0.15 MG/1
150 TABLET ORAL
Qty: 30 TABLET | Refills: 1 | Status: SHIPPED | OUTPATIENT
Start: 2024-06-13 | End: 2024-06-14 | Stop reason: SDUPTHER

## 2024-06-13 NOTE — TELEPHONE ENCOUNTER
Requested Prescriptions     Pending Prescriptions Disp Refills    levothyroxine (SYNTHROID) 150 MCG tablet 30 tablet 1     Sig: Take 1 tablet by mouth daily       Last Clinic Visit:  4/4/2024     Next Clinic Appointment:  Visit date not found

## 2024-06-14 DIAGNOSIS — E03.9 HYPOTHYROIDISM, UNSPECIFIED TYPE: Chronic | ICD-10-CM

## 2024-06-14 RX ORDER — LEVOTHYROXINE SODIUM 0.15 MG/1
150 TABLET ORAL
Qty: 30 TABLET | Refills: 3 | Status: SHIPPED | OUTPATIENT
Start: 2024-06-14 | End: 2025-06-14

## 2024-06-14 RX ORDER — LEVOTHYROXINE SODIUM 0.15 MG/1
150 TABLET ORAL
Qty: 30 TABLET | Refills: 1 | Status: SHIPPED | OUTPATIENT
Start: 2024-06-14 | End: 2025-06-14

## 2024-06-14 NOTE — TELEPHONE ENCOUNTER
Script for Levothyroxine sent to wrong pharmacy.  send to [facility]  Requested Prescriptions     Pending Prescriptions Disp Refills    levothyroxine (SYNTHROID) 150 MCG tablet 30 tablet 1     Sig: Take 1 tablet by mouth daily       Last Clinic Visit:  4/4/2024     Next Clinic Appointment:  Visit date not found

## 2024-06-14 NOTE — PROGRESS NOTES
Patient Levothyroxine sent to wrong pharmacy. I re-ordered her levothyroxine and sent it to [facility].

## 2024-06-17 DIAGNOSIS — J45.41 MODERATE PERSISTENT ASTHMA WITH EXACERBATION: Primary | ICD-10-CM

## 2024-06-17 RX ORDER — MONTELUKAST SODIUM 10 MG/1
10 TABLET ORAL NIGHTLY
Qty: 90 TABLET | Refills: 1 | Status: SHIPPED | OUTPATIENT
Start: 2024-06-17

## 2024-06-17 NOTE — TELEPHONE ENCOUNTER
Montelukast refilled to [facility] as requested     Rio Dacosta MD  Internal Medicine Resident  PGY-2

## 2024-06-18 ENCOUNTER — OFFICE VISIT (OUTPATIENT)
Dept: ENT CLINIC | Age: 49
End: 2024-06-18
Payer: COMMERCIAL

## 2024-06-18 ENCOUNTER — OFFICE VISIT (OUTPATIENT)
Dept: PULMONOLOGY | Age: 49
End: 2024-06-18
Payer: COMMERCIAL

## 2024-06-18 VITALS
BODY MASS INDEX: 40.17 KG/M2 | DIASTOLIC BLOOD PRESSURE: 84 MMHG | HEIGHT: 63 IN | SYSTOLIC BLOOD PRESSURE: 132 MMHG | OXYGEN SATURATION: 100 % | HEART RATE: 75 BPM | WEIGHT: 226.7 LBS

## 2024-06-18 VITALS
HEART RATE: 83 BPM | WEIGHT: 226.2 LBS | SYSTOLIC BLOOD PRESSURE: 134 MMHG | TEMPERATURE: 97.8 F | HEIGHT: 63 IN | BODY MASS INDEX: 40.08 KG/M2 | DIASTOLIC BLOOD PRESSURE: 88 MMHG

## 2024-06-18 DIAGNOSIS — J45.21 MILD INTERMITTENT ASTHMA WITH ACUTE EXACERBATION: Primary | ICD-10-CM

## 2024-06-18 DIAGNOSIS — J45.909 MODERATE ASTHMA WITHOUT COMPLICATION, UNSPECIFIED WHETHER PERSISTENT: ICD-10-CM

## 2024-06-18 DIAGNOSIS — J32.4 CHRONIC PANSINUSITIS: Primary | ICD-10-CM

## 2024-06-18 DIAGNOSIS — J32.4 CHRONIC PANSINUSITIS: Chronic | ICD-10-CM

## 2024-06-18 PROCEDURE — 3017F COLORECTAL CA SCREEN DOC REV: CPT | Performed by: INTERNAL MEDICINE

## 2024-06-18 PROCEDURE — 1036F TOBACCO NON-USER: CPT | Performed by: INTERNAL MEDICINE

## 2024-06-18 PROCEDURE — 99214 OFFICE O/P EST MOD 30 MIN: CPT | Performed by: INTERNAL MEDICINE

## 2024-06-18 PROCEDURE — 31231 NASAL ENDOSCOPY DX: CPT | Performed by: OTOLARYNGOLOGY

## 2024-06-18 PROCEDURE — G8427 DOCREV CUR MEDS BY ELIG CLIN: HCPCS | Performed by: INTERNAL MEDICINE

## 2024-06-18 PROCEDURE — G8417 CALC BMI ABV UP PARAM F/U: HCPCS | Performed by: INTERNAL MEDICINE

## 2024-06-18 RX ORDER — PREDNISONE 10 MG/1
40 TABLET ORAL DAILY
Qty: 20 TABLET | Refills: 1 | Status: SHIPPED | OUTPATIENT
Start: 2024-06-18 | End: 2024-06-28

## 2024-06-18 ASSESSMENT — ENCOUNTER SYMPTOMS
DIARRHEA: 0
VOICE CHANGE: 0
SINUS PRESSURE: 0
SHORTNESS OF BREATH: 0
EYE PAIN: 0
SINUS PAIN: 0
COUGH: 0
RHINORRHEA: 0
TROUBLE SWALLOWING: 0
SORE THROAT: 0
EYE REDNESS: 0
CHOKING: 0
FACIAL SWELLING: 0
NAUSEA: 0
EYE ITCHING: 0

## 2024-06-18 NOTE — PROGRESS NOTES
Kenroy Griggs (:  1975) is a 48 y.o. female,Established patient, here for evaluation of the following chief complaint(s):  URI (Wants to be seen for URI, LOV 22, pt states she took Covid test and it was negative.)      Assessment & Plan   1. Mild intermittent asthma with acute exacerbation  2. Chronic pansinusitis  Acute episode of asthma has developed after worsening upper respiratory symptoms.  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  Suspect viral etiology.  Treating with prednisone 40 mg daily x 5 days, may repeat if needed.  Try switching ICS/LABA inhaler for ICS/LABA/LAMA  She was concerned she may need to start a biologic agent.  It does not appear that asthma has been uncontrolled such that she would require this step up in treatment.  It may be helpful to have allergy consultation regarding chronic sinusitis, however.    Return if symptoms worsen or fail to improve.       Subjective   HPI    Kenroy Griggs returns for follow-up after a 2-year absence, because of worsening chest congestion and cough.  She has had numerous episodes of recurring sinusitis was treated pain, with antibiotic and prednisone.  She took a trip to [place], and upon return had increased chest congestion and cough, shortness of breath.  No evident fever.  She was seen by Dr. Camacho this morning for sinusitis.  Culture was taken but no specific treatment prescribed.  She has been using Symbicort inhaler consistently.  She has had frequent problems of sinusitis, has had 1 apparent episode of asthma exacerbation in the past year.         Objective   Physical Exam  Vitals reviewed.   Constitutional:       Appearance: She is well-developed. She is obese.   HENT:      Head: Normocephalic and atraumatic.      Mouth/Throat:      Mouth: Mucous membranes are moist.      Pharynx: Oropharynx is clear. No oropharyngeal exudate.   Eyes:      General: No scleral

## 2024-06-18 NOTE — PROGRESS NOTES
Patient with history of CRS and a week of cough and congestion. Traveled to [facility]. Partner tested positive for Covid. She home tested negative. Seeing pulmonary this afternoon.     PE: Nasal cavity clear.         Due to the patients chronic sinus disease and/or history of sinonasal neoplasm for surveillance a nasal endoscopy with or without debridement will be performed to complete a significant physical examination of the patient which cannot be performed by anterior rhinoscopy alone (failure of complete examination of the paranasal sinuses). Failure to provide this procedure may lead to late detection of significant chronic benign disease, acute exacerbation, resolution or failure of early diagnosis of recurrent cancer. The procedure report is present in the body of the chart.       Nasal Endoscopy    Pre OP: CRS  Post OP: Same  Reason: URI. Concern for sinus infection  Procedure: Nasal endoscopy  Surgeon: Fer Camacho  Anesthesia: Afrin with 2% lidocaine  Estimated Blood Loss: None      After obtaining verbal consent from the patient 1% lidocaine with afrin was sprayed into the nasal cavities.  After allowing a time for anesthesia, a nasal endoscope was placed into the nostril.  The septum, inferior, and middle turbinates were examined.  The middle meatus, and sphenoethmoid recess was examined bilaterally.  Cultures were obtained from the right frontal sinus. There were no complications.     Pertinent positives included: There was not edema and purulence in the left middle meatus. There was not edema and purulence at the right middle meatus. Polyps were not identified in the sinuses. Masses were not identified.     Tolerated well without complication. I attest that I was present for and did the entire procedure myself.    A/P: Await cultures .  Call with results.     
injected. No chemosis or exudate.  Neck:      Thyroid: No thyroid mass or thyromegaly.   Cardiovascular:      Rate and Rhythm: Normal rate and regular rhythm.   Pulmonary:      Effort: No tachypnea or respiratory distress.   Lymphadenopathy:      Head:      Right side of head: No preauricular or posterior auricular adenopathy.      Left side of head: No preauricular or posterior auricular adenopathy.      Cervical:      Right cervical: No superficial, deep or posterior cervical adenopathy.     Left cervical: No superficial, deep or posterior cervical adenopathy.   Neurological:      Mental Status: She is alert and oriented to person, place, and time.         Assessment:      ***      Plan:   Assessment & Plan   ***        Fer Camacho MD

## 2024-06-21 DIAGNOSIS — J32.4 CHRONIC PANSINUSITIS: Primary | ICD-10-CM

## 2024-06-21 RX ORDER — ITRACONAZOLE 10 MG/ML
SOLUTION ORAL
Qty: 200 ML | Refills: 0 | Status: SHIPPED | OUTPATIENT
Start: 2024-06-21

## 2024-06-21 RX ORDER — CIPROFLOXACIN 500 MG/1
500 TABLET, FILM COATED ORAL 2 TIMES DAILY
Qty: 42 TABLET | Refills: 0 | Status: SHIPPED | OUTPATIENT
Start: 2024-06-21 | End: 2024-07-12

## 2024-06-22 LAB
BACTERIA SPEC AEROBE CULT: ABNORMAL
GRAM STN SPEC: ABNORMAL
ORGANISM: ABNORMAL

## 2024-06-28 ENCOUNTER — TELEPHONE (OUTPATIENT)
Dept: ENT CLINIC | Age: 49
End: 2024-06-28

## 2024-06-28 RX ORDER — AZITHROMYCIN 250 MG/1
TABLET, FILM COATED ORAL
Qty: 6 TABLET | Refills: 0 | Status: SHIPPED | OUTPATIENT
Start: 2024-06-28 | End: 2024-07-08

## 2024-06-28 NOTE — TELEPHONE ENCOUNTER
Patient called to ask dr Camacho for a different RX the cipro is Not working she would like a Z pack please advise thank you

## 2024-07-02 ENCOUNTER — TELEPHONE (OUTPATIENT)
Dept: PULMONOLOGY | Age: 49
End: 2024-07-02

## 2024-07-02 RX ORDER — PREDNISONE 10 MG/1
TABLET ORAL
Qty: 20 TABLET | Refills: 0 | Status: SHIPPED | OUTPATIENT
Start: 2024-07-02

## 2024-07-02 NOTE — TELEPHONE ENCOUNTER
She states that she was given prednisone at her office visit due to sinus infection but her lungs are still not clear, and she has a lot of post nasal drip, still coughing a lot and is hoarse.  She uses [facility] in [place].  She can be reached at [phone].  Please advised, she did call about this on Friday at 4:43 and I for got to forward this message.

## 2024-07-02 NOTE — TELEPHONE ENCOUNTER
Spoke with the Lizy.  She was treated with azithromycin by Dr. Camacho for the sinusitis, and upper respiratory symptoms have cleared.  She continues to have some chest congestion and cough, although it is better than a week ago.  Problem is still present however.  We will treat her with another 5 days of prednisone 40 mg

## 2024-07-21 DIAGNOSIS — M25.562 LEFT KNEE PAIN, UNSPECIFIED CHRONICITY: ICD-10-CM

## 2024-07-21 DIAGNOSIS — S83.242A ACUTE MEDIAL MENISCUS TEAR OF LEFT KNEE, INITIAL ENCOUNTER: ICD-10-CM

## 2024-07-21 DIAGNOSIS — I48.91 ATRIAL FIBRILLATION WITH RVR (HCC): ICD-10-CM

## 2024-07-21 DIAGNOSIS — E03.9 HYPOTHYROIDISM, UNSPECIFIED TYPE: Chronic | ICD-10-CM

## 2024-07-22 DIAGNOSIS — L30.9 ECZEMA, UNSPECIFIED TYPE: ICD-10-CM

## 2024-07-22 RX ORDER — DILTIAZEM HYDROCHLORIDE 120 MG/1
120 CAPSULE, COATED, EXTENDED RELEASE ORAL DAILY
Qty: 90 CAPSULE | Refills: 0 | Status: SHIPPED | OUTPATIENT
Start: 2024-07-22

## 2024-07-22 RX ORDER — LEVOTHYROXINE SODIUM 150 UG/1
150 TABLET ORAL
Qty: 30 TABLET | Refills: 1 | OUTPATIENT
Start: 2024-07-22 | End: 2025-07-22

## 2024-07-22 RX ORDER — ASPIRIN 81 MG/1
TABLET ORAL
Qty: 90 TABLET | Refills: 1 | Status: SHIPPED | OUTPATIENT
Start: 2024-07-22

## 2024-07-23 RX ORDER — CHLORHEXIDINE GLUCONATE 40 MG/ML
SOLUTION TOPICAL
Qty: 236 ML | Refills: 0 | Status: SHIPPED | OUTPATIENT
Start: 2024-07-23

## 2024-07-23 RX ORDER — MOMETASONE FUROATE 1 MG/G
CREAM TOPICAL
Qty: 45 G | Refills: 0 | Status: SHIPPED | OUTPATIENT
Start: 2024-07-23

## 2024-07-23 NOTE — TELEPHONE ENCOUNTER
Requested Prescriptions     Pending Prescriptions Disp Refills    chlorhexidine gluconate (RUSSELL-HEX 4) 4 % SOLN external solution 236 mL 0     Sig: APPLY TOPICALLY DAILY AS NEEDED    mometasone (ELOCON) 0.1 % cream [Pharmacy Med Name: MOMETASONE FUROATE 0.1% CREAM] 45 g 0     Sig: APPLY TOPICALLY  TO THE AFFECTED AREA(S) DAILY       Last Clinic Visit:  4/4/2024     Next Clinic Appointment:  Visit date not found

## 2024-08-16 ENCOUNTER — TELEMEDICINE (OUTPATIENT)
Dept: BARIATRICS/WEIGHT MGMT | Age: 49
End: 2024-08-16

## 2024-08-16 DIAGNOSIS — E66.01 MORBID OBESITY WITH BMI OF 40.0-44.9, ADULT (HCC): Primary | ICD-10-CM

## 2024-08-16 DIAGNOSIS — Z71.3 DIETARY COUNSELING AND SURVEILLANCE: ICD-10-CM

## 2024-08-20 ENCOUNTER — TELEPHONE (OUTPATIENT)
Dept: PULMONOLOGY | Age: 49
End: 2024-08-20

## 2024-08-20 DIAGNOSIS — J45.40 MODERATE PERSISTENT ASTHMA WITHOUT COMPLICATION: Primary | ICD-10-CM

## 2024-08-20 NOTE — TELEPHONE ENCOUNTER
Patient called to let us know that she completed her lab work. She said that biologics were talked about depending on her results. Patient does not have an office visit scheduled. Please advise on the next step. Thank you

## 2024-08-23 RX ORDER — FLUTICASONE FUROATE, UMECLIDINIUM BROMIDE AND VILANTEROL TRIFENATATE 100; 62.5; 25 UG/1; UG/1; UG/1
1 POWDER RESPIRATORY (INHALATION) DAILY
Qty: 1 EACH | Refills: 11 | Status: SHIPPED | OUTPATIENT
Start: 2024-08-23

## 2024-08-23 NOTE — TELEPHONE ENCOUNTER
Patient called back to schedule an appt. Next opening would be for Nov 1st. Patient declined that day wanting an earlier appt. Patient declined any [facility] appt. Please advise

## 2024-08-23 NOTE — TELEPHONE ENCOUNTER
Is there a time next week we can put pt in at the end of your day? She is having some respiratory issues. Please see her [employer] message from today. Thank you

## 2024-08-26 ENCOUNTER — TELEPHONE (OUTPATIENT)
Dept: PULMONOLOGY | Age: 49
End: 2024-08-26

## 2024-08-26 NOTE — TELEPHONE ENCOUNTER
Submitted PA for TRELEGY  Via [facility] Key: [id number]  STATUS: PENDING.    Follow up done daily; if no decision with in three days we will refax.  If another three days goes by with no decision will call the insurance for status.

## 2024-08-27 NOTE — TELEPHONE ENCOUNTER
Approved on August 26 by [place] [employer] 2017  Your PA request for [id number] was approved for 365 days. The PA# assigned is [id number].  Authorization Expiration Date: 8/24/2025

## 2024-08-28 ENCOUNTER — OFFICE VISIT (OUTPATIENT)
Dept: PULMONOLOGY | Age: 49
End: 2024-08-28
Payer: COMMERCIAL

## 2024-08-28 VITALS
DIASTOLIC BLOOD PRESSURE: 86 MMHG | WEIGHT: 228.6 LBS | HEIGHT: 63 IN | SYSTOLIC BLOOD PRESSURE: 128 MMHG | HEART RATE: 86 BPM | BODY MASS INDEX: 40.5 KG/M2 | OXYGEN SATURATION: 98 %

## 2024-08-28 DIAGNOSIS — J45.30 MILD PERSISTENT ASTHMA WITHOUT COMPLICATION: Primary | ICD-10-CM

## 2024-08-28 PROCEDURE — G8427 DOCREV CUR MEDS BY ELIG CLIN: HCPCS | Performed by: INTERNAL MEDICINE

## 2024-08-28 PROCEDURE — G8417 CALC BMI ABV UP PARAM F/U: HCPCS | Performed by: INTERNAL MEDICINE

## 2024-08-28 PROCEDURE — 99213 OFFICE O/P EST LOW 20 MIN: CPT | Performed by: INTERNAL MEDICINE

## 2024-08-28 PROCEDURE — 1036F TOBACCO NON-USER: CPT | Performed by: INTERNAL MEDICINE

## 2024-08-28 PROCEDURE — 3017F COLORECTAL CA SCREEN DOC REV: CPT | Performed by: INTERNAL MEDICINE

## 2024-08-28 NOTE — PROGRESS NOTES
Kenroy Griggs (:  1975) is a 48 y.o. female,Established patient, here for evaluation of the following chief complaint(s):  Follow-up (F/u for lab results)         Assessment & Plan  Mild persistent asthma without complication            Mild persistent asthma, clinically responding better to ICS/LABA/LAMA inhaler compared to ICS/LABA.  Continue Trelegy 100 mcg inhaler.  She does not have markers of significant type II inflammation that would suggest biologic agents to be helpful.    Return in 6 months (on 2025), or if symptoms worsen or fail to improve.       Subjective   HPI    Kenroy Griggs returns for follow-up.  She reports that Trelegy inhaler has been more helpful than Symbicort.  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  Day-to-day functional status has been good.  She has not had any issues with tightness wheezing or cough.  No exacerbations.  She is starting an exercise class as she addresses how to lose weight.  She has a sedentary job, primarily working at a computer station.  We discussed interrupting work with frequent walks of at least 5 minutes.  She continues to have upper respiratory congestion and drainage that waxes and wanes.  This does not correlate with lower respiratory symptoms.           Objective   Physical Exam  Vitals reviewed.   Constitutional:       Appearance: She is well-developed. She is obese.   HENT:      Head: Normocephalic and atraumatic.      Mouth/Throat:      Mouth: Mucous membranes are moist.      Pharynx: No oropharyngeal exudate or posterior oropharyngeal erythema.      Comments: Mallampati score 3  Eyes:      General: No scleral icterus.        Right eye: No discharge.         Left eye: No discharge.   Neck:      Thyroid: No thyromegaly.      Trachea: No tracheal deviation.   Cardiovascular:      Rate and Rhythm: Normal rate and regular rhythm.      Pulses:           Radial pulses are 2+ on the right side

## 2024-09-12 ENCOUNTER — TELEPHONE (OUTPATIENT)
Dept: ENT CLINIC | Age: 49
End: 2024-09-12

## 2024-09-13 DIAGNOSIS — J32.9 SINUSITIS, UNSPECIFIED CHRONICITY, UNSPECIFIED LOCATION: ICD-10-CM

## 2024-09-19 ENCOUNTER — TELEPHONE (OUTPATIENT)
Dept: PULMONOLOGY | Age: 49
End: 2024-09-19

## 2024-09-19 RX ORDER — CETIRIZINE HYDROCHLORIDE, PSEUDOEPHEDRINE HYDROCHLORIDE 5; 120 MG/1; MG/1
1 TABLET, FILM COATED, EXTENDED RELEASE ORAL 2 TIMES DAILY
Qty: 60 TABLET | Refills: 2 | Status: SHIPPED | OUTPATIENT
Start: 2024-09-19

## 2024-09-19 NOTE — TELEPHONE ENCOUNTER
Kenroy had called Dr Camacho's office with complaints of green/ yellow nasal discharge,    14 days of Augmentin was prescribed by Dr Camacho    He did not given any steroids  With a message in epic   \"The patient has been on over 6 rounds of sterois this year so I will need to refrain on steroids.\"    Kenroy called to request \" a 2 week taper of prednisone\"    [facility] pharmacy

## 2024-09-20 NOTE — TELEPHONE ENCOUNTER
I would not prescribe systemic steroids for an upper respiratory infection.  Prednisone is utilized when she has an exacerbation of asthma

## 2024-09-24 NOTE — TELEPHONE ENCOUNTER
No Patient called she has been w/o her clarithromycin because she went to the ED for her sinus infection, she would like a refill for that and she is starting to get Thrush and she would like something for that, she is allergic to diflucan.   [facility] [phone]

## 2024-10-08 ENCOUNTER — TELEPHONE (OUTPATIENT)
Dept: SPEECH THERAPY | Age: 49
End: 2024-10-08

## 2024-10-08 ENCOUNTER — TELEPHONE (OUTPATIENT)
Dept: ORTHOPEDIC SURGERY | Age: 49
End: 2024-10-08

## 2024-10-08 NOTE — PROGRESS NOTES
Nikko Lamb MD, Nina Ferguson, [facility]  Tiffanie Kehrt CNP  Daciaganchristy Net CNP [place] [address]  [place] [address], [address] [facility] [phone]   [facility]   Juni Henson. [address] [facility]. Madeline Pagan [address] [phone]     Video Visit- Consult    Pursuant to the emergency declaration under the [address] [place] [address] [facility] authority and the [employer] Act, this Virtual  Visit was conducted, with patient's consent, to reduce the patient's risk of exposure to COVID-19. Services were provided through a video synchronous discussion virtually to substitute for in-person clinic visit. Patient was located in their home. Assessment:      Visit Diagnoses and Associated Orders     Hypersomnia   (New Problem)  -  Primary    needs work-up    Baseline Diagnostic Sleep Study [79696 Custom]   - Future Order         Snoring   (New Problem)      needs work-up    Baseline Diagnostic Sleep Study [[id number] Custom]   - Future Order         Paroxysmal atrial fibrillation (HCC)   (Stable)           Moderate persistent asthma in adult without complication   (Stable)           Hypothyroidism due to Hashimoto's thyroiditis   (Stable)           GERD without esophagitis   (Stable)           Morbid obesity with BMI of 40.0-44.9, adult (HCC)   (Not Stable)      Baseline Diagnostic Sleep Study [[id number] Custom]   - Future Order                Plan:      One or more undiagnosed new problem with uncertain prognosis till final diagnosis is made. Differential diagnosis includes but not limited to: RODGER, PLMD's, narcolepsy, parasomnias. Reviewed RODGER (which is the highest likelihood diagnosis): pathophysiology, diagnosis, complications and treatment. Instructed her not to drive if drowsy. Continue medications per her PCP and other physicians. Limit caffeine use after 3pm. Will do PSG to rule-out RODGER and other sleep disorders.  1 wk follow up after study to review her results. The chronic medical conditions listed are directly related to the primary diagnosis listed above. The management of the primary diagnosis affects the secondary diagnosis and vice versa. This information was analyzed to assess complexity and medical decision making in regards to further testing and management. Continue meds for: a fib, asthma, hypothyroid, and GERD. Pt would medically benefit from wt loss for RODGER (diet, exercise, surgical). Orders Placed This Encounter   Procedures    Baseline Diagnostic Sleep Study          Subjective:     Patient ID: Isa Pittman is a 48 y.o. female. Chief Complaint   Patient presents with    Sleep Apnea       HPI:      Isa Pittman is a 48 y.o. female referred by MAYELA Iniguez for a sleep evaluation. She complains of: snoring, witnessed apneas, excessive daytime sleepiness , non-restorative sleep, nocturia, AM headaches and tossing and turning at night. She denies: cataplexy and hypnagogic hallucinations. Symptoms began several years ago, gradually worsening since that time    Previous evaluation and treatment has included- None     Chronic stable medical conditions: a fib, asthma, hypothyroid, and GERD  Chronic unstable medical conditions: obesity    DOT/CDL - No  FAA/'s license -No    Previous Report(s) Reviewed: historical medical records, office notes, and referral letter(s). Pertinent data has been documented.     [place] - Total score: 12    Caffeine Intake - Coffee: 1-2 cup(s) per day and Pop/Soda: 2 can(s) per day    Social History     Socioeconomic History    Marital status: Single     Spouse name: Not on file    Number of children: Not on file    Years of education: Not on file    Highest education level: Not on file   Occupational History    Occupation: NA   Tobacco Use    Smoking status: Never Smoker    Smokeless tobacco: Never Used   Vaping Use    Vaping Use: Never used   Substance and Sexual Activity    Alcohol use: Yes     Alcohol/week: 0.0 standard drinks     Comment: 2 drinks/week     Drug use: No    Sexual activity: Yes   Other Topics Concern    Not on file   Social History Narrative    Not on file     Social Determinants of Health     Financial Resource Strain:     Difficulty of Paying Living Expenses:    Food Insecurity:     Worried About Running Out of Food in the Last Year:     [address] in the Last Year:    Transportation Needs:     Lack of Transportation (Medical):      Lack of Transportation (Non-Medical):    Physical Activity:     Days of Exercise per Week:     Minutes of Exercise per Session:    Stress:     Feeling of Stress :    Social Connections:     Frequency of Communication with Friends and Family:     Frequency of Social Gatherings with Friends and Family:     Attends Scientologist Services:     Active Member of Clubs or Organizations:     Attends Club or Organization Meetings:     Marital Status:    Intimate Partner Violence:     Fear of Current or Ex-Partner:     Emotionally Abused:     Physically Abused:     Sexually Abused:         Current Outpatient Medications   Medication Instructions    albuterol sulfate  (90 Base) MCG/ACT inhaler INHALE 2 PUFF BY MOUTH EVERY 6 HOURS AS NEEDED FOR WHEEZING    budesonide (PULMICORT) 250 mcg, Nebulization, 2 TIMES DAILY    budesonide-formoterol (SYMBICORT) 160-4.5 MCG/ACT AERO 2 puffs, Inhalation, 2 TIMES DAILY    cetirizine-psuedoephedrine (ALLERGY RELIEF D) 5-120 MG per extended release tablet TAKE 1 TABLET BY MOUTH 2 TIMES A DAY    chlorhexidine (BETASEPT SURGICAL SCRUB) 4 % external liquid APPLY TO AFFECTED AREA(S) TOPICALLY AS NEEDED    cyanocobalamin 1000 MCG/ML injection INJECT 1ML INTO THE SKIN ONCE MONTHLY    dilTIAZem (CARDIZEM CD) 120 MG extended release capsule TAKE 1 CAPSULE BY MOUTH ONE TIME A DAY    fluticasone (FLONASE) 50 MCG/ACT nasal spray APPLY 1 SPRAY IN THE AFFECTED NOSTRIL ONE TIME A DAY    folic acid (FOLVITE) 1 mg, Oral, DAILY    Insulin Syringe-Needle U-100 (ULTICARE INSULIN SYRINGE) 31G X 5/16\" 1 ML MISC AS DIRECTED    ketoconazole (NIZORAL) 2 % shampoo APPLY TO AFFECTED AREA(S) ONCE DAILY AS NEEDED    levothyroxine (SYNTHROID) 137 mcg, Oral, DAILY    liothyronine (CYTOMEL) 5 MCG tablet TAKE TWO TABLETS BY MOUTH DAILY    Misc Natural Product Nasal (PONARIS) SOLN 1 spray, Each Nostril, EVERY 6 HOURS PRN    Misc Natural Product Nasal (PONARIS) SOLN 1 spray, Each Nostril, EVERY 4 HOURS PRN    Misc. Devices (HIBICLENS HAND PUMP 32OZ) MISC Use as needed for skin irritation or bumps.  mometasone (ELOCON) 0.1 % cream Apply topically daily.  montelukast (SINGULAIR) 10 MG tablet Take one tablet by mouth every evening    Multiple Vitamins-Minerals (THERAPEUTIC MULTIVITAMIN-MINERALS) tablet 1 tablet, Oral, DAILY    mupirocin (BACTROBAN) 2 % ointment Apply topically 3 times daily.  pantoprazole (PROTONIX) 40 mg, Oral, 2 TIMES DAILY BEFORE MEALS    polyethylene glycol (MIRALAX) 17 GM/SCOOP POWD powder No dose, route, or frequency recorded.  Saccharomyces boulardii (PROBIOTIC) 250 MG CAPS TAKE 1 CAPSULE BY MOUTH 2 TIMES A DAY    urea (CARMOL) 10 % cream Apply topically as needed.        Allergies as of 10/28/2021 - Fully Reviewed 10/28/2021   Allergen Reaction Noted    Latex Swelling and Dermatitis 07/22/2010    Clindamycin/lincomycin Hives and Other (See Comments) 06/04/2012    Sulfa antibiotics Anaphylaxis and Hives 06/04/2012    Diflucan [fluconazole] Hives and Itching 06/04/2012    Other Hives 07/22/2010       Patient Active Problem List   Diagnosis    Hypothyroidism    Chronic sinusitis    Asthma in adult    Chronic rhinosinusitis    Iron deficiency anemia    Eczema    Intestinal malabsorption    Morbid obesity with BMI of 40.0-44.9, adult (HCC)    Thrombocytopenia (HCC)    Chronic ITP (idiopathic thrombocytopenia) (HCC)    Hyperlipidemia, mixed    Autoimmune hepatitis ([facility]    Anxiety    Chronic pansinusitis    Atrial fibrillation with RVR (HCC)    GERD without esophagitis       Past Medical History:   Diagnosis Date    Abdominal wall abscess 2/8/2017    Abdominal wall cellulitis 2/6/2017    Anal fissure 4/30/2015    Anemia, iron deficiency     Asthma     Autoimmune hepatitis ([facility]     History of blood transfusion     Hypothyroidism     Menorrhagia with regular cycle     Morbid obesity with BMI of 40.0-44.9, adult ([facility] 5/26/2015    MRSA (methicillin resistant staph aureus) culture positive 02/03/2017    abdominal abscess    MRSA infection 08/20/2012    rt thigh abscess    Pericarditis, acute     Sinusitis        Past Surgical History:   Procedure Laterality Date    COLONOSCOPY N/A 12/29/2020    COLONOSCOPY DIAGNOSTIC performed by Nora Rodriguez MD at [address] HISTORY  8/22/2012    INCISION AND DRAINAGE POSTERIOR THIGH ABSCESS    RECTAL SURGERY  Aug 2011    repair of rectal fissures and anal skin tag removal    SINUS SURGERY      X 3    TONSILLECTOMY  2004    TONSILLECTOMY AND ADENOIDECTOMY  2005    (partial adenoidectomy)    UPPER GASTROINTESTINAL ENDOSCOPY N/A 12/29/2020    EGD BIOPSY performed by Nora Rodriguez MD at [facility]       Family History   Problem Relation Age of Onset    High Blood Pressure Mother     Heart Disease Father     Diabetes Maternal Aunt        Review of Systems   Constitutional: Positive for fatigue. Negative for activity change and appetite change. HENT: Positive for congestion. Negative for nosebleeds, postnasal drip, rhinorrhea and sneezing. Eyes: Negative for photophobia, pain and visual disturbance. Respiratory: Positive for apnea. Negative for choking, chest tightness and shortness of breath. Cardiovascular: Positive for palpitations. Gastrointestinal: Negative for abdominal distention, abdominal pain, nausea and vomiting.    Endocrine: Negative for cold intolerance and heat intolerance. Genitourinary: Positive for frequency. Negative for difficulty urinating, dysuria and urgency. Musculoskeletal: Negative. Negative for neck pain and neck stiffness. Skin: Negative. Allergic/Immunologic: Negative. Neurological: Positive for headaches. Negative for tremors, seizures, syncope and weakness. Hematological: Negative for adenopathy. Does not bruise/bleed easily. Psychiatric/Behavioral: Positive for sleep disturbance. Negative for agitation, behavioral problems and confusion. Objective:     Vitals:  Patient reported Height and Weight Calculated BMI   Patient-Reported Vitals 10/28/2021   Patient-Reported Weight 215#   Patient-Reported Height 5'3\"   Patient-Reported Temperature -       38.1     Due to COVID-19 this was a virtual visit and physical exam was deferred.     Electronically signed by Domonique Huffman MD on10/28/2021 at 2:54 PM normal...

## 2024-10-08 NOTE — TELEPHONE ENCOUNTER
Patient has called with question  what is the best portable  Nebulizer  this product NAWETI  is that good  what percent saline should she be using 9 or 7 ?  Can this be a RX ?

## 2024-10-08 NOTE — TELEPHONE ENCOUNTER
LAST SEAN INJ 4/13/23.  RETURNED PATIENT CALL WANTING TO [facility] APPT FOR FREDY SEAN INJ.  [facility] APPT FOR THURSDAY.

## 2024-10-09 ENCOUNTER — TELEPHONE (OUTPATIENT)
Dept: INTERNAL MEDICINE CLINIC | Age: 49
End: 2024-10-09

## 2024-10-09 DIAGNOSIS — J01.91 ACUTE RECURRENT SINUSITIS, UNSPECIFIED LOCATION: Primary | ICD-10-CM

## 2024-10-09 RX ORDER — AZITHROMYCIN 250 MG/1
TABLET, FILM COATED ORAL
Qty: 6 TABLET | Refills: 0 | Status: SHIPPED | OUTPATIENT
Start: 2024-10-09 | End: 2024-10-19

## 2024-10-09 RX ORDER — PREDNISONE 10 MG/1
TABLET ORAL
Qty: 20 TABLET | Refills: 0 | Status: SHIPPED | OUTPATIENT
Start: 2024-10-09 | End: 2024-10-19

## 2024-10-09 NOTE — TELEPHONE ENCOUNTER
Pt called and stated she is having trouble reaching her ENT doctor. Pt has an infection and post nasal drip that becomes severe due to her asthma that can lead to pneumonia. Pt is hoping to get a script for a Z pack and also 5 day supply of Prednisone 40 mg.     Pt can be reached at [phone]

## 2024-10-09 NOTE — PROGRESS NOTES
Called patient to discuss recent symptoms.  She states that she has been having greenish postnasal discharge that is causing her to cough no fever no sick contacts denies any COVID positive test.  She states that she has just completed 2 weeks of Augmentin and it has been 3 days since her last dose she continues to have symptoms.  I have sent prescription for Z-Fam and prednisone 40 mg for 5 days.  Plan discussed with Dr. Love.

## 2024-10-10 ENCOUNTER — OFFICE VISIT (OUTPATIENT)
Dept: ORTHOPEDIC SURGERY | Age: 49
End: 2024-10-10
Payer: COMMERCIAL

## 2024-10-10 VITALS — WEIGHT: 228 LBS | HEIGHT: 63 IN | BODY MASS INDEX: 40.4 KG/M2

## 2024-10-10 DIAGNOSIS — M17.12 PRIMARY OSTEOARTHRITIS OF LEFT KNEE: Primary | ICD-10-CM

## 2024-10-10 DIAGNOSIS — M25.561 RIGHT KNEE PAIN, UNSPECIFIED CHRONICITY: ICD-10-CM

## 2024-10-10 PROCEDURE — 1036F TOBACCO NON-USER: CPT | Performed by: ORTHOPAEDIC SURGERY

## 2024-10-10 PROCEDURE — 99214 OFFICE O/P EST MOD 30 MIN: CPT | Performed by: ORTHOPAEDIC SURGERY

## 2024-10-10 PROCEDURE — 3017F COLORECTAL CA SCREEN DOC REV: CPT | Performed by: ORTHOPAEDIC SURGERY

## 2024-10-10 PROCEDURE — G8484 FLU IMMUNIZE NO ADMIN: HCPCS | Performed by: ORTHOPAEDIC SURGERY

## 2024-10-10 PROCEDURE — 20610 DRAIN/INJ JOINT/BURSA W/O US: CPT | Performed by: ORTHOPAEDIC SURGERY

## 2024-10-10 PROCEDURE — G8427 DOCREV CUR MEDS BY ELIG CLIN: HCPCS | Performed by: ORTHOPAEDIC SURGERY

## 2024-10-10 PROCEDURE — G8417 CALC BMI ABV UP PARAM F/U: HCPCS | Performed by: ORTHOPAEDIC SURGERY

## 2024-10-10 RX ORDER — METHYLPREDNISOLONE ACETATE 40 MG/ML
40 INJECTION, SUSPENSION INTRA-ARTICULAR; INTRALESIONAL; INTRAMUSCULAR; SOFT TISSUE ONCE
Status: COMPLETED | OUTPATIENT
Start: 2024-10-10 | End: 2024-10-10

## 2024-10-10 RX ORDER — BUPIVACAINE HYDROCHLORIDE 5 MG/ML
4 INJECTION, SOLUTION PERINEURAL ONCE
Status: COMPLETED | OUTPATIENT
Start: 2024-10-10 | End: 2024-10-10

## 2024-10-10 RX ORDER — METHYLPREDNISOLONE 4 MG
TABLET, DOSE PACK ORAL
Qty: 1 KIT | Refills: 0 | Status: SHIPPED | OUTPATIENT
Start: 2024-10-10

## 2024-10-10 RX ORDER — LIDOCAINE 50 MG/G
1 PATCH TOPICAL DAILY
Qty: 10 PATCH | Refills: 0 | Status: SHIPPED | OUTPATIENT
Start: 2024-10-10 | End: 2024-10-20

## 2024-10-10 RX ADMIN — METHYLPREDNISOLONE ACETATE 40 MG: 40 INJECTION, SUSPENSION INTRA-ARTICULAR; INTRALESIONAL; INTRAMUSCULAR; SOFT TISSUE at 10:38

## 2024-10-10 RX ADMIN — BUPIVACAINE HYDROCHLORIDE 20 MG: 5 INJECTION, SOLUTION PERINEURAL at 10:37

## 2024-10-10 RX ADMIN — BUPIVACAINE HYDROCHLORIDE 20 MG: 5 INJECTION, SOLUTION PERINEURAL at 10:36

## 2024-10-10 RX ADMIN — METHYLPREDNISOLONE ACETATE 40 MG: 40 INJECTION, SUSPENSION INTRA-ARTICULAR; INTRALESIONAL; INTRAMUSCULAR; SOFT TISSUE at 10:37

## 2024-10-10 NOTE — TELEPHONE ENCOUNTER
Pt needs the Prednisone 40 mg sent to the [facility] on [address].     Pt can be reached at [phone]

## 2024-10-15 NOTE — PROGRESS NOTES
10/10/2024     Reason for visit:  Status post left knee arthroscopy with partial medial meniscectomy on 9/9/2022  Right knee pain    History of Present Illness:  Patient presents for repeat evaluation.  She is now having pain within both knees.  She has responded favorably to cortisone in the past.    Objective:  Ht 1.6 m (5' 3\")   Wt 103.4 kg (228 lb)   LMP 07/14/2020   BMI 40.39 kg/m²      Physical Exam:  The patient is well-appearing and in no apparent distress  Examination of the left and right knee   There is a trace effusion,  Range of motion reveals 0 to 125  Neg lachman, negative posterior drawer, no pain or laxity with varus or valgus stress at 0 degrees and 30 degrees of flexion  + joint line tenderness  Small palpable mass over the anterior distal tibia.  Mildly painful  5 out of 5 strength throughout distal muscle groups  Sensation is intact to light touch throughout all distributions  There is some mild Swelling present  Palpable DP pulse, brisk cap refill, 2+ symmetric reflexes     4 view x-rays of the left and right knees were obtained in office today on 10/10/2024 reviewed.  There is no fracture or dislocation.  Degenerative changes of both knees which is most prominent within the lateral compartment the right knee and the medial compartment the left knee.    Assessment:  Status post left knee arthroscopy with partial medial meniscectomy on 9/9/2022  Bilateral knee degenerative joint disease    Plan:  I discussed with the patient the diagnosis and treatment options.  We discussed operative and nonoperative management.  At this point I do recommend nonoperative management.  Nonoperative treatment options include activity modification, anti-inflammatory medications, physical therapy, and injections.  The patient elected proceed with cortisone injection.  Therefore injection was given to the left and right knee via sterile technique.  The injection consisted of 40 mg of Depo-Medrol combined with

## 2024-10-18 DIAGNOSIS — I48.91 ATRIAL FIBRILLATION WITH RVR (HCC): ICD-10-CM

## 2024-10-18 DIAGNOSIS — J45.41 MODERATE PERSISTENT ASTHMA WITH EXACERBATION: ICD-10-CM

## 2024-10-18 RX ORDER — DILTIAZEM HYDROCHLORIDE 120 MG/1
120 CAPSULE, COATED, EXTENDED RELEASE ORAL DAILY
Qty: 90 CAPSULE | Refills: 0 | Status: SHIPPED | OUTPATIENT
Start: 2024-10-18

## 2024-10-18 RX ORDER — MONTELUKAST SODIUM 10 MG/1
10 TABLET ORAL NIGHTLY
Qty: 36 TABLET | Refills: 4 | Status: SHIPPED | OUTPATIENT
Start: 2024-10-18

## 2024-10-18 NOTE — TELEPHONE ENCOUNTER
Requested Prescriptions     Pending Prescriptions Disp Refills    montelukast (SINGULAIR) 10 MG tablet [Pharmacy Med Name: MONTELUKAST SOD 10 MG TABLET] 36 tablet      Sig: TAKE ONE TABLET BY MOUTH ONCE NIGHTLY       Last Clinic Visit:  4/4/2024     Next Clinic Appointment:  Visit date not found

## 2024-10-18 NOTE — TELEPHONE ENCOUNTER
Requested Prescriptions     Pending Prescriptions Disp Refills    montelukast (SINGULAIR) 10 MG tablet [Pharmacy Med Name: MONTELUKAST SOD 10 MG TABLET] 36 tablet      Sig: TAKE ONE TABLET BY MOUTH ONCE NIGHTLY    dilTIAZem (CARDIZEM CD) 120 MG extended release capsule 90 capsule 0     Sig: Take 1 capsule by mouth daily       Last Clinic Visit:  4/4/2024     Next Clinic Appointment:  Visit date not found

## 2024-11-01 ENCOUNTER — TELEPHONE (OUTPATIENT)
Dept: INTERNAL MEDICINE CLINIC | Age: 49
End: 2024-11-01

## 2024-11-01 NOTE — TELEPHONE ENCOUNTER
Patient hasn't been seen here since April. I called her and informed her that she should make an appointment to come in an see us as she would have to come in anyways to  the handicap script. Patient agrees with that and will call the  to make an appointment.

## 2024-11-01 NOTE — TELEPHONE ENCOUNTER
Patient called stating she needs and new Rx for her handicap placard.     Patient can be reached at [phone]

## 2024-11-07 ENCOUNTER — TELEPHONE (OUTPATIENT)
Dept: ORTHOPEDIC SURGERY | Age: 49
End: 2024-11-07

## 2024-11-07 ENCOUNTER — OFFICE VISIT (OUTPATIENT)
Dept: INTERNAL MEDICINE CLINIC | Age: 49
End: 2024-11-07
Payer: COMMERCIAL

## 2024-11-07 VITALS
RESPIRATION RATE: 18 BRPM | WEIGHT: 228.7 LBS | OXYGEN SATURATION: 98 % | TEMPERATURE: 97.3 F | HEART RATE: 103 BPM | BODY MASS INDEX: 40.51 KG/M2 | SYSTOLIC BLOOD PRESSURE: 119 MMHG | DIASTOLIC BLOOD PRESSURE: 76 MMHG

## 2024-11-07 DIAGNOSIS — K90.9 INTESTINAL MALABSORPTION, UNSPECIFIED TYPE: ICD-10-CM

## 2024-11-07 DIAGNOSIS — M25.562 LEFT KNEE PAIN, UNSPECIFIED CHRONICITY: Primary | ICD-10-CM

## 2024-11-07 DIAGNOSIS — M17.12 PRIMARY OSTEOARTHRITIS OF LEFT KNEE: ICD-10-CM

## 2024-11-07 DIAGNOSIS — J45.30 MILD PERSISTENT ASTHMA WITHOUT COMPLICATION: ICD-10-CM

## 2024-11-07 DIAGNOSIS — M25.561 RIGHT KNEE PAIN, UNSPECIFIED CHRONICITY: ICD-10-CM

## 2024-11-07 PROBLEM — N64.59 ABNORMAL BREAST EXAM: Status: RESOLVED | Noted: 2024-02-28 | Resolved: 2024-11-07

## 2024-11-07 PROBLEM — M62.838 MUSCLE SPASM: Status: RESOLVED | Noted: 2024-01-24 | Resolved: 2024-11-07

## 2024-11-07 PROCEDURE — 99213 OFFICE O/P EST LOW 20 MIN: CPT

## 2024-11-07 RX ORDER — METHYLPREDNISOLONE 4 MG/1
TABLET ORAL
Qty: 1 KIT | Refills: 0 | Status: SHIPPED | OUTPATIENT
Start: 2024-11-07

## 2024-11-07 RX ORDER — M-VIT,TX,IRON,MINS/CALC/FOLIC 27MG-0.4MG
1 TABLET ORAL DAILY
Qty: 30 TABLET | Refills: 2 | Status: SHIPPED | OUTPATIENT
Start: 2024-11-07

## 2024-11-07 RX ORDER — SACCHAROMYCES BOULARDII 250 MG
250 CAPSULE ORAL 2 TIMES DAILY
Qty: 14 CAPSULE | Refills: 0 | Status: SHIPPED | OUTPATIENT
Start: 2024-11-07 | End: 2024-11-07

## 2024-11-07 RX ORDER — METHYLPREDNISOLONE 4 MG/1
TABLET ORAL
Qty: 21 TABLET | OUTPATIENT
Start: 2024-11-07

## 2024-11-07 RX ORDER — SACCHAROMYCES BOULARDII 250 MG
250 CAPSULE ORAL 2 TIMES DAILY
Qty: 60 CAPSULE | Refills: 0 | Status: SHIPPED | OUTPATIENT
Start: 2024-11-07 | End: 2024-12-07

## 2024-11-07 RX ORDER — M-VIT,TX,IRON,MINS/CALC/FOLIC 27MG-0.4MG
1 TABLET ORAL DAILY
Qty: 30 TABLET | Refills: 1 | Status: SHIPPED | OUTPATIENT
Start: 2024-11-07 | End: 2024-11-07

## 2024-11-07 ASSESSMENT — ENCOUNTER SYMPTOMS
SHORTNESS OF BREATH: 0
WHEEZING: 0
STRIDOR: 0
ABDOMINAL DISTENTION: 0
ABDOMINAL PAIN: 0

## 2024-11-07 NOTE — TELEPHONE ENCOUNTER
Prescription Refill     Medication Name:  MEDROL PACK  Pharmacy: [facility]  Patient Contact Number:  [phone]

## 2024-11-07 NOTE — PROGRESS NOTES
11/07/24 1417   BP: 119/76   Site: Right Upper Arm   Position: Sitting   Cuff Size: Large Adult   Pulse: (!) 103   Resp: 18   Temp: 97.3 °F (36.3 °C)   TempSrc: Temporal   SpO2: 98%   Weight: 103.7 kg (228 lb 11.2 oz)      Estimated body mass index is 40.51 kg/m² as calculated from the following:    Height as of 10/10/24: 1.6 m (5' 3\").    Weight as of this encounter: 103.7 kg (228 lb 11.2 oz).  Physical Exam  Constitutional:       General: She is not in acute distress.     Appearance: She is not ill-appearing or toxic-appearing.   HENT:      Head: Normocephalic and atraumatic.      Nose: Nose normal.      Mouth/Throat:      Mouth: Mucous membranes are moist.   Cardiovascular:      Rate and Rhythm: Normal rate and regular rhythm.   Pulmonary:      Effort: Pulmonary effort is normal.      Breath sounds: Normal breath sounds. No wheezing.   Abdominal:      General: Bowel sounds are normal. There is no distension.      Palpations: Abdomen is soft.      Tenderness: There is no abdominal tenderness. There is no right CVA tenderness, left CVA tenderness or guarding.   Musculoskeletal:      Right knee: Crepitus present. No swelling, deformity or effusion. No LCL laxity, MCL laxity, ACL laxity or PCL laxity.      Left knee: Crepitus present. No swelling, deformity or effusion. No LCL laxity, MCL laxity, ACL laxity or PCL laxity.  Neurological:      Mental Status: She is alert.         ASSESSMENT/PLAN:     1.  Bilateral knee osteo arthritis  Patient has undergone left meniscal repair over a year ago and gets injection for her bilateral knee osteoarthritis.  She has been working on losing weight and exercising she goes to [employer] and also is currently enrolled in a weight loss clinic.  They are planning on starting her on GLP pending some labs.  We discussed in detail about being calorie deficit and controlling carb intake.  Also encouraged her to continue exercising regularly to strengthen her lower extremity muscles.

## 2024-11-21 ENCOUNTER — OFFICE VISIT (OUTPATIENT)
Dept: ENT CLINIC | Age: 49
End: 2024-11-21
Payer: COMMERCIAL

## 2024-11-21 ENCOUNTER — TELEPHONE (OUTPATIENT)
Dept: ADMINISTRATIVE | Age: 49
End: 2024-11-21

## 2024-11-21 VITALS
DIASTOLIC BLOOD PRESSURE: 86 MMHG | WEIGHT: 233.2 LBS | HEART RATE: 99 BPM | HEIGHT: 63 IN | TEMPERATURE: 98.2 F | BODY MASS INDEX: 41.32 KG/M2 | SYSTOLIC BLOOD PRESSURE: 135 MMHG

## 2024-11-21 DIAGNOSIS — Z79.52 LONG TERM SYSTEMIC STEROID USER: ICD-10-CM

## 2024-11-21 DIAGNOSIS — J32.9 RECURRENT SINUS INFECTIONS: ICD-10-CM

## 2024-11-21 DIAGNOSIS — E03.9 HYPOTHYROIDISM, UNSPECIFIED TYPE: Primary | ICD-10-CM

## 2024-11-21 PROCEDURE — 1036F TOBACCO NON-USER: CPT | Performed by: OTOLARYNGOLOGY

## 2024-11-21 PROCEDURE — G8427 DOCREV CUR MEDS BY ELIG CLIN: HCPCS | Performed by: OTOLARYNGOLOGY

## 2024-11-21 PROCEDURE — G8484 FLU IMMUNIZE NO ADMIN: HCPCS | Performed by: OTOLARYNGOLOGY

## 2024-11-21 PROCEDURE — 99214 OFFICE O/P EST MOD 30 MIN: CPT | Performed by: OTOLARYNGOLOGY

## 2024-11-21 PROCEDURE — 3017F COLORECTAL CA SCREEN DOC REV: CPT | Performed by: OTOLARYNGOLOGY

## 2024-11-21 PROCEDURE — G8417 CALC BMI ABV UP PARAM F/U: HCPCS | Performed by: OTOLARYNGOLOGY

## 2024-11-21 RX ORDER — ITRACONAZOLE 10 MG/ML
200 SOLUTION ORAL DAILY
Qty: 200 ML | Refills: 0 | Status: SHIPPED | OUTPATIENT
Start: 2024-11-21 | End: 2024-12-01

## 2024-11-21 ASSESSMENT — ENCOUNTER SYMPTOMS
EYE PAIN: 0
RHINORRHEA: 0
COUGH: 0
NAUSEA: 0
DIARRHEA: 0
FACIAL SWELLING: 0
SORE THROAT: 0
SINUS PRESSURE: 0
SINUS PAIN: 0
EYE ITCHING: 0
TROUBLE SWALLOWING: 0
EYE REDNESS: 0
VOICE CHANGE: 0
SHORTNESS OF BREATH: 0
CHOKING: 0

## 2024-11-21 NOTE — PATIENT INSTRUCTIONS
Dr. Camacho Dry Nose Therapy    Please follow the instructions below for treatment management of your nasal crusting. .     1. Be a two nostril blower.   2. Do not pick nose.   3. Do not place anything in nose.  4. Pretz nasal spray 5 times a day. Order from [employer]  5. Saline nasal gel 3 times a day.   6. Bedside humidifier while sleeping.    7. Saline rinses twice a day.   8. If you smoke stop.

## 2024-11-21 NOTE — PROGRESS NOTES
Subjective:      Patient ID: Kenroy Griggs is a 49 y.o. female.    HPI    Review of Systems    Objective:   Physical Exam    Assessment:     ***     Plan:     ***    Fer Camacho MD

## 2024-11-21 NOTE — PROGRESS NOTES
Subjective:      Patient ID: Kenroy Griggs is a 49 y.o. female.    HPI  Chief Complaint   Patient presents with    Voice and sinus  History of Present Illness:Kenroy is a(n) 49 y.o. female who presents with a one week history of sinus infection. Exposed to old radiator at work. Hoarse voice and nasal congestion. Nasal congestion. Post ansal drip. Some blood when blowing.    Also discussed thyroid levels and oral steroid use. Patient wants an endocrinologist to manage.      Patient Active Problem List   Diagnosis    Hypothyroidism    Chronic sinusitis    Iron deficiency anemia    Hyperlipidemia, mixed    Autoimmune hepatitis (HCC)    Anxiety    Atrial fibrillation with RVR (HCC)    GERD without esophagitis    Obstructive sleep apnea    History of ITP    Prediabetes    Needs flu shot    Acute bilateral low back pain without sciatica    Atypical pneumonia    Mild persistent asthma without complication     Past Surgical History:   Procedure Laterality Date    COLONOSCOPY N/A 12/29/2020    COLONOSCOPY DIAGNOSTIC performed by Prince Hankins MD at [facility] ENDOSCOPY    KNEE ARTHROSCOPY Left 9/9/2022    LEFT KNEE ARTHROSCOPY; PARTIAL MEDIAL MENISCECTOMY performed by Rio Ty MD at [facility] OR    OTHER SURGICAL HISTORY  8/22/2012    INCISION AND DRAINAGE POSTERIOR THIGH ABSCESS    PRE-MALIGNANT / BENIGN SKIN LESION EXCISION N/A 2/8/2022    EXCISION OF SCALP CYST performed by Milan Win MD at [facility] OR    RECTAL SURGERY  Aug 2011    repair of rectal fissures and anal skin tag removal    SINUS SURGERY      X 3    TONSILLECTOMY  2004    TONSILLECTOMY AND ADENOIDECTOMY  2005    (partial adenoidectomy)    UPPER GASTROINTESTINAL ENDOSCOPY N/A 12/29/2020    EGD BIOPSY performed by Prince Hankins MD at [facility] ENDOSCOPY     Family History   Problem Relation Age of Onset    High Blood Pressure Mother     Hypertension Mother     Elevated Lipids Mother     Other Mother         fatty liver    Heart Disease Father

## 2024-11-21 NOTE — TELEPHONE ENCOUNTER
Submitted PA for Itraconazole Via [facility] Key: [id number]: APPROVED.    \"Your PA request for [id number] was approved for 90 days. The PA# assigned is [id number].\"    Auth Expiration Date: 2/17/2025.    If this requires a response please respond to the pool ( P MHCX PSC MEDICATION PRE-AUTH).      Thank you please advise patient.

## 2024-12-02 ENCOUNTER — TELEPHONE (OUTPATIENT)
Dept: ENDOCRINOLOGY | Age: 49
End: 2024-12-02

## 2024-12-05 ENCOUNTER — TELEPHONE (OUTPATIENT)
Dept: ENT CLINIC | Age: 49
End: 2024-12-05

## 2024-12-06 ENCOUNTER — TELEPHONE (OUTPATIENT)
Dept: ENT CLINIC | Age: 49
End: 2024-12-06

## 2024-12-06 NOTE — TELEPHONE ENCOUNTER
Patient called to state she has finished the Augmentin RX but remains with same symptoms of green mucous, post nasal gtt, cough ear and nose congestion. Asking if she can get a z-leatha.

## 2024-12-06 NOTE — TELEPHONE ENCOUNTER
Called and informed patient per Dr Camacho to please call PCP. Verbalized understanding. Patient also asked if she can get a copy of time she had made her last appointment with Dr Camacho for work. Copy of appointment details left at  for patient to .

## 2024-12-30 DIAGNOSIS — L73.9 NASAL FOLLICULITIS: ICD-10-CM

## 2024-12-30 DIAGNOSIS — L30.9 ECZEMA, UNSPECIFIED TYPE: ICD-10-CM

## 2024-12-30 RX ORDER — MUPIROCIN 20 MG/G
OINTMENT TOPICAL
Qty: 22 G | Refills: 1 | Status: SHIPPED | OUTPATIENT
Start: 2024-12-30

## 2024-12-30 RX ORDER — METHOCARBAMOL 500 MG/1
500 TABLET, FILM COATED ORAL 4 TIMES DAILY
Qty: 40 TABLET | Refills: 1 | Status: SHIPPED | OUTPATIENT
Start: 2024-12-30 | End: 2025-01-19

## 2024-12-30 RX ORDER — MOMETASONE FUROATE 1 MG/G
CREAM TOPICAL
Qty: 45 G | Refills: 0 | Status: SHIPPED | OUTPATIENT
Start: 2024-12-30

## 2024-12-30 NOTE — TELEPHONE ENCOUNTER
Requested Prescriptions     Pending Prescriptions Disp Refills    mometasone (ELOCON) 0.1 % cream 45 g 0     Sig: APPLY TOPICALLY  TO THE AFFECTED AREA(S) DAILY       Last Clinic Visit:  11/7/2024     Next Clinic Appointment:  5/5/2025

## 2025-01-14 ENCOUNTER — OFFICE VISIT (OUTPATIENT)
Dept: ENT CLINIC | Age: 50
End: 2025-01-14
Payer: COMMERCIAL

## 2025-01-14 VITALS
SYSTOLIC BLOOD PRESSURE: 133 MMHG | TEMPERATURE: 97.5 F | HEIGHT: 63 IN | HEART RATE: 98 BPM | WEIGHT: 229 LBS | BODY MASS INDEX: 40.57 KG/M2 | DIASTOLIC BLOOD PRESSURE: 83 MMHG

## 2025-01-14 DIAGNOSIS — J32.4 CHRONIC PANSINUSITIS: ICD-10-CM

## 2025-01-14 DIAGNOSIS — J32.9 RECURRENT SINUS INFECTIONS: Primary | ICD-10-CM

## 2025-01-14 DIAGNOSIS — B37.9 YEAST INFECTION: ICD-10-CM

## 2025-01-14 PROCEDURE — 99214 OFFICE O/P EST MOD 30 MIN: CPT | Performed by: OTOLARYNGOLOGY

## 2025-01-14 PROCEDURE — 3017F COLORECTAL CA SCREEN DOC REV: CPT | Performed by: OTOLARYNGOLOGY

## 2025-01-14 PROCEDURE — G8417 CALC BMI ABV UP PARAM F/U: HCPCS | Performed by: OTOLARYNGOLOGY

## 2025-01-14 PROCEDURE — G8427 DOCREV CUR MEDS BY ELIG CLIN: HCPCS | Performed by: OTOLARYNGOLOGY

## 2025-01-14 PROCEDURE — 1036F TOBACCO NON-USER: CPT | Performed by: OTOLARYNGOLOGY

## 2025-01-14 RX ORDER — AZITHROMYCIN 250 MG/1
TABLET, FILM COATED ORAL
Qty: 6 TABLET | Refills: 0 | Status: SHIPPED | OUTPATIENT
Start: 2025-01-14 | End: 2025-01-24

## 2025-01-14 ASSESSMENT — ENCOUNTER SYMPTOMS
EYE ITCHING: 0
SHORTNESS OF BREATH: 0
SINUS PAIN: 0
CHOKING: 0
SORE THROAT: 1
SINUS PRESSURE: 1
EYE PAIN: 0
DIARRHEA: 0
NAUSEA: 0
TROUBLE SWALLOWING: 0
EYE REDNESS: 0
RHINORRHEA: 0
FACIAL SWELLING: 0
COUGH: 0
VOICE CHANGE: 0

## 2025-01-14 NOTE — PROGRESS NOTES
Subjective:      Patient ID: Kenroy Griggs is a 49 y.o. female.    HPI  Chief Complaint   Patient presents with    Sinus infection  History of Present Illness:Kenroy is a(n) 49 y.o. female who presents 5 days nasal congestion. Post nasal drip. Throat clearing. Sinus pressure.   Nasal Discharge: purulent  Nasal Obstruction: No   Post Nasal Drainage: Yes  Nasal Bleeding: No  Sense of Smell: decreased  Eye Symptoms: none  Previous Treatments: none     Patient Active Problem List   Diagnosis    Hypothyroidism    Chronic sinusitis    Iron deficiency anemia    Hyperlipidemia, mixed    Autoimmune hepatitis (HCC)    Anxiety    Atrial fibrillation with RVR (HCC)    GERD without esophagitis    Obstructive sleep apnea    History of ITP    Prediabetes    Needs flu shot    Acute bilateral low back pain without sciatica    Atypical pneumonia    Mild persistent asthma without complication     Past Surgical History:   Procedure Laterality Date    COLONOSCOPY N/A 12/29/2020    COLONOSCOPY DIAGNOSTIC performed by Prince Hankins MD at [facility] ENDOSCOPY    KNEE ARTHROSCOPY Left 9/9/2022    LEFT KNEE ARTHROSCOPY; PARTIAL MEDIAL MENISCECTOMY performed by Rio Ty MD at [facility] OR    OTHER SURGICAL HISTORY  8/22/2012    INCISION AND DRAINAGE POSTERIOR THIGH ABSCESS    PRE-MALIGNANT / BENIGN SKIN LESION EXCISION N/A 2/8/2022    EXCISION OF SCALP CYST performed by Milan Win MD at [facility] OR    RECTAL SURGERY  Aug 2011    repair of rectal fissures and anal skin tag removal    SINUS SURGERY      X 3    TONSILLECTOMY  2004    TONSILLECTOMY AND ADENOIDECTOMY  2005    (partial adenoidectomy)    UPPER GASTROINTESTINAL ENDOSCOPY N/A 12/29/2020    EGD BIOPSY performed by Prince Hankins MD at [facility] ENDOSCOPY     Family History   Problem Relation Age of Onset    High Blood Pressure Mother     Hypertension Mother     Elevated Lipids Mother     Other Mother         fatty liver    Heart Disease Father     Hypertension Father

## 2025-01-15 ENCOUNTER — TELEPHONE (OUTPATIENT)
Dept: ENT CLINIC | Age: 50
End: 2025-01-15

## 2025-01-15 DIAGNOSIS — J32.4 CHRONIC PANSINUSITIS: ICD-10-CM

## 2025-01-15 DIAGNOSIS — M25.562 LEFT KNEE PAIN, UNSPECIFIED CHRONICITY: ICD-10-CM

## 2025-01-15 DIAGNOSIS — E06.3 HYPOTHYROIDISM DUE TO HASHIMOTO'S THYROIDITIS: Chronic | ICD-10-CM

## 2025-01-15 DIAGNOSIS — S83.242A ACUTE MEDIAL MENISCUS TEAR OF LEFT KNEE, INITIAL ENCOUNTER: ICD-10-CM

## 2025-01-15 DIAGNOSIS — I48.91 ATRIAL FIBRILLATION WITH RVR (HCC): ICD-10-CM

## 2025-01-15 RX ORDER — ASPIRIN 81 MG/1
TABLET ORAL
Qty: 90 TABLET | Refills: 1 | Status: SHIPPED | OUTPATIENT
Start: 2025-01-15

## 2025-01-15 RX ORDER — FOLIC ACID 1 MG/1
TABLET ORAL
Qty: 90 TABLET | Refills: 3 | Status: SHIPPED | OUTPATIENT
Start: 2025-01-15

## 2025-01-15 RX ORDER — FLUTICASONE PROPIONATE 93 UG/1
SPRAY, METERED NASAL
Qty: 48 ML | Refills: 5 | Status: SHIPPED | OUTPATIENT
Start: 2025-01-15

## 2025-01-15 RX ORDER — PANTOPRAZOLE SODIUM 40 MG/1
40 TABLET, DELAYED RELEASE ORAL
Qty: 180 TABLET | Refills: 3 | Status: SHIPPED | OUTPATIENT
Start: 2025-01-15

## 2025-01-15 RX ORDER — DILTIAZEM HYDROCHLORIDE 120 MG/1
120 CAPSULE, COATED, EXTENDED RELEASE ORAL DAILY
Qty: 90 CAPSULE | Refills: 0 | Status: SHIPPED | OUTPATIENT
Start: 2025-01-15

## 2025-01-15 NOTE — TELEPHONE ENCOUNTER
Patient has called to get a RX for her nebulizer 3% saline  just to be used for sinus and throat  moisture thank you

## 2025-01-15 NOTE — TELEPHONE ENCOUNTER
Requested Prescriptions     Pending Prescriptions Disp Refills    dilTIAZem (CARDIZEM CD) 120 MG extended release capsule [Pharmacy Med Name: dilTIAZem 24H ER (CD) 120 MG CP] 90 capsule 0     Sig: TAKE 1 CAPSULE BY MOUTH DAILY       Last Clinic Visit:  11/7/2024     Next Clinic Appointment:  5/5/2025

## 2025-01-17 ENCOUNTER — TELEPHONE (OUTPATIENT)
Dept: ENT CLINIC | Age: 50
End: 2025-01-17

## 2025-01-17 DIAGNOSIS — J01.40 ACUTE NON-RECURRENT PANSINUSITIS: Primary | ICD-10-CM

## 2025-01-17 LAB
BACTERIA SPEC AEROBE CULT: ABNORMAL
BACTERIA SPEC AEROBE CULT: ABNORMAL
BACTERIA SPEC ANAEROBE CULT: ABNORMAL
GRAM STN SPEC: ABNORMAL
ORGANISM: ABNORMAL

## 2025-01-17 NOTE — TELEPHONE ENCOUNTER
Patient has called to say that the Zpack is NOT working also are her test results in yet? Please advise

## 2025-01-23 ENCOUNTER — OFFICE VISIT (OUTPATIENT)
Dept: ORTHOPEDIC SURGERY | Age: 50
End: 2025-01-23
Payer: COMMERCIAL

## 2025-01-23 ENCOUNTER — TELEPHONE (OUTPATIENT)
Dept: ENT CLINIC | Age: 50
End: 2025-01-23

## 2025-01-23 VITALS — BODY MASS INDEX: 40.57 KG/M2 | HEIGHT: 63 IN | WEIGHT: 229 LBS

## 2025-01-23 DIAGNOSIS — M17.12 PRIMARY OSTEOARTHRITIS OF LEFT KNEE: Primary | ICD-10-CM

## 2025-01-23 DIAGNOSIS — M25.561 RIGHT KNEE PAIN, UNSPECIFIED CHRONICITY: ICD-10-CM

## 2025-01-23 PROCEDURE — 99213 OFFICE O/P EST LOW 20 MIN: CPT | Performed by: ORTHOPAEDIC SURGERY

## 2025-01-23 PROCEDURE — G8427 DOCREV CUR MEDS BY ELIG CLIN: HCPCS | Performed by: ORTHOPAEDIC SURGERY

## 2025-01-23 PROCEDURE — G8417 CALC BMI ABV UP PARAM F/U: HCPCS | Performed by: ORTHOPAEDIC SURGERY

## 2025-01-23 PROCEDURE — 1036F TOBACCO NON-USER: CPT | Performed by: ORTHOPAEDIC SURGERY

## 2025-01-23 PROCEDURE — 3017F COLORECTAL CA SCREEN DOC REV: CPT | Performed by: ORTHOPAEDIC SURGERY

## 2025-01-23 PROCEDURE — 20610 DRAIN/INJ JOINT/BURSA W/O US: CPT | Performed by: ORTHOPAEDIC SURGERY

## 2025-01-23 RX ORDER — BUPIVACAINE HYDROCHLORIDE 5 MG/ML
4 INJECTION, SOLUTION PERINEURAL ONCE
Status: CANCELLED | OUTPATIENT
Start: 2025-01-23 | End: 2025-01-23

## 2025-01-23 RX ORDER — BUPIVACAINE HYDROCHLORIDE 5 MG/ML
4 INJECTION, SOLUTION PERINEURAL ONCE
Status: COMPLETED | OUTPATIENT
Start: 2025-01-23 | End: 2025-01-23

## 2025-01-23 RX ORDER — METHYLPREDNISOLONE ACETATE 40 MG/ML
40 INJECTION, SUSPENSION INTRA-ARTICULAR; INTRALESIONAL; INTRAMUSCULAR; SOFT TISSUE ONCE
Status: CANCELLED | OUTPATIENT
Start: 2025-01-23 | End: 2025-01-23

## 2025-01-23 RX ORDER — METHYLPREDNISOLONE ACETATE 40 MG/ML
40 INJECTION, SUSPENSION INTRA-ARTICULAR; INTRALESIONAL; INTRAMUSCULAR; SOFT TISSUE ONCE
Status: COMPLETED | OUTPATIENT
Start: 2025-01-23 | End: 2025-01-23

## 2025-01-23 RX ADMIN — BUPIVACAINE HYDROCHLORIDE 20 MG: 5 INJECTION, SOLUTION PERINEURAL at 13:48

## 2025-01-23 RX ADMIN — METHYLPREDNISOLONE ACETATE 40 MG: 40 INJECTION, SUSPENSION INTRA-ARTICULAR; INTRALESIONAL; INTRAMUSCULAR; SOFT TISSUE at 14:00

## 2025-01-23 RX ADMIN — METHYLPREDNISOLONE ACETATE 40 MG: 40 INJECTION, SUSPENSION INTRA-ARTICULAR; INTRALESIONAL; INTRAMUSCULAR; SOFT TISSUE at 13:51

## 2025-01-23 RX ADMIN — BUPIVACAINE HYDROCHLORIDE 20 MG: 5 INJECTION, SOLUTION PERINEURAL at 13:50

## 2025-01-23 NOTE — TELEPHONE ENCOUNTER
Patient came in and said the Augmentin is not working and wants to know if there is anything else she can do or take. Please call patient, thank you!

## 2025-01-27 ENCOUNTER — TELEPHONE (OUTPATIENT)
Dept: INTERNAL MEDICINE CLINIC | Age: 50
End: 2025-01-27

## 2025-01-27 ENCOUNTER — OFFICE VISIT (OUTPATIENT)
Dept: ENT CLINIC | Age: 50
End: 2025-01-27
Payer: COMMERCIAL

## 2025-01-27 VITALS — SYSTOLIC BLOOD PRESSURE: 136 MMHG | TEMPERATURE: 97.3 F | HEART RATE: 89 BPM | DIASTOLIC BLOOD PRESSURE: 80 MMHG

## 2025-01-27 DIAGNOSIS — J32.9 RECURRENT SINUS INFECTIONS: Primary | ICD-10-CM

## 2025-01-27 DIAGNOSIS — J45.41 MODERATE PERSISTENT ASTHMA WITH EXACERBATION: ICD-10-CM

## 2025-01-27 PROCEDURE — 31231 NASAL ENDOSCOPY DX: CPT | Performed by: OTOLARYNGOLOGY

## 2025-01-27 RX ORDER — ALBUTEROL SULFATE 90 UG/1
INHALANT RESPIRATORY (INHALATION)
Qty: 18 EACH | Refills: 5 | Status: SHIPPED | OUTPATIENT
Start: 2025-01-27

## 2025-01-27 NOTE — TELEPHONE ENCOUNTER
I placed a call out to this patient to try to schedule an appointment with her. She stated that she is on her way to see an ENT. I told her well maybe ask them to give you an ATB for your sinus infection if that what is going on. I tried to explain to her that she would need an appointment if we was to prescribe her an ATB. She stated she don't think she should have to go to two different appointments for the same thing. She stated that the ENT is going to tell her that she needs to get it from her primary doctor. She stated she needs to speak to a doctor.

## 2025-01-27 NOTE — PROGRESS NOTES
1/23/2025     Reason for visit:  Status post left knee arthroscopy with partial medial meniscectomy on 9/9/2022  Right knee pain    History of Present Illness:  Patient presents for repeat evaluation.  She is now having pain within both knees.  She has responded favorably to cortisone in the past.    Objective:  Ht 1.6 m (5' 3\")   Wt 103.9 kg (229 lb)   LMP 07/14/2020   BMI 40.57 kg/m²      Physical Exam:  The patient is well-appearing and in no apparent distress  Examination of the left and right knee   There is a trace effusion,  Range of motion reveals 0 to 125  Neg lachman, negative posterior drawer, no pain or laxity with varus or valgus stress at 0 degrees and 30 degrees of flexion  + joint line tenderness  Small palpable mass over the anterior distal tibia.  Mildly painful  5 out of 5 strength throughout distal muscle groups  Sensation is intact to light touch throughout all distributions  There is some mild Swelling present  Palpable DP pulse, brisk cap refill, 2+ symmetric reflexes     4 view x-rays of the left and right knees were obtained in office today on 10/10/2024 reviewed.  There is no fracture or dislocation.  Degenerative changes of both knees which is most prominent within the lateral compartment the right knee and the medial compartment the left knee.    Assessment:  Status post left knee arthroscopy with partial medial meniscectomy on 9/9/2022  Bilateral knee degenerative joint disease    Plan:  I discussed with the patient the diagnosis and treatment options.  We discussed operative and nonoperative management.  At this point I do recommend nonoperative management.  Nonoperative treatment options include activity modification, anti-inflammatory medications, physical therapy, and injections.  The patient elected proceed with cortisone injection.  Therefore injection was given to the left and right knee via sterile technique.  The injection consisted of 40 mg of Depo-Medrol combined with 4

## 2025-01-27 NOTE — TELEPHONE ENCOUNTER
Patient called asking for refill on Medrol. Patient also asking for higher dosage of Medrol.       Patient can be reached at [phone]

## 2025-01-27 NOTE — TELEPHONE ENCOUNTER
Requested Prescriptions     Pending Prescriptions Disp Refills    albuterol sulfate HFA (VENTOLIN HFA) 108 (90 Base) MCG/ACT inhaler 18 each 5     Sig: INHALE 2 PUFF BY MOUTH EVERY 6 HOURS AS NEEDED FOR WHEEZING       Last Clinic Visit:  11/7/2024     Next Clinic Appointment:  5/5/2025

## 2025-01-27 NOTE — PROGRESS NOTES
Patient here for nasal endoscopy with cultures. Patient has been on 2 rounds of antibiotics without relief.     PE: Nasal cavity patent.         Due to the patients chronic sinus disease and/or history of sinonasal neoplasm for surveillance a nasal endoscopy with or without debridement will be performed to complete a significant physical examination of the patient which cannot be performed by anterior rhinoscopy alone (failure of complete examination of the paranasal sinuses). Failure to provide this procedure may lead to late detection of significant chronic benign disease, acute exacerbation, resolution or failure of early diagnosis of recurrent cancer. The procedure report is present in the body of the chart.       Nasal Endoscopy    Pre OP: Subacute sinusitis  Post OP: Same  Reason: For sinus direct culture.   Procedure: Nasal endoscopy  Surgeon: Fer Camacho  Anesthesia: Afrin with 2% lidocaine  Estimated Blood Loss: None      After obtaining verbal consent from the patient 1% lidocaine with afrin was sprayed into the nasal cavities.  After allowing a time for anesthesia, a nasal endoscope was placed into the nostril.  The septum, inferior, and middle turbinates were examined.  The middle meatus, and sphenoethmoid recess was examined bilaterally.  Cultures were obtained from the left maxllary sinus. There were no complications.     Pertinent positives included: There was edema and purulence in the left middle meatus. There was edema and purulence at the right middle meatus. Polyps were not identified in the sinuses. Masses were not identified.     Tolerated well without complication. I attest that I was present for and did the entire procedure myself.    Call with results and plan.

## 2025-01-28 ENCOUNTER — TELEPHONE (OUTPATIENT)
Dept: ENT CLINIC | Age: 50
End: 2025-01-28

## 2025-01-28 DIAGNOSIS — J32.4 CHRONIC PANSINUSITIS: Primary | ICD-10-CM

## 2025-01-28 RX ORDER — SODIUM CHLORIDE FOR INHALATION 3 %
4 VIAL, NEBULIZER (ML) INHALATION 2 TIMES DAILY
Qty: 200 ML | Refills: 5 | Status: SHIPPED | OUTPATIENT
Start: 2025-01-28

## 2025-01-28 NOTE — TELEPHONE ENCOUNTER
[facility] called and stated they do have a stronger strength of the normal saline at 3% they wanted to know if you want to change it to the 3%

## 2025-01-29 RX ORDER — MULTIVIT,CALC,MINS/IRON/FOLIC 9MG-400MCG
1 TABLET ORAL DAILY
Qty: 90 TABLET | Refills: 0 | Status: SHIPPED | OUTPATIENT
Start: 2025-01-29

## 2025-01-29 NOTE — TELEPHONE ENCOUNTER
Requested Prescriptions     Pending Prescriptions Disp Refills    Multiple Vitamins-Minerals (THERA-AMIRAH MAX-M) TABS [Pharmacy Med Name: THERA-AMIRAH MAX-M TABLET] 90 tablet      Sig: TAKE 1 TABLET BY MOUTH DAILY       Last Clinic Visit:  11/7/2024     Next Clinic Appointment:  5/5/2025

## 2025-01-31 DIAGNOSIS — J32.4 CHRONIC PANSINUSITIS: Primary | ICD-10-CM

## 2025-01-31 RX ORDER — TETRACYCLINE HYDROCHLORIDE 500 MG/1
500 CAPSULE ORAL 2 TIMES DAILY
Qty: 28 CAPSULE | Refills: 0 | Status: SHIPPED | OUTPATIENT
Start: 2025-01-31 | End: 2025-02-14

## 2025-02-02 LAB
BACTERIA SPEC AEROBE CULT: ABNORMAL
BACTERIA SPEC ANAEROBE CULT: ABNORMAL
GRAM STN SPEC: ABNORMAL
ORGANISM: ABNORMAL

## 2025-02-05 RX ORDER — PREDNISONE 10 MG/1
TABLET ORAL
Qty: 20 TABLET | Refills: 0 | OUTPATIENT
Start: 2025-02-05

## 2025-02-05 NOTE — TELEPHONE ENCOUNTER
Dr Acosta is out of the office this week, I would refer you back to Dr Camacho since he did do the culture.  Sorry for the inconvenience.  Porsche.

## 2025-02-06 ENCOUNTER — OFFICE VISIT (OUTPATIENT)
Dept: INTERNAL MEDICINE CLINIC | Age: 50
End: 2025-02-06
Payer: COMMERCIAL

## 2025-02-06 VITALS
RESPIRATION RATE: 18 BRPM | OXYGEN SATURATION: 98 % | HEART RATE: 86 BPM | BODY MASS INDEX: 40.74 KG/M2 | TEMPERATURE: 97.2 F | SYSTOLIC BLOOD PRESSURE: 128 MMHG | DIASTOLIC BLOOD PRESSURE: 81 MMHG | WEIGHT: 230 LBS

## 2025-02-06 DIAGNOSIS — J45.30 MILD PERSISTENT ASTHMA WITHOUT COMPLICATION: Primary | ICD-10-CM

## 2025-02-06 PROCEDURE — 99213 OFFICE O/P EST LOW 20 MIN: CPT

## 2025-02-06 ASSESSMENT — ENCOUNTER SYMPTOMS
NAUSEA: 0
EYES NEGATIVE: 1
DIARRHEA: 0
COUGH: 1
ABDOMINAL PAIN: 0
SHORTNESS OF BREATH: 1
VOMITING: 0
CONSTIPATION: 0
ABDOMINAL DISTENTION: 0
CHEST TIGHTNESS: 0
GASTROINTESTINAL NEGATIVE: 1
ALLERGIC/IMMUNOLOGIC NEGATIVE: 1
PHOTOPHOBIA: 0

## 2025-02-06 NOTE — PROGRESS NOTES
[facility]    Kenroy Griggs is a 49 y.o. female, here for evaluation of the following concerns:    PMHx: Asthma (albuterol Trelegy IV, montelukast), B/l knee arthritis, hypothyroid (levothyroxine 150 mcg), Recurrent Sinusitus (On Tetracycline)    Last visit 11/07/2024: Knee pain, needed a handicap play card.    Patient reports that she is here to get steroids.  On further inquiry patient reports that she had been having upper respiratory tract infection symptoms i.e. cough productive of yellowish sputum, runny nose, nasal congestion, and post nasal drip started in the first week.  She went to Dr. Camacho (ENT) and he prescribed her Azithromycin (Z-Pack) for acute flare of chronic sinusitis.  However her symptoms did not get better so she was prescribed Augmentin, however as per patient it still did not work.  She then requested Medrol for her symptoms but Dr Camacho wanted to do endoscopy which was done 1/27/25. Results showed edema and purulence at right middle meatus. Cultures grew staph epidermidis and Dr Camacho prescribed Tetracycline.     Now patient reports all of her symptoms are better other than cough which is still present. Otherwise nasal congestion and Rhinorrhea has improved.    Asthma: She reports her Asthma symptoms are well controlled, though she feels dyspnea  in the night. She is using Trilegy which she is responding well to.     Review of Systems   Constitutional:  Negative for activity change, appetite change and fatigue.   HENT: Negative.     Eyes: Negative.  Negative for photophobia and visual disturbance.   Respiratory:  Positive for cough and shortness of breath. Negative for chest tightness.    Cardiovascular: Negative.  Negative for chest pain, palpitations and leg swelling.   Gastrointestinal: Negative.  Negative for abdominal distention, abdominal pain, constipation, diarrhea, nausea and vomiting.   Endocrine: Negative.  Negative for cold

## 2025-02-10 ENCOUNTER — TELEPHONE (OUTPATIENT)
Dept: ENT CLINIC | Age: 50
End: 2025-02-10

## 2025-02-10 NOTE — TELEPHONE ENCOUNTER
Patient has called with question should she wait to get the flu shot  she is getting better  but still  symptoms please advise

## 2025-02-12 DIAGNOSIS — E03.9 HYPOTHYROIDISM, UNSPECIFIED TYPE: Chronic | ICD-10-CM

## 2025-02-12 RX ORDER — LEVOTHYROXINE SODIUM 150 UG/1
150 TABLET ORAL
Qty: 30 TABLET | Refills: 11 | Status: SHIPPED | OUTPATIENT
Start: 2025-02-12 | End: 2026-02-12

## 2025-02-16 NOTE — PROGRESS NOTES
[FreeTextEntry1] : heladio will call she was told no more colonoscopies and their not doing mammos
removal    SINUS SURGERY      X 3    TONSILLECTOMY  2004    TONSILLECTOMY AND ADENOIDECTOMY  2005    (partial adenoidectomy)       Social History     Socioeconomic History    Marital status: Single     Spouse name: Not on file    Number of children: Not on file    Years of education: Not on file    Highest education level: Not on file   Occupational History    Occupation: NA   Social Needs    Financial resource strain: Not on file    Food insecurity:     Worry: Not on file     Inability: Not on file    Transportation needs:     Medical: Not on file     Non-medical: Not on file   Tobacco Use    Smoking status: Never Smoker    Smokeless tobacco: Never Used   Substance and Sexual Activity    Alcohol use:  Yes     Alcohol/week: 0.0 standard drinks     Comment: 2 drinks/week     Drug use: No    Sexual activity: Not on file   Lifestyle    Physical activity:     Days per week: Not on file     Minutes per session: Not on file    Stress: Not on file   Relationships    Social connections:     Talks on phone: Not on file     Gets together: Not on file     Attends Latter day service: Not on file     Active member of club or organization: Not on file     Attends meetings of clubs or organizations: Not on file     Relationship status: Not on file    Intimate partner violence:     Fear of current or ex partner: Not on file     Emotionally abused: Not on file     Physically abused: Not on file     Forced sexual activity: Not on file   Other Topics Concern    Not on file   Social History Narrative    Not on file        Family History   Problem Relation Age of Onset    High Blood Pressure Mother     Heart Disease Father     Diabetes Maternal Aunt        Vitals:    02/05/20 1517   BP: (!) 126/93   Site: Right Upper Arm   Position: Sitting   Cuff Size: Large Adult   Pulse: 88   Resp: 20   Temp: 97.8 °F (36.6 °C)   TempSrc: Oral   SpO2: 99%   Weight: 223 lb 4.8 oz (101.3 kg)   Height: 5' 3\" (1.6 m)     Estimated
26-Feb-2025

## 2025-02-19 DIAGNOSIS — K90.9 INTESTINAL MALABSORPTION, UNSPECIFIED TYPE: ICD-10-CM

## 2025-02-19 DIAGNOSIS — E53.8 B12 DEFICIENCY: ICD-10-CM

## 2025-02-20 RX ORDER — SACCHAROMYCES BOULARDII 250 MG
250 CAPSULE ORAL 2 TIMES DAILY
Qty: 60 CAPSULE | Refills: 1 | Status: SHIPPED | OUTPATIENT
Start: 2025-02-20

## 2025-02-20 RX ORDER — CYANOCOBALAMIN 1000 UG/ML
INJECTION, SOLUTION INTRAMUSCULAR; SUBCUTANEOUS
Qty: 1 ML | Refills: 2 | Status: SHIPPED | OUTPATIENT
Start: 2025-02-20

## 2025-02-20 NOTE — TELEPHONE ENCOUNTER
Requested Prescriptions     Pending Prescriptions Disp Refills    saccharomyces boulardii (FLORASTOR) 250 MG capsule [Pharmacy Med Name: SACCH-BOULARDII 250 MG CAPSULE] 60 capsule 1     Sig: TAKE 1 CAPSULE BY MOUTH 2 TIMES A DAY       Last Clinic Visit:  2/6/2025     Next Clinic Appointment:  5/5/2025

## 2025-02-20 NOTE — TELEPHONE ENCOUNTER
Requested Prescriptions     Pending Prescriptions Disp Refills    cyanocobalamin 1000 MCG/ML injection [Pharmacy Med Name: CYANOCOBALAMIN 1,000 MCG/ML MDV] 1 mL 2     Sig: INJECT 1 ML INTO THE MUSCLE EVERY 30 DAYS       Last Clinic Visit:  2/6/2025     Next Clinic Appointment:  2/19/2025

## 2025-02-25 ENCOUNTER — TELEPHONE (OUTPATIENT)
Dept: ADMINISTRATIVE | Age: 50
End: 2025-02-25

## 2025-02-25 DIAGNOSIS — R05.2 SUBACUTE COUGH: ICD-10-CM

## 2025-02-25 DIAGNOSIS — L30.9 ECZEMA, UNSPECIFIED TYPE: ICD-10-CM

## 2025-02-25 DIAGNOSIS — J45.41 MODERATE PERSISTENT ASTHMA WITH EXACERBATION: ICD-10-CM

## 2025-02-25 RX ORDER — ALBUTEROL SULFATE 90 UG/1
INHALANT RESPIRATORY (INHALATION)
Qty: 18 EACH | Refills: 5 | Status: SHIPPED | OUTPATIENT
Start: 2025-02-25

## 2025-02-25 RX ORDER — MOMETASONE FUROATE 1 MG/G
CREAM TOPICAL
Qty: 45 G | Refills: 0 | Status: SHIPPED | OUTPATIENT
Start: 2025-02-25

## 2025-02-25 RX ORDER — CHLORHEXIDINE GLUCONATE 40 MG/ML
SOLUTION TOPICAL
Qty: 236 ML | Refills: 0 | Status: SHIPPED | OUTPATIENT
Start: 2025-02-25

## 2025-02-25 RX ORDER — IPRATROPIUM BROMIDE AND ALBUTEROL SULFATE 2.5; .5 MG/3ML; MG/3ML
SOLUTION RESPIRATORY (INHALATION)
Qty: 360 ML | Refills: 0 | Status: SHIPPED | OUTPATIENT
Start: 2025-02-25

## 2025-02-25 RX ORDER — POLYETHYLENE GLYCOL 3350 17 G/17G
17 POWDER ORAL DAILY
Qty: 510 G | Refills: 0 | Status: SHIPPED | OUTPATIENT
Start: 2025-02-25 | End: 2025-03-27

## 2025-02-25 RX ORDER — MULTIVIT,CALC,MINS/IRON/FOLIC 9MG-400MCG
1 TABLET ORAL DAILY
Qty: 90 TABLET | Refills: 0 | Status: SHIPPED | OUTPATIENT
Start: 2025-02-25

## 2025-02-25 NOTE — TELEPHONE ENCOUNTER
Requested Prescriptions     Pending Prescriptions Disp Refills    Multiple Vitamins-Minerals (THERA-AMIRAH MAX-M) TABS 90 tablet 0     Sig: Take 1 tablet by mouth daily     Signed Prescriptions Disp Refills    mometasone (ELOCON) 0.1 % cream 45 g 0     Sig: APPLY TO AFFECTED AREA(S) DAILY     Authorizing Provider: KARLA REA       Last Clinic Visit:  2/6/2025     Next Clinic Appointment:  5/5/2025

## 2025-02-25 NOTE — TELEPHONE ENCOUNTER
Requested Prescriptions     Pending Prescriptions Disp Refills    ipratropium 0.5 mg-albuterol 2.5 mg (DUONEB) 0.5-2.5 (3) MG/3ML SOLN nebulizer solution [Pharmacy Med Name: IPRAT-ALBUT 0.5-3 MG/3 ML (60 VLS)] 360 mL 1     Sig: INHALE 3 MILLILITERS (1 VIAL)  VIA NEBULIZATION BY MOUTH EVERY 4 HOURS       Last Clinic Visit:  2/6/2025     Next Clinic Appointment:  2/25/2025

## 2025-02-25 NOTE — TELEPHONE ENCOUNTER
Submitted PA for Gilson Topete 100-62.5-25MCG/ACT aerosol powder   Via CMM Key: [id number]  STATUS: This has been previously approved until 8/24/25    Follow up done daily; if no decision with in three days we will refax.  If another three days goes by with no decision will call the insurance for status.

## 2025-02-25 NOTE — TELEPHONE ENCOUNTER
Requested Prescriptions     Pending Prescriptions Disp Refills    mometasone (ELOCON) 0.1 % cream [Pharmacy Med Name: MOMETASONE FUROATE 0.1% CREAM] 45 g 0     Sig: APPLY TO AFFECTED AREA(S) DAILY       Last Clinic Visit:  2/6/2025     Next Clinic Appointment:  5/5/2025

## 2025-02-27 ENCOUNTER — PATIENT MESSAGE (OUTPATIENT)
Dept: ENT CLINIC | Age: 50
End: 2025-02-27

## 2025-02-27 DIAGNOSIS — J32.4 CHRONIC PANSINUSITIS: Primary | ICD-10-CM

## 2025-02-27 RX ORDER — TETRACYCLINE HYDROCHLORIDE 500 MG/1
500 CAPSULE ORAL 2 TIMES DAILY
Qty: 28 CAPSULE | Refills: 0 | Status: SHIPPED | OUTPATIENT
Start: 2025-02-27 | End: 2025-03-13

## 2025-04-09 ENCOUNTER — TELEPHONE (OUTPATIENT)
Dept: ENT CLINIC | Age: 50
End: 2025-04-09

## 2025-04-09 DIAGNOSIS — J01.40 ACUTE NON-RECURRENT PANSINUSITIS: ICD-10-CM

## 2025-04-09 NOTE — TELEPHONE ENCOUNTER
Patient called and asked if Dr. Grossman can put in a prescription for sinus infection? Please advise, thank you!

## 2025-04-14 DIAGNOSIS — I48.91 ATRIAL FIBRILLATION WITH RVR (HCC): ICD-10-CM

## 2025-04-14 RX ORDER — DILTIAZEM HYDROCHLORIDE 120 MG/1
120 CAPSULE, COATED, EXTENDED RELEASE ORAL DAILY
Qty: 90 CAPSULE | Refills: 0 | Status: SHIPPED | OUTPATIENT
Start: 2025-04-14

## 2025-04-14 NOTE — TELEPHONE ENCOUNTER
Requested Prescriptions     Pending Prescriptions Disp Refills    dilTIAZem (CARDIZEM CD) 120 MG extended release capsule 90 capsule 0     Sig: Take 1 capsule by mouth daily       Last Clinic Visit:  2/6/2025     Next Clinic Appointment:  5/5/2025

## 2025-04-17 DIAGNOSIS — E03.9 ACQUIRED HYPOTHYROIDISM: Primary | Chronic | ICD-10-CM

## 2025-04-17 RX ORDER — LIOTHYRONINE SODIUM 5 UG/1
10 TABLET ORAL DAILY
Qty: 60 TABLET | Refills: 0 | OUTPATIENT
Start: 2025-04-17

## 2025-04-17 NOTE — TELEPHONE ENCOUNTER
Medication:   Requested Prescriptions     Pending Prescriptions Disp Refills    liothyronine (CYTOMEL) 5 MCG tablet [Pharmacy Med Name: LIOTHYRONINE SOD 5 MCG TAB] 180 tablet 5     Sig: TAKE 2 TABLETS BY MOUTH DAILY       Last Filled:      Patient Phone Number: [phone] (home)     Last appt: 1/20/2023   Next appt: Visit date not found    Last Thyroid:   Lab Results   Component Value Date/Time    TSH 3.90 02/23/2024 12:55 PM    FT3 2.7 07/23/2021 01:41 PM    T4FREE 0.9 07/23/2021 01:41 PM       Patient needs to schedule appointment

## 2025-04-18 DIAGNOSIS — J45.41 MODERATE PERSISTENT ASTHMA WITH EXACERBATION: ICD-10-CM

## 2025-04-18 RX ORDER — MONTELUKAST SODIUM 10 MG/1
10 TABLET ORAL NIGHTLY
Qty: 36 TABLET | Refills: 4 | Status: SHIPPED | OUTPATIENT
Start: 2025-04-18

## 2025-04-18 NOTE — TELEPHONE ENCOUNTER
Requested Prescriptions     Pending Prescriptions Disp Refills    montelukast (SINGULAIR) 10 MG tablet 36 tablet 4     Sig: Take 1 tablet by mouth nightly       Last Clinic Visit:  2/6/2025     Next Clinic Appointment:  5/5/2025

## 2025-04-18 NOTE — TELEPHONE ENCOUNTER
Patient was provided with a 1 month refill and was informed that she needs to make her next appointment on May 5th 2025 for future refills. Discussed with Dr. Love.

## 2025-04-21 ENCOUNTER — TELEPHONE (OUTPATIENT)
Dept: ENT CLINIC | Age: 50
End: 2025-04-21

## 2025-04-21 DIAGNOSIS — J32.4 CHRONIC PANSINUSITIS: Primary | ICD-10-CM

## 2025-04-23 DIAGNOSIS — R05.2 SUBACUTE COUGH: ICD-10-CM

## 2025-04-23 DIAGNOSIS — I48.91 ATRIAL FIBRILLATION WITH RVR (HCC): ICD-10-CM

## 2025-04-23 DIAGNOSIS — M25.562 LEFT KNEE PAIN, UNSPECIFIED CHRONICITY: ICD-10-CM

## 2025-04-23 DIAGNOSIS — L73.9 NASAL FOLLICULITIS: ICD-10-CM

## 2025-04-23 RX ORDER — DILTIAZEM HYDROCHLORIDE 120 MG/1
120 CAPSULE, COATED, EXTENDED RELEASE ORAL DAILY
Qty: 90 CAPSULE | Refills: 0 | Status: SHIPPED | OUTPATIENT
Start: 2025-04-23

## 2025-04-24 ENCOUNTER — TELEPHONE (OUTPATIENT)
Dept: ADMINISTRATIVE | Age: 50
End: 2025-04-24

## 2025-04-24 RX ORDER — CHLORHEXIDINE GLUCONATE 4 G/100ML
LIQUID TOPICAL DAILY
Qty: 236 ML | Refills: 0 | Status: SHIPPED | OUTPATIENT
Start: 2025-04-24

## 2025-04-24 RX ORDER — POLYETHYLENE GLYCOL 3350 17 G/17G
POWDER ORAL
Qty: 510 G | Refills: 0 | Status: SHIPPED | OUTPATIENT
Start: 2025-04-24

## 2025-04-24 RX ORDER — LIOTHYRONINE SODIUM 5 UG/1
10 TABLET ORAL DAILY
Qty: 180 TABLET | Refills: 5 | OUTPATIENT
Start: 2025-04-24

## 2025-04-24 RX ORDER — LIDOCAINE 50 MG/G
PATCH TOPICAL
Qty: 10 PATCH | Refills: 0 | Status: SHIPPED | OUTPATIENT
Start: 2025-04-24

## 2025-04-24 RX ORDER — MUPIROCIN 2 %
OINTMENT (GRAM) TOPICAL
Qty: 22 G | Refills: 1 | Status: SHIPPED | OUTPATIENT
Start: 2025-04-24

## 2025-04-24 RX ORDER — METHOCARBAMOL 500 MG/1
TABLET, FILM COATED ORAL
Qty: 40 TABLET | Refills: 1 | Status: SHIPPED | OUTPATIENT
Start: 2025-04-24

## 2025-04-24 RX ORDER — BENZONATATE 200 MG/1
CAPSULE ORAL
Qty: 60 CAPSULE | Refills: 1 | Status: SHIPPED | OUTPATIENT
Start: 2025-04-24

## 2025-04-24 NOTE — TELEPHONE ENCOUNTER
Submitted PA for Gilson Topete 100-62.5-25MCG/ACT aerosol powder   Via CMM Key: [id number]  STATUS: PENDING.    Follow up done daily; if no decision with in three days we will refax.  If another three days goes by with no decision will call the insurance for status.

## 2025-04-24 NOTE — TELEPHONE ENCOUNTER
Requested Prescriptions     Pending Prescriptions Disp Refills    polyethylene glycol (MIRALAX) 17 GM/SCOOP POWD powder [Pharmacy Med Name: POLYETHYLENE GLYCOL 3350 POWD] 510 g 0     Sig: TAKE 17 GRAMS BY MOUTH DAILY FOR 30 DAYS       Last Clinic Visit:  2/6/2025     Next Clinic Appointment:  4/23/2025

## 2025-04-24 NOTE — TELEPHONE ENCOUNTER
Requested Prescriptions     Pending Prescriptions Disp Refills    benzonatate (TESSALON) 200 MG capsule [Pharmacy Med Name: BENZONATATE 200 MG CAPSULE] 60 capsule 1     Sig: TAKE 1 CAPSULE BY MOUTH 3 TIMES A DAY AS NEEDED FOR COUGH       Last Clinic Visit:  2/6/2025     Next Clinic Appointment:  5/5/2025

## 2025-04-24 NOTE — TELEPHONE ENCOUNTER
Requested Prescriptions     Pending Prescriptions Disp Refills    RUSSELL-HEX 4 4 % SOLN external solution [Pharmacy Med Name: RUSSELL-HEX 4% LIQUID] 236 mL 0     Sig: APPLY TOPICALLY DAILY AS NEEDED       Last Clinic Visit:  2/6/2025     Next Clinic Appointment:  4/23/2025

## 2025-04-24 NOTE — TELEPHONE ENCOUNTER
Requested Prescriptions     Pending Prescriptions Disp Refills    mupirocin (BACTROBAN) 2 % ointment [Pharmacy Med Name: MUPIROCIN 2% OINTMENT] 22 g 1     Sig: APPLY TO AFFECTED AREA(S) THREE TIMES A DAY    methocarbamol (ROBAXIN) 500 MG tablet [Pharmacy Med Name: METHOCARBAMOL 500 MG TABLET] 40 tablet 1     Sig: TAKE 1 TABLET BY MOUTH 4 TIMES A DAY       Last Clinic Visit:  2/6/2025     Next Clinic Appointment:  4/23/2025

## 2025-04-28 RX ORDER — CETIRIZINE HYDROCHLORIDE, PSEUDOEPHEDRINE HYDROCHLORIDE 5; 120 MG/1; MG/1
1 TABLET, FILM COATED, EXTENDED RELEASE ORAL 2 TIMES DAILY
Qty: 60 TABLET | Refills: 2 | Status: SHIPPED | OUTPATIENT
Start: 2025-04-28

## 2025-04-29 ENCOUNTER — OFFICE VISIT (OUTPATIENT)
Dept: ORTHOPEDIC SURGERY | Age: 50
End: 2025-04-29

## 2025-04-29 VITALS — WEIGHT: 230 LBS | HEIGHT: 63 IN | BODY MASS INDEX: 40.75 KG/M2

## 2025-04-29 DIAGNOSIS — M17.0 PRIMARY OSTEOARTHRITIS OF KNEES, BILATERAL: Primary | ICD-10-CM

## 2025-04-29 RX ORDER — METHYLPREDNISOLONE ACETATE 40 MG/ML
40 INJECTION, SUSPENSION INTRA-ARTICULAR; INTRALESIONAL; INTRAMUSCULAR; SOFT TISSUE ONCE
Status: COMPLETED | OUTPATIENT
Start: 2025-04-29 | End: 2025-04-29

## 2025-04-29 RX ORDER — METHYLPREDNISOLONE 4 MG/1
TABLET ORAL
Qty: 1 KIT | Refills: 0 | Status: SHIPPED | OUTPATIENT
Start: 2025-04-29

## 2025-04-29 RX ORDER — BUPIVACAINE HYDROCHLORIDE 5 MG/ML
4 INJECTION, SOLUTION PERINEURAL ONCE
Status: COMPLETED | OUTPATIENT
Start: 2025-04-29 | End: 2025-04-29

## 2025-04-29 RX ADMIN — BUPIVACAINE HYDROCHLORIDE 20 MG: 5 INJECTION, SOLUTION PERINEURAL at 16:14

## 2025-04-29 RX ADMIN — METHYLPREDNISOLONE ACETATE 40 MG: 40 INJECTION, SUSPENSION INTRA-ARTICULAR; INTRALESIONAL; INTRAMUSCULAR; SOFT TISSUE at 16:12

## 2025-04-29 RX ADMIN — BUPIVACAINE HYDROCHLORIDE 20 MG: 5 INJECTION, SOLUTION PERINEURAL at 16:13

## 2025-04-29 RX ADMIN — METHYLPREDNISOLONE ACETATE 40 MG: 40 INJECTION, SUSPENSION INTRA-ARTICULAR; INTRALESIONAL; INTRAMUSCULAR; SOFT TISSUE at 16:13

## 2025-04-30 NOTE — PROGRESS NOTES
mL of 0.5% Marcaine.  The patient tolerated this well.  She will return to see me as needed.    Greater than 20 minutes were spent with this encounter.  Time spent included evaluating the patient's chart prior to arrival.  Evaluating the patient in the office including history, physical examination, imaging reviewing, and counseling on next steps.  Lastly, time was spent discussing orders with my staff as well as providing documentation in the chart.                  Rio Ty MD            Orthopaedic Surgery Sports Medicine and Arthroscopy            [facility]            Team Physician [facility]      Disclaimer:  This note was dictated with voice recognition software.  Though review and correction are routine, we apologize for any errors.

## 2025-05-12 ENCOUNTER — OFFICE VISIT (OUTPATIENT)
Dept: ENT CLINIC | Age: 50
End: 2025-05-12
Payer: COMMERCIAL

## 2025-05-12 VITALS — SYSTOLIC BLOOD PRESSURE: 143 MMHG | DIASTOLIC BLOOD PRESSURE: 85 MMHG | HEART RATE: 84 BPM | TEMPERATURE: 98 F

## 2025-05-12 DIAGNOSIS — J32.4 CHRONIC PANSINUSITIS: Primary | ICD-10-CM

## 2025-05-12 DIAGNOSIS — R43.0 ANOSMIA: ICD-10-CM

## 2025-05-12 PROCEDURE — 3017F COLORECTAL CA SCREEN DOC REV: CPT | Performed by: OTOLARYNGOLOGY

## 2025-05-12 PROCEDURE — 99214 OFFICE O/P EST MOD 30 MIN: CPT | Performed by: OTOLARYNGOLOGY

## 2025-05-12 PROCEDURE — 1036F TOBACCO NON-USER: CPT | Performed by: OTOLARYNGOLOGY

## 2025-05-12 PROCEDURE — G8428 CUR MEDS NOT DOCUMENT: HCPCS | Performed by: OTOLARYNGOLOGY

## 2025-05-12 PROCEDURE — G8417 CALC BMI ABV UP PARAM F/U: HCPCS | Performed by: OTOLARYNGOLOGY

## 2025-05-12 RX ORDER — FEXOFENADINE HCL AND PSEUDOEPHEDRINE HCL 180; 240 MG/1; MG/1
1 TABLET, EXTENDED RELEASE ORAL DAILY
Qty: 30 TABLET | Refills: 5 | Status: SHIPPED | OUTPATIENT
Start: 2025-05-12

## 2025-05-12 RX ORDER — BUDESONIDE 0.5 MG/2ML
500 INHALANT ORAL 2 TIMES DAILY
Qty: 120 ML | Refills: 5 | Status: SHIPPED | OUTPATIENT
Start: 2025-05-12 | End: 2025-11-08

## 2025-05-12 ASSESSMENT — ENCOUNTER SYMPTOMS
FACIAL SWELLING: 0
COUGH: 0
EYE REDNESS: 0
SORE THROAT: 0
VOICE CHANGE: 0
EYE ITCHING: 0
CHOKING: 0
RHINORRHEA: 0
EYE PAIN: 0
SHORTNESS OF BREATH: 0
TROUBLE SWALLOWING: 0
SINUS PRESSURE: 0
DIARRHEA: 0
NAUSEA: 0
SINUS PAIN: 0

## 2025-05-12 NOTE — PROGRESS NOTES
Subjective:      Patient ID: Kenroy Griggs is a 49 y.o. female.    HPI  Chief Complaint   Patient presents with    Taste and smell disturbance.   History of Present Illness:Kenroy is a(n) 49 y.o. female who presents with a history of CRS and weakened sense of smell. Now with 6 months of no olfaction. Concerned as could not smell outlet getting hot in house. Has been on steroid sprays and oral steroids with minimal benefit. Has bad allergies and zyrtec D and Claritin D no longer effective. No recent COVID    Patient Active Problem List   Diagnosis    Hypothyroidism    Chronic sinusitis    Iron deficiency anemia    Hyperlipidemia, mixed    Autoimmune hepatitis (HCC)    Anxiety    Atrial fibrillation with RVR (HCC)    GERD without esophagitis    Obstructive sleep apnea    History of ITP    Prediabetes    Needs flu shot    Acute bilateral low back pain without sciatica    Atypical pneumonia    Mild persistent asthma without complication     Past Surgical History:   Procedure Laterality Date    COLONOSCOPY N/A 12/29/2020    COLONOSCOPY DIAGNOSTIC performed by Prince Hankins MD at [facility] ENDOSCOPY    KNEE ARTHROSCOPY Left 9/9/2022    LEFT KNEE ARTHROSCOPY; PARTIAL MEDIAL MENISCECTOMY performed by Rio Ty MD at [facility] OR    OTHER SURGICAL HISTORY  8/22/2012    INCISION AND DRAINAGE POSTERIOR THIGH ABSCESS    PRE-MALIGNANT / BENIGN SKIN LESION EXCISION N/A 2/8/2022    EXCISION OF SCALP CYST performed by Milan Win MD at [facility] OR    RECTAL SURGERY  Aug 2011    repair of rectal fissures and anal skin tag removal    SINUS SURGERY      X 3    TONSILLECTOMY  2004    TONSILLECTOMY AND ADENOIDECTOMY  2005    (partial adenoidectomy)    UPPER GASTROINTESTINAL ENDOSCOPY N/A 12/29/2020    EGD BIOPSY performed by Prince Hankins MD at [facility] ENDOSCOPY     Family History   Problem Relation Age of Onset    High Blood Pressure Mother     Hypertension Mother     Elevated Lipids Mother     Other Mother         fatty

## 2025-05-13 ENCOUNTER — TELEPHONE (OUTPATIENT)
Dept: ADMINISTRATIVE | Age: 50
End: 2025-05-13

## 2025-05-13 DIAGNOSIS — J30.2 SEASONAL ALLERGIES: Primary | ICD-10-CM

## 2025-05-13 RX ORDER — FEXOFENADINE HCL 180 MG/1
180 TABLET ORAL DAILY
Qty: 30 TABLET | Refills: 5 | Status: SHIPPED | OUTPATIENT
Start: 2025-05-13 | End: 2025-11-09

## 2025-05-13 NOTE — TELEPHONE ENCOUNTER
Submitted PA for Budesonide 0.5MG/2ML suspension  Via [facility] [id number]  STATUS: PENDING.    Follow up done daily; if no decision with in three days we will refax.  If another three days goes by with no decision will call the insurance for status.

## 2025-05-13 NOTE — TELEPHONE ENCOUNTER
Patient called and said her insurance will not cover Allegra D. She would like a prescription for regular Allegra. Thanks!

## 2025-06-04 DIAGNOSIS — M25.562 LEFT KNEE PAIN, UNSPECIFIED CHRONICITY: ICD-10-CM

## 2025-06-04 DIAGNOSIS — M17.12 PRIMARY OSTEOARTHRITIS OF LEFT KNEE: Primary | ICD-10-CM

## 2025-06-04 RX ORDER — METHYLPREDNISOLONE 4 MG/1
TABLET ORAL
Qty: 1 KIT | Refills: 0 | Status: SHIPPED | OUTPATIENT
Start: 2025-06-04

## 2025-06-11 ENCOUNTER — HOSPITAL ENCOUNTER (EMERGENCY)
Age: 50
Discharge: HOME | End: 2025-06-11
Payer: COMMERCIAL

## 2025-06-11 ENCOUNTER — APPOINTMENT (OUTPATIENT)
Dept: GENERAL RADIOLOGY | Age: 50
End: 2025-06-11
Payer: COMMERCIAL

## 2025-06-11 VITALS
TEMPERATURE: 98.6 F | SYSTOLIC BLOOD PRESSURE: 137 MMHG | HEIGHT: 63 IN | DIASTOLIC BLOOD PRESSURE: 81 MMHG | OXYGEN SATURATION: 100 % | RESPIRATION RATE: 19 BRPM | BODY MASS INDEX: 40.7 KG/M2 | WEIGHT: 229.72 LBS | HEART RATE: 89 BPM

## 2025-06-11 DIAGNOSIS — D64.9 ANEMIA, UNSPECIFIED TYPE: ICD-10-CM

## 2025-06-11 DIAGNOSIS — L05.01 PILONIDAL ABSCESS: Primary | ICD-10-CM

## 2025-06-11 DIAGNOSIS — B34.9 VIRAL ILLNESS: ICD-10-CM

## 2025-06-11 LAB
ALBUMIN SERPL-MCNC: 3.8 G/DL (ref 3.4–5)
ALBUMIN/GLOB SERPL: 0.9 {RATIO} (ref 1.1–2.2)
ALP SERPL-CCNC: 177 U/L (ref 40–129)
ALT SERPL-CCNC: 43 U/L (ref 10–40)
ANION GAP SERPL CALCULATED.3IONS-SCNC: 11 MMOL/L (ref 3–16)
AST SERPL-CCNC: 56 U/L (ref 15–37)
BASOPHILS # BLD: 0.1 K/UL (ref 0–0.2)
BASOPHILS NFR BLD: 0.9 %
BILIRUB SERPL-MCNC: 0.5 MG/DL (ref 0–1)
BUN SERPL-MCNC: 15 MG/DL (ref 7–20)
CALCIUM SERPL-MCNC: 8.5 MG/DL (ref 8.3–10.6)
CHLORIDE SERPL-SCNC: 103 MMOL/L (ref 99–110)
CO2 SERPL-SCNC: 21 MMOL/L (ref 21–32)
CREAT SERPL-MCNC: 0.9 MG/DL (ref 0.6–1.1)
DEPRECATED RDW RBC AUTO: 19.3 % (ref 12.4–15.4)
EOSINOPHIL # BLD: 0.1 K/UL (ref 0–0.6)
EOSINOPHIL NFR BLD: 0.6 %
FLUAV + FLUBV AG NOSE IA.RAPID: NOT DETECTED
FLUAV + FLUBV AG NOSE IA.RAPID: NOT DETECTED
GFR SERPLBLD CREATININE-BSD FMLA CKD-EPI: 77 ML/MIN/{1.73_M2}
GLUCOSE SERPL-MCNC: 105 MG/DL (ref 70–99)
HCT VFR BLD AUTO: 29.3 % (ref 36–48)
HGB BLD-MCNC: 8.4 G/DL (ref 12–16)
LIPASE SERPL-CCNC: 69 U/L (ref 13–60)
LYMPHOCYTES # BLD: 1.3 K/UL (ref 1–5.1)
LYMPHOCYTES NFR BLD: 12.5 %
MAGNESIUM SERPL-MCNC: 2.33 MG/DL (ref 1.8–2.4)
MCH RBC QN AUTO: 19.6 PG (ref 26–34)
MCHC RBC AUTO-ENTMCNC: 28.7 G/DL (ref 31–36)
MCV RBC AUTO: 68.2 FL (ref 80–100)
MONOCYTES # BLD: 0.6 K/UL (ref 0–1.3)
MONOCYTES NFR BLD: 5.9 %
NEUTROPHILS # BLD: 8.6 K/UL (ref 1.7–7.7)
NEUTROPHILS NFR BLD: 80.1 %
PATH INTERP BLD-IMP: NO
PLATELET # BLD AUTO: 225 K/UL (ref 135–450)
PLATELET BLD QL SMEAR: ADEQUATE
PMV BLD AUTO: 8.8 FL (ref 5–10.5)
POTASSIUM SERPL-SCNC: 4.4 MMOL/L (ref 3.5–5.1)
PROT SERPL-MCNC: 7.9 G/DL (ref 6.4–8.2)
RBC # BLD AUTO: 4.3 M/UL (ref 4–5.2)
SARS-COV-2 RDRP RESP QL NAA+PROBE: NOT DETECTED
SLIDE REVIEW: ABNORMAL
SODIUM SERPL-SCNC: 135 MMOL/L (ref 136–145)
WBC # BLD AUTO: 10.7 K/UL (ref 4–11)

## 2025-06-11 PROCEDURE — 80053 COMPREHEN METABOLIC PANEL: CPT

## 2025-06-11 PROCEDURE — 87635 SARS-COV-2 COVID-19 AMP PRB: CPT

## 2025-06-11 PROCEDURE — 85025 COMPLETE CBC W/AUTO DIFF WBC: CPT

## 2025-06-11 PROCEDURE — 10080 I&D PILONIDAL CYST SIMPLE: CPT

## 2025-06-11 PROCEDURE — 71046 X-RAY EXAM CHEST 2 VIEWS: CPT

## 2025-06-11 PROCEDURE — 99284 EMERGENCY DEPT VISIT MOD MDM: CPT

## 2025-06-11 PROCEDURE — 83690 ASSAY OF LIPASE: CPT

## 2025-06-11 PROCEDURE — 36415 COLL VENOUS BLD VENIPUNCTURE: CPT

## 2025-06-11 PROCEDURE — 87502 INFLUENZA DNA AMP PROBE: CPT

## 2025-06-11 PROCEDURE — 83735 ASSAY OF MAGNESIUM: CPT

## 2025-06-11 RX ORDER — CIPROFLOXACIN 500 MG/1
500 TABLET, FILM COATED ORAL 2 TIMES DAILY
Qty: 20 TABLET | Refills: 0 | Status: SHIPPED | OUTPATIENT
Start: 2025-06-11 | End: 2025-06-13

## 2025-06-11 RX ORDER — ITRACONAZOLE 10 MG/ML
200 SOLUTION ORAL ONCE
Qty: 20 ML | Refills: 0 | Status: SHIPPED | OUTPATIENT
Start: 2025-06-11 | End: 2025-06-11

## 2025-06-11 RX ORDER — METRONIDAZOLE 500 MG/1
500 TABLET ORAL 3 TIMES DAILY
Qty: 30 TABLET | Refills: 0 | Status: SHIPPED | OUTPATIENT
Start: 2025-06-11 | End: 2025-06-21

## 2025-06-11 RX ORDER — LIDOCAINE HYDROCHLORIDE AND EPINEPHRINE 10; 10 MG/ML; UG/ML
20 INJECTION, SOLUTION INFILTRATION; PERINEURAL ONCE
Status: DISCONTINUED | OUTPATIENT
Start: 2025-06-11 | End: 2025-06-11 | Stop reason: HOSPADM

## 2025-06-11 ASSESSMENT — PAIN DESCRIPTION - LOCATION: LOCATION: BACK

## 2025-06-11 ASSESSMENT — PAIN DESCRIPTION - ORIENTATION: ORIENTATION: LOWER

## 2025-06-11 ASSESSMENT — PAIN - FUNCTIONAL ASSESSMENT: PAIN_FUNCTIONAL_ASSESSMENT: 0-10

## 2025-06-11 ASSESSMENT — PAIN SCALES - GENERAL: PAINLEVEL_OUTOF10: 8

## 2025-06-11 NOTE — ED TRIAGE NOTES
Pt has abscess on lower back since Sunday night. Patient recently traveled to the [place] and returned on 05/31. Pt says since then she has been having a cough and congestion.

## 2025-06-11 NOTE — ED PROVIDER NOTES
[facility] EMERGENCY DEPARTMENT  EMERGENCY DEPARTMENT ENCOUNTER        Pt Name: Kenroy Griggs  MRN: [id number]  Birthdate 1975  Date of evaluation: 6/11/2025  Provider: Rosa Alberts PA-C  PCP: ANTHONY Lynn MD  Note Started: 2:21 PM EDT 6/11/25      JALEN. I have evaluated this patient.        CHIEF COMPLAINT       Chief Complaint   Patient presents with    Abscess     Pt has abscess on lower back since Sunday night. Patient recently traveled to the [place] and returned on 05/31. Pt says since then she has been having a cough and congestion. Pt also has had diarrhea since Tuesday.     Diarrhea    Cough       HISTORY OF PRESENT ILLNESS: 1 or more Elements     History From: patient    Kenroy Griggs is a 49 y.o. female who presents for multiple complaints that started since she returned from [facility] on 5/31.  She initially started with cough productive of yellow sputum, rhinorrhea, and congestion.  Then started with wheezing.  Has hx of asthma. Has needed to use her inhaler.  Denies hemoptysis.  Also had diarrhea for 2 days that resolved.  No sick contacts.  Also reports pilonidal abscess that has developed over the past few days and is painful.  No drainage.  This is her first time having an abscess in this location, but has had them in other locations.  History of MRSA.  Uses chlorhexidine soap.    Nursing Notes were reviewed and agreed with or any disagreements were addressed in the HPI.    REVIEW OF SYSTEMS :      Review of Systems    Positives and Pertinent negatives as per HPI.     SURGICAL HISTORY     Past Surgical History:   Procedure Laterality Date    COLONOSCOPY N/A 12/29/2020    COLONOSCOPY DIAGNOSTIC performed by Prince Hankins MD at [facility] ENDOSCOPY    KNEE ARTHROSCOPY Left 9/9/2022    LEFT KNEE ARTHROSCOPY; PARTIAL MEDIAL MENISCECTOMY performed by Rio Ty MD at [facility] OR    OTHER SURGICAL HISTORY  8/22/2012    INCISION AND DRAINAGE POSTERIOR  WITH epi  Procedure type:     Complexity:  Complex  Procedure details:     Incision types:  Single straight    Incision depth:  Dermal    Wound management:  Probed and deloculated    Drainage:  Purulent    Drainage amount:  Moderate    Packing materials:  None  Post-procedure details:     Procedure completion:  Tolerated well, no immediate complications      FINAL IMPRESSION      1. Pilonidal abscess    2. Viral illness    3. Anemia, unspecified type          DISPOSITION/PLAN       DISPOSITION Decision To Discharge 06/11/2025 03:55:42 PM   DISPOSITION CONDITION Stable           PATIENT REFERRED TO:  ANTHONY Lynn MD  [address] [place] [id number] [phone]    Schedule an appointment as soon as possible for a visit   for reevaluation    [facility] Emergency Department  [address] [address]  [phone]    As needed, If symptoms worsen      DISCHARGE MEDICATIONS:  Discharge Medication List as of 6/11/2025  3:56 PM        START taking these medications    Details   ciprofloxacin (CIPRO) 500 MG tablet Take 1 tablet by mouth 2 times daily for 10 days, Disp-20 tablet, R-0Normal      metroNIDAZOLE (FLAGYL) 500 MG tablet Take 1 tablet by mouth 3 times daily for 10 days, Disp-30 tablet, R-0Normal             DISCONTINUED MEDICATIONS:  Discharge Medication List as of 6/11/2025  3:56 PM                 (Please note that portions of this note were completed with a voice recognition program.  Efforts were made to edit the dictations but occasionally words are mis-transcribed.)    Rosa Alberts PA-C (electronically signed)            Rosa Alberts PA-C  06/11/25 [id number]

## 2025-06-11 NOTE — ED NOTES
Pt confirmed d/c paperwork has correct name. Discharge and education instructions reviewed with patient. Teach-back successful.  Pt verbalized understanding and denied questions at this time. No acute distress noted. Patient instructed to follow-up as noted - return to emergency department if symptoms worsen. Patient verbalized understanding. Discharged per EDMD with discharge instructions. Pt discharged to private vehicle. Patient stable upon departure. Thanked patient for choosing [facility] for care. Provider aware of patient pain at time of discharge.

## 2025-06-13 ENCOUNTER — OFFICE VISIT (OUTPATIENT)
Dept: INTERNAL MEDICINE CLINIC | Age: 50
End: 2025-06-13
Payer: COMMERCIAL

## 2025-06-13 VITALS
OXYGEN SATURATION: 97 % | DIASTOLIC BLOOD PRESSURE: 83 MMHG | HEIGHT: 63 IN | TEMPERATURE: 97.2 F | RESPIRATION RATE: 18 BRPM | BODY MASS INDEX: 39.92 KG/M2 | SYSTOLIC BLOOD PRESSURE: 126 MMHG | HEART RATE: 81 BPM | WEIGHT: 225.3 LBS

## 2025-06-13 DIAGNOSIS — G56.03 BILATERAL CARPAL TUNNEL SYNDROME: Primary | ICD-10-CM

## 2025-06-13 DIAGNOSIS — L05.01 PILONIDAL ABSCESS: ICD-10-CM

## 2025-06-13 PROCEDURE — 99213 OFFICE O/P EST LOW 20 MIN: CPT

## 2025-06-13 RX ORDER — DOXYCYCLINE HYCLATE 100 MG
100 TABLET ORAL 2 TIMES DAILY
Qty: 14 TABLET | Refills: 0 | Status: SHIPPED | OUTPATIENT
Start: 2025-06-13 | End: 2025-06-20

## 2025-06-13 RX ORDER — ITRACONAZOLE 100 MG/1
100 CAPSULE ORAL DAILY
Qty: 7 CAPSULE | Refills: 0 | Status: SHIPPED | OUTPATIENT
Start: 2025-06-13 | End: 2025-06-20

## 2025-06-13 SDOH — ECONOMIC STABILITY: FOOD INSECURITY: WITHIN THE PAST 12 MONTHS, THE FOOD YOU BOUGHT JUST DIDN'T LAST AND YOU DIDN'T HAVE MONEY TO GET MORE.: PATIENT DECLINED

## 2025-06-13 SDOH — ECONOMIC STABILITY: FOOD INSECURITY: WITHIN THE PAST 12 MONTHS, YOU WORRIED THAT YOUR FOOD WOULD RUN OUT BEFORE YOU GOT MONEY TO BUY MORE.: PATIENT DECLINED

## 2025-06-13 ASSESSMENT — PATIENT HEALTH QUESTIONNAIRE - PHQ9
1. LITTLE INTEREST OR PLEASURE IN DOING THINGS: SEVERAL DAYS
SUM OF ALL RESPONSES TO PHQ QUESTIONS 1-9: 2
SUM OF ALL RESPONSES TO PHQ QUESTIONS 1-9: 2
2. FEELING DOWN, DEPRESSED OR HOPELESS: SEVERAL DAYS
SUM OF ALL RESPONSES TO PHQ QUESTIONS 1-9: 2
SUM OF ALL RESPONSES TO PHQ QUESTIONS 1-9: 2

## 2025-06-13 NOTE — PATIENT INSTRUCTIONS
We are referring you to general surgery for pilonidal abscess.     We are referring you to a hand surgeon within the system so that it is covered by the insurance.    Please starting taking doxycycline along with flagyl and stop ciprofloxacin.    Please take itraconazole as long as you are taking the antibiotics.    Please come back in 3 months or as needed before that.

## 2025-06-13 NOTE — PROGRESS NOTES
[facility]    Kenroy Griggs is a 49 y.o. female, with a MHx significant for asthma, chronic rhinosinositis, lower back pain, hypothyroidism, degenerative joint disorder, carpal tunnel syndrome who presents to the clinic for the following concerns.    HPI    #Abscess  Patient removed recently had a painful swelling in her intergluteal cleft that required ED visit 2 weeks ago.  The diagnosis of pilonidal abscess was made in the ED and it was incised and drained. She was discharged with ciprofloxacin and metronidazole.  She denied having fever, chills, fatigue.  She still has such significant discomfort at the area of abscess which does not let her sit comfortably.  It is also causing her to have lower back pain without any red flag signs including weakness, saddle anesthesia, bowel/bladder control loss.  She has history of MRSA cellulitis and had few episodes of abscesses in her genital and thigh area after shaving/waxing. Physical examination showed wound covered in cotton with surrounding area of induration suppression in the right cheek without significant erythema.  Tenderness was present in the area of induration.    Carpal tunnel syndrome  Patient has history of carpal tunnel syndrome for which she was seen 1 week hand surgeon before but not due to changes insurance that the surgeon is not covered by his insurance and she needed a referral to the hand surgeon.    #Lower back pain  Patient has history of lower back pain she feels this was after having this painful abscess in her intergluteal cleft. No red flags no bowel/bladder control loss, lower extremity weakness, saddle anesthesia.    Social situation:  She used to work at  in the healthcare related setting.  She recently lost her job due to losing the government wade and now she is jobless and lost her insurance.  Now she is on Medicaid.  She recently found a new part-time job that she is going started on

## 2025-06-16 ENCOUNTER — OFFICE VISIT (OUTPATIENT)
Dept: INTERNAL MEDICINE CLINIC | Age: 50
End: 2025-06-16
Payer: COMMERCIAL

## 2025-06-16 ENCOUNTER — TELEPHONE (OUTPATIENT)
Dept: INTERNAL MEDICINE CLINIC | Age: 50
End: 2025-06-16

## 2025-06-16 VITALS
TEMPERATURE: 97 F | WEIGHT: 225.1 LBS | OXYGEN SATURATION: 97 % | DIASTOLIC BLOOD PRESSURE: 77 MMHG | HEART RATE: 86 BPM | RESPIRATION RATE: 16 BRPM | BODY MASS INDEX: 39.87 KG/M2 | SYSTOLIC BLOOD PRESSURE: 116 MMHG

## 2025-06-16 DIAGNOSIS — L02.31 GLUTEAL ABSCESS: Primary | ICD-10-CM

## 2025-06-16 DIAGNOSIS — B37.9 YEAST INFECTION: ICD-10-CM

## 2025-06-16 PROCEDURE — 99213 OFFICE O/P EST LOW 20 MIN: CPT

## 2025-06-16 RX ORDER — ITRACONAZOLE 10 MG/ML
200 SOLUTION ORAL DAILY
Qty: 200 ML | Refills: 0 | Status: SHIPPED | OUTPATIENT
Start: 2025-06-16 | End: 2025-06-16

## 2025-06-16 RX ORDER — ITRACONAZOLE 10 MG/ML
100 SOLUTION ORAL DAILY
Qty: 70 ML | Refills: 0 | Status: SHIPPED | OUTPATIENT
Start: 2025-06-16 | End: 2025-06-18 | Stop reason: SDUPTHER

## 2025-06-16 RX ORDER — LIDOCAINE AND PRILOCAINE 25; 25 MG/G; MG/G
CREAM TOPICAL
Qty: 30 G | Refills: 0 | Status: SHIPPED | OUTPATIENT
Start: 2025-06-16

## 2025-06-16 NOTE — PATIENT INSTRUCTIONS
Lido abraham cream ordered to [address]  Use as directed.    Call us as needed for any problems.  [phone]    Return in 2 weeks

## 2025-06-16 NOTE — PROGRESS NOTES
Department of General Surgery - Adult  General Surgery Service  Resident Clinic Note      CC:  Gluteal Abscess     History Obtained From:  patient    HISTORY OF PRESENT ILLNESS:  This is a 49 y.o. female with Hx of gluteal and thigh abscesses who presents with a right gluteal abscess. The patient was recently in [facility] and noticed the abscess after coming home. The patient had it drained in the ED last week and was started on antibiotics. She has been doing well since drainage in the ED, however wanted the wound checked as she has a history of MRSA. Denies fever or increased pain. States swelling and redness have greatly improved. Does have some pain at the stab site after sitting for a prolonged time.     Patient states she notes that these ususally occur after shaving. She does Hibiclens wash after shaving and usually prevents these from occurring. She is taking doxycyline and flagyl without issue. Also treating area topically with mupirocin     Past Medical History:   Diagnosis Date    Asthma     Autoimmune hepatitis (HCC)     Chronic sinusitis     COVID-19     Hypothyroidism      Past Surgical History:   Procedure Laterality Date    COLONOSCOPY N/A 12/29/2020    COLONOSCOPY DIAGNOSTIC performed by Prince Hankins MD at [facility] ENDOSCOPY    KNEE ARTHROSCOPY Left 9/9/2022    LEFT KNEE ARTHROSCOPY; PARTIAL MEDIAL MENISCECTOMY performed by Rio Ty MD at [facility] OR    OTHER SURGICAL HISTORY  8/22/2012    INCISION AND DRAINAGE POSTERIOR THIGH ABSCESS    PRE-MALIGNANT / BENIGN SKIN LESION EXCISION N/A 2/8/2022    EXCISION OF SCALP CYST performed by Milan Win MD at [facility] OR    RECTAL SURGERY  Aug 2011    repair of rectal fissures and anal skin tag removal    SINUS SURGERY      X 3    TONSILLECTOMY  2004    TONSILLECTOMY AND ADENOIDECTOMY  2005    (partial adenoidectomy)    UPPER GASTROINTESTINAL ENDOSCOPY N/A 12/29/2020    EGD BIOPSY performed by Prince Hankins MD at [facility] ENDOSCOPY

## 2025-06-18 DIAGNOSIS — B37.9 YEAST INFECTION: ICD-10-CM

## 2025-06-18 RX ORDER — ITRACONAZOLE 10 MG/ML
100 SOLUTION ORAL DAILY
Qty: 70 ML | Refills: 0 | Status: CANCELLED | OUTPATIENT
Start: 2025-06-18 | End: 2025-06-25

## 2025-06-18 RX ORDER — ITRACONAZOLE 10 MG/ML
100 SOLUTION ORAL DAILY
Qty: 70 ML | Refills: 0 | Status: SHIPPED | OUTPATIENT
Start: 2025-06-18 | End: 2025-06-25

## 2025-06-18 NOTE — TELEPHONE ENCOUNTER
Requested Prescriptions     Pending Prescriptions Disp Refills    itraconazole (SPORANOX) 10 MG/ML solution 70 mL 0     Sig: Take 10 mLs by mouth daily for 7 days       Last Clinic Visit:  6/16/2025     Next Clinic Appointment:  6/30/2025

## 2025-06-24 DIAGNOSIS — S83.242A ACUTE MEDIAL MENISCUS TEAR OF LEFT KNEE, INITIAL ENCOUNTER: ICD-10-CM

## 2025-06-24 DIAGNOSIS — M25.562 LEFT KNEE PAIN, UNSPECIFIED CHRONICITY: ICD-10-CM

## 2025-06-24 RX ORDER — ASPIRIN 81 MG/1
81 TABLET, COATED ORAL DAILY
Qty: 90 TABLET | Refills: 1 | Status: SHIPPED | OUTPATIENT
Start: 2025-06-24

## 2025-06-24 RX ORDER — LIOTHYRONINE SODIUM 5 UG/1
10 TABLET ORAL DAILY
Qty: 180 TABLET | Refills: 5 | OUTPATIENT
Start: 2025-06-24

## 2025-06-24 NOTE — TELEPHONE ENCOUNTER
Requested Prescriptions     Pending Prescriptions Disp Refills    ASPIRIN LOW DOSE 81 MG EC tablet [Pharmacy Med Name: ASPIRIN EC 81 MG TABLET] 90 tablet 1     Sig: TAKE 1 TABLET BY MOUTH DAILY       Last Clinic Visit:  6/16/2025     Next Clinic Appointment:  6/30/2025

## 2025-06-25 DIAGNOSIS — L30.9 ECZEMA, UNSPECIFIED TYPE: ICD-10-CM

## 2025-06-25 RX ORDER — BENZOCAINE/MENTHOL 6 MG-10 MG
LOZENGE MUCOUS MEMBRANE
Qty: 30 G | Refills: 1 | Status: CANCELLED | OUTPATIENT
Start: 2025-06-25 | End: 2025-07-02

## 2025-06-25 RX ORDER — LIDOCAINE 50 MG/G
PATCH TOPICAL
Qty: 10 PATCH | Refills: 0 | Status: SHIPPED | OUTPATIENT
Start: 2025-06-25

## 2025-06-25 RX ORDER — BENZOCAINE/MENTHOL 6 MG-10 MG
LOZENGE MUCOUS MEMBRANE
Qty: 28 G | Refills: 0 | Status: SHIPPED | OUTPATIENT
Start: 2025-06-25 | End: 2025-07-02

## 2025-06-25 RX ORDER — BENZOCAINE/MENTHOL 6 MG-10 MG
LOZENGE MUCOUS MEMBRANE
Qty: 28 G | Refills: 0 | Status: SHIPPED | OUTPATIENT
Start: 2025-06-25 | End: 2025-06-25 | Stop reason: CLARIF

## 2025-06-30 ENCOUNTER — OFFICE VISIT (OUTPATIENT)
Dept: INTERNAL MEDICINE CLINIC | Age: 50
End: 2025-06-30
Payer: COMMERCIAL

## 2025-06-30 VITALS
RESPIRATION RATE: 20 BRPM | HEIGHT: 63 IN | HEART RATE: 85 BPM | DIASTOLIC BLOOD PRESSURE: 84 MMHG | BODY MASS INDEX: 40.08 KG/M2 | WEIGHT: 226.2 LBS | OXYGEN SATURATION: 99 % | TEMPERATURE: 97.2 F | SYSTOLIC BLOOD PRESSURE: 132 MMHG

## 2025-06-30 DIAGNOSIS — M54.50 ACUTE BILATERAL LOW BACK PAIN WITHOUT SCIATICA: Primary | ICD-10-CM

## 2025-06-30 DIAGNOSIS — L02.31 ABSCESS, GLUTEAL, RIGHT: Primary | ICD-10-CM

## 2025-06-30 DIAGNOSIS — R21 RASH: ICD-10-CM

## 2025-06-30 PROCEDURE — 99213 OFFICE O/P EST LOW 20 MIN: CPT

## 2025-06-30 RX ORDER — CHLORHEXIDINE GLUCONATE 40 MG/ML
SOLUTION TOPICAL DAILY
Qty: 236 ML | Refills: 0 | Status: SHIPPED | OUTPATIENT
Start: 2025-06-30

## 2025-06-30 RX ORDER — CETIRIZINE HYDROCHLORIDE, PSEUDOEPHEDRINE HYDROCHLORIDE 5; 120 MG/1; MG/1
1 TABLET, FILM COATED, EXTENDED RELEASE ORAL 2 TIMES DAILY
Qty: 60 TABLET | Refills: 2
Start: 2025-06-30 | End: 2025-09-28

## 2025-06-30 RX ORDER — CETIRIZINE HYDROCHLORIDE, PSEUDOEPHEDRINE HYDROCHLORIDE 5; 120 MG/1; MG/1
1 TABLET, FILM COATED, EXTENDED RELEASE ORAL 2 TIMES DAILY
Qty: 60 TABLET | Refills: 2 | Status: CANCELLED
Start: 2025-06-30 | End: 2025-09-28

## 2025-06-30 RX ORDER — METHOCARBAMOL 500 MG/1
500 TABLET, FILM COATED ORAL 4 TIMES DAILY
Qty: 40 TABLET | Refills: 1 | Status: SHIPPED | OUTPATIENT
Start: 2025-06-30

## 2025-06-30 RX ORDER — KETOCONAZOLE 20 MG/ML
SHAMPOO, SUSPENSION TOPICAL
Qty: 120 ML | Refills: 1 | Status: SHIPPED | OUTPATIENT
Start: 2025-06-30

## 2025-06-30 NOTE — PROGRESS NOTES
Department of General Surgery - Adult  General Surgery Service  Resident Clinic Note      CC:  Gluteal Abscess     History Obtained From:  patient    HISTORY OF PRESENT ILLNESS:  This is a 49 y.o. female with Hx of gluteal and thigh abscesses who presents with a right gluteal abscess. The patient was recently in [facility] and noticed the abscess after coming home. The patient had it drained in the ED last week and was started on antibiotics. She has been doing well since drainage in the ED, however wanted the wound checked as she has a history of MRSA. Denies fever or increased pain. States swelling and redness have greatly improved. Does have some pain at the stab site after sitting for a prolonged time.     6/16/25 Patient states she notes that these ususally occur after shaving. She does Hibiclens wash after shaving and usually prevents these from occurring. She is taking doxycyline and flagyl without issue. Also treating area topically with mupirocin     6/30/25: patient completed course of antibiotics. Denies tenderness, pain, or drainage at site. Feeling good. Does state her sciatica has increased since the abscess was drained.      Past Medical History:   Diagnosis Date    Asthma     Autoimmune hepatitis (HCC)     Chronic sinusitis     COVID-19     Hypothyroidism      Past Surgical History:   Procedure Laterality Date    COLONOSCOPY N/A 12/29/2020    COLONOSCOPY DIAGNOSTIC performed by Prince Hankins MD at [facility] ENDOSCOPY    KNEE ARTHROSCOPY Left 9/9/2022    LEFT KNEE ARTHROSCOPY; PARTIAL MEDIAL MENISCECTOMY performed by Rio Ty MD at [facility] OR    OTHER SURGICAL HISTORY  8/22/2012    INCISION AND DRAINAGE POSTERIOR THIGH ABSCESS    PRE-MALIGNANT / BENIGN SKIN LESION EXCISION N/A 2/8/2022    EXCISION OF SCALP CYST performed by Milan Win MD at [facility] OR    RECTAL SURGERY  Aug 2011    repair of rectal fissures and anal skin tag removal    SINUS SURGERY      X 3    TONSILLECTOMY  2004

## 2025-06-30 NOTE — TELEPHONE ENCOUNTER
Requested Prescriptions     Pending Prescriptions Disp Refills    chlorhexidine gluconate (RUSSELL-HEX 4) 4 % SOLN external solution 236 mL 0     Sig: Apply topically daily    methocarbamol (ROBAXIN) 500 MG tablet 40 tablet 1     Sig: Take 1 tablet by mouth 4 times daily    ketoconazole (NIZORAL) 2 % shampoo 120 mL 1     Sig: Apply topically daily as needed.       Last Clinic Visit:  6/30/2025     Next Clinic Appointment:  Visit date not found

## 2025-07-09 ENCOUNTER — OFFICE VISIT (OUTPATIENT)
Dept: ORTHOPEDIC SURGERY | Age: 50
End: 2025-07-09
Payer: COMMERCIAL

## 2025-07-09 VITALS — BODY MASS INDEX: 39.87 KG/M2 | WEIGHT: 225 LBS | HEIGHT: 63 IN

## 2025-07-09 DIAGNOSIS — M47.27 LUMBOSACRAL SPONDYLOSIS WITH RADICULOPATHY: ICD-10-CM

## 2025-07-09 DIAGNOSIS — M43.16 SPONDYLOLISTHESIS OF LUMBAR REGION: Primary | ICD-10-CM

## 2025-07-09 PROCEDURE — 99214 OFFICE O/P EST MOD 30 MIN: CPT | Performed by: PHYSICIAN ASSISTANT

## 2025-07-09 PROCEDURE — G8417 CALC BMI ABV UP PARAM F/U: HCPCS | Performed by: PHYSICIAN ASSISTANT

## 2025-07-09 PROCEDURE — 1036F TOBACCO NON-USER: CPT | Performed by: PHYSICIAN ASSISTANT

## 2025-07-09 PROCEDURE — G8428 CUR MEDS NOT DOCUMENT: HCPCS | Performed by: PHYSICIAN ASSISTANT

## 2025-07-09 PROCEDURE — 3017F COLORECTAL CA SCREEN DOC REV: CPT | Performed by: PHYSICIAN ASSISTANT

## 2025-07-09 RX ORDER — METHOCARBAMOL 750 MG/1
750 TABLET, FILM COATED ORAL 3 TIMES DAILY
Qty: 90 TABLET | Refills: 0 | Status: SHIPPED | OUTPATIENT
Start: 2025-07-09 | End: 2025-08-08

## 2025-07-09 NOTE — PROGRESS NOTES
History of present illness:   Ms. Kenroy Griggs is a pleasant 49 y.o. female kindly referred by primary care providers/[facility] outpatient clinic for consultation regarding her low back pain, right leg pain.   Her pain began gradually about 1 year ago.  Pain has steadily worsened since onset.   Back pain 8/10 VAS, right buttock pain 8/10 VAS, right leg pain 5/10 VAS.   Pain is describes as constant dull ache with episodes of sharp pain which has significantly worsened about 3 months ago.  She reports numbness and tingling low back rating down the right gluteus and right posterior lateral leg.    She denies weakness of her right and left leg.  She denies bowel or bladder dysfunction and saddle anesthesia.   She can sit for a maximum of 60 minutes and stand for a maximum unlimited minutes.   Pain does disrupt her sleep.   Prior medications and treatment tried include Tylenol and Robaxin without much relief.      Past medical history:  Her past medical history has been reviewed.  Past Medical History:   Diagnosis Date    Asthma     Autoimmune hepatitis (HCC)     Chronic sinusitis     COVID-19     Hypothyroidism        Her past surgical history has been reviewed.  Past Surgical History:   Procedure Laterality Date    COLONOSCOPY N/A 12/29/2020    COLONOSCOPY DIAGNOSTIC performed by Prince Hankins MD at [facility] ENDOSCOPY    KNEE ARTHROSCOPY Left 9/9/2022    LEFT KNEE ARTHROSCOPY; PARTIAL MEDIAL MENISCECTOMY performed by Rio Ty MD at [facility] OR    OTHER SURGICAL HISTORY  8/22/2012    INCISION AND DRAINAGE POSTERIOR THIGH ABSCESS    PRE-MALIGNANT / BENIGN SKIN LESION EXCISION N/A 2/8/2022    EXCISION OF SCALP CYST performed by Milan Win MD at [facility] OR    RECTAL SURGERY  Aug 2011    repair of rectal fissures and anal skin tag removal    SINUS SURGERY      X 3    TONSILLECTOMY  2004    TONSILLECTOMY AND ADENOIDECTOMY  2005    (partial adenoidectomy)    UPPER GASTROINTESTINAL ENDOSCOPY N/A

## 2025-07-15 NOTE — PLAN OF CARE
[facility] - Outpatient Rehabilitation and Therapy: [address], [place] [address] office: [phone] fax: [phone]     Physical Therapy Initial Evaluation Certification      Dear LEILANI Pittman ,    We had the pleasure of evaluating the following patient for physical therapy services at [facility].  A summary of our findings can be found in the initial assessment below.  This includes our plan of care.  If you have any questions or concerns regarding these findings, please do not hesitate to contact me at the office phone number listed above.  Thank you for the referral.     Physician Signature:_______________________________Date:__________________  By signing above (or electronic signature), therapist’s plan is approved by physician       Physical Therapy: TREATMENT/PROGRESS NOTE   Patient: Kenroy Griggs (49 y.o. female)   Examination Date: 2025   :  1975 MRN: [id number]   Visit #:   Insurance Allowable Auth Needed   *** []Yes    []No    Insurance: Payor: [employer] / Plan: [employer] [place] MEDICAID / Product Type: *No Product type* /   Insurance ID: [id number] - (Medicaid Managed)  Secondary Insurance (if applicable):    Treatment Diagnosis: ***  No diagnosis found.   Medical Diagnosis:  Spondylolisthesis of lumbar region [M43.16]  Lumbosacral spondylosis with radiculopathy [M47.27]   Referring Physician: Pavan Lima PA  PCP: Nav Molina DO     Plan of care signed (Y/N):     Date of Patient follow up with Physician:      Plan of Care Report: EVAL today  POC update due: (10 visits /OR AUTH LIMITS, whichever is less) *** 2025                                             Medical History:  Comorbidities:  {THERAPY COMORBIDITIES:70271}  Relevant Medical History:   Medical History    Diagnosis Date Comment Source   Asthma      Hypothyroidism         Other Medical History    Diagnosis Date Comment Source

## 2025-07-18 ENCOUNTER — HOSPITAL ENCOUNTER (OUTPATIENT)
Dept: PHYSICAL THERAPY | Age: 50
Setting detail: THERAPIES SERIES
Discharge: HOME | End: 2025-07-18

## 2025-07-22 ENCOUNTER — HOSPITAL ENCOUNTER (OUTPATIENT)
Dept: PHYSICAL THERAPY | Age: 50
Setting detail: THERAPIES SERIES
Discharge: HOME | End: 2025-07-22
Payer: COMMERCIAL

## 2025-07-22 DIAGNOSIS — M54.50 LUMBAR SPINE PAIN: Primary | ICD-10-CM

## 2025-07-22 DIAGNOSIS — Z74.09 IMPAIRED MOBILITY AND ENDURANCE: ICD-10-CM

## 2025-07-22 DIAGNOSIS — R26.2 DIFFICULTY WALKING: ICD-10-CM

## 2025-07-22 DIAGNOSIS — R29.898 HIP WEAKNESS: ICD-10-CM

## 2025-07-22 PROCEDURE — 97530 THERAPEUTIC ACTIVITIES: CPT

## 2025-07-22 PROCEDURE — 97110 THERAPEUTIC EXERCISES: CPT

## 2025-07-22 PROCEDURE — 97161 PT EVAL LOW COMPLEX 20 MIN: CPT

## 2025-07-22 NOTE — PLAN OF CARE
[facility] - Outpatient Rehabilitation and Therapy: [address], [place] [address] office: [phone] fax: [phone]     Physical Therapy Initial Evaluation Certification      Dear LEILANI Pittman ,    We had the pleasure of evaluating the following patient for physical therapy services at [facility].  A summary of our findings can be found in the initial assessment below.  This includes our plan of care.  If you have any questions or concerns regarding these findings, please do not hesitate to contact me at the office phone number listed above.  Thank you for the referral.     Physician Signature:_______________________________Date:__________________  By signing above (or electronic signature), therapist’s plan is approved by physician       Physical Therapy: TREATMENT/PROGRESS NOTE   Patient: Kenroy Griggs (49 y.o. female)   Examination Date: 2025   :  1975 MRN: [id number]   Visit #: 1   Insurance Allowable Auth Needed   Mn []Yes    [x]No    Insurance: Payor: [employer] / Plan: [employer] [place] MEDICAID / Product Type: *No Product type* /   Insurance ID: [id number] - (Medicaid Managed)  Secondary Insurance (if applicable):    Treatment Diagnosis:     ICD-10-CM    1. Lumbar spine pain  M54.50       2. Hip weakness  R29.898       3. Difficulty walking  R26.2       4. Impaired mobility and endurance  Z74.09          Medical Diagnosis:  Spondylolisthesis, lumbar region [M43.16]  Other spondylosis with radiculopathy, lumbosacral region [M47.27]   Referring Physician: Pavan Lima PA  PCP: Nav Molina DO     Plan of care signed (Y/N):     Date of Patient follow up with Physician:      Plan of Care Report: EVAL today  POC update due: (10 visits /OR AUTH LIMITS, whichever is less)  2025                                             Medical History:  Comorbidities:  Other: Left knee scope, Asthma   Relevant Medical History:

## 2025-07-25 DIAGNOSIS — J32.4 CHRONIC PANSINUSITIS: ICD-10-CM

## 2025-07-25 RX ORDER — MULTIVIT,CALC,MINS/IRON/FOLIC 9MG-400MCG
1 TABLET ORAL DAILY
Qty: 90 TABLET | Refills: 0 | Status: SHIPPED | OUTPATIENT
Start: 2025-07-25

## 2025-07-25 NOTE — TELEPHONE ENCOUNTER
Requested Prescriptions     Pending Prescriptions Disp Refills    Multiple Vitamins-Minerals (THERA-AMIRAH MAX-M) TABS 90 tablet 0     Sig: Take 1 tablet by mouth daily       Last Clinic Visit:  6/30/2025     Next Clinic Appointment:  Visit date not found

## 2025-07-27 RX ORDER — FLUTICASONE PROPIONATE 93 UG/1
1 SPRAY, METERED NASAL DAILY
Qty: 48 ML | Refills: 5 | Status: SHIPPED | OUTPATIENT
Start: 2025-07-27

## 2025-07-27 RX ORDER — CETIRIZINE HYDROCHLORIDE, PSEUDOEPHEDRINE HYDROCHLORIDE 5; 120 MG/1; MG/1
1 TABLET, FILM COATED, EXTENDED RELEASE ORAL 2 TIMES DAILY
Qty: 60 TABLET | Refills: 2 | Status: SHIPPED | OUTPATIENT
Start: 2025-07-27 | End: 2025-10-25

## 2025-07-28 ENCOUNTER — HOSPITAL ENCOUNTER (OUTPATIENT)
Dept: PHYSICAL THERAPY | Age: 50
Setting detail: THERAPIES SERIES
Discharge: HOME | End: 2025-07-28
Payer: COMMERCIAL

## 2025-07-28 PROCEDURE — 97112 NEUROMUSCULAR REEDUCATION: CPT

## 2025-07-28 PROCEDURE — 97110 THERAPEUTIC EXERCISES: CPT

## 2025-07-28 PROCEDURE — 97140 MANUAL THERAPY 1/> REGIONS: CPT

## 2025-07-28 RX ORDER — MULTIVIT,CALC,MINS/IRON/FOLIC 9MG-400MCG
1 TABLET ORAL DAILY
Qty: 90 TABLET | Refills: 1 | Status: SHIPPED | OUTPATIENT
Start: 2025-07-28

## 2025-07-28 NOTE — FLOWSHEET NOTE
Progressing: [] Met: [] Not Met: [] Adjusted  Patient will have a decrease in pain to <4/10 to facilitate improvement in movement, function, and ADLs as indicated by Functional Deficits.  [] Progressing: [] Met: [] Not Met: [] Adjusted    IF APPLICABLE:  [] Patient to demonstrate independence in wear and care for custom orthotic device. (Only if applicable for orthotic eval)     Long Term Goals: To be achieved in: 6 weeks / DC   Disability index score of 20% or less for the Modified Oswestry to assist with reaching prior level of function with activities such as .  [] Progressing: [] Met: [] Not Met: [] Adjusted  Patient will demonstrate increased AROM of flexion/SB to 95/25 deg without pain to allow for proper joint functioning to enable patient to reaching into cabinets.   [] Progressing: [] Met: [] Not Met: [] Adjusted  Patient will demonstrate increased Strength of right proximal hip muscles  to at least +4/5 throughout without pain to allow for proper functional mobility to enable patient to return to  standing > hour without limitation .   [] Progressing: [] Met: [] Not Met: [] Adjusted  Patient will return to bending/ squatting to carry unloading laundry without increased symptoms or restriction.   [] Progressing: [] Met: [] Not Met: [] Adjusted   Patient to be able to sleep through night without pain.   [] Progressing: [] Met: [] Not Met: [] Adjusted     Overall Progression Towards Functional goals/ Treatment Progress Update:  [] Patient is progressing as expected towards functional goals listed.    [] Progression is slowed due to complexities/Impairments listed.  [] Progression has been slowed due to co-morbidities.  [x] Plan just implemented, too soon (<30days) to assess goals progression   [] Goals require adjustment due to lack of progress  [] Patient is not progressing as expected and requires additional follow up with physician  [] Other:     TREATMENT PLAN     Frequency/Duration: 2x/week for 6 weeks

## 2025-07-28 NOTE — TELEPHONE ENCOUNTER
Requested Prescriptions     Pending Prescriptions Disp Refills    Multiple Vitamins-Minerals (THERA-AMIRAH MAX-M) TABS 90 tablet 1     Sig: Take 1 tablet by mouth daily       Last Clinic Visit:  6/30/2025     Next Clinic Appointment:  Visit date not found

## 2025-07-29 ENCOUNTER — OFFICE VISIT (OUTPATIENT)
Dept: ENT CLINIC | Age: 50
End: 2025-07-29
Payer: COMMERCIAL

## 2025-07-29 VITALS — DIASTOLIC BLOOD PRESSURE: 87 MMHG | SYSTOLIC BLOOD PRESSURE: 144 MMHG | TEMPERATURE: 97.3 F | HEART RATE: 84 BPM

## 2025-07-29 DIAGNOSIS — J32.4 CHRONIC PANSINUSITIS: Primary | ICD-10-CM

## 2025-07-29 PROCEDURE — G8427 DOCREV CUR MEDS BY ELIG CLIN: HCPCS | Performed by: OTOLARYNGOLOGY

## 2025-07-29 PROCEDURE — G8417 CALC BMI ABV UP PARAM F/U: HCPCS | Performed by: OTOLARYNGOLOGY

## 2025-07-29 PROCEDURE — 3017F COLORECTAL CA SCREEN DOC REV: CPT | Performed by: OTOLARYNGOLOGY

## 2025-07-29 PROCEDURE — 1036F TOBACCO NON-USER: CPT | Performed by: OTOLARYNGOLOGY

## 2025-07-29 PROCEDURE — 99214 OFFICE O/P EST MOD 30 MIN: CPT | Performed by: OTOLARYNGOLOGY

## 2025-07-29 RX ORDER — LEVOFLOXACIN 500 MG/1
500 TABLET, FILM COATED ORAL DAILY
Qty: 10 TABLET | Refills: 0 | Status: SHIPPED | OUTPATIENT
Start: 2025-07-29 | End: 2025-08-08

## 2025-07-29 RX ORDER — ITRACONAZOLE 10 MG/ML
200 SOLUTION ORAL DAILY
Qty: 60 ML | Refills: 0 | Status: SHIPPED | OUTPATIENT
Start: 2025-07-29 | End: 2025-08-01

## 2025-07-29 RX ORDER — PREDNISONE 10 MG/1
TABLET ORAL
Qty: 38 TABLET | Refills: 0 | Status: SHIPPED | OUTPATIENT
Start: 2025-07-29

## 2025-07-29 ASSESSMENT — ENCOUNTER SYMPTOMS
SINUS PAIN: 1
VOICE CHANGE: 1

## 2025-07-29 NOTE — PROGRESS NOTES
Subjective:      Patient ID: Kenroy Griggs is a 49 y.o. female.    HPI  Chief Complaint   Patient presents with    Sinus pain and drainage  History of Present Illness:Kenroy is a(n) 49 y.o. female who presents with a long history of CRS and allergies and frequent steroid use presents with 2 weeks of worsening frontal sinus pain and mucus drainage and hoarse voice. Here for evaluation.       Patient Active Problem List   Diagnosis    Hypothyroidism    Chronic sinusitis    Iron deficiency anemia    Hyperlipidemia, mixed    Autoimmune hepatitis (HCC)    Anxiety    Atrial fibrillation with RVR (HCC)    GERD without esophagitis    Obstructive sleep apnea    History of ITP    Prediabetes    Needs flu shot    Acute bilateral low back pain without sciatica    Atypical pneumonia    Mild persistent asthma without complication    Spondylolisthesis of lumbar region    Lumbosacral spondylosis with radiculopathy     Past Surgical History:   Procedure Laterality Date    COLONOSCOPY N/A 12/29/2020    COLONOSCOPY DIAGNOSTIC performed by Prince Hankins MD at [facility] ENDOSCOPY    KNEE ARTHROSCOPY Left 9/9/2022    LEFT KNEE ARTHROSCOPY; PARTIAL MEDIAL MENISCECTOMY performed by Rio Ty MD at [facility] OR    OTHER SURGICAL HISTORY  8/22/2012    INCISION AND DRAINAGE POSTERIOR THIGH ABSCESS    PRE-MALIGNANT / BENIGN SKIN LESION EXCISION N/A 2/8/2022    EXCISION OF SCALP CYST performed by Milan iWn MD at [facility] OR    RECTAL SURGERY  Aug 2011    repair of rectal fissures and anal skin tag removal    SINUS SURGERY      X 3    TONSILLECTOMY  2004    TONSILLECTOMY AND ADENOIDECTOMY  2005    (partial adenoidectomy)    UPPER GASTROINTESTINAL ENDOSCOPY N/A 12/29/2020    EGD BIOPSY performed by Prince Hankins MD at [facility] ENDOSCOPY     Family History   Problem Relation Age of Onset    High Blood Pressure Mother     Hypertension Mother     Elevated Lipids Mother     Other Mother         fatty liver    Heart Disease Father

## 2025-07-30 ENCOUNTER — HOSPITAL ENCOUNTER (OUTPATIENT)
Dept: PHYSICAL THERAPY | Age: 50
Setting detail: THERAPIES SERIES
Discharge: HOME | End: 2025-07-30
Payer: COMMERCIAL

## 2025-07-30 PROCEDURE — 97140 MANUAL THERAPY 1/> REGIONS: CPT

## 2025-07-30 PROCEDURE — 97110 THERAPEUTIC EXERCISES: CPT

## 2025-07-30 PROCEDURE — 97530 THERAPEUTIC ACTIVITIES: CPT

## 2025-07-30 NOTE — FLOWSHEET NOTE
[facility]   [address]   [phone]         Post-Operative Instructions for Laser Surgery      23    Patient Name: Papo Lazar  : 1943    Tylenol is usually sufficient for any discomfort that you may feel. It is not uncommon for you to experience the sensation of a large floater in your vision. If you experience hundreds of thousands of floaters that do not stop, flashing or arcing lights that do not stop, or a curtain or veil of blackness that you cannot see through, that does not go away, please call the office [phone]. Follow up as planned.
follow up with physician  [] Other:     TREATMENT PLAN     Frequency/Duration: 2x/week for 6 weeks for the following treatment interventions:    Interventions:  Therapeutic Exercise (62444) including: strength training, ROM, and functional mobility  Therapeutic Activities (58237) including: functional mobility training and education.  Neuromuscular Re-education (22061) activation and proprioception, including postural re-education.    Gait Training (80090) for normalization of ambulation patterns and AD training.   Manual Therapy (53116) as indicated to include: Passive Range of Motion, Gr I-IV mobilizations, Grade V Manipulation, Soft Tissue Mobilization, Dry Needling/IASTM, Trigger Point Release, and Myofascial Release  Modalities as needed that may include: Cryotherapy, Electrical Stimulation, Biofeedback, Thermal Agents, Traction, and Ultrasound  Patient education on joint protection, postural re-education, activity modification, progression of HEP, and vestibular fuction/BPPV    Plan: Lumbar and hip mobility, ball/belt traction , hip strengthening    Electronically Signed by Shaun Cobian PT  Date: 07/30/2025   Note: Portions of this note have been templated and/or copied from initial evaluation, reassessments and prior notes for documentation efficiency.  Note: If patient does not return for scheduled/recommended follow up visits, this note will serve as a discharge from care along with the most recent update on progress.    Ortho Evaluation

## 2025-07-31 ENCOUNTER — HOSPITAL ENCOUNTER (OUTPATIENT)
Dept: PHYSICAL THERAPY | Age: 50
Setting detail: THERAPIES SERIES
Discharge: HOME | End: 2025-07-31
Payer: COMMERCIAL

## 2025-07-31 PROCEDURE — 97140 MANUAL THERAPY 1/> REGIONS: CPT

## 2025-07-31 PROCEDURE — 97110 THERAPEUTIC EXERCISES: CPT

## 2025-07-31 PROCEDURE — 97530 THERAPEUTIC ACTIVITIES: CPT

## 2025-07-31 NOTE — FLOWSHEET NOTE
[facility] - Outpatient Rehabilitation and Therapy: [address], [place] [address] office: [phone] fax: [phone]         Physical Therapy: TREATMENT/PROGRESS NOTE   Patient: Kenroy Griggs (49 y.o. female)   Examination Date: 2025   :  1975 MRN: [id number]   Visit #: 4   Insurance Allowable Auth Needed   Mn []Yes    [x]No    Insurance: Payor: [employer] / Plan: [employer] [place] MEDICAID / Product Type: *No Product type* /   Insurance ID: [id number] - (Medicaid Managed)  Secondary Insurance (if applicable):    Treatment Diagnosis:     ICD-10-CM    1. Lumbar spine pain  M54.50       2. Hip weakness  R29.898       3. Difficulty walking  R26.2       4. Impaired mobility and endurance  Z74.09          Medical Diagnosis:  Spondylolisthesis, lumbar region [M43.16]  Other spondylosis with radiculopathy, lumbosacral region [M47.27]   Referring Physician: Pavan Lima PA  PCP: Nav Molina DO     Plan of care signed (Y/N):     Date of Patient follow up with Physician:      Plan of Care Report: NO  POC update due: (10 visits /OR AUTH LIMITS, whichever is less)  2025                                             Medical History:  Comorbidities:  Other: Left knee scope, Asthma   Relevant Medical History:                                          Precautions/ Contra-indications:           Latex allergy:  NO  Pacemaker:    NO  Contraindications for Manipulation: None  Date of Surgery: NA  Other:    Red Flags:  None    Suicide Screening:   The patient did not verbalize a primary behavioral concern, suicidal ideation, suicidal intent, or demonstrate suicidal behaviors.    Preferred Language for Healthcare:   [x] English       [] other:    SUBJECTIVE EXAMINATION     Patient stated complaint: Patient reports she has a pinch in her LB. Did an exercises that hurt her post surgical knee. Felt the ball belt traction really helped    Eval: Patient reports that she's

## 2025-08-05 ENCOUNTER — HOSPITAL ENCOUNTER (OUTPATIENT)
Dept: PHYSICAL THERAPY | Age: 50
Setting detail: THERAPIES SERIES
Discharge: HOME | End: 2025-08-05
Payer: COMMERCIAL

## 2025-08-05 PROCEDURE — 97110 THERAPEUTIC EXERCISES: CPT

## 2025-08-05 PROCEDURE — 97140 MANUAL THERAPY 1/> REGIONS: CPT

## 2025-08-05 PROCEDURE — 97112 NEUROMUSCULAR REEDUCATION: CPT

## 2025-08-06 ENCOUNTER — HOSPITAL ENCOUNTER (OUTPATIENT)
Dept: PHYSICAL THERAPY | Age: 50
Setting detail: THERAPIES SERIES
Discharge: HOME | End: 2025-08-06
Payer: COMMERCIAL

## 2025-08-06 PROCEDURE — 97112 NEUROMUSCULAR REEDUCATION: CPT

## 2025-08-06 PROCEDURE — 97110 THERAPEUTIC EXERCISES: CPT

## 2025-08-07 ENCOUNTER — HOSPITAL ENCOUNTER (OUTPATIENT)
Dept: PHYSICAL THERAPY | Age: 50
Setting detail: THERAPIES SERIES
Discharge: HOME | End: 2025-08-07
Payer: COMMERCIAL

## 2025-08-07 PROCEDURE — 97112 NEUROMUSCULAR REEDUCATION: CPT

## 2025-08-07 PROCEDURE — 97110 THERAPEUTIC EXERCISES: CPT

## 2025-08-07 PROCEDURE — 97140 MANUAL THERAPY 1/> REGIONS: CPT

## 2025-08-11 ENCOUNTER — APPOINTMENT (OUTPATIENT)
Dept: PHYSICAL THERAPY | Age: 50
End: 2025-08-11
Payer: COMMERCIAL

## 2025-08-11 DIAGNOSIS — E03.9 HYPOTHYROIDISM, UNSPECIFIED TYPE: Chronic | ICD-10-CM

## 2025-08-11 RX ORDER — LEVOTHYROXINE SODIUM 150 UG/1
150 TABLET ORAL
Qty: 30 TABLET | Refills: 5 | Status: SHIPPED | OUTPATIENT
Start: 2025-08-11 | End: 2026-08-11

## 2025-08-12 ENCOUNTER — APPOINTMENT (OUTPATIENT)
Dept: PHYSICAL THERAPY | Age: 50
End: 2025-08-12
Payer: COMMERCIAL

## 2025-08-13 ENCOUNTER — TELEPHONE (OUTPATIENT)
Dept: ENT CLINIC | Age: 50
End: 2025-08-13

## 2025-08-14 ENCOUNTER — OFFICE VISIT (OUTPATIENT)
Dept: ORTHOPEDIC SURGERY | Age: 50
End: 2025-08-14

## 2025-08-14 ENCOUNTER — APPOINTMENT (OUTPATIENT)
Dept: PHYSICAL THERAPY | Age: 50
End: 2025-08-14
Payer: COMMERCIAL

## 2025-08-14 VITALS — HEIGHT: 63 IN | WEIGHT: 225 LBS | BODY MASS INDEX: 39.87 KG/M2

## 2025-08-14 DIAGNOSIS — M17.12 PRIMARY OSTEOARTHRITIS OF LEFT KNEE: Primary | ICD-10-CM

## 2025-08-14 DIAGNOSIS — M25.562 LEFT KNEE PAIN, UNSPECIFIED CHRONICITY: ICD-10-CM

## 2025-08-14 DIAGNOSIS — M17.0 PRIMARY OSTEOARTHRITIS OF KNEES, BILATERAL: ICD-10-CM

## 2025-08-14 DIAGNOSIS — M25.561 RIGHT KNEE PAIN, UNSPECIFIED CHRONICITY: ICD-10-CM

## 2025-08-14 RX ORDER — BUPIVACAINE HYDROCHLORIDE 5 MG/ML
4 INJECTION, SOLUTION PERINEURAL ONCE
Status: COMPLETED | OUTPATIENT
Start: 2025-08-14 | End: 2025-08-14

## 2025-08-14 RX ORDER — METHYLPREDNISOLONE ACETATE 40 MG/ML
40 INJECTION, SUSPENSION INTRA-ARTICULAR; INTRALESIONAL; INTRAMUSCULAR; SOFT TISSUE ONCE
Status: COMPLETED | OUTPATIENT
Start: 2025-08-14 | End: 2025-08-14

## 2025-08-14 RX ORDER — METHYLPREDNISOLONE 4 MG/1
TABLET ORAL
Qty: 1 KIT | Refills: 0 | Status: SHIPPED | OUTPATIENT
Start: 2025-08-14

## 2025-08-14 RX ADMIN — METHYLPREDNISOLONE ACETATE 40 MG: 40 INJECTION, SUSPENSION INTRA-ARTICULAR; INTRALESIONAL; INTRAMUSCULAR; SOFT TISSUE at 10:55

## 2025-08-14 RX ADMIN — BUPIVACAINE HYDROCHLORIDE 20 MG: 5 INJECTION, SOLUTION PERINEURAL at 10:48

## 2025-08-14 RX ADMIN — BUPIVACAINE HYDROCHLORIDE 20 MG: 5 INJECTION, SOLUTION PERINEURAL at 10:49

## 2025-08-15 DIAGNOSIS — M43.16 SPONDYLOLISTHESIS OF LUMBAR REGION: Primary | ICD-10-CM

## 2025-08-18 ENCOUNTER — HOSPITAL ENCOUNTER (OUTPATIENT)
Dept: PHYSICAL THERAPY | Age: 50
Setting detail: THERAPIES SERIES
Discharge: HOME | End: 2025-08-18
Payer: COMMERCIAL

## 2025-08-18 PROCEDURE — 97530 THERAPEUTIC ACTIVITIES: CPT

## 2025-08-18 PROCEDURE — 97112 NEUROMUSCULAR REEDUCATION: CPT

## 2025-08-18 PROCEDURE — 97110 THERAPEUTIC EXERCISES: CPT

## 2025-08-18 PROCEDURE — 97140 MANUAL THERAPY 1/> REGIONS: CPT

## 2025-08-18 RX ORDER — METHOCARBAMOL 750 MG/1
750 TABLET, FILM COATED ORAL 3 TIMES DAILY
Qty: 90 TABLET | Refills: 0 | Status: SHIPPED | OUTPATIENT
Start: 2025-08-18

## 2025-08-20 ENCOUNTER — HOSPITAL ENCOUNTER (OUTPATIENT)
Dept: PHYSICAL THERAPY | Age: 50
Setting detail: THERAPIES SERIES
End: 2025-08-20
Payer: COMMERCIAL

## 2025-08-27 ENCOUNTER — TELEPHONE (OUTPATIENT)
Dept: ENT CLINIC | Age: 50
End: 2025-08-27

## 2025-08-27 ENCOUNTER — HOSPITAL ENCOUNTER (OUTPATIENT)
Dept: CT IMAGING | Age: 50
Discharge: HOME | End: 2025-08-27
Attending: OTOLARYNGOLOGY
Payer: COMMERCIAL

## 2025-08-27 ENCOUNTER — HOSPITAL ENCOUNTER (OUTPATIENT)
Dept: PHYSICAL THERAPY | Age: 50
Setting detail: THERAPIES SERIES
Discharge: HOME | End: 2025-08-27
Payer: COMMERCIAL

## 2025-08-27 DIAGNOSIS — J32.4 CHRONIC PANSINUSITIS: ICD-10-CM

## 2025-08-27 PROCEDURE — 70486 CT MAXILLOFACIAL W/O DYE: CPT

## 2025-08-27 PROCEDURE — 97110 THERAPEUTIC EXERCISES: CPT

## 2025-08-27 PROCEDURE — 97112 NEUROMUSCULAR REEDUCATION: CPT

## 2025-08-28 DIAGNOSIS — J32.4 CHRONIC PANSINUSITIS: ICD-10-CM

## 2025-08-28 DIAGNOSIS — L73.9 NASAL FOLLICULITIS: ICD-10-CM

## 2025-08-28 RX ORDER — FLUTICASONE PROPIONATE 93 UG/1
1 SPRAY, METERED NASAL DAILY
Qty: 48 ML | Refills: 5 | Status: SHIPPED | OUTPATIENT
Start: 2025-08-28

## 2025-08-28 RX ORDER — MUPIROCIN 2 %
OINTMENT (GRAM) TOPICAL
Qty: 22 G | Refills: 1 | Status: SHIPPED | OUTPATIENT
Start: 2025-08-28

## 2025-08-29 PROBLEM — B37.9 YEAST INFECTION: Status: ACTIVE | Noted: 2025-08-29

## 2025-09-02 ENCOUNTER — OFFICE VISIT (OUTPATIENT)
Dept: ENT CLINIC | Age: 50
End: 2025-09-02
Payer: COMMERCIAL

## 2025-09-02 VITALS
HEART RATE: 99 BPM | BODY MASS INDEX: 40.74 KG/M2 | WEIGHT: 230 LBS | SYSTOLIC BLOOD PRESSURE: 124 MMHG | DIASTOLIC BLOOD PRESSURE: 80 MMHG

## 2025-09-02 DIAGNOSIS — J32.4 CHRONIC PANSINUSITIS: Primary | ICD-10-CM

## 2025-09-02 PROCEDURE — G8417 CALC BMI ABV UP PARAM F/U: HCPCS | Performed by: OTOLARYNGOLOGY

## 2025-09-02 PROCEDURE — 1036F TOBACCO NON-USER: CPT | Performed by: OTOLARYNGOLOGY

## 2025-09-02 PROCEDURE — 3017F COLORECTAL CA SCREEN DOC REV: CPT | Performed by: OTOLARYNGOLOGY

## 2025-09-02 PROCEDURE — G8428 CUR MEDS NOT DOCUMENT: HCPCS | Performed by: OTOLARYNGOLOGY

## 2025-09-02 PROCEDURE — 99214 OFFICE O/P EST MOD 30 MIN: CPT | Performed by: OTOLARYNGOLOGY

## 2025-09-02 ASSESSMENT — ENCOUNTER SYMPTOMS
VOICE CHANGE: 0
EYE ITCHING: 0
NAUSEA: 0
EYE PAIN: 0
TROUBLE SWALLOWING: 0
RHINORRHEA: 0
SHORTNESS OF BREATH: 0
COUGH: 0
SINUS PAIN: 0
SINUS PRESSURE: 0
FACIAL SWELLING: 0
SORE THROAT: 0
CHOKING: 0
DIARRHEA: 0

## 2025-09-03 ENCOUNTER — HOSPITAL ENCOUNTER (OUTPATIENT)
Dept: PHYSICAL THERAPY | Age: 50
Setting detail: THERAPIES SERIES
Discharge: HOME | End: 2025-09-03
Payer: COMMERCIAL

## 2025-09-03 PROCEDURE — 97110 THERAPEUTIC EXERCISES: CPT

## 2025-09-03 PROCEDURE — 97112 NEUROMUSCULAR REEDUCATION: CPT

## 2025-09-03 PROCEDURE — 97140 MANUAL THERAPY 1/> REGIONS: CPT

## (undated) DEVICE — SYRINGE MED 10ML LUERLOCK TIP W/O SFTY DISP

## (undated) DEVICE — TRAY PREP DRY W/ PREM GLV 2 APPL 6 SPNG 2 UNDPD 1 OVERWRAP

## (undated) DEVICE — SOLUTION IRRIG 3000ML 0.9% SOD CHL USP UROMATIC PLAS CONT

## (undated) DEVICE — GLOVE SURG SZ 7 L12IN FNGR THK75MIL WHT LTX POLYMER BEAD

## (undated) DEVICE — ELECTRODE ELECSURG NDL 2.8 INX7.2 CM COAT INSUL EDGE

## (undated) DEVICE — PROCEDURE KIT ENDOSCP CUST

## (undated) DEVICE — MASC TURNOVER KIT: Brand: MEDLINE INDUSTRIES, INC.

## (undated) DEVICE — GLOVE ORANGE PI 7 1/2   MSG9075

## (undated) DEVICE — TOWEL,OR,DSP,ST,BLUE,STD,4/PK,20PK/CS: Brand: MEDLINE

## (undated) DEVICE — STRIP,CLOSURE,WOUND,MEDI-STRIP,1/2X4: Brand: MEDLINE

## (undated) DEVICE — CAUTERY TIP POLISHER: Brand: DEVON

## (undated) DEVICE — BW-412T DISP COMBO CLEANING BRUSH: Brand: SINGLE USE COMBINATION CLEANING BRUSH

## (undated) DEVICE — MASTISOL ADHESIVE LIQ 2/3ML

## (undated) DEVICE — DYONICS 25 INFLOW TUBE SET, 3 PER BOX

## (undated) DEVICE — APPLICATOR MEDICATED 26 CC SOLUTION HI LT ORNG CHLORAPREP

## (undated) DEVICE — 4.5 MM FULL RADIUS ELITE STRAIGHT                                    DISPOSABLE BLADES, MAROON,PACKAGED 6                                    PER BOX, STERILE

## (undated) DEVICE — MOUTHPIECE ENDOSCP L CTRL OPN AND SIDE PORTS DISP

## (undated) DEVICE — 3M™ STERI-DRAPE™ ARTHROSCOPY SHEET WITH POUCH 1194: Brand: STERI-DRAPE™

## (undated) DEVICE — SHEET,DRAPE,53X77,STERILE: Brand: MEDLINE

## (undated) DEVICE — SOLUTION IV IRRIG WATER 500ML POUR BRL ST 2F7113

## (undated) DEVICE — SUTURE MCRYL SZ 4-0 L27IN ABSRB UD L19MM PS-2 1/2 CIR PRIM Y426H

## (undated) DEVICE — 3.5 MM FULL RADIUS ELITE STRAIGHT                                    DISPOSABLE BLADES, BEIGE,PACKAGED 6                                    PER BOX, STERILE

## (undated) DEVICE — GLOVE SURG SZ 75 L12IN FNGR THK79MIL GRN LTX FREE

## (undated) DEVICE — GENERAL: Brand: MEDLINE INDUSTRIES, INC.

## (undated) DEVICE — GOWN SIRUS NONREIN XL W/TWL: Brand: MEDLINE INDUSTRIES, INC.

## (undated) DEVICE — E-Z CLEAN, NON-STICK, PTFE COATED, ELECTROSURGICAL BLADE ELECTRODE, MODIFIED EXTENDED INSULATION, 2.5 INCH (6.35 CM): Brand: MEGADYNE

## (undated) DEVICE — TUBING SUCT 10FR MAL ALUM SHFT FN CAP VENT UNIV CONN W/ OBT

## (undated) DEVICE — SET VLV 3 PC AWS DISPOSABLE GRDIAN SCOPEVALET

## (undated) DEVICE — FORCEPS BX L240CM WRK CHN 2.8MM STD CAP W/ NDL MIC MESH